# Patient Record
Sex: MALE | Race: WHITE | NOT HISPANIC OR LATINO | Employment: OTHER | URBAN - METROPOLITAN AREA
[De-identification: names, ages, dates, MRNs, and addresses within clinical notes are randomized per-mention and may not be internally consistent; named-entity substitution may affect disease eponyms.]

---

## 2017-01-02 ENCOUNTER — TRANSCRIBE ORDERS (OUTPATIENT)
Dept: LAB | Facility: OTHER | Age: 72
End: 2017-01-02

## 2017-01-02 ENCOUNTER — APPOINTMENT (OUTPATIENT)
Dept: LAB | Facility: OTHER | Age: 72
End: 2017-01-02
Payer: MEDICARE

## 2017-01-02 DIAGNOSIS — R73.09 OTHER ABNORMAL GLUCOSE: ICD-10-CM

## 2017-01-02 DIAGNOSIS — M54.9 DORSALGIA: ICD-10-CM

## 2017-01-02 DIAGNOSIS — K21.9 GASTRO-ESOPHAGEAL REFLUX DISEASE WITHOUT ESOPHAGITIS: ICD-10-CM

## 2017-01-02 DIAGNOSIS — I10 ESSENTIAL (PRIMARY) HYPERTENSION: ICD-10-CM

## 2017-01-02 DIAGNOSIS — E78.00 PURE HYPERCHOLESTEROLEMIA: ICD-10-CM

## 2017-01-02 LAB
ANION GAP SERPL CALCULATED.3IONS-SCNC: 10 MMOL/L (ref 4–13)
BASOPHILS # BLD AUTO: 0.01 THOUSANDS/ΜL (ref 0–0.1)
BASOPHILS NFR BLD AUTO: 0 % (ref 0–1)
BUN SERPL-MCNC: 20 MG/DL (ref 5–25)
CALCIUM SERPL-MCNC: 8.8 MG/DL (ref 8.3–10.1)
CHLORIDE SERPL-SCNC: 107 MMOL/L (ref 100–108)
CHOLEST SERPL-MCNC: 152 MG/DL (ref 50–200)
CO2 SERPL-SCNC: 25 MMOL/L (ref 21–32)
CREAT SERPL-MCNC: 1.04 MG/DL (ref 0.6–1.3)
EOSINOPHIL # BLD AUTO: 0.12 THOUSAND/ΜL (ref 0–0.61)
EOSINOPHIL NFR BLD AUTO: 2 % (ref 0–6)
ERYTHROCYTE [DISTWIDTH] IN BLOOD BY AUTOMATED COUNT: 12.6 % (ref 11.6–15.1)
EST. AVERAGE GLUCOSE BLD GHB EST-MCNC: 120 MG/DL
GFR SERPL CREATININE-BSD FRML MDRD: >60 ML/MIN/1.73SQ M
GLUCOSE SERPL-MCNC: 101 MG/DL (ref 65–140)
HBA1C MFR BLD: 5.8 % (ref 4.2–6.3)
HCT VFR BLD AUTO: 42.5 % (ref 36.5–49.3)
HDLC SERPL-MCNC: 52 MG/DL (ref 40–60)
HGB BLD-MCNC: 14.7 G/DL (ref 12–17)
LDLC SERPL CALC-MCNC: 65 MG/DL (ref 0–100)
LYMPHOCYTES # BLD AUTO: 1.62 THOUSANDS/ΜL (ref 0.6–4.47)
LYMPHOCYTES NFR BLD AUTO: 32 % (ref 14–44)
MCH RBC QN AUTO: 33.9 PG (ref 26.8–34.3)
MCHC RBC AUTO-ENTMCNC: 34.6 G/DL (ref 31.4–37.4)
MCV RBC AUTO: 98 FL (ref 82–98)
MONOCYTES # BLD AUTO: 0.54 THOUSAND/ΜL (ref 0.17–1.22)
MONOCYTES NFR BLD AUTO: 11 % (ref 4–12)
NEUTROPHILS # BLD AUTO: 2.79 THOUSANDS/ΜL (ref 1.85–7.62)
NEUTS SEG NFR BLD AUTO: 55 % (ref 43–75)
NRBC BLD AUTO-RTO: 0 /100 WBCS
PLATELET # BLD AUTO: 173 THOUSANDS/UL (ref 149–390)
PMV BLD AUTO: 10.9 FL (ref 8.9–12.7)
POTASSIUM SERPL-SCNC: 3.9 MMOL/L (ref 3.5–5.3)
RBC # BLD AUTO: 4.33 MILLION/UL (ref 3.88–5.62)
SODIUM SERPL-SCNC: 142 MMOL/L (ref 136–145)
TRIGL SERPL-MCNC: 176 MG/DL
WBC # BLD AUTO: 5.09 THOUSAND/UL (ref 4.31–10.16)

## 2017-01-02 PROCEDURE — 80061 LIPID PANEL: CPT

## 2017-01-02 PROCEDURE — 36415 COLL VENOUS BLD VENIPUNCTURE: CPT

## 2017-01-02 PROCEDURE — 83036 HEMOGLOBIN GLYCOSYLATED A1C: CPT

## 2017-01-02 PROCEDURE — 85025 COMPLETE CBC W/AUTO DIFF WBC: CPT

## 2017-01-02 PROCEDURE — 80048 BASIC METABOLIC PNL TOTAL CA: CPT

## 2017-01-16 ENCOUNTER — APPOINTMENT (OUTPATIENT)
Dept: LAB | Facility: CLINIC | Age: 72
End: 2017-01-16
Payer: MEDICARE

## 2017-01-16 ENCOUNTER — ALLSCRIPTS OFFICE VISIT (OUTPATIENT)
Dept: OTHER | Facility: OTHER | Age: 72
End: 2017-01-16

## 2017-01-16 DIAGNOSIS — M54.9 DORSALGIA: ICD-10-CM

## 2017-01-16 LAB
BILIRUB UR QL STRIP: NEGATIVE
CLARITY UR: NORMAL
COLOR UR: YELLOW
GLUCOSE UR STRIP-MCNC: NEGATIVE MG/DL
HGB UR QL STRIP.AUTO: NEGATIVE
KETONES UR STRIP-MCNC: NEGATIVE MG/DL
LEUKOCYTE ESTERASE UR QL STRIP: NEGATIVE
NITRITE UR QL STRIP: NEGATIVE
PH UR STRIP.AUTO: 7.5 [PH] (ref 4.5–8)
PROT UR STRIP-MCNC: NEGATIVE MG/DL
SP GR UR STRIP.AUTO: 1.02 (ref 1–1.03)
UROBILINOGEN UR QL STRIP.AUTO: 1 E.U./DL

## 2017-01-16 PROCEDURE — 81003 URINALYSIS AUTO W/O SCOPE: CPT

## 2017-01-30 ENCOUNTER — HOSPITAL ENCOUNTER (OUTPATIENT)
Dept: ULTRASOUND IMAGING | Facility: CLINIC | Age: 72
Discharge: HOME | End: 2017-01-30
Payer: MEDICARE

## 2017-01-30 DIAGNOSIS — M54.9 DORSALGIA: ICD-10-CM

## 2017-01-30 PROCEDURE — 76770 US EXAM ABDO BACK WALL COMP: CPT

## 2017-03-31 ENCOUNTER — ALLSCRIPTS OFFICE VISIT (OUTPATIENT)
Dept: OTHER | Facility: OTHER | Age: 72
End: 2017-03-31

## 2017-03-31 DIAGNOSIS — R07.9 CHEST PAIN: ICD-10-CM

## 2017-03-31 DIAGNOSIS — R94.39 OTHER NONSPECIFIC ABNORMAL CARDIOVASCULAR SYSTEM FUNCTION STUDY: ICD-10-CM

## 2017-03-31 DIAGNOSIS — I10 ESSENTIAL (PRIMARY) HYPERTENSION: ICD-10-CM

## 2017-03-31 DIAGNOSIS — E78.00 PURE HYPERCHOLESTEROLEMIA: ICD-10-CM

## 2017-04-18 ENCOUNTER — HOSPITAL ENCOUNTER (OUTPATIENT)
Dept: NON INVASIVE DIAGNOSTICS | Facility: CLINIC | Age: 72
Discharge: HOME | End: 2017-04-18
Payer: MEDICARE

## 2017-04-18 ENCOUNTER — ALLSCRIPTS OFFICE VISIT (OUTPATIENT)
Dept: OTHER | Facility: OTHER | Age: 72
End: 2017-04-18

## 2017-04-18 DIAGNOSIS — R07.9 CHEST PAIN: ICD-10-CM

## 2017-04-18 LAB
MAX DIASTOLIC BP: 76 MMHG
MAX HEART RATE: 123 BPM
MAX PREDICTED HEART RATE: 147 BPM
MAX. SYSTOLIC BP: 232 MMHG
PROTOCOL NAME: NORMAL
REASON FOR TERMINATION: NORMAL
TARGET HR FORMULA: NORMAL
TEST INDICATION: NORMAL
TIME IN EXERCISE PHASE: 480 S

## 2017-04-18 PROCEDURE — 93350 STRESS TTE ONLY: CPT

## 2017-04-22 ENCOUNTER — APPOINTMENT (EMERGENCY)
Dept: RADIOLOGY | Facility: HOSPITAL | Age: 72
DRG: 287 | End: 2017-04-22
Payer: MEDICARE

## 2017-04-22 ENCOUNTER — HOSPITAL ENCOUNTER (INPATIENT)
Facility: HOSPITAL | Age: 72
LOS: 2 days | Discharge: HOME | DRG: 287 | End: 2017-04-24
Attending: EMERGENCY MEDICINE | Admitting: INTERNAL MEDICINE
Payer: MEDICARE

## 2017-04-22 DIAGNOSIS — R07.9 CHEST PAIN: Primary | ICD-10-CM

## 2017-04-22 PROBLEM — E78.5 HYPERLIPIDEMIA: Chronic | Status: ACTIVE | Noted: 2017-04-22

## 2017-04-22 PROBLEM — I10 HTN (HYPERTENSION): Chronic | Status: ACTIVE | Noted: 2017-04-22

## 2017-04-22 LAB
ANION GAP SERPL CALCULATED.3IONS-SCNC: 10 MMOL/L (ref 4–13)
ANION GAP SERPL CALCULATED.3IONS-SCNC: 11 MMOL/L (ref 4–13)
BASOPHILS # BLD AUTO: 0.03 THOUSANDS/ΜL (ref 0–0.1)
BASOPHILS NFR BLD AUTO: 1 % (ref 0–1)
BUN SERPL-MCNC: 21 MG/DL (ref 5–25)
BUN SERPL-MCNC: 25 MG/DL (ref 5–25)
CALCIUM SERPL-MCNC: 8.5 MG/DL (ref 8.3–10.1)
CALCIUM SERPL-MCNC: 8.7 MG/DL (ref 8.3–10.1)
CHLORIDE SERPL-SCNC: 107 MMOL/L (ref 100–108)
CHLORIDE SERPL-SCNC: 108 MMOL/L (ref 100–108)
CHOLEST SERPL-MCNC: 120 MG/DL (ref 50–200)
CO2 SERPL-SCNC: 24 MMOL/L (ref 21–32)
CO2 SERPL-SCNC: 24 MMOL/L (ref 21–32)
CREAT SERPL-MCNC: 1.09 MG/DL (ref 0.6–1.3)
CREAT SERPL-MCNC: 1.12 MG/DL (ref 0.6–1.3)
EOSINOPHIL # BLD AUTO: 0.12 THOUSAND/ΜL (ref 0–0.61)
EOSINOPHIL NFR BLD AUTO: 2 % (ref 0–6)
ERYTHROCYTE [DISTWIDTH] IN BLOOD BY AUTOMATED COUNT: 12.7 % (ref 11.6–15.1)
ERYTHROCYTE [DISTWIDTH] IN BLOOD BY AUTOMATED COUNT: 12.9 % (ref 11.6–15.1)
GFR SERPL CREATININE-BSD FRML MDRD: >60 ML/MIN/1.73SQ M
GFR SERPL CREATININE-BSD FRML MDRD: >60 ML/MIN/1.73SQ M
GLUCOSE SERPL-MCNC: 112 MG/DL (ref 65–140)
GLUCOSE SERPL-MCNC: 124 MG/DL (ref 65–140)
HCT VFR BLD AUTO: 40.4 % (ref 36.5–49.3)
HCT VFR BLD AUTO: 45.7 % (ref 36.5–49.3)
HDLC SERPL-MCNC: 53 MG/DL (ref 40–60)
HGB BLD-MCNC: 14 G/DL (ref 12–17)
HGB BLD-MCNC: 15.5 G/DL (ref 12–17)
LDLC SERPL CALC-MCNC: 50 MG/DL (ref 0–100)
LYMPHOCYTES # BLD AUTO: 2.35 THOUSANDS/ΜL (ref 0.6–4.47)
LYMPHOCYTES NFR BLD AUTO: 36 % (ref 14–44)
MAGNESIUM SERPL-MCNC: 1.8 MG/DL (ref 1.6–2.6)
MCH RBC QN AUTO: 34.2 PG (ref 26.8–34.3)
MCH RBC QN AUTO: 34.3 PG (ref 26.8–34.3)
MCHC RBC AUTO-ENTMCNC: 33.9 G/DL (ref 31.4–37.4)
MCHC RBC AUTO-ENTMCNC: 34.7 G/DL (ref 31.4–37.4)
MCV RBC AUTO: 101 FL (ref 82–98)
MCV RBC AUTO: 99 FL (ref 82–98)
MONOCYTES # BLD AUTO: 0.78 THOUSAND/ΜL (ref 0.17–1.22)
MONOCYTES NFR BLD AUTO: 12 % (ref 4–12)
NEUTROPHILS # BLD AUTO: 3.23 THOUSANDS/ΜL (ref 1.85–7.62)
NEUTS SEG NFR BLD AUTO: 50 % (ref 43–75)
NRBC BLD AUTO-RTO: 0 /100 WBCS
PLATELET # BLD AUTO: 185 THOUSANDS/UL (ref 149–390)
PLATELET # BLD AUTO: 197 THOUSANDS/UL (ref 149–390)
PMV BLD AUTO: 10.1 FL (ref 8.9–12.7)
PMV BLD AUTO: 10.4 FL (ref 8.9–12.7)
POTASSIUM SERPL-SCNC: 3.9 MMOL/L (ref 3.5–5.3)
POTASSIUM SERPL-SCNC: 5.5 MMOL/L (ref 3.5–5.3)
RBC # BLD AUTO: 4.08 MILLION/UL (ref 3.88–5.62)
RBC # BLD AUTO: 4.53 MILLION/UL (ref 3.88–5.62)
SODIUM SERPL-SCNC: 141 MMOL/L (ref 136–145)
SODIUM SERPL-SCNC: 143 MMOL/L (ref 136–145)
TRIGL SERPL-MCNC: 83 MG/DL
TROPONIN I SERPL-MCNC: <0.02 NG/ML
WBC # BLD AUTO: 6.08 THOUSAND/UL (ref 4.31–10.16)
WBC # BLD AUTO: 6.52 THOUSAND/UL (ref 4.31–10.16)

## 2017-04-22 PROCEDURE — 84484 ASSAY OF TROPONIN QUANT: CPT | Performed by: PHYSICIAN ASSISTANT

## 2017-04-22 PROCEDURE — 80048 BASIC METABOLIC PNL TOTAL CA: CPT

## 2017-04-22 PROCEDURE — 80061 LIPID PANEL: CPT | Performed by: INTERNAL MEDICINE

## 2017-04-22 PROCEDURE — 36415 COLL VENOUS BLD VENIPUNCTURE: CPT

## 2017-04-22 PROCEDURE — 84484 ASSAY OF TROPONIN QUANT: CPT

## 2017-04-22 PROCEDURE — 99285 EMERGENCY DEPT VISIT HI MDM: CPT

## 2017-04-22 PROCEDURE — 83735 ASSAY OF MAGNESIUM: CPT | Performed by: INTERNAL MEDICINE

## 2017-04-22 PROCEDURE — 93005 ELECTROCARDIOGRAM TRACING: CPT

## 2017-04-22 PROCEDURE — 80048 BASIC METABOLIC PNL TOTAL CA: CPT | Performed by: INTERNAL MEDICINE

## 2017-04-22 PROCEDURE — 71020 HB CHEST X-RAY 2VW FRONTAL&LATL: CPT

## 2017-04-22 PROCEDURE — 85025 COMPLETE CBC W/AUTO DIFF WBC: CPT

## 2017-04-22 PROCEDURE — 85027 COMPLETE CBC AUTOMATED: CPT | Performed by: INTERNAL MEDICINE

## 2017-04-22 PROCEDURE — 94760 N-INVAS EAR/PLS OXIMETRY 1: CPT

## 2017-04-22 RX ORDER — FENOFIBRATE 145 MG/1
145 TABLET, COATED ORAL DAILY
COMMUNITY
Start: 2015-11-12 | End: 2018-05-12 | Stop reason: SDUPTHER

## 2017-04-22 RX ORDER — AMLODIPINE BESYLATE 5 MG/1
5 TABLET ORAL DAILY
COMMUNITY
Start: 2014-09-19 | End: 2018-03-12 | Stop reason: SDUPTHER

## 2017-04-22 RX ORDER — NITROGLYCERIN 0.4 MG/1
0.4 TABLET SUBLINGUAL ONCE
Status: COMPLETED | OUTPATIENT
Start: 2017-04-22 | End: 2017-04-22

## 2017-04-22 RX ORDER — HYDRALAZINE HYDROCHLORIDE 20 MG/ML
5 INJECTION INTRAMUSCULAR; INTRAVENOUS EVERY 6 HOURS PRN
Status: DISCONTINUED | OUTPATIENT
Start: 2017-04-22 | End: 2017-04-24 | Stop reason: HOSPADM

## 2017-04-22 RX ORDER — AMLODIPINE BESYLATE 5 MG/1
5 TABLET ORAL DAILY
Status: DISCONTINUED | OUTPATIENT
Start: 2017-04-22 | End: 2017-04-24 | Stop reason: HOSPADM

## 2017-04-22 RX ORDER — ASPIRIN 81 MG/1
81 TABLET, CHEWABLE ORAL
COMMUNITY
End: 2018-03-01

## 2017-04-22 RX ORDER — DOCUSATE SODIUM 100 MG/1
100 CAPSULE, LIQUID FILLED ORAL 2 TIMES DAILY
Status: DISCONTINUED | OUTPATIENT
Start: 2017-04-22 | End: 2017-04-24 | Stop reason: HOSPADM

## 2017-04-22 RX ORDER — PRAVASTATIN SODIUM 40 MG
40 TABLET ORAL
Status: DISCONTINUED | OUTPATIENT
Start: 2017-04-22 | End: 2017-04-24 | Stop reason: HOSPADM

## 2017-04-22 RX ORDER — FENOFIBRATE 145 MG/1
145 TABLET, COATED ORAL DAILY
Status: DISCONTINUED | OUTPATIENT
Start: 2017-04-22 | End: 2017-04-24 | Stop reason: HOSPADM

## 2017-04-22 RX ORDER — LISINOPRIL 40 MG/1
40 TABLET ORAL DAILY
COMMUNITY
Start: 2016-01-18 | End: 2018-03-08 | Stop reason: SDUPTHER

## 2017-04-22 RX ORDER — ACETAMINOPHEN 325 MG/1
650 TABLET ORAL EVERY 4 HOURS PRN
Status: DISCONTINUED | OUTPATIENT
Start: 2017-04-22 | End: 2017-04-24 | Stop reason: HOSPADM

## 2017-04-22 RX ORDER — LISINOPRIL 20 MG/1
40 TABLET ORAL DAILY
Status: DISCONTINUED | OUTPATIENT
Start: 2017-04-22 | End: 2017-04-24 | Stop reason: HOSPADM

## 2017-04-22 RX ORDER — MULTIVITAMIN
1 TABLET ORAL
COMMUNITY
End: 2018-03-08

## 2017-04-22 RX ORDER — LOVASTATIN 40 MG/1
40 TABLET ORAL DAILY
COMMUNITY
Start: 2016-01-26 | End: 2018-05-01 | Stop reason: SDUPTHER

## 2017-04-22 RX ORDER — ASPIRIN 81 MG/1
81 TABLET, CHEWABLE ORAL DAILY
Status: DISCONTINUED | OUTPATIENT
Start: 2017-04-22 | End: 2017-04-24 | Stop reason: HOSPADM

## 2017-04-22 RX ORDER — ONDANSETRON 2 MG/ML
4 INJECTION INTRAMUSCULAR; INTRAVENOUS EVERY 6 HOURS PRN
Status: DISCONTINUED | OUTPATIENT
Start: 2017-04-22 | End: 2017-04-24 | Stop reason: HOSPADM

## 2017-04-22 RX ADMIN — ENOXAPARIN SODIUM 40 MG: 40 INJECTION SUBCUTANEOUS at 08:53

## 2017-04-22 RX ADMIN — PRAVASTATIN SODIUM 40 MG: 40 TABLET ORAL at 16:19

## 2017-04-22 RX ADMIN — FENOFIBRATE 145 MG: 145 TABLET ORAL at 08:54

## 2017-04-22 RX ADMIN — DOCUSATE SODIUM 100 MG: 100 CAPSULE, LIQUID FILLED ORAL at 16:19

## 2017-04-22 RX ADMIN — DOCUSATE SODIUM 100 MG: 100 CAPSULE, LIQUID FILLED ORAL at 08:54

## 2017-04-22 RX ADMIN — NITROGLYCERIN 0.4 MG: 0.4 TABLET SUBLINGUAL at 04:00

## 2017-04-22 RX ADMIN — AMLODIPINE BESYLATE 5 MG: 5 TABLET ORAL at 08:54

## 2017-04-22 RX ADMIN — METOPROLOL TARTRATE 25 MG: 25 TABLET ORAL at 08:54

## 2017-04-23 LAB
INR PPP: 1.02 (ref 0.86–1.16)
MAGNESIUM SERPL-MCNC: 2.2 MG/DL (ref 1.6–2.6)
PROTHROMBIN TIME: 13.3 SECONDS (ref 12–14.3)
TROPONIN I SERPL-MCNC: <0.02 NG/ML
TROPONIN I SERPL-MCNC: <0.02 NG/ML

## 2017-04-23 PROCEDURE — 93005 ELECTROCARDIOGRAM TRACING: CPT | Performed by: PHYSICIAN ASSISTANT

## 2017-04-23 PROCEDURE — 83735 ASSAY OF MAGNESIUM: CPT | Performed by: PHYSICIAN ASSISTANT

## 2017-04-23 PROCEDURE — 84484 ASSAY OF TROPONIN QUANT: CPT | Performed by: PHYSICIAN ASSISTANT

## 2017-04-23 PROCEDURE — 85610 PROTHROMBIN TIME: CPT | Performed by: PHYSICIAN ASSISTANT

## 2017-04-23 PROCEDURE — 93005 ELECTROCARDIOGRAM TRACING: CPT

## 2017-04-23 RX ORDER — ASPIRIN 81 MG/1
324 TABLET, CHEWABLE ORAL ONCE
Status: COMPLETED | OUTPATIENT
Start: 2017-04-23 | End: 2017-04-23

## 2017-04-23 RX ORDER — NITROGLYCERIN 0.4 MG/1
0.4 TABLET SUBLINGUAL
Status: DISCONTINUED | OUTPATIENT
Start: 2017-04-23 | End: 2017-04-24 | Stop reason: HOSPADM

## 2017-04-23 RX ORDER — SODIUM CHLORIDE 9 MG/ML
125 INJECTION, SOLUTION INTRAVENOUS CONTINUOUS
Status: DISCONTINUED | OUTPATIENT
Start: 2017-04-24 | End: 2017-04-24 | Stop reason: HOSPADM

## 2017-04-23 RX ORDER — MAGNESIUM HYDROXIDE/ALUMINUM HYDROXICE/SIMETHICONE 120; 1200; 1200 MG/30ML; MG/30ML; MG/30ML
30 SUSPENSION ORAL EVERY 4 HOURS PRN
Status: DISCONTINUED | OUTPATIENT
Start: 2017-04-23 | End: 2017-04-24 | Stop reason: HOSPADM

## 2017-04-23 RX ADMIN — NITROGLYCERIN 0.4 MG: 0.4 TABLET SUBLINGUAL at 22:40

## 2017-04-23 RX ADMIN — NITROGLYCERIN 0.4 MG: 0.4 TABLET SUBLINGUAL at 22:45

## 2017-04-23 RX ADMIN — ALUMINUM HYDROXIDE, MAGNESIUM HYDROXIDE, AND SIMETHICONE 30 ML: 200; 200; 20 SUSPENSION ORAL at 12:31

## 2017-04-23 RX ADMIN — NITROGLYCERIN 0.4 MG: 0.4 TABLET SUBLINGUAL at 22:51

## 2017-04-23 RX ADMIN — ASPIRIN 81 MG CHEWABLE TABLET 81 MG: 81 TABLET CHEWABLE at 09:58

## 2017-04-23 RX ADMIN — FENOFIBRATE 145 MG: 145 TABLET ORAL at 09:59

## 2017-04-23 RX ADMIN — LISINOPRIL 40 MG: 20 TABLET ORAL at 09:59

## 2017-04-23 RX ADMIN — METOPROLOL TARTRATE 25 MG: 25 TABLET ORAL at 09:59

## 2017-04-23 RX ADMIN — ASPIRIN 81 MG CHEWABLE TABLET 324 MG: 81 TABLET CHEWABLE at 19:30

## 2017-04-23 RX ADMIN — ACETAMINOPHEN 650 MG: 325 TABLET ORAL at 06:50

## 2017-04-23 RX ADMIN — ENOXAPARIN SODIUM 40 MG: 40 INJECTION SUBCUTANEOUS at 10:00

## 2017-04-23 RX ADMIN — AMLODIPINE BESYLATE 5 MG: 5 TABLET ORAL at 09:59

## 2017-04-23 RX ADMIN — PRAVASTATIN SODIUM 40 MG: 40 TABLET ORAL at 16:32

## 2017-04-24 ENCOUNTER — APPOINTMENT (INPATIENT)
Dept: INTERVENTIONAL RADIOLOGY/VASCULAR | Facility: HOSPITAL | Age: 72
DRG: 287 | End: 2017-04-24
Payer: MEDICARE

## 2017-04-24 ENCOUNTER — APPOINTMENT (INPATIENT)
Dept: NON INVASIVE DIAGNOSTICS | Facility: HOSPITAL | Age: 72
DRG: 287 | End: 2017-04-24
Payer: MEDICARE

## 2017-04-24 VITALS
SYSTOLIC BLOOD PRESSURE: 153 MMHG | DIASTOLIC BLOOD PRESSURE: 81 MMHG | HEART RATE: 72 BPM | TEMPERATURE: 97.5 F | WEIGHT: 214.29 LBS | BODY MASS INDEX: 36.58 KG/M2 | OXYGEN SATURATION: 92 % | RESPIRATION RATE: 18 BRPM | HEIGHT: 64 IN

## 2017-04-24 LAB
ANION GAP SERPL CALCULATED.3IONS-SCNC: 9 MMOL/L (ref 4–13)
ATRIAL RATE: 58 BPM
ATRIAL RATE: 59 BPM
ATRIAL RATE: 60 BPM
ATRIAL RATE: 60 BPM
ATRIAL RATE: 61 BPM
BUN SERPL-MCNC: 19 MG/DL (ref 5–25)
CALCIUM SERPL-MCNC: 8.7 MG/DL (ref 8.3–10.1)
CHLORIDE SERPL-SCNC: 106 MMOL/L (ref 100–108)
CO2 SERPL-SCNC: 24 MMOL/L (ref 21–32)
CREAT SERPL-MCNC: 0.95 MG/DL (ref 0.6–1.3)
ERYTHROCYTE [DISTWIDTH] IN BLOOD BY AUTOMATED COUNT: 12.7 % (ref 11.6–15.1)
GFR SERPL CREATININE-BSD FRML MDRD: >60 ML/MIN/1.73SQ M
GLUCOSE SERPL-MCNC: 115 MG/DL (ref 65–140)
HCT VFR BLD AUTO: 40.3 % (ref 36.5–49.3)
HGB BLD-MCNC: 13.8 G/DL (ref 12–17)
MCH RBC QN AUTO: 34 PG (ref 26.8–34.3)
MCHC RBC AUTO-ENTMCNC: 34.2 G/DL (ref 31.4–37.4)
MCV RBC AUTO: 99 FL (ref 82–98)
NT-PROBNP SERPL-MCNC: 24 PG/ML
P AXIS: 28 DEGREES
P AXIS: 29 DEGREES
P AXIS: 34 DEGREES
P AXIS: 61 DEGREES
P AXIS: 68 DEGREES
PLATELET # BLD AUTO: 186 THOUSANDS/UL (ref 149–390)
PMV BLD AUTO: 10.3 FL (ref 8.9–12.7)
POTASSIUM SERPL-SCNC: 4.2 MMOL/L (ref 3.5–5.3)
PR INTERVAL: 180 MS
PR INTERVAL: 184 MS
PR INTERVAL: 190 MS
PR INTERVAL: 192 MS
PR INTERVAL: 192 MS
QRS AXIS: 26 DEGREES
QRS AXIS: 27 DEGREES
QRS AXIS: 27 DEGREES
QRS AXIS: 28 DEGREES
QRS AXIS: 57 DEGREES
QRSD INTERVAL: 82 MS
QRSD INTERVAL: 82 MS
QRSD INTERVAL: 84 MS
QRSD INTERVAL: 86 MS
QRSD INTERVAL: 88 MS
QT INTERVAL: 416 MS
QT INTERVAL: 420 MS
QTC INTERVAL: 408 MS
QTC INTERVAL: 415 MS
QTC INTERVAL: 416 MS
QTC INTERVAL: 416 MS
QTC INTERVAL: 418 MS
RBC # BLD AUTO: 4.06 MILLION/UL (ref 3.88–5.62)
SODIUM SERPL-SCNC: 139 MMOL/L (ref 136–145)
T WAVE AXIS: 15 DEGREES
T WAVE AXIS: 24 DEGREES
T WAVE AXIS: 30 DEGREES
T WAVE AXIS: 37 DEGREES
T WAVE AXIS: 42 DEGREES
VENTRICULAR RATE: 58 BPM
VENTRICULAR RATE: 59 BPM
VENTRICULAR RATE: 60 BPM
VENTRICULAR RATE: 60 BPM
VENTRICULAR RATE: 61 BPM
WBC # BLD AUTO: 5.9 THOUSAND/UL (ref 4.31–10.16)

## 2017-04-24 PROCEDURE — 99152 MOD SED SAME PHYS/QHP 5/>YRS: CPT | Performed by: PHYSICIAN ASSISTANT

## 2017-04-24 PROCEDURE — 93306 TTE W/DOPPLER COMPLETE: CPT

## 2017-04-24 PROCEDURE — C1894 INTRO/SHEATH, NON-LASER: HCPCS | Performed by: PHYSICIAN ASSISTANT

## 2017-04-24 PROCEDURE — 99153 MOD SED SAME PHYS/QHP EA: CPT | Performed by: PHYSICIAN ASSISTANT

## 2017-04-24 PROCEDURE — B2111ZZ FLUOROSCOPY OF MULTIPLE CORONARY ARTERIES USING LOW OSMOLAR CONTRAST: ICD-10-PCS | Performed by: FAMILY MEDICINE

## 2017-04-24 PROCEDURE — 93458 L HRT ARTERY/VENTRICLE ANGIO: CPT | Performed by: PHYSICIAN ASSISTANT

## 2017-04-24 PROCEDURE — 85027 COMPLETE CBC AUTOMATED: CPT | Performed by: PHYSICIAN ASSISTANT

## 2017-04-24 PROCEDURE — 80048 BASIC METABOLIC PNL TOTAL CA: CPT | Performed by: INTERNAL MEDICINE

## 2017-04-24 PROCEDURE — 4A023N7 MEASUREMENT OF CARDIAC SAMPLING AND PRESSURE, LEFT HEART, PERCUTANEOUS APPROACH: ICD-10-PCS | Performed by: FAMILY MEDICINE

## 2017-04-24 PROCEDURE — B2151ZZ FLUOROSCOPY OF LEFT HEART USING LOW OSMOLAR CONTRAST: ICD-10-PCS | Performed by: FAMILY MEDICINE

## 2017-04-24 PROCEDURE — 83880 ASSAY OF NATRIURETIC PEPTIDE: CPT | Performed by: PHYSICIAN ASSISTANT

## 2017-04-24 PROCEDURE — C1769 GUIDE WIRE: HCPCS | Performed by: PHYSICIAN ASSISTANT

## 2017-04-24 RX ORDER — MIDAZOLAM HYDROCHLORIDE 1 MG/ML
INJECTION INTRAMUSCULAR; INTRAVENOUS CODE/TRAUMA/SEDATION MEDICATION
Status: COMPLETED | OUTPATIENT
Start: 2017-04-24 | End: 2017-04-24

## 2017-04-24 RX ORDER — DIPHENHYDRAMINE HYDROCHLORIDE 50 MG/ML
INJECTION INTRAMUSCULAR; INTRAVENOUS CODE/TRAUMA/SEDATION MEDICATION
Status: COMPLETED | OUTPATIENT
Start: 2017-04-24 | End: 2017-04-24

## 2017-04-24 RX ORDER — LIDOCAINE HYDROCHLORIDE 10 MG/ML
INJECTION, SOLUTION INFILTRATION; PERINEURAL CODE/TRAUMA/SEDATION MEDICATION
Status: COMPLETED | OUTPATIENT
Start: 2017-04-24 | End: 2017-04-24

## 2017-04-24 RX ORDER — FENTANYL CITRATE 50 UG/ML
INJECTION, SOLUTION INTRAMUSCULAR; INTRAVENOUS CODE/TRAUMA/SEDATION MEDICATION
Status: COMPLETED | OUTPATIENT
Start: 2017-04-24 | End: 2017-04-24

## 2017-04-24 RX ORDER — VERAPAMIL HCL 2.5 MG/ML
AMPUL (ML) INTRAVENOUS CODE/TRAUMA/SEDATION MEDICATION
Status: COMPLETED | OUTPATIENT
Start: 2017-04-24 | End: 2017-04-24

## 2017-04-24 RX ORDER — METHYLPREDNISOLONE SODIUM SUCCINATE 125 MG/2ML
125 INJECTION, POWDER, LYOPHILIZED, FOR SOLUTION INTRAMUSCULAR; INTRAVENOUS ONCE
Status: COMPLETED | OUTPATIENT
Start: 2017-04-24 | End: 2017-04-24

## 2017-04-24 RX ORDER — SODIUM CHLORIDE 9 MG/ML
75 INJECTION, SOLUTION INTRAVENOUS CONTINUOUS
Status: DISCONTINUED | OUTPATIENT
Start: 2017-04-24 | End: 2017-04-24 | Stop reason: HOSPADM

## 2017-04-24 RX ORDER — NITROGLYCERIN 20 MG/100ML
INJECTION INTRAVENOUS CODE/TRAUMA/SEDATION MEDICATION
Status: COMPLETED | OUTPATIENT
Start: 2017-04-24 | End: 2017-04-24

## 2017-04-24 RX ADMIN — FENOFIBRATE 145 MG: 145 TABLET ORAL at 10:10

## 2017-04-24 RX ADMIN — ASPIRIN 81 MG CHEWABLE TABLET 81 MG: 81 TABLET CHEWABLE at 10:10

## 2017-04-24 RX ADMIN — MIDAZOLAM HYDROCHLORIDE 1 MG: 1 INJECTION, SOLUTION INTRAMUSCULAR; INTRAVENOUS at 13:15

## 2017-04-24 RX ADMIN — SODIUM CHLORIDE 125 ML/HR: 0.9 INJECTION, SOLUTION INTRAVENOUS at 07:42

## 2017-04-24 RX ADMIN — IOHEXOL 90 ML: 350 INJECTION, SOLUTION INTRAVENOUS at 13:24

## 2017-04-24 RX ADMIN — LIDOCAINE HYDROCHLORIDE 1 ML: 10 INJECTION, SOLUTION INFILTRATION; PERINEURAL at 13:02

## 2017-04-24 RX ADMIN — DIPHENHYDRAMINE HYDROCHLORIDE 50 MG: 50 INJECTION, SOLUTION INTRAMUSCULAR; INTRAVENOUS at 13:01

## 2017-04-24 RX ADMIN — METHYLPREDNISOLONE SODIUM SUCCINATE 125 MG: 125 INJECTION, POWDER, FOR SOLUTION INTRAMUSCULAR; INTRAVENOUS at 12:59

## 2017-04-24 RX ADMIN — SODIUM CHLORIDE 75 ML/HR: 0.9 INJECTION, SOLUTION INTRAVENOUS at 13:54

## 2017-04-24 RX ADMIN — MIDAZOLAM HYDROCHLORIDE 1 MG: 1 INJECTION, SOLUTION INTRAMUSCULAR; INTRAVENOUS at 13:03

## 2017-04-24 RX ADMIN — AMLODIPINE BESYLATE 5 MG: 5 TABLET ORAL at 10:09

## 2017-04-24 RX ADMIN — PRAVASTATIN SODIUM 40 MG: 40 TABLET ORAL at 17:11

## 2017-04-24 RX ADMIN — FENTANYL CITRATE 50 MCG: 50 INJECTION, SOLUTION INTRAMUSCULAR; INTRAVENOUS at 13:04

## 2017-04-24 RX ADMIN — LISINOPRIL 40 MG: 20 TABLET ORAL at 10:10

## 2017-04-24 RX ADMIN — VERAPAMIL HYDROCHLORIDE 2.5 MG: 2.5 INJECTION, SOLUTION INTRAVENOUS at 13:04

## 2017-04-24 RX ADMIN — DOCUSATE SODIUM 100 MG: 100 CAPSULE, LIQUID FILLED ORAL at 10:10

## 2017-04-24 RX ADMIN — FENTANYL CITRATE 25 MCG: 50 INJECTION, SOLUTION INTRAMUSCULAR; INTRAVENOUS at 13:15

## 2017-04-24 RX ADMIN — NITROGLYCERIN 200 MCG: 20 INJECTION INTRAVENOUS at 13:04

## 2017-04-25 ENCOUNTER — GENERIC CONVERSION - ENCOUNTER (OUTPATIENT)
Dept: OTHER | Facility: OTHER | Age: 72
End: 2017-04-25

## 2017-04-26 ENCOUNTER — GENERIC CONVERSION - ENCOUNTER (OUTPATIENT)
Dept: OTHER | Facility: OTHER | Age: 72
End: 2017-04-26

## 2017-05-01 ENCOUNTER — GENERIC CONVERSION - ENCOUNTER (OUTPATIENT)
Dept: OTHER | Facility: OTHER | Age: 72
End: 2017-05-01

## 2017-05-13 ENCOUNTER — HOSPITAL ENCOUNTER (EMERGENCY)
Facility: HOSPITAL | Age: 72
Discharge: HOME | End: 2017-05-13
Attending: EMERGENCY MEDICINE
Payer: MEDICARE

## 2017-05-13 ENCOUNTER — APPOINTMENT (EMERGENCY)
Dept: CT IMAGING | Facility: HOSPITAL | Age: 72
End: 2017-05-13
Payer: MEDICARE

## 2017-05-13 VITALS
DIASTOLIC BLOOD PRESSURE: 67 MMHG | RESPIRATION RATE: 17 BRPM | WEIGHT: 186.07 LBS | HEART RATE: 85 BPM | SYSTOLIC BLOOD PRESSURE: 134 MMHG | TEMPERATURE: 99.9 F | OXYGEN SATURATION: 94 % | BODY MASS INDEX: 31.94 KG/M2

## 2017-05-13 DIAGNOSIS — K52.9 ENTERITIS: Primary | ICD-10-CM

## 2017-05-13 LAB
ALBUMIN SERPL BCP-MCNC: 3.9 G/DL (ref 3.5–5)
ALP SERPL-CCNC: 56 U/L (ref 46–116)
ALT SERPL W P-5'-P-CCNC: 26 U/L (ref 12–78)
ANION GAP SERPL CALCULATED.3IONS-SCNC: 14 MMOL/L (ref 4–13)
AST SERPL W P-5'-P-CCNC: 22 U/L (ref 5–45)
BASOPHILS # BLD MANUAL: 0 THOUSAND/UL (ref 0–0.1)
BASOPHILS NFR MAR MANUAL: 0 % (ref 0–1)
BILIRUB DIRECT SERPL-MCNC: 0.19 MG/DL (ref 0–0.2)
BILIRUB SERPL-MCNC: 0.5 MG/DL (ref 0.2–1)
BUN SERPL-MCNC: 24 MG/DL (ref 5–25)
CALCIUM SERPL-MCNC: 8.6 MG/DL (ref 8.3–10.1)
CHLORIDE SERPL-SCNC: 105 MMOL/L (ref 100–108)
CLARITY, POC: CLEAR
CO2 SERPL-SCNC: 22 MMOL/L (ref 21–32)
COLOR, POC: YELLOW
CREAT SERPL-MCNC: 1.12 MG/DL (ref 0.6–1.3)
EOSINOPHIL # BLD MANUAL: 0 THOUSAND/UL (ref 0–0.4)
EOSINOPHIL NFR BLD MANUAL: 0 % (ref 0–6)
ERYTHROCYTE [DISTWIDTH] IN BLOOD BY AUTOMATED COUNT: 12.5 % (ref 11.6–15.1)
EXT BILIRUBIN, UA: NEGATIVE
EXT BLOOD URINE: NEGATIVE
EXT GLUCOSE, UA: NEGATIVE
EXT KETONES: NORMAL
EXT NITRITE, UA: NEGATIVE
EXT PH, UA: 5
EXT PROTEIN, UA: NORMAL
EXT SPECIFIC GRAVITY, UA: 1.03
EXT UROBILINOGEN: 0.2
GFR SERPL CREATININE-BSD FRML MDRD: >60 ML/MIN/1.73SQ M
GLUCOSE SERPL-MCNC: 114 MG/DL (ref 65–140)
HCT VFR BLD AUTO: 45.6 % (ref 36.5–49.3)
HGB BLD-MCNC: 15.8 G/DL (ref 12–17)
LACTATE SERPL-SCNC: 2 MMOL/L (ref 0.5–2)
LACTATE SERPL-SCNC: 2.3 MMOL/L (ref 0.5–2)
LIPASE SERPL-CCNC: 82 U/L (ref 73–393)
LYMPHOCYTES # BLD AUTO: 0.53 THOUSAND/UL (ref 0.6–4.47)
LYMPHOCYTES # BLD AUTO: 7 % (ref 14–44)
MCH RBC QN AUTO: 34.2 PG (ref 26.8–34.3)
MCHC RBC AUTO-ENTMCNC: 34.6 G/DL (ref 31.4–37.4)
MCV RBC AUTO: 99 FL (ref 82–98)
MONOCYTES # BLD AUTO: 0.08 THOUSAND/UL (ref 0–1.22)
MONOCYTES NFR BLD: 1 % (ref 4–12)
NEUTROPHILS # BLD MANUAL: 6.9 THOUSAND/UL (ref 1.85–7.62)
NEUTS SEG NFR BLD AUTO: 92 % (ref 43–75)
NRBC BLD AUTO-RTO: 0 /100 WBCS
PLATELET # BLD AUTO: 152 THOUSANDS/UL (ref 149–390)
PLATELET BLD QL SMEAR: ADEQUATE
PMV BLD AUTO: 10.2 FL (ref 8.9–12.7)
POTASSIUM SERPL-SCNC: 3.9 MMOL/L (ref 3.5–5.3)
PROT SERPL-MCNC: 7.1 G/DL (ref 6.4–8.2)
RBC # BLD AUTO: 4.62 MILLION/UL (ref 3.88–5.62)
SODIUM SERPL-SCNC: 141 MMOL/L (ref 136–145)
TOTAL CELLS COUNTED SPEC: 100
WBC # BLD AUTO: 7.5 THOUSAND/UL (ref 4.31–10.16)
WBC # BLD EST: NEGATIVE 10*3/UL

## 2017-05-13 PROCEDURE — 85007 BL SMEAR W/DIFF WBC COUNT: CPT | Performed by: PHYSICIAN ASSISTANT

## 2017-05-13 PROCEDURE — 83605 ASSAY OF LACTIC ACID: CPT | Performed by: PHYSICIAN ASSISTANT

## 2017-05-13 PROCEDURE — 36415 COLL VENOUS BLD VENIPUNCTURE: CPT | Performed by: PHYSICIAN ASSISTANT

## 2017-05-13 PROCEDURE — 80048 BASIC METABOLIC PNL TOTAL CA: CPT | Performed by: PHYSICIAN ASSISTANT

## 2017-05-13 PROCEDURE — 74177 CT ABD & PELVIS W/CONTRAST: CPT

## 2017-05-13 PROCEDURE — 99284 EMERGENCY DEPT VISIT MOD MDM: CPT

## 2017-05-13 PROCEDURE — 85027 COMPLETE CBC AUTOMATED: CPT | Performed by: PHYSICIAN ASSISTANT

## 2017-05-13 PROCEDURE — 83690 ASSAY OF LIPASE: CPT | Performed by: PHYSICIAN ASSISTANT

## 2017-05-13 PROCEDURE — 81002 URINALYSIS NONAUTO W/O SCOPE: CPT | Performed by: PHYSICIAN ASSISTANT

## 2017-05-13 PROCEDURE — 80076 HEPATIC FUNCTION PANEL: CPT | Performed by: PHYSICIAN ASSISTANT

## 2017-05-13 RX ORDER — MORPHINE SULFATE 4 MG/ML
4 INJECTION, SOLUTION INTRAMUSCULAR; INTRAVENOUS ONCE
Status: COMPLETED | OUTPATIENT
Start: 2017-05-13 | End: 2017-05-13

## 2017-05-13 RX ORDER — DICYCLOMINE HCL 20 MG
20 TABLET ORAL EVERY 6 HOURS PRN
Qty: 30 TABLET | Refills: 0 | Status: SHIPPED | OUTPATIENT
Start: 2017-05-13 | End: 2018-03-08

## 2017-05-13 RX ORDER — ONDANSETRON 2 MG/ML
4 INJECTION INTRAMUSCULAR; INTRAVENOUS ONCE
Status: COMPLETED | OUTPATIENT
Start: 2017-05-13 | End: 2017-05-13

## 2017-05-13 RX ORDER — ACETAMINOPHEN 325 MG/1
650 TABLET ORAL ONCE
Status: COMPLETED | OUTPATIENT
Start: 2017-05-13 | End: 2017-05-13

## 2017-05-13 RX ORDER — IBUPROFEN 400 MG/1
400 TABLET ORAL ONCE
Status: COMPLETED | OUTPATIENT
Start: 2017-05-13 | End: 2017-05-13

## 2017-05-13 RX ORDER — ONDANSETRON 4 MG/1
4 TABLET, ORALLY DISINTEGRATING ORAL EVERY 8 HOURS PRN
Qty: 15 TABLET | Refills: 0 | Status: SHIPPED | OUTPATIENT
Start: 2017-05-13 | End: 2018-03-08

## 2017-05-13 RX ORDER — LOPERAMIDE HYDROCHLORIDE 2 MG/1
2 CAPSULE ORAL ONCE
Status: COMPLETED | OUTPATIENT
Start: 2017-05-13 | End: 2017-05-13

## 2017-05-13 RX ORDER — LOPERAMIDE HYDROCHLORIDE 2 MG/1
2 TABLET ORAL 4 TIMES DAILY PRN
Qty: 16 TABLET | Refills: 0 | Status: SHIPPED | OUTPATIENT
Start: 2017-05-13 | End: 2017-06-12

## 2017-05-13 RX ADMIN — ACETAMINOPHEN 650 MG: 325 TABLET ORAL at 18:20

## 2017-05-13 RX ADMIN — LOPERAMIDE HYDROCHLORIDE 2 MG: 2 CAPSULE ORAL at 22:15

## 2017-05-13 RX ADMIN — SODIUM CHLORIDE 1000 ML: 0.9 INJECTION, SOLUTION INTRAVENOUS at 18:19

## 2017-05-13 RX ADMIN — IOHEXOL 100 ML: 350 INJECTION, SOLUTION INTRAVENOUS at 18:46

## 2017-05-13 RX ADMIN — ONDANSETRON 4 MG: 2 INJECTION INTRAMUSCULAR; INTRAVENOUS at 18:20

## 2017-05-13 RX ADMIN — SODIUM CHLORIDE 1000 ML: 0.9 INJECTION, SOLUTION INTRAVENOUS at 19:12

## 2017-05-13 RX ADMIN — IBUPROFEN 400 MG: 400 TABLET ORAL at 19:40

## 2017-05-13 RX ADMIN — MORPHINE SULFATE 4 MG: 4 INJECTION, SOLUTION INTRAMUSCULAR; INTRAVENOUS at 18:20

## 2017-05-15 ENCOUNTER — ALLSCRIPTS OFFICE VISIT (OUTPATIENT)
Dept: OTHER | Facility: OTHER | Age: 72
End: 2017-05-15

## 2017-05-22 ENCOUNTER — ALLSCRIPTS OFFICE VISIT (OUTPATIENT)
Dept: OTHER | Facility: OTHER | Age: 72
End: 2017-05-22

## 2017-05-22 ENCOUNTER — GENERIC CONVERSION - ENCOUNTER (OUTPATIENT)
Dept: OTHER | Facility: OTHER | Age: 72
End: 2017-05-22

## 2017-05-25 ENCOUNTER — APPOINTMENT (OUTPATIENT)
Dept: LAB | Facility: OTHER | Age: 72
End: 2017-05-25
Payer: MEDICARE

## 2017-05-25 DIAGNOSIS — E78.00 PURE HYPERCHOLESTEROLEMIA: ICD-10-CM

## 2017-05-25 DIAGNOSIS — R07.9 CHEST PAIN: ICD-10-CM

## 2017-05-25 DIAGNOSIS — I10 ESSENTIAL (PRIMARY) HYPERTENSION: ICD-10-CM

## 2017-05-25 LAB
ANION GAP SERPL CALCULATED.3IONS-SCNC: 8 MMOL/L (ref 4–13)
BASOPHILS # BLD AUTO: 0.02 THOUSANDS/ΜL (ref 0–0.1)
BASOPHILS NFR BLD AUTO: 0 % (ref 0–1)
BUN SERPL-MCNC: 24 MG/DL (ref 5–25)
CALCIUM SERPL-MCNC: 9 MG/DL (ref 8.3–10.1)
CHLORIDE SERPL-SCNC: 106 MMOL/L (ref 100–108)
CHOLEST SERPL-MCNC: 133 MG/DL (ref 50–200)
CO2 SERPL-SCNC: 26 MMOL/L (ref 21–32)
CREAT SERPL-MCNC: 0.96 MG/DL (ref 0.6–1.3)
EOSINOPHIL # BLD AUTO: 0.07 THOUSAND/ΜL (ref 0–0.61)
EOSINOPHIL NFR BLD AUTO: 2 % (ref 0–6)
ERYTHROCYTE [DISTWIDTH] IN BLOOD BY AUTOMATED COUNT: 13.1 % (ref 11.6–15.1)
GFR SERPL CREATININE-BSD FRML MDRD: >60 ML/MIN/1.73SQ M
GLUCOSE P FAST SERPL-MCNC: 95 MG/DL (ref 65–99)
HCT VFR BLD AUTO: 43.8 % (ref 36.5–49.3)
HDLC SERPL-MCNC: 44 MG/DL (ref 40–60)
HGB BLD-MCNC: 14.7 G/DL (ref 12–17)
LDLC SERPL CALC-MCNC: 64 MG/DL (ref 0–100)
LYMPHOCYTES # BLD AUTO: 1.28 THOUSANDS/ΜL (ref 0.6–4.47)
LYMPHOCYTES NFR BLD AUTO: 27 % (ref 14–44)
MCH RBC QN AUTO: 33.9 PG (ref 26.8–34.3)
MCHC RBC AUTO-ENTMCNC: 33.6 G/DL (ref 31.4–37.4)
MCV RBC AUTO: 101 FL (ref 82–98)
MONOCYTES # BLD AUTO: 0.54 THOUSAND/ΜL (ref 0.17–1.22)
MONOCYTES NFR BLD AUTO: 11 % (ref 4–12)
NEUTROPHILS # BLD AUTO: 2.86 THOUSANDS/ΜL (ref 1.85–7.62)
NEUTS SEG NFR BLD AUTO: 60 % (ref 43–75)
NRBC BLD AUTO-RTO: 0 /100 WBCS
PLATELET # BLD AUTO: 251 THOUSANDS/UL (ref 149–390)
PMV BLD AUTO: 11.2 FL (ref 8.9–12.7)
POTASSIUM SERPL-SCNC: 4.2 MMOL/L (ref 3.5–5.3)
RBC # BLD AUTO: 4.34 MILLION/UL (ref 3.88–5.62)
SODIUM SERPL-SCNC: 140 MMOL/L (ref 136–145)
TRIGL SERPL-MCNC: 126 MG/DL
WBC # BLD AUTO: 4.78 THOUSAND/UL (ref 4.31–10.16)

## 2017-05-25 PROCEDURE — 80048 BASIC METABOLIC PNL TOTAL CA: CPT

## 2017-05-25 PROCEDURE — 80061 LIPID PANEL: CPT

## 2017-05-25 PROCEDURE — 85025 COMPLETE CBC W/AUTO DIFF WBC: CPT

## 2017-05-25 PROCEDURE — 36415 COLL VENOUS BLD VENIPUNCTURE: CPT

## 2017-06-05 ENCOUNTER — GENERIC CONVERSION - ENCOUNTER (OUTPATIENT)
Dept: OTHER | Facility: OTHER | Age: 72
End: 2017-06-05

## 2017-06-07 ENCOUNTER — ALLSCRIPTS OFFICE VISIT (OUTPATIENT)
Dept: OTHER | Facility: OTHER | Age: 72
End: 2017-06-07

## 2017-06-16 ENCOUNTER — GENERIC CONVERSION - ENCOUNTER (OUTPATIENT)
Dept: OTHER | Facility: OTHER | Age: 72
End: 2017-06-16

## 2017-06-19 ENCOUNTER — ALLSCRIPTS OFFICE VISIT (OUTPATIENT)
Dept: OTHER | Facility: OTHER | Age: 72
End: 2017-06-19

## 2017-07-21 ENCOUNTER — GENERIC CONVERSION - ENCOUNTER (OUTPATIENT)
Dept: OTHER | Facility: OTHER | Age: 72
End: 2017-07-21

## 2017-07-25 ENCOUNTER — GENERIC CONVERSION - ENCOUNTER (OUTPATIENT)
Dept: OTHER | Facility: OTHER | Age: 72
End: 2017-07-25

## 2017-09-19 ENCOUNTER — GENERIC CONVERSION - ENCOUNTER (OUTPATIENT)
Dept: OTHER | Facility: OTHER | Age: 72
End: 2017-09-19

## 2017-10-04 ENCOUNTER — APPOINTMENT (OUTPATIENT)
Dept: LAB | Facility: OTHER | Age: 72
End: 2017-10-04
Payer: MEDICARE

## 2017-10-04 ENCOUNTER — TRANSCRIBE ORDERS (OUTPATIENT)
Dept: LAB | Facility: OTHER | Age: 72
End: 2017-10-04

## 2017-10-04 DIAGNOSIS — R73.09 OTHER ABNORMAL GLUCOSE: ICD-10-CM

## 2017-10-04 DIAGNOSIS — E78.00 PURE HYPERCHOLESTEROLEMIA: ICD-10-CM

## 2017-10-04 DIAGNOSIS — I10 ESSENTIAL (PRIMARY) HYPERTENSION: ICD-10-CM

## 2017-10-04 DIAGNOSIS — G47.33 OBSTRUCTIVE SLEEP APNEA: ICD-10-CM

## 2017-10-04 DIAGNOSIS — M54.9 DORSALGIA: ICD-10-CM

## 2017-10-04 LAB
ANION GAP SERPL CALCULATED.3IONS-SCNC: 5 MMOL/L (ref 4–13)
BUN SERPL-MCNC: 21 MG/DL (ref 5–25)
CALCIUM SERPL-MCNC: 9.3 MG/DL (ref 8.3–10.1)
CHLORIDE SERPL-SCNC: 106 MMOL/L (ref 100–108)
CHOLEST SERPL-MCNC: 147 MG/DL (ref 50–200)
CO2 SERPL-SCNC: 27 MMOL/L (ref 21–32)
CREAT SERPL-MCNC: 1 MG/DL (ref 0.6–1.3)
EST. AVERAGE GLUCOSE BLD GHB EST-MCNC: 114 MG/DL
GFR SERPL CREATININE-BSD FRML MDRD: 74 ML/MIN/1.73SQ M
GLUCOSE P FAST SERPL-MCNC: 105 MG/DL (ref 65–99)
HBA1C MFR BLD: 5.6 % (ref 4.2–6.3)
HDLC SERPL-MCNC: 45 MG/DL (ref 40–60)
LDLC SERPL CALC-MCNC: 61 MG/DL (ref 0–100)
POTASSIUM SERPL-SCNC: 4.3 MMOL/L (ref 3.5–5.3)
SODIUM SERPL-SCNC: 138 MMOL/L (ref 136–145)
TRIGL SERPL-MCNC: 203 MG/DL

## 2017-10-04 PROCEDURE — 80048 BASIC METABOLIC PNL TOTAL CA: CPT

## 2017-10-04 PROCEDURE — 80061 LIPID PANEL: CPT

## 2017-10-04 PROCEDURE — 83036 HEMOGLOBIN GLYCOSYLATED A1C: CPT

## 2017-10-04 PROCEDURE — 36415 COLL VENOUS BLD VENIPUNCTURE: CPT

## 2017-10-07 DIAGNOSIS — M25.519 PAIN IN SHOULDER: ICD-10-CM

## 2017-10-07 DIAGNOSIS — E78.00 PURE HYPERCHOLESTEROLEMIA: ICD-10-CM

## 2017-10-07 DIAGNOSIS — R73.09 OTHER ABNORMAL GLUCOSE: ICD-10-CM

## 2017-10-07 DIAGNOSIS — G47.33 OBSTRUCTIVE SLEEP APNEA: ICD-10-CM

## 2017-10-07 DIAGNOSIS — M54.9 DORSALGIA: ICD-10-CM

## 2017-10-07 DIAGNOSIS — I10 ESSENTIAL (PRIMARY) HYPERTENSION: ICD-10-CM

## 2017-10-23 ENCOUNTER — ALLSCRIPTS OFFICE VISIT (OUTPATIENT)
Dept: OTHER | Facility: OTHER | Age: 72
End: 2017-10-23

## 2017-10-24 NOTE — PROGRESS NOTES
Assessment  1  Back pain (724 5) (M54 9)   2  Benign essential hypertension (401 1) (I10)   3  Hypercholesterolemia (272 0) (E78 00)   4  Obesity (278 00) (E66 9)   5  Obstructive sleep apnea on CPAP (327 23,V46 8) (G47 33,Z99 89)   6  Chronic left shoulder pain (719 41,338 29) (M25 512,G89 29)    Plan  Abnormal blood sugar, Benign essential hypertension    · (1) HEMOGLOBIN A1C; Status:Active; Requested for:63Bjc8011;   Back pain    · 1 - Esa Guardado MD  (Neurosurgery) Co-Management  *  Status: Active  Requested for: 78NOR6628  Care Summary provided  : Yes  Benign essential hypertension    · (1) CBC/PLT/DIFF; Status:Active; Requested for:30Mlh7570;    · (1) COMPREHENSIVE METABOLIC PANEL; Status:Active; Requested for:91Apr7909;    · (1) LIPID PANEL, FASTING; Status:Active; Requested for:43Xca5933;    · (1) TSH; Status:Active; Requested for:40Ghf0926;    · (1) URINALYSIS w URINE C/S REFLEX (will reflex a microscopy if leukocytes, occult blood, or  nitrites are not within normal limits); Status:Active; Requested for:29Yzr9697; Benign essential hypertension, Special screening examination for neoplasm of prostate    · (1) PSA (SCREEN) (Dx V76 44 Screen for Prostate Cancer); Status:Active; Requested  for:12Qur1040;   Chronic left shoulder pain    · 1 - Lawrence Rodriguez MD (Orthopedic Surgery) Co-Management  *  Status: Active  Requested for:  56BDN2721  Care Summary provided  : Yes    Discussion/Summary  Discussion Summary:   I reviewed his labs no changes needed in his medication refer him to neurosurgery and orthopedics for his musculoskeletal problems  Medication SE Review and Pt Understands Tx: Possible side effects of new medications were reviewed with the patient/guardian today  The treatment plan was reviewed with the patient/guardian   The patient/guardian understands and agrees with the treatment plan      Chief Complaint  Chief Complaint Chronic Condition [facility]: Patient is here today for follow up of chronic conditions described in HPI  History of Present Illness  HPI: For follow-up he is stable he has worsening trouble with his left shoulder and lower back pain which have been chronic problems for him  He has had several left shoulder surgeries in a long history of low back pain  He is normally ambulatory and active   Obstructive Sleep Apnea (Brief): The patient is being seen for a routine clinic follow-up of obstructive sleep apnea  The patient is currently asymptomatic  No associated symptoms are reported  Obesity (Follow-Up): The patient is being seen for follow-up of obesity  He has had no significant interval events  The patient is currently asymptomatic  Disease management:  the patient is not doing well with his goals  Back Pain (Brief): The patient is being seen for a routine clinic follow-up of back pain  Symptoms:  back pain,-- back stiffness,-- decreased spine range of motion,-- decreased flexion,-- decreased extension,-- decreased lateral bending-- and-- decreased rotation, but-- no lower extremity numbness,-- no lower extremity tingling-- and-- no lower extremity weakness  The patient is currently experiencing symptoms  Hyperlipidemia (Follow-Up): The patient states his hyperlipidemia has been under good control since the last visit  Comorbid Illnesses: hypertension  He has no significant interval events  Symptoms: The patient is currently asymptomatic  The patient is doing well with his hyperlipidemia goals  Hypertension (Follow-Up): The patient presents for follow-up of essential hypertension  The patient states he has been doing well with his blood pressure control since the last visit  He has no comorbid illnesses  He has no significant interval events  Symptoms: The patient is currently asymptomatic  Disease Management: the patient is doing well with his blood pressure goals        Review of Systems  Complete-Male:   Constitutional: No fever or chills, feels well, no tiredness, no recent weight gain or weight loss  Eyes: No complaints of eye pain, no red eyes, no discharge from eyes, no itchy eyes  ENT: no complaints of earache, no hearing loss, no nosebleeds, no nasal discharge, no sore throat, no hoarseness  Cardiovascular: No complaints of slow heart rate, no fast heart rate, no chest pain, no palpitations, no leg claudication, no lower extremity  Respiratory: No complaints of shortness of breath, no wheezing, no cough, no SOB on exertion, no orthopnea or PND  Gastrointestinal: No complaints of abdominal pain, no constipation, no nausea or vomiting, no diarrhea or bloody stools  Genitourinary: No complaints of dysuria, no incontinence, no hesitancy, no nocturia, no genital lesion, no testicular pain  Musculoskeletal: as noted in HPI  Integumentary: No complaints of skin rash or skin lesions, no itching, no skin wound, no dry skin  Neurological: No compliants of headache, no confusion, no convulsions, no numbness or tingling, no dizziness or fainting, no limb weakness, no difficulty walking  Psychiatric: Is not suicidal, no sleep disturbances, no anxiety or depression, no change in personality, no emotional problems  Endocrine: No complaints of proptosis, no hot flashes, no muscle weakness, no erectile dysfunction, no deepening of the voice, no feelings of weakness  Hematologic/Lymphatic: No complaints of swollen glands, no swollen glands in the neck, does not bleed easily, no easy bruising  ROS Reviewed:   ROS reviewed  Active Problems  1  Abnormal blood sugar (790 29) (R73 09)   2  Abnormal stress test (794 39) (R94 39)   3  Active advance directive (V49 89) (Z78 9)   4  Acute pharyngitis, unspecified etiology (462) (J02 9)   5  Back pain (724 5) (M54 9)   6  Backache (724 5) (M54 9)   7  Benign essential hypertension (401 1) (I10)   8  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,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,N13 8)   9   Chest pain (786 50) (R07 9)   10  Disc degeneration, lumbar (722 52) (M51 36)   11  Diverticulitis (562 11) (K57 92)   12  Encounter for screening colonoscopy (V76 51) (Z12 11)   13  Enteritis (558 9) (K52 9)   14  Esophageal reflux (530 81) (K21 9)   15  Fever in other diseases (780 61) (R50 81)   16  Hypercholesterolemia (272 0) (E78 00)   17  Lumbar facet arthropathy (721 3) (M12 88)   18  Nocturia (788 43) (R35 1)   19  Obesity (278 00) (E66 9)   20  Obstructive sleep apnea on CPAP (327 23,V46 8) (G47 33,Z99 89)   21  Screening for genitourinary condition (V81 6) (Z13 89)   22  Special screening examination for neoplasm of prostate (V76 44) (Z12 5)   23  Systolic murmur (111 1) (J68 2)   24  Urgency of urination (788 63) (R39 15)   25  Urinary frequency (788 41) (R35 0)    Past Medical History  1  History of Bronchitis (490) (J40)   2  History of Fever (780 60) (R50 9)   3  History of chest pain (V13 89) (Z87 898)   4  History of hypercholesterolemia (V12 29) (Z86 39)   5  History of hyperlipidemia (V12 29) (Z86 39)   6  History of hypertension (V12 59) (Z86 79)   7  History of low back pain (V13 59) (Z87 39)   8  History of mitral valve disorder (V12 59) (Z86 79)   9  History of sleep apnea (V13 89) (Z86 69)   10  History of upper respiratory infection (V12 09) (Z87 09)   11  History of Other muscle spasm (728 85) (M62 838)   12  History of Thoracic neuritis (724 4) (M54 14)  Active Problems And Past Medical History Reviewed: The active problems and past medical history were reviewed and updated today  Surgical History  1  History of Complete Colonoscopy   2  History of Diagnostic Esophagogastroduodenoscopy   3  History of Foot Surgery   4  History of Hernia Repair   5  History of Hernia Repair   6  History of Neuroplasty Decompression Tarsal Tunnel Release   7  History of Neuroplasty Median Nerve At Carpal Tunnel   8  History of Shoulder Surgery  Surgical History Reviewed:    The surgical history was reviewed and updated today  Family History  Mother    1  Family history of Diabetes Mellitus (V18 0)   2  Family history of arteriosclerotic cardiovascular disease (V17 49) (Z82 49)   3  Family history of Hypertension (V17 49)  Father    4  Family history of Cancer   5  Family history of Rheu arthritis mult site w involv of organs and systems  Sister    6  Family history of Thyroid disease  Brother    7  Family history of Diabetes   8  Family history of Diabetes Mellitus (V18 0)   9  Family history of Heart disease   10  Family history of Heart Disease (V17 49)   11  Family history of Hypertension (V17 49)   12  Family history of Hypertension  Family History    13  Family history of Coronary Artery Disease (V17 49)  Family History Reviewed: The family history was reviewed and updated today  Social History   · Active advance directive (V49 89) (Z78 9)   · Denied: History of Alcohol Use (History)   · Former smoker (V15 82) (Y83 937)   · Lives with spouse   · Denied: History of Marijuana   · Marital History - Currently    ·    · Never A Smoker   · No drug use   · Retired  Social History Reviewed: The social history was reviewed and updated today  Current Meds   1  AmLODIPine Besylate 5 MG Oral Tablet; TAKE ONE TABLET BY MOUTH EVERY DAY IN THE   MORNING; Therapy: 42ZVM7196 to (Evaluate:72Okk9494)  Requested for: 88Nfz7746; Last Rx:01Jgk6352   Ordered   2  Aspirin Low Dose 81 MG TABS; TAKE 1 TABLET DAILY; Therapy: (Recorded:09Jun2014) to Recorded   3  Fenofibrate 145 MG Oral Tablet; take one tablet by mouth one time daily; Therapy: 75GPU8136 to (Evaluate:20Zsf4495)  Requested for: 23Oct2017; Last Rx:23Oct2017   Ordered   4  Lisinopril 40 MG Oral Tablet; take one tablet by mouth one time daily; Therapy: 40BCY4813 to (Evaluate:11Jan2018)  Requested for: 13Oct2017; Last Rx:13Oct2017   Ordered   5  Lovastatin 40 MG Oral Tablet; take one tablet by mouth one time daily;    Therapy: 15NMB9605 to (Evaluate:15Oct2017)  Requested for: 17Leq9110; Last Rx:43Vuu4712;   Status: ACTIVE - Transmit to Pharmacy - Awaiting Verification Ordered   6  Metoprolol Tartrate 25 MG Oral Tablet; take one tablet by mouth twice daily; Therapy: 52Wzc6269 to (Evaluate:70Sep5025)  Requested for: 68MGG6321; Last Rx:00Ppz7775   Ordered   7  Tamsulosin HCl - 0 4 MG Oral Capsule; take 1 capsule by mouth at bedtime; Therapy: 97JLI0960 to (Evaluate:62Eyv0179); Last Rx:10Bdp2256 Ordered  Medication List Reviewed: The medication list was reviewed and updated today  Allergies  1  No Known Drug Allergies  2  Seasonal    Vitals  Vital Signs    Recorded: 39LPT2495 02:22PM   Temperature 98 1 F   Heart Rate 60   Respiration 16   Systolic 569   Diastolic 62   Height 5 ft 3 in   Weight 185 lb 6 oz   BMI Calculated 32 84   BSA Calculated 1 87     Physical Exam    Constitutional   General appearance: Abnormal   morbidly obese  Eyes   Conjunctiva and lids: No swelling, erythema, or discharge  Pupils and irises: Equal, round and reactive to light  Ears, Nose, Mouth, and Throat   External inspection of ears and nose: Normal     Otoscopic examination: Tympanic membrance translucent with normal light reflex  Canals patent without erythema  Nasal mucosa, septum, and turbinates: Normal without edema or erythema  Oropharynx: Normal with no erythema, edema, exudate or lesions  Pulmonary   Respiratory effort: No increased work of breathing or signs of respiratory distress  Auscultation of lungs: Clear to auscultation, equal breath sounds bilaterally, no wheezes, no rales, no rhonci  Cardiovascular   Palpation of heart: Normal PMI, no thrills  Auscultation of heart: Normal rate and rhythm, normal S1 and S2, without murmurs  Examination of extremities for edema and/or varicosities: Normal     Carotid pulses: Normal     Abdomen   Abdomen: Non-tender, no masses  Liver and spleen: No hepatomegaly or splenomegaly  Lymphatic   Palpation of lymph nodes in neck: No lymphadenopathy  Musculoskeletal   Gait and station: Normal     Digits and nails: Normal without clubbing or cyanosis  Inspection/palpation of joints, bones, and muscles: Abnormal  -- Limited motion in all directions of his lumbar spine  Limited motion in all directions of left shoulder due to pain  Skin   Skin and subcutaneous tissue: Normal without rashes or lesions  Neurologic   Cranial nerves: Cranial nerves 2-12 intact  Reflexes: 2+ and symmetric  Sensation: No sensory loss  Psychiatric   Orientation to person, place and time: Normal     Mood and affect: Normal          Results/Data  Falls Risk Assessment (Dx Z13 89 Screen for Neurologic Disorder) 23Oct2017 02:24PM User, s     Test Name Result Flag Reference   Falls Risk      Falls with injury in the past year       Health Management  Encounter for preventive health examination   COLONOSCOPY; every 5 years; Last 27GAA2809; Next Due: 30Izl3973;  Active    Future Appointments    Date/Time Provider Specialty Site   06/25/2018 01:00 PM Elizabeth Garcia [facility] Pulmonary Medicine [address] [facility] [place] [facility]   05/21/2018 02:15 PM Tangela Monreal MD Urology [facility]   03/01/2018 02:00 PM Satnam Bailey DO Internal Medicine [facility]     Signatures   Electronically signed by : Marsha Peguero [facility]; Oct 23 2017  6:00PM EST                       (Author)    Electronically signed by : Albert Lennox, DO; Oct 23 2017  6:07PM EST                       (Co-author)

## 2017-11-03 ENCOUNTER — ALLSCRIPTS OFFICE VISIT (OUTPATIENT)
Dept: OTHER | Facility: OTHER | Age: 72
End: 2017-11-03

## 2017-11-03 ENCOUNTER — APPOINTMENT (OUTPATIENT)
Dept: RADIOLOGY | Facility: CLINIC | Age: 72
End: 2017-11-03
Payer: MEDICARE

## 2017-11-03 DIAGNOSIS — M25.519 PAIN IN SHOULDER: ICD-10-CM

## 2017-11-03 PROCEDURE — 73030 X-RAY EXAM OF SHOULDER: CPT

## 2017-11-04 NOTE — PROGRESS NOTES
Assessment  1  Tendinitis of right rotator cuff (726 10) (M98 81)   2  Tendinitis of left rotator cuff (726 10) (M75 82)    Plan  Shoulder pain    · Kenalog 40 MG/ML Injection Suspension (Triamcinolone Acetonide)   · * XR SHOULDER 2+ VIEW LEFT; Status:Active - Retrospective By Protocol Authorization; Requested for:03Nov2017;    · * XR SHOULDER 2+ VIEW RIGHT; Status:Active - Retrospective By Protocol  Authorization; Requested for:03Nov2017;   Tendinitis of left rotator cuff    · Follow-up visit in 6 weeks Evaluation and Treatment  Follow-up  Status: Hold For -  Scheduling  Requested for: 45BWS2696    Discussion/Summary    Reviewed the x-ray findings, diagnosis, and treatment plan today  I recommend initial conservative management  I recommend physical therapy but he declines this option  We also discussed possible corticosteroid injection  He elected to proceed with this for the left shoulder only  Follow up 6 weeks  Chief Complaint  1  Shoulder Pain    History of Present Illness  74year-old left hand dominant male here for evaluation of bilateral shoulder pain  He is retired  He reports several months of pain in the bilateral shoulders after he tripped and grabbed a branch with the left arm to stop himself from falling  He reports the hyper extension injury to the left shoulder  Today he complains of pain in the lateral aspect of both shoulders  The left is more symptomatic than the right  The right side is described as achy soreness  His symptoms are worse with laying on each shoulder at night  He has been taking Tylenol but has not had any other treatment  history significant for a surgery to the right shoulder in 2005 but he is unclear of the details  He also reports 2 separate surgeries on the left shoulder the most recent being 5 years ago  Review of Systems    Constitutional: No fever or chills, feels well, no tiredness, no recent weight loss or weight gain     Eyes: No complaints of red eyes, no eyesight problems  ENT: no complaints of loss of hearing, no nosebleeds, no sore throat  Cardiovascular: No complaints of chest pain, no palpitations, no leg claudication or lower extremity edema  Respiratory: No complaints of shortness of breath, no wheezing, no cough  Gastrointestinal: No complaints of abdominal pain, no constipation, no nausea or vomiting, no diarrhea or bloody stools  Genitourinary: No complaints of dysuria or incontinence, no hesitancy, no nocturia  Musculoskeletal: as noted in HPI  Integumentary: No complaints of skin rash or lesion, no itching or dry skin, no skin wounds  Neurological: No complaints of headache, no confusion, no numbness or tingling, no dizziness  Psychiatric: No suicidal thoughts, no anxiety, no depression  Endocrine: No muscle weakness, no frequent urination, no excessive thirst, no feelings of weakness  Active Problems  1  Abnormal blood sugar (790 29) (R73 09)   2  Abnormal stress test (794 39) (R94 39)   3  Active advance directive (V49 89) (Z78 9)   4  Acute pharyngitis, unspecified etiology (462) (J02 9)   5  Back pain (724 5) (M54 9)   6  Backache (724 5) (M54 9)   7  Benign essential hypertension (401 1) (I10)   8  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,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,N13 8)   9  Chest pain (786 50) (R07 9)   10  Chronic left shoulder pain (719 41,338 29) (M25 512,G89 29)   11  Disc degeneration, lumbar (722 52) (M51 36)   12  Diverticulitis (562 11) (K57 92)   13  Encounter for screening colonoscopy (V76 51) (Z12 11)   14  Enteritis (558 9) (K52 9)   15  Esophageal reflux (530 81) (K21 9)   16  Fever in other diseases (780 61) (R50 81)   17  Hypercholesterolemia (272 0) (E78 00)   18  Lumbar facet arthropathy (721 3) (M12 88)   19  Nocturia (788 43) (R35 1)   20  Obesity (278 00) (E66 9)   21  Obstructive sleep apnea on CPAP (327 23,V46 8) (G47 33,Z99 89)   22  Screening for genitourinary condition (V81 6) (Z49 42)   23  Special screening examination for neoplasm of prostate (V76 44) (Z12 5)   24  Systolic murmur (366 8) (Z79 3)   25  Tendinitis of left rotator cuff (726 10) (M75 82)   26  Tendinitis of right rotator cuff (726 10) (M75 81)   27  Urgency of urination (788 63) (R39 15)   28  Urinary frequency (788 41) (R35 0)    Past Medical History   · History of Bronchitis (490) (J40)   · History of Fever (780 60) (R50 9)   · History of chest pain (V13 89) (O46 063)   · History of hypercholesterolemia (V12 29) (Z86 39)   · History of hyperlipidemia (V12 29) (Z86 39)   · History of hypertension (V12 59) (Z86 79)   · History of low back pain (V13 59) (Z87 39)   · History of mitral valve disorder (V12 59) (Z86 79)   · History of sleep apnea (V13 89) (Z86 69)   · History of upper respiratory infection (V12 09) (Z87 09)   · History of Other muscle spasm (728 85) (K99 944)   · History of Thoracic neuritis (724 4) (M54 14)    The active problems and past medical history were reviewed and updated today  Surgical History   · History of Complete Colonoscopy   · History of Diagnostic Esophagogastroduodenoscopy   · History of Foot Surgery   · History of Hernia Repair   · History of Hernia Repair   · History of Neuroplasty Decompression Tarsal Tunnel Release   · History of Neuroplasty Median Nerve At Carpal Tunnel   · History of Shoulder Surgery    The surgical history was reviewed and updated today         Family History  Mother    · Family history of Diabetes Mellitus (V18 0)   · Family history of arteriosclerotic cardiovascular disease (V17 49) (Z82 49)   · Family history of Hypertension (V17 49)  Father    · Family history of Cancer   · Family history of Rheu arthritis mult site w involv of organs and systems  Sister    · Family history of Thyroid disease  Brother    · Family history of Diabetes   · Family history of Diabetes Mellitus (V18 0)   · Family history of Heart disease   · Family history of Heart Disease (V17 49)   · Family history of Hypertension (V17 49)   · Family history of Hypertension  Family History    · Family history of Coronary Artery Disease (V17 49)    The family history was reviewed and updated today  Social History   · Active advance directive (V49 89) (Z78 9)   · Denied: History of Alcohol Use (History)   · Former smoker (V15 82) (D05 283)   · Lives with spouse   · Denied: History of Marijuana   · Marital History - Currently    ·    · Never A Smoker   · No drug use   · Retired  The social history was reviewed and updated today  The social history was reviewed and is unchanged  Current Meds   1  AmLODIPine Besylate 5 MG Oral Tablet; TAKE ONE TABLET BY MOUTH EVERY DAY IN   THE MORNING; Therapy: 96YMG7658 to (Evaluate:33Ayd4532)  Requested for: 54Bye2421; Last   Rx:05Sep2017 Ordered   2  Aspirin Low Dose 81 MG TABS; TAKE 1 TABLET DAILY; Therapy: (Recorded:09Jun2014) to Recorded   3  Fenofibrate 145 MG Oral Tablet; take one tablet by mouth one time daily; Therapy: 17VHS4871 to (Evaluate:91Odo8632)  Requested for: 23Oct2017; Last   Rx:23Oct2017 Ordered   4  Lisinopril 40 MG Oral Tablet; take one tablet by mouth one time daily; Therapy: 93DIV7605 to (Evaluate:11Jan2018)  Requested for: 13Oct2017; Last   Rx:13Oct2017 Ordered   5  Lovastatin 40 MG Oral Tablet; take one tablet by mouth one time daily; Therapy: 78QNT0074 to ([id number])  Requested for: 26Oct2017; Last   Rx:26Oct2017 Ordered   6  Metoprolol Tartrate 25 MG Oral Tablet; take one tablet by mouth twice daily; Therapy: 31Xcl2645 to (Evaluate:33Pdt1899)  Requested for: 90ZWJ2670; Last   Rx:34Onk7134 Ordered   7  Tamsulosin HCl - 0 4 MG Oral Capsule; take 1 capsule by mouth at bedtime; Therapy: 85NUP3144 to (Evaluate:92Daj2345); Last Rx:12Uwj5811 Ordered    The medication list was reviewed and updated today  Allergies  1  No Known Drug Allergies  2   Seasonal    Vitals   Recorded: 97FJO9009 08:42AM   Heart Rate 50 Systolic 732   Diastolic 69   Height 5 ft 3 in   Weight 185 lb 6 08 oz   BMI Calculated 32 84   BSA Calculated 1 88     Physical Exam    Constitutional - General appearance: Normal    Right shoulder: There are well-healed surgical incisions at the anterior and superior aspect of the right shoulder  There is no tenderness around the shoulder  Active forward elevation is 165Â°, external rotation of 65Â°, internal rotation to T7  Strength is 5/5 and elevation, supraspinatus, internal rotation, external rotation, biceps, triceps  Negative belly press  Mildly positive impingement signs  Sensation intact to light touch in C5 through T1 distributions  Radial pulse 2 +    Left shoulder: There are well-healed surgical incisions at the anterior and superior aspect of the right shoulder  There is mild tenderness at the anterior shoulder  Active forward elevation is 165Â°, external rotation of 65Â°, internal rotation to T7  Strength is 5/5 and elevation, supraspinatus, internal rotation, external rotation, biceps, triceps  Negative belly press  Mildly positive impingement signs  Positive speed's test   Sensation intact to light touch in C5 through T1 distributions  Radial pulse 2 +      Results/Data  I personally reviewed the films/images/results in the office today  My interpretation follows  X-ray Review Three views of the bilateral shoulders were obtained and reviewed today  On the left side there is a metallic suture anchor present consistent with likely prior rotator cuff repair  No acute bony abnormalities  Minimal degenerative changes  On the right shoulder there are no acute bony abnormalities and minimal degenerative changes  Procedure    Procedure: Injection of the left subacromial bursa  Indication:  inflammation  Potential complications include bleeding,-- infection-- and-- allergic reaction  Risk, benefits and alternatives were discussed with the patient   Verbal consent was obtained prior to the procedure  Alcohol was used to prep the area  ethyl chloride spray was used as a topical anesthetic  Using sterile technique, the aspiration/injection needle was then directed from a lateral aspect  Was used to inject 21,-- 4 mL of 1% Lidocaine,-- 4 mL 0 25% Bupivacaine-- and-- 1 mL of 40mg/mL triamcinolone  A bandage was applied  the patient tolerated the procedure well  Complications: none  Future Appointments    Date/Time Provider Specialty Site   06/25/2018 01:00 PM Yvette Vora [facility] Pul[facility]   12/14/2017 10:15 AM ANNIE Bagley   Orthopedic Surgery [facility]   05/21/2018 02:15 PM Rios Gaitan MD Urology [facility]   11/14/2017 09:45 AM Claudine Manzo [facility] Neurosurgery [facility]   03/01/2018 02:00 PM Dominic Hoffmann DO Internal Medicine [address] [facility]     Signatures   Electronically signed by : ANNIE Dickey ; Nov  3 2017 11:17AM EST                       (Author)

## 2017-11-14 ENCOUNTER — TRANSCRIBE ORDERS (OUTPATIENT)
Dept: ADMINISTRATIVE | Facility: HOSPITAL | Age: 72
End: 2017-11-14

## 2017-11-14 ENCOUNTER — GENERIC CONVERSION - ENCOUNTER (OUTPATIENT)
Dept: OTHER | Facility: OTHER | Age: 72
End: 2017-11-14

## 2017-11-14 DIAGNOSIS — M54.16 RADICULOPATHY OF LUMBAR REGION: ICD-10-CM

## 2017-11-14 DIAGNOSIS — M70.61 TROCHANTERIC BURSITIS OF RIGHT HIP: ICD-10-CM

## 2017-11-14 DIAGNOSIS — M54.5 LOW BACK PAIN, UNSPECIFIED BACK PAIN LATERALITY, UNSPECIFIED CHRONICITY, WITH SCIATICA PRESENCE UNSPECIFIED: Primary | ICD-10-CM

## 2017-11-14 DIAGNOSIS — M47.816 SPONDYLOSIS OF LUMBAR REGION WITHOUT MYELOPATHY OR RADICULOPATHY: ICD-10-CM

## 2017-11-14 DIAGNOSIS — M54.41 LOW BACK PAIN WITH RIGHT-SIDED SCIATICA: ICD-10-CM

## 2017-11-14 DIAGNOSIS — M54.42 LOW BACK PAIN WITH LEFT-SIDED SCIATICA: ICD-10-CM

## 2017-11-20 ENCOUNTER — HOSPITAL ENCOUNTER (OUTPATIENT)
Dept: MRI IMAGING | Facility: CLINIC | Age: 72
Discharge: HOME | End: 2017-11-20
Payer: MEDICARE

## 2017-11-20 ENCOUNTER — APPOINTMENT (OUTPATIENT)
Dept: RADIOLOGY | Facility: CLINIC | Age: 72
End: 2017-11-20
Payer: MEDICARE

## 2017-11-20 ENCOUNTER — TRANSCRIBE ORDERS (OUTPATIENT)
Dept: RADIOLOGY | Facility: CLINIC | Age: 72
End: 2017-11-20

## 2017-11-20 DIAGNOSIS — M70.61 TROCHANTERIC BURSITIS OF RIGHT HIP: ICD-10-CM

## 2017-11-20 PROCEDURE — 72114 X-RAY EXAM L-S SPINE BENDING: CPT

## 2017-11-20 PROCEDURE — 72148 MRI LUMBAR SPINE W/O DYE: CPT

## 2017-12-12 ENCOUNTER — GENERIC CONVERSION - ENCOUNTER (OUTPATIENT)
Dept: OTHER | Facility: OTHER | Age: 72
End: 2017-12-12

## 2017-12-14 ENCOUNTER — ALLSCRIPTS OFFICE VISIT (OUTPATIENT)
Dept: OTHER | Facility: OTHER | Age: 72
End: 2017-12-14

## 2017-12-15 NOTE — PROGRESS NOTES
Assessment  1  Tendinitis of left rotator cuff (726 10) (M75 82)   2  Tendinitis of right rotator cuff (726 10) (M75 81)    Plan  Tendinitis of left rotator cuff, Tendinitis of right rotator cuff    · Follow-up PRN Evaluation and Treatment  Follow-up  Status: Complete  Done:30Qqi8936 10:27AM    Chief Complaint  1  Shoulder Pain    Discussion/Summary    We again discussed treatment options  I again recommended a course of physical therapy to help improve the strength and motion in both shoulders  He again refuses physical therapy  He would like to proceed with an injection in the right side which was performed without complication today  He would like to pursue any further treatment he may follow up as needed  Corticosteroid injections may be repeated every 3-4 months as needed  History of Present Illness  74year old male here for follow up of bilateral shoulder pain  Had minimal improvement after corticosteroid injection in left shoulder  Continues to complain of pain in the bilateral shoulders as mild and worse with activities  No new injuries  No mechanical symptoms  Review of Systems   Constitutional: No fever or chills, feels well, no tiredness, no recent weight loss or weight gain  Musculoskeletal: as noted in HPI  Integumentary: No complaints of skin rash or lesion, no itching or dry skin, no skin wounds  Neurological: No complaints of headache, no confusion, no numbness or tingling, no dizziness  Active Problems  1  Abnormal blood sugar (790 29) (R73 09)   2  Abnormal stress test (794 39) (R94 39)   3  Active advance directive (V49 89) (Z78 9)   4  Acute pharyngitis, unspecified etiology (462) (J02 9)   5  Arthritis (716 90) (M19 90)   6  Back pain (724 5) (M54 9)   7  Backache (724 5) (M54 9)   8  Benign essential hypertension (401 1) (I10)   9  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,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,N13 8)   10  Chest pain (786 50) (R07 9)   11   Chronic bilateral low back pain with bilateral sciatica (724 2,724 3,338 29)  (M54 42,M54 41,G89 29)   12  Chronic left shoulder pain (719 41,338 29) (M25 512,G89 29)   13  Chronic radicular lumbar pain (724 4,338 29) (M54 16,G89 29)   14  Disc degeneration, lumbar (722 52) (M51 36)   15  Diverticulitis (562 11) (K57 92)   16  Encounter for screening colonoscopy (V76 51) (Z12 11)   17  Enteritis (558 9) (K52 9)   18  Esophageal reflux (530 81) (K21 9)   19  Fever in other diseases (780 61) (R50 81)   20  GERD (gastroesophageal reflux disease) (530 81) (K21 9)   21  Greater trochanteric bursitis of right hip (726 5) (M70 61)   22  Heartburn (787 1) (R12)   23  Hypercholesterolemia (272 0) (E78 00)   24  Lumbar facet arthropathy (721 3) (M46 96)   25  Lumbar spondylosis (721 3) (M47 816)   26  Lumbar stenosis with neurogenic claudication (724 03) (M48 062)   27  Nocturia (788 43) (R35 1)   28  Obesity (278 00) (E66 9)   29  Obstructive sleep apnea on CPAP (327 23,V46 8) (G47 33,Z99 89)   30  Screening for genitourinary condition (V81 6) (Z13 89)   31  Seasonal allergies (477 9) (J30 2)   32  Sleep disorder (780 50) (G47 9)   33  Special screening examination for neoplasm of prostate (V76 44) (Z12 5)   34  Systolic murmur (070 2) (J38 8)   35  Tendinitis of left rotator cuff (726 10) (M75 82)   36  Tendinitis of right rotator cuff (726 10) (M75 81)   37  Urgency of urination (788 63) (R39 15)   38  Urinary frequency (788 41) (R35 0)   39   Urinary hesitancy (788 64) (R39 11)    Past Medical History   · History of Bronchitis (490) (J40)   · History of Fever (780 60) (R50 9)   · History of chest pain (V13 89) (H06 314)   · History of hypercholesterolemia (V12 29) (Z86 39)   · History of hyperlipidemia (V12 29) (Z86 39)   · History of hypertension (V12 59) (Z86 79)   · History of low back pain (V13 59) (Z87 39)   · History of mitral valve disorder (V12 59) (Z86 79)   · History of sleep apnea (V13 89) (Z86 69)   · History of upper respiratory infection (V12 09) (Z87 09)   · History of Other muscle spasm (728 85) (W93 396)   · History of Thoracic neuritis (724 4) (M54 14)    The active problems and past medical history were reviewed and updated today  Surgical History   · History of Complete Colonoscopy   · History of Diagnostic Esophagogastroduodenoscopy   · History of Foot Surgery   · History of Hernia Repair   · History of Hernia Repair   · History of Neuroplasty Decompression Tarsal Tunnel Release   · History of Neuroplasty Median Nerve At Carpal Tunnel   · History of Shoulder Surgery    Family History  Mother    · Family history of Diabetes Mellitus (V18 0)   · Family history of arteriosclerotic cardiovascular disease (V17 49) (Z82 49)   · Family history of Hypertension (V17 49)  Father    · Family history of Cancer   · Family history of Rheu arthritis mult site w involv of organs and systems  Sister    · Family history of Thyroid disease  Brother    · Family history of Diabetes   · Family history of Diabetes Mellitus (V18 0)   · Family history of Heart disease   · Family history of Heart Disease (V17 49)   · Family history of Hypertension (V17 49)   · Family history of Hypertension  Family History    · Family history of Coronary Artery Disease (V17 49)    Social History   · Active advance directive (V49 89) (Z78 9)   · Denied: History of Alcohol Use (History)   · Former smoker (V15 82) (Z87 891)   · Has 4 children   · High school or GED   · Lives with spouse   · Denied: History of Marijuana   · Marital History - Currently    ·    · No drug use   · Retired  The social history was reviewed and updated today  The social history was reviewed and is unchanged  Current Meds   1  AmLODIPine Besylate 5 MG Oral Tablet; TAKE ONE TABLET BY MOUTH EVERY DAY IN THE MORNING; Therapy: 33Jwy8501 to (Bebeto Ortiz)  Requested for: 64NQW5960; Last Rx:47Ttj4787 Ordered   2  Aspirin Low Dose 81 MG TABS; TAKE 1 TABLET DAILY;  Therapy: (Recorded:09Jun2014) to Recorded   3  Centrum Ultra Mens Oral Tablet; TAKE 1 TABLET DAILY; Therapy: (Recorded:79Usc1052) to Recorded   4  Esomeprazole Magnesium CPDR; TAKE 1 CAPSULE ONCE DAILY; Therapy: (Recorded:88Xpe6624) to Recorded   5  Fenofibrate 145 MG Oral Tablet; take one tablet by mouth one time daily; Therapy: 15EZF4631 to ([id number])  Requested for: 64HJK4466; Last Rx:74Wfo3940 Ordered   6  Imodium A-D TABS; take 1 tablet daily prn; Therapy: (Recorded:25Fue4655) to Recorded   7  Lisinopril 40 MG Oral Tablet; take one tablet by mouth one time daily; Therapy: 77ONF9633 to (Evaluate:11Jan2018)  Requested for: 13Oct2017; Last Rx:13Oct2017 Ordered   8  Lovastatin 40 MG Oral Tablet; take one tablet by mouth one time daily; Therapy: 39TVL2308 to (03 17 74 30 53)  Requested for: 26Oct2017; Last Rx:95Guq0395 Ordered   9  Metoprolol Tartrate 25 MG Oral Tablet; take one tablet by mouth twice daily; Therapy: 62Jjv5375 to (Evaluate:16Snb5987)  Requested for: 20Oct2017; Last Rx:44Ude4518 Ordered   10  Tylenol Extra Strength 500 MG Oral Tablet; TAKE 2 TABLET Daily PRN; Therapy: (Recorded:39Mdx8391) to Recorded    The medication list was reviewed and updated today  Allergies  1  No Known Drug Allergies  2  Seasonal    Vitals   Recorded: 88XPX3918 09:59AM   Heart Rate 48   Systolic 873   Diastolic 72   Height 5 ft 3 in   Weight 185 lb    BMI Calculated 32 77   BSA Calculated 1 88     Physical Exam   Constitutional - General appearance: Normal   Right shoulder: again seen is well-healed surgical incisions at the anterior and superior aspect of the right shoulder  There is no tenderness around the shoulder  Active forward elevation is 165Â°, external rotation of 65Â°, internal rotation to T7  Strength is 5/5 and elevation, supraspinatus, internal rotation, external rotation  Mildly positive impingement signs  Sensation intact to light touch in C5 through T1 distributions   Radial pulse 2 +  Left shoulder: Again is well-healed surgical incisions at the anterior and superior aspect of the right shoulder  There is mild tenderness at the anterior shoulder  Active forward elevation is 165Â°, external rotation of 65Â°, internal rotation to T7  Strength 5-out of 5 with pain in elevation  Strength is 5/5 in internal rotation, external rotation  Positive impingement signs  Positive speed's test  Sensation intact to light touch in C5 through T1 distributions  Radial pulse 2 +      Procedure    Procedure: Injection of the right subacromial bursa  Indication:  inflammation  Potential complications include bleeding,-- infection-- and-- allergic reaction  Risk, benefits and alternatives were discussed with the patient  Verbal consent was obtained prior to the procedure  Alcohol was used to prep the area  ethyl chloride spray was used as a topical anesthetic  Using sterile technique, the aspiration/injection needle was then directed from a lateral aspect  Was used to inject 21,-- 4 mL of 1% Lidocaine,-- 4 mL 0 25% Bupivacaine-- and-- 1 mL of 40mg/mL triamcinolone  A bandage was applied  the patient tolerated the procedure well  Complications: none        Future Appointments    Date/Time Provider Specialty Site   06/25/2018 01:00 PM Niles Mcneil, [address] Pulmonary Medicine [facility]   05/21/2018 02:15 PM Oscar Guido MD Urology [facility]   12/20/2017 09:00 AM Daiana Taylor MD Pain Management [facility]   03/01/2018 02:00 PM Dano Aguero DO Internal Medicine [address] [facility]     Signatures   Electronically signed by : ANNIE Posey ; Dec 14 2017 10:27AM EST                       (Author)

## 2017-12-20 ENCOUNTER — ALLSCRIPTS OFFICE VISIT (OUTPATIENT)
Dept: OTHER | Facility: OTHER | Age: 72
End: 2017-12-20

## 2017-12-21 NOTE — CONSULTS
Assessment   Assessed    1  Chronic radicular lumbar pain (724 4,338 29) (M54 16,G89 29)   2  Disc degeneration, lumbar (722 52) (M51 36)   3  Chronic pain syndrome (338 4) (G89 4)    Plan        Complete risks and benefits of the procedure were reviewed including bleeding, infection, tissue reaction, allergic reaction and nerve injury with verbal and written consent being obtained  Discussion/Summary        TAWANNA's pain persists despite time, relative rest, activity modification and therapy  I believe that he would benefit from lumbar epidural steroid injection to diminish any inflammatory component of his pain  We will initially use an interlaminar approach  The injection may need to be repeated or alternate approach utilized based on the degree of pain relief following the initial injection  In the office today, we reviewed the nature of TAWANNA's pathology in depth using diagrams and models  We discussed the approach we would use for the epidural steroid injection and provided literature for home review  The patient understands the risks associated with the procedure including bleeding, infection, tissue injury, allergic reaction and paralysis and provided written and verbal consent in the office today  This is a 77-year-old male who presents today with chronic low back pain which is multifocal in nature  In the past, he has had pain interventions which helped with his symptoms  His low back pain and leg pain has gradually returned  Recent MRI of the lumbosacral spine demonstrates multilevel degenerative changes with foraminal stenosis  The patient's pain persists despite time, relative rest, activity modification and therapy  I believe that he would benefit from a lumbar epidural steroid injection to diminish any inflammatory component of his pain  We will initially use an interlaminar approach   The injection may need to be repeated or alternate approach utilized based on the degree of pain relief following the initial injection  the office today, we reviewed the nature of the patient's pathology in depth using diagrams and models  We discussed the approach we would use for the epidural steroid injection and provided literature for home review  The patient understands the risks associated with the procedure including bleeding, infection, tissue reaction, allergic reaction, spinal headache and paralysis and provided written and verbal consent in the office today  some point in time, I will consider repeating a lumbar RFA upon completion of a series of lumbar epidural steroid injections  Patient verbalizes understanding  encouraged patient to continue with home exercises and over-the-counter analgesics when necessary  The patient has the current Goals: Continue home exercises, use of TENS, and over-the-counter analgesics  The patent has the current Barriers: None  Patient is able to Self-Care  Educational resources provided: Re: [place]  Possible side effects of new medications were reviewed with the patient/guardian today  The treatment plan was reviewed with the patient/guardian  The patient/guardian understands and agrees with the treatment plan    The patient was counseled regarding diagnostic results,-- instructions for management,-- risk factor reductions,-- prognosis,-- patient and family education,-- impressions,-- risks and benefits of treatment options-- and-- importance of compliance with treatment  Chief Complaint   Chief Complaints    1  Back Pain    History of Present Illness   Patient is a 77-year-old male who presents today for follow up visit  Patient was last seen about 2 years ago  At that time, he had a left and a right L2-L5 rhizotomy  Patient reports that it helped with his pain  However, his low back pain has slowly returned  He complains of low back and shooting pain down the leg  He has been off pain medications   He continues to use a TENS Unit prn to control his pain  Patient recently had an MRI of the lumbosacral spine which demonstrates multilevel facet degenerative changes which is severe  There is also evidence of central foraminal stenosis  He denies loss of bladder or bowel  He denies fever or chills  He continues to perform home exercises which includes stretching on a regular basis without significant relief of pain  Referring physician is  Dr Henry Valentin      Primary Care physician is  Dr Juan M Martinez presents with complaints of gradual onset of constant episodes of severe bilateral lower back pain, described as sharp and throbbing, radiating to the bilateral buttock and bilateral thigh  On a scale of 1 to 10, the patient rates the pain as 8  Symptoms are worsening  Review of Systems        Constitutional: no fever,-- no recent weight gain-- and-- no recent weight loss  Eyes: no double vision-- and-- no blurry vision  Cardiovascular: no chest pain,-- no palpitations-- and-- no lower extremity edema  Respiratory: no complaints of shortness of breath-- and-- no wheezing  Musculoskeletal: joint stiffness-- and-- decreased range of motion, but-- no difficulty walking,-- no muscle weakness,-- no joint swelling,-- no limb swelling-- and-- no pain in extremity  Neurological: no dizziness,-- no difficulty swallowing,-- no memory loss,-- no loss of consciousness-- and-- no seizures  Gastrointestinal: constipation, but-- no nausea,-- no vomiting-- and-- no diarrhea  Genitourinary: no difficulty initiating urine stream,-- no genital pain-- and-- no frequent urination  Integumentary: no complaints of skin rash  Psychiatric: no depression  Endocrine: no excessive thirst,-- no adrenal disease,-- no hypothyroidism-- and-- no hyperthyroidism  Hematologic/Lymphatic: no tendency for easy bruising-- and-- no tendency for easy bleeding  ROS reviewed  Active Problems   Problems    1  Abnormal blood sugar (790 29) (R73 09)   2  Abnormal stress test (794 39) (R94 39)   3  Active advance directive (V49 89) (Z78 9)   4  Acute pharyngitis, unspecified etiology (462) (J02 9)   5  Arthritis (716 90) (M19 90)   6  Back pain (724 5) (M54 9)   7  Backache (724 5) (M54 9)   8  Benign essential hypertension (401 1) (I10)   9  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,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,N13 8)   10  Chest pain (786 50) (R07 9)   11  Chronic bilateral low back pain with bilateral sciatica (724 2,724 3,338 29)      (M54 42,M54 41,G89 29)   12  Chronic left shoulder pain (719 41,338 29) (M25 512,G89 29)   13  Chronic radicular lumbar pain (724 4,338 29) (M54 16,G89 29)   14  Disc degeneration, lumbar (722 52) (M51 36)   15  Diverticulitis (562 11) (K57 92)   16  Encounter for screening colonoscopy (V76 51) (Z12 11)   17  Enteritis (558 9) (K52 9)   18  Esophageal reflux (530 81) (K21 9)   19  Fever in other diseases (780 61) (R50 81)   20  GERD (gastroesophageal reflux disease) (530 81) (K21 9)   21  Greater trochanteric bursitis of right hip (726 5) (M70 61)   22  Heartburn (787 1) (R12)   23  Hypercholesterolemia (272 0) (E78 00)   24  Lumbar facet arthropathy (721 3) (M46 96)   25  Lumbar spondylosis (721 3) (M47 816)   26  Lumbar stenosis with neurogenic claudication (724 03) (M48 062)   27  Nocturia (788 43) (R35 1)   28  Obesity (278 00) (E66 9)   29  Obstructive sleep apnea on CPAP (327 23,V46 8) (G47 33,Z99 89)   30  Screening for genitourinary condition (V81 6) (Z13 89)   31  Seasonal allergies (477 9) (J30 2)   32  Sleep disorder (780 50) (G47 9)   33  Special screening examination for neoplasm of prostate (V76 44) (Z12 5)   34  Systolic murmur (693 4) (R51 1)   35  Tendinitis of left rotator cuff (726 10) (M75 82)   36  Tendinitis of right rotator cuff (726 10) (M75 81)   37  Urgency of urination (788 63) (R39 15)   38  Urinary frequency (788 41) (R35 0)   39   Urinary hesitancy (788 64) (R39 11)    Past Medical History   Problems    1  History of Bronchitis (490) (J40)   2  History of Fever (780 60) (R50 9)   3  History of chest pain (V13 89) (Z87 898)   4  History of hypercholesterolemia (V12 29) (Z86 39)   5  History of hyperlipidemia (V12 29) (Z86 39)   6  History of hypertension (V12 59) (Z86 79)   7  History of low back pain (V13 59) (Z87 39)   8  History of mitral valve disorder (V12 59) (Z86 79)   9  History of sleep apnea (V13 89) (Z86 69)   10  History of upper respiratory infection (V12 09) (Z87 09)   11  History of Other muscle spasm (728 85) (M62 838)   12  History of Thoracic neuritis (724 4) (M54 14)     The active problems and past medical history were reviewed and updated today  Surgical History   Problems    1  History of Complete Colonoscopy   2  History of Diagnostic Esophagogastroduodenoscopy   3  History of Foot Surgery   4  History of Hernia Repair   5  History of Hernia Repair   6  History of Neuroplasty Decompression Tarsal Tunnel Release   7  History of Neuroplasty Median Nerve At Carpal Tunnel   8  History of Shoulder Surgery    Family History   Mother    1  Family history of Diabetes Mellitus (V18 0)   2  Family history of arteriosclerotic cardiovascular disease (V17 49) (Z82 49)   3  Family history of Hypertension (V17 49)  Father    4  Family history of Cancer   5  Family history of Rheu arthritis mult site w involv of organs and systems  Sister    6  Family history of Thyroid disease  Brother    7  Family history of Diabetes   8  Family history of Diabetes Mellitus (V18 0)   9  Family history of Heart disease   10  Family history of Heart Disease (V17 49)   11  Family history of Hypertension (V17 49)   12  Family history of Hypertension  Family History    13   Family history of Coronary Artery Disease (V17 49)    Social History   Problems    · Active advance directive (V49 89) (Z78 9)   · Denied: History of Alcohol Use (History)   · Former smoker (V15 82) (F40 987)   · Has 4 children   · High school or GED   · Lives with spouse   · Denied: History of Marijuana   · Marital History - Currently    ·    · No drug use   · Retired  The social history was reviewed and updated today  Current Meds    1  AmLODIPine Besylate 5 MG Oral Tablet; TAKE ONE TABLET BY MOUTH EVERY DAY IN     THE MORNING; Therapy: 82Doe2606 to (Ramonita Tye)  Requested for: 50BQQ7951; Last     Rx:55Yfd0750 Ordered   2  Aspirin Low Dose 81 MG TABS; TAKE 1 TABLET DAILY; Therapy: (Recorded:09Jun2014) to Recorded   3  Centrum Ultra Mens Oral Tablet; TAKE 1 TABLET DAILY; Therapy: (Recorded:14Nov2017) to Recorded   4  Esomeprazole Magnesium CPDR; TAKE 1 CAPSULE ONCE DAILY; Therapy: (Recorded:26Qsb0091) to Recorded   5  Fenofibrate 145 MG Oral Tablet; take one tablet by mouth one time daily; Therapy: 42YQG4366 to (475 5336)  Requested for: 43VCE3816; Last     Rx:84Ewz6423 Ordered   6  Imodium A-D TABS; take 1 tablet daily prn; Therapy: (Recorded:46Zix9741) to Recorded   7  Lisinopril 40 MG Oral Tablet; take one tablet by mouth one time daily; Therapy: 14GVW2462 to (Evaluate:11Jan2018)  Requested for: 13Oct2017; Last     Rx:13Oct2017 Ordered   8  Lovastatin 40 MG Oral Tablet; take one tablet by mouth one time daily; Therapy: 48GRN0849 to ([id number])  Requested for: 26Oct2017; Last     Rx:26Oct2017 Ordered   9  Metoprolol Tartrate 25 MG Oral Tablet; take one tablet by mouth twice daily; Therapy: 72Iud4145 to (Evaluate:59Frv4417)  Requested for: 20Oct2017; Last     Rx:20Oct2017 Ordered   10  Tylenol Extra Strength 500 MG Oral Tablet; TAKE 2 TABLET Daily PRN; Therapy: (Recorded:60Ick6814) to Recorded     The medication list was reviewed and updated today  Allergies   Medication    1  No Known Drug Allergies  Non-Medication    2   Seasonal    Vitals   Vital Signs    Recorded: 20Dec2017 08:52AM   Heart Rate 56   Respiration 14 Systolic 913   Diastolic 58   Height 5 ft 3 in   Weight 180 lb    BMI Calculated 31 89   BSA Calculated 1 85   Pain Scale 8     Physical Exam        Constitutional      General appearance: Well developed, well nourished, alert, in no distress, non-toxic and no overt pain behavior  HEENT      Hearing grossly intact  Pulmonary      Respiratory effort: Even and unlabored  Cardiovascular      Examination of extremities: No edema or pitting edema present  Pedal pulses: 2+ bilaterally  Skin      Skin and subcutaneous tissue: Normal without rashes or lesions, well hydrated  Psychiatric      Mood and affect: Mood and affect appropriate  Neurologic      Cranial nerves: Cranial nerves II-XII grossly intact  the muscle tone was normal      Musculoskeletal      Gait and station: Normal        Joint Exam:       Lumbar/Sacral Spine examination demonstrates Lumbosacral Spine:      Appearance: Normal  Spinal alignment exhibits normal lordosis  Tenderness: at lumbar spine,-- right paraspinal-- and-- left paraspinal  Palpatory Findings include bilateral muscle spasms  Lumbosacral Spine Sensory: intact to light touch and pinprick in the lower extremities  ROM Lumbosacral Spine: Positive facet loading of the lumbosacral spine bilaterally  Flexion was not restricted-- and-- was painless  Extension was restricted-- and-- was painful  Left lateral flexion was restricted-- and-- was painful  Right lateral flexion was restricted-- and-- was painful  Rotation to the left was restricted-- and-- was painful  Rotation to the right was restricted-- and-- was painful  ROM Hips: Full      Foot and ankle strength was normal bilaterally  Knee strength was normal bilaterally  Hip strength was normal bilaterally  Evaluation of Muscle Stretch Reflexes on the right side demonstrates 4/4 Knee Jerk Reflex        Evaluation of Muscle Stretch Reflexes on the left side demonstrates 4/4 Knee Jerk Reflex  Special Tests: negative Straight Leg Raise on right-- and-- negative Straight Leg Raise on left  Results/Data   Procedure Flowsheet [id number] 08:50AM       Test Name Result Flag Reference   Oswestry Score 62           Results        Radiology:  LUMBAR SPINE     INDICATION: Chronic low-back pain radiating to both hips  COMPARISON: Lumbar spine plain films from 11/20/2017  VIEWS: AP, bilateral oblique, coned down and lateral projections in neutral, flexion and extension     IMAGES: 7     FINDINGS:     Alignment is unremarkable  There is no subluxation and alignment is stable in flexion, extension, and neutral positioning  There is no radiographic evidence of acute fracture or destructive osseous lesion  There is severe degenerative disc space narrowing at L2-L3  There is mild degenerative disc space narrowing throughout the rest of lumbar spine  This mild degenerative facet disease throughout the lumbar spine  Visualized soft tissues appear unremarkable  IMPRESSION:     No acute osseous abnormality  Degenerative changes as described  MRI:  MRI LUMBAR SPINE WITHOUT CONTRAST     INDICATION: Low back pain going down both legs  COMPARISON: November 22, 2013     TECHNIQUE: Sagittal T1, sagittal T2, sagittal inversion recovery, axial T1 and axial T2, coronal T2      IMAGE QUALITY: Diagnostic     FINDINGS:     ALIGNMENT: Slight retrolisthesis L1-L2 and anterolisthesis L4-5  L4-5 anterolisthesis is new since the prior study  MARROW SIGNAL: Endplate degenerative marrow signal L1-2  DISTAL CORD AND CONUS: Normal size and signal within the distal cord and conus  The conus ends at the L1 level  PARASPINAL SOFT TISSUES: Small bilateral T2 renal hyperintensities are likely cysts  SACRUM: Normal signal within the sacrum  No evidence of insufficiency or stress fracture       LOWER THORACIC DISC SPACES: Slight disc desiccation and minimal retrolisthesis with small marginal osteophytes  No significant central or foraminal narrowing  LUMBAR DISC SPACES:     L1-L2: Small marginal osteophytes are noted  There is minimal central stenosis  Foramina are patent  L2-L3: Mild annular bulge with small marginal osteophytes  No significant central stenosis  Minimal right foraminal narrowing  L3-L4: Mild facet hypertrophy  Small marginal osteophytes are noted  Minimal right foraminal narrowing  L4-L5: Severe facet hypertrophy  Slight anterolisthesis noted which is new from the prior study with uncovering of the posterior disc margin  Mild bilateral foraminal narrowing right greater than left  Central canal patent  L5-S1: This is a transitional level  No significant central or foraminal narrowing  IMPRESSION:     Facet degenerative change L4-5 accounts for mild anterolisthesis which is new from the prior study  Mild right greater than left foraminal narrowing is noted  Spondylotic degenerative changes elsewhere results in minimal central and foraminal narrowing as described            Future Appointments      Date/Time Provider Specialty Site   03/01/2018 02:00 PM Dorothea Landin DO Internal Medicine [facility]   05/21/2018 02:15 PM Callie Rodríguez MD Urology [facility]   06/25/2018 01:00 PM Fabrice Melo [facility] Pulmonary Medicine [facility]     Signatures    Electronically signed by : Ovidio Moore MD; Dec 20 2017 10:10AM EST                       (Author)

## 2017-12-28 ENCOUNTER — GENERIC CONVERSION - ENCOUNTER (OUTPATIENT)
Dept: INTERNAL MEDICINE CLINIC | Facility: CLINIC | Age: 72
End: 2017-12-28

## 2018-01-09 NOTE — PROGRESS NOTES
History of Present Illness  Care Coordination Encounter Information:   Type of Encounter: Telephonic   Contact: Follow-Up    Spoke to Patient  Care Coordination [facility] Nurse Richa Mcdonald:   The reason for call is to discuss outreach for follow up/needed services and coordination of meeting care plan treatment goals  Patient in stable condition  Pain on his lower back and hip that comes and goes and states that he thinks it is due to his arthritis  Takes Tylenol for the pain and medication relieve the pain  Welcomed opportunity to patient to make a f/u appointment with PCP for the pain  Patient denied and stated that it does not affect his ADLs and he will make an appointment if he feels that he needs to be seen  Tolerating medications as ordered  Stated that he has no questions or concerns at this time and was busy at the moment but would like a f/u phone call  Active Problems    1  Abnormal blood sugar (790 29) (R73 09)   2  Abnormal stress test (794 39) (R94 39)   3  Active advance directive (V49 89) (Z78 9)   4  Acute pharyngitis, unspecified etiology (462) (J02 9)   5  Back pain (724 5) (M54 9)   6  Backache (724 5) (M54 9)   7  Benign essential hypertension (401 1) (I10)   8  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,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,N13 8)   9  Chest pain (786 50) (R07 9)   10  Disc degeneration, lumbar (722 52) (M51 36)   11  Diverticulitis (562 11) (K57 92)   12  Encounter for screening colonoscopy (V76 51) (Z12 11)   13  Enteritis (558 9) (K52 9)   14  Esophageal reflux (530 81) (K21 9)   15  Fever in other diseases (780 61) (R50 81)   16  Hypercholesterolemia (272 0) (E78 00)   17  Lumbar facet arthropathy (721 3) (M12 88)   18  Nocturia (788 43) (R35 1)   19  GREGG (obstructive sleep apnea) (327 23) (G47 33)   20  Screening for genitourinary condition (V81 6) (Z13 89)   21  Special screening examination for neoplasm of prostate (V76 44) (Z12 5)   22  Systolic murmur (862 3) (P99 1)   23  Urgency of urination (788 63) (R39 15)   24  Urinary frequency (788 41) (R35 0)    Past Medical History    1  History of Bronchitis (490) (J40)   2  History of Fever (780 60) (R50 9)   3  History of chest pain (V13 89) (Z87 898)   4  History of hypercholesterolemia (V12 29) (Z86 39)   5  History of hyperlipidemia (V12 29) (Z86 39)   6  History of hypertension (V12 59) (Z86 79)   7  History of low back pain (V13 59) (Z87 39)   8  History of mitral valve disorder (V12 59) (Z86 79)   9  History of sleep apnea (V13 89) (Z86 69)   10  History of upper respiratory infection (V12 09) (Z87 09)   11  History of Other muscle spasm (728 85) (M62 838)   12  History of Thoracic neuritis (724 4) (M54 14)    Surgical History    1  History of Complete Colonoscopy   2  History of Diagnostic Esophagogastroduodenoscopy   3  History of Foot Surgery   4  History of Hernia Repair   5  History of Hernia Repair   6  History of Neuroplasty Decompression Tarsal Tunnel Release   7  History of Neuroplasty Median Nerve At Carpal Tunnel   8  History of Shoulder Surgery    Family History  Mother    1  Family history of Diabetes Mellitus (V18 0)   2  Family history of arteriosclerotic cardiovascular disease (V17 49) (Z82 49)   3  Family history of Hypertension (V17 49)  Father    4  Family history of Cancer   5  Family history of Rheu arthritis mult site w involv of organs and systems  Sister    6  Family history of Thyroid disease  Brother    7  Family history of Diabetes   8  Family history of Diabetes Mellitus (V18 0)   9  Family history of Heart disease   10  Family history of Heart Disease (V17 49)   11  Family history of Hypertension   12  Family history of Hypertension (V17 49)  Family History    13   Family history of Coronary Artery Disease (V17 49)    Social History    · Active advance directive (V49 89) (Z78 9)   · Denied: History of Alcohol Use (History)   · Former smoker (P17 69) (F45 347)   · Lives with spouse   · Denied: History of Marijuana   · Marital History - Currently    ·    · Never A Smoker   · No drug use   · Retired    Current Meds    1  AmLODIPine Besylate 5 MG Oral Tablet; TAKE ONE TABLET BY MOUTH EVERY DAY IN   THE MORNING; Therapy: 50Isf2719 to (Evaluate:07Sep2017)  Requested for: 22BBG0136; Last   Rx:09Jun2017 Ordered   2  Lisinopril 40 MG Oral Tablet; take one tablet by mouth one time daily; Therapy: 66EAX6440 to (Evaluate:03Klr8004)  Requested for: 13Fom8752; Last   Rx:24Onb3243 Ordered    3  Tamsulosin HCl - 0 4 MG Oral Capsule; take 1 capsule by mouth at bedtime; Therapy: 33XRA9114 to (Evaluate:22Wrl5699); Last Rx:10Fud8629 Ordered    4  Metoprolol Tartrate 25 MG Oral Tablet; take one tablet by mouth twice daily; Therapy: 94Hji8652 to (Evaluate:44Bwe1332)  Requested for: 45Zlk8009; Last   Rx:49Ldm6912 Ordered    5  Fenofibrate 145 MG Oral Tablet; take one tablet by mouth one time daily; Therapy: 47WUE9487 to (Dinora Aquino)  Requested for: 27Mar2017; Last   Rx:27Mar2017; Status: ACTIVE - Transmit to [facility] Verification Ordered   6  Lovastatin 40 MG Oral Tablet; take one tablet by mouth one time daily; Therapy: 78XWF9026 to (Evaluate:15Oct2017)  Requested for: 67Mkf3593; Last   Rx:70Sdi0610; Status: ACTIVE - Transmit to Pharmacy - Awaiting Verification Ordered    7  Aspirin Low Dose 81 MG TABS; TAKE 1 TABLET DAILY; Therapy: (Recorded:09Jun2014) to Recorded    Allergies    1  No Known Drug Allergies    2  Seasonal    Health Management   COLONOSCOPY; every 5 years; Last 75VOS1222; Next Due: 34Pfp0547; Active    End of Encounter Meds    1  AmLODIPine Besylate 5 MG Oral Tablet; TAKE ONE TABLET BY MOUTH EVERY DAY IN   THE MORNING; Therapy: 52Ehv6702 to (Evaluate:60Gmj7239)  Requested for: 63OLK9125; Last   Rx:09Jun2017 Ordered   2  Lisinopril 40 MG Oral Tablet; take one tablet by mouth one time daily;    Therapy: 41QYV5214 to (Evaluate:33Owr5351)  Requested for: 36Quq1942; Last Rx:58Knm5785 Ordered    3  Tamsulosin HCl - 0 4 MG Oral Capsule; take 1 capsule by mouth at bedtime; Therapy: 79HBZ4314 to (Evaluate:17May2018); Last Rx:22May2017 Ordered    4  Metoprolol Tartrate 25 MG Oral Tablet; take one tablet by mouth twice daily; Therapy: 86Cld5759 to (Evaluate:13Apr2018)  Requested for: 18Apr2017; Last   Rx:81Xpn1186 Ordered    5  Fenofibrate 145 MG Oral Tablet; take one tablet by mouth one time daily; Therapy: 88RZM0428 to (Josh Brock)  Requested for: 27Mar2017; Last   Rx:27Mar2017; Status: ACTIVE - Transmit to [facility] Verification Ordered   6  Lovastatin 40 MG Oral Tablet; take one tablet by mouth one time daily; Therapy: 71TPT7547 to (Evaluate:15Oct2017)  Requested for: 18Apr2017; Last   Rx:18Apr2017; Status: ACTIVE - Transmit to Pharmacy - Awaiting Verification Ordered    7  Aspirin Low Dose 81 MG TABS; TAKE 1 TABLET DAILY; Therapy: (Recorded:09Jun2014) to Recorded    Future Appointments    Date/Time Provider Specialty Site   05/21/2018 02:15 PM Dionicio Land MD Urology [facility]   06/19/2017 11:15 AM ANNIE Kendall   Pulmonary Medicine [facility]   10/18/2017 09:30 AM Tootie Bauer DO Internal Medicine [facility]     Patient Care Team    Care Team Member Role Specialty Office Number   Doyle Henson MD  Anesthesia [phone]   [facility]  Physician Assistant [phone]     Signatures   Electronically signed by : Radhika Contreras RN; Jun 16 2017  2:20PM EST                       (Author)

## 2018-01-09 NOTE — PROGRESS NOTES
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) (N40 1)   2  Special screening examination for neoplasm of prostate (V76 44) (Z12 5)   3  Urinary frequency (788 41) (R35 0)   4  Urgency of urination (788 63) (R39 15)   5  Nocturia (788 43) (R35 1)    Discussion/Summary  Discussion Summary:   Henny Perea is a 68-year-old male with a history of mixed lower urinary tract symptoms including a slow stream as well as nocturia  All of his symptoms have resolved since initiating treatment for obstructive sleep apnea  He currently has nocturia twice per night but is overall happy with this as well as his daytime symptoms  He has discontinued his medications approximate one year ago and is overall doing very well  His exam today is benign and his recent PSA is below 1  He empties his bladder to completion with a PVR of 47 as assessed today  Nataliia Grajeda is doing very well he will continue off of medication at the current time  He will follow-up in a year simply for a voiding assessment  I told him that no further PSA testing is recommended  We did discuss that if his lower urinary tract symptoms worsen over time we could get him back on medication if indicated  Understands and agrees with treatment plan: The treatment plan was reviewed with the patient/guardian  The patient/guardian understands and agrees with the treatment plan      Chief Complaint  Chief Complaint Free Text Note Form: BPH, frequency, nocturia  PSA= 0 5 12/10/15      History of Present Illness  HPI: Henny Perea returns for followup on his lower urinary tract symptoms  In brief he has a history of BPH and nocturia and was seen by my partner Dr Oly Rodriguez 2 years ago  At that time he was initiated on tamsulosin as well as oxybutynin  He has done very well over the past 2 years  He was recently initiated on treatment for obstructive sleep apnea and notes that his nocturia has completely resolved   He has appropriately discontinued both his tamsulosin and his oxybutynin and at this time is not on any medical management  He is overall happy with his lower urinary tract symptoms at the current time  He notes that he recently underwent a right inguinal herniorrhaphy and was told that his bladder was included in the hernia sac which was reduced  He has no current lower urinary tract symptoms      Review of Systems  Complete-Male Urology:   Constitutional: No fever or chills, feels well, no tiredness, no recent weight gain or weight loss  Respiratory: Sleep apnea, but No complaints of shortness of breath, no wheezing, no cough, no SOB on exertion, no orthopnea or PND  Cardiovascular: Hypertension, hypercholesterolemia, borderline diabetes, but No complaints of slow heart rate, no fast heart rate, no chest pain, no palpitations, no leg claudication, no lower extremity  Gastrointestinal: GERD, but No complaints of abdominal pain, no constipation, no nausea or vomiting, no diarrhea or bloody stools  Genitourinary: stream quality fair, but as noted in HPI, no dysuria, no urinary hesitancy, no hematuria, no empty sensation, no incontinence and no feelings of urinary urgency (occasionally)    The patient presents with complaints of nocturia (2x)  Musculoskeletal: Back pain, getting injections, but No complaints of arthralgia, no myalgias, no joint swelling or stiffness, no limb pain or swelling  Integumentary: No complaints of skin rash or skin lesions, no itching, no skin wound, no dry skin  Hematologic/Lymphatic: No complaints of swollen glands, no swollen glands in the neck, does not bleed easily, no easy bruising  Neurological: Sciatic back pain, but No compliants of headache, no confusion, no convulsions, no numbness or tingling, no dizziness or fainting, no limb weakness, no difficulty walking  Active Problems    1  Back pain (724 5) (M54 9)   2  Backache (724 5) (M54 9)   3  Benign essential hypertension (401 1) (I10)   4   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) (N40 1)   5  Disc degeneration, lumbar (722 52) (M51 36)   6  Diverticulitis (562 11) (K57 92)   7  Encounter for screening colonoscopy (V76 51) (Z12 11)   8  Esophageal reflux (530 81) (K21 9)   9  Hypercholesterolemia (272 0) (E78 0)   10  Lumbar facet arthropathy (721 3) (M47 816)   11  Nocturia (788 43) (R35 1)   12  Special screening examination for neoplasm of prostate (V76 44) (Z12 5)   13  Systolic murmur (115 8) (T99)   14  Urgency of urination (788 63) (R39 15)   15  Urinary frequency (788 41) (R35 0)    Past Medical History    1  History of Bronchitis (490) (J40)   2  History of Fever (780 60) (R50 9)   3  History of chest pain (V13 89) (Z87 898)   4  History of hypercholesterolemia (V12 29) (Z86 39)   5  History of hyperlipidemia (V12 29) (Z86 39)   6  History of hypertension (V12 59) (Z86 79)   7  History of low back pain (V13 59) (Z87 39)   8  History of mitral valve disorder (V12 59) (Z86 79)   9  History of sleep apnea (V13 89) (Z87 09)   10  History of upper respiratory infection (V12 09) (Z87 09)   11  History of Other muscle spasm (728 85) (M62 838)   12  History of Thoracic neuritis (724 4) (M54 14)  Active Problems And Past Medical History Reviewed: The active problems and past medical history were reviewed and updated today  Surgical History    1  History of Complete Colonoscopy   2  History of Diagnostic Esophagogastroduodenoscopy   3  History of Foot Surgery   4  History of Hernia Repair   5  History of Hernia Repair   6  History of Neuroplasty Decompression Tarsal Tunnel Release   7  History of Neuroplasty Median Nerve At Carpal Tunnel   8  History of Shoulder Surgery  Surgical History Reviewed: The surgical history was reviewed and updated today  Family History    1  Family history of Diabetes Mellitus (V18 0)   2  Family history of Heart Disease (V17 49)   3  Family history of Hypertension (V17 49)    4   Family history of Cancer   5  Family history of Rheu arthritis mult site w involv of organs and systems    6  Family history of Thyroid disease    7  Family history of Diabetes   8  Family history of Diabetes Mellitus (V18 0)   9  Family history of Heart disease   10  Family history of Heart Disease (V17 49)   11  Family history of Hypertension (V17 49)   12  Family history of Hypertension    13  Family history of Coronary Artery Disease (V17 49)  Family History Reviewed: The family history was reviewed and updated today  Social History    · Denied: History of Alcohol Use (History)   · Former smoker (V15 82) (K23 310)   · Lives with spouse   · Denied: History of Marijuana   · Marital History - Currently    ·    · Never A Smoker   · No drug use   · Retired  Social History Reviewed: The social history was reviewed and updated today  The social history was reviewed and is unchanged  Current Meds   1  AmLODIPine Besylate 5 MG Oral Tablet; TAKE ONE TABLET BY MOUTH EVERY DAY IN   THE MORNING; Therapy: 92Pdw5941 to (Evaluate:44Ied8130)  Requested for: 60Ywl3598; Last   Rx:13Ltu2515 Ordered   2  Aspirin Low Dose 81 MG Oral Tablet; TAKE 1 TABLET DAILY; Therapy: (Recorded:09Jun2014) to Recorded   3  Fenofibrate 145 MG Oral Tablet; take one tablet by mouth one time daily; Therapy: 39CUD0623 to (Evaluate:60Gju4316)  Requested for: 63UJA7552; Last   Rx:12Nov2015 Ordered   4  Lisinopril 40 MG Oral Tablet; take one tablet by mouth one time daily; Therapy: 43JKZ4470 to (Evaluate:45Gen8450)  Requested for: 09HFX6837; Last   Rx:18Jan2016 Ordered   5  Lovastatin 40 MG Oral Tablet; take one tablet by mouth one time daily; Therapy: 58BIO9829 to (Evaluate:20Jan2017)  Requested for: 47UJD8633; Last   Rx:26Jan2016 Ordered  Medication List Reviewed: The medication list was reviewed and updated today  Allergies    1  No Known Drug Allergies    2   Seasonal    Vitals  Vital Signs [Data Includes: Current Encounter] Recorded: 17QKJ0540 09:17AM   Heart Rate 60   Systolic 621   Diastolic 62   Height 5 ft 3 in   Weight 184 lb    BMI Calculated 32 59   BSA Calculated 1 87     Physical Exam    Constitutional   General appearance: No acute distress, well appearing and well nourished  Head and Face   Head and face: Normal     Eyes   Conjunctiva and lids: No erythema, swelling or discharge  Ears, Nose, Mouth, and Throat   Oropharynx: Normal with no erythema, edema, exudate or lesions  Pulmonary   Respiratory effort: No increased work of breathing or signs of respiratory distress  Cardiovascular   Examination of extremities for edema and/or varicosities: Normal     Abdomen   Abdomen: Non-tender, no masses  Right inguinal incision consistent with prior herniorrhaphy  Liver and spleen: No hepatomegaly or splenomegaly  Examination for hernias: No hernias appreciated  Genitourinary   Anus and perineum: Normal     Digital rectal exam of prostate: Normal size, no masses  25-30 cc and smooth with no nodules or tenderness be her  Anus, perineum, and rectum: Normal     Lymphatic   Palpation of lymph nodes in groin: No lymphadenopathy  Musculoskeletal   Gait and station: Normal     Stability: Normal     Skin   Skin and subcutaneous tissue: Normal without rashes or lesions  Palpation of skin and subcutaneous tissue: Normal turgor  Neurologic   Cranial nerves: Cranial nerves 2-12 intact  Psychiatric   Judgment and insight: Normal     Orientation to person, place and time: Normal     Recent and remote memory: Intact      Mood and affect: Normal        Results/Data  Lab Studies Reviewed: PSA December 10, 2015 0 5      Future Appointments    Date/Time Provider Specialty Site   03/21/2016 02:15 PM Brynn Cantor DO Internal Medicine [address] [place] [address] [place]     Signatures   Electronically signed by : Verner Squires, MD; Feb 5 2016  9:33AM EST                       (Author)

## 2018-01-10 NOTE — PROGRESS NOTES
History of Present Illness  Care Coordination Encounter Information:   Type of Encounter: Telephonic   Contact: Follow-Up    Spoke to Patient  Care Coordination  Nurse [address]:   The reason for call is to discuss outreach for follow up/needed services and coordination of meeting care plan treatment goals  Patient doing well  Denied pain or discomfort at the moment  Blood pressure this morning was 128/62 per patient  Tolerating medications with no side effects noted  Patient has no questions or concerns at this time  During f/u phone calls education provided with patient verbalizing understanding  Patient is compliant with f/u PCP appointments  Needs are met  Contact information given to patient again  Will close from care coordination at this time  Active Problems    1  Abnormal blood sugar (790 29) (R73 09)   2  Abnormal stress test (794 39) (R94 39)   3  Active advance directive (V49 89) (Z78 9)   4  Acute pharyngitis, unspecified etiology (462) (J02 9)   5  Back pain (724 5) (M54 9)   6  Backache (724 5) (M54 9)   7  Benign essential hypertension (401 1) (I10)   8  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,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,N13 8)   9  Chest pain (786 50) (R07 9)   10  Disc degeneration, lumbar (722 52) (M51 36)   11  Diverticulitis (562 11) (K57 92)   12  Encounter for screening colonoscopy (V76 51) (Z12 11)   13  Enteritis (558 9) (K52 9)   14  Esophageal reflux (530 81) (K21 9)   15  Fever in other diseases (780 61) (R50 81)   16  Hypercholesterolemia (272 0) (E78 00)   17  Lumbar facet arthropathy (721 3) (M12 88)   18  Nocturia (788 43) (R35 1)   19  Obesity (278 00) (E66 9)   20  GREGG (obstructive sleep apnea) (327 23) (G47 33)   21  Screening for genitourinary condition (V81 6) (Z13 89)   22  Special screening examination for neoplasm of prostate (V76 44) (Z12 5)   23  Systolic murmur (852 2) (B65 0)   24  Urgency of urination (788 63) (R39 15)   25   Urinary frequency (788 41) (R35 0)    Past Medical History    1  History of Bronchitis (490) (J40)   2  History of Fever (780 60) (R50 9)   3  History of chest pain (V13 89) (Z87 898)   4  History of hypercholesterolemia (V12 29) (Z86 39)   5  History of hyperlipidemia (V12 29) (Z86 39)   6  History of hypertension (V12 59) (Z86 79)   7  History of low back pain (V13 59) (Z87 39)   8  History of mitral valve disorder (V12 59) (Z86 79)   9  History of sleep apnea (V13 89) (Z86 69)   10  History of upper respiratory infection (V12 09) (Z87 09)   11  History of Other muscle spasm (728 85) (M62 838)   12  History of Thoracic neuritis (724 4) (M54 14)    Surgical History    1  History of Complete Colonoscopy   2  History of Diagnostic Esophagogastroduodenoscopy   3  History of Foot Surgery   4  History of Hernia Repair   5  History of Hernia Repair   6  History of Neuroplasty Decompression Tarsal Tunnel Release   7  History of Neuroplasty Median Nerve At Carpal Tunnel   8  History of Shoulder Surgery    Family History  Mother    1  Family history of Diabetes Mellitus (V18 0)   2  Family history of arteriosclerotic cardiovascular disease (V17 49) (Z82 49)   3  Family history of Hypertension (V17 49)  Father    4  Family history of Cancer   5  Family history of Rheu arthritis mult site w involv of organs and systems  Sister    6  Family history of Thyroid disease  Brother    7  Family history of Diabetes   8  Family history of Diabetes Mellitus (V18 0)   9  Family history of Heart disease   10  Family history of Heart Disease (V17 49)   11  Family history of Hypertension (V17 49)   12  Family history of Hypertension  Family History    13   Family history of Coronary Artery Disease (V17 49)    Social History    · Active advance directive (V49 89) (Z78 9)   · Denied: History of Alcohol Use (History)   · Former smoker (V15 82) (W82 062)   · Lives with spouse   · Denied: History of Marijuana   · Marital History - Currently    ·    · Never A Smoker   · No drug use   · Retired    Current Meds    1  AmLODIPine Besylate 5 MG Oral Tablet; TAKE ONE TABLET BY MOUTH EVERY DAY IN   THE MORNING; Therapy: 34Mma0211 to (Evaluate:07Sep2017)  Requested for: 43GPS3024; Last   Rx:09Jun2017 Ordered   2  Lisinopril 40 MG Oral Tablet; take one tablet by mouth one time daily; Therapy: 28KOI1237 to (Evaluate:15Oct2017)  Requested for: 45PZZ2283; Last   Rx:98Stj3238 Ordered    3  Tamsulosin HCl - 0 4 MG Oral Capsule; take 1 capsule by mouth at bedtime; Therapy: 00DGH3309 to (Evaluate:55Ulh4742); Last Rx:97Owj3430 Ordered    4  Metoprolol Tartrate 25 MG Oral Tablet; take one tablet by mouth twice daily; Therapy: 87Sgj5557 to (Evaluate:13Apr2018)  Requested for: 96Eru2293; Last   Rx:37Rbv4812 Ordered    5  Fenofibrate 145 MG Oral Tablet; take one tablet by mouth one time daily; Therapy: 93PBD0791 to ([phone])  Requested for: 27Mar2017; Last   Rx:27Mar2017; Status: ACTIVE - Transmit to [facility] Verification Ordered   6  Lovastatin 40 MG Oral Tablet; take one tablet by mouth one time daily; Therapy: 05IMH7189 to (Evaluate:15Oct2017)  Requested for: 18Apr2017; Last   Rx:18Apr2017; Status: ACTIVE - Transmit to Pharmacy - Awaiting Verification Ordered    7  Aspirin Low Dose 81 MG TABS; TAKE 1 TABLET DAILY; Therapy: (Recorded:09Jun2014) to Recorded    Allergies    1  No Known Drug Allergies    2  Seasonal    Health Management   COLONOSCOPY; every 5 years; Last 35YMK1104; Next Due: 93Sbl2803; Active    End of Encounter Meds    1  AmLODIPine Besylate 5 MG Oral Tablet; TAKE ONE TABLET BY MOUTH EVERY DAY IN   THE MORNING; Therapy: 41Rwe6975 to (Evaluate:07Sep2017)  Requested for: 49OQY3082; Last   Rx:09Jun2017 Ordered   2  Lisinopril 40 MG Oral Tablet; take one tablet by mouth one time daily; Therapy: 65TPY8350 to (Evaluate:15Oct2017)  Requested for: 00YGY0772; Last   Rx:59Cfi1160 Ordered    3   Tamsulosin HCl - 0 4 MG Oral Capsule; take 1 capsule by mouth at bedtime; Therapy: 42XLJ5121 to (Evaluate:08Dvq4643); Last Rx:47Dgk1135 Ordered    4  Metoprolol Tartrate 25 MG Oral Tablet; take one tablet by mouth twice daily; Therapy: 33Ijo2905 to (Evaluate:13Apr2018)  Requested for: 18Apr2017; Last   Rx:18Apr2017 Ordered    5  Fenofibrate 145 MG Oral Tablet; take one tablet by mouth one time daily; Therapy: 26ALD4598 to (Fidencio Mcneal)  Requested for: 27Mar2017; Last   Rx:27Mar2017; Status: ACTIVE - Transmit to [facility] Verification Ordered   6  Lovastatin 40 MG Oral Tablet; take one tablet by mouth one time daily; Therapy: 98QXE8397 to (Evaluate:15Oct2017)  Requested for: 18Apr2017; Last   Rx:18Apr2017; Status: ACTIVE - Transmit to Pharmacy - Awaiting Verification Ordered    7  Aspirin Low Dose 81 MG TABS; TAKE 1 TABLET DAILY; Therapy: (Recorded:09Jun2014) to Recorded    Future Appointments    Date/Time Provider Specialty Site   09/19/2017 11:00 AM Ildefonso Valle [facility] Pulmonary Medicine [address] [facility]   05/21/2018 02:15 PM Stacie Heimlich, MD Urology [facility] FOR UROLOGY [facility]   10/18/2017 09:30 AM Edwar Angulo DO Internal Medicine [facility] OF [facility]     Patient Care Team    Care Team Member Role Specialty Office Number   Isidro Aguilar MD  Anesthesia [phone]   Jayesh Vernon [facility]  Physician Assistant [phone]   Melisa ALMAZAN    Pulmonary Medicine [phone]     Signatures   Electronically signed by : Kolton Sams RN; Jul 25 2017  9:20AM EST                       (Author)

## 2018-01-11 NOTE — PROGRESS NOTES
History of Present Illness  Care Coordination Encounter Information:   Type of Encounter: Telephonic   Contact: Follow-Up    Spoke to Patient  Care Coordination SL Nurse Inder Frey:   The reason for call is to discuss outreach for follow up/needed services and coordination of meeting care plan treatment goals  Patient stated he is in stable condition  Denied pain or discomfort  Since discharge on 5/13/17, patient stated his appetite is normal again and has had no diarrhea  Continues to tolerate medications as ordered  Discussed appropriate ER, and urgent care utilization and when to contact PCP prior to going to ER or urgent care  Patient verbalized understanding  Did not have any questions or concerns at this time  Some education provided to patient and patient is in stable condition  Will close from care coordination at this time  Active Problems    1  Abnormal blood sugar (790 29) (R73 09)   2  Abnormal stress test (794 39) (R94 39)   3  Active advance directive (V49 89) (Z78 9)   4  Acute pharyngitis, unspecified etiology (462) (J02 9)   5  Back pain (724 5) (M54 9)   6  Backache (724 5) (M54 9)   7  Benign essential hypertension (401 1) (I10)   8  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,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,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)   9  Chest pain (786 50) (R07 9)   10  Disc degeneration, lumbar (722 52) (M51 36)   11  Diverticulitis (562 11) (K57 92)   12  Encounter for screening colonoscopy (V76 51) (Z12 11)   13  Enteritis (558 9) (K52 9)   14  Esophageal reflux (530 81) (K21 9)   15  Fever in other diseases (780 61) (R50 81)   16  Hypercholesterolemia (272 0) (E78 00)   17  Lumbar facet arthropathy (721 3) (M12 88)   18  Nocturia (788 43) (R35 1)   19  GREGG (obstructive sleep apnea) (327 23) (G47 33)   20  Screening for genitourinary condition (V81 6) (Z13 89)   21  Special screening examination for neoplasm of prostate (V76 44) (Z12 5)   22  Systolic murmur (094 3) (H24 0)   23   Urgency of urination (788 63) (R39 15)   24  Urinary frequency (788 41) (R35 0)    Past Medical History    1  History of Bronchitis (490) (J40)   2  History of Fever (780 60) (R50 9)   3  History of chest pain (V13 89) (Z87 898)   4  History of hypercholesterolemia (V12 29) (Z86 39)   5  History of hyperlipidemia (V12 29) (Z86 39)   6  History of hypertension (V12 59) (Z86 79)   7  History of low back pain (V13 59) (Z87 39)   8  History of mitral valve disorder (V12 59) (Z86 79)   9  History of sleep apnea (V13 89) (Z86 69)   10  History of upper respiratory infection (V12 09) (Z87 09)   11  History of Other muscle spasm (728 85) (M62 838)   12  History of Thoracic neuritis (724 4) (M54 14)    Surgical History    1  History of Complete Colonoscopy   2  History of Diagnostic Esophagogastroduodenoscopy   3  History of Foot Surgery   4  History of Hernia Repair   5  History of Hernia Repair   6  History of Neuroplasty Decompression Tarsal Tunnel Release   7  History of Neuroplasty Median Nerve At Carpal Tunnel   8  History of Shoulder Surgery    Family History  Mother    1  Family history of Diabetes Mellitus (V18 0)   2  Family history of arteriosclerotic cardiovascular disease (V17 49) (Z82 49)   3  Family history of Hypertension (V17 49)  Father    4  Family history of Cancer   5  Family history of Rheu arthritis mult site w involv of organs and systems  Sister    6  Family history of Thyroid disease  Brother    7  Family history of Diabetes   8  Family history of Diabetes Mellitus (V18 0)   9  Family history of Heart disease   10  Family history of Heart Disease (V17 49)   11  Family history of Hypertension (V17 49)   12  Family history of Hypertension  Family History    13   Family history of Coronary Artery Disease (V17 49)    Social History    · Active advance directive (V49 89) (Z78 9)   · Denied: History of Alcohol Use (History)   · Former smoker (V15 82) (U23 405)   · Lives with spouse   · Denied: History of Marijuana   · Marital History - Currently    ·    · Never A Smoker   · No drug use   · Retired    Current Meds    1  AmLODIPine Besylate 5 MG Oral Tablet; TAKE ONE TABLET BY MOUTH EVERY   MORNING; Therapy: 75Kxq3157 to (Esaw Angie)  Requested for: 69YHE2500; Last   Rx:29Nov2016 Ordered   2  Lisinopril 40 MG Oral Tablet; take one tablet by mouth one time daily; Therapy: 10GZV3452 to (Evaluate:36Abs7255)  Requested for: 92Phf8531; Last   Rx:20Isv2398 Ordered    3  Tamsulosin HCl - 0 4 MG Oral Capsule; take 1 capsule by mouth at bedtime; Therapy: 28JRX4089 to (Evaluate:92Nga4780); Last Rx:07Kpe8290 Ordered    4  Metoprolol Tartrate 25 MG Oral Tablet; take one tablet by mouth twice daily; Therapy: 68Dpt4161 to (Evaluate:69Pdi7619)  Requested for: 17Hnz3831; Last   Rx:57Akn9824 Ordered    5  Fenofibrate 145 MG Oral Tablet; take one tablet by mouth one time daily; Therapy: 21MGK4263 to ([phone])  Requested for: 27Mar2017; Last   Rx:27Mar2017; Status: ACTIVE - Transmit to [facility] Verification Ordered   6  Lovastatin 40 MG Oral Tablet; take one tablet by mouth one time daily; Therapy: 16DPK1375 to (Evaluate:15Oct2017)  Requested for: 62Rxz7241; Last   Rx:18Apr2017; Status: ACTIVE - Transmit to Pharmacy - Awaiting Verification Ordered    7  Aspirin Low Dose 81 MG TABS; TAKE 1 TABLET DAILY; Therapy: (Recorded:09Jun2014) to Recorded    Allergies    1  No Known Drug Allergies    2  Seasonal    Health Management   COLONOSCOPY; every 5 years; Last 14SEC6942; Next Due: 45Plu9600; Active    End of Encounter Meds    1  AmLODIPine Besylate 5 MG Oral Tablet; TAKE ONE TABLET BY MOUTH EVERY   MORNING; Therapy: 89Cog1248 to (Esaw Spink)  Requested for: 06MAL5093; Last   Rx:29Nov2016 Ordered   2  Lisinopril 40 MG Oral Tablet; take one tablet by mouth one time daily; Therapy: 49GTX3339 to (Evaluate:00Csx1204)  Requested for: 88Nsn3360; Last   Rx:25Rhh4063 Ordered    3   Tamsulosin HCl - 0 4 MG Oral Capsule; take 1 capsule by mouth at bedtime; Therapy: 98RAE5092 to (Evaluate:65Ojn3792); Last Rx:60Mhy4977 Ordered    4  Metoprolol Tartrate 25 MG Oral Tablet; take one tablet by mouth twice daily; Therapy: 48Xne1880 to (Evaluate:13Apr2018)  Requested for: 18Apr2017; Last   Rx:18Apr2017 Ordered    5  Fenofibrate 145 MG Oral Tablet; take one tablet by mouth one time daily; Therapy: 47XRC0188 to (Ivy Guidry)  Requested for: 27Mar2017; Last   Rx:27Mar2017; Status: ACTIVE - Transmit to [facility] Verification Ordered   6  Lovastatin 40 MG Oral Tablet; take one tablet by mouth one time daily; Therapy: 55IMG9904 to (Evaluate:15Oct2017)  Requested for: 18Apr2017; Last   Rx:18Apr2017; Status: ACTIVE - Transmit to Pharmacy - Awaiting Verification Ordered    7  Aspirin Low Dose 81 MG TABS; TAKE 1 TABLET DAILY;    Therapy: (Recorded:09Jun2014) to Recorded    Future Appointments    Date/Time Provider Specialty Site   05/21/2018 02:15 PM Partha Dudley MD Urology [facility]   06/07/2017 11:00 AM Mark Perez [facility]     Patient Care Team    Care Team Member Role Specialty Office Number   Aftab Byrd MD  Anesthesia [phone]   [facility]  Physician Assistant [phone]     Signatures   Electronically signed by : Bonnie Menendez RN; Jun 5 2017  4:06PM EST                       (Author)

## 2018-01-12 VITALS
SYSTOLIC BLOOD PRESSURE: 120 MMHG | WEIGHT: 183 LBS | HEART RATE: 59 BPM | DIASTOLIC BLOOD PRESSURE: 60 MMHG | OXYGEN SATURATION: 96 % | BODY MASS INDEX: 32.43 KG/M2 | HEIGHT: 63 IN

## 2018-01-13 VITALS
WEIGHT: 181 LBS | HEART RATE: 62 BPM | SYSTOLIC BLOOD PRESSURE: 130 MMHG | OXYGEN SATURATION: 98 % | BODY MASS INDEX: 32.06 KG/M2 | DIASTOLIC BLOOD PRESSURE: 60 MMHG

## 2018-01-13 VITALS
HEART RATE: 60 BPM | WEIGHT: 185.38 LBS | BODY MASS INDEX: 32.85 KG/M2 | TEMPERATURE: 98.1 F | RESPIRATION RATE: 16 BRPM | HEIGHT: 63 IN | SYSTOLIC BLOOD PRESSURE: 118 MMHG | DIASTOLIC BLOOD PRESSURE: 62 MMHG

## 2018-01-13 VITALS
OXYGEN SATURATION: 95 % | WEIGHT: 181.25 LBS | HEIGHT: 63 IN | DIASTOLIC BLOOD PRESSURE: 70 MMHG | SYSTOLIC BLOOD PRESSURE: 120 MMHG | HEART RATE: 94 BPM | BODY MASS INDEX: 32.11 KG/M2

## 2018-01-13 VITALS
HEART RATE: 50 BPM | DIASTOLIC BLOOD PRESSURE: 69 MMHG | WEIGHT: 185.38 LBS | HEIGHT: 63 IN | BODY MASS INDEX: 32.85 KG/M2 | SYSTOLIC BLOOD PRESSURE: 156 MMHG

## 2018-01-13 VITALS
BODY MASS INDEX: 31.92 KG/M2 | WEIGHT: 180.13 LBS | HEART RATE: 58 BPM | DIASTOLIC BLOOD PRESSURE: 56 MMHG | TEMPERATURE: 98.5 F | SYSTOLIC BLOOD PRESSURE: 118 MMHG | HEIGHT: 63 IN | OXYGEN SATURATION: 98 %

## 2018-01-13 NOTE — PROGRESS NOTES
History of Present Illness  Care Coordination Encounter Information:   Type of Encounter: Telephonic   Contact: Follow-Up    Spoke to Patient  Care Coordination SL Nurse [facility]:   The reason for call is to discuss outreach for follow up/needed services and coordination of meeting care plan treatment goals  Patient in stable condition  Denied pain or discomfort  Tolerating medications as ordered  Educated patient on good medication management; verbalized understanding  Blood pressures are stable as well per patient  Stated that he is still maintaining a low sodium diet and takes salt off the table  Educated patient when to seek medical attention with health (when to make PCP apt, going to urgent care or ER) patient verbalized understanding  Patient did not have any questions or concerns at this time but would like another f/u phone call  Active Problems    1  Abnormal blood sugar (790 29) (R73 09)   2  Abnormal stress test (794 39) (R94 39)   3  Active advance directive (V49 89) (Z78 9)   4  Acute pharyngitis, unspecified etiology (462) (J02 9)   5  Back pain (724 5) (M54 9)   6  Backache (724 5) (M54 9)   7  Benign essential hypertension (401 1) (I10)   8  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,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,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)   9  Chest pain (786 50) (R07 9)   10  Disc degeneration, lumbar (722 52) (M51 36)   11  Diverticulitis (562 11) (K57 92)   12  Encounter for screening colonoscopy (V76 51) (Z12 11)   13  Enteritis (558 9) (K52 9)   14  Esophageal reflux (530 81) (K21 9)   15  Fever in other diseases (780 61) (R50 81)   16  Hypercholesterolemia (272 0) (E78 00)   17  Lumbar facet arthropathy (721 3) (M12 88)   18  Nocturia (788 43) (R35 1)   19  GREGG (obstructive sleep apnea) (327 23) (G47 33)   20  Screening for genitourinary condition (V81 6) (Z13 89)   21  Special screening examination for neoplasm of prostate (V76 44) (Z12 5)   22  Systolic murmur (380 5) (M96 4)   23   Urgency of urination (788 63) (R39 15)   24  Urinary frequency (788 41) (R35 0)    Past Medical History    1  History of Bronchitis (490) (J40)   2  History of Fever (780 60) (R50 9)   3  History of chest pain (V13 89) (Z87 898)   4  History of hypercholesterolemia (V12 29) (Z86 39)   5  History of hyperlipidemia (V12 29) (Z86 39)   6  History of hypertension (V12 59) (Z86 79)   7  History of low back pain (V13 59) (Z87 39)   8  History of mitral valve disorder (V12 59) (Z86 79)   9  History of sleep apnea (V13 89) (Z86 69)   10  History of upper respiratory infection (V12 09) (Z87 09)   11  History of Other muscle spasm (728 85) (M62 838)   12  History of Thoracic neuritis (724 4) (M54 14)    Surgical History    1  History of Complete Colonoscopy   2  History of Diagnostic Esophagogastroduodenoscopy   3  History of Foot Surgery   4  History of Hernia Repair   5  History of Hernia Repair   6  History of Neuroplasty Decompression Tarsal Tunnel Release   7  History of Neuroplasty Median Nerve At Carpal Tunnel   8  History of Shoulder Surgery    Family History  Mother    1  Family history of Diabetes Mellitus (V18 0)   2  Family history of arteriosclerotic cardiovascular disease (V17 49) (Z82 49)   3  Family history of Hypertension (V17 49)  Father    4  Family history of Cancer   5  Family history of Rheu arthritis mult site w involv of organs and systems  Sister    6  Family history of Thyroid disease  Brother    7  Family history of Diabetes   8  Family history of Diabetes Mellitus (V18 0)   9  Family history of Heart disease   10  Family history of Heart Disease (V17 49)   11  Family history of Hypertension (V17 49)   12  Family history of Hypertension  Family History    13   Family history of Coronary Artery Disease (V17 49)    Social History    · Active advance directive (V49 89) (Z78 9)   · Denied: History of Alcohol Use (History)   · Former smoker (V15 82) (D17 596)   · Lives with spouse   · Denied: History of Marijuana · Marital History - Currently    ·    · Never A Smoker   · No drug use   · Retired    Current Meds    1  AmLODIPine Besylate 5 MG Oral Tablet; TAKE ONE TABLET BY MOUTH EVERY   MORNING; Therapy: 19Bxe2648 to (Michelle Pradowilson)  Requested for: 78YQH3512; Last   Rx:29Nov2016 Ordered   2  Lisinopril 40 MG Oral Tablet; take one tablet by mouth one time daily; Therapy: 78QIY6556 to (Evaluate:52Xul0797)  Requested for: 18Wqw2110; Last   Rx:20Lar4818 Ordered    3  Tamsulosin HCl - 0 4 MG Oral Capsule; take 1 capsule by mouth at bedtime; Therapy: 75PUB6028 to (Evaluate:85Elq5866); Last Rx:96Unq8117 Ordered    4  Metoprolol Tartrate 25 MG Oral Tablet; take one tablet by mouth twice daily; Therapy: 11Fmf3891 to (Evaluate:27Odj6070)  Requested for: 77Oqw9635; Last   Rx:57Pds3954 Ordered    5  Fenofibrate 145 MG Oral Tablet; take one tablet by mouth one time daily; Therapy: 99HTM3462 to (Rand Enamorado)  Requested for: 27Mar2017; Last   Rx:27Mar2017; Status: ACTIVE - Transmit to [facility] Verification Ordered   6  Lovastatin 40 MG Oral Tablet; take one tablet by mouth one time daily; Therapy: 03YXZ1862 to (Evaluate:15Oct2017)  Requested for: 89Mcm0918; Last   Rx:40Ajs5944; Status: ACTIVE - Transmit to Pharmacy - Awaiting Verification Ordered    7  Aspirin Low Dose 81 MG TABS; TAKE 1 TABLET DAILY; Therapy: (Recorded:09Jun2014) to Recorded    Allergies    1  No Known Drug Allergies    2  Seasonal    Health Management   COLONOSCOPY; every 5 years; Last 02SXJ5800; Next Due: 11Edk6485; Active    End of Encounter Meds    1  AmLODIPine Besylate 5 MG Oral Tablet; TAKE ONE TABLET BY MOUTH EVERY   MORNING; Therapy: 33Gph1421 to (Michelle Pradowilson)  Requested for: 79JJZ8409; Last   Rx:29Nov2016 Ordered   2  Lisinopril 40 MG Oral Tablet; take one tablet by mouth one time daily; Therapy: 20AZJ2334 to (Evaluate:28Ydl7736)  Requested for: 69Oup0451; Last   Rx:59Bxf7357 Ordered    3   Tamsulosin HCl - 0 4 MG Oral Capsule; take 1 capsule by mouth at bedtime; Therapy: 80OQL2341 to (Evaluate:17May2018); Last Rx:22May2017 Ordered    4  Metoprolol Tartrate 25 MG Oral Tablet; take one tablet by mouth twice daily; Therapy: 67Wro1197 to (Evaluate:13Apr2018)  Requested for: 18Apr2017; Last   Rx:18Apr2017 Ordered    5  Fenofibrate 145 MG Oral Tablet; take one tablet by mouth one time daily; Therapy: 00XBB0930 to (Josh Gutiérrez)  Requested for: 27Mar2017; Last   Rx:27Mar2017; Status: ACTIVE - Transmit to [facility] Verification Ordered   6  Lovastatin 40 MG Oral Tablet; take one tablet by mouth one time daily; Therapy: 37DBY7151 to (Evaluate:15Oct2017)  Requested for: 18Apr2017; Last   Rx:18Apr2017; Status: ACTIVE - Transmit to Pharmacy - Awaiting Verification Ordered    7  Aspirin Low Dose 81 MG TABS; TAKE 1 TABLET DAILY;    Therapy: (Recorded:09Jun2014) to Recorded    Future Appointments    Date/Time Provider Specialty Site   05/21/2018 02:15 PM Dionicio Land MD Urology [facility]   06/07/2017 11:00 AM Tootie Bauer DO Internal Medicine [facility]     Patient Care Team    Care Team Member Role Specialty Office Number   Doyle Henson MD  Anesthesia [phone]   [facility]  Physician Assistant [phone]     Signatures   Electronically signed by : Radhika Contreras RN; May 22 2017  3:36PM EST                       (Author)

## 2018-01-14 VITALS
HEART RATE: 52 BPM | BODY MASS INDEX: 32.07 KG/M2 | HEIGHT: 63 IN | SYSTOLIC BLOOD PRESSURE: 134 MMHG | WEIGHT: 181 LBS | DIASTOLIC BLOOD PRESSURE: 70 MMHG

## 2018-01-14 VITALS
SYSTOLIC BLOOD PRESSURE: 120 MMHG | WEIGHT: 184 LBS | BODY MASS INDEX: 32.6 KG/M2 | HEART RATE: 61 BPM | HEIGHT: 63 IN | DIASTOLIC BLOOD PRESSURE: 58 MMHG

## 2018-01-14 VITALS
SYSTOLIC BLOOD PRESSURE: 154 MMHG | HEIGHT: 63 IN | DIASTOLIC BLOOD PRESSURE: 70 MMHG | HEART RATE: 70 BPM | WEIGHT: 184 LBS | OXYGEN SATURATION: 95 % | BODY MASS INDEX: 32.6 KG/M2

## 2018-01-16 NOTE — PROGRESS NOTES
History of Present Illness  Care Coordination Encounter Information:   Type of Encounter: Telephonic   Contact: Follow-Up    Spoke to Patient  Care Coordination SL Nurse Woody Barksdale:   The reason for call is to discuss outreach for follow up/needed services and coordination of meeting care plan treatment goals  Patient stated he is doing ok  He does have some pain in shoulder due to recent fall  He stated he does take Tylenol and it is effective  Encouraged patient to make appointment with PCP (Dr Ethel Cunningham)  Patient denied and stated that he is very busy and can not  I told patient the risks of not getting shoulder assessed and he verbalized understanding and stated that he is fine  Patient stated he has been tolerating medications  Asked him if he checks his blood pressures and he stated that he does not  Educated patient on the importance of checking it due to patient being on more than 1 blood pressure medication  He verbalized understanding  Has no questions or concerns at this time  Will continue to f/u  Active Problems    1  Abnormal blood sugar (790 29) (R73 09)   2  Abnormal stress test (794 39) (R94 39)   3  Active advance directive (V49 89) (Z78 9)   4  Acute pharyngitis, unspecified etiology (462) (J02 9)   5  Back pain (724 5) (M54 9)   6  Backache (724 5) (M54 9)   7  Benign essential hypertension (401 1) (I10)   8  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,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,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)   9  Chest pain (786 50) (R07 9)   10  Disc degeneration, lumbar (722 52) (M51 36)   11  Diverticulitis (562 11) (K57 92)   12  Encounter for screening colonoscopy (V76 51) (Z12 11)   13  Enteritis (558 9) (K52 9)   14  Esophageal reflux (530 81) (K21 9)   15  Fever in other diseases (780 61) (R50 81)   16  Hypercholesterolemia (272 0) (E78 00)   17  Lumbar facet arthropathy (721 3) (M12 88)   18  Nocturia (788 43) (R35 1)   19  Obesity (278 00) (E66 9)   20   GREGG (obstructive sleep apnea) (327 23) (G47 33)   21  Screening for genitourinary condition (V81 6) (Z13 89)   22  Special screening examination for neoplasm of prostate (V76 44) (Z12 5)   23  Systolic murmur (530 8) (S06 6)   24  Urgency of urination (788 63) (R39 15)   25  Urinary frequency (788 41) (R35 0)    Past Medical History    1  History of Bronchitis (490) (J40)   2  History of Fever (780 60) (R50 9)   3  History of chest pain (V13 89) (Z87 898)   4  History of hypercholesterolemia (V12 29) (Z86 39)   5  History of hyperlipidemia (V12 29) (Z86 39)   6  History of hypertension (V12 59) (Z86 79)   7  History of low back pain (V13 59) (Z87 39)   8  History of mitral valve disorder (V12 59) (Z86 79)   9  History of sleep apnea (V13 89) (Z86 69)   10  History of upper respiratory infection (V12 09) (Z87 09)   11  History of Other muscle spasm (728 85) (M62 838)   12  History of Thoracic neuritis (724 4) (M54 14)    Surgical History    1  History of Complete Colonoscopy   2  History of Diagnostic Esophagogastroduodenoscopy   3  History of Foot Surgery   4  History of Hernia Repair   5  History of Hernia Repair   6  History of Neuroplasty Decompression Tarsal Tunnel Release   7  History of Neuroplasty Median Nerve At Carpal Tunnel   8  History of Shoulder Surgery    Family History  Mother    1  Family history of Diabetes Mellitus (V18 0)   2  Family history of arteriosclerotic cardiovascular disease (V17 49) (Z82 49)   3  Family history of Hypertension (V17 49)  Father    4  Family history of Cancer   5  Family history of Rheu arthritis mult site w involv of organs and systems  Sister    6  Family history of Thyroid disease  Brother    7  Family history of Diabetes   8  Family history of Diabetes Mellitus (V18 0)   9  Family history of Heart disease   10  Family history of Heart Disease (V17 49)   11  Family history of Hypertension (V17 49)   12  Family history of Hypertension  Family History    13   Family history of Coronary Artery Disease (V17 49)    Social History    · Active advance directive (V49 89) (Z78 9)   · Denied: History of Alcohol Use (History)   · Former smoker (V15 82) (S98 047)   · Lives with spouse   · Denied: History of Marijuana   · Marital History - Currently    ·    · Never A Smoker   · No drug use   · Retired    Current Meds    1  AmLODIPine Besylate 5 MG Oral Tablet; TAKE ONE TABLET BY MOUTH EVERY DAY IN   THE MORNING; Therapy: 64Lqi0076 to (Evaluate:07Sep2017)  Requested for: 20UFS0678; Last   Rx:09Jun2017 Ordered   2  Lisinopril 40 MG Oral Tablet; take one tablet by mouth one time daily; Therapy: 87VRB0020 to (Evaluate:15Oct2017)  Requested for: 77VNW6381; Last   Rx:93Puj2646 Ordered    3  Tamsulosin HCl - 0 4 MG Oral Capsule; take 1 capsule by mouth at bedtime; Therapy: 27WPT7726 to (Evaluate:37Fge0269); Last Rx:23Vhm1994 Ordered    4  Metoprolol Tartrate 25 MG Oral Tablet; take one tablet by mouth twice daily; Therapy: 89Vly6347 to (Evaluate:63Qrk3700)  Requested for: 93Rdo6671; Last   Rx:18Apr2017 Ordered    5  Fenofibrate 145 MG Oral Tablet; take one tablet by mouth one time daily; Therapy: 46TDE1163 to (Leroy Meth)  Requested for: 27Mar2017; Last   Rx:27Mar2017; Status: ACTIVE - Transmit to [facility] Verification Ordered   6  Lovastatin 40 MG Oral Tablet; take one tablet by mouth one time daily; Therapy: 30EKO6279 to (Evaluate:15Oct2017)  Requested for: 43Kmo9589; Last   Rx:14Ozd1623; Status: ACTIVE - Transmit to Pharmacy - Awaiting Verification Ordered    7  Aspirin Low Dose 81 MG TABS; TAKE 1 TABLET DAILY; Therapy: (Recorded:09Jun2014) to Recorded    Allergies    1  No Known Drug Allergies    2  Seasonal    Health Management   COLONOSCOPY; every 5 years; Last 24HJE1693; Next Due: 27Vhr6808; Active    End of Encounter Meds    1  AmLODIPine Besylate 5 MG Oral Tablet; TAKE ONE TABLET BY MOUTH EVERY DAY IN   THE MORNING;    Therapy: 47Kmi8559 to (Evaluate:07Sep2017)  Requested for: 13OUY6952; Last   Rx:09Jun2017 Ordered   2  Lisinopril 40 MG Oral Tablet; take one tablet by mouth one time daily; Therapy: 52VJG7756 to (Evaluate:15Oct2017)  Requested for: 47ZLH9084; Last   Rx:15Qhc0621 Ordered    3  Tamsulosin HCl - 0 4 MG Oral Capsule; take 1 capsule by mouth at bedtime; Therapy: 57TSW1944 to (Evaluate:17May2018); Last Rx:92Tnl1501 Ordered    4  Metoprolol Tartrate 25 MG Oral Tablet; take one tablet by mouth twice daily; Therapy: 02Aro3830 to (Evaluate:13Apr2018)  Requested for: 72Kah7756; Last   Rx:03Vlg3483 Ordered    5  Fenofibrate 145 MG Oral Tablet; take one tablet by mouth one time daily; Therapy: 87GTT9553 to ([phone])  Requested for: 27Mar2017; Last   Rx:27Mar2017; Status: ACTIVE - Transmit to [facility] Verification Ordered   6  Lovastatin 40 MG Oral Tablet; take one tablet by mouth one time daily; Therapy: 15HFQ1298 to (Evaluate:15Oct2017)  Requested for: 18Apr2017; Last   Rx:18Apr2017; Status: ACTIVE - Transmit to Pharmacy - Awaiting Verification Ordered    7  Aspirin Low Dose 81 MG TABS; TAKE 1 TABLET DAILY; Therapy: (Recorded:09Jun2014) to Recorded    Future Appointments    Date/Time Provider Specialty Site   09/19/2017 11:00 AM Saul Arteaga [facility] Pulmonary Medicine [facility] PULMONARY [facility] [place] 05/21/2018 02:15 PM Jamarcus Luis MD Urology [facility] CNTR FOR UROLOGY [facility] [place]   10/18/2017 09:30 AM Willie Meza DO Internal Medicine [facility] ASSOC OF [facility]     Patient Care Team    Care Team Member Role Specialty Office Number   Malika Nagel MD  Anesthesia [phone]   [facility]  Physician Assistant [phone]   Arti ALMAZAN    Pulmonary Medicine [phone]     Signatures   Electronically signed by : Michael Rivera RN; Jul 21 2017  3:55PM EST                       (Author)

## 2018-01-16 NOTE — PROGRESS NOTES
History of Present Illness  Care Coordination Encounter Information:   Type of Encounter: Telephonic   Contact: Initial Contact    Spoke to Patient  Care Coordination SL Nurse Rodrigo Long:   The reason for call is to discuss outreach for follow up/needed services and coordination of meeting care plan treatment goals  Patient alert and in stable condition  Patient in pleasant mood; denied pain or discomfort  Stated that he has been managing his blood pressures  His blood pressure today was 120/64  Stated he is tolerating medications well  Educated patient on importance of a good low sodium diet  Patient did not have any questions or concerns at this time  Would like a call back in a few weeks for f/u  Active Problems    1  Abnormal blood sugar (790 29) (R73 09)   2  Abnormal stress test (794 39) (R94 39)   3  Active advance directive (V49 89) (Z78 9)   4  Acute pharyngitis, unspecified etiology (462) (J02 9)   5  Back pain (724 5) (M54 9)   6  Backache (724 5) (M54 9)   7  Benign essential hypertension (401 1) (I10)   8  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,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,N13 8)   9  Chest pain (786 50) (R07 9)   10  Disc degeneration, lumbar (722 52) (M51 36)   11  Diverticulitis (562 11) (K57 92)   12  Encounter for screening colonoscopy (V76 51) (Z12 11)   13  Esophageal reflux (530 81) (K21 9)   14  Fever in other diseases (780 61) (R50 81)   15  Hypercholesterolemia (272 0) (E78 00)   16  Lumbar facet arthropathy (721 3) (M12 88)   17  Nocturia (788 43) (R35 1)   18  GREGG (obstructive sleep apnea) (327 23) (G47 33)   19  Screening for genitourinary condition (V81 6) (Z13 89)   20  Special screening examination for neoplasm of prostate (V76 44) (Z12 5)   21  Systolic murmur (775 2) (D90 8)   22  Urgency of urination (788 63) (R39 15)   23  Urinary frequency (788 41) (R35 0)    Past Medical History    1  History of Bronchitis (490) (J40)   2  History of Fever (780 60) (R50 9)   3  History of chest pain (V13 89) (Z87 898)   4  History of hypercholesterolemia (V12 29) (Z86 39)   5  History of hyperlipidemia (V12 29) (Z86 39)   6  History of hypertension (V12 59) (Z86 79)   7  History of low back pain (V13 59) (Z87 39)   8  History of mitral valve disorder (V12 59) (Z86 79)   9  History of sleep apnea (V13 89) (Z86 69)   10  History of upper respiratory infection (V12 09) (Z87 09)   11  History of Other muscle spasm (728 85) (M62 838)   12  History of Thoracic neuritis (724 4) (M54 14)    Surgical History    1  History of Complete Colonoscopy   2  History of Diagnostic Esophagogastroduodenoscopy   3  History of Foot Surgery   4  History of Hernia Repair   5  History of Hernia Repair   6  History of Neuroplasty Decompression Tarsal Tunnel Release   7  History of Neuroplasty Median Nerve At Carpal Tunnel   8  History of Shoulder Surgery    Family History  Mother    1  Family history of Diabetes Mellitus (V18 0)   2  Family history of arteriosclerotic cardiovascular disease (V17 49) (Z82 49)   3  Family history of Hypertension (V17 49)  Father    4  Family history of Cancer   5  Family history of Rheu arthritis mult site w involv of organs and systems  Sister    6  Family history of Thyroid disease  Brother    7  Family history of Diabetes   8  Family history of Diabetes Mellitus (V18 0)   9  Family history of Heart disease   10  Family history of Heart Disease (V17 49)   11  Family history of Hypertension (V17 49)   12  Family history of Hypertension  Family History    13  Family history of Coronary Artery Disease (V17 49)    Social History    · Active advance directive (V49 89) (Z78 9)   · Denied: History of Alcohol Use (History)   · Former smoker (V15 82) (Z86 622)   · Lives with spouse   · Denied: History of Marijuana   · Marital History - Currently    ·    · Never A Smoker   · No drug use   · Retired    Current Meds    1   AmLODIPine Besylate 5 MG Oral Tablet; TAKE ONE TABLET BY MOUTH EVERY   MORNING; Therapy: 63Frk3871 to (Anel Almazan)  Requested for: 89CUT3040; Last   Rx:29Nov2016 Ordered   2  Lisinopril 40 MG Oral Tablet; take one tablet by mouth one time daily; Therapy: 98KDM4325 to (Evaluate:95Wak6627)  Requested for: 19Tgm4516; Last   Rx:08Vbj1294 Ordered    3  Metoprolol Tartrate 25 MG Oral Tablet; take one tablet by mouth twice daily; Therapy: 88Umx9977 to (Evaluate:78Kex8853)  Requested for: 39Lby0085; Last   Rx:29Ruy1680 Ordered    4  Fenofibrate 145 MG Oral Tablet; take one tablet by mouth one time daily; Therapy: 96JMG8520 to ([phone])  Requested for: 27Mar2017; Last   Rx:27Mar2017; Status: 15[address] Dr nano Marinelli Verification Ordered   5  Lovastatin 40 MG Oral Tablet; take one tablet by mouth one time daily; Therapy: 70JPB6623 to (Evaluate:15Oct2017)  Requested for: 18Apr2017; Last   Rx:18Apr2017; Status: ACTIVE - Transmit to Pharmacy - Awaiting Verification Ordered    6  Aspirin Low Dose 81 MG TABS; TAKE 1 TABLET DAILY; Therapy: (Recorded:09Jun2014) to Recorded    Allergies    1  No Known Drug Allergies    2  Seasonal    Health Management   COLONOSCOPY; every 5 years; Last 83QKQ3060; Next Due: 38Hqn3864; Active    End of Encounter Meds    1  AmLODIPine Besylate 5 MG Oral Tablet; TAKE ONE TABLET BY MOUTH EVERY   MORNING; Therapy: 10Dzv0025 to (Anel Almazan)  Requested for: 29BWO7968; Last   Rx:29Nov2016 Ordered   2  Lisinopril 40 MG Oral Tablet; take one tablet by mouth one time daily; Therapy: 91ZHV7397 to (Evaluate:00Mil5157)  Requested for: 50Unw3156; Last   Rx:08Ovs5523 Ordered    3  Metoprolol Tartrate 25 MG Oral Tablet; take one tablet by mouth twice daily; Therapy: 37Ywk8110 to (Evaluate:09Exh0258)  Requested for: 06Zzs1896; Last   Rx:79Con9160 Ordered    4  Fenofibrate 145 MG Oral Tablet; take one tablet by mouth one time daily;    Therapy: 99TNP7099 to ([phone])  Requested for: 64LER8331; Last Rx:27Mar2017; Status: ACTIVE - Transmit to Pharmacy - Awaiting Verification Ordered   5  Lovastatin 40 MG Oral Tablet; take one tablet by mouth one time daily; Therapy: 19JSC3627 to (Evaluate:15Oct2017)  Requested for: 18Apr2017; Last   Rx:18Apr2017; Status: ACTIVE - Transmit to Pharmacy - Awaiting Verification Ordered    6  Aspirin Low Dose 81 MG TABS; TAKE 1 TABLET DAILY;    Therapy: (Recorded:09Jun2014) to Recorded    Future Appointments    Date/Time Provider Specialty Site   05/22/2017 01:45 PM [address] [place] [address] Urology [facility] FOR UROLOGY [facility]   05/31/2017 10:00 AM Rolly Orosco DO Internal Medicine [facility] ASSOC OF [facility]     Patient Care Team    Care Team Member Role Specialty Office Number   Anne Arthur MD  Anesthesia [phone]     Signatures   Electronically signed by : Subhash Vasquez RN; May  1 2017  2:55PM EST                       (Author)

## 2018-01-18 NOTE — MISCELLANEOUS
Assessment    1  Benign essential hypertension (401 1) (I10)   2  Chest pain (786 50) (R07 9)   3  GREGG (obstructive sleep apnea) (327 23) (G47 33)   4  Hypercholesterolemia (272 0) (E78 00)    Discussion/Summary  Discussion Summary:   Fortunately he had a negative cardiac catheterization  He is otherwise doing fairly well the present time  He will continue the current medicines and I'm scheduled to see him in 2 months  Chief Complaint  Chief Complaint Free Text Note Form: Chest pain and positive stress test      History of Present Illness  TCM Communication [facility]: The patient is being contacted for follow-up after hospitalization and has appt 4/26, needs to be a tcm appt  He was hospitalized at [facility]  The date of admission: 4/22, date of discharge: 4/24  Diagnosis: chest pain   s/p cardiac cath-clear  He was discharged to home  Medications reviewed and updated today  He scheduled a follow up appointment  Counseling was provided to the patient  doing ok  Communication performed and completed by shayy mckeon   HPI: He was hospitalized with a positive stress test and chest pain  He had a negative cardiac catheterization  He is now feeling fairly well  Review of Systems  Complete-Male:   Constitutional: He feels he is back to normal, but as noted in HPI  Eyes: eyesight problems and He wears corrective lenses  ENT: no complaints of earache, no hearing loss, no nosebleeds, no nasal discharge, no sore throat, no hoarseness  Cardiovascular: No further chest pain, but as noted in HPI and no chest pain  Respiratory: No complaints of shortness of breath, no wheezing, no cough, no SOB on exertion, no orthopnea or PND  Gastrointestinal: He is overweight, but No complaints of abdominal pain, no constipation, no nausea or vomiting, no diarrhea or bloody stools  Genitourinary: No complaints of dysuria, no incontinence, no hesitancy, no nocturia, no genital lesion, no testicular pain  Musculoskeletal: No complaints of arthralgia, no myalgias, no joint swelling or stiffness, no limb pain or swelling  Integumentary: No complaints of skin rash or skin lesions, no itching, no skin wound, no dry skin  Neurological: No compliants of headache, no confusion, no convulsions, no numbness or tingling, no dizziness or fainting, no limb weakness, no difficulty walking  Psychiatric: Is not suicidal, no sleep disturbances, no anxiety or depression, no change in personality, no emotional problems  Endocrine: No complaints of proptosis, no hot flashes, no muscle weakness, no erectile dysfunction, no deepening of the voice, no feelings of weakness  Hematologic/Lymphatic: No complaints of swollen glands, no swollen glands in the neck, does not bleed easily, no easy bruising  Active Problems    1  Abnormal blood sugar (790 29) (R73 09)   2  Abnormal stress test (794 39) (R94 39)   3  Acute pharyngitis, unspecified etiology (462) (J02 9)   4  Back pain (724 5) (M54 9)   5  Backache (724 5) (M54 9)   6  Benign essential hypertension (401 1) (I10)   7  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,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,N13 8)   8  Chest pain (786 50) (R07 9)   9  Disc degeneration, lumbar (722 52) (M51 36)   10  Diverticulitis (562 11) (K57 92)   11  Encounter for screening colonoscopy (V76 51) (Z12 11)   12  Esophageal reflux (530 81) (K21 9)   13  Fever in other diseases (780 61) (R50 81)   14  Hypercholesterolemia (272 0) (E78 00)   15  Lumbar facet arthropathy (721 3) (M12 88)   16  Nocturia (788 43) (R35 1)   17  GREGG (obstructive sleep apnea) (327 23) (G47 33)   18  Screening for genitourinary condition (V81 6) (Z13 89)   19  Special screening examination for neoplasm of prostate (V76 44) (Z12 5)   20  Systolic murmur (577 4) (X11 8)   21  Urgency of urination (788 63) (R39 15)   22  Urinary frequency (788 41) (R35 0)    Past Medical History    1  History of Bronchitis (490) (J40)   2  History of Fever (780 60) (R50 9)   3  History of chest pain (V13 89) (Z87 898)   4  History of hypercholesterolemia (V12 29) (Z86 39)   5  History of hyperlipidemia (V12 29) (Z86 39)   6  History of hypertension (V12 59) (Z86 79)   7  History of low back pain (V13 59) (Z87 39)   8  History of mitral valve disorder (V12 59) (Z86 79)   9  History of sleep apnea (V13 89) (Z86 69)   10  History of upper respiratory infection (V12 09) (Z87 09)   11  History of Other muscle spasm (728 85) (M62 838)   12  History of Thoracic neuritis (724 4) (M54 14)    Surgical History    1  History of Complete Colonoscopy   2  History of Diagnostic Esophagogastroduodenoscopy   3  History of Foot Surgery   4  History of Hernia Repair   5  History of Hernia Repair   6  History of Neuroplasty Decompression Tarsal Tunnel Release   7  History of Neuroplasty Median Nerve At Carpal Tunnel   8  History of Shoulder Surgery    Family History  Mother    1  Family history of Diabetes Mellitus (V18 0)   2  Family history of arteriosclerotic cardiovascular disease (V17 49) (Z82 49)   3  Family history of Hypertension (V17 49)  Father    4  Family history of Cancer   5  Family history of Rheu arthritis mult site w involv of organs and systems  Sister    6  Family history of Thyroid disease  Brother    7  Family history of Diabetes   8  Family history of Diabetes Mellitus (V18 0)   9  Family history of Heart disease   10  Family history of Heart Disease (V17 49)   11  Family history of Hypertension (V17 49)   12  Family history of Hypertension  Family History    13  Family history of Coronary Artery Disease (V17 49)    Social History    · Denied: History of Alcohol Use (History)   · Former smoker (V15 82) (Z24 153)   · Lives with spouse   · Denied: History of Marijuana   · Marital History - Currently    ·    · Never A Smoker   · No drug use   · Retired    Current Meds   1   AmLODIPine Besylate 5 MG Oral Tablet; TAKE ONE TABLET BY MOUTH EVERY   MORNING; Therapy: 12Dzj6825 to ([place]a [place])  Requested for: 08LSZ0535; Last   Rx:29Nov2016 Ordered   2  Aspirin Low Dose 81 MG TABS; TAKE 1 TABLET DAILY; Therapy: (Recorded:09Jun2014) to Recorded   3  Fenofibrate 145 MG Oral Tablet; take one tablet by mouth one time daily; Therapy: 68HNP7554 to (Alan Palacioly)  Requested for: 27Mar2017; Last   Rx:27Mar2017; Status: 1554 [address]geons Dr nano Marinelli Verification Ordered   4  Lisinopril 40 MG Oral Tablet; take one tablet by mouth one time daily; Therapy: 36DDW6540 to (Evaluate:08Dxe0496)  Requested for: 45Shj9256; Last   Rx:33Fza6971 Ordered   5  Lovastatin 40 MG Oral Tablet; take one tablet by mouth one time daily; Therapy: 20JYZ0869 to (Evaluate:86Xcf8172)  Requested for: 76Hqp2039; Last   Rx:43Hyv7874; Status: ACTIVE - Transmit to Pharmacy - Awaiting Verification Ordered   6  Metoprolol Tartrate 25 MG Oral Tablet; take one tablet by mouth twice daily; Therapy: 72Qly7852 to (Evaluate:21Xjc6292)  Requested for: 84Xse3630; Last   Rx:03Hec2220 Ordered  Medication List Reviewed: The medication list was reviewed and updated today  Allergies    1  No Known Drug Allergies    2  Seasonal    Physical Exam    Constitutional   General appearance: No acute distress, well appearing and well nourished  Blood pressures 122/82  Eyes   Pupils and irises: Equal, round and reactive to light  Ears, Nose, Mouth, and Throat   Otoscopic examination: Tympanic membrance translucent with normal light reflex  Canals patent without erythema  Oropharynx: Normal with no erythema, edema, exudate or lesions  Pulmonary   Auscultation of lungs: Clear to auscultation, equal breath sounds bilaterally, no wheezes, no rales, no rhonci  Cardiovascular   Auscultation of heart: Normal rate and rhythm, normal S1 and S2, without murmurs  No murmur rub or gallop     Examination of extremities for edema and/or varicosities: Normal     Abdomen Abdomen: Abnormal   He is moderately overweight  Lymphatic   Palpation of lymph nodes in neck: No lymphadenopathy  Musculoskeletal   Inspection/palpation of joints, bones, and muscles: Abnormal   Some arthritic changes of his hands  Skin   Skin and subcutaneous tissue: Normal without rashes or lesions  Neurologic   Cranial nerves: Cranial nerves 2-12 intact  Psychiatric   Orientation to person, place and time: Normal     Mood and affect: Normal          Health Management  Encounter for preventive health examination   COLONOSCOPY; every 5 years; Last 89AIG5028; Next Due: 29Zmj0265; Active    Future Appointments    Date/Time Provider Specialty Site   05/22/2017 01:45 PM [address] [place] [address] Urology [facility] CNTR FOR UROLOGY [place]   05/31/2017 10:00 AM Nash Kovacs DO Internal Medicine [facility] ASSOC OF [facility]     Signatures   Electronically signed by : Porsche August DO;  Apr 26 2017  5:40PM EST                       (Author)

## 2018-01-22 VITALS
BODY MASS INDEX: 31.89 KG/M2 | DIASTOLIC BLOOD PRESSURE: 58 MMHG | SYSTOLIC BLOOD PRESSURE: 110 MMHG | HEART RATE: 56 BPM | WEIGHT: 180 LBS | HEIGHT: 63 IN | RESPIRATION RATE: 14 BRPM

## 2018-01-22 VITALS
HEIGHT: 63 IN | WEIGHT: 184 LBS | SYSTOLIC BLOOD PRESSURE: 122 MMHG | BODY MASS INDEX: 32.6 KG/M2 | OXYGEN SATURATION: 95 % | HEART RATE: 56 BPM | DIASTOLIC BLOOD PRESSURE: 72 MMHG

## 2018-01-22 VITALS
HEART RATE: 45 BPM | HEIGHT: 63 IN | SYSTOLIC BLOOD PRESSURE: 168 MMHG | WEIGHT: 182.38 LBS | RESPIRATION RATE: 18 BRPM | DIASTOLIC BLOOD PRESSURE: 55 MMHG | BODY MASS INDEX: 32.32 KG/M2 | TEMPERATURE: 94.8 F

## 2018-01-22 VITALS
WEIGHT: 185.25 LBS | OXYGEN SATURATION: 97 % | DIASTOLIC BLOOD PRESSURE: 82 MMHG | BODY MASS INDEX: 31.8 KG/M2 | HEART RATE: 51 BPM | SYSTOLIC BLOOD PRESSURE: 122 MMHG

## 2018-01-23 VITALS
HEIGHT: 63 IN | HEART RATE: 48 BPM | SYSTOLIC BLOOD PRESSURE: 136 MMHG | WEIGHT: 185 LBS | BODY MASS INDEX: 32.78 KG/M2 | DIASTOLIC BLOOD PRESSURE: 72 MMHG

## 2018-01-24 VITALS
BODY MASS INDEX: 32.47 KG/M2 | HEIGHT: 63 IN | TEMPERATURE: 97.7 F | WEIGHT: 183.25 LBS | HEART RATE: 49 BPM | RESPIRATION RATE: 18 BRPM | DIASTOLIC BLOOD PRESSURE: 59 MMHG | SYSTOLIC BLOOD PRESSURE: 148 MMHG

## 2018-01-30 ENCOUNTER — HOSPITAL ENCOUNTER (OUTPATIENT)
Dept: RADIOLOGY | Facility: CLINIC | Age: 73
Discharge: HOME | End: 2018-01-30
Attending: ANESTHESIOLOGY
Payer: MEDICARE

## 2018-01-30 VITALS
RESPIRATION RATE: 20 BRPM | HEART RATE: 43 BPM | TEMPERATURE: 97.7 F | DIASTOLIC BLOOD PRESSURE: 55 MMHG | OXYGEN SATURATION: 96 % | SYSTOLIC BLOOD PRESSURE: 136 MMHG

## 2018-01-30 DIAGNOSIS — M54.16 LUMBAR RADICULOPATHY: ICD-10-CM

## 2018-01-30 PROBLEM — M54.42 CHRONIC BILATERAL LOW BACK PAIN WITH BILATERAL SCIATICA: Status: ACTIVE | Noted: 2017-11-14

## 2018-01-30 PROBLEM — M54.41 CHRONIC BILATERAL LOW BACK PAIN WITH BILATERAL SCIATICA: Status: ACTIVE | Noted: 2017-11-14

## 2018-01-30 PROBLEM — M48.062 LUMBAR STENOSIS WITH NEUROGENIC CLAUDICATION: Status: ACTIVE | Noted: 2017-11-14

## 2018-01-30 PROBLEM — G89.29 CHRONIC LEFT SHOULDER PAIN: Status: ACTIVE | Noted: 2017-10-23

## 2018-01-30 PROBLEM — M25.512 CHRONIC LEFT SHOULDER PAIN: Status: ACTIVE | Noted: 2017-10-23

## 2018-01-30 PROBLEM — G89.29 CHRONIC BILATERAL LOW BACK PAIN WITH BILATERAL SCIATICA: Status: ACTIVE | Noted: 2017-11-14

## 2018-01-30 PROBLEM — G89.4 CHRONIC PAIN DISORDER: Status: ACTIVE | Noted: 2017-12-20

## 2018-01-30 PROCEDURE — 62323 NJX INTERLAMINAR LMBR/SAC: CPT | Performed by: ANESTHESIOLOGY

## 2018-01-30 RX ORDER — LIDOCAINE HYDROCHLORIDE 10 MG/ML
1 INJECTION, SOLUTION EPIDURAL; INFILTRATION; INTRACAUDAL; PERINEURAL ONCE
Status: COMPLETED | OUTPATIENT
Start: 2018-01-30 | End: 2018-01-30

## 2018-01-30 RX ORDER — METHYLPREDNISOLONE ACETATE 80 MG/ML
80 INJECTION, SUSPENSION INTRA-ARTICULAR; INTRALESIONAL; INTRAMUSCULAR; SOFT TISSUE ONCE
Status: COMPLETED | OUTPATIENT
Start: 2018-01-30 | End: 2018-01-30

## 2018-01-30 RX ADMIN — LIDOCAINE HYDROCHLORIDE 1 ML: 10 INJECTION, SOLUTION EPIDURAL; INFILTRATION; INTRACAUDAL; PERINEURAL at 10:13

## 2018-01-30 RX ADMIN — METHYLPREDNISOLONE ACETATE 80 MG: 80 INJECTION, SUSPENSION INTRA-ARTICULAR; INTRALESIONAL; INTRAMUSCULAR; SOFT TISSUE at 10:14

## 2018-01-30 RX ADMIN — IOHEXOL 1 ML: 300 INJECTION, SOLUTION INTRAVENOUS at 10:13

## 2018-01-30 NOTE — H&P
History of Present Illness: The patient is a 74 y o  male who presents with complaints of low pain and bilateral leg pain which is neuropathic in nature  Patient is here today for a lumbar epidural steroid injection  Pain score is 6/10       Patient Active Problem List   Diagnosis    Chest pain    HTN (hypertension)    Hyperlipidemia    Chronic pain disorder    Chronic left shoulder pain    Chronic bilateral low back pain with bilateral sciatica    Disc degeneration, lumbar    Lumbar stenosis with neurogenic claudication       Past Medical History:   Diagnosis Date    Diverticulitis     Hyperlipidemia     Hypertension        Past Surgical History:   Procedure Laterality Date    HERNIA REPAIR      KNEE SURGERY Left          Current Outpatient Prescriptions:     amLODIPine (NORVASC) 5 mg tablet, Take 5 mg by mouth daily, Disp: , Rfl:     aspirin (ASPIRIN LOW DOSE) 81 mg chewable tablet, Chew 81 mg daily after dinner  , Disp: , Rfl:     dicyclomine (BENTYL) 20 mg tablet, Take 1 tablet by mouth every 6 (six) hours as needed (abdominal cramping) for up to 30 days For crampy abdominal pain, Disp: 30 tablet, Rfl: 0    fenofibrate (TRICOR) 145 mg tablet, Take 145 mg by mouth daily, Disp: , Rfl:     lisinopril (ZESTRIL) 40 mg tablet, Take 40 mg by mouth daily, Disp: , Rfl:     lovastatin (MEVACOR) 40 MG tablet, Take 40 mg by mouth daily, Disp: , Rfl:     metoprolol tartrate (LOPRESSOR) 25 mg tablet, Take 1 tablet by mouth 2 (two) times a day for 30 days, Disp: 60 tablet, Rfl: 0    Multiple Vitamin (MULTIVITAMIN) tablet, Take 1 tablet by mouth daily before dinner  , Disp: , Rfl:     ondansetron (ZOFRAN ODT) 4 mg disintegrating tablet, Take 1 tablet by mouth every 8 (eight) hours as needed for nausea or vomiting for up to 30 days, Disp: 15 tablet, Rfl: 0    No Known Allergies    Physical Exam:   Vitals:    01/30/18 0942   BP: 120/58   Pulse: (!) 45   Resp: 18   Temp: 97 7 °F (36 5 °C)   SpO2: 96% General: Awake, Alert, Oriented x 3, Mood and affect appropriate  Respiratory: Respirations even and unlabored  Cardiovascular: Peripheral pulses intact; no edema  Musculoskeletal Exam: limited ROM of the lumbar spine  Tenderness to palpation of the lumbar paraspinal musculature  ASA Score: 2    Assessment:   1   Lumbar radiculopathy        Plan: LESI ASA HOLD

## 2018-01-30 NOTE — DISCHARGE INSTRUCTIONS
Epidural Steroid Injection   WHAT YOU NEED TO KNOW:   An epidural steroid injection ([facility]) is a procedure to inject steroid medicine into the epidural space  The epidural space is between your spinal cord and vertebrae  Steroids reduce inflammation and fluid buildup in your spine that may be causing pain  You may be given pain medicine along with the steroids  ACTIVITY  · Do not drive or operate machinery today  · No strenuous activity today - bending, lifting, etc   · You may resume normal activites starting tomorrow - start slowly and as tolerated  · You may shower today, but no tub baths or hot tubs  · You may have numbness for several hours from the local anesthetic  Please use caution and common sense, especially with weight-bearing activities  CARE OF THE INJECTION SITE  · If you have soreness or pain, apply ice to the area today (20 minutes on/20 minutes off)  · Starting tomorrow, you may use warm, moist heat or ice if needed  · You may have an increase or change in your discomfort for 36-48 hours after your treatment  · Apply ice and continue with any pain medication you have been prescribed  · Notify the [facility] if you have any of the following: redness, drainage, swelling, headache, stiff neck or fever above 100°F     SPECIAL INSTRUCTIONS  · Our office will contact you in approximately 7 days for a progress report  MEDICATIONS  · Continue to take all routine medications  · Our office may have instructed you to hold some medications  If you have a problem specifically related to your procedure, please call our office at [phone]  Problems not related to your procedure should be directed to your primary care physician

## 2018-02-06 ENCOUNTER — TELEPHONE (OUTPATIENT)
Dept: RADIOLOGY | Facility: CLINIC | Age: 73
End: 2018-02-06

## 2018-02-07 NOTE — TELEPHONE ENCOUNTER
Pt returned call stating he had about 70% relief -- he said he can bend over and put his socks on  Pt can be reached at [phone]

## 2018-02-15 ENCOUNTER — APPOINTMENT (OUTPATIENT)
Dept: LAB | Facility: CLINIC | Age: 73
End: 2018-02-15
Payer: MEDICARE

## 2018-02-15 DIAGNOSIS — R73.09 OTHER ABNORMAL GLUCOSE: ICD-10-CM

## 2018-02-15 DIAGNOSIS — I10 ESSENTIAL (PRIMARY) HYPERTENSION: ICD-10-CM

## 2018-02-15 DIAGNOSIS — Z12.5 ENCOUNTER FOR SCREENING FOR MALIGNANT NEOPLASM OF PROSTATE: ICD-10-CM

## 2018-02-15 LAB
ALBUMIN SERPL BCP-MCNC: 3.6 G/DL (ref 3.5–5)
ALP SERPL-CCNC: 57 U/L (ref 46–116)
ALT SERPL W P-5'-P-CCNC: 25 U/L (ref 12–78)
ANION GAP SERPL CALCULATED.3IONS-SCNC: 8 MMOL/L (ref 4–13)
AST SERPL W P-5'-P-CCNC: 16 U/L (ref 5–45)
BASOPHILS # BLD AUTO: 0.01 THOUSANDS/ΜL (ref 0–0.1)
BASOPHILS NFR BLD AUTO: 0 % (ref 0–1)
BILIRUB SERPL-MCNC: 0.73 MG/DL (ref 0.2–1)
BILIRUB UR QL STRIP: NEGATIVE
BUN SERPL-MCNC: 20 MG/DL (ref 5–25)
CALCIUM SERPL-MCNC: 8.8 MG/DL (ref 8.3–10.1)
CHLORIDE SERPL-SCNC: 109 MMOL/L (ref 100–108)
CHOLEST SERPL-MCNC: 131 MG/DL (ref 50–200)
CLARITY UR: CLEAR
CO2 SERPL-SCNC: 27 MMOL/L (ref 21–32)
COLOR UR: YELLOW
CREAT SERPL-MCNC: 0.99 MG/DL (ref 0.6–1.3)
EOSINOPHIL # BLD AUTO: 0.1 THOUSAND/ΜL (ref 0–0.61)
EOSINOPHIL NFR BLD AUTO: 2 % (ref 0–6)
ERYTHROCYTE [DISTWIDTH] IN BLOOD BY AUTOMATED COUNT: 13.2 % (ref 11.6–15.1)
EST. AVERAGE GLUCOSE BLD GHB EST-MCNC: 114 MG/DL
GFR SERPL CREATININE-BSD FRML MDRD: 75 ML/MIN/1.73SQ M
GLUCOSE P FAST SERPL-MCNC: 99 MG/DL (ref 65–99)
GLUCOSE UR STRIP-MCNC: NEGATIVE MG/DL
HBA1C MFR BLD: 5.6 % (ref 4.2–6.3)
HCT VFR BLD AUTO: 43.9 % (ref 36.5–49.3)
HDLC SERPL-MCNC: 53 MG/DL (ref 40–60)
HGB BLD-MCNC: 14.7 G/DL (ref 12–17)
HGB UR QL STRIP.AUTO: NEGATIVE
KETONES UR STRIP-MCNC: NEGATIVE MG/DL
LDLC SERPL CALC-MCNC: 56 MG/DL (ref 0–100)
LEUKOCYTE ESTERASE UR QL STRIP: NEGATIVE
LYMPHOCYTES # BLD AUTO: 1.35 THOUSANDS/ΜL (ref 0.6–4.47)
LYMPHOCYTES NFR BLD AUTO: 21 % (ref 14–44)
MCH RBC QN AUTO: 34.3 PG (ref 26.8–34.3)
MCHC RBC AUTO-ENTMCNC: 33.5 G/DL (ref 31.4–37.4)
MCV RBC AUTO: 102 FL (ref 82–98)
MONOCYTES # BLD AUTO: 0.62 THOUSAND/ΜL (ref 0.17–1.22)
MONOCYTES NFR BLD AUTO: 10 % (ref 4–12)
NEUTROPHILS # BLD AUTO: 4.27 THOUSANDS/ΜL (ref 1.85–7.62)
NEUTS SEG NFR BLD AUTO: 67 % (ref 43–75)
NITRITE UR QL STRIP: NEGATIVE
NRBC BLD AUTO-RTO: 0 /100 WBCS
PH UR STRIP.AUTO: 6 [PH] (ref 4.5–8)
PLATELET # BLD AUTO: 207 THOUSANDS/UL (ref 149–390)
PMV BLD AUTO: 10.9 FL (ref 8.9–12.7)
POTASSIUM SERPL-SCNC: 3.9 MMOL/L (ref 3.5–5.3)
PROT SERPL-MCNC: 6.9 G/DL (ref 6.4–8.2)
PROT UR STRIP-MCNC: NEGATIVE MG/DL
PSA SERPL-MCNC: 0.4 NG/ML (ref 0–4)
RBC # BLD AUTO: 4.29 MILLION/UL (ref 3.88–5.62)
SODIUM SERPL-SCNC: 144 MMOL/L (ref 136–145)
SP GR UR STRIP.AUTO: 1.02 (ref 1–1.03)
TRIGL SERPL-MCNC: 109 MG/DL
TSH SERPL DL<=0.05 MIU/L-ACNC: 1.21 UIU/ML (ref 0.36–3.74)
UROBILINOGEN UR QL STRIP.AUTO: 0.2 E.U./DL
WBC # BLD AUTO: 6.37 THOUSAND/UL (ref 4.31–10.16)

## 2018-02-15 PROCEDURE — 85025 COMPLETE CBC W/AUTO DIFF WBC: CPT

## 2018-02-15 PROCEDURE — 80053 COMPREHEN METABOLIC PANEL: CPT

## 2018-02-15 PROCEDURE — 83036 HEMOGLOBIN GLYCOSYLATED A1C: CPT

## 2018-02-15 PROCEDURE — 84443 ASSAY THYROID STIM HORMONE: CPT

## 2018-02-15 PROCEDURE — G0103 PSA SCREENING: HCPCS

## 2018-02-15 PROCEDURE — 36415 COLL VENOUS BLD VENIPUNCTURE: CPT

## 2018-02-15 PROCEDURE — 81003 URINALYSIS AUTO W/O SCOPE: CPT

## 2018-02-15 PROCEDURE — 80061 LIPID PANEL: CPT

## 2018-02-23 DIAGNOSIS — I10 ESSENTIAL (PRIMARY) HYPERTENSION: ICD-10-CM

## 2018-02-23 DIAGNOSIS — Z12.5 ENCOUNTER FOR SCREENING FOR MALIGNANT NEOPLASM OF PROSTATE: ICD-10-CM

## 2018-02-23 DIAGNOSIS — R73.09 OTHER ABNORMAL GLUCOSE: ICD-10-CM

## 2018-03-01 ENCOUNTER — HOSPITAL ENCOUNTER (EMERGENCY)
Facility: HOSPITAL | Age: 73
Discharge: HOME | End: 2018-03-01
Attending: EMERGENCY MEDICINE | Admitting: EMERGENCY MEDICINE
Payer: MEDICARE

## 2018-03-01 ENCOUNTER — APPOINTMENT (EMERGENCY)
Dept: CT IMAGING | Facility: HOSPITAL | Age: 73
End: 2018-03-01
Payer: MEDICARE

## 2018-03-01 ENCOUNTER — OFFICE VISIT (OUTPATIENT)
Dept: INTERNAL MEDICINE CLINIC | Facility: CLINIC | Age: 73
End: 2018-03-01
Payer: MEDICARE

## 2018-03-01 VITALS
WEIGHT: 177 LBS | DIASTOLIC BLOOD PRESSURE: 62 MMHG | SYSTOLIC BLOOD PRESSURE: 120 MMHG | BODY MASS INDEX: 30.22 KG/M2 | OXYGEN SATURATION: 96 % | HEIGHT: 64 IN | HEART RATE: 62 BPM

## 2018-03-01 VITALS
BODY MASS INDEX: 30.38 KG/M2 | TEMPERATURE: 99.6 F | DIASTOLIC BLOOD PRESSURE: 77 MMHG | RESPIRATION RATE: 16 BRPM | SYSTOLIC BLOOD PRESSURE: 174 MMHG | WEIGHT: 177 LBS | OXYGEN SATURATION: 95 % | HEART RATE: 75 BPM

## 2018-03-01 DIAGNOSIS — R10.30 LOWER ABDOMINAL PAIN: Primary | ICD-10-CM

## 2018-03-01 DIAGNOSIS — K57.92 DIVERTICULITIS: Primary | ICD-10-CM

## 2018-03-01 DIAGNOSIS — E78.2 MIXED HYPERLIPIDEMIA: ICD-10-CM

## 2018-03-01 DIAGNOSIS — I10 ESSENTIAL HYPERTENSION: ICD-10-CM

## 2018-03-01 LAB
ALBUMIN SERPL BCP-MCNC: 3.5 G/DL (ref 3.5–5)
ALP SERPL-CCNC: 62 U/L (ref 46–116)
ALT SERPL W P-5'-P-CCNC: 22 U/L (ref 12–78)
ANION GAP SERPL CALCULATED.3IONS-SCNC: 9 MMOL/L (ref 4–13)
AST SERPL W P-5'-P-CCNC: 15 U/L (ref 5–45)
BACTERIA UR QL AUTO: ABNORMAL /HPF
BASOPHILS # BLD AUTO: 0.02 THOUSANDS/ΜL (ref 0–0.1)
BASOPHILS NFR BLD AUTO: 0 % (ref 0–1)
BILIRUB SERPL-MCNC: 0.8 MG/DL (ref 0.2–1)
BILIRUB UR QL STRIP: NEGATIVE
BUN SERPL-MCNC: 20 MG/DL (ref 5–25)
CALCIUM SERPL-MCNC: 9.3 MG/DL (ref 8.3–10.1)
CHLORIDE SERPL-SCNC: 103 MMOL/L (ref 100–108)
CLARITY UR: CLEAR
CO2 SERPL-SCNC: 25 MMOL/L (ref 21–32)
COLOR UR: YELLOW
CREAT SERPL-MCNC: 1.06 MG/DL (ref 0.6–1.3)
EOSINOPHIL # BLD AUTO: 0.04 THOUSAND/ΜL (ref 0–0.61)
EOSINOPHIL NFR BLD AUTO: 1 % (ref 0–6)
ERYTHROCYTE [DISTWIDTH] IN BLOOD BY AUTOMATED COUNT: 12.5 % (ref 11.6–15.1)
GFR SERPL CREATININE-BSD FRML MDRD: 69 ML/MIN/1.73SQ M
GLUCOSE SERPL-MCNC: 114 MG/DL (ref 65–140)
GLUCOSE UR STRIP-MCNC: NEGATIVE MG/DL
HCT VFR BLD AUTO: 41.6 % (ref 36.5–49.3)
HGB BLD-MCNC: 14.3 G/DL (ref 12–17)
HGB UR QL STRIP.AUTO: ABNORMAL
KETONES UR STRIP-MCNC: NEGATIVE MG/DL
LEUKOCYTE ESTERASE UR QL STRIP: NEGATIVE
LIPASE SERPL-CCNC: 110 U/L (ref 73–393)
LYMPHOCYTES # BLD AUTO: 1.8 THOUSANDS/ΜL (ref 0.6–4.47)
LYMPHOCYTES NFR BLD AUTO: 25 % (ref 14–44)
MCH RBC QN AUTO: 34.2 PG (ref 26.8–34.3)
MCHC RBC AUTO-ENTMCNC: 34.4 G/DL (ref 31.4–37.4)
MCV RBC AUTO: 100 FL (ref 82–98)
MONOCYTES # BLD AUTO: 0.77 THOUSAND/ΜL (ref 0.17–1.22)
MONOCYTES NFR BLD AUTO: 11 % (ref 4–12)
MUCOUS THREADS UR QL AUTO: ABNORMAL
NEUTROPHILS # BLD AUTO: 4.64 THOUSANDS/ΜL (ref 1.85–7.62)
NEUTS SEG NFR BLD AUTO: 64 % (ref 43–75)
NITRITE UR QL STRIP: NEGATIVE
NON-SQ EPI CELLS URNS QL MICRO: ABNORMAL /HPF
NRBC BLD AUTO-RTO: 0 /100 WBCS
PH UR STRIP.AUTO: 5.5 [PH] (ref 4.5–8)
PLATELET # BLD AUTO: 177 THOUSANDS/UL (ref 149–390)
PMV BLD AUTO: 9.9 FL (ref 8.9–12.7)
POTASSIUM SERPL-SCNC: 3.5 MMOL/L (ref 3.5–5.3)
PROT SERPL-MCNC: 7.2 G/DL (ref 6.4–8.2)
PROT UR STRIP-MCNC: NEGATIVE MG/DL
RBC # BLD AUTO: 4.18 MILLION/UL (ref 3.88–5.62)
RBC #/AREA URNS AUTO: ABNORMAL /HPF
SODIUM SERPL-SCNC: 137 MMOL/L (ref 136–145)
SP GR UR STRIP.AUTO: >=1.03 (ref 1–1.03)
UROBILINOGEN UR QL STRIP.AUTO: 0.2 E.U./DL
WBC # BLD AUTO: 7.29 THOUSAND/UL (ref 4.31–10.16)
WBC #/AREA URNS AUTO: ABNORMAL /HPF

## 2018-03-01 PROCEDURE — 96360 HYDRATION IV INFUSION INIT: CPT

## 2018-03-01 PROCEDURE — 83690 ASSAY OF LIPASE: CPT | Performed by: EMERGENCY MEDICINE

## 2018-03-01 PROCEDURE — 99284 EMERGENCY DEPT VISIT MOD MDM: CPT

## 2018-03-01 PROCEDURE — 81001 URINALYSIS AUTO W/SCOPE: CPT | Performed by: EMERGENCY MEDICINE

## 2018-03-01 PROCEDURE — 74177 CT ABD & PELVIS W/CONTRAST: CPT

## 2018-03-01 PROCEDURE — 85025 COMPLETE CBC W/AUTO DIFF WBC: CPT | Performed by: EMERGENCY MEDICINE

## 2018-03-01 PROCEDURE — 99214 OFFICE O/P EST MOD 30 MIN: CPT | Performed by: INTERNAL MEDICINE

## 2018-03-01 PROCEDURE — 36415 COLL VENOUS BLD VENIPUNCTURE: CPT | Performed by: EMERGENCY MEDICINE

## 2018-03-01 PROCEDURE — 80053 COMPREHEN METABOLIC PANEL: CPT | Performed by: EMERGENCY MEDICINE

## 2018-03-01 RX ORDER — DICYCLOMINE HCL 20 MG
20 TABLET ORAL ONCE
Status: COMPLETED | OUTPATIENT
Start: 2018-03-01 | End: 2018-03-01

## 2018-03-01 RX ORDER — CIPROFLOXACIN 500 MG/1
500 TABLET, FILM COATED ORAL 2 TIMES DAILY
Qty: 20 TABLET | Refills: 0 | Status: SHIPPED | OUTPATIENT
Start: 2018-03-01 | End: 2018-03-11

## 2018-03-01 RX ORDER — DICYCLOMINE HCL 20 MG
20 TABLET ORAL EVERY 6 HOURS
Qty: 20 TABLET | Refills: 0 | Status: SHIPPED | OUTPATIENT
Start: 2018-03-01 | End: 2018-03-08

## 2018-03-01 RX ORDER — ACETAMINOPHEN 500 MG
2 TABLET ORAL DAILY PRN
COMMUNITY
End: 2019-05-21

## 2018-03-01 RX ORDER — LOPERAMIDE HYDROCHLORIDE 2 MG/1
1 TABLET ORAL DAILY PRN
COMMUNITY

## 2018-03-01 RX ORDER — METRONIDAZOLE 500 MG/1
500 TABLET ORAL EVERY 8 HOURS SCHEDULED
Qty: 30 TABLET | Refills: 0 | Status: SHIPPED | OUTPATIENT
Start: 2018-03-01 | End: 2018-03-08

## 2018-03-01 RX ORDER — DICYCLOMINE HCL 20 MG
20 TABLET ORAL EVERY 6 HOURS
Qty: 20 TABLET | Refills: 0 | Status: SHIPPED | OUTPATIENT
Start: 2018-03-01 | End: 2018-11-14 | Stop reason: ALTCHOICE

## 2018-03-01 RX ADMIN — DICYCLOMINE HYDROCHLORIDE 20 MG: 20 TABLET ORAL at 16:47

## 2018-03-01 RX ADMIN — IOHEXOL 100 ML: 350 INJECTION, SOLUTION INTRAVENOUS at 17:35

## 2018-03-01 RX ADMIN — SODIUM CHLORIDE 1000 ML: 0.9 INJECTION, SOLUTION INTRAVENOUS at 16:47

## 2018-03-01 NOTE — PROGRESS NOTES
Assessment/Plan:  Regarding his abdominal pain it appears that he has diverticulitis  Clinically he is consistent with that  Regarding his blood pressure is stable  His cholesterol is under good control  He is overweight  I had a lengthy discussion with him  I have advised him to go to the emergency room for evaluation including labs and CT scan  He may need to be be admitted  He was in the hospital last summer for acute diverticulitis  I will see him after he is treated  He will call before if any problems  No problem-specific Assessment & Plan notes found for this encounter  Diagnoses and all orders for this visit:    Lower abdominal pain    Essential hypertension    Mixed hyperlipidemia    Other orders  -     aspirin (ASPIRIN LOW DOSE) 81 MG tablet; Take 1 tablet by mouth daily  -     acetaminophen (TYLENOL) 500 mg tablet; Take 2 tablets by mouth daily as needed  -     loperamide (IMODIUM A-D) 2 MG tablet; Take 1 tablet by mouth daily as needed  -     Influenza Vac Split High-Dose 0 5 ML BRII;   -     esomeprazole (NexIUM) 20 mg capsule; Take 1 capsule by mouth daily  -     Multiple Vitamins-Minerals (CENTRUM ULTRA MENS PO); Take 1 tablet by mouth daily          Subjective:  Problems are 1  Lower abdominal pain 2  Hypertension 3  Hyperlipidemia 4  History of diverticulosis     Patient ID: Tyree Weaver is a 74 y o  male  HPI he comes in with abdominal pain  It started 1 week ago  He did not call  It is in the lower abdominal in especially on the left  He has had some loose stools  He has had no melena or hematochezia  He has had no fever  The pain is been there every day  He is eating  He has not had any vomiting    The following portions of the patient's history were reviewed and updated as appropriate: allergies, current medications, past family history, past medical history, past social history, past surgical history and problem list     Review of Systems Constitutional: Positive for appetite change and fatigue  Negative for activity change, chills, diaphoresis, fever and unexpected weight change  HENT: Negative for congestion, ear pain, hearing loss, mouth sores, nosebleeds, postnasal drip, sinus pain, sinus pressure, sore throat and trouble swallowing  Eyes: Negative for pain, discharge and visual disturbance  Respiratory: Negative for apnea, cough, chest tightness, shortness of breath and wheezing  Cardiovascular: Negative for chest pain, palpitations and leg swelling  Gastrointestinal: Positive for abdominal pain and nausea  Negative for anal bleeding, blood in stool, constipation, diarrhea and vomiting  Endocrine: Negative for polydipsia and polyphagia  Genitourinary: Negative for decreased urine volume, dysuria, flank pain, frequency, hematuria and urgency  Musculoskeletal: Negative for arthralgias, back pain, gait problem, joint swelling and myalgias  Skin: Negative for rash and wound  Allergic/Immunologic: Negative for environmental allergies and food allergies  Neurological: Negative for dizziness, tremors, seizures, syncope, speech difficulty, light-headedness, numbness and headaches  Hematological: Negative for adenopathy  Does not bruise/bleed easily  Psychiatric/Behavioral: Negative for agitation, confusion, hallucinations, sleep disturbance and suicidal ideas  The patient is not nervous/anxious  Objective:     Physical Exam   Constitutional: No distress  He is overweight  He is in moderate distress  His blood pressure is 120/62  Oxygen saturation is 96  HENT:   Head: Normocephalic  Right Ear: External ear normal    Left Ear: External ear normal    Nose: Nose normal    Mouth/Throat: Oropharynx is clear and moist  No oropharyngeal exudate  Eyes: Conjunctivae and EOM are normal  Pupils are equal, round, and reactive to light  Right eye exhibits no discharge  Left eye exhibits no discharge     Neck: Normal range of motion  Neck supple  No thyromegaly present  Cardiovascular: Normal rate, regular rhythm, normal heart sounds and intact distal pulses  Exam reveals no gallop and no friction rub  No murmur heard  Pulmonary/Chest: Effort normal and breath sounds normal  No respiratory distress  He has no wheezes  He has no rales  Abdominal: Bowel sounds are normal  He exhibits distension  He exhibits no mass  There is tenderness  There is guarding  There is no rebound  He has tenderness in the lower abdomen particularly on the left  Slight guarding  Musculoskeletal: Normal range of motion  He exhibits no edema, tenderness or deformity  Lymphadenopathy:     He has no cervical adenopathy  Neurological: He is alert  He has normal reflexes  No cranial nerve deficit  Coordination normal    Skin: Skin is warm and dry  No rash noted  No erythema  Psychiatric: He has a normal mood and affect  His behavior is normal  Judgment and thought content normal    Nursing note and vitals reviewed

## 2018-03-01 NOTE — ED NOTES
D/c reviewed with pt prior to discharge  Medications discussed  Ambulatory off unit with steady gait        Radha Roe RN  [id number]

## 2018-03-01 NOTE — DISCHARGE INSTRUCTIONS
Diverticulitis   WHAT YOU NEED TO KNOW:   What is diverticulitis? Diverticulitis is a condition that causes small pockets along your intestine called diverticula to become inflamed or infected  This is caused by hard bowel movement, food, or bacteria that get stuck in the pockets  What are the signs and symptoms of diverticulitis? · Pain in the lower left side of your abdomen    · Fever and chills    · Nausea or vomiting     · Constipation or diarrhea     · An urge to urinate or have a bowel movement more often than usual     · Bloody bowel movements    · Bloating and gas  How is diverticulitis diagnosed? Your healthcare provider will examine you  He or she will ask questions about your symptoms and health history  You may need any of the following:  · Blood and urine tests  may show signs of infection or inflammation  · CT scan or ultrasound  pictures may be used to find problems in your intestines  You may be given contrast liquid to help your intestines show up better in the pictures  Tell the healthcare provider if you have ever had an allergic reaction to contrast liquid  · A colonoscopy  is used to look at your intestines with a scope  A scope (long bendable tube with a light on the end) is used to take pictures  This test may show swollen diverticula or bleeding  Samples may be taken from your digestive tract and sent to a lab for tests  Bleeding may be controlled with tools that are inserted through the scope  How is diverticulitis treated? Mild diverticulitis can be treated at home  You may need to rest and follow a clear liquid diet until your diverticulitis gets better  You will be admitted to the hospital if you have severe diverticulitis  You may need any of the following:  · Antibiotics  help treat or prevent a bacterial infection  · Prescription pain medicine  may be given  Ask your healthcare provider how to take this medicine safely   Some prescription pain medicines contain acetaminophen  Do not take other medicines that contain acetaminophen without talking to your healthcare provider  Too much acetaminophen may cause liver damage  Prescription pain medicine may cause constipation  Ask your healthcare provider how to prevent or treat constipation  · An IV  may be used to give you liquids and nutrition  You may not be able to eat or drink anything until your healthcare provider says it is okay  · Drainage  may be done to reduce inflammation or treat infection  Your healthcare provider may insert a small tube through an incision in your abdomen to drain pus from infected diverticula  · Surgery  may be needed if there is a hole in your bowel or a large amount of swelling  A healthcare provider will remove the infected or inflamed areas of your colon  How can I manage my symptoms? A clear liquid diet will allow your intestines to heal  A clear liquid diet includes any liquids that you can see through  Examples include water, ginger-sally, cranberry or apple juice, frozen fruit ice, or broth  Ask your healthcare provider for more information on a clear liquid diet  When should I seek immediate care? · You have bowel movement or foul-smelling discharge leaking from your vagina or in your urine  · You have severe diarrhea  · You urinate less than usual or not at all  · You are not able to have a bowel movement  · You cannot stop vomiting  · You have severe abdominal pain, a fever, and your abdomen is larger than usual      · You have new or increased blood in your bowel movements  When should I contact my healthcare provider? · You have pain when you urinate  · Your symptoms get worse or do not go away  · You have questions or concerns about your condition or care  CARE AGREEMENT:   You have the right to help plan your care  Learn about your health condition and how it may be treated   Discuss treatment options with your caregivers to decide what care you want to receive  You always have the right to refuse treatment  The above information is an  only  It is not intended as medical advice for individual conditions or treatments  Talk to your doctor, nurse or pharmacist before following any medical regimen to see if it is safe and effective for you  © 2017 [employer] Information is for End User's use only and may not be sold, redistributed or otherwise used for commercial purposes  All illustrations and images included in CareNotes® are the copyrighted property of [employer]  or [employer]

## 2018-03-01 NOTE — ED PROVIDER NOTES
History  Chief Complaint   Patient presents with    Abdominal Pain     LLQ abdominal pain x 1 week     [de-identified] year old male presents for evaluation of left lower quadrant abdominal pain that is been ongoing for the past week but has been worsening  States that he had some enteritis symptoms, a/w diarrhea, took Imodium which resolved diarrhea symptoms, he then became constipated, attempted taking a laxative which is only partially relieved his constipation  He did have a bowel movement yesterday though he denies any blood or melena in his bowel movements  He states that he has had this lower abdominal pain in the past when he had diverticulitis and he is concerned that this is diverticulitis  He went to his primary care provider who referred him to the emergency department for further testing  He denies any fevers or chills, denies nausea or vomiting, denies urinary symptoms but does admit to a chronic problem of difficulty urinating likely secondary to enlarged prostate  Patient denies other symptoms at this time  Prior to Admission Medications   Prescriptions Last Dose Informant Patient Reported? Taking?    Influenza Vac Split High-Dose 0 5 ML BRII   Yes No   Multiple Vitamin (MULTIVITAMIN) tablet   Yes No   Sig: Take 1 tablet by mouth daily before dinner     Multiple Vitamins-Minerals (CENTRUM ULTRA MENS PO)   Yes No   Sig: Take 1 tablet by mouth daily   acetaminophen (TYLENOL) 500 mg tablet   Yes No   Sig: Take 2 tablets by mouth daily as needed   amLODIPine (NORVASC) 5 mg tablet   Yes No   Sig: Take 5 mg by mouth daily   aspirin (ASPIRIN LOW DOSE) 81 MG tablet   Yes No   Sig: Take 1 tablet by mouth daily   dicyclomine (BENTYL) 20 mg tablet   No No   Sig: Take 1 tablet by mouth every 6 (six) hours as needed (abdominal cramping) for up to 30 days For crampy abdominal pain   esomeprazole (NexIUM) 20 mg capsule   Yes No   Sig: Take 1 capsule by mouth daily   fenofibrate (TRICOR) 145 mg tablet Yes No   Sig: Take 145 mg by mouth daily   lisinopril (ZESTRIL) 40 mg tablet   Yes No   Sig: Take 40 mg by mouth daily   loperamide (IMODIUM A-D) 2 MG tablet   Yes No   Sig: Take 1 tablet by mouth daily as needed   lovastatin (MEVACOR) 40 MG tablet   Yes No   Sig: Take 40 mg by mouth daily   metoprolol tartrate (LOPRESSOR) 25 mg tablet   No No   Sig: Take 1 tablet by mouth 2 (two) times a day for 30 days   ondansetron (ZOFRAN ODT) 4 mg disintegrating tablet   No No   Sig: Take 1 tablet by mouth every 8 (eight) hours as needed for nausea or vomiting for up to 30 days      Facility-Administered Medications: None       Past Medical History:   Diagnosis Date    Apnea     Diverticulitis     Hypercholesterolemia     Hyperlipidemia     Hypertension     Mitral valve disorder     Thoracic neuritis        Past Surgical History:   Procedure Laterality Date    COLONOSCOPY      Complete    ESOPHAGOGASTRODUODENOSCOPY      Diagnostic    FOOT SURGERY      HERNIA REPAIR      KNEE SURGERY Left     NEUROPLASTY / TRANSPOSITION MEDIAN NERVE AT CARPAL TUNNEL      SHOULDER SURGERY      TARSAL TUNNEL RELEASE      Neuroplasty Decompression       Family History   Problem Relation Age of Onset    Diabetes Mother      Mellitus    Heart disease Mother      Arteriosclerotic Cardiovascular    Hypertension Mother     Cancer Father     Arthritis Father      Rheu Mult Site W Involv Of Organs And Systems    Thyroid disease Sister     Diabetes Brother      Diabetes:Mellitus    Heart disease Brother     Hypertension Brother     Coronary artery disease Family      I have reviewed and agree with the history as documented  Social History   Substance Use Topics    Smoking status: Never Smoker    Smokeless tobacco: Never Used      Comment: Per [employer] : Former Smoker    Alcohol use No        Review of Systems   Constitutional: Positive for appetite change (decreased)  Negative for chills, fatigue and fever     HENT: Negative for congestion and sore throat  Respiratory: Negative for cough, chest tightness and shortness of breath  Cardiovascular: Negative for chest pain and palpitations  Gastrointestinal: Positive for abdominal pain (LLQ, suprapubic, b/l flank), constipation and diarrhea  Negative for blood in stool, nausea and vomiting  Genitourinary: Positive for difficulty urinating and flank pain  Negative for dysuria, penile pain, scrotal swelling and testicular pain  Musculoskeletal: Negative for back pain and neck pain  Skin: Negative for color change and rash  Allergic/Immunologic: Negative for immunocompromised state  Neurological: Negative for dizziness, syncope and headaches  Physical Exam  ED Triage Vitals [03/01/18 1616]   Temperature Pulse Respirations Blood Pressure SpO2   99 6 °F (37 6 °C) 75 16 (!) 174/77 95 %      Temp Source Heart Rate Source Patient Position - Orthostatic VS BP Location FiO2 (%)   Oral Monitor Lying Right arm --      Pain Score       9           Orthostatic Vital Signs  Vitals:    03/01/18 1616   BP: (!) 174/77   Pulse: 75   Patient Position - Orthostatic VS: Lying       Physical Exam   Constitutional: He is oriented to person, place, and time  He appears well-developed and well-nourished  No distress  HENT:   Head: Normocephalic and atraumatic  Mouth/Throat: Oropharynx is clear and moist    Eyes: Conjunctivae and EOM are normal  Pupils are equal, round, and reactive to light  Right eye exhibits no discharge  Left eye exhibits no discharge  No scleral icterus  Neck: Normal range of motion  Neck supple  No JVD present  Cardiovascular: Normal rate, regular rhythm, normal heart sounds and intact distal pulses  Exam reveals no gallop and no friction rub  No murmur heard  Pulmonary/Chest: Effort normal and breath sounds normal  No respiratory distress  He has no wheezes  He has no rales  He exhibits no tenderness  Abdominal: Soft   Bowel sounds are normal  He exhibits no distension  There is tenderness (suprapubic, LLQ, b/l flank)  There is no rebound and no guarding  Musculoskeletal: Normal range of motion  He exhibits no edema, tenderness or deformity  Neurological: He is alert and oriented to person, place, and time  No cranial nerve deficit  Skin: Skin is warm and dry  No rash noted  He is not diaphoretic  No erythema  No pallor  Psychiatric: He has a normal mood and affect  His behavior is normal    Vitals reviewed  ED Medications  Medications   sodium chloride 0 9 % bolus 1,000 mL (0 mL Intravenous Stopped 3/1/18 1747)   dicyclomine (BENTYL) tablet 20 mg (20 mg Oral Given 3/1/18 1647)   iohexol (OMNIPAQUE) 350 MG/ML injection (MULTI-DOSE) 100 mL (100 mL Intravenous Given 3/1/18 1735)       Diagnostic Studies  Results Reviewed     Procedure Component Value Units Date/Time    Comprehensive metabolic panel [[id number]] Collected:  03/01/18 1646    Lab Status:  Final result Specimen:  Blood from Arm, Right Updated:  03/01/18 1710     Sodium 137 mmol/L      Potassium 3 5 mmol/L      Chloride 103 mmol/L      CO2 25 mmol/L      Anion Gap 9 mmol/L      BUN 20 mg/dL      Creatinine 1 06 mg/dL      Glucose 114 mg/dL      Calcium 9 3 mg/dL      AST 15 U/L      ALT 22 U/L      Alkaline Phosphatase 62 U/L      Total Protein 7 2 g/dL      Albumin 3 5 g/dL      Total Bilirubin 0 80 mg/dL      eGFR 69 ml/min/1 73sq m     Narrative:         National Kidney Disease Education Program recommendations are as follows:  GFR calculation is accurate only with a steady state creatinine  Chronic Kidney disease less than 60 ml/min/1 73 sq  meters  Kidney failure less than 15 ml/min/1 73 sq  meters      Lipase [[id number]]  (Normal) Collected:  03/01/18 1646    Lab Status:  Final result Specimen:  Blood from Arm, Right Updated:  03/01/18 1710     Lipase 110 u/L     Urine Microscopic [[id number]]  (Abnormal) Collected:  03/01/18 1643    Lab Status:  Final result Specimen:  Urine from Urine, Clean Catch Updated:  03/01/18 1656     RBC, UA 0-1 (A) /hpf      WBC, UA 0-1 (A) /hpf      Epithelial Cells Occasional /hpf      Bacteria, UA Occasional /hpf      MUCOUS THREADS Occasional    CBC and differential [[id number]]  (Abnormal) Collected:  03/01/18 1646    Lab Status:  Final result Specimen:  Blood from Arm, Right Updated:  03/01/18 1655     WBC 7 29 Thousand/uL      RBC 4 18 Million/uL      Hemoglobin 14 3 g/dL      Hematocrit 41 6 %       (H) fL      MCH 34 2 pg      MCHC 34 4 g/dL      RDW 12 5 %      MPV 9 9 fL      Platelets 123 Thousands/uL      nRBC 0 /100 WBCs      Neutrophils Relative 64 %      Lymphocytes Relative 25 %      Monocytes Relative 11 %      Eosinophils Relative 1 %      Basophils Relative 0 %      Neutrophils Absolute 4 64 Thousands/µL      Lymphocytes Absolute 1 80 Thousands/µL      Monocytes Absolute 0 77 Thousand/µL      Eosinophils Absolute 0 04 Thousand/µL      Basophils Absolute 0 02 Thousands/µL     UA w Reflex to Microscopic w Reflex to Culture [[id number]]  (Abnormal) Collected:  03/01/18 1643    Lab Status:  Final result Specimen:  Urine from Urine, Clean Catch Updated:  03/01/18 1649     Color, UA Yellow     Clarity, UA Clear     Specific Gravity, UA >=1 030     pH, UA 5 5     Leukocytes, UA Negative     Nitrite, UA Negative     Protein, UA Negative mg/dl      Glucose, UA Negative mg/dl      Ketones, UA Negative mg/dl      Urobilinogen, UA 0 2 E U /dl      Bilirubin, UA Negative     Blood, UA Trace-lysed (A)                 CT abdomen pelvis with contrast   ED Interpretation by Haley Martines DO (03/01 3243)   Acute uncomplicated diverticulitis involving the splenic flexure  Final Result by Dragan Sanchez MD (03/01 6729)      Acute uncomplicated diverticulitis involving the splenic flexure              Workstation performed: [id number]                    Procedures  Procedures       Phone Contacts  ED Phone Contact    ED Course  ED Course as of Mar 01 2307   u Mar 01, 2018   1756 Discussed with patient the findingsOf diverticulitis  Discussed the risks and benefits of hospitalization versus getting treated at home  Patient feels comfortable going home and getting antibiotics  Discussed good return precautions in case of worsening condition  The patient is also advised of the trace blood in his urine and advised to follow up with Dr Dede Hendrix regarding this  MDM  Number of Diagnoses or Management Options  Diverticulitis:   Diagnosis management comments: Abdominal pain - LLQ and suprapubic  Concern for diverticulitis, Urinary infection, etc   UA, labs, CT abd/pelvis  IVF  CT scan indicates diverticulitis  Patient is agreeable to discharge with outpatient medication  Given good return precautions in case of worsening condition  Discussed with patient and they understood the risks and benefits of discharge  Patient had opportunity to ask questions regarding care and discharge instructions and had no further questions  Advised follow up with PCP, advised returning if worsening, and discussed disease specific return precautions  Patient understood discharge instructions  Amount and/or Complexity of Data Reviewed  Clinical lab tests: ordered and reviewed  Tests in the radiology section of CPT®: ordered and reviewed      CritCare Time    Disposition  Final diagnoses:   Diverticulitis     Time reflects when diagnosis was documented in both MDM as applicable and the Disposition within this note     Time User Action Codes Description Comment    3/1/2018  6:00 PM Zackary Chong Add [F74 65] Diverticulitis       ED Disposition     ED Disposition Condition Comment    Discharge  Nadira Formberyl discharge to home/self care      Condition at discharge: Good        Follow-up Information     Follow up With Specialties Details Why Contact Info Additional [facility] Emergency Department Emergency Medicine  If symptoms worsen: worsening abdominal pain, systemic illness, etc [address], [address] [place], [place], [address],  Internal Medicine Schedule an appointment as soon as possible for a visit for follow up and to [facility] mprovement [address]/[facility] [address]  [phone]           Discharge Medication List as of 3/1/2018  6:25 PM      START taking these medications    Details   ciprofloxacin (CIPRO) 500 mg tablet Take 1 tablet (500 mg total) by mouth 2 (two) times a day for 10 days, Starting Thu 3/1/2018, Until Sun 3/11/2018, Print      metroNIDAZOLE (FLAGYL) 500 mg tablet Take 1 tablet (500 mg total) by mouth every 8 (eight) hours for 10 days, Starting u 3/1/2018, Until Sun 3/11/2018, Print         CONTINUE these medications which have CHANGED    Details   dicyclomine (BENTYL) 20 mg tablet Take 1 tablet (20 mg total) by mouth every 6 (six) hours As needed for abdominal pain, Starting u 3/1/2018, Normal         CONTINUE these medications which have NOT CHANGED    Details   acetaminophen (TYLENOL) 500 mg tablet Take 2 tablets by mouth daily as needed, Historical Med      amLODIPine (NORVASC) 5 mg tablet Take 5 mg by mouth daily, Starting 9/19/2014, Until Discontinued, Historical Med      aspirin (ASPIRIN LOW DOSE) 81 MG tablet Take 1 tablet by mouth daily, Historical Med      esomeprazole (NexIUM) 20 mg capsule Take 1 capsule by mouth daily, Historical Med      fenofibrate (TRICOR) 145 mg tablet Take 145 mg by mouth daily, Starting 11/12/2015, Until Discontinued, Historical Med      Influenza Vac Split High-Dose 0 5 ML BRII Starting Thu 10/1/2015, Historical Med      lisinopril (ZESTRIL) 40 mg tablet Take 40 mg by mouth daily, Starting 1/18/2016, Until Discontinued, Historical Med      loperamide (IMODIUM A-D) 2 MG tablet Take 1 tablet by mouth daily as needed, Historical Med      lovastatin (MEVACOR) 40 MG tablet Take 40 mg by mouth daily, Starting 1/26/2016, Until Discontinued, Historical Med      metoprolol tartrate (LOPRESSOR) 25 mg tablet Take 1 tablet by mouth 2 (two) times a day for 30 days, Starting 4/24/2017, Until Wed 5/24/17, No Print      Multiple Vitamin (MULTIVITAMIN) tablet Take 1 tablet by mouth daily before dinner  , Until Discontinued, Historical Med      Multiple Vitamins-Minerals (CENTRUM ULTRA MENS PO) Take 1 tablet by mouth daily, Historical Med      ondansetron (ZOFRAN ODT) 4 mg disintegrating tablet Take 1 tablet by mouth every 8 (eight) hours as needed for nausea or vomiting for up to 30 days, Starting 5/13/2017, Until Mon 6/12/17, Print           No discharge procedures on file      ED Provider  Electronically Signed by           Bo Madison DO  03/01/18 1723

## 2018-03-02 ENCOUNTER — TELEPHONE (OUTPATIENT)
Dept: INTERNAL MEDICINE CLINIC | Facility: CLINIC | Age: 73
End: 2018-03-02

## 2018-03-08 ENCOUNTER — APPOINTMENT (EMERGENCY)
Dept: CT IMAGING | Facility: HOSPITAL | Age: 73
End: 2018-03-08
Payer: MEDICARE

## 2018-03-08 ENCOUNTER — HOSPITAL ENCOUNTER (EMERGENCY)
Facility: HOSPITAL | Age: 73
Discharge: HOME | End: 2018-03-09
Attending: EMERGENCY MEDICINE | Admitting: EMERGENCY MEDICINE
Payer: MEDICARE

## 2018-03-08 VITALS
RESPIRATION RATE: 18 BRPM | TEMPERATURE: 100 F | SYSTOLIC BLOOD PRESSURE: 144 MMHG | DIASTOLIC BLOOD PRESSURE: 68 MMHG | WEIGHT: 180 LBS | HEIGHT: 65 IN | BODY MASS INDEX: 29.99 KG/M2 | HEART RATE: 76 BPM | OXYGEN SATURATION: 96 %

## 2018-03-08 DIAGNOSIS — I10 ESSENTIAL HYPERTENSION: Primary | ICD-10-CM

## 2018-03-08 DIAGNOSIS — R10.9 ABDOMINAL PAIN: Primary | ICD-10-CM

## 2018-03-08 LAB
ALBUMIN SERPL BCP-MCNC: 3.7 G/DL (ref 3.5–5)
ALP SERPL-CCNC: 57 U/L (ref 46–116)
ALT SERPL W P-5'-P-CCNC: 29 U/L (ref 12–78)
ANION GAP SERPL CALCULATED.3IONS-SCNC: 11 MMOL/L (ref 4–13)
AST SERPL W P-5'-P-CCNC: 26 U/L (ref 5–45)
BASOPHILS # BLD AUTO: 0.02 THOUSANDS/ΜL (ref 0–0.1)
BASOPHILS NFR BLD AUTO: 0 % (ref 0–1)
BILIRUB SERPL-MCNC: 0.5 MG/DL (ref 0.2–1)
BUN SERPL-MCNC: 21 MG/DL (ref 5–25)
CALCIUM SERPL-MCNC: 9.6 MG/DL (ref 8.3–10.1)
CHLORIDE SERPL-SCNC: 106 MMOL/L (ref 100–108)
CLARITY, POC: CLEAR
CO2 SERPL-SCNC: 27 MMOL/L (ref 21–32)
COLOR, POC: YELLOW
CREAT SERPL-MCNC: 1.41 MG/DL (ref 0.6–1.3)
EOSINOPHIL # BLD AUTO: 0.04 THOUSAND/ΜL (ref 0–0.61)
EOSINOPHIL NFR BLD AUTO: 1 % (ref 0–6)
ERYTHROCYTE [DISTWIDTH] IN BLOOD BY AUTOMATED COUNT: 12.7 % (ref 11.6–15.1)
EXT BILIRUBIN, UA: NORMAL
EXT BLOOD URINE: NORMAL
EXT GLUCOSE, UA: NORMAL
EXT KETONES: NORMAL
EXT NITRITE, UA: NORMAL
EXT PH, UA: 6
EXT PROTEIN, UA: NORMAL
EXT SPECIFIC GRAVITY, UA: 1.01
EXT UROBILINOGEN: NORMAL
GFR SERPL CREATININE-BSD FRML MDRD: 49 ML/MIN/1.73SQ M
GLUCOSE SERPL-MCNC: 123 MG/DL (ref 65–140)
HCT VFR BLD AUTO: 44.1 % (ref 36.5–49.3)
HGB BLD-MCNC: 15.3 G/DL (ref 12–17)
LACTATE SERPL-SCNC: 1.6 MMOL/L (ref 0.5–2)
LIPASE SERPL-CCNC: 106 U/L (ref 73–393)
LYMPHOCYTES # BLD AUTO: 0.45 THOUSANDS/ΜL (ref 0.6–4.47)
LYMPHOCYTES NFR BLD AUTO: 5 % (ref 14–44)
MCH RBC QN AUTO: 34.5 PG (ref 26.8–34.3)
MCHC RBC AUTO-ENTMCNC: 34.7 G/DL (ref 31.4–37.4)
MCV RBC AUTO: 99 FL (ref 82–98)
MONOCYTES # BLD AUTO: 0.57 THOUSAND/ΜL (ref 0.17–1.22)
MONOCYTES NFR BLD AUTO: 7 % (ref 4–12)
NEUTROPHILS # BLD AUTO: 7.29 THOUSANDS/ΜL (ref 1.85–7.62)
NEUTS SEG NFR BLD AUTO: 87 % (ref 43–75)
NRBC BLD AUTO-RTO: 0 /100 WBCS
PLATELET # BLD AUTO: 231 THOUSANDS/UL (ref 149–390)
PMV BLD AUTO: 9.8 FL (ref 8.9–12.7)
POTASSIUM SERPL-SCNC: 3.7 MMOL/L (ref 3.5–5.3)
PROT SERPL-MCNC: 7.2 G/DL (ref 6.4–8.2)
RBC # BLD AUTO: 4.44 MILLION/UL (ref 3.88–5.62)
SODIUM SERPL-SCNC: 144 MMOL/L (ref 136–145)
WBC # BLD AUTO: 8.4 THOUSAND/UL (ref 4.31–10.16)
WBC # BLD EST: NORMAL 10*3/UL

## 2018-03-08 PROCEDURE — 83690 ASSAY OF LIPASE: CPT | Performed by: EMERGENCY MEDICINE

## 2018-03-08 PROCEDURE — 36415 COLL VENOUS BLD VENIPUNCTURE: CPT | Performed by: EMERGENCY MEDICINE

## 2018-03-08 PROCEDURE — 85025 COMPLETE CBC W/AUTO DIFF WBC: CPT | Performed by: EMERGENCY MEDICINE

## 2018-03-08 PROCEDURE — 96361 HYDRATE IV INFUSION ADD-ON: CPT

## 2018-03-08 PROCEDURE — 96375 TX/PRO/DX INJ NEW DRUG ADDON: CPT

## 2018-03-08 PROCEDURE — 80053 COMPREHEN METABOLIC PANEL: CPT | Performed by: EMERGENCY MEDICINE

## 2018-03-08 PROCEDURE — 83605 ASSAY OF LACTIC ACID: CPT | Performed by: EMERGENCY MEDICINE

## 2018-03-08 PROCEDURE — 81002 URINALYSIS NONAUTO W/O SCOPE: CPT | Performed by: EMERGENCY MEDICINE

## 2018-03-08 PROCEDURE — 74177 CT ABD & PELVIS W/CONTRAST: CPT

## 2018-03-08 PROCEDURE — 87040 BLOOD CULTURE FOR BACTERIA: CPT | Performed by: EMERGENCY MEDICINE

## 2018-03-08 PROCEDURE — 96374 THER/PROPH/DIAG INJ IV PUSH: CPT

## 2018-03-08 RX ORDER — ONDANSETRON 2 MG/ML
4 INJECTION INTRAMUSCULAR; INTRAVENOUS ONCE
Status: COMPLETED | OUTPATIENT
Start: 2018-03-08 | End: 2018-03-08

## 2018-03-08 RX ORDER — FENTANYL CITRATE 50 UG/ML
50 INJECTION, SOLUTION INTRAMUSCULAR; INTRAVENOUS ONCE
Status: COMPLETED | OUTPATIENT
Start: 2018-03-08 | End: 2018-03-08

## 2018-03-08 RX ORDER — LISINOPRIL 40 MG/1
TABLET ORAL
Qty: 90 TABLET | Refills: 0 | Status: SHIPPED | OUTPATIENT
Start: 2018-03-08 | End: 2018-07-10 | Stop reason: SDUPTHER

## 2018-03-08 RX ADMIN — IOHEXOL 100 ML: 350 INJECTION, SOLUTION INTRAVENOUS at 21:28

## 2018-03-08 RX ADMIN — SODIUM CHLORIDE 1000 ML: 0.9 INJECTION, SOLUTION INTRAVENOUS at 20:50

## 2018-03-08 RX ADMIN — FENTANYL CITRATE 50 MCG: 50 INJECTION INTRAMUSCULAR; INTRAVENOUS at 20:58

## 2018-03-08 RX ADMIN — ONDANSETRON 4 MG: 2 INJECTION INTRAMUSCULAR; INTRAVENOUS at 20:57

## 2018-03-09 DIAGNOSIS — I10 ESSENTIAL HYPERTENSION: ICD-10-CM

## 2018-03-09 DIAGNOSIS — R73.9 HYPERGLYCEMIA: Primary | ICD-10-CM

## 2018-03-09 PROCEDURE — 99284 EMERGENCY DEPT VISIT MOD MDM: CPT

## 2018-03-09 NOTE — DISCHARGE INSTRUCTIONS
Abdominal Pain   WHAT YOU NEED TO KNOW:   Abdominal pain can be dull, achy, or sharp  You may have pain in one area of your abdomen, or in your entire abdomen  Your pain may be caused by a condition such as constipation, food sensitivity or poisoning, infection, or a blockage  Abdominal pain can also be from a hernia, appendicitis, or an ulcer  Liver, gallbladder, or kidney conditions can also cause abdominal pain  The cause of your abdominal pain may be unknown  DISCHARGE INSTRUCTIONS:   Return to the emergency department if:   · You have new chest pain or shortness of breath  · You have pulsing pain in your upper abdomen or lower back that suddenly becomes constant  · Your pain is in the right lower abdominal area and worsens with movement  · You have a fever over 100 4°F (38°C) or shaking chills  · You are vomiting and cannot keep food or liquids down  · Your pain does not improve or gets worse over the next 8 to 12 hours  · You see blood in your vomit or bowel movements, or they look black and tarry  · Your skin or the whites of your eyes turn yellow  · You are a woman and have a large amount of vaginal bleeding that is not your monthly period  Contact your healthcare provider if:   · You have pain in your lower back  · You are a man and have pain in your testicles  · You have pain when you urinate  · You have questions or concerns about your condition or care  Follow up with your healthcare provider within 24 hours or as directed:  Write down your questions so you remember to ask them during your visits  Medicines:   · Medicines  may be given to calm your stomach and prevent vomiting or to decrease pain  Ask how to take pain medicine safely  · Take your medicine as directed  Contact your healthcare provider if you think your medicine is not helping or if you have side effects  Tell him of her if you are allergic to any medicine   Keep a list of the medicines, vitamins, and herbs you take  Include the amounts, and when and why you take them  Bring the list or the pill bottles to follow-up visits  Carry your medicine list with you in case of an emergency  © 2017 [address] [employer] Information is for End User's use only and may not be sold, redistributed or otherwise used for commercial purposes  All illustrations and images included in CareNotes® are the copyrighted property of [employer]  or [employer]  The above information is an  only  It is not intended as medical advice for individual conditions or treatments  Talk to your doctor, nurse or pharmacist before following any medical regimen to see if it is safe and effective for you

## 2018-03-09 NOTE — ED PROVIDER NOTES
Pt Name: Sarah Padilla  MRN: [id number]  [place] 1945  Age/Sex: 74 y o  male  Date of evaluation: 3/8/2018  PCP: Emily Gutierrez, [address] [place] [address]    Chief Complaint   Patient presents with    Abdominal Pain     Pt c/o LLQ abdominal pain x 1 week  States has had low grade fever at home  Denies n/v/d          HPI    Maryrere Breenus presents to the Emergency Department complaining of abdominal pain  Patient has a hx of diverticulitis  He has been on outpatient abx and tonight developed a fever and worsening pain            History provided by:  Patient   used: No    Abdominal Pain   Pain location:  LLQ  Pain quality: bloating, dull, fullness and pressure    Pain severity:  Moderate  Onset quality:  Sudden  Timing:  Constant  Chronicity:  Recurrent  Relieved by:  Nothing  Worsened by:  Nothing  Ineffective treatments:  None tried  Associated symptoms: chills, fever and nausea    Associated symptoms: no chest pain, no constipation, no diarrhea, no dysuria, no fatigue, no shortness of breath, no sore throat and no vomiting          Past Medical and Surgical History    Past Medical History:   Diagnosis Date    Apnea     Diverticulitis     Hypercholesterolemia     Hyperlipidemia     Hypertension     Mitral valve disorder     Thoracic neuritis        Past Surgical History:   Procedure Laterality Date    COLONOSCOPY      Complete    ESOPHAGOGASTRODUODENOSCOPY      Diagnostic    FOOT SURGERY      HERNIA REPAIR      KNEE SURGERY Left     NEUROPLASTY / TRANSPOSITION MEDIAN NERVE AT CARPAL TUNNEL      SHOULDER SURGERY      TARSAL TUNNEL RELEASE      Neuroplasty Decompression       Family History   Problem Relation Age of Onset    Diabetes Mother      Mellitus    Heart disease Mother      Arteriosclerotic Cardiovascular    Hypertension Mother     Cancer Father     Arthritis Father      Rheu Mult Site W Involv Of Organs And Systems    Thyroid disease Sister     Diabetes Brother Diabetes:Mellitus    Heart disease Brother     Hypertension Brother     Coronary artery disease Family        Social History   Substance Use Topics    Smoking status: Never Smoker    Smokeless tobacco: Never Used      Comment: Per [employer] : Former Smoker    Alcohol use No              Allergies    Allergies   Allergen Reactions    Iodides     Seasonal Ic  [Cholestatin]        Home Medications    Prior to Admission medications    Medication Sig Start Date End Date Taking?  Authorizing Provider   acetaminophen (TYLENOL) 500 mg tablet Take 2 tablets by mouth daily as needed   Yes Historical Provider, MD   amLODIPine (NORVASC) 5 mg tablet Take 5 mg by mouth daily 9/19/14  Yes Historical Provider, MD   aspirin (ASPIRIN LOW DOSE) 81 MG tablet Take 1 tablet by mouth daily   Yes Historical Provider, MD   ciprofloxacin (CIPRO) 500 mg tablet Take 1 tablet (500 mg total) by mouth 2 (two) times a day for 10 days 3/1/18 3/11/18 Yes Flower Chong DO   dicyclomine (BENTYL) 20 mg tablet Take 1 tablet (20 mg total) by mouth every 6 (six) hours As needed for abdominal pain 3/1/18  Yes Flower Chong DO   esomeprazole (NexIUM) 20 mg capsule Take 1 capsule by mouth daily   Yes Historical Provider, MD   fenofibrate (TRICOR) 145 mg tablet Take 145 mg by mouth daily 11/12/15  Yes Historical Provider, MD   lisinopril (ZESTRIL) 40 mg tablet TAKE ONE TABLET BY MOUTH ONE TIME DAILY  3/8/18  Yes Janay Ortiz PA-C   loperamide (IMODIUM A-D) 2 MG tablet Take 1 tablet by mouth daily as needed   Yes Historical Provider, MD   lovastatin (MEVACOR) 40 MG tablet Take 40 mg by mouth daily 1/26/16  Yes Historical Provider, MD   Multiple Vitamins-Minerals (CENTRUM ULTRA MENS PO) Take 1 tablet by mouth daily   Yes Historical Provider, MD   Multiple Vitamin (MULTIVITAMIN) tablet Take 1 tablet by mouth daily before dinner    3/8/18 Yes Historical Provider, MD   Influenza Vac Split High-Dose 0 5 ML BRII  10/1/15   Historical Provider, MD   dicyclomine (BENTYL) 20 mg tablet Take 1 tablet by mouth every 6 (six) hours as needed (abdominal cramping) for up to 30 days For crampy abdominal pain 5/13/17 3/8/18  Xavier Guzman MD   dicyclomine (BENTYL) 20 mg tablet Take 1 tablet (20 mg total) by mouth every 6 (six) hours As needed for abdominal pain/cramping 3/1/18 3/8/18  Lazara Chong DO   lisinopril (ZESTRIL) 40 mg tablet Take 40 mg by mouth daily 1/18/16 3/8/18  Historical Provider, MD   metoprolol tartrate (LOPRESSOR) 25 mg tablet Take 1 tablet by mouth 2 (two) times a day for 30 days 4/24/17 3/8/18  Nohemi Barahona MD   metroNIDAZOLE (FLAGYL) 500 mg tablet Take 1 tablet (500 mg total) by mouth every 8 (eight) hours for 10 days 3/1/18 3/8/18  Lazara Chong DO   ondansetron (ZOFRAN ODT) 4 mg disintegrating tablet Take 1 tablet by mouth every 8 (eight) hours as needed for nausea or vomiting for up to 30 days 5/13/17 3/8/18  Xavier Guzman MD           Review of Systems    Review of Systems   Constitutional: Positive for chills and fever  Negative for activity change, appetite change and fatigue  HENT: Negative for congestion, rhinorrhea, sinus pressure, sneezing, sore throat and trouble swallowing  Eyes: Negative for photophobia and visual disturbance  Respiratory: Negative for chest tightness, shortness of breath and wheezing  Cardiovascular: Negative for chest pain and leg swelling  Gastrointestinal: Positive for abdominal distention, abdominal pain and nausea  Negative for constipation, diarrhea and vomiting  Endocrine: Negative for polydipsia, polyphagia and polyuria  Genitourinary: Negative for decreased urine volume, difficulty urinating, dysuria, flank pain, frequency and urgency  Musculoskeletal: Negative for back pain, gait problem, joint swelling and neck pain  Skin: Negative for color change, pallor and rash  Allergic/Immunologic: Negative for immunocompromised state  Neurological: Negative for seizures, syncope, speech difficulty, weakness, light-headedness and headaches  Psychiatric/Behavioral: Negative for confusion  All other systems reviewed and are negative  Physical Exam      ED Triage Vitals [03/08/18 1940]   Temperature Pulse Respirations Blood Pressure SpO2   100 °F (37 8 °C) 94 17 (!) 175/81 95 %      Temp Source Heart Rate Source Patient Position - Orthostatic VS BP Location FiO2 (%)   Oral Monitor Lying Right arm --      Pain Score       7               Physical Exam   Constitutional: He is oriented to person, place, and time  He appears well-developed and well-nourished  No distress  HENT:   Head: Normocephalic and atraumatic  Nose: Nose normal    Mouth/Throat: Oropharynx is clear and moist    Eyes: Conjunctivae and EOM are normal  Pupils are equal, round, and reactive to light  Neck: Normal range of motion  Neck supple  Cardiovascular: Normal rate, regular rhythm and normal heart sounds  Exam reveals no gallop and no friction rub  No murmur heard  Pulmonary/Chest: Effort normal and breath sounds normal  No respiratory distress  He has no wheezes  He has no rales  Abdominal: Soft  Normal appearance and bowel sounds are normal  There is tenderness in the left lower quadrant  There is no rebound and no guarding  Musculoskeletal: Normal range of motion  Neurological: He is alert and oriented to person, place, and time  Skin: Skin is warm and dry  He is not diaphoretic  Psychiatric: He has a normal mood and affect  His behavior is normal    Nursing note and vitals reviewed  Assessment and Plan    Bryn Pedraza is a 74 y o  male who presents with LLQ abdominal pain  Physical examination remarkable for tenderness without guarding or rebound  Differential diagnosis (not completely inclusive) includes diverticulitis vs other pathology   Plan will be to perform diagnostic testing and treat symptomatically        MDM    Diagnostic Results      Labs:    Results for orders placed or performed during the hospital encounter of 03/08/18   CBC and differential   Result Value Ref Range    WBC 8 40 4 31 - 10 16 Thousand/uL    RBC 4 44 3 88 - 5 62 Million/uL    Hemoglobin 15 3 12 0 - 17 0 g/dL    Hematocrit 44 1 36 5 - 49 3 %    MCV 99 (H) 82 - 98 fL    MCH 34 5 (H) 26 8 - 34 3 pg    MCHC 34 7 31 4 - 37 4 g/dL    RDW 12 7 11 6 - 15 1 %    MPV 9 8 8 9 - 12 7 fL    Platelets 664 793 - 705 Thousands/uL    nRBC 0 /100 WBCs    Neutrophils Relative 87 (H) 43 - 75 %    Lymphocytes Relative 5 (L) 14 - 44 %    Monocytes Relative 7 4 - 12 %    Eosinophils Relative 1 0 - 6 %    Basophils Relative 0 0 - 1 %    Neutrophils Absolute 7 29 1 85 - 7 62 Thousands/µL    Lymphocytes Absolute 0 45 (L) 0 60 - 4 47 Thousands/µL    Monocytes Absolute 0 57 0 17 - 1 22 Thousand/µL    Eosinophils Absolute 0 04 0 00 - 0 61 Thousand/µL    Basophils Absolute 0 02 0 00 - 0 10 Thousands/µL   Comprehensive metabolic panel   Result Value Ref Range    Sodium 144 136 - 145 mmol/L    Potassium 3 7 3 5 - 5 3 mmol/L    Chloride 106 100 - 108 mmol/L    CO2 27 21 - 32 mmol/L    Anion Gap 11 4 - 13 mmol/L    BUN 21 5 - 25 mg/dL    Creatinine 1 41 (H) 0 60 - 1 30 mg/dL    Glucose 123 65 - 140 mg/dL    Calcium 9 6 8 3 - 10 1 mg/dL    AST 26 5 - 45 U/L    ALT 29 12 - 78 U/L    Alkaline Phosphatase 57 46 - 116 U/L    Total Protein 7 2 6 4 - 8 2 g/dL    Albumin 3 7 3 5 - 5 0 g/dL    Total Bilirubin 0 50 0 20 - 1 00 mg/dL    eGFR 49 ml/min/1 73sq m   Lipase   Result Value Ref Range    Lipase 106 73 - 393 u/L   Lactic acid, plasma   Result Value Ref Range    LACTIC ACID 1 6 0 5 - 2 0 mmol/L   POCT urinalysis dipstick   Result Value Ref Range    Color, UA yellow     Clarity, UA clear     EXT Glucose, UA neg     EXT Bilirubin, UA (Ref: Negative) neg     EXT Ketones, UA (Ref: Negative) neg     EXT Spec Grav, UA 1 010     EXT Blood, UA (Ref: Negative) neg     EXT pH, UA 6 0     EXT Protein, UA (Ref: Negative) neg     EXT Urobilinogen, UA (Ref: 0 2- 1 0) neg     EXT Leukocytes, UA (Ref: Negative) neg     EXT Nitrite, UA (Ref: Negative) neg        All labs reviewed and utilized in the medical decision making process    Radiology:    CT abdomen pelvis with contrast   Final Result         1  Colonic diverticulosis  Previously noted splenic flecture diverticulitis has near completely resolved  Fluid-filled jejunal and ileal common loops of bowel could relate to enteritis  2   Bilateral renal subcentimeter nonobstructing calculi  3   Delayed right nephrogram without right hydronephrosis or obstructing stone  This could be due to renal failure although other etiologies are not excluded  4   Left inferior pole renal cortical hypodensity was not seen on the prior exam and could relate to pyelonephritis, correlate with urinalysis  Workstation performed: [id number]             All radiology studies independently viewed by me and interpreted by the radiologist     Procedure    Procedures    CritCare Time      ED Course of Care and Re-Assessments    Patient is currently taking outpatient abx as well as something for GERD and stomach pain that he was prescribed  He should continue theses medications and follow up with PCP       Medications   sodium chloride 0 9 % bolus 1,000 mL (0 mL Intravenous Stopped 3/8/18 2213)   ondansetron (ZOFRAN) injection 4 mg (4 mg Intravenous Given 3/8/18 2057)   fentanyl citrate (PF) 100 MCG/2ML 50 mcg (50 mcg Intravenous Given 3/8/18 2058)   iohexol (OMNIPAQUE) 350 MG/ML injection (MULTI-DOSE) 100 mL (100 mL Intravenous Given 3/8/18 2128)           FINAL IMPRESSION    Final diagnoses:   Abdominal pain         DISPOSITION/PLAN      Time reflects when diagnosis was documented in both MDM as applicable and the Disposition within this note     Time User Action Codes Description Comment    3/8/2018 11:08 PM Cammie Jackson Add [R10 9] Abdominal pain       ED Disposition     ED Disposition Condition Comment    Discharge  Marito Doan discharge to home/self care      Condition at discharge: Good        Follow-up Information     Follow up With Specialties Details Why Contact Info Additional [address],  Internal Medicine Schedule an appointment as soon as possible for a visit  [address] [place] [address]/[address]       [address] [facility] Emergency Department Emergency Medicine Go to As needed, If symptoms worsen [address] [facility] [address]  [phone] [place] ED, [address] [place], [place], [address]            PATIENT REFERRED TO:    Halima Inman DO  [address] [place] [address]/[address]    Schedule an appointment as soon as possible for a visit      [address] [facility] Emergency Department  [address] [facility] [address]  [phone]  Go to  As needed, If symptoms worsen      DISCHARGE MEDICATIONS:    Discharge Medication List as of 3/9/2018 12:50 AM      CONTINUE these medications which have NOT CHANGED    Details   acetaminophen (TYLENOL) 500 mg tablet Take 2 tablets by mouth daily as needed, Historical Med      amLODIPine (NORVASC) 5 mg tablet Take 5 mg by mouth daily, Starting 9/19/2014, Until Discontinued, Historical Med      aspirin (ASPIRIN LOW DOSE) 81 MG tablet Take 1 tablet by mouth daily, Historical Med      ciprofloxacin (CIPRO) 500 mg tablet Take 1 tablet (500 mg total) by mouth 2 (two) times a day for 10 days, Starting Thu 3/1/2018, Until Sun 3/11/2018, Print      dicyclomine (BENTYL) 20 mg tablet Take 1 tablet (20 mg total) by mouth every 6 (six) hours As needed for abdominal pain, Starting Thu 3/1/2018, Normal      esomeprazole (NexIUM) 20 mg capsule Take 1 capsule by mouth daily, Historical Med      fenofibrate (TRICOR) 145 mg tablet Take 145 mg by mouth daily, Starting 11/12/2015, Until Discontinued, Historical Med      lisinopril (ZESTRIL) 40 mg tablet TAKE ONE TABLET BY MOUTH ONE TIME DAILY , Normal      loperamide (IMODIUM A-D) 2 MG tablet Take 1 tablet by mouth daily as needed, Historical Med      lovastatin (MEVACOR) 40 MG tablet Take 40 mg by mouth daily, Starting 1/26/2016, Until Discontinued, Historical Med      Multiple Vitamins-Minerals (CENTRUM ULTRA MENS PO) Take 1 tablet by mouth daily, Historical Med      Influenza Vac Split High-Dose 0 5 ML BRII Starting Thu 10/1/2015, Historical Med             No discharge procedures on file           Kathie Carine, [address] [facility], DO  03/09/18 5588

## 2018-03-09 NOTE — ED NOTES
Pt states to not have ride to go home  Pt's wife does not drive and daughter is out of country  Pt states "money is tighthttps://www [employer]/ supervisor called and cab voucher requested        Hitesh Saenz RN  03/09/18 3912

## 2018-03-12 ENCOUNTER — OFFICE VISIT (OUTPATIENT)
Dept: INTERNAL MEDICINE CLINIC | Facility: CLINIC | Age: 73
End: 2018-03-12
Payer: MEDICARE

## 2018-03-12 VITALS
SYSTOLIC BLOOD PRESSURE: 136 MMHG | OXYGEN SATURATION: 97 % | BODY MASS INDEX: 29.16 KG/M2 | HEIGHT: 65 IN | WEIGHT: 175 LBS | DIASTOLIC BLOOD PRESSURE: 64 MMHG | HEART RATE: 50 BPM

## 2018-03-12 DIAGNOSIS — I10 ESSENTIAL HYPERTENSION: ICD-10-CM

## 2018-03-12 DIAGNOSIS — K57.92 ACUTE DIVERTICULITIS: Primary | ICD-10-CM

## 2018-03-12 DIAGNOSIS — I10 ESSENTIAL HYPERTENSION: Primary | ICD-10-CM

## 2018-03-12 PROCEDURE — 99213 OFFICE O/P EST LOW 20 MIN: CPT | Performed by: INTERNAL MEDICINE

## 2018-03-12 RX ORDER — AMLODIPINE BESYLATE 5 MG/1
TABLET ORAL
Qty: 90 TABLET | Refills: 0 | Status: SHIPPED | OUTPATIENT
Start: 2018-03-12 | End: 2018-03-12 | Stop reason: SDUPTHER

## 2018-03-12 RX ORDER — AMLODIPINE BESYLATE 5 MG/1
5 TABLET ORAL EVERY MORNING
Qty: 90 TABLET | Refills: 3 | Status: SHIPPED | OUTPATIENT
Start: 2018-03-12 | End: 2018-12-31 | Stop reason: SDUPTHER

## 2018-03-12 NOTE — TELEPHONE ENCOUNTER
AT CHECKOUT PT SAID HE NEEDS A REFILL OF AMLODIPINE TO [facility]

## 2018-03-12 NOTE — PROGRESS NOTES
Assessment/Plan:  Regarding his pain he is improving slowly  He does not have a fever  He still has very slight tenderness left lower quadrant  I am going to restart the Cipro as I do not think that is bothering his abdomen  I will see him back here in 3 days  If he has any exacerbation of his pain prior to that time he will let me know    No problem-specific Assessment & Plan notes found for this encounter  There are no diagnoses linked to this encounter  Subjective:  Abdominal pain     Patient ID: Becca Khan is a 74 y o  male  HPI he came in about 12 days ago with abdominal pain  He went to the emergency room  CT scan Re confirmed diverticulitis  He was given Flagyl and Cipro  The Flagyl bother his stomach  He went back 3 days ago to the emergency room  He was still having discomfort  He was kept on Flagyl and Cipro  He is a little bit better  He does not have fever or chills  He has not really been taking his temperature  He is eating  His bowels are moving and started to form up  Overall he is some better but not entirely better  The following portions of the patient's history were reviewed and updated as appropriate: allergies, current medications, past family history, past medical history, past social history, past surgical history and problem list     Review of Systems   Constitutional: Positive for fatigue  Negative for chills and fever  He generally feels fairly well  No chills  HENT: Negative  Respiratory: Negative for chest tightness and shortness of breath  Cardiovascular: Negative for chest pain  Gastrointestinal: Positive for abdominal pain, diarrhea, nausea and vomiting  Negative for blood in stool  Genitourinary: Negative  Objective:     Physical Exam   Constitutional:   Moderately overweight  Temperature is 98 2°  Blood pressure is 136/64  Cardiovascular: Normal rate and regular rhythm      No murmur heard   Pulmonary/Chest: Effort normal and breath sounds normal  He has no wheezes  He has no rales     Abdominal:   Slightly tender left lower quadrant

## 2018-03-14 LAB
BACTERIA BLD CULT: NORMAL
BACTERIA BLD CULT: NORMAL

## 2018-03-16 ENCOUNTER — OFFICE VISIT (OUTPATIENT)
Dept: INTERNAL MEDICINE CLINIC | Facility: CLINIC | Age: 73
End: 2018-03-16
Payer: MEDICARE

## 2018-03-16 VITALS
HEART RATE: 60 BPM | DIASTOLIC BLOOD PRESSURE: 66 MMHG | OXYGEN SATURATION: 97 % | BODY MASS INDEX: 29.32 KG/M2 | HEIGHT: 65 IN | SYSTOLIC BLOOD PRESSURE: 130 MMHG | WEIGHT: 176 LBS

## 2018-03-16 DIAGNOSIS — K57.92 ACUTE DIVERTICULITIS: ICD-10-CM

## 2018-03-16 DIAGNOSIS — I10 ESSENTIAL HYPERTENSION: Primary | ICD-10-CM

## 2018-03-16 PROCEDURE — 99213 OFFICE O/P EST LOW 20 MIN: CPT | Performed by: INTERNAL MEDICINE

## 2018-03-16 NOTE — PROGRESS NOTES
Assessment/Plan:  He seems to be doing well  He is off of his antibiotics  He is going to see the gastroenterologist for his diverticulitis  His blood pressure is under control he has no cardiac symptoms  Will see him in 3 in 4 months for his regular appointment  He will call before if any problems  No problem-specific Assessment & Plan notes found for this encounter  There are no diagnoses linked to this encounter  Subjective:  Diverticulitis     Patient ID: Marito Doan is a 74 y o  male  HPI he is back for recheck after 4 days  He is feeling much better  The pain is gone  He is off his antibiotics  He has no fever  He is eating well and his appetite has stabilized  The following portions of the patient's history were reviewed and updated as appropriate: allergies, current medications, past family history, past medical history, past social history, past surgical history and problem list     Review of Systems   Constitutional:        He is doing much better  His pain is gone  He has stable hypertension  Has hyperlipidemia  HENT: Negative  Respiratory: Negative  Gastrointestinal:        He no longer has any abdominal pain  He is moving his bowels well  He has no dysuria or hematuria  Genitourinary: Negative  Hematological: Negative  Psychiatric/Behavioral: Negative  Objective:     Physical Exam   Constitutional: No distress  He is overweight  His temperature is 98 4°  His blood pressure is 130/66  HENT:   Head: Normocephalic  Right Ear: External ear normal    Left Ear: External ear normal    Nose: Nose normal    Mouth/Throat: Oropharynx is clear and moist  No oropharyngeal exudate  Eyes: Conjunctivae and EOM are normal  Pupils are equal, round, and reactive to light  Right eye exhibits no discharge  Left eye exhibits no discharge  Neck: No thyromegaly present     Cardiovascular: Normal rate, regular rhythm, normal heart sounds and intact distal pulses  Exam reveals no gallop and no friction rub  No murmur heard  Pulmonary/Chest: Effort normal and breath sounds normal  No respiratory distress  He has no wheezes  He has no rales  Abdominal: Soft  Bowel sounds are normal  He exhibits no distension and no mass  There is no tenderness  There is no rebound and no guarding  Musculoskeletal: Normal range of motion  He exhibits no edema, tenderness or deformity  Neurological: He is alert  He has normal reflexes  No cranial nerve deficit  Coordination normal    Skin: Skin is warm and dry  No rash noted  No erythema  Psychiatric: He has a normal mood and affect  His behavior is normal  Judgment and thought content normal    Nursing note and vitals reviewed

## 2018-03-27 ENCOUNTER — OFFICE VISIT (OUTPATIENT)
Dept: GASTROENTEROLOGY | Facility: CLINIC | Age: 73
End: 2018-03-27
Payer: MEDICARE

## 2018-03-27 VITALS
HEART RATE: 60 BPM | BODY MASS INDEX: 29.29 KG/M2 | DIASTOLIC BLOOD PRESSURE: 70 MMHG | WEIGHT: 176 LBS | SYSTOLIC BLOOD PRESSURE: 130 MMHG

## 2018-03-27 DIAGNOSIS — K57.92 DIVERTICULITIS OF INTESTINE, UNSPECIFIED BLEEDING STATUS, UNSPECIFIED COMPLICATION STATUS, UNSPECIFIED PART OF INTESTINAL TRACT: ICD-10-CM

## 2018-03-27 DIAGNOSIS — K92.1 MELANOTIC STOOLS: ICD-10-CM

## 2018-03-27 DIAGNOSIS — R93.89 ABNORMAL CT SCAN: ICD-10-CM

## 2018-03-27 DIAGNOSIS — R10.13 EPIGASTRIC ABDOMINAL PAIN: Primary | ICD-10-CM

## 2018-03-27 DIAGNOSIS — R10.32 LLQ ABDOMINAL PAIN: ICD-10-CM

## 2018-03-27 PROBLEM — R93.5 ABNORMAL CT OF THE ABDOMEN: Status: ACTIVE | Noted: 2018-03-27

## 2018-03-27 PROCEDURE — 99213 OFFICE O/P EST LOW 20 MIN: CPT | Performed by: INTERNAL MEDICINE

## 2018-03-27 NOTE — PROGRESS NOTES
[facility]          Chief Complaint: Diverticulitis       HPI:  Stephania Meadows is a 74 y o  male who presents here today for evaluation of diverticulitis  He has a history of diverticulosis with recurrent flares of diverticulitis  His last flare was three weeks ago  At that time he did not take Flagyl but did take full course of Cipro  He continues to experience LLQ abdominal discomfort with diarrhea  At times with his diarrhea he has noticed melanotic stools  He does have a history of chronic lower back pain and notices some association of his abdominal pain with his lower back and sciatic pain  He also complaints of upper GI symptoms, for which he is taking Omeprazole on occasion  He denies any nausea, vomiting, dysphagia, and any other s/s at this time  Review of Systems:   Constitutional: No fever or chills, feels well, no tiredness, no recent weight gain or weight loss  HENT: No complaints of earache, no hearing loss, no nosebleeds, no nasal discharge, no sore throat, no hoarseness  Eyes: No complaints of eye pain, no red eyes, no discharge from eyes, no itchy eyes  Cardiovascular: No complaints of slow heart rate, no fast heart rate, no chest pain, no palpitations, no leg claudication, no lower extremity edema  Respiratory: No complaints of shortness of breath, no wheezing, no cough, no SOB on exertion, no orthopnea  Gastrointestinal: As noted in HPI  Genitourinary: No complaints of dysuria, no incontinence, no hesitancy, no nocturia  Musculoskeletal: No complaints of arthralgia, no myalgias, no joint swelling or stiffness, no limb pain or swelling  Complaint of  back pain  Neurological: No complaints of headache, no confusion, no convulsions, no numbness or tingling, no dizziness or fainting, no limb weakness, no difficulty walking  Skin: No complaints of skin rash or skin lesions, no itching, no skin wound, no dry skin      Hematological/Lymphatic: No complaints of swollen glands, does not bleed easy  Allergic/Immunologic: No immunocompromised state  Endocrine:  No complaints of polyuria, no polydipsia  Psychiatric/Behavioral: is not suicidal, no sleep disturbances, no anxiety or depression, no change in personality, no emotional problems  Historical Information   Past Medical History:   Diagnosis Date    Apnea     Diverticulitis     Hypercholesterolemia     Hyperlipidemia     Hypertension     Mitral valve disorder     Thoracic neuritis      Past Surgical History:   Procedure Laterality Date    COLONOSCOPY      Complete    ESOPHAGOGASTRODUODENOSCOPY      Diagnostic    FOOT SURGERY      HERNIA REPAIR      KNEE SURGERY Left     NEUROPLASTY / TRANSPOSITION MEDIAN NERVE AT CARPAL TUNNEL      SHOULDER SURGERY      TARSAL TUNNEL RELEASE      Neuroplasty Decompression     Social History   History   Alcohol Use No     History   Drug Use No     Comment: denied marijuana     History   Smoking Status    Never Smoker   Smokeless Tobacco    Never Used     Comment: Per [employer] : Former Smoker     Family History   Problem Relation Age of Onset    Diabetes Mother      Mellitus    Heart disease Mother      Arteriosclerotic Cardiovascular    Hypertension Mother     Cancer Father     Arthritis Father      Maximu Mult Site W Involv Of Organs And Systems    Thyroid disease Sister     Diabetes Brother      Diabetes:Mellitus    Heart disease Brother     Hypertension Brother     Coronary artery disease Family          Current Medications: has a current medication list which includes the following prescription(s): acetaminophen, amlodipine, aspirin, dicyclomine, esomeprazole, fenofibrate, influenza vac split high-dose, lisinopril, loperamide, lovastatin, and multiple vitamins-minerals         Physical Exam:   Constitutional  General Appearance: No acute distress, well appearing and well nourished  Head  Normocephalic  Eyes  Conjunctivae and lids: No swelling, erythema, or discharge  Pupils and irises: Equal, round and reactive to light  Ears, Nose, Mouth, and Throat  External inspection of ears and nose: Normal  Nasal mucosa, septum and turbinates: Normal without edema or erythema/   Oropharynx: Normal with no erythema, edema, exudate or lesions  Neck  Normal range of motion  Neck supple  Cardiovascular  Auscultation of the heart: Normal rate and rhythm, normal S1 and S2 without murmurs  Examination of the extremities for edema and/or varicosities: Normal  Pulmonary/Chest  Respiratory effort: No increased work of breathing or signs of respiratory distress  Auscultation of lungs: Clear to auscultation, equal breath sounds bilaterally, no wheezes, rales, no rhonchi  Abdomen  Abdomen: Mild mid upper quadrant and epigastric tenderness  No rebound    Liver and spleen: No hepatomegaly or splenomegaly  Musculoskeletal  Gait and station: normal   Digits and Nails: normal without clubbing or cyanosis  Inspection/palpation of joints, bones, and muscles: Normal  Neurological  No nystagmus or asterixis  Skin  Skin and subcutaneous tissue: Normal without rashes or lesions  Lymphatic  Palpation of the lymph nodes in neck: No lymphadenopathy  Psychiatric  Orientation to person, place and time: Normal   Mood and affect: Normal          Labs:   Results Reviewed     None          X-Rays & Procedures:   CT scan done on 03/08/2018 showed colonic diverticulosis  Previously noted splenic flecture diverticulitis has near completely resolved  Fluid-filled jejunal and ileal common loops of bowel could relate to enteritis     ______________________________________________________________________      Assessment & Plan:        Epigastric abdominal pain - plan is to continue Omeprazole   Will schedule EGD    Melanotic stools - plan is to schedule an EGD as above    LLQ abdominal pain - plan is to schedule colonoscopy    Abnormal CT scan showing diverticulitis - plan is to schedule a colonoscopy as above                     Attestation:   By signing my name below, I, EDMUND FELDMAN  [facility], attest that this documentation has been prepared under the direction and in the presence of Kris Ackerman MD  Electronically Signed: EDMUND FELDMAN  [facility], Scribe  03/27/18   By signing my name below, Buffy Cosby, attest that this documentation has been prepared under the direction and in the presence of Kris Ackerman MD  Electronically Signed: Cookie Daigle, Esperanza  03/27/18      I, Kris Ackerman, personally performed the services described in this documentation  All medical record entries made by the scribe were at my direction and in my presence  I have reviewed the chart and discharge instructions and agree that the record reflects my personal performance and is accurate and complete   Kris Ackerman MD  03/27/18

## 2018-04-06 ENCOUNTER — OFFICE VISIT (OUTPATIENT)
Dept: PAIN MEDICINE | Facility: CLINIC | Age: 73
End: 2018-04-06
Payer: MEDICARE

## 2018-04-06 VITALS
HEIGHT: 65 IN | DIASTOLIC BLOOD PRESSURE: 64 MMHG | BODY MASS INDEX: 29.32 KG/M2 | WEIGHT: 176 LBS | SYSTOLIC BLOOD PRESSURE: 118 MMHG

## 2018-04-06 DIAGNOSIS — M54.16 LUMBAR RADICULOPATHY: Primary | ICD-10-CM

## 2018-04-06 DIAGNOSIS — G89.4 CHRONIC PAIN SYNDROME: ICD-10-CM

## 2018-04-06 DIAGNOSIS — M54.41 CHRONIC BILATERAL LOW BACK PAIN WITH BILATERAL SCIATICA: ICD-10-CM

## 2018-04-06 DIAGNOSIS — M54.42 CHRONIC BILATERAL LOW BACK PAIN WITH BILATERAL SCIATICA: ICD-10-CM

## 2018-04-06 DIAGNOSIS — M48.062 LUMBAR STENOSIS WITH NEUROGENIC CLAUDICATION: ICD-10-CM

## 2018-04-06 DIAGNOSIS — G89.29 CHRONIC BILATERAL LOW BACK PAIN WITH BILATERAL SCIATICA: ICD-10-CM

## 2018-04-06 DIAGNOSIS — M51.36 DISC DEGENERATION, LUMBAR: ICD-10-CM

## 2018-04-06 PROCEDURE — 99213 OFFICE O/P EST LOW 20 MIN: CPT | Performed by: ANESTHESIOLOGY

## 2018-04-06 NOTE — PROGRESS NOTES
Assessment:  1  Lumbar radiculopathy     2  Disc degeneration, lumbar     3  Chronic bilateral low back pain with bilateral sciatica     4  Lumbar stenosis with neurogenic claudication     5  Chronic pain syndrome       Plan:  Patient is a 74 y o male who presents today for follow up management of low back pain  Patient had a LESI back 8 weeks ago  He states that it helps and that he is moving a lot better  Pain has subsided  He does have back pain and spasms aggravated with lumbar spine extension  I advised use of back brace, topical creams and possibly a repeat RFA  Patient declines at this time  I will give him a sheet of exercises he can do at home  I advised him that if his pain gets worse, he will benefit from a repeat LESI versus RFA  He verbalized understanding  Patient will follow up p r n  My impressions and treatment recommendations were discussed in detail with the patient who verbalized understanding and had no further questions  Discharge instructions were provided  I personally saw and examined the patient and I agree with the above discussed plan of care  History of Present Illness:  Spenser Fournier is a 74 y o  male who presents for a follow up office visit in regards to Back Pain  The patients current symptoms include achiness in the low back  He had a LESI back 1/30/18  He states that the procedure helped about 30%  He states that he is able to do a lot more than before  Pain is rated 7/10  He states that he has diverticulitis and is seeing GI  Patient takes acetaminophen prn for pain  I have personally reviewed and/or updated the patient's past medical history, past surgical history, family history, social history, current medications, allergies, and vital signs today  Review of Systems   Respiratory: Negative for shortness of breath  Cardiovascular: Negative for chest pain     Gastrointestinal: Negative for constipation, diarrhea, nausea and vomiting  Musculoskeletal: Negative for arthralgias, gait problem, joint swelling and myalgias  Skin: Negative for rash  Neurological: Negative for dizziness, seizures and weakness  All other systems reviewed and are negative        Patient Active Problem List   Diagnosis    Chest pain    Essential hypertension    Mixed hyperlipidemia    Chronic pain disorder    Chronic left shoulder pain    Chronic bilateral low back pain with bilateral sciatica    Disc degeneration, lumbar    Lumbar stenosis with neurogenic claudication    Left lower quadrant pain    Abnormal CT of the abdomen       Past Medical History:   Diagnosis Date    Apnea     Diverticulitis     Hypercholesterolemia     Hyperlipidemia     Hypertension     Mitral valve disorder     Thoracic neuritis        Past Surgical History:   Procedure Laterality Date    COLONOSCOPY      Complete    ESOPHAGOGASTRODUODENOSCOPY      Diagnostic    FOOT SURGERY      HERNIA REPAIR      KNEE SURGERY Left     NEUROPLASTY / TRANSPOSITION MEDIAN NERVE AT CARPAL TUNNEL      SHOULDER SURGERY      TARSAL TUNNEL RELEASE      Neuroplasty Decompression       Family History   Problem Relation Age of Onset    Diabetes Mother      Mellitus    Heart disease Mother      Arteriosclerotic Cardiovascular    Hypertension Mother     Cancer Father     Arthritis Father      Rheu Mult Site W Involv Of Organs And Systems    Thyroid disease Sister     Diabetes Brother      Diabetes:Mellitus    Heart disease Brother     Hypertension Brother     Coronary artery disease Family        Social History     Occupational History    retired      Social History Main Topics    Smoking status: Never Smoker    Smokeless tobacco: Never Used      Comment: Per [employer] : Former Smoker    Alcohol use No    Drug use: No      Comment: denied marijuana    Sexual activity: Not Currently     Partners: Female       Current Outpatient Prescriptions on File Prior to Visit Medication Sig    acetaminophen (TYLENOL) 500 mg tablet Take 2 tablets by mouth daily as needed    amLODIPine (NORVASC) 5 mg tablet Take 1 tablet (5 mg total) by mouth every morning    aspirin (ASPIRIN LOW DOSE) 81 MG tablet Take 1 tablet by mouth daily    dicyclomine (BENTYL) 20 mg tablet Take 1 tablet (20 mg total) by mouth every 6 (six) hours As needed for abdominal pain    esomeprazole (NexIUM) 20 mg capsule Take 1 capsule by mouth daily    fenofibrate (TRICOR) 145 mg tablet Take 145 mg by mouth daily    Influenza Vac Split High-Dose 0 5 ML BRII     lisinopril (ZESTRIL) 40 mg tablet TAKE ONE TABLET BY MOUTH ONE TIME DAILY     loperamide (IMODIUM A-D) 2 MG tablet Take 1 tablet by mouth daily as needed    lovastatin (MEVACOR) 40 MG tablet Take 40 mg by mouth daily    Multiple Vitamins-Minerals (CENTRUM ULTRA MENS PO) Take 1 tablet by mouth daily     No current facility-administered medications on file prior to visit  Allergies   Allergen Reactions    Iodides     Seasonal Ic  [Cholestatin]        Physical Exam:    Ht 5' 5" (1 651 m)   Wt 79 8 kg (176 lb)   BMI 29 29 kg/m²     Constitutional:normal, well developed, well nourished, alert, in no distress and non-toxic and no overt pain behavior    Eyes:anicteric  HEENT:grossly intact  Neck:supple, symmetric, trachea midline and no masses   Pulmonary:even and unlabored  Cardiovascular:No edema or pitting edema present  Skin:Normal without rashes or lesions and well hydrated  Psychiatric:Mood and affect appropriate  Neurologic:Cranial Nerves II-XII grossly intact  Musculoskeletal:normal    Lumbar Spine Exam    Appearance:  Normal lordosis  Palpation/Tenderness:  left lumbar paraspinal tenderness  right lumbar paraspinal tenderness  Sensory:  no sensory deficits noted  Range of Motion:  Extension:  Moderately limited  with pain  Motor Strength:  Left foot dorsiflexion:  5/5  Left foot plantar flexion:  5/5  Right foot dorsiflexion:  4/5  Right foot plantar flexion:  5/5  Reflexes:  Left Patellar:  2+   Right Patellar:  2+     Imaging

## 2018-04-10 ENCOUNTER — ANESTHESIA EVENT (OUTPATIENT)
Dept: PERIOP | Facility: HOSPITAL | Age: 73
End: 2018-04-10
Payer: MEDICARE

## 2018-04-10 ENCOUNTER — ANESTHESIA (OUTPATIENT)
Dept: PERIOP | Facility: HOSPITAL | Age: 73
End: 2018-04-10
Payer: MEDICARE

## 2018-04-10 ENCOUNTER — TELEPHONE (OUTPATIENT)
Dept: OTHER | Facility: HOSPITAL | Age: 73
End: 2018-04-10

## 2018-04-10 ENCOUNTER — TELEPHONE (OUTPATIENT)
Dept: GASTROENTEROLOGY | Facility: CLINIC | Age: 73
End: 2018-04-10

## 2018-04-10 ENCOUNTER — HOSPITAL ENCOUNTER (OUTPATIENT)
Facility: HOSPITAL | Age: 73
Setting detail: OUTPATIENT SURGERY
Discharge: HOME | End: 2018-04-10
Attending: INTERNAL MEDICINE | Admitting: INTERNAL MEDICINE
Payer: MEDICARE

## 2018-04-10 VITALS
WEIGHT: 170.4 LBS | TEMPERATURE: 98 F | DIASTOLIC BLOOD PRESSURE: 71 MMHG | BODY MASS INDEX: 29.09 KG/M2 | HEART RATE: 49 BPM | OXYGEN SATURATION: 100 % | SYSTOLIC BLOOD PRESSURE: 154 MMHG | RESPIRATION RATE: 20 BRPM | HEIGHT: 64 IN

## 2018-04-10 DIAGNOSIS — B37.81 ESOPHAGEAL MONILIASIS (HCC): ICD-10-CM

## 2018-04-10 DIAGNOSIS — R93.5 ABNORMAL CT OF THE ABDOMEN: ICD-10-CM

## 2018-04-10 DIAGNOSIS — R10.32 LEFT LOWER QUADRANT PAIN: ICD-10-CM

## 2018-04-10 DIAGNOSIS — B37.81 ESOPHAGEAL MONILIASIS (HCC): Primary | ICD-10-CM

## 2018-04-10 PROCEDURE — 88312 SPECIAL STAINS GROUP 1: CPT | Performed by: PATHOLOGY

## 2018-04-10 PROCEDURE — 88342 IMHCHEM/IMCYTCHM 1ST ANTB: CPT | Performed by: PATHOLOGY

## 2018-04-10 PROCEDURE — 88305 TISSUE EXAM BY PATHOLOGIST: CPT | Performed by: PATHOLOGY

## 2018-04-10 PROCEDURE — 43239 EGD BIOPSY SINGLE/MULTIPLE: CPT | Performed by: INTERNAL MEDICINE

## 2018-04-10 PROCEDURE — 88112 CYTOPATH CELL ENHANCE TECH: CPT | Performed by: PATHOLOGY

## 2018-04-10 PROCEDURE — 45378 DIAGNOSTIC COLONOSCOPY: CPT | Performed by: INTERNAL MEDICINE

## 2018-04-10 RX ORDER — PROPOFOL 10 MG/ML
INJECTION, EMULSION INTRAVENOUS AS NEEDED
Status: DISCONTINUED | OUTPATIENT
Start: 2018-04-10 | End: 2018-04-10 | Stop reason: SURG

## 2018-04-10 RX ORDER — SODIUM CHLORIDE, SODIUM LACTATE, POTASSIUM CHLORIDE, CALCIUM CHLORIDE 600; 310; 30; 20 MG/100ML; MG/100ML; MG/100ML; MG/100ML
100 INJECTION, SOLUTION INTRAVENOUS CONTINUOUS
Status: DISCONTINUED | OUTPATIENT
Start: 2018-04-10 | End: 2018-04-10 | Stop reason: HOSPADM

## 2018-04-10 RX ORDER — LIDOCAINE HYDROCHLORIDE 10 MG/ML
INJECTION, SOLUTION INFILTRATION; PERINEURAL AS NEEDED
Status: DISCONTINUED | OUTPATIENT
Start: 2018-04-10 | End: 2018-04-10 | Stop reason: SURG

## 2018-04-10 RX ADMIN — PROPOFOL 30 MG: 10 INJECTION, EMULSION INTRAVENOUS at 08:23

## 2018-04-10 RX ADMIN — LIDOCAINE HYDROCHLORIDE 50 MG: 10 INJECTION, SOLUTION INFILTRATION; PERINEURAL at 08:10

## 2018-04-10 RX ADMIN — SODIUM CHLORIDE, SODIUM LACTATE, POTASSIUM CHLORIDE, AND CALCIUM CHLORIDE: .6; .31; .03; .02 INJECTION, SOLUTION INTRAVENOUS at 08:00

## 2018-04-10 RX ADMIN — PROPOFOL 20 MG: 10 INJECTION, EMULSION INTRAVENOUS at 08:18

## 2018-04-10 RX ADMIN — PROPOFOL 150 MG: 10 INJECTION, EMULSION INTRAVENOUS at 08:10

## 2018-04-10 RX ADMIN — PROPOFOL 20 MG: 10 INJECTION, EMULSION INTRAVENOUS at 08:13

## 2018-04-10 NOTE — TELEPHONE ENCOUNTER
ptn is saying Dr Edenilson Blount gave him direction after his colonoscopy to take nystatin (MYCOSTATIN) 100,000 units/mL suspension for 2 weeks but the pharmacy said the script was only for 3 day   He wants to know whats correct and if he should be given more

## 2018-04-10 NOTE — ANESTHESIA PREPROCEDURE EVALUATION
Review of Systems/Medical History  Patient summary reviewed  Chart reviewed  No history of anesthetic complications     Cardiovascular  EKG reviewed, Exercise tolerance: good,  Hyperlipidemia, Hypertension , No CAD, , No CHF ,    Pulmonary  Not a smoker , No asthma: , No recent URI , Sleep apnea ,        GI/Hepatic  Negative GI/hepatic ROS               Endo/Other  Negative endo/other ROS      GYN       Hematology   Musculoskeletal    Arthritis     Neurology   Psychology       Patient: Daquan Median  MR number: [id number]  Account number: [de-identified]  : 1945  Age: 66 years  Gender: Male  Status: Inpatient  Location: Bedside  Height: 64 in  Weight: 214 1 lb  BP: 162/ 68 mmHg     Indications: Evaluate for coronary artery disease      Diagnoses: I25 10 - Atherosclerotic heart disease of native coronary artery without angina pectoris     Sonographer:   Alexander Brothers RDCS  Interpreting Physician:  Nawaf Fallon MD  Primary Physician:  Moreno Martin PA-C  Group:  [facility]     SUMMARY     LEFT VENTRICLE:  Systolic function was normal  Ejection fraction was estimated to be 55 %  There were no regional wall motion abnormalities  Doppler parameters were consistent with abnormal left ventricular relaxation (grade 1 diastolic dysfunction)      MITRAL VALVE:  There was mild regurgitation      AORTIC VALVE:  There was mild regurgitation      TRICUSPID VALVE:  There was mild regurgitation      PULMONIC VALVE:  There was mild regurgitation      HISTORY: Symptoms: chest pain  PRIOR HISTORY: Risk factors: hypertension and hypercholesterolemia  Physical Exam    Airway    Mallampati score: II  TM Distance: >3 FB       Dental   upper dentures,     Cardiovascular      Pulmonary  Breath sounds clear to auscultation,     Other Findings        Anesthesia Plan  ASA Score- 2     Anesthesia Type- IV sedation with anesthesia with ASA Monitors     Additional Monitors:   Airway Plan: Comment: ANNIE Gil , have personally seen and evaluated the patient prior to anesthetic care  I have reviewed the pre-anesthetic record, and other medical records if appropriate to the anesthetic care  If a CRNA is involved in the case, I have reviewed the CRNA assessment, if present, and agree  Risks/benefits and alternatives discussed with patient including possible PONV, sore throat, and possibility of rare anesthetic and surgical emergencies        Plan Factors-  Patient did not smoke on day of surgery  Induction-     Postoperative Plan-     Informed Consent- Anesthetic plan and risks discussed with patient  I personally reviewed this patient with the CRNA  Discussed and agreed on the Anesthesia Plan with the BALAJI Villalba

## 2018-04-10 NOTE — OP NOTE
**** GI/ENDOSCOPY REPORT ****     PATIENT NAME: Rhiannon Dailey - VISIT ID:  Patient ID: [id number]   YOB: 1945     INTRODUCTION: Esophagogastroduodenoscopy - A 74 male patient presents for   an outpatient Esophagogastroduodenoscopy at [address] [place] [address]  INDICATIONS: Pain located in the epigastrium  CONSENT: The benefits, risks, and alternatives to the procedure were   discussed and informed consent was obtained from the patient  PREPARATION:  EKG, pulse, pulse oximetry and blood pressure were monitored   throughout the procedure  MEDICATIONS:asa 2     PROCEDURE:  The endoscope was passed without difficulty through the mouth   under direct visualization and advanced to the 3rd portion of the   duodenum  The scope was withdrawn and the mucosa was carefully examined  FINDINGS:   Esophagus: Mild candidal esophagitis was found in the proximal   third of the esophagus and middle third of the esophagus  Tissue sampling   for cytology was performed in the middle third of the esophagus  The Z   line was visualized at 40 cm from the entry site  Stomach: The antrum,   body of the stomach, cardia, fundus, incisura, and pylorus appeared to be   normal  A biopsy was taken from the antrum, body of the stomach, and   fundus  Duodenum: The duodenal bulb, 2nd portion of the duodenum, and 3rd   portion of the duodenum appeared to be normal      COMPLICATIONS: There were no complications  IMPRESSIONS: Mild esophagitis seen in the proximal third of the esophagus   and middle third of the esophagus  Tissue sampling for cytology was   performed  Z line visualized  Normal antrum, body of the stomach, cardia,   fundus, incisura, and pylorus  Biopsy taken  Normal duodenal bulb, 2nd   portion of the duodenum, and 3rd portion of the duodenum  RECOMMENDATIONS: Follow-up on the results of the biopsy specimens in 1   week  Begin medication as prescribed   Begin taking Nystatin 600,000 U po   qid x 2 weeks  ESTIMATED BLOOD LOSS: Insignificant  PATHOLOGY SPECIMENS: Cytology performed in the middle third of the   esophagus  Random biopsy taken from the antrum, body of the stomach, and   fundus  PROCEDURE CODES: 17815 - EGD flexible; with biopsy     ICD-9 Codes: 789 06 Abdominal pain, epigastric 112 84 Candidal esophagitis     ICD-10 Codes: R10 13 Epigastric pain K20 9 Esophagitis, unspecified     PERFORMED BY: ANNIE Rush  [employer]  on 04/10/2018  Version 1, electronically signed by ANNIE Johnson , D O  on   04/10/2018 at 08:19

## 2018-04-10 NOTE — OP NOTE
**** GI/ENDOSCOPY REPORT ****     PATIENT NAME: Chely Gates ------ VISIT ID:  Patient ID:   [id number] YOB: 1945     INTRODUCTION: Colonoscopy - A 74 male patient presents for an outpatient   Colonoscopy at [facility]  PREVIOUS COLONOSCOPY: This colonoscopy is being performed for diagnostic   indication     INDICATIONS: Pain centered in the left a lower quadrant of the abdomen  History of diverticulitis  CONSENT:  The benefits, risks, and alternatives to the procedure were   discussed and informed consent was obtained from the patient  PREPARATION: EKG, pulse, pulse oximetry and blood pressure were monitored   throughout the procedure  The patient was identified by myself both   verbally and by visual inspection of ID band  Airway Assessment   Classification: Airway class 2 - Visualization of the soft palate, fauces   and uvula  ASA Classification: Class 2 - Patient has mild to moderate   systemic disturbance that may or may not be related to the disorder   requiring surgery  MEDICATIONS: Anesthesia-check records     PROCEDURE:  The endoscope was passed without difficulty through the anus   under direct visualization and advanced to the cecum, confirmed by   appendiceal orifice and ileocecal valve  The scope was withdrawn and the   mucosa was carefully examined  The quality of the preparation was   excellent  Cecal Intubation Time: 3 minutes(s) Scope Withdrawal Time: 3   minutes(s)     RECTAL EXAM: Normal rectal exam      FINDINGS:  There was evidence of moderately severe diverticulosis in the   sigmoid colon  No evidence of current diverticulitis  Normal   retroversion  Otherwise, the colon appeared to be normal      COMPLICATIONS: There were no complications  IMPRESSIONS: Moderately severe diverticulosis found in the sigmoid colon  RECOMMENDATIONS: Colonoscopy recommended in 10 years   Yearly FIT test with   PCP Every 3 year Cologuard test with PCP     ESTIMATED BLOOD LOSS: None  PATHOLOGY SPECIMENS: No     PROCEDURE CODES: Colonoscopy     ICD-9 Codes: 789 04 Abdominal pain, left lower quadrant 562 10   Diverticulosis of colon (without mention of hemorrhage)     ICD-10 Codes: R10 32 Left lower quadrant pain K57 Diverticular disease of   intestine     PERFORMED BY: ANNIE Sibley  on 04/10/2018  Version 1, electronically signed by ANNIE Fowler , D O  on   04/10/2018 at 08:30

## 2018-04-10 NOTE — ANESTHESIA POSTPROCEDURE EVALUATION
Post-Op Assessment Note      CV Status:  Stable    Mental Status:  Alert    Hydration Status:  Stable    PONV Controlled:  None    Airway Patency:  Patent and adequate    Post Op Vitals Reviewed: Yes          Staff: Anesthesiologist, CRNA           BP   102/54   Temp      Pulse  58   Resp   16   SpO2   99%

## 2018-04-10 NOTE — DISCHARGE INSTRUCTIONS
Abdominal Pain   WHAT YOU NEED TO KNOW:   Abdominal pain can be dull, achy, or sharp  You may have pain in one area of your abdomen, or in your entire abdomen  Your pain may be caused by a condition such as constipation, food sensitivity or poisoning, infection, or a blockage  Abdominal pain can also be from a hernia, appendicitis, or an ulcer  Liver, gallbladder, or kidney conditions can also cause abdominal pain  The cause of your abdominal pain may be unknown  DISCHARGE INSTRUCTIONS:   Return to the emergency department if:   · You have new chest pain or shortness of breath  · You have pulsing pain in your upper abdomen or lower back that suddenly becomes constant  · Your pain is in the right lower abdominal area and worsens with movement  · You have a fever over 100 4°F (38°C) or shaking chills  · You are vomiting and cannot keep food or liquids down  · Your pain does not improve or gets worse over the next 8 to 12 hours  · You see blood in your vomit or bowel movements, or they look black and tarry  · Your skin or the whites of your eyes turn yellow  · You are a woman and have a large amount of vaginal bleeding that is not your monthly period  Contact your healthcare provider if:   · You have pain in your lower back  · You are a man and have pain in your testicles  · You have pain when you urinate  · You have questions or concerns about your condition or care  Follow up with your healthcare provider within 24 hours or as directed:  Write down your questions so you remember to ask them during your visits  Medicines:   · Medicines  may be given to calm your stomach and prevent vomiting or to decrease pain  Ask how to take pain medicine safely  · Take your medicine as directed  Contact your healthcare provider if you think your medicine is not helping or if you have side effects  Tell him of her if you are allergic to any medicine   Keep a list of the medicines, vitamins, and herbs you take  Include the amounts, and when and why you take them  Bring the list or the pill bottles to follow-up visits  Carry your medicine list with you in case of an emergency  © 2017 [employer] Information is for End User's use only and may not be sold, redistributed or otherwise used for commercial purposes  All illustrations and images included in CareNotes® are the copyrighted property of [employer]  or [employer]  The above information is an  only  It is not intended as medical advice for individual conditions or treatments  Talk to your doctor, nurse or pharmacist before following any medical regimen to see if it is safe and effective for you

## 2018-04-19 ENCOUNTER — TELEPHONE (OUTPATIENT)
Dept: GASTROENTEROLOGY | Facility: CLINIC | Age: 73
End: 2018-04-19

## 2018-04-30 ENCOUNTER — TELEPHONE (OUTPATIENT)
Dept: GASTROENTEROLOGY | Facility: CLINIC | Age: 73
End: 2018-04-30

## 2018-04-30 DIAGNOSIS — B37.81 ESOPHAGEAL MONILIASIS (HCC): Primary | ICD-10-CM

## 2018-05-01 ENCOUNTER — TELEPHONE (OUTPATIENT)
Dept: GASTROENTEROLOGY | Facility: CLINIC | Age: 73
End: 2018-05-01

## 2018-05-01 DIAGNOSIS — B37.81 ESOPHAGEAL MONILIASIS (HCC): ICD-10-CM

## 2018-05-01 DIAGNOSIS — E78.2 MIXED HYPERLIPIDEMIA: Primary | ICD-10-CM

## 2018-05-01 RX ORDER — LOVASTATIN 40 MG/1
TABLET ORAL
Qty: 90 TABLET | Refills: 2 | Status: SHIPPED | OUTPATIENT
Start: 2018-05-01 | End: 2019-02-06 | Stop reason: SDUPTHER

## 2018-05-01 NOTE — TELEPHONE ENCOUNTER
ptn is saying his Nystatin should be sent over for 280ML it was sent for 60 ML   Please resend at correct amount

## 2018-05-03 ENCOUNTER — HOSPITAL ENCOUNTER (EMERGENCY)
Facility: HOSPITAL | Age: 73
Discharge: HOME | End: 2018-05-03
Attending: EMERGENCY MEDICINE
Payer: MEDICARE

## 2018-05-03 VITALS
SYSTOLIC BLOOD PRESSURE: 166 MMHG | OXYGEN SATURATION: 96 % | TEMPERATURE: 98.6 F | DIASTOLIC BLOOD PRESSURE: 72 MMHG | HEART RATE: 61 BPM | RESPIRATION RATE: 18 BRPM

## 2018-05-03 DIAGNOSIS — S61.451A CAT BITE OF RIGHT HAND, INITIAL ENCOUNTER: ICD-10-CM

## 2018-05-03 DIAGNOSIS — S61.452A CAT BITE OF LEFT HAND, INITIAL ENCOUNTER: Primary | ICD-10-CM
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
## 2018-05-03 PROCEDURE — 90715 TDAP VACCINE 7 YRS/> IM: CPT | Performed by: PHYSICIAN ASSISTANT

## 2018-05-03 PROCEDURE — 99283 EMERGENCY DEPT VISIT LOW MDM: CPT

## 2018-05-03 PROCEDURE — 90471 IMMUNIZATION ADMIN: CPT

## 2018-05-03 RX ORDER — AMOXICILLIN AND CLAVULANATE POTASSIUM 875; 125 MG/1; MG/1
1 TABLET, FILM COATED ORAL ONCE
Status: COMPLETED | OUTPATIENT
Start: 2018-05-03 | End: 2018-05-03

## 2018-05-03 RX ORDER — AMOXICILLIN AND CLAVULANATE POTASSIUM 875; 125 MG/1; MG/1
1 TABLET, FILM COATED ORAL EVERY 12 HOURS SCHEDULED
Qty: 20 TABLET | Refills: 0 | Status: SHIPPED | OUTPATIENT
Start: 2018-05-03 | End: 2018-05-13

## 2018-05-03 RX ADMIN — TETANUS TOXOID, REDUCED DIPHTHERIA TOXOID AND ACELLULAR PERTUSSIS VACCINE, ADSORBED 0.5 ML: 5; 2.5; 8; 8; 2.5 SUSPENSION INTRAMUSCULAR at 20:02

## 2018-05-03 RX ADMIN — AMOXICILLIN AND CLAVULANATE POTASSIUM 1 TABLET: 875; 125 TABLET, FILM COATED ORAL at 20:02

## 2018-05-03 NOTE — ED PROVIDER NOTES
History  Chief Complaint   Patient presents with    Cat Bite     pt post cat bite on b/l hands  cat was adopted last summer not up to date on shots  incident occured about 1 hour ago      Janette Licea is a 74 y o  male w PMH diverticulitis, HTN, HLD who presents for evaluation of cat bite  Patient that by his own cat tonight  He was trying to coax a cat out from underneath the porch after got out of the house  Unfortunately the cat bit him in multiple locations on bilateral hands  He does not remember the date of his last tetanus shot  The shots are not up-to-date for the cat but the cat is owned by him and his wife that he is able to watch for the next 7 days  No abnormal behaviour of cat  Prior to Admission Medications   Prescriptions Last Dose Informant Patient Reported? Taking?    Influenza Vac Split High-Dose 0 5 ML BRII  Self Yes No   Multiple Vitamins-Minerals (CENTRUM ULTRA MENS PO)  Self Yes No   Sig: Take 1 tablet by mouth daily   acetaminophen (TYLENOL) 500 mg tablet  Self Yes No   Sig: Take 2 tablets by mouth daily as needed   amLODIPine (NORVASC) 5 mg tablet  Self No No   Sig: Take 1 tablet (5 mg total) by mouth every morning   aspirin (ASPIRIN LOW DOSE) 81 MG tablet  Self Yes No   Sig: Take 1 tablet by mouth daily   dicyclomine (BENTYL) 20 mg tablet  Self No No   Sig: Take 1 tablet (20 mg total) by mouth every 6 (six) hours As needed for abdominal pain   esomeprazole (NexIUM) 20 mg capsule  Self Yes No   Sig: Take 1 capsule by mouth daily   fenofibrate (TRICOR) 145 mg tablet  Self Yes No   Sig: Take 145 mg by mouth daily   lisinopril (ZESTRIL) 40 mg tablet  Self No No   Sig: TAKE ONE TABLET BY MOUTH ONE TIME DAILY    loperamide (IMODIUM A-D) 2 MG tablet  Self Yes No   Sig: Take 1 tablet by mouth daily as needed   lovastatin (MEVACOR) 40 MG tablet   No No   Sig: Take one tablet by mouth one time daily   nystatin (MYCOSTATIN) 100,000 units/mL suspension   No No   Sig: Apply 5 mL (500,000 Units total) to the mouth or throat 4 (four) times a day   nystatin (MYCOSTATIN) 100,000 units/mL suspension   No No   Sig: Take 5 mL (500,000 Units total) by mouth 4 (four) times a day      Facility-Administered Medications: None       Past Medical History:   Diagnosis Date    Apnea     Apnea     Diverticulitis     Diverticulitis     Hypercholesterolemia     Hyperlipidemia     Hypertension     Mitral valve disorder     Mitral valve disorder     Thoracic neuritis     Thoracic neuritis        Past Surgical History:   Procedure Laterality Date    COLONOSCOPY      Complete    ESOPHAGOGASTRODUODENOSCOPY      Diagnostic    FOOT SURGERY      HERNIA REPAIR      KNEE SURGERY Left     NEUROPLASTY / TRANSPOSITION MEDIAN NERVE AT CARPAL TUNNEL      [place] COLONOSCOPY FLX DX W/COLLJ SPEC WHEN PFRMD N/A 4/10/2018    Procedure: EGD AND COLONOSCOPY;  Surgeon: Bebe Aguilar MD;  Location: [facility]; Service: Gastroenterology    SHOULDER SURGERY      TARSAL TUNNEL RELEASE      Neuroplasty Decompression       Family History   Problem Relation Age of Onset    Diabetes Mother      Mellitus    Heart disease Mother      Arteriosclerotic Cardiovascular    Hypertension Mother     Cancer Father     Arthritis Father      Rheu Mult Site W Involv Of Organs And Systems    Thyroid disease Sister     Diabetes Brother      Diabetes:Mellitus    Heart disease Brother     Hypertension Brother     Coronary artery disease Family      I have reviewed and agree with the history as documented  Social History   Substance Use Topics    Smoking status: Former Smoker     Quit date: 1978    Smokeless tobacco: Never Used      Comment: Per [employer] : Former Smoker    Alcohol use No        Review of Systems   Constitutional: Negative for activity change, chills, diaphoresis, fatigue and fever  HENT: Negative for congestion and rhinorrhea  Eyes: Negative for pain     Respiratory: Negative for cough, chest tightness, shortness of breath and wheezing  Cardiovascular: Negative for chest pain and palpitations  Gastrointestinal: Negative for abdominal distention, constipation, diarrhea, nausea and vomiting  Genitourinary: Negative for difficulty urinating and dysuria  Musculoskeletal: Negative for arthralgias and myalgias  Skin: Positive for wound  Neurological: Negative for dizziness, weakness, light-headedness and headaches  Psychiatric/Behavioral: The patient is not nervous/anxious  Physical Exam  ED Triage Vitals   Temperature Pulse Respirations Blood Pressure SpO2   05/03/18 1930 05/03/18 1928 05/03/18 1928 05/03/18 1928 05/03/18 1928   98 6 °F (37 °C) 61 18 166/72 96 %      Temp Source Heart Rate Source Patient Position - Orthostatic VS BP Location FiO2 (%)   05/03/18 1930 05/03/18 1928 05/03/18 1928 05/03/18 1928 --   Oral Monitor Sitting Right arm       Pain Score       05/03/18 1928       3           Orthostatic Vital Signs  Vitals:    05/03/18 1928   BP: 166/72   Pulse: 61   Patient Position - Orthostatic VS: Sitting       Physical Exam   Constitutional: He is oriented to person, place, and time  He appears well-developed and well-nourished  No distress  HENT:   Head: Normocephalic and atraumatic  Eyes: Pupils are equal, round, and reactive to light  Neck: Neck supple  No tracheal deviation present  Cardiovascular: Normal rate, regular rhythm and intact distal pulses  Exam reveals no gallop and no friction rub  No murmur heard  Pulmonary/Chest: Effort normal and breath sounds normal  No respiratory distress  He has no wheezes  He has no rales  Abdominal: Soft  Bowel sounds are normal  He exhibits no distension and no mass  There is no tenderness  There is no guarding  Musculoskeletal: He exhibits no edema or deformity  Neurological: He is alert and oriented to person, place, and time  Skin: Skin is warm and dry  He is not diaphoretic     Multiple puncture wounds and scratch wounds to bilateral hands, mostly on the dorsal surfaces, some extend up to the distal forearms   Psychiatric: He has a normal mood and affect  His behavior is normal    Nursing note and vitals reviewed  ED Medications  Medications   tetanus-diphtheria-acellular pertussis (BOOSTRIX) IM injection 0 5 mL (0 5 mL Intramuscular Given 5/3/18 2002)   amoxicillin-clavulanate (AUGMENTIN) 875-125 mg per tablet 1 tablet (1 tablet Oral Given 5/3/18 2002)       Diagnostic Studies  Results Reviewed     None                 No orders to display              Procedures  Procedures       Phone Contacts  ED Phone Contact    ED Course                               MDM  Number of Diagnoses or Management Options  Cat bite of left hand, initial encounter:   Cat bite of right hand, initial encounter:   Diagnosis management comments: DDX includes but not ltd to:   Multiple cat bites  Updated tetanus  The wounds were cleansed with hydrogen peroxide and sterile saline  He is appropriate for discharge home on antibiotics  Return if any signs of infection which were discussed with him and his wife  Needs to return if there is any suspicion for abnormal animal behavior  If so will require rabies vaccine but does not at this time as animal can be quarantined  Return parameters discussed  Pt requires f/u as an outpt  Pt expresses understanding w above treatment plan  All questions answered prior to d/c  Portions of the record may have been created with voice recognition software   Occasional wrong word or "sound a like" substitutions may have occurred due to the inherent limitations of voice recognition software   Read the chart carefully and recognize, using context, where substitutions have occurred        CritCare Time    Disposition  Final diagnoses:   Cat bite of left hand, initial encounter   Cat bite of right hand, initial encounter     Time reflects when diagnosis was documented in both MDM as applicable and the Disposition within this note     Time User Action Codes Description Comment    5/3/2018  7:46 PM Floydene Marii Add Enrique Brown Cat bite of left hand, initial encounter     5/3/2018  7:47 PM Floydene Marii Add Enrique Anguianoner Cat bite of right hand, initial encounter       ED Disposition     ED Disposition Condition Comment    Discharge  [address] discharge to home/self care      Condition at discharge: Good        Follow-up Information     Follow up With Specialties Details Why Contact Info Additional Information    [facility] Emergency Department Emergency Medicine  If symptoms worsen [address] [address]  [phone] [place] [facility], [address] [facility], [facility], [address]        Discharge Medication List as of 5/3/2018  7:48 PM      START taking these medications    Details   amoxicillin-clavulanate (AUGMENTIN) 875-125 mg per tablet Take 1 tablet by mouth every 12 (twelve) hours for 10 days, Starting Thu 5/3/2018, Until Sun 5/13/2018, Print         CONTINUE these medications which have NOT CHANGED    Details   acetaminophen (TYLENOL) 500 mg tablet Take 2 tablets by mouth daily as needed, Historical Med      amLODIPine (NORVASC) 5 mg tablet Take 1 tablet (5 mg total) by mouth every morning, Starting Mon 3/12/2018, Normal      aspirin (ASPIRIN LOW DOSE) 81 MG tablet Take 1 tablet by mouth daily, Historical Med      dicyclomine (BENTYL) 20 mg tablet Take 1 tablet (20 mg total) by mouth every 6 (six) hours As needed for abdominal pain, Starting Thu 3/1/2018, Normal      esomeprazole (NexIUM) 20 mg capsule Take 1 capsule by mouth daily, Historical Med      fenofibrate (TRICOR) 145 mg tablet Take 145 mg by mouth daily, Starting Thu 11/12/2015, Historical Med      Influenza Vac Split High-Dose 0 5 ML BRII Starting Thu 10/1/2015, Historical Med      lisinopril (ZESTRIL) 40 mg tablet TAKE ONE TABLET BY MOUTH ONE TIME DAILY , Normal      loperamide (IMODIUM A-D) 2 MG tablet Take 1 tablet by mouth daily as needed, Historical Med      lovastatin (MEVACOR) 40 MG tablet Take one tablet by mouth one time daily, Normal      Multiple Vitamins-Minerals (CENTRUM ULTRA MENS PO) Take 1 tablet by mouth daily, Historical Med      !! nystatin (MYCOSTATIN) 100,000 units/mL suspension Apply 5 mL (500,000 Units total) to the mouth or throat 4 (four) times a day, Starting Tue 5/1/2018, Normal      !! nystatin (MYCOSTATIN) 100,000 units/mL suspension Take 5 mL (500,000 Units total) by mouth 4 (four) times a day, Starting Tue 5/1/2018, Normal       !! - Potential duplicate medications found  Please discuss with provider  No discharge procedures on file      ED Provider  Electronically Signed by           Emily Trejo PA-C  05/04/18 8014

## 2018-05-03 NOTE — DISCHARGE INSTRUCTIONS
Animal Bite   WHAT YOU NEED TO KNOW:   Animal bite injuries range from shallow cuts to deep, life-threatening wounds  An animal can cut or puncture the skin when it bites  Your skin may be torn from your body  Your skin may swell or bruise even if the bite does not break the skin  Animal bites occur more often on the hands, arms, legs, and face  Bites from dogs and cats are the most common injuries  DISCHARGE INSTRUCTIONS:   Seek care immediately if:   · You have a fever  · Your wound is red, swollen, and draining pus  · You see red streaks on the skin around the wound  · You can no longer move the bitten area  · Your heartbeat and breathing are much faster than usual     · You feel dizzy and confused  Contact your healthcare provider if:   · Your pain does not get better, even after you take pain medicine  · You have nightmares or flashbacks about the animal bite  · You have questions or concerns about your condition or care  Medicines: You may need any of the following:  · Antibiotics  prevent or treat a bacterial infection  · Prescription pain medicine  may be given  Ask how to take this medicine safely  · A tetanus vaccine  may be needed to prevent tetanus  Tetanus is a life-threatening bacterial infection that affects the nerves and muscles  The bacteria can be spread through animal bites  · A rabies vaccine  may be needed to prevent rabies  Rabies is a life-threatening viral infection  The virus can be spread through animal bites  · Take your medicine as directed  Contact your healthcare provider if you think your medicine is not helping or if you have side effects  Tell him or her if you are allergic to any medicine  Keep a list of the medicines, vitamins, and herbs you take  Include the amounts, and when and why you take them  Bring the list or the pill bottles to follow-up visits  Carry your medicine list with you in case of an emergency    Follow up with your healthcare provider in 1 to 2 days: You may need to return to have your stitches removed  Write down your questions so you remember to ask them during your visits  Self-care:   · Apply antibiotic ointment as directed  This helps prevent infection in minor skin wounds  It is available without a doctor's order  · Keep the wound clean and covered  Wash the wound every day with soap and water or germ-killing cleanser  Ask your healthcare provider about the kinds of bandages to use  · Apply ice on your wound  Ice helps decrease swelling and pain  Ice may also help prevent tissue damage  Use an ice pack, or put crushed ice in a plastic bag  Cover it with a towel and place it on your wound for 15 to 20 minutes every hour or as directed  · Elevate the wound area  Raise your wound above the level of your heart as often as you can  This will help decrease swelling and pain  Prop your wound on pillows or blankets to keep it elevated comfortably  Prevent another animal bite:   · Learn to recognize the signs of a scared or angry pet  Avoid quick, sudden movements  · Do not step between animals that are fighting  · Do not leave a pet alone with a young child  · Do not disturb an animal while it eats, sleeps, or cares for its young  · Do not approach an animal you do not know, especially one that is tied up or caged  · Stay away from animals that seem sick or act strangely  · Do not feed or capture wild animals  © 2017 [address] Information is for End User's use only and may not be sold, redistributed or otherwise used for commercial purposes  All illustrations and images included in CareNotes® are the copyrighted property of [employer] , [employer]  or [employer]  The above information is an  only  It is not intended as medical advice for individual conditions or treatments   Talk to your doctor, nurse or pharmacist before following any medical regimen to see if it is safe and effective for you

## 2018-05-12 DIAGNOSIS — I10 ESSENTIAL HYPERTENSION: Primary | ICD-10-CM

## 2018-05-12 DIAGNOSIS — E78.2 MIXED HYPERLIPIDEMIA: Primary | ICD-10-CM

## 2018-05-14 RX ORDER — FENOFIBRATE 145 MG/1
TABLET, COATED ORAL
Qty: 30 TABLET | Refills: 2 | Status: SHIPPED | OUTPATIENT
Start: 2018-05-14 | End: 2018-08-21 | Stop reason: SDUPTHER

## 2018-05-21 ENCOUNTER — OFFICE VISIT (OUTPATIENT)
Dept: UROLOGY | Facility: CLINIC | Age: 73
End: 2018-05-21
Payer: MEDICARE

## 2018-05-21 VITALS
SYSTOLIC BLOOD PRESSURE: 118 MMHG | BODY MASS INDEX: 29.66 KG/M2 | HEIGHT: 65 IN | HEART RATE: 64 BPM | DIASTOLIC BLOOD PRESSURE: 80 MMHG | WEIGHT: 178 LBS

## 2018-05-21 DIAGNOSIS — R35.0 BENIGN PROSTATIC HYPERPLASIA WITH URINARY FREQUENCY: Primary | ICD-10-CM

## 2018-05-21 DIAGNOSIS — N40.1 BENIGN PROSTATIC HYPERPLASIA WITH URINARY FREQUENCY: Primary | ICD-10-CM

## 2018-05-21 LAB
SL AMB  POCT GLUCOSE, UA: NORMAL
SL AMB LEUKOCYTE ESTERASE,UA: NORMAL
SL AMB POCT BILIRUBIN,UA: NORMAL
SL AMB POCT BLOOD,UA: NORMAL
SL AMB POCT CLARITY,UA: CLEAR
SL AMB POCT COLOR,UA: YELLOW
SL AMB POCT KETONES,UA: NORMAL
SL AMB POCT NITRITE,UA: NORMAL
SL AMB POCT PH,UA: 5
SL AMB POCT SPECIFIC GRAVITY,UA: 1.01
SL AMB POCT URINE PROTEIN: NORMAL
SL AMB POCT UROBILINOGEN: NORMAL

## 2018-05-21 PROCEDURE — 81002 URINALYSIS NONAUTO W/O SCOPE: CPT | Performed by: UROLOGY

## 2018-05-21 PROCEDURE — 99214 OFFICE O/P EST MOD 30 MIN: CPT | Performed by: UROLOGY

## 2018-05-21 PROCEDURE — 51741 ELECTRO-UROFLOWMETRY FIRST: CPT | Performed by: UROLOGY

## 2018-05-21 PROCEDURE — 51798 US URINE CAPACITY MEASURE: CPT | Performed by: UROLOGY

## 2018-05-21 NOTE — PROGRESS NOTES
Uroflow Result    Indication:     medically refractory lower urinary tract symptoms       Procedure  [facility]  There asked to void volitionally into the uroflow apparatus  The following findings were noted:    Findings:  Voided Volume:   102  Maximum flow (Qmax):  4 0 ml/s  Average flow:    2 5 ml/s  Description of emptying curve:   stick high though pattern indicative of abdominal voiding       Post Void Residual Measurement:  After voiding to completion the patient was brought to the exam room and positioned supine    Residual urine volume was measured with an ultrasound at:    Twenty-three ml

## 2018-05-21 NOTE — PROGRESS NOTES
Referring Physician: Ana Hall DO  A copy of this note was sent to the referring physician  Diagnoses and all orders for this visit:    Benign prostatic hyperplasia with urinary frequency  -     POCT urine dip  -     Cystoscopy; Future            Assessment and plan:       1  Medically refractory lower urinary tract symptoms  -not improved with a trial of tamsulosin    I recommended flexible cystoscopy for further evaluation  Discussed the risks benefits and alternatives to this diagnostic procedure  Risks include but are not limited to hematuria, pain, urinary tract infection, stricture formation, possible need for hospitalization  Patient verbalized understanding and has elected to proceed  Cystoscopy will be scheduled in the near future  Patient was provided with written information about the Uro lift procedure  We discussed the pending the results of the cystoscopy he may benefit from this minimally invasive procedure for management of his obstructive lower urinary tract symptoms    Normajean Mortimer, MD      Chief Complaint     Follow-up BPH      History of Present Illness     Maddi Blount is a 74 y o  male returns in follow-up for BP H  one year ago he was initiated on a trial of an alpha-blocker for bothersome frequency urgency and intermittent urinary stream   He felt this medication was not efficacious and discontinued it  At the present time he remains highly symptomatic  His primary concern is post micturition dribbling  He also reports in intermittent stream, hesitancy, and significant frequency and urgency as well as nocturia x2  He has not had any hematuria or interval UTIs  Detailed Urologic History     - please refer to HPI    Review of Systems     Review of Systems   Constitutional: Negative for activity change and fatigue  HENT: Negative for congestion  Eyes: Negative for visual disturbance  Respiratory: Negative for shortness of breath and wheezing  Cardiovascular: Negative for chest pain and leg swelling  Gastrointestinal: Negative for abdominal pain  Endocrine: Positive for polyuria  Genitourinary: Positive for dysuria  Negative for flank pain, hematuria and urgency  Musculoskeletal: Negative for back pain  Allergic/Immunologic: Negative for immunocompromised state  Neurological: Negative for dizziness and numbness  Psychiatric/Behavioral: Negative for dysphoric mood  All other systems reviewed and are negative  AUA SYMPTOM SCORE      Most Recent Value   AUA SYMPTOM SCORE   How often have you had a sensation of not emptying your bladder completely after you finished urinating? 5   How often have you had to urinate again less than two hours after you finished urinating? 4   How often have you found you stopped and started again several times when you urinate? 3   How often have you found it difficult to postpone urination? 3   How often have you had a weak urinary stream?  3   How often have you had to push or strain to begin urination? 2   How many times did you most typically get up to urinate from the time you went to bed at night until the time you got up in the morning? 2   Quality of Life: If you were to spend the rest of your life with your urinary condition just the way it is now, how would you feel about that?  3   AUA SYMPTOM SCORE  22            Allergies     Allergies   Allergen Reactions    Iodides     Seasonal Ic  [Cholestatin]        Physical Exam     Physical Exam   Constitutional: He is oriented to person, place, and time  He appears well-developed and well-nourished  No distress  HENT:   Head: Normocephalic and atraumatic  Eyes: EOM are normal    Neck: Normal range of motion  Cardiovascular:   Negative lower extremity edema   Pulmonary/Chest: Effort normal and breath sounds normal    Abdominal: Soft     Genitourinary:   Genitourinary Comments: Negative suprapubic tenderness, CVA tenderness Musculoskeletal: Normal range of motion  Neurological: He is alert and oriented to person, place, and time  Skin: Skin is warm  Psychiatric: He has a normal mood and affect   His behavior is normal            Vital Signs  Vitals:    05/21/18 1416   BP: 118/80   BP Location: Left arm   Patient Position: Sitting   Cuff Size: Adult   Pulse: 64   Weight: 80 7 kg (178 lb)   Height: 5' 5" (1 651 m)         Current Medications       Current Outpatient Prescriptions:     acetaminophen (TYLENOL) 500 mg tablet, Take 2 tablets by mouth daily as needed, Disp: , Rfl:     amLODIPine (NORVASC) 5 mg tablet, Take 1 tablet (5 mg total) by mouth every morning, Disp: 90 tablet, Rfl: 3    aspirin (ASPIRIN LOW DOSE) 81 MG tablet, Take 1 tablet by mouth daily, Disp: , Rfl:     dicyclomine (BENTYL) 20 mg tablet, Take 1 tablet (20 mg total) by mouth every 6 (six) hours As needed for abdominal pain, Disp: 20 tablet, Rfl: 0    esomeprazole (NexIUM) 20 mg capsule, Take 1 capsule by mouth daily, Disp: , Rfl:     fenofibrate (TRICOR) 145 mg tablet, TAKE ONE TABLET BY MOUTH ONE TIME DAILY , Disp: 30 tablet, Rfl: 2    Influenza Vac Split High-Dose 0 5 ML BRII, , Disp: , Rfl:     lisinopril (ZESTRIL) 40 mg tablet, TAKE ONE TABLET BY MOUTH ONE TIME DAILY , Disp: 90 tablet, Rfl: 0    loperamide (IMODIUM A-D) 2 MG tablet, Take 1 tablet by mouth daily as needed, Disp: , Rfl:     lovastatin (MEVACOR) 40 MG tablet, Take one tablet by mouth one time daily, Disp: 90 tablet, Rfl: 2    Multiple Vitamins-Minerals (CENTRUM ULTRA MENS PO), Take 1 tablet by mouth daily, Disp: , Rfl:     nystatin (MYCOSTATIN) 100,000 units/mL suspension, Apply 5 mL (500,000 Units total) to the mouth or throat 4 (four) times a day, Disp: 60 mL, Rfl: 0    nystatin (MYCOSTATIN) 100,000 units/mL suspension, Take 5 mL (500,000 Units total) by mouth 4 (four) times a day, Disp: 280 mL, Rfl: 0      Active Problems     Patient Active Problem List   Diagnosis    Chest pain    Essential hypertension    Mixed hyperlipidemia    Chronic pain disorder    Chronic left shoulder pain    Chronic bilateral low back pain with bilateral sciatica    Disc degeneration, lumbar    Lumbar stenosis with neurogenic claudication    Left lower quadrant pain    Abnormal CT of the abdomen    Chronic pain syndrome    Benign prostatic hyperplasia with urinary frequency         Past Medical History     Past Medical History:   Diagnosis Date    Apnea     Apnea     Diverticulitis     Diverticulitis     Hypercholesterolemia     Hyperlipidemia     Hypertension     Mitral valve disorder     Mitral valve disorder     Thoracic neuritis     Thoracic neuritis          Surgical History     Past Surgical History:   Procedure Laterality Date    COLONOSCOPY      Complete    ESOPHAGOGASTRODUODENOSCOPY      Diagnostic    FOOT SURGERY      HERNIA REPAIR      KNEE SURGERY Left     NEUROPLASTY / TRANSPOSITION MEDIAN NERVE AT CARPAL TUNNEL      [place] COLONOSCOPY FLX DX W/COLLJ SPEC WHEN PFRMD N/A 4/10/2018    Procedure: EGD AND COLONOSCOPY;  Surgeon: Kodi Soto MD;  Location: [facility];   Service: Gastroenterology    SHOULDER SURGERY      TARSAL TUNNEL RELEASE      Neuroplasty Decompression         Family History     Family History   Problem Relation Age of Onset    Diabetes Mother      Mellitus    Heart disease Mother      Arteriosclerotic Cardiovascular    Hypertension Mother     Cancer Father     Arthritis Father      Rheu Mult Site W Involv Of Organs And Systems    Thyroid disease Sister     Diabetes Brother      Diabetes:Mellitus    Heart disease Brother     Hypertension Brother     Coronary artery disease Family          Social History     Social History     History   Smoking Status    Former Smoker    Quit date: 1978   Smokeless Tobacco    Never Used     Comment: Per [employer] : Former Smoker         Pertinent Lab Values     Lab Results   Component Value Date CREATININE 1 41 (H) 03/08/2018       Lab Results   Component Value Date    PSA 0 4 02/15/2018    PSA 0 5 12/10/2015    PSA 0 78 12/03/2014       @RESULTRCNT(1H])@      Pertinent Imaging      - n/a    Portions of the record may have been created with voice recognition software   Occasional wrong word or "sound a like" substitutions may have occurred due to the inherent limitations of voice recognition software   Read the chart carefully and recognize, using context, where substitutions have occurred

## 2018-06-20 ENCOUNTER — OFFICE VISIT (OUTPATIENT)
Dept: PULMONOLOGY | Facility: CLINIC | Age: 73
End: 2018-06-20
Payer: MEDICARE

## 2018-06-20 VITALS
OXYGEN SATURATION: 96 % | BODY MASS INDEX: 29.79 KG/M2 | HEART RATE: 55 BPM | WEIGHT: 179 LBS | SYSTOLIC BLOOD PRESSURE: 140 MMHG | RESPIRATION RATE: 18 BRPM | DIASTOLIC BLOOD PRESSURE: 70 MMHG

## 2018-06-20 DIAGNOSIS — G47.33 OSA ON CPAP: Primary | ICD-10-CM

## 2018-06-20 DIAGNOSIS — Z99.89 OSA ON CPAP: Primary | ICD-10-CM

## 2018-06-20 PROCEDURE — 99213 OFFICE O/P EST LOW 20 MIN: CPT | Performed by: PHYSICIAN ASSISTANT

## 2018-06-20 NOTE — PROGRESS NOTES
Assessment:    1  GREGG on CPAP  CPAP Auto New DME           Plan:     Patient doing well with his CPAP, compliance shows 98% usage with AHI of 0 9  He is requesting a new machine as his is over 10years old  Will order a new machine and set it to auto CPAP - he is now on a set pressure of 14 and feels that he wakes up bloated and with excess gas, pressure may be too high  Follow up in 3 months or sooner if necessary  Subjective:     Patient ID: Maine Valero is a 74 y o  male  Chief Complaint:  Patient is a 73 yo male former smoker with PMH of GREGG on CPAP, HTN, HLD, BPH  He is here today for follow up  He is doing well with his CPAP, he would like a new machine as his is loud  Also would like an Auto CPAP as he wakes up feeling bloated  He does still wake during the night to urinate - having a procedure done with Dr Elisha Hood for his BPH  Since he retired 10 years ago he does tend to take a nap if he is not busy  On days he is out of the house or driving he does not have any trouble staying awake  The following portions of the patient's history were reviewed in this encounter and updated as appropriate:   Review of Systems   Constitutional: Negative  HENT: Negative  Respiratory: Negative  Cardiovascular: Negative  Gastrointestinal: Negative  Genitourinary: Positive for frequency  Musculoskeletal: Negative  Skin: Negative  Allergic/Immunologic: Negative  Neurological: Negative  Psychiatric/Behavioral: Negative  Objective:    Physical Exam   Constitutional: He is oriented to person, place, and time  He appears well-developed and well-nourished  No distress  HENT:   Mouth/Throat: Oropharynx is clear and moist    Crowded oropharyngeal airway, Mallampati 3   Eyes: Pupils are equal, round, and reactive to light  Neck:   Short and wide neck   Cardiovascular: Normal rate and regular rhythm  No murmur heard    Pulmonary/Chest: Effort normal and breath sounds normal  No respiratory distress  He has no wheezes  He has no rales  Abdominal: Soft  Musculoskeletal: Normal range of motion  Neurological: He is alert and oriented to person, place, and time  Skin: Skin is warm and dry  Psychiatric: He has a normal mood and affect   His behavior is normal  Judgment and thought content normal

## 2018-07-02 ENCOUNTER — APPOINTMENT (OUTPATIENT)
Dept: LAB | Facility: CLINIC | Age: 73
End: 2018-07-02
Payer: MEDICARE

## 2018-07-02 DIAGNOSIS — I10 ESSENTIAL HYPERTENSION: ICD-10-CM

## 2018-07-02 DIAGNOSIS — R73.9 HYPERGLYCEMIA: ICD-10-CM

## 2018-07-02 LAB
ANION GAP SERPL CALCULATED.3IONS-SCNC: 10 MMOL/L (ref 4–13)
BASOPHILS # BLD AUTO: 0.02 THOUSANDS/ΜL (ref 0–0.1)
BASOPHILS NFR BLD AUTO: 0 % (ref 0–1)
BUN SERPL-MCNC: 16 MG/DL (ref 5–25)
CALCIUM SERPL-MCNC: 9.1 MG/DL (ref 8.3–10.1)
CHLORIDE SERPL-SCNC: 105 MMOL/L (ref 100–108)
CHOLEST SERPL-MCNC: 136 MG/DL (ref 50–200)
CO2 SERPL-SCNC: 24 MMOL/L (ref 21–32)
CREAT SERPL-MCNC: 1.03 MG/DL (ref 0.6–1.3)
EOSINOPHIL # BLD AUTO: 0.07 THOUSAND/ΜL (ref 0–0.61)
EOSINOPHIL NFR BLD AUTO: 1 % (ref 0–6)
ERYTHROCYTE [DISTWIDTH] IN BLOOD BY AUTOMATED COUNT: 12.6 % (ref 11.6–15.1)
EST. AVERAGE GLUCOSE BLD GHB EST-MCNC: 120 MG/DL
GFR SERPL CREATININE-BSD FRML MDRD: 71 ML/MIN/1.73SQ M
GLUCOSE P FAST SERPL-MCNC: 90 MG/DL (ref 65–99)
HBA1C MFR BLD: 5.8 % (ref 4.2–6.3)
HCT VFR BLD AUTO: 44.3 % (ref 36.5–49.3)
HDLC SERPL-MCNC: 44 MG/DL (ref 40–60)
HGB BLD-MCNC: 15.2 G/DL (ref 12–17)
IMM GRANULOCYTES # BLD AUTO: 0.01 THOUSAND/UL (ref 0–0.2)
IMM GRANULOCYTES NFR BLD AUTO: 0 % (ref 0–2)
LDLC SERPL CALC-MCNC: 62 MG/DL (ref 0–100)
LYMPHOCYTES # BLD AUTO: 1.45 THOUSANDS/ΜL (ref 0.6–4.47)
LYMPHOCYTES NFR BLD AUTO: 28 % (ref 14–44)
MCH RBC QN AUTO: 34.4 PG (ref 26.8–34.3)
MCHC RBC AUTO-ENTMCNC: 34.3 G/DL (ref 31.4–37.4)
MCV RBC AUTO: 100 FL (ref 82–98)
MONOCYTES # BLD AUTO: 0.53 THOUSAND/ΜL (ref 0.17–1.22)
MONOCYTES NFR BLD AUTO: 10 % (ref 4–12)
NEUTROPHILS # BLD AUTO: 3.06 THOUSANDS/ΜL (ref 1.85–7.62)
NEUTS SEG NFR BLD AUTO: 61 % (ref 43–75)
NONHDLC SERPL-MCNC: 92 MG/DL
NRBC BLD AUTO-RTO: 0 /100 WBCS
PLATELET # BLD AUTO: 200 THOUSANDS/UL (ref 149–390)
PMV BLD AUTO: 10.9 FL (ref 8.9–12.7)
POTASSIUM SERPL-SCNC: 4 MMOL/L (ref 3.5–5.3)
RBC # BLD AUTO: 4.42 MILLION/UL (ref 3.88–5.62)
SODIUM SERPL-SCNC: 139 MMOL/L (ref 136–145)
TRIGL SERPL-MCNC: 150 MG/DL
WBC # BLD AUTO: 5.14 THOUSAND/UL (ref 4.31–10.16)

## 2018-07-02 PROCEDURE — 85025 COMPLETE CBC W/AUTO DIFF WBC: CPT

## 2018-07-02 PROCEDURE — 83036 HEMOGLOBIN GLYCOSYLATED A1C: CPT

## 2018-07-02 PROCEDURE — 80048 BASIC METABOLIC PNL TOTAL CA: CPT

## 2018-07-02 PROCEDURE — 80061 LIPID PANEL: CPT

## 2018-07-02 PROCEDURE — 36415 COLL VENOUS BLD VENIPUNCTURE: CPT

## 2018-07-10 ENCOUNTER — OFFICE VISIT (OUTPATIENT)
Dept: INTERNAL MEDICINE CLINIC | Facility: CLINIC | Age: 73
End: 2018-07-10
Payer: MEDICARE

## 2018-07-10 VITALS
SYSTOLIC BLOOD PRESSURE: 130 MMHG | HEART RATE: 56 BPM | HEIGHT: 65 IN | OXYGEN SATURATION: 95 % | DIASTOLIC BLOOD PRESSURE: 64 MMHG | WEIGHT: 176 LBS | BODY MASS INDEX: 29.32 KG/M2

## 2018-07-10 DIAGNOSIS — Z99.89 OSA ON CPAP: Primary | ICD-10-CM

## 2018-07-10 DIAGNOSIS — N40.1 BENIGN PROSTATIC HYPERPLASIA WITH URINARY FREQUENCY: ICD-10-CM

## 2018-07-10 DIAGNOSIS — I10 ESSENTIAL HYPERTENSION: ICD-10-CM

## 2018-07-10 DIAGNOSIS — G47.33 OSA ON CPAP: Primary | ICD-10-CM

## 2018-07-10 DIAGNOSIS — K21.9 GERD WITHOUT ESOPHAGITIS: ICD-10-CM

## 2018-07-10 DIAGNOSIS — R35.0 BENIGN PROSTATIC HYPERPLASIA WITH URINARY FREQUENCY: ICD-10-CM

## 2018-07-10 DIAGNOSIS — E78.2 COMBINED HYPERLIPIDEMIA: ICD-10-CM

## 2018-07-10 PROCEDURE — 99214 OFFICE O/P EST MOD 30 MIN: CPT | Performed by: INTERNAL MEDICINE

## 2018-07-10 RX ORDER — LISINOPRIL 40 MG/1
40 TABLET ORAL DAILY
Qty: 90 TABLET | Refills: 3 | Status: SHIPPED | OUTPATIENT
Start: 2018-07-10 | End: 2019-03-27 | Stop reason: SDUPTHER

## 2018-07-10 NOTE — PROGRESS NOTES
Assessment/Plan:  Regarding his care cardiac status he remains stable with normal blood pressure and a good cholesterol  No chest pains  He should follow up with the cardiologist who wheeze not seen in a year  Blood sugar was stable at 90  LDL cholesterol was 62  He needs to get an eye checkup and has not down the to this point  He is up-to-date on colonoscopies  He has BPH and is for a a your left  I will see him back here in 4 months      Recent Results (from the past 1008 hour(s))   CBC and differential    Collection Time: 07/02/18  7:57 AM   Result Value Ref Range    WBC 5 14 4 31 - 10 16 Thousand/uL    RBC 4 42 3 88 - 5 62 Million/uL    Hemoglobin 15 2 12 0 - 17 0 g/dL    Hematocrit 44 3 36 5 - 49 3 %     (H) 82 - 98 fL    MCH 34 4 (H) 26 8 - 34 3 pg    MCHC 34 3 31 4 - 37 4 g/dL    RDW 12 6 11 6 - 15 1 %    MPV 10 9 8 9 - 12 7 fL    Platelets 287 312 - 577 Thousands/uL    nRBC 0 /100 WBCs    Neutrophils Relative 61 43 - 75 %    Immat GRANS % 0 0 - 2 %    Lymphocytes Relative 28 14 - 44 %    Monocytes Relative 10 4 - 12 %    Eosinophils Relative 1 0 - 6 %    Basophils Relative 0 0 - 1 %    Neutrophils Absolute 3 06 1 85 - 7 62 Thousands/µL    Immature Grans Absolute 0 01 0 00 - 0 20 Thousand/uL    Lymphocytes Absolute 1 45 0 60 - 4 47 Thousands/µL    Monocytes Absolute 0 53 0 17 - 1 22 Thousand/µL    Eosinophils Absolute 0 07 0 00 - 0 61 Thousand/µL    Basophils Absolute 0 02 0 00 - 0 10 Thousands/µL   Basic metabolic panel    Collection Time: 07/02/18  7:57 AM   Result Value Ref Range    Sodium 139 136 - 145 mmol/L    Potassium 4 0 3 5 - 5 3 mmol/L    Chloride 105 100 - 108 mmol/L    CO2 24 21 - 32 mmol/L    Anion Gap 10 4 - 13 mmol/L    BUN 16 5 - 25 mg/dL    Creatinine 1 03 0 60 - 1 30 mg/dL    Glucose, Fasting 90 65 - 99 mg/dL    Calcium 9 1 8 3 - 10 1 mg/dL    eGFR 71 ml/min/1 73sq m   HEMOGLOBIN A1C W/ EAG ESTIMATION    Collection Time: 07/02/18  7:57 AM   Result Value Ref Range Hemoglobin A1C 5 8 4 2 - 6 3 %     mg/dl   Lipid panel    Collection Time: 07/02/18  7:57 AM   Result Value Ref Range    Cholesterol 136 50 - 200 mg/dL    Triglycerides 150 <=150 mg/dL    HDL, Direct 44 40 - 60 mg/dL    LDL Calculated 62 0 - 100 mg/dL    Non-HDL-Chol (CHOL-HDL) 92 mg/dl       1  GREGG on CPAP     2  Essential hypertension  lisinopril (ZESTRIL) 40 mg tablet    Basic metabolic panel    Lipid panel    Basic metabolic panel   3  Combined hyperlipidemia  Lipid panel   4  GERD without esophagitis         Orders Placed This Encounter   Procedures    Basic metabolic panel    Lipid panel    Basic metabolic panel    Lipid panel         Subjective:  Problems are 1  BPH 2  Obstructive sleep apnea 3  Hypertension 4  GERD 5  Hyperlipidemia D media  Patient ID: Angelica Henderson is a 74 y o  male  HPI  He comes in for follow-up  Actually doing fairly well  Does have BPH and is going to get a Uro left  Denies any cardiac complaints  Head ate catheterization 1 year ago with trivial coronary disease  Cholesterol stable with an LDL 62  The following portions of the patient's history were reviewed and updated as appropriate:   He has a past medical history of Apnea; Apnea; Diverticulitis; Diverticulitis; Hypercholesterolemia; Hyperlipidemia; Hypertension; Mitral valve disorder; Mitral valve disorder; Thoracic neuritis; and Thoracic neuritis  ,   does not have any pertinent problems on file  ,   has a past surgical history that includes Knee surgery (Left); Colonoscopy; Esophagogastroduodenoscopy; Foot surgery; Hernia repair; Tarsal tunnel release; Shoulder surgery; Neuroplasty / transposition median nerve at carpal tunnel; and pr colonoscopy flx dx w/collj spec when pfrmd (N/A, 4/10/2018)  ,  family history includes Arthritis in his father; Cancer in his father; Coronary artery disease in his family; Diabetes in his brother and mother; Heart disease in his brother and mother; Hypertension in his brother and mother; Thyroid disease in his sister  ,   reports that he quit smoking about 40 years ago  He has a 17 00 pack-year smoking history  He has never used smokeless tobacco  He reports that he does not drink alcohol or use drugs  ,  is allergic to iodides and seasonal ic  [cholestatin]       Current Outpatient Prescriptions:     acetaminophen (TYLENOL) 500 mg tablet, Take 2 tablets by mouth daily as needed, Disp: , Rfl:     amLODIPine (NORVASC) 5 mg tablet, Take 1 tablet (5 mg total) by mouth every morning, Disp: 90 tablet, Rfl: 3    aspirin (ASPIRIN LOW DOSE) 81 MG tablet, Take 1 tablet by mouth daily, Disp: , Rfl:     dicyclomine (BENTYL) 20 mg tablet, Take 1 tablet (20 mg total) by mouth every 6 (six) hours As needed for abdominal pain, Disp: 20 tablet, Rfl: 0    fenofibrate (TRICOR) 145 mg tablet, TAKE ONE TABLET BY MOUTH ONE TIME DAILY , Disp: 30 tablet, Rfl: 2    lisinopril (ZESTRIL) 40 mg tablet, Take 1 tablet (40 mg total) by mouth daily, Disp: 90 tablet, Rfl: 3    loperamide (IMODIUM A-D) 2 MG tablet, Take 1 tablet by mouth daily as needed, Disp: , Rfl:     lovastatin (MEVACOR) 40 MG tablet, Take one tablet by mouth one time daily, Disp: 90 tablet, Rfl: 2    metoprolol tartrate (LOPRESSOR) 25 mg tablet, TAKE ONE TABLET BY MOUTH TWICE DAILY , Disp: 60 tablet, Rfl: 9    Multiple Vitamins-Minerals (CENTRUM ULTRA MENS PO), Take 1 tablet by mouth daily, Disp: , Rfl:     esomeprazole (NexIUM) 20 mg capsule, Take 1 capsule by mouth daily, Disp: , Rfl:     Review of Systems   Constitutional: Negative for activity change, appetite change, chills, diaphoresis, fatigue, fever and unexpected weight change  HENT: Negative for congestion, ear pain, hearing loss, mouth sores, nosebleeds, postnasal drip, sinus pain, sinus pressure, sore throat and trouble swallowing  Eyes: Negative for pain, discharge and visual disturbance  Has not had a recent eye checkup     Respiratory: Negative for apnea, cough, chest tightness, shortness of breath and wheezing  Cardiovascular: Negative for chest pain, palpitations and leg swelling  Gastrointestinal: Negative for abdominal pain, anal bleeding, blood in stool, constipation, diarrhea, nausea and vomiting  Endocrine: Negative for polydipsia and polyphagia  Genitourinary: Negative for decreased urine volume, dysuria, flank pain, frequency, hematuria and urgency  Musculoskeletal: Negative for arthralgias, back pain, gait problem, joint swelling and myalgias  Skin: Negative for rash and wound  Allergic/Immunologic: Negative for environmental allergies and food allergies  Neurological: Negative for dizziness, tremors, seizures, syncope, speech difficulty, light-headedness, numbness and headaches  Hematological: Negative for adenopathy  Does not bruise/bleed easily  Psychiatric/Behavioral: Negative for agitation, confusion, hallucinations, sleep disturbance and suicidal ideas  The patient is not nervous/anxious  Objective:  /64 (BP Location: Left arm)   Pulse 56   Ht 5' 5" (1 651 m)   Wt 79 8 kg (176 lb)   SpO2 95%   BMI 29 29 kg/m²      Physical Exam   Constitutional: He appears well-developed  No distress  Moderately overweight  Blood pressure is 130/64  Oxygen saturations 95%  HENT:   Head: Normocephalic  Right Ear: External ear normal    Left Ear: External ear normal    Nose: Nose normal    Mouth/Throat: Oropharynx is clear and moist  No oropharyngeal exudate  Dental plates in place  Eyes: Conjunctivae and EOM are normal  Pupils are equal, round, and reactive to light  Right eye exhibits no discharge  Left eye exhibits no discharge  Neck: Normal range of motion  Neck supple  No thyromegaly present  Cardiovascular: Normal rate, regular rhythm, normal heart sounds and intact distal pulses  Exam reveals no gallop and no friction rub  No murmur heard    Pulmonary/Chest: Effort normal and breath sounds normal  No respiratory distress  He has no wheezes  He has no rales  Abdominal: Soft  Bowel sounds are normal  He exhibits no distension and no mass  There is no tenderness  There is no rebound and no guarding  Musculoskeletal: Normal range of motion  He exhibits no edema, tenderness or deformity  Lymphadenopathy:     He has no cervical adenopathy  Neurological: He is alert  He has normal reflexes  No cranial nerve deficit  Coordination normal    Skin: Skin is warm and dry  No rash noted  No erythema  Psychiatric: He has a normal mood and affect  His behavior is normal  Judgment and thought content normal    Nursing note and vitals reviewed

## 2018-07-25 ENCOUNTER — TELEPHONE (OUTPATIENT)
Dept: PULMONOLOGY | Facility: CLINIC | Age: 73
End: 2018-07-25

## 2018-07-27 ENCOUNTER — TELEPHONE (OUTPATIENT)
Dept: INTERNAL MEDICINE CLINIC | Facility: CLINIC | Age: 73
End: 2018-07-27

## 2018-07-27 DIAGNOSIS — K21.00 REFLUX ESOPHAGITIS: Primary | ICD-10-CM

## 2018-07-27 NOTE — TELEPHONE ENCOUNTER
[facility] CALLED AND WANTS THE OMEPRAZOLE 20MG  INSTEAD OF THE ESOMEPRAZOLE 20MG  PLEASE RESEND A SCRIPT FOR THE OMEPRAZOLE 20MG  [facility]  [phone]  PATIENT IS REQUESTING THIS

## 2018-08-02 ENCOUNTER — PROCEDURE VISIT (OUTPATIENT)
Dept: UROLOGY | Facility: CLINIC | Age: 73
End: 2018-08-02
Payer: MEDICARE

## 2018-08-02 VITALS
DIASTOLIC BLOOD PRESSURE: 70 MMHG | BODY MASS INDEX: 29.49 KG/M2 | HEART RATE: 58 BPM | HEIGHT: 65 IN | SYSTOLIC BLOOD PRESSURE: 138 MMHG | WEIGHT: 177 LBS

## 2018-08-02 DIAGNOSIS — R35.0 BENIGN PROSTATIC HYPERPLASIA WITH URINARY FREQUENCY: ICD-10-CM

## 2018-08-02 DIAGNOSIS — N40.1 BENIGN PROSTATIC HYPERPLASIA WITH LOWER URINARY TRACT SYMPTOMS, SYMPTOM DETAILS UNSPECIFIED: Primary | ICD-10-CM

## 2018-08-02 DIAGNOSIS — N40.1 BENIGN PROSTATIC HYPERPLASIA WITH URINARY FREQUENCY: ICD-10-CM

## 2018-08-02 LAB
SL AMB  POCT GLUCOSE, UA: NORMAL
SL AMB LEUKOCYTE ESTERASE,UA: NORMAL
SL AMB POCT BILIRUBIN,UA: NORMAL
SL AMB POCT BLOOD,UA: NORMAL
SL AMB POCT CLARITY,UA: CLEAR
SL AMB POCT COLOR,UA: YELLOW
SL AMB POCT KETONES,UA: NORMAL
SL AMB POCT NITRITE,UA: NORMAL
SL AMB POCT PH,UA: NORMAL
SL AMB POCT SPECIFIC GRAVITY,UA: 1.01
SL AMB POCT URINE PROTEIN: NORMAL
SL AMB POCT UROBILINOGEN: NORMAL

## 2018-08-02 PROCEDURE — 52000 CYSTOURETHROSCOPY: CPT | Performed by: UROLOGY

## 2018-08-02 PROCEDURE — 99213 OFFICE O/P EST LOW 20 MIN: CPT | Performed by: UROLOGY

## 2018-08-02 PROCEDURE — 87086 URINE CULTURE/COLONY COUNT: CPT | Performed by: UROLOGY

## 2018-08-02 PROCEDURE — 76872 US TRANSRECTAL: CPT | Performed by: UROLOGY

## 2018-08-02 PROCEDURE — 81002 URINALYSIS NONAUTO W/O SCOPE: CPT | Performed by: UROLOGY

## 2018-08-02 NOTE — PROGRESS NOTES
Referring Physician: Stan Fletcher DO  A copy of this note was sent to the referring physician  Diagnoses and all orders for this visit:    Benign prostatic hyperplasia with lower urinary tract symptoms, symptom details unspecified  -     POCT urine dip  -     Urine culture    Benign prostatic hyperplasia with urinary frequency            Assessment and plan: We reviewed the options for treating BPH/LUTS which include but are not limited to expectant management, medical therapy, transurethral resection of prostate (TURP)  We also discussed minimally invasive options  At this point, the patient wishes to proceed with Urolift  This is a great option for the patient based on the following criteria:    - cystoscopy revealed mild lateral lobe hyperplasia and a high bladder neck  - 20 g gland measured on transrectal ultrasound  -uroflow with Q max 4 0  - PVR with 23  - normal renal function  - no active urinary tract infection  - PSA: 0 4  - no documented allergy to nickel    - He has failed the following treatment options:  tamsulosin  The additional morbidity of standard surgical options (ie, TURP) is not necessary given the above criteria  The risks of Urolift include but are not limited to bleeding, infection, reaction to anesthesia such as heart attack, stroke, DVT/PE, hyponatremia, bladder neck contracture, urethral stricture, injury to surrounding structures (ureters, rectum, etc)  We discussed that additional risks of trans urethral resection procedures such as incontinence, retrograde ejaculation, and erectile dysfunction have been only rarely reported with the Uro lift procedure  We did discuss that this is a new were procedure and a permanent implant  We discussed that there may be some long-term implications that her on for seen with this newer technology, such as perhaps complicating treatment for prostate cancer if indicated down the line    Finally, I told him that he may require additional procedures secondary to some of these complications  Because he has a high bladder neck I recommended performing a concomitant trans urethral incision of the prostate at the time of the Uro lift  I discussed the additional risks of this procedure including retrograde ejaculation which he states is not a concern of his    Informed consent was obtained for the Uro lift procedure  This will be scheduled in the near future to be performed under [facility]  We will schedule the surgery at [facility]  He understands he will be discharged with a Samuel catheter for few days due to the trans urethral incision of the prostate      Fernando Chandra MD      Chief Complaint     Follow-up BPH      History of Present Illness     Jhony Avila is a 74 y o  male returns in follow-up for BP H  He has had f he remains highly symptomatic  with bothersome weak stream, hesitancy and daytime frequency and nocturia times 2-3  Tamsulosin was not in brie efficacious the past   He presents today for cystoscopy and transrectal ultrasoun no interval UTIs or hematuria  Detailed Urologic History     - please refer to HPI    Review of Systems     Review of Systems   Constitutional: Negative for activity change and fatigue  HENT: Negative for congestion  Eyes: Negative for visual disturbance  Respiratory: Negative for shortness of breath and wheezing  Cardiovascular: Negative for chest pain and leg swelling  Gastrointestinal: Negative for abdominal pain  Endocrine: Negative for polyuria  Genitourinary: Positive for frequency  Negative for dysuria, flank pain, hematuria and urgency  Musculoskeletal: Negative for back pain  Allergic/Immunologic: Negative for immunocompromised state  Neurological: Negative for dizziness and numbness  Psychiatric/Behavioral: Negative for dysphoric mood  All other systems reviewed and are negative        AUA SYMPTOM SCORE      Most Recent Value   AUA SYMPTOM SCORE   How often have you had a sensation of not emptying your bladder completely after you finished urinating? 5   How often have you had to urinate again less than two hours after you finished urinating? 4   How often have you found you stopped and started again several times when you urinate? 3   How often have you found it difficult to postpone urination? 3   How often have you had a weak urinary stream?  3   How often have you had to push or strain to begin urination? 2   How many times did you most typically get up to urinate from the time you went to bed at night until the time you got up in the morning? 2   Quality of Life: If you were to spend the rest of your life with your urinary condition just the way it is now, how would you feel about that?  3   AUA SYMPTOM SCORE  22            Allergies     Allergies   Allergen Reactions    Iodides     Seasonal Ic  [Cholestatin]        Physical Exam     Physical Exam   Constitutional: He is oriented to person, place, and time  He appears well-developed and well-nourished  No distress  HENT:   Head: Normocephalic and atraumatic  Eyes: EOM are normal    Neck: Normal range of motion  Cardiovascular:   Negative lower extremity edema   Pulmonary/Chest: Effort normal and breath sounds normal    Abdominal: Soft  Genitourinary: Penis normal    Genitourinary Comments: Negative suprapubic tenderness, CVA tenderness   Musculoskeletal: Normal range of motion  Neurological: He is alert and oriented to person, place, and time  Skin: Skin is warm  Psychiatric: He has a normal mood and affect   His behavior is normal            Vital Signs  Vitals:    08/02/18 1359   BP: 138/70   Pulse: 58   Weight: 80 3 kg (177 lb)   Height: 5' 5" (1 651 m)         Current Medications       Current Outpatient Prescriptions:     acetaminophen (TYLENOL) 500 mg tablet, Take 2 tablets by mouth daily as needed, Disp: , Rfl:     amLODIPine (NORVASC) 5 mg tablet, Take 1 tablet (5 mg total) by mouth every morning, Disp: 90 tablet, Rfl: 3    aspirin (ASPIRIN LOW DOSE) 81 MG tablet, Take 1 tablet by mouth daily, Disp: , Rfl:     dicyclomine (BENTYL) 20 mg tablet, Take 1 tablet (20 mg total) by mouth every 6 (six) hours As needed for abdominal pain, Disp: 20 tablet, Rfl: 0    esomeprazole (NexIUM) 20 mg capsule, Take 1 capsule (20 mg total) by mouth daily, Disp: 90 capsule, Rfl: 3    fenofibrate (TRICOR) 145 mg tablet, TAKE ONE TABLET BY MOUTH ONE TIME DAILY , Disp: 30 tablet, Rfl: 2    lisinopril (ZESTRIL) 40 mg tablet, Take 1 tablet (40 mg total) by mouth daily, Disp: 90 tablet, Rfl: 3    loperamide (IMODIUM A-D) 2 MG tablet, Take 1 tablet by mouth daily as needed, Disp: , Rfl:     lovastatin (MEVACOR) 40 MG tablet, Take one tablet by mouth one time daily, Disp: 90 tablet, Rfl: 2    metoprolol tartrate (LOPRESSOR) 25 mg tablet, TAKE ONE TABLET BY MOUTH TWICE DAILY , Disp: 60 tablet, Rfl: 9    Multiple Vitamins-Minerals (CENTRUM ULTRA MENS PO), Take 1 tablet by mouth daily, Disp: , Rfl:       Active Problems     Patient Active Problem List   Diagnosis    Chest pain    Essential hypertension    Mixed hyperlipidemia    Chronic pain disorder    Chronic left shoulder pain    Chronic bilateral low back pain with bilateral sciatica    Disc degeneration, lumbar    Lumbar stenosis with neurogenic claudication    Left lower quadrant pain    Abnormal CT of the abdomen    Chronic pain syndrome    Benign prostatic hyperplasia with urinary frequency    GREGG on CPAP         Past Medical History     Past Medical History:   Diagnosis Date    Apnea     Apnea     Diverticulitis     Diverticulitis     Hypercholesterolemia     Hyperlipidemia     Hypertension     Mitral valve disorder     Mitral valve disorder     Thoracic neuritis     Thoracic neuritis          Surgical History     Past Surgical History:   Procedure Laterality Date    COLONOSCOPY      Complete    ESOPHAGOGASTRODUODENOSCOPY Diagnostic    FOOT SURGERY      HERNIA REPAIR      KNEE SURGERY Left     NEUROPLASTY / TRANSPOSITION MEDIAN NERVE AT CARPAL TUNNEL      HI COLONOSCOPY FLX DX W/COLLJ SPEC WHEN PFRMD N/A 4/10/2018    Procedure: EGD AND COLONOSCOPY;  Surgeon: Volodymyr Yousif MD;  Location: [facility]; Service: Gastroenterology    SHOULDER SURGERY      TARSAL TUNNEL RELEASE      Neuroplasty Decompression         Family History     Family History   Problem Relation Age of Onset    Diabetes Mother         Mellitus    Heart disease Mother         Arteriosclerotic Cardiovascular    Hypertension Mother     Cancer Father     Arthritis Father         Rheu Mult Site W Involv Of Organs And Systems    Thyroid disease Sister     Diabetes Brother         Diabetes:Mellitus    Heart disease Brother     Hypertension Brother     Coronary artery disease Family          Social History     Social History     History   Smoking Status    Former Smoker    Packs/day: 1 00    Years: 17 00    Quit date: 1978   Smokeless Tobacco    Never Used         Pertinent Lab Values     Lab Results   Component Value Date    CREATININE 1 03 07/02/2018       Lab Results   Component Value Date    PSA 0 4 02/15/2018    PSA 0 5 12/10/2015    PSA 0 78 12/03/2014       @RESULTRCNT(1H])@      Pertinent Imaging      - n/a    Portions of the record may have been created with voice recognition software   Occasional wrong word or "sound a like" substitutions may have occurred due to the inherent limitations of voice recognition software   Read the chart carefully and recognize, using context, where substitutions have occurred

## 2018-08-02 NOTE — PROGRESS NOTES
Office TRUS-    Indication    Prostate volumetric       Transrectal ultrasonography  The patient was placed in the left lateral decubitus position  After an attentive digital rectal examination, a 7 5 mHz sidefire ultrasound probe was gently inserted into the rectum and biplanar imaging of the prostate was done with the findings noted below  Images were taken of any abnormal findings and also to document prostate size      Ultrasound size measurements:  -Volume:  20 cm3 4 0 x 2 3 x 4 0    Ultrasound findings:  -Cysts: None  -Masses: None  -Median lobe: absent

## 2018-08-02 NOTE — PROGRESS NOTES
Office Cystoscopy Procedure Note    Indication:     medically refractory lower urinary tract symptoms     Informed consent   The risks, benefits, complications, treatment options, and expected outcomes were discussed with the patient  The patient concurred with the proposed plan and provided informed consent  Anesthesia  Lidocaine jelly 2%    Antibiotic prophylaxis   None    Procedure  The patient was placed in the supineposition, was prepped and draped in the usual manner using sterile technique, and 2% lidocaine jelly instilled into the urethra  A 17 F flexible cystoscope was then inserted into the urethra and the urethra and bladder carefully examined  The following findings were noted:    Findings:  Urethra:  Mild nonobstructing bulbar urethral strictures are noted  Prostate:  Very mild lateral lobe hyperplasia  The lateral lobes do not meet in the midline  There is however a high bladder neck  Bladder:  Large capacity grade 1-2 trabeculations  Ureteral orifices:  Normal  Other findings:  None     Specimens: None                 Complications:    None; patient tolerated the procedure well           Disposition: To home after 30 minute observation             Condition: Stable

## 2018-08-03 LAB — BACTERIA UR CULT: NORMAL

## 2018-08-06 ENCOUNTER — OFFICE VISIT (OUTPATIENT)
Dept: LAB | Facility: HOSPITAL | Age: 73
End: 2018-08-06
Attending: UROLOGY
Payer: MEDICARE

## 2018-08-06 DIAGNOSIS — N40.1 BENIGN PROSTATIC HYPERPLASIA WITH LOWER URINARY TRACT SYMPTOMS, SYMPTOM DETAILS UNSPECIFIED: ICD-10-CM

## 2018-08-06 PROCEDURE — 93005 ELECTROCARDIOGRAM TRACING: CPT

## 2018-08-07 LAB
ATRIAL RATE: 56 BPM
P AXIS: 57 DEGREES
PR INTERVAL: 184 MS
QRS AXIS: 14 DEGREES
QRSD INTERVAL: 88 MS
QT INTERVAL: 412 MS
QTC INTERVAL: 397 MS
T WAVE AXIS: 7 DEGREES
VENTRICULAR RATE: 56 BPM

## 2018-08-07 PROCEDURE — 93010 ELECTROCARDIOGRAM REPORT: CPT | Performed by: INTERNAL MEDICINE

## 2018-08-07 NOTE — PRE-PROCEDURE INSTRUCTIONS
Pre-Surgery Instructions:   Medication Instructions    amLODIPine (NORVASC) 5 mg tablet Patient was instructed by Physician and understands   aspirin (ASPIRIN LOW DOSE) 81 MG tablet Patient was instructed by Physician and understands   dicyclomine (BENTYL) 20 mg tablet Patient was instructed by Physician and understands   fenofibrate (TRICOR) 145 mg tablet Patient was instructed by Physician and understands   lisinopril (ZESTRIL) 40 mg tablet Patient was instructed by Physician and understands   lovastatin (MEVACOR) 40 MG tablet Patient was instructed by Physician and understands   metoprolol tartrate (LOPRESSOR) 25 mg tablet Patient was instructed by Physician and understands   Multiple Vitamins-Minerals (CENTRUM ULTRA MENS PO) Patient was instructed by Physician and understands

## 2018-08-09 DIAGNOSIS — K21.9 CHRONIC GERD: Primary | ICD-10-CM

## 2018-08-09 NOTE — TELEPHONE ENCOUNTER
Pharmacy called  Patient does not want the Nexium because it is too expensive  Wants to change script to Prilosex 20 mg  Please send new script to pharmacy or call to advise

## 2018-08-10 ENCOUNTER — TELEPHONE (OUTPATIENT)
Dept: INTERNAL MEDICINE CLINIC | Facility: CLINIC | Age: 73
End: 2018-08-10

## 2018-08-10 DIAGNOSIS — K21.9 CHRONIC GERD: ICD-10-CM

## 2018-08-10 RX ORDER — OMEPRAZOLE 20 MG/1
20 CAPSULE, DELAYED RELEASE ORAL DAILY
Qty: 90 CAPSULE | Refills: 1 | Status: SHIPPED | OUTPATIENT
Start: 2018-08-10 | End: 2018-08-10 | Stop reason: SDUPTHER

## 2018-08-10 RX ORDER — OMEPRAZOLE 20 MG/1
20 CAPSULE, DELAYED RELEASE ORAL DAILY
Qty: 90 CAPSULE | Refills: 1 | Status: SHIPPED | OUTPATIENT
Start: 2018-08-10 | End: 2019-05-20 | Stop reason: SDUPTHER

## 2018-08-10 NOTE — TELEPHONE ENCOUNTER
Meghan Whitney from [facility] called in regards to a script for nexium that was sent to them  Pt's copay is too high, wants to know if we could send over omeprazole?  Please advise      Any questions call Meghan Whitney   [phone]

## 2018-08-11 NOTE — TELEPHONE ENCOUNTER
Pharmacy calling back and said patient is @ pharmacy now    She said a verbal OK to change the med to omeprazole is fine    Or e scribe    Is fine too

## 2018-08-17 ENCOUNTER — APPOINTMENT (OUTPATIENT)
Dept: LAB | Facility: HOSPITAL | Age: 73
End: 2018-08-17
Attending: UROLOGY
Payer: MEDICARE

## 2018-08-17 DIAGNOSIS — N40.1 BENIGN PROSTATIC HYPERPLASIA WITH LOWER URINARY TRACT SYMPTOMS, SYMPTOM DETAILS UNSPECIFIED: ICD-10-CM

## 2018-08-17 LAB
ABO GROUP BLD: NORMAL
BLD GP AB SCN SERPL QL: NEGATIVE
RH BLD: NEGATIVE
SPECIMEN EXPIRATION DATE: NORMAL

## 2018-08-17 PROCEDURE — 86850 RBC ANTIBODY SCREEN: CPT

## 2018-08-17 PROCEDURE — 86901 BLOOD TYPING SEROLOGIC RH(D): CPT

## 2018-08-17 PROCEDURE — 36415 COLL VENOUS BLD VENIPUNCTURE: CPT

## 2018-08-17 PROCEDURE — 86900 BLOOD TYPING SEROLOGIC ABO: CPT

## 2018-08-21 ENCOUNTER — TELEPHONE (OUTPATIENT)
Dept: INTERNAL MEDICINE CLINIC | Facility: CLINIC | Age: 73
End: 2018-08-21

## 2018-08-21 DIAGNOSIS — E78.2 MIXED HYPERLIPIDEMIA: ICD-10-CM

## 2018-08-21 RX ORDER — FENOFIBRATE 145 MG/1
145 TABLET, COATED ORAL DAILY
Qty: 30 TABLET | Refills: 0 | Status: SHIPPED | OUTPATIENT
Start: 2018-08-21 | End: 2018-09-18 | Stop reason: SDUPTHER

## 2018-08-21 NOTE — TELEPHONE ENCOUNTER
Patient called for a refill on Fenofibrate 145 mg  Would like to come in a  the script from Dr Rosco Severs this afternoon  He has only 1 pill left  Please call patient to let him know what time he can come for the script   Needs to go to [facility] in [address] [place] [address]

## 2018-08-21 NOTE — TELEPHONE ENCOUNTER
Misael Dougherty from [facility] called they faxed yesterday a note stating that they need a script for Fenofibrate sent over for the pt he is out of meds   [phone] faxed

## 2018-08-22 ENCOUNTER — TELEPHONE (OUTPATIENT)
Dept: INTERNAL MEDICINE CLINIC | Facility: CLINIC | Age: 73
End: 2018-08-22

## 2018-08-22 NOTE — TELEPHONE ENCOUNTER
PT CALLED PLEASE REMOVE [facility]  PT DOES NOT USE THIS PHARMACY ANY LONGER

## 2018-08-24 ENCOUNTER — APPOINTMENT (OUTPATIENT)
Dept: PREADMISSION TESTING | Facility: HOSPITAL | Age: 73
End: 2018-08-24
Payer: MEDICARE

## 2018-08-29 ENCOUNTER — ANESTHESIA EVENT (OUTPATIENT)
Dept: PERIOP | Facility: HOSPITAL | Age: 73
End: 2018-08-29
Payer: MEDICARE

## 2018-08-30 ENCOUNTER — ANESTHESIA (OUTPATIENT)
Dept: PERIOP | Facility: HOSPITAL | Age: 73
End: 2018-08-30
Payer: MEDICARE

## 2018-08-30 ENCOUNTER — HOSPITAL ENCOUNTER (OUTPATIENT)
Facility: HOSPITAL | Age: 73
Setting detail: OUTPATIENT SURGERY
Discharge: HOME | End: 2018-08-30
Attending: UROLOGY | Admitting: UROLOGY
Payer: MEDICARE

## 2018-08-30 ENCOUNTER — TELEPHONE (OUTPATIENT)
Dept: UROLOGY | Facility: CLINIC | Age: 73
End: 2018-08-30

## 2018-08-30 VITALS
SYSTOLIC BLOOD PRESSURE: 170 MMHG | WEIGHT: 173.72 LBS | TEMPERATURE: 97.2 F | RESPIRATION RATE: 18 BRPM | HEIGHT: 65 IN | HEART RATE: 50 BPM | DIASTOLIC BLOOD PRESSURE: 77 MMHG | OXYGEN SATURATION: 97 % | BODY MASS INDEX: 28.94 KG/M2

## 2018-08-30 DIAGNOSIS — R39.12 BENIGN PROSTATIC HYPERPLASIA WITH WEAK URINARY STREAM: Primary | ICD-10-CM

## 2018-08-30 DIAGNOSIS — N40.1 BENIGN PROSTATIC HYPERPLASIA WITH WEAK URINARY STREAM: Primary | ICD-10-CM

## 2018-08-30 PROCEDURE — 52450 TRANSURETHRAL INC PROSTATE: CPT | Performed by: UROLOGY

## 2018-08-30 PROCEDURE — 52442 CYSTO INS TRNSPRSTC IMPLT EA: CPT | Performed by: UROLOGY

## 2018-08-30 PROCEDURE — L8699 PROSTHETIC IMPLANT NOS: HCPCS | Performed by: UROLOGY

## 2018-08-30 PROCEDURE — 52441 CYSTO INSJ TRNSPRSTC 1 IMPLT: CPT | Performed by: UROLOGY

## 2018-08-30 DEVICE — IMPLANT URO PROSTATE UROLIFT: Type: IMPLANTABLE DEVICE | Site: PROSTATE | Status: FUNCTIONAL

## 2018-08-30 RX ORDER — FENTANYL CITRATE/PF 50 MCG/ML
50 SYRINGE (ML) INJECTION
Status: DISCONTINUED | OUTPATIENT
Start: 2018-08-30 | End: 2018-08-30 | Stop reason: HOSPADM

## 2018-08-30 RX ORDER — DOCUSATE SODIUM 100 MG/1
100 CAPSULE, LIQUID FILLED ORAL 2 TIMES DAILY
Qty: 30 CAPSULE | Refills: 0 | Status: SHIPPED | OUTPATIENT
Start: 2018-08-30 | End: 2019-05-21

## 2018-08-30 RX ORDER — ONDANSETRON 2 MG/ML
4 INJECTION INTRAMUSCULAR; INTRAVENOUS ONCE
Status: DISCONTINUED | OUTPATIENT
Start: 2018-08-30 | End: 2018-08-30 | Stop reason: HOSPADM

## 2018-08-30 RX ORDER — PHENAZOPYRIDINE HYDROCHLORIDE 200 MG/1
200 TABLET, FILM COATED ORAL 3 TIMES DAILY PRN
Qty: 10 TABLET | Refills: 0 | Status: SHIPPED | OUTPATIENT
Start: 2018-08-30 | End: 2018-09-02

## 2018-08-30 RX ORDER — SODIUM CHLORIDE, SODIUM LACTATE, POTASSIUM CHLORIDE, CALCIUM CHLORIDE 600; 310; 30; 20 MG/100ML; MG/100ML; MG/100ML; MG/100ML
INJECTION, SOLUTION INTRAVENOUS CONTINUOUS PRN
Status: DISCONTINUED | OUTPATIENT
Start: 2018-08-30 | End: 2018-08-30 | Stop reason: SURG

## 2018-08-30 RX ORDER — OXYCODONE HYDROCHLORIDE 5 MG/1
5 TABLET ORAL EVERY 4 HOURS PRN
Status: DISCONTINUED | OUTPATIENT
Start: 2018-08-30 | End: 2018-08-30 | Stop reason: HOSPADM

## 2018-08-30 RX ORDER — HYDROCODONE BITARTRATE AND ACETAMINOPHEN 5; 325 MG/1; MG/1
1 TABLET ORAL EVERY 6 HOURS PRN
Qty: 5 TABLET | Refills: 0 | Status: SHIPPED | OUTPATIENT
Start: 2018-08-30 | End: 2018-09-09

## 2018-08-30 RX ORDER — MAGNESIUM HYDROXIDE 1200 MG/15ML
LIQUID ORAL AS NEEDED
Status: DISCONTINUED | OUTPATIENT
Start: 2018-08-30 | End: 2018-08-30 | Stop reason: HOSPADM

## 2018-08-30 RX ORDER — DEXMEDETOMIDINE HYDROCHLORIDE 100 UG/ML
INJECTION, SOLUTION INTRAVENOUS AS NEEDED
Status: DISCONTINUED | OUTPATIENT
Start: 2018-08-30 | End: 2018-08-30 | Stop reason: SURG

## 2018-08-30 RX ORDER — PROPOFOL 10 MG/ML
INJECTION, EMULSION INTRAVENOUS AS NEEDED
Status: DISCONTINUED | OUTPATIENT
Start: 2018-08-30 | End: 2018-08-30 | Stop reason: SURG

## 2018-08-30 RX ORDER — ONDANSETRON 2 MG/ML
INJECTION INTRAMUSCULAR; INTRAVENOUS AS NEEDED
Status: DISCONTINUED | OUTPATIENT
Start: 2018-08-30 | End: 2018-08-30 | Stop reason: SURG

## 2018-08-30 RX ORDER — FENTANYL CITRATE 50 UG/ML
INJECTION, SOLUTION INTRAMUSCULAR; INTRAVENOUS AS NEEDED
Status: DISCONTINUED | OUTPATIENT
Start: 2018-08-30 | End: 2018-08-30 | Stop reason: SURG

## 2018-08-30 RX ORDER — IBUPROFEN 200 MG
600 TABLET ORAL EVERY 6 HOURS PRN
Refills: 0
Start: 2018-08-30 | End: 2019-05-21

## 2018-08-30 RX ADMIN — ONDANSETRON 4 MG: 2 INJECTION INTRAMUSCULAR; INTRAVENOUS at 09:52

## 2018-08-30 RX ADMIN — PROPOFOL 150 MG: 10 INJECTION, EMULSION INTRAVENOUS at 09:52

## 2018-08-30 RX ADMIN — SODIUM CHLORIDE, SODIUM LACTATE, POTASSIUM CHLORIDE, AND CALCIUM CHLORIDE: .6; .31; .03; .02 INJECTION, SOLUTION INTRAVENOUS at 09:37

## 2018-08-30 RX ADMIN — CEFAZOLIN SODIUM 2000 MG: 2 SOLUTION INTRAVENOUS at 09:37

## 2018-08-30 RX ADMIN — DEXMEDETOMIDINE 25 MCG: 100 INJECTION, SOLUTION, CONCENTRATE INTRAVENOUS at 09:52

## 2018-08-30 RX ADMIN — SODIUM CHLORIDE, SODIUM LACTATE, POTASSIUM CHLORIDE, AND CALCIUM CHLORIDE: .6; .31; .03; .02 INJECTION, SOLUTION INTRAVENOUS at 09:45

## 2018-08-30 RX ADMIN — FENTANYL CITRATE 25 MCG: 50 INJECTION, SOLUTION INTRAMUSCULAR; INTRAVENOUS at 10:02

## 2018-08-30 RX ADMIN — LIDOCAINE HYDROCHLORIDE 100 MG: 20 INJECTION, SOLUTION INTRAVENOUS at 09:52

## 2018-08-30 RX ADMIN — DEXMEDETOMIDINE 12 MCG: 100 INJECTION, SOLUTION, CONCENTRATE INTRAVENOUS at 09:57

## 2018-08-30 RX ADMIN — FENTANYL CITRATE 25 MCG: 50 INJECTION, SOLUTION INTRAMUSCULAR; INTRAVENOUS at 09:55

## 2018-08-30 NOTE — ANESTHESIA PREPROCEDURE EVALUATION
Review of Systems/Medical History  Patient summary reviewed  Chart reviewed  No history of anesthetic complications     Cardiovascular  EKG reviewed, Negative cardio ROS Exercise tolerance (METS): good,  Hyperlipidemia, Hypertension , No CAD , No CHF ,   Comment: LEFT VENTRICLE:  Systolic function was normal  Ejection fraction was estimated to be 55 %  There were no regional wall motion abnormalities  Doppler parameters were consistent with abnormal left ventricular relaxation (grade 1 diastolic dysfunction)      MITRAL VALVE:  There was mild regurgitation      AORTIC VALVE:  There was mild regurgitation      TRICUSPID VALVE:  There was mild regurgitation      PULMONIC VALVE:  There was mild regurgitation    ,  Pulmonary  Negative pulmonary ROS Not a smoker , No asthma , No recent URI , Sleep apnea ,        GI/Hepatic  Negative GI/hepatic ROS          Negative  ROS        Endo/Other  Negative endo/other ROS      GYN  Negative gynecology ROS          Hematology  Negative hematology ROS      Musculoskeletal    Arthritis     Neurology  Negative neurology ROS      Psychology   Negative psychology ROS          Patient: Antoni Birch  MR number: [id number]  Account number: [de-identified]  : 1945  Age: 66 years  Gender: Male  Status: Inpatient  Location: Bedside  Height: 64 in  Weight: 214 1 lb  BP: 162/ 68 mmHg     Indications: Evaluate for coronary artery disease      Diagnoses: I25 10 - Atherosclerotic heart disease of native coronary artery without angina pectoris     Sonographer:   Alexander Brothers RDCS  Interpreting Physician:  Maik Euceda MD  Primary Physician:  Maco Merlos PA-C  Group:  [facility]     SUMMARY     LEFT VENTRICLE:  Systolic function was normal  Ejection fraction was estimated to be 55 %  There were no regional wall motion abnormalities    Doppler parameters were consistent with abnormal left ventricular relaxation (grade 1 diastolic dysfunction)      MITRAL VALVE:  There was mild regurgitation      AORTIC VALVE:  There was mild regurgitation      TRICUSPID VALVE:  There was mild regurgitation      PULMONIC VALVE:  There was mild regurgitation      HISTORY: Symptoms: chest pain  PRIOR HISTORY: Risk factors: hypertension and hypercholesterolemia  Physical Exam    Airway    Mallampati score: II  TM Distance: >3 FB       Dental   upper dentures,     Cardiovascular  Comment: Negative ROS,     Pulmonary  Breath sounds clear to auscultation,     Other Findings        Anesthesia Plan  ASA Score- 2     Anesthesia Type- general with ASA Monitors  Additional Monitors:   Airway Plan:         Plan Factors-  Patient did not smoke on day of surgery  Induction- intravenous  Postoperative Plan-     Informed Consent- Anesthetic plan and risks discussed with patient  I personally reviewed this patient with the CRNA  Discussed and agreed on the Anesthesia Plan with the CRNA  Kady Fontenot

## 2018-08-30 NOTE — DISCHARGE INSTRUCTIONS
UROLIFT    You may restart your aspirin in 5 days  You will be contacted by my office for Samuel catheter removal appointment    WHAT YOU NEED TO KNOW:   A UroLift is procedure that is done to open the natural channel within your prostate gland  DISCHARGE INSTRUCTIONS:   Medicines:   · Pain medicine: You may be given a prescription medicine to decrease pain  Do not wait until the pain is severe before you take this medicine  · Antibiotics:  You may be provided with antibiotics at discharge  This medicine is given to fight or prevent an infection caused by bacteria  Always take your antibiotics exactly as ordered by your healthcare provider  Do not stop taking your medicine unless directed by your healthcare provider  Never save antibiotics or take leftover antibiotics that were given to you for another illness  · Take your medicine as directed  Contact your healthcare provider if you think your medicine is not helping or if you have side effects  Tell him or her if you are allergic to any medicine  Keep a list of the medicines, vitamins, and herbs you take  Include the amounts, and when and why you take them  Bring the list or the pill bottles to follow-up visits  Carry your medicine list with you in case of an emergency  Follow up with your healthcare provider or urologist as directed: You may need to return to make sure you do not have an infection, or to have your Samuel catheter removed  Write down your questions so you remember to ask them during your visits  Samuel catheter care: You may be sent home with a Samuel catheter  If so you will be provided with instructions to remove it    Bladder control:  After surgery, you may leak urine and have trouble controlling when you urinate  Ask for more information about the following ways to help decrease urine leakage:  · Avoid caffeine:  Caffeine can cause problems with bladder control and increase your need to urinate      · Do pelvic floor muscle exercises:  Pelvic floor muscle exercises may help improve your bladder control, if you leak urine  These exercises are done by tightening and relaxing your pelvic muscles  Ask how to do pelvic floor muscle exercises, and how often to do them  · Limit your liquids:  Drink smaller amounts of liquid throughout the day  Do not drink before bedtime  Ask if you should decrease the amount of liquid you drink each day  This may help you control your bladder  · Wear a pad or adult diapers: These may help to absorb leaking urine and decrease the odor  Activity guidelines:  Ask when it is okay for you to return to work and activities, or to have sex  Contact your healthcare provider or urologist if:   · You have a fever  · You have new or more blood in your urine  · You have trouble starting to urinate, or have a weak stream of urine when you urinate  · You feel like you have a full bladder, even after you urinate  You may also leak urine  · You often wake up during the night to urinate  You may also feel the need to urinate right away  · You feel pain and burning when you urinate  · You feel pain or pressure in your lower abdomen  · Your urine looks cloudy, and smells bad  · You have trouble getting an erection or ejaculating  · You have questions or concerns about your condition or care  Seek care immediately or call 911 if:   · You urinate little or not at all  · You have severe abdominal or back pain  · You are dizzy or confused  · You have abdominal pain, nausea, and vomiting  · Your heartbeat is slower than usual   © 2017 [facility] Information is for End User's use only and may not be sold, redistributed or otherwise used for commercial purposes  All illustrations and images included in CareNotes® are the copyrighted property of [employer]  or [employer]  The above information is an  only   It is not intended as medical advice for individual conditions or treatments  Talk to your doctor, nurse or pharmacist before following any medical regimen to see if it is safe and effective for you  Samuel Catheter Placement and Care   WHAT YOU NEED TO KNOW:   A Samuel catheter is a sterile tube that is inserted into your bladder to drain urine  It is also called an indwelling urinary catheter  The tip of the catheter has a small balloon filled with solution that holds the catheter inside your bladder  DISCHARGE INSTRUCTIONS:   Return to the emergency department if:   · Your catheter comes out  · You suddenly have material that looks like sand in the tubing or drainage bag  · No urine is draining into the bag and you have checked the system  · You have pain in your hip, back, pelvis, or lower abdomen  · You are confused or cannot think clearly  Contact your healthcare provider if:   · You have a fever  · You have bladder spasms for more than 1 day after the catheter is placed  · You see blood in the tubing or drainage bag  · You have a rash or itching where the catheter tube is secured to your skin  · Urine leaks from or around the catheter, tubing, or drainage bag  · The closed drainage system has accidently come open or apart  · You see a layer of crystals inside the tubing  · You have questions or concerns about your condition or care  Care for your Samuel catheter:   · Clean your genital area 2 times every day  Clean your catheter and the area around where it was inserted  Use soap and water  Clean your anal opening and catheter area after every bowel movement  · Secure the catheter tube  so you do not pull or move the catheter  This helps prevent pain and bladder spasms  Healthcare providers will show you how to use medical tape or a strap to secure the catheter tube to your body  · Keep a closed drainage system    Your Samuel catheter should always be attached to the drainage bag to form a closed system  Do not disconnect any part of the closed system unless you need to change the bag  Care for your drainage bag:   · Ask if a leg bag is right for you  A leg bag can be worn under your clothes  Ask your healthcare provider for more information about a leg bag  · Keep the drainage bag below the level of your waist   This helps stop urine from moving back up the tubing and into your bladder  Do not loop or kink the tubing  This can cause urine to back up and collect in your bladder  Do not let the drainage bag touch or lie on the floor  · Empty the drainage bag when needed  The weight of a full drainage bag can be painful  Empty the drainage bag every 3 to 6 hours or when it is ? full  · Clean and change the drainage bag as directed  Ask your healthcare provider how often you should change the drainage bag and what cleaning solution to use  Wear disposable gloves when you change the bag  Do not allow the end of the catheter or tubing to touch anything  Clean the ends with an alcohol pad before you reconnect them  What to do if problems develop:   · No urine is draining into the bag:      ¨ Check for kinks in the tubing and straighten them out  ¨ Check the tape or strap used to secure the catheter tube to your skin  Make sure it is not blocking the tube  ¨ Make sure you are not sitting or lying on the tubing  ¨ Make sure the urine bag is hanging below the level of your waist     · Urine leaks from or around the catheter, tubing, or drainage bag:  Check if the closed drainage system has accidently come open or apart  Clean the catheter and tubing ends with a new alcohol pad and reconnect them  Prevent an infection:   · Wash your hands often  Wash before and after you touch your catheter, tubing, or drainage bag  Use soap and water  Wear clean disposable gloves when you care for your catheter or disconnect the drainage bag   Wash your hands before you prepare or eat food            · Drink liquids as directed  Ask your healthcare provider how much liquid to drink each day and which liquids are best for you  Liquids will help flush your kidneys and bladder to help prevent infection  Follow up with your healthcare provider as directed:  Write down your questions so you remember to ask them during your visits  © 2017 [address] [employer] Information is for End User's use only and may not be sold, redistributed or otherwise used for commercial purposes  All illustrations and images included in CareNotes® are the copyrighted property of [employer]  or [employer]  The above information is an  only  It is not intended as medical advice for individual conditions or treatments  Talk to your doctor, nurse or pharmacist before following any medical regimen to see if it is safe and effective for you

## 2018-08-30 NOTE — H&P (VIEW-ONLY)
Referring Physician: Ilana White DO  A copy of this note was sent to the referring physician  Diagnoses and all orders for this visit:    Benign prostatic hyperplasia with lower urinary tract symptoms, symptom details unspecified  -     POCT urine dip  -     Urine culture    Benign prostatic hyperplasia with urinary frequency            Assessment and plan: We reviewed the options for treating BPH/LUTS which include but are not limited to expectant management, medical therapy, transurethral resection of prostate (TURP)  We also discussed minimally invasive options  At this point, the patient wishes to proceed with Urolift  This is a great option for the patient based on the following criteria:    - cystoscopy revealed mild lateral lobe hyperplasia and a high bladder neck  - 20 g gland measured on transrectal ultrasound  -uroflow with Q max 4 0  - PVR with 23  - normal renal function  - no active urinary tract infection  - PSA: 0 4  - no documented allergy to nickel    - He has failed the following treatment options:  tamsulosin  The additional morbidity of standard surgical options (ie, TURP) is not necessary given the above criteria  The risks of Urolift include but are not limited to bleeding, infection, reaction to anesthesia such as heart attack, stroke, DVT/PE, hyponatremia, bladder neck contracture, urethral stricture, injury to surrounding structures (ureters, rectum, etc)  We discussed that additional risks of trans urethral resection procedures such as incontinence, retrograde ejaculation, and erectile dysfunction have been only rarely reported with the Uro lift procedure  We did discuss that this is a new were procedure and a permanent implant  We discussed that there may be some long-term implications that her on for seen with this newer technology, such as perhaps complicating treatment for prostate cancer if indicated down the line    Finally, I told him that he may require additional procedures secondary to some of these complications  Because he has a high bladder neck I recommended performing a concomitant trans urethral incision of the prostate at the time of the Uro lift  I discussed the additional risks of this procedure including retrograde ejaculation which he states is not a concern of his    Informed consent was obtained for the Uro lift procedure  This will be scheduled in the near future to be performed under [facility]  We will schedule the surgery at [place]  He understands he will be discharged with a Samuel catheter for few days due to the trans urethral incision of the prostate      Fransico Freeman MD      Chief Complaint     Follow-up BPH      History of Present Illness     John Paul Batres is a 74 y o  male returns in follow-up for BP H  He has had f he remains highly symptomatic  with bothersome weak stream, hesitancy and daytime frequency and nocturia times 2-3  Tamsulosin was not in brie efficacious the past   He presents today for cystoscopy and transrectal ultrasoun no interval UTIs or hematuria  Detailed Urologic History     - please refer to HPI    Review of Systems     Review of Systems   Constitutional: Negative for activity change and fatigue  HENT: Negative for congestion  Eyes: Negative for visual disturbance  Respiratory: Negative for shortness of breath and wheezing  Cardiovascular: Negative for chest pain and leg swelling  Gastrointestinal: Negative for abdominal pain  Endocrine: Negative for polyuria  Genitourinary: Positive for frequency  Negative for dysuria, flank pain, hematuria and urgency  Musculoskeletal: Negative for back pain  Allergic/Immunologic: Negative for immunocompromised state  Neurological: Negative for dizziness and numbness  Psychiatric/Behavioral: Negative for dysphoric mood  All other systems reviewed and are negative        AUA SYMPTOM SCORE      Most Recent Value   AUA SYMPTOM SCORE   How often have you had a sensation of not emptying your bladder completely after you finished urinating? 5   How often have you had to urinate again less than two hours after you finished urinating? 4   How often have you found you stopped and started again several times when you urinate? 3   How often have you found it difficult to postpone urination? 3   How often have you had a weak urinary stream?  3   How often have you had to push or strain to begin urination? 2   How many times did you most typically get up to urinate from the time you went to bed at night until the time you got up in the morning? 2   Quality of Life: If you were to spend the rest of your life with your urinary condition just the way it is now, how would you feel about that?  3   AUA SYMPTOM SCORE  22            Allergies     Allergies   Allergen Reactions    Iodides     Seasonal Ic  [Cholestatin]        Physical Exam     Physical Exam   Constitutional: He is oriented to person, place, and time  He appears well-developed and well-nourished  No distress  HENT:   Head: Normocephalic and atraumatic  Eyes: EOM are normal    Neck: Normal range of motion  Cardiovascular:   Negative lower extremity edema   Pulmonary/Chest: Effort normal and breath sounds normal    Abdominal: Soft  Genitourinary: Penis normal    Genitourinary Comments: Negative suprapubic tenderness, CVA tenderness   Musculoskeletal: Normal range of motion  Neurological: He is alert and oriented to person, place, and time  Skin: Skin is warm  Psychiatric: He has a normal mood and affect   His behavior is normal            Vital Signs  Vitals:    08/02/18 1359   BP: 138/70   Pulse: 58   Weight: 80 3 kg (177 lb)   Height: 5' 5" (1 651 m)         Current Medications       Current Outpatient Prescriptions:     acetaminophen (TYLENOL) 500 mg tablet, Take 2 tablets by mouth daily as needed, Disp: , Rfl:     amLODIPine (NORVASC) 5 mg tablet, Take 1 tablet (5 mg total) by mouth every morning, Disp: 90 tablet, Rfl: 3    aspirin (ASPIRIN LOW DOSE) 81 MG tablet, Take 1 tablet by mouth daily, Disp: , Rfl:     dicyclomine (BENTYL) 20 mg tablet, Take 1 tablet (20 mg total) by mouth every 6 (six) hours As needed for abdominal pain, Disp: 20 tablet, Rfl: 0    esomeprazole (NexIUM) 20 mg capsule, Take 1 capsule (20 mg total) by mouth daily, Disp: 90 capsule, Rfl: 3    fenofibrate (TRICOR) 145 mg tablet, TAKE ONE TABLET BY MOUTH ONE TIME DAILY , Disp: 30 tablet, Rfl: 2    lisinopril (ZESTRIL) 40 mg tablet, Take 1 tablet (40 mg total) by mouth daily, Disp: 90 tablet, Rfl: 3    loperamide (IMODIUM A-D) 2 MG tablet, Take 1 tablet by mouth daily as needed, Disp: , Rfl:     lovastatin (MEVACOR) 40 MG tablet, Take one tablet by mouth one time daily, Disp: 90 tablet, Rfl: 2    metoprolol tartrate (LOPRESSOR) 25 mg tablet, TAKE ONE TABLET BY MOUTH TWICE DAILY , Disp: 60 tablet, Rfl: 9    Multiple Vitamins-Minerals (CENTRUM ULTRA MENS PO), Take 1 tablet by mouth daily, Disp: , Rfl:       Active Problems     Patient Active Problem List   Diagnosis    Chest pain    Essential hypertension    Mixed hyperlipidemia    Chronic pain disorder    Chronic left shoulder pain    Chronic bilateral low back pain with bilateral sciatica    Disc degeneration, lumbar    Lumbar stenosis with neurogenic claudication    Left lower quadrant pain    Abnormal CT of the abdomen    Chronic pain syndrome    Benign prostatic hyperplasia with urinary frequency    GREGG on CPAP         Past Medical History     Past Medical History:   Diagnosis Date    Apnea     Apnea     Diverticulitis     Diverticulitis     Hypercholesterolemia     Hyperlipidemia     Hypertension     Mitral valve disorder     Mitral valve disorder     Thoracic neuritis     Thoracic neuritis          Surgical History     Past Surgical History:   Procedure Laterality Date    COLONOSCOPY      Complete    ESOPHAGOGASTRODUODENOSCOPY Diagnostic    FOOT SURGERY      HERNIA REPAIR      KNEE SURGERY Left     NEUROPLASTY / TRANSPOSITION MEDIAN NERVE AT CARPAL TUNNEL      SD COLONOSCOPY FLX DX W/COLLJ SPEC WHEN PFRMD N/A 4/10/2018    Procedure: EGD AND COLONOSCOPY;  Surgeon: Phyllis Cerrato MD;  Location: [facility]; Service: Gastroenterology    SHOULDER SURGERY      TARSAL TUNNEL RELEASE      Neuroplasty Decompression         Family History     Family History   Problem Relation Age of Onset    Diabetes Mother         Mellitus    Heart disease Mother         Arteriosclerotic Cardiovascular    Hypertension Mother     Cancer Father     Arthritis Father         Rheu Mult Site W Involv Of Organs And Systems    Thyroid disease Sister     Diabetes Brother         Diabetes:Mellitus    Heart disease Brother     Hypertension Brother     Coronary artery disease Family          Social History     Social History     History   Smoking Status    Former Smoker    Packs/day: 1 00    Years: 17 00    Quit date: 1978   Smokeless Tobacco    Never Used         Pertinent Lab Values     Lab Results   Component Value Date    CREATININE 1 03 07/02/2018       Lab Results   Component Value Date    PSA 0 4 02/15/2018    PSA 0 5 12/10/2015    PSA 0 78 12/03/2014       @RESULTRCNT(1H])@      Pertinent Imaging      - n/a    Portions of the record may have been created with voice recognition software   Occasional wrong word or "sound a like" substitutions may have occurred due to the inherent limitations of voice recognition software   Read the chart carefully and recognize, using context, where substitutions have occurred

## 2018-08-30 NOTE — TELEPHONE ENCOUNTER
Discharging with Samuel catheter after Uro lift    Please schedule Samuel removal in PVR in the office early next week    Then follow-up 4 weeks AP uroflow PVR

## 2018-08-30 NOTE — OP NOTE
Operative Note     PATIENT:  Rachel De La Rosa (MRN [id number])    DATE OF PROCEDURE:   8/30/2018    PRE-OP DIAGNOSES:   1) BPH with obstruction     POST-OP DIAGNOSES AND OPERATIVE FINDINGS:   1) BPH with obstruction    PROCEDURES:  1) UroLift implantation  2) trans urethral incision of the prostate    SURGEON:   Mariely Ellsworth MD    No qualified teaching residents available to assist    ANESTHESIA TYPE:  General anesthesia    ESTIMATED BLOOD LOSS:   Minimal    COMPLICATIONS:   None    ANTIBIOTICS:  Cefazolin    INTRAOPERATIVE THROMBOEMBOLISM PROPHYLAXIS:  Pneumatic compression stockings    INDICATIONS:  Patient is a 75-year-old male with bilobar hyperplasia as well as high bladder neck  He has medically refractory lower urinary tract symptoms  After reviewing the options patient elected to proceed to the operating room for a Uro lift with a concomitant trans urethral incision of the prostate  Both procedures were discussed in detail including the benefits, the alternatives, the expected postoperative course, and the risks and he provided informed consent and elected to proceed    PROCEDURE SUMMARY:    The patient was identified, brought to the operating room, and placed on the table in supine position  After induction of general anesthesia, the patient was placed in dorsal lithotomy position and prepped and draped in the usual sterile fashion  A complete formal timeout was performed  A 20F cystoscope was inserted into the bladder  Again noted was a high bladder neck and lateral lobe hyperplasia  Trans urethral incision of the prostate was performed 1st   This was done using an electrocautery instrument  Bladder neck was incised at the 5 o'clock and 7:00 position  The incision began just distal to each ureteral orifice and proceeded to the [place] 42  Hemostasis was then ensured using the instrument  This provided a nice wide-open channel to deploy each Uro lift implant        The cystoscopy bridge was replaced with a UroLift delivery device  The first treatment site was the patient's left side approximately 1 5 cm distal to the bladder neck  The distal tip of the delivery device was then angled laterally approximately 20 degrees at this position to compress the lateral lobe  The trigger was pulled, thereby deploying a needle containing the implant through the prostate  The needle was then retracted, allowing one end of the implant to be delivered to the capsular surface of the prostate  The implant was then tensioned to assure capsular seating and removal of slack monofilament  The device was then angled back toward midline and slowly advanced proximally until cystoscopic verification of the monofilament being centered in the delivery bay  The urethral end piece was then affixed to the monofilament thereby tailoring the size of the implant  Excess filament was then severed  The delivery device was then re-advanced into the bladder  The delivery device was then replaced with cystoscope and bridge and the implant location and opening effect was confirmed cystoscopically  The same procedure was then repeated on the right side, and two additional implants were delivered just proximal to the veru montanum, again one on left and one on right side of the prostate, following the same technique  A total of 6 implants were deployed to create a nice anterior channel  Implants were deployed in the following locations:    1  Right bladder neck  2  Left bladder neck  3  Right apex  4  Left apex  5  Right mid gland  6  Left mid gland    A final cystoscopy was conducted first to inspect the location and state of each implant and second, to confirm the presence of a continuous anterior channel was present through the prostatic urethra with irrigation flow turned off  A hays catheter was then placed      The patient tolerated the procedure well and was transferred to the recovery room awake alert and in stable condition  IMPLANTS:     Implant Name Type Inv  Item Serial No   Lot No  LRB No  Used   IMPLANT URO PROSTATE UROLIFT - [id number]   IMPLANT URO PROSTATE UROLIFT   [employer] [id number] N/A 6      PLAN:  Patient will be discharged home with a Samuel catheter  Will be removed in the office next week    He will then follow up in 4 weeks with a uroflow PVR

## 2018-08-30 NOTE — ANESTHESIA POSTPROCEDURE EVALUATION
Post-Op Assessment Note      CV Status:  Stable    Mental Status:  Alert and awake    Hydration Status:  Euvolemic    PONV Controlled:  Controlled    Airway Patency:  Patent    Post Op Vitals Reviewed: Yes          Staff: CRNA           /71   Temp  97 2   Pulse 50   Resp   16   SpO2   99%

## 2018-08-30 NOTE — TELEPHONE ENCOUNTER
Called and spoke to patients wife  Patient was scheduled for void trial on 9/4/18 at 8 and 2  Explained void trail to patients wife  Patients wife verbalized understanding of void trial and is aware of appointments time and location  Patients wife denies any other questions at this time

## 2018-08-31 ENCOUNTER — HOSPITAL ENCOUNTER (EMERGENCY)
Facility: HOSPITAL | Age: 73
Discharge: HOME | End: 2018-08-31
Attending: EMERGENCY MEDICINE | Admitting: EMERGENCY MEDICINE
Payer: MEDICARE

## 2018-08-31 VITALS
DIASTOLIC BLOOD PRESSURE: 69 MMHG | OXYGEN SATURATION: 95 % | TEMPERATURE: 98.4 F | HEART RATE: 75 BPM | RESPIRATION RATE: 18 BRPM | SYSTOLIC BLOOD PRESSURE: 135 MMHG

## 2018-08-31 DIAGNOSIS — T83.9XXA FOLEY CATHETER PROBLEM (HCC): Primary | ICD-10-CM

## 2018-08-31 PROCEDURE — 99283 EMERGENCY DEPT VISIT LOW MDM: CPT

## 2018-08-31 NOTE — DISCHARGE INSTRUCTIONS
Samuel Catheter Placement and Care   WHAT YOU NEED TO KNOW:   A Samuel catheter is a sterile tube that is inserted into your bladder to drain urine  It is also called an indwelling urinary catheter  The tip of the catheter has a small balloon filled with solution that holds the catheter inside your bladder  DISCHARGE INSTRUCTIONS:   Return to the emergency department if:   · Your catheter comes out  · You suddenly have material that looks like sand in the tubing or drainage bag  · No urine is draining into the bag and you have checked the system  · You have pain in your hip, back, pelvis, or lower abdomen  · You are confused or cannot think clearly  Contact your healthcare provider if:   · You have a fever  · You have bladder spasms for more than 1 day after the catheter is placed  · You see blood in the tubing or drainage bag  · You have a rash or itching where the catheter tube is secured to your skin  · Urine leaks from or around the catheter, tubing, or drainage bag  · The closed drainage system has accidently come open or apart  · You see a layer of crystals inside the tubing  · You have questions or concerns about your condition or care  Care for your Samuel catheter:   · Clean your genital area 2 times every day  Clean your catheter and the area around where it was inserted  Use soap and water  Clean your anal opening and catheter area after every bowel movement  · Secure the catheter tube  so you do not pull or move the catheter  This helps prevent pain and bladder spasms  Healthcare providers will show you how to use medical tape or a strap to secure the catheter tube to your body  · Keep a closed drainage system  Your Samuel catheter should always be attached to the drainage bag to form a closed system  Do not disconnect any part of the closed system unless you need to change the bag    Care for your drainage bag:   · Ask if a leg bag is right for you   A leg bag can be worn under your clothes  Ask your healthcare provider for more information about a leg bag  · Keep the drainage bag below the level of your waist   This helps stop urine from moving back up the tubing and into your bladder  Do not loop or kink the tubing  This can cause urine to back up and collect in your bladder  Do not let the drainage bag touch or lie on the floor  · Empty the drainage bag when needed  The weight of a full drainage bag can be painful  Empty the drainage bag every 3 to 6 hours or when it is ? full  · Clean and change the drainage bag as directed  Ask your healthcare provider how often you should change the drainage bag and what cleaning solution to use  Wear disposable gloves when you change the bag  Do not allow the end of the catheter or tubing to touch anything  Clean the ends with an alcohol pad before you reconnect them  What to do if problems develop:   · No urine is draining into the bag:      ¨ Check for kinks in the tubing and straighten them out  ¨ Check the tape or strap used to secure the catheter tube to your skin  Make sure it is not blocking the tube  ¨ Make sure you are not sitting or lying on the tubing  ¨ Make sure the urine bag is hanging below the level of your waist     · Urine leaks from or around the catheter, tubing, or drainage bag:  Check if the closed drainage system has accidently come open or apart  Clean the catheter and tubing ends with a new alcohol pad and reconnect them  Prevent an infection:   · Wash your hands often  Wash before and after you touch your catheter, tubing, or drainage bag  Use soap and water  Wear clean disposable gloves when you care for your catheter or disconnect the drainage bag  Wash your hands before you prepare or eat food  · Drink liquids as directed  Ask your healthcare provider how much liquid to drink each day and which liquids are best for you   Liquids will help flush your kidneys and bladder to help prevent infection  Follow up with your healthcare provider as directed:  Write down your questions so you remember to ask them during your visits  © 2017 [employer] Information is for End User's use only and may not be sold, redistributed or otherwise used for commercial purposes  All illustrations and images included in CareNotes® are the copyrighted property of A D [employer] M [employer]  or [employer]  The above information is an  only  It is not intended as medical advice for individual conditions or treatments  Talk to your doctor, nurse or pharmacist before following any medical regimen to see if it is safe and effective for you

## 2018-09-04 ENCOUNTER — PROCEDURE VISIT (OUTPATIENT)
Dept: UROLOGY | Facility: CLINIC | Age: 73
End: 2018-09-04
Payer: MEDICARE

## 2018-09-04 ENCOUNTER — TELEPHONE (OUTPATIENT)
Dept: UROLOGY | Facility: CLINIC | Age: 73
End: 2018-09-04

## 2018-09-04 VITALS
BODY MASS INDEX: 28.92 KG/M2 | HEART RATE: 62 BPM | HEIGHT: 65 IN | WEIGHT: 173.6 LBS | DIASTOLIC BLOOD PRESSURE: 78 MMHG | SYSTOLIC BLOOD PRESSURE: 142 MMHG

## 2018-09-04 DIAGNOSIS — N40.1 BENIGN PROSTATIC HYPERPLASIA WITH LOWER URINARY TRACT SYMPTOMS, SYMPTOM DETAILS UNSPECIFIED: Primary | ICD-10-CM

## 2018-09-04 LAB — POST-VOID RESIDUAL VOLUME, ML POC: 6 ML

## 2018-09-04 PROCEDURE — 51798 US URINE CAPACITY MEASURE: CPT

## 2018-09-04 NOTE — TELEPHONE ENCOUNTER
Lm on pt's vm to confirm the appt   In the message, adv we must hear back from him in order to confirm if he can make the appointment or not

## 2018-09-04 NOTE — TELEPHONE ENCOUNTER
Pt needs a 4 week fu with grecia in [facility]  It's for a uroflow/pvr  Please advise

## 2018-09-04 NOTE — PROGRESS NOTES
9/4/2018    Ellyn Rollins  1945  [id number]    Diagnosis  Chief Complaint     Benign Prostatic Hypertrophy          Patient presents for Void trial managed by [address]  Follow up in 4 weeks with Uroflow/PVR with AP     Procedure Samuel removal/voiding trial    Samuel catheter removed after deflation of an intact balloon  Patient tolerated well  Encouraged patient to hydrate well and return this afternoon for post void residual  He knows he may return early if uncomfortable and unable to urinate  Patient agrees to this plan  Patient returned this afternoon  Patient states he was able to void multiple times  Patient voided while in office  Bladder ultrasound performed and PVR measured 6ml      Recent Results (from the past 1 hour(s))   POCT Measure PVR    Collection Time: 09/04/18  2:09 PM   Result Value Ref Range    POST-VOID RESIDUAL VOLUME, ML POC 6 mL         Vitals:    09/04/18 0805   BP: 142/78   BP Location: Right arm   Patient Position: Sitting   Cuff Size: Standard   Pulse: 62   Weight: 78 7 kg (173 lb 9 6 oz)   Height: 5' 5" (1 651 m)           Cassy Arzate RN

## 2018-09-18 DIAGNOSIS — E78.2 MIXED HYPERLIPIDEMIA: ICD-10-CM

## 2018-09-18 RX ORDER — FENOFIBRATE 145 MG/1
145 TABLET, COATED ORAL DAILY
Qty: 90 TABLET | Refills: 2 | Status: SHIPPED | OUTPATIENT
Start: 2018-09-18 | End: 2019-03-06 | Stop reason: SDUPTHER

## 2018-09-25 ENCOUNTER — OFFICE VISIT (OUTPATIENT)
Dept: PULMONOLOGY | Facility: CLINIC | Age: 73
End: 2018-09-25
Payer: MEDICARE

## 2018-09-25 VITALS
DIASTOLIC BLOOD PRESSURE: 80 MMHG | HEART RATE: 50 BPM | WEIGHT: 173 LBS | BODY MASS INDEX: 28.82 KG/M2 | SYSTOLIC BLOOD PRESSURE: 130 MMHG | HEIGHT: 65 IN | OXYGEN SATURATION: 96 %

## 2018-09-25 DIAGNOSIS — G47.33 OSA ON CPAP: Primary | ICD-10-CM

## 2018-09-25 DIAGNOSIS — Z99.89 OSA ON CPAP: Primary | ICD-10-CM

## 2018-09-25 PROCEDURE — 99213 OFFICE O/P EST LOW 20 MIN: CPT | Performed by: INTERNAL MEDICINE

## 2018-09-25 NOTE — PROGRESS NOTES
Assessment:    1  GREGG on CPAP  PAP DME Resupply/Reorder       Plan:     Patient with sleep apnea, recently requested a new machine, was also having problems with time the pressure was too high  We ordered him a new machine and sent it to auto CPAP  He is doing much better with the new machine  Compliance report shows AHI of 5 9  He did recently have a prostate surgery done so his sleep has been more interrupted overall he feels better with the new CPAP  We will continue with the current settings  Follow-up in 6 months or sooner if necessary  Subjective:     Patient ID: Kashmir Wilhelm is a 74 y o  male  Chief Complaint:  Patient is a 75 yo male former smoker with PMH of GREGG on CPAP, HTN, HLD, BPH  He is here today for follow up  He did receive a new CPAP machine which is set to an auto pressure  The new machine is much more comfortable  He did recently have a surgery Dr Elma Gutierrez for his prostate so has been up more through the night to urinate  The following portions of the patient's history were reviewed in this encounter and updated as appropriate:   Review of Systems   Constitutional: Negative  HENT: Negative  Respiratory: Negative  Cardiovascular: Negative  Gastrointestinal: Negative  Genitourinary: Negative  Musculoskeletal: Negative  Skin: Negative  Allergic/Immunologic: Negative  Neurological: Negative  Psychiatric/Behavioral: Negative  Objective:  /80   Pulse (!) 50   Ht 5' 5" (1 651 m)   Wt 78 5 kg (173 lb)   SpO2 96%   BMI 28 79 kg/m²     Physical Exam   Constitutional: He is oriented to person, place, and time  He appears well-developed and well-nourished  No distress  HENT:   Mouth/Throat: Oropharynx is clear and moist    Crowded oropharyngeal airway, Mallampati 3   Eyes: Pupils are equal, round, and reactive to light  Neck:   Short and wide neck   Cardiovascular: Normal rate and regular rhythm      No murmur heard   Pulmonary/Chest: Effort normal and breath sounds normal  No respiratory distress  He has no wheezes  He has no rales  Abdominal: Soft  Musculoskeletal: Normal range of motion  Neurological: He is alert and oriented to person, place, and time  Skin: Skin is warm and dry  Psychiatric: He has a normal mood and affect   His behavior is normal  Judgment and thought content normal

## 2018-10-04 ENCOUNTER — OFFICE VISIT (OUTPATIENT)
Dept: UROLOGY | Facility: CLINIC | Age: 73
End: 2018-10-04
Payer: MEDICARE

## 2018-10-04 VITALS
HEIGHT: 65 IN | SYSTOLIC BLOOD PRESSURE: 130 MMHG | DIASTOLIC BLOOD PRESSURE: 80 MMHG | HEART RATE: 74 BPM | BODY MASS INDEX: 28.79 KG/M2

## 2018-10-04 DIAGNOSIS — N40.1 BPH ASSOCIATED WITH NOCTURIA: Primary | ICD-10-CM

## 2018-10-04 DIAGNOSIS — R35.1 BPH ASSOCIATED WITH NOCTURIA: Primary | ICD-10-CM

## 2018-10-04 DIAGNOSIS — R39.12 BENIGN PROSTATIC HYPERPLASIA WITH WEAK URINARY STREAM: ICD-10-CM

## 2018-10-04 DIAGNOSIS — N40.1 BENIGN PROSTATIC HYPERPLASIA WITH WEAK URINARY STREAM: ICD-10-CM

## 2018-10-04 PROBLEM — R35.0 BENIGN PROSTATIC HYPERPLASIA WITH URINARY FREQUENCY: Status: RESOLVED | Noted: 2018-05-21 | Resolved: 2018-10-04

## 2018-10-04 LAB
POST-VOID RESIDUAL VOLUME, ML POC: 24 ML
SL AMB  POCT GLUCOSE, UA: NORMAL
SL AMB LEUKOCYTE ESTERASE,UA: NORMAL
SL AMB POCT BILIRUBIN,UA: NORMAL
SL AMB POCT BLOOD,UA: NORMAL
SL AMB POCT CLARITY,UA: CLEAR
SL AMB POCT COLOR,UA: YELLOW
SL AMB POCT KETONES,UA: NORMAL
SL AMB POCT NITRITE,UA: NORMAL
SL AMB POCT PH,UA: 5
SL AMB POCT SPECIFIC GRAVITY,UA: 1.01
SL AMB POCT URINE PROTEIN: NORMAL
SL AMB POCT UROBILINOGEN: NORMAL

## 2018-10-04 PROCEDURE — 51798 US URINE CAPACITY MEASURE: CPT | Performed by: PHYSICIAN ASSISTANT

## 2018-10-04 PROCEDURE — 51741 ELECTRO-UROFLOWMETRY FIRST: CPT | Performed by: PHYSICIAN ASSISTANT

## 2018-10-04 PROCEDURE — 81002 URINALYSIS NONAUTO W/O SCOPE: CPT | Performed by: PHYSICIAN ASSISTANT

## 2018-10-04 PROCEDURE — 99024 POSTOP FOLLOW-UP VISIT: CPT | Performed by: PHYSICIAN ASSISTANT

## 2018-10-04 NOTE — PROGRESS NOTES
1  BPH associated with nocturia  POCT Measure PVR    POCT urine dip   2  Benign prostatic hyperplasia with weak urinary stream         Assessment and plan:       1  BPH with lower urinary tract symptoms status post Urolift and TUIP 8/30/18  - managed by Dr Nenita Ramirez   - most of patient's urinary symptoms have improved after Uro lift in TURP  We did discuss timed voiding and avoidance of bladder irritants to minimize his irritative lower urinary symptoms  We discussed that these can improve over time  - patient will follow up in 6 months time for uroflow PVR in symptom reassessment  He is aware to contact us in the interim with any concern  All questions answered  Wesley Block PA-C      Chief Complaint     Chief Complaint   Patient presents with    Benign Prostatic Hypertrophy         History of Present Illness     Tiffanie Frias is a 74 y o  male patient of Dr Nenita Ramirez with a history of BPH with lower urinary tract symptoms status post Urolift and TUIP 8/30/18 presenting for follow-up  Patient had bothersome urinary hesitancy, frequency, nocturia 2-3 times nightly, and weak stream   Symptoms do not improve with tamsulosin monotherapy  Cystoscopy had revealed mild lateral lobe hyperplasia and high bladder neck  Patient underwent Uro lift implantation and transurethral incision of the prostate 08/30/2018  Patient states that he has noticed a significant improvement in the strength of his stream   He is also having decreased nocturia down to 3 times nightly  Patient does have a diagnosis of obstructive sleep apnea and utilizes a CPAP every night  He overall feels empty after urination  He still has intermittent hesitancy, urgency, and suprapubic pressure  He denies any dysuria, gross hematuria, or urinary infections  Uroflow reveals a voided volume of 173 mL, 17 mL/sec peak flow, 9 1 mL/sec average flow  Relatively good bell curve distribution    Postvoid residual reveals 24 mL         Laboratory     Lab Results   Component Value Date    CREATININE 1 03 07/02/2018       Lab Results   Component Value Date    PSA 0 4 02/15/2018    PSA 0 5 12/10/2015    PSA 0 78 12/03/2014       Review of Systems     Review of Systems   Constitutional: Negative for activity change, appetite change, chills, diaphoresis, fatigue, fever and unexpected weight change  Respiratory: Negative for chest tightness and shortness of breath  Cardiovascular: Negative for chest pain, palpitations and leg swelling  Gastrointestinal: Negative for abdominal distention, abdominal pain, constipation, diarrhea, nausea and vomiting  Genitourinary: Positive for frequency and urgency  Negative for decreased urine volume, difficulty urinating, dysuria, enuresis, flank pain, genital sores and hematuria  Musculoskeletal: Negative for back pain, gait problem and myalgias  Skin: Negative for color change, pallor, rash and wound  Psychiatric/Behavioral: Negative for behavioral problems  The patient is not nervous/anxious  AUA SYMPTOM SCORE      Most Recent Value   AUA SYMPTOM SCORE   How often have you had a sensation of not emptying your bladder completely after you finished urinating? 0   How often have you had to urinate again less than two hours after you finished urinating? 3   How often have you found you stopped and started again several times when you urinate? 4   How often have you found it difficult to postpone urination? 2   How often have you had a weak urinary stream?  0   How often have you had to push or strain to begin urination? 2   How many times did you most typically get up to urinate from the time you went to bed at night until the time you got up in the morning?   4   Quality of Life: If you were to spend the rest of your life with your urinary condition just the way it is now, how would you feel about that?  3   AUA SYMPTOM SCORE  15            Allergies     Allergies   Allergen Reactions  Iodine      Nausea, hot, sweaty -- had through an IV  Has had it since with no issues       Physical Exam     Physical Exam   Constitutional: He is oriented to person, place, and time  He appears well-developed and well-nourished  No distress  HENT:   Head: Normocephalic and atraumatic  Eyes: Conjunctivae are normal    Neck: Normal range of motion  No tracheal deviation present  Pulmonary/Chest: Effort normal    Musculoskeletal: Normal range of motion  He exhibits no edema or deformity  Neurological: He is alert and oriented to person, place, and time  Skin: Skin is warm and dry  No rash noted  He is not diaphoretic  No erythema  Psychiatric: He has a normal mood and affect   His behavior is normal          Vital Signs     Vitals:    10/04/18 1137   BP: 130/80   BP Location: Left arm   Patient Position: Sitting   Cuff Size: Adult   Pulse: 74   Height: 5' 5" (1 651 m)         Current Medications       Current Outpatient Prescriptions:     amLODIPine (NORVASC) 5 mg tablet, Take 1 tablet (5 mg total) by mouth every morning, Disp: 90 tablet, Rfl: 3    aspirin (ASPIRIN LOW DOSE) 81 MG tablet, Take 1 tablet by mouth daily, Disp: , Rfl:     fenofibrate (TRICOR) 145 mg tablet, Take 1 tablet (145 mg total) by mouth daily, Disp: 90 tablet, Rfl: 2    lisinopril (ZESTRIL) 40 mg tablet, Take 1 tablet (40 mg total) by mouth daily, Disp: 90 tablet, Rfl: 3    lovastatin (MEVACOR) 40 MG tablet, Take one tablet by mouth one time daily, Disp: 90 tablet, Rfl: 2    metoprolol tartrate (LOPRESSOR) 25 mg tablet, TAKE ONE TABLET BY MOUTH TWICE DAILY , Disp: 60 tablet, Rfl: 9    Multiple Vitamins-Minerals (CENTRUM ULTRA MENS PO), Take 1 tablet by mouth daily, Disp: , Rfl:     omeprazole (PriLOSEC) 20 mg delayed release capsule, Take 1 capsule (20 mg total) by mouth daily for 90 days, Disp: 90 capsule, Rfl: 1    acetaminophen (TYLENOL) 500 mg tablet, Take 2 tablets by mouth daily as needed, Disp: , Rfl:    dicyclomine (BENTYL) 20 mg tablet, Take 1 tablet (20 mg total) by mouth every 6 (six) hours As needed for abdominal pain (Patient not taking: Reported on 10/4/2018 ), Disp: 20 tablet, Rfl: 0    docusate sodium (COLACE) 100 mg capsule, Take 1 capsule (100 mg total) by mouth 2 (two) times a day for 15 days, Disp: 30 capsule, Rfl: 0    ibuprofen (MOTRIN) 200 mg tablet, Take 3 tablets (600 mg total) by mouth every 6 (six) hours as needed for mild pain (Patient not taking: Reported on 10/4/2018 ), Disp: , Rfl: 0    loperamide (IMODIUM A-D) 2 MG tablet, Take 1 tablet by mouth daily as needed, Disp: , Rfl:       Active Problems     Patient Active Problem List   Diagnosis    Chest pain    Essential hypertension    Mixed hyperlipidemia    Chronic pain disorder    Chronic left shoulder pain    Chronic bilateral low back pain with bilateral sciatica    Disc degeneration, lumbar    Lumbar stenosis with neurogenic claudication    Left lower quadrant pain    Abnormal CT of the abdomen    Chronic pain syndrome    GREGG on CPAP    Benign prostatic hyperplasia with lower urinary tract symptoms         Past Medical History     Past Medical History:   Diagnosis Date    Apnea     Apnea     CPAP (continuous positive airway pressure) dependence     Diverticulitis     Diverticulitis     Hypercholesterolemia     Hyperlipidemia     Hypertension     Mitral valve disorder     Mitral valve disorder     Sleep apnea     Thoracic neuritis     Thoracic neuritis          Surgical History     Past Surgical History:   Procedure Laterality Date    COLONOSCOPY      Complete    CORONARY ANGIOPLASTY      ESOPHAGOGASTRODUODENOSCOPY      Diagnostic    FOOT SURGERY      HERNIA REPAIR      KNEE SURGERY Left     NEUROPLASTY / TRANSPOSITION MEDIAN NERVE AT CARPAL TUNNEL      MI COLONOSCOPY FLX DX W/COLLJ SPEC WHEN PFRMD N/A 4/10/2018    Procedure: EGD AND COLONOSCOPY;  Surgeon: Kali Finch MD;  Location: [facility]; Service: Gastroenterology    UT CYSTOURETHRO W/IMPLANT N/A 8/30/2018    Procedure: Mariselahrene Miladys WITH INSERTION UROLIFT;  Surgeon: Serjio Hughes MD;  Location: [facility];  Service: Urology    UT TRANSURETHRAL INCISION OF PROSTATE N/A 8/30/2018    Procedure: TRANSURETHRAL INCISION OF PROSTATE (TUIP);   Surgeon: Serjio Hughes MD;  Location: [facility];  Service: Urology    SHOULDER SURGERY      TARSAL TUNNEL RELEASE      Neuroplasty Decompression         Family History     Family History   Problem Relation Age of Onset    Diabetes Mother         Mellitus    Heart disease Mother         Arteriosclerotic Cardiovascular    Hypertension Mother     Cancer Father     Arthritis Father         Rheu Mult Site W Involv Of Organs And Systems    Thyroid disease Sister     Diabetes Brother         Diabetes:Mellitus    Heart disease Brother     Hypertension Brother     Coronary artery disease Family          Social History     Social History       Radiology

## 2018-10-10 DIAGNOSIS — I10 ESSENTIAL HYPERTENSION: ICD-10-CM

## 2018-10-23 ENCOUNTER — APPOINTMENT (EMERGENCY)
Dept: CT IMAGING | Facility: HOSPITAL | Age: 73
End: 2018-10-23
Payer: MEDICARE

## 2018-10-23 ENCOUNTER — HOSPITAL ENCOUNTER (EMERGENCY)
Facility: HOSPITAL | Age: 73
Discharge: HOME | End: 2018-10-24
Attending: EMERGENCY MEDICINE
Payer: MEDICARE

## 2018-10-23 DIAGNOSIS — R10.32 LLQ ABDOMINAL PAIN: ICD-10-CM

## 2018-10-23 DIAGNOSIS — N39.0 UTI (URINARY TRACT INFECTION): ICD-10-CM

## 2018-10-23 DIAGNOSIS — N32.89 BLADDER WALL THICKENING: Primary | ICD-10-CM

## 2018-10-23 LAB
ALBUMIN SERPL BCP-MCNC: 3.8 G/DL (ref 3.5–5)
ALP SERPL-CCNC: 65 U/L (ref 46–116)
ALT SERPL W P-5'-P-CCNC: 27 U/L (ref 12–78)
ANION GAP SERPL CALCULATED.3IONS-SCNC: 10 MMOL/L (ref 4–13)
AST SERPL W P-5'-P-CCNC: 20 U/L (ref 5–45)
BACTERIA UR QL AUTO: ABNORMAL /HPF
BASOPHILS # BLD AUTO: 0.01 THOUSANDS/ΜL (ref 0–0.1)
BASOPHILS NFR BLD AUTO: 0 % (ref 0–1)
BILIRUB DIRECT SERPL-MCNC: 0.24 MG/DL (ref 0–0.2)
BILIRUB SERPL-MCNC: 0.6 MG/DL (ref 0.2–1)
BILIRUB UR QL STRIP: NEGATIVE
BUN SERPL-MCNC: 21 MG/DL (ref 5–25)
CALCIUM SERPL-MCNC: 9.3 MG/DL (ref 8.3–10.1)
CHLORIDE SERPL-SCNC: 104 MMOL/L (ref 100–108)
CLARITY UR: ABNORMAL
CO2 SERPL-SCNC: 28 MMOL/L (ref 21–32)
COLOR UR: YELLOW
CREAT SERPL-MCNC: 1 MG/DL (ref 0.6–1.3)
EOSINOPHIL # BLD AUTO: 0.02 THOUSAND/ΜL (ref 0–0.61)
EOSINOPHIL NFR BLD AUTO: 0 % (ref 0–6)
ERYTHROCYTE [DISTWIDTH] IN BLOOD BY AUTOMATED COUNT: 12.7 % (ref 11.6–15.1)
GFR SERPL CREATININE-BSD FRML MDRD: 73 ML/MIN/1.73SQ M
GLUCOSE SERPL-MCNC: 110 MG/DL (ref 65–140)
GLUCOSE UR STRIP-MCNC: NEGATIVE MG/DL
HCT VFR BLD AUTO: 44 % (ref 36.5–49.3)
HGB BLD-MCNC: 15.5 G/DL (ref 12–17)
HGB UR QL STRIP.AUTO: ABNORMAL
IMM GRANULOCYTES # BLD AUTO: 0.02 THOUSAND/UL (ref 0–0.2)
IMM GRANULOCYTES NFR BLD AUTO: 0 % (ref 0–2)
KETONES UR STRIP-MCNC: NEGATIVE MG/DL
LACTATE SERPL-SCNC: 1.6 MMOL/L (ref 0.5–2)
LEUKOCYTE ESTERASE UR QL STRIP: ABNORMAL
LIPASE SERPL-CCNC: 114 U/L (ref 73–393)
LYMPHOCYTES # BLD AUTO: 1.32 THOUSANDS/ΜL (ref 0.6–4.47)
LYMPHOCYTES NFR BLD AUTO: 13 % (ref 14–44)
MCH RBC QN AUTO: 35.1 PG (ref 26.8–34.3)
MCHC RBC AUTO-ENTMCNC: 35.2 G/DL (ref 31.4–37.4)
MCV RBC AUTO: 100 FL (ref 82–98)
MONOCYTES # BLD AUTO: 0.97 THOUSAND/ΜL (ref 0.17–1.22)
MONOCYTES NFR BLD AUTO: 9 % (ref 4–12)
NEUTROPHILS # BLD AUTO: 7.97 THOUSANDS/ΜL (ref 1.85–7.62)
NEUTS SEG NFR BLD AUTO: 78 % (ref 43–75)
NITRITE UR QL STRIP: NEGATIVE
NON-SQ EPI CELLS URNS QL MICRO: ABNORMAL /HPF
NRBC BLD AUTO-RTO: 0 /100 WBCS
PH UR STRIP.AUTO: 7 [PH] (ref 4.5–8)
PLATELET # BLD AUTO: 178 THOUSANDS/UL (ref 149–390)
PMV BLD AUTO: 10.4 FL (ref 8.9–12.7)
POTASSIUM SERPL-SCNC: 3.7 MMOL/L (ref 3.5–5.3)
PROT SERPL-MCNC: 7 G/DL (ref 6.4–8.2)
PROT UR STRIP-MCNC: ABNORMAL MG/DL
RBC # BLD AUTO: 4.42 MILLION/UL (ref 3.88–5.62)
RBC #/AREA URNS AUTO: ABNORMAL /HPF
SODIUM SERPL-SCNC: 142 MMOL/L (ref 136–145)
SP GR UR STRIP.AUTO: 1.02 (ref 1–1.03)
UROBILINOGEN UR QL STRIP.AUTO: 0.2 E.U./DL
WBC # BLD AUTO: 10.31 THOUSAND/UL (ref 4.31–10.16)
WBC #/AREA URNS AUTO: ABNORMAL /HPF

## 2018-10-23 PROCEDURE — 36415 COLL VENOUS BLD VENIPUNCTURE: CPT | Performed by: EMERGENCY MEDICINE

## 2018-10-23 PROCEDURE — 85025 COMPLETE CBC W/AUTO DIFF WBC: CPT | Performed by: EMERGENCY MEDICINE

## 2018-10-23 PROCEDURE — 96361 HYDRATE IV INFUSION ADD-ON: CPT

## 2018-10-23 PROCEDURE — 74177 CT ABD & PELVIS W/CONTRAST: CPT

## 2018-10-23 PROCEDURE — 81001 URINALYSIS AUTO W/SCOPE: CPT | Performed by: EMERGENCY MEDICINE

## 2018-10-23 PROCEDURE — 80076 HEPATIC FUNCTION PANEL: CPT | Performed by: EMERGENCY MEDICINE

## 2018-10-23 PROCEDURE — 93005 ELECTROCARDIOGRAM TRACING: CPT

## 2018-10-23 PROCEDURE — 87086 URINE CULTURE/COLONY COUNT: CPT | Performed by: EMERGENCY MEDICINE

## 2018-10-23 PROCEDURE — 83690 ASSAY OF LIPASE: CPT | Performed by: EMERGENCY MEDICINE

## 2018-10-23 PROCEDURE — 87077 CULTURE AEROBIC IDENTIFY: CPT | Performed by: EMERGENCY MEDICINE

## 2018-10-23 PROCEDURE — 87186 SC STD MICRODIL/AGAR DIL: CPT | Performed by: EMERGENCY MEDICINE

## 2018-10-23 PROCEDURE — 83605 ASSAY OF LACTIC ACID: CPT | Performed by: EMERGENCY MEDICINE

## 2018-10-23 PROCEDURE — 80048 BASIC METABOLIC PNL TOTAL CA: CPT | Performed by: EMERGENCY MEDICINE

## 2018-10-23 PROCEDURE — 96374 THER/PROPH/DIAG INJ IV PUSH: CPT

## 2018-10-23 PROCEDURE — 99285 EMERGENCY DEPT VISIT HI MDM: CPT

## 2018-10-23 RX ORDER — MORPHINE SULFATE 4 MG/ML
4 INJECTION, SOLUTION INTRAMUSCULAR; INTRAVENOUS ONCE
Status: COMPLETED | OUTPATIENT
Start: 2018-10-23 | End: 2018-10-23

## 2018-10-23 RX ORDER — SODIUM CHLORIDE 9 MG/ML
1000 INJECTION, SOLUTION INTRAVENOUS ONCE
Status: COMPLETED | OUTPATIENT
Start: 2018-10-23 | End: 2018-10-24

## 2018-10-23 RX ADMIN — SODIUM CHLORIDE 1000 ML/HR: 0.9 INJECTION, SOLUTION INTRAVENOUS at 22:36

## 2018-10-23 RX ADMIN — IOHEXOL 100 ML: 350 INJECTION, SOLUTION INTRAVENOUS at 23:29

## 2018-10-23 RX ADMIN — MORPHINE SULFATE 4 MG: 4 INJECTION INTRAVENOUS at 22:58

## 2018-10-24 ENCOUNTER — TELEPHONE (OUTPATIENT)
Dept: UROLOGY | Facility: CLINIC | Age: 73
End: 2018-10-24

## 2018-10-24 VITALS
HEIGHT: 65 IN | RESPIRATION RATE: 18 BRPM | WEIGHT: 177.69 LBS | OXYGEN SATURATION: 94 % | HEART RATE: 63 BPM | BODY MASS INDEX: 29.61 KG/M2 | TEMPERATURE: 98.6 F | DIASTOLIC BLOOD PRESSURE: 68 MMHG | SYSTOLIC BLOOD PRESSURE: 159 MMHG

## 2018-10-24 LAB
ATRIAL RATE: 61 BPM
P AXIS: 54 DEGREES
PR INTERVAL: 188 MS
QRS AXIS: 21 DEGREES
QRSD INTERVAL: 84 MS
QT INTERVAL: 418 MS
QTC INTERVAL: 420 MS
T WAVE AXIS: 27 DEGREES
VENTRICULAR RATE: 61 BPM

## 2018-10-24 PROCEDURE — 93010 ELECTROCARDIOGRAM REPORT: CPT | Performed by: INTERNAL MEDICINE

## 2018-10-24 RX ORDER — CEPHALEXIN 250 MG/1
500 CAPSULE ORAL 4 TIMES DAILY
Qty: 80 CAPSULE | Refills: 0 | Status: SHIPPED | OUTPATIENT
Start: 2018-10-24 | End: 2018-10-24

## 2018-10-24 RX ORDER — CEPHALEXIN 250 MG/1
500 CAPSULE ORAL 4 TIMES DAILY
Qty: 80 CAPSULE | Refills: 0 | Status: SHIPPED | OUTPATIENT
Start: 2018-10-24 | End: 2018-11-03

## 2018-10-24 RX ORDER — CEPHALEXIN 250 MG/1
500 CAPSULE ORAL ONCE
Status: COMPLETED | OUTPATIENT
Start: 2018-10-24 | End: 2018-10-24

## 2018-10-24 RX ADMIN — CEPHALEXIN 500 MG: 250 CAPSULE ORAL at 00:51

## 2018-10-24 NOTE — TELEPHONE ENCOUNTER
Patient managed by Kecia Portillo at the [facility] office  Patient was in the ER last night for abdominal pain  Patient had CT abdomen pelvis while in the ER  Can you please review CT and let us know when patient should be seen in the office and should he be scheduled as a cystoscopy?

## 2018-10-24 NOTE — ED PROVIDER NOTES
History  Chief Complaint   Patient presents with    Abdominal Pain     Brought in by EMS for LLQ abdominal pain since yesterday, gettting worse  C/o diarrhea, hx diverticulitis     75 yo male past medical history of diverticulitis presents with left lower quadrant abdominal pain starting yesterday  Has gradually worsened  Pain is sharp and nonradiating  Has constipation, 1 episode of diarrhea  No nausea  Last normal bowel movement yesterday  Denies fevers or chills  No dysuria or frequency  Prior to Admission Medications   Prescriptions Last Dose Informant Patient Reported? Taking?    Multiple Vitamins-Minerals (CENTRUM ULTRA MENS PO)  Self Yes Yes   Sig: Take 1 tablet by mouth daily   acetaminophen (TYLENOL) 500 mg tablet  Self Yes Yes   Sig: Take 2 tablets by mouth daily as needed   amLODIPine (NORVASC) 5 mg tablet  Self No Yes   Sig: Take 1 tablet (5 mg total) by mouth every morning   aspirin (ASPIRIN LOW DOSE) 81 MG tablet  Self Yes Yes   Sig: Take 1 tablet by mouth daily   dicyclomine (BENTYL) 20 mg tablet  Self No Yes   Sig: Take 1 tablet (20 mg total) by mouth every 6 (six) hours As needed for abdominal pain   docusate sodium (COLACE) 100 mg capsule  Self No Yes   Sig: Take 1 capsule (100 mg total) by mouth 2 (two) times a day for 15 days   fenofibrate (TRICOR) 145 mg tablet  Self No Yes   Sig: Take 1 tablet (145 mg total) by mouth daily   ibuprofen (MOTRIN) 200 mg tablet  Self No Yes   Sig: Take 3 tablets (600 mg total) by mouth every 6 (six) hours as needed for mild pain   lisinopril (ZESTRIL) 40 mg tablet  Self No Yes   Sig: Take 1 tablet (40 mg total) by mouth daily   loperamide (IMODIUM A-D) 2 MG tablet  Self Yes Yes   Sig: Take 1 tablet by mouth daily as needed   lovastatin (MEVACOR) 40 MG tablet  Self No Yes   Sig: Take one tablet by mouth one time daily   metoprolol tartrate (LOPRESSOR) 25 mg tablet   No Yes   Sig: TAKE ONE TABLET BY MOUTH TWICE DAILY    omeprazole (PriLOSEC) 20 mg delayed release capsule  Self No Yes   Sig: Take 1 capsule (20 mg total) by mouth daily for 90 days      Facility-Administered Medications: None       Past Medical History:   Diagnosis Date    Angina at rest ([address] )     Apnea     Apnea     CPAP (continuous positive airway pressure) dependence     Diverticulitis     Diverticulitis     Hypercholesterolemia     Hyperlipidemia     Hypertension     Mitral valve disorder     Mitral valve disorder     Sleep apnea     Thoracic neuritis     Thoracic neuritis        Past Surgical History:   Procedure Laterality Date    COLONOSCOPY      Complete    CORONARY ANGIOPLASTY      ESOPHAGOGASTRODUODENOSCOPY      Diagnostic    FOOT SURGERY      HERNIA REPAIR      KNEE SURGERY Left     NEUROPLASTY / TRANSPOSITION MEDIAN NERVE AT CARPAL TUNNEL      WA COLONOSCOPY FLX DX W/COLLJ SPEC WHEN PFRMD N/A 4/10/2018    Procedure: EGD AND COLONOSCOPY;  Surgeon: Luis A Dickerson MD;  Location: [facility]; Service: Gastroenterology    WA CYSTOURETHRO W/IMPLANT N/A 8/30/2018    Procedure: Alvia Orts WITH INSERTION UROLIFT;  Surgeon: Naz Cross MD;  Location: [facility];  Service: Urology    WA TRANSURETHRAL INCISION OF PROSTATE N/A 8/30/2018    Procedure: TRANSURETHRAL INCISION OF PROSTATE (TUIP); Surgeon: Naz Cross MD;  [facility]: [facility];  Service: Urology    SHOULDER SURGERY      TARSAL TUNNEL RELEASE      Neuroplasty Decompression       Family History   Problem Relation Age of Onset    Diabetes Mother         Mellitus    Heart disease Mother         Arteriosclerotic Cardiovascular    Hypertension Mother     Cancer Father     Arthritis Father         Rheu Mult Site W Involv Of Organs And Systems    Thyroid disease Sister     Diabetes Brother         Diabetes:Mellitus    Heart disease Brother     Hypertension Brother     Coronary artery disease Family      I have reviewed and agree with the history as documented      Social History Substance Use Topics    Smoking status: Former Smoker     Packs/day: 1 00     Years: 17 00     Quit date: 1978    Smokeless tobacco: Never Used    Alcohol use No        Review of Systems   Constitutional: Negative for chills and fever  HENT: Negative for dental problem and ear pain  Eyes: Negative for pain and redness  Respiratory: Negative for cough and shortness of breath  Cardiovascular: Negative for chest pain and palpitations  Gastrointestinal: Positive for abdominal pain, constipation and diarrhea  Negative for nausea  Endocrine: Negative for polydipsia and polyphagia  Genitourinary: Negative for dysuria and frequency  Musculoskeletal: Negative for arthralgias and joint swelling  Skin: Negative for color change and rash  Neurological: Negative for dizziness and headaches  Psychiatric/Behavioral: Negative for behavioral problems and confusion  All other systems reviewed and are negative  Physical Exam  Physical Exam   Constitutional: He is oriented to person, place, and time  He appears well-developed and well-nourished  No distress  HENT:   Head: Atraumatic  Right Ear: External ear normal    Left Ear: External ear normal    Nose: Nose normal    Eyes: Pupils are equal, round, and reactive to light  Conjunctivae and EOM are normal    Neck: Normal range of motion  Neck supple  No JVD present  Cardiovascular: Normal rate, regular rhythm and normal heart sounds  No murmur heard  Pulmonary/Chest: Effort normal and breath sounds normal  No respiratory distress  He has no wheezes  Abdominal: Soft  Bowel sounds are normal  He exhibits no distension  LLQ tenderness to palpation   Musculoskeletal: Normal range of motion  He exhibits no edema  Neurological: He is alert and oriented to person, place, and time  No cranial nerve deficit  Skin: Skin is warm and dry  Capillary refill takes less than 2 seconds  He is not diaphoretic     Psychiatric: He has a normal mood and affect  His behavior is normal    Nursing note and vitals reviewed  Vital Signs  ED Triage Vitals   Temperature Pulse Respirations Blood Pressure SpO2   10/23/18 2211 10/23/18 2211 10/23/18 2211 10/23/18 2211 10/23/18 2211   98 6 °F (37 °C) 58 18 (!) 191/86 97 %      Temp Source Heart Rate Source Patient Position - Orthostatic VS BP Location FiO2 (%)   10/23/18 2211 10/23/18 2211 10/24/18 0053 10/23/18 2211 --   Oral Monitor Lying Right arm       Pain Score       10/23/18 2211       5           Vitals:    10/23/18 2211 10/24/18 0053   BP: (!) 191/86 159/68   Pulse: 58 63   Patient Position - Orthostatic VS:  Lying       Visual Acuity      ED Medications  Medications   sodium chloride 0 9 % infusion (0 mL/hr Intravenous Stopped 10/24/18 0050)   morphine (PF) 4 mg/mL injection 4 mg (4 mg Intravenous Given 10/23/18 2258)   iohexol (OMNIPAQUE) 350 MG/ML injection (MULTI-DOSE) 100 mL (100 mL Intravenous Given 10/23/18 2329)   cephalexin (KEFLEX) capsule 500 mg (500 mg Oral Given 10/24/18 0051)       Diagnostic Studies  Results Reviewed     Procedure Component Value Units Date/Time    Urine Microscopic [[id number]]  (Abnormal) Collected:  10/23/18 2304    Lab Status:  Final result Specimen:  Urine from Urine, Clean Catch Updated:  10/23/18 2324     RBC, UA 1-2 (A) /hpf      WBC, UA 30-50 (A) /hpf      Epithelial Cells None Seen /hpf      Bacteria, UA Moderate (A) /hpf     Urine culture [[id number]] Collected:  10/23/18 2304    Lab Status:   In process Specimen:  Urine from Urine, Clean Catch Updated:  10/23/18 2324    UA w Reflex to Microscopic w Reflex to Culture [[id number]]  (Abnormal) Collected:  10/23/18 2304    Lab Status:  Final result Specimen:  Urine from Urine, Clean Catch Updated:  10/23/18 2316     Color, UA Yellow     Clarity, UA Slightly Cloudy     Specific [place], UA 1 020     pH, UA 7 0     Leukocytes, UA Moderate (A)     Nitrite, UA Negative     Protein, UA 30 (1+) (A) mg/dl      Glucose, UA Negative mg/dl Ketones, UA Negative mg/dl      Urobilinogen, UA 0 2 E U /dl      Bilirubin, UA Negative     Blood, UA Moderate (A)    Lactic acid, plasma [[id number]]  (Normal) Collected:  10/23/18 2234    Lab Status:  Final result Specimen:  Blood from Arm, Right Updated:  10/23/18 2259     LACTIC ACID 1 6 mmol/L     Narrative:         Result may be elevated if tourniquet was used during collection  Basic metabolic panel [[id number]] Collected:  10/23/18 2234    Lab Status:  Final result Specimen:  Blood from Arm, Right Updated:  10/23/18 2257     Sodium 142 mmol/L      Potassium 3 7 mmol/L      Chloride 104 mmol/L      CO2 28 mmol/L      ANION GAP 10 mmol/L      BUN 21 mg/dL      Creatinine 1 00 mg/dL      Glucose 110 mg/dL      Calcium 9 3 mg/dL      eGFR 73 ml/min/1 73sq m     Narrative:         National Kidney Disease Education Program recommendations are as follows:  GFR calculation is accurate only with a steady state creatinine  Chronic Kidney disease less than 60 ml/min/1 73 sq  meters  Kidney failure less than 15 ml/min/1 73 sq  meters      Hepatic function panel [[id number]]  (Abnormal) Collected:  10/23/18 2234    Lab Status:  Final result Specimen:  Blood from Arm, Right Updated:  10/23/18 2257     Total Bilirubin 0 60 mg/dL      Bilirubin, Direct 0 24 (H) mg/dL      Alkaline Phosphatase 65 U/L      AST 20 U/L      ALT 27 U/L      Total Protein 7 0 g/dL      Albumin 3 8 g/dL     Lipase [[id number]]  (Normal) Collected:  10/23/18 2234    Lab Status:  Final result Specimen:  Blood from Arm, Right Updated:  10/23/18 2257     Lipase 114 u/L     CBC and differential [[id number]]  (Abnormal) Collected:  10/23/18 2234    Lab Status:  Final result Specimen:  Blood from Arm, Right Updated:  10/23/18 2241     WBC 10 31 (H) Thousand/uL      RBC 4 42 Million/uL      Hemoglobin 15 5 g/dL      Hematocrit 44 0 %       (H) fL      MCH 35 1 (H) pg      MCHC 35 2 g/dL      RDW 12 7 %      MPV 10 4 fL      Platelets 210 Thousands/uL nRBC 0 /100 WBCs      Neutrophils Relative 78 (H) %      Immat GRANS % 0 %      Lymphocytes Relative 13 (L) %      Monocytes Relative 9 %      Eosinophils Relative 0 %      Basophils Relative 0 %      Neutrophils Absolute 7 97 (H) Thousands/µL      Immature Grans Absolute 0 02 Thousand/uL      Lymphocytes Absolute 1 32 Thousands/µL      Monocytes Absolute 0 97 Thousand/µL      Eosinophils Absolute 0 02 Thousand/µL      Basophils Absolute 0 01 Thousands/µL                  CT abdomen pelvis w contrast   Final Result by Nelly Quarles DO (10/24 0023)      Focal wall thickening of the posterior bladder with extension to the area of the left ureterovesicular junction and thickening of the distal left ureter as well (axial images 72 and 73)  There is also associated mild left hydroureteronephrosis and    evidence of obstructive uropathy  The findings taken together are suspicious for bladder neoplasm  Cystoscopy may be of benefit for further evaluation when clinically feasible and at the discretion of the referring caregiver  There are additional areas of focal ureteral thickening seen in the mid and distal left ureter (for example axial image 59), suspicious for neoplasm as well  Right-sided nephrolithiasis, bilateral renal cysts, colonic diverticulosis without evidence of acute diverticulitis, and other findings as above  Findings discussed with Dr Joshua Guidry by Dr Olya Harvey at 12:20 AM on 10/24/2018  Workstation performed: [id number]                    Procedures  Procedures       Phone Contacts  ED Phone Contact    ED Course           Identification of Seniors at Risk      Most Recent Value   (ISAR) Identification of Seniors at Risk   Before the illness or injury that brought you to the Emergency, did you need someone to help you on a regular basis?   0 Filed at: 10/23/2018 2214   In the last 24 hours, have you needed more help than usual?  0 Filed at: 10/23/2018 2214   Have you been hospitalized for one or more nights during the past 6 months? 0 Filed at: 10/23/2018 2214   In general, do you see well?  0 Filed at: 10/23/2018 2214   In general, do you have serious problems with your memory? 0 Filed at: 10/23/2018 2214   Do you take more than three different medications every day?  0 Filed at: 10/23/2018 2214   ISAR Score  0 Filed at: 10/23/2018 2214                          MDM  Number of Diagnoses or Management Options  Bladder wall thickening:   LLQ abdominal pain:   UTI (urinary tract infection):   Diagnosis management comments: LLQ abdominal pain, bladder thickening and ureter thickening seen on CT scan, possible cancer cystoscopy recommended for follow up  Urine with UTI, will treat and refer to urology as patient already established with them  Benign abdominal exam, labs unremarkable, vitals stable, declines pain medication  CritCare Time    Disposition  Final diagnoses:   Bladder wall thickening   LLQ abdominal pain   UTI (urinary tract infection)     Time reflects when diagnosis was documented in both MDM as applicable and the Disposition within this note     Time User Action Codes Description Comment    10/24/2018 12:42 AM Floria Rued Add [N32 89] Bladder wall thickening     10/24/2018 12:42 AM Floria Rued Add [R10 32] LLQ abdominal pain     10/24/2018 12:43 AM Floria Rued Add [N39 0] UTI (urinary tract infection)       ED Disposition     ED Disposition Condition Comment    Discharge  Lakeisha Osborn discharge to home/self care      Condition at discharge: Good        Follow-up Information     Follow up With Specialties Details Why Contact Info    Selena Haji MD Urology Schedule an appointment as soon as possible for a visit  [address]  [phone]            Discharge Medication List as of 10/24/2018 12:48 AM      START taking these medications    Details   cephalexin (KEFLEX) 250 mg capsule Take 2 capsules (500 mg total) by mouth 4 (four) times a day for 10 days, Starting Wed 10/24/2018, Until Sat 11/3/2018, Print         CONTINUE these medications which have NOT CHANGED    Details   acetaminophen (TYLENOL) 500 mg tablet Take 2 tablets by mouth daily as needed, Historical Med      amLODIPine (NORVASC) 5 mg tablet Take 1 tablet (5 mg total) by mouth every morning, Starting Mon 3/12/2018, Normal      aspirin (ASPIRIN LOW DOSE) 81 MG tablet Take 1 tablet by mouth daily, Historical Med      dicyclomine (BENTYL) 20 mg tablet Take 1 tablet (20 mg total) by mouth every 6 (six) hours As needed for abdominal pain, Starting Thu 3/1/2018, Normal      docusate sodium (COLACE) 100 mg capsule Take 1 capsule (100 mg total) by mouth 2 (two) times a day for 15 days, Starting Thu 8/30/2018, Until Tue 10/23/2018, Normal      fenofibrate (TRICOR) 145 mg tablet Take 1 tablet (145 mg total) by mouth daily, Starting Tue 9/18/2018, Normal      ibuprofen (MOTRIN) 200 mg tablet Take 3 tablets (600 mg total) by mouth every 6 (six) hours as needed for mild pain, Starting Thu 8/30/2018, No Print      lisinopril (ZESTRIL) 40 mg tablet Take 1 tablet (40 mg total) by mouth daily, Starting Tue 7/10/2018, Normal      loperamide (IMODIUM A-D) 2 MG tablet Take 1 tablet by mouth daily as needed, Historical Med      lovastatin (MEVACOR) 40 MG tablet Take one tablet by mouth one time daily, Normal      metoprolol tartrate (LOPRESSOR) 25 mg tablet TAKE ONE TABLET BY MOUTH TWICE DAILY , Normal      Multiple Vitamins-Minerals (CENTRUM ULTRA MENS PO) Take 1 tablet by mouth daily, Historical Med      omeprazole (PriLOSEC) 20 mg delayed release capsule Take 1 capsule (20 mg total) by mouth daily for 90 days, Starting Fri 8/10/2018, Until Thu 11/8/2018, Normal           No discharge procedures on file      ED Provider  Electronically Signed by           Yobany Stafford MD  10/24/18 1333

## 2018-10-24 NOTE — TELEPHONE ENCOUNTER
Patient just underwent cystoscopy, TUIP, and Urolift a few weeks ago  I would recommend office f/u with Dr Tay Salcedo in 1-2 weeks to discuss CT findings and further plan of care  Thank you

## 2018-10-24 NOTE — TELEPHONE ENCOUNTER
Patient scheduled for discussion with Rolin Lombard on 11/6/18 at 2:45 at the [facility] office  Please call and confirm appointment

## 2018-10-26 LAB — BACTERIA UR CULT: ABNORMAL

## 2018-11-06 ENCOUNTER — TELEPHONE (OUTPATIENT)
Dept: UROLOGY | Facility: CLINIC | Age: 73
End: 2018-11-06

## 2018-11-06 ENCOUNTER — APPOINTMENT (OUTPATIENT)
Dept: LAB | Facility: CLINIC | Age: 73
End: 2018-11-06
Payer: MEDICARE

## 2018-11-06 ENCOUNTER — OFFICE VISIT (OUTPATIENT)
Dept: UROLOGY | Facility: CLINIC | Age: 73
End: 2018-11-06
Payer: MEDICARE

## 2018-11-06 VITALS
HEART RATE: 56 BPM | BODY MASS INDEX: 29.26 KG/M2 | SYSTOLIC BLOOD PRESSURE: 132 MMHG | HEIGHT: 65 IN | WEIGHT: 175.6 LBS | DIASTOLIC BLOOD PRESSURE: 68 MMHG

## 2018-11-06 DIAGNOSIS — N40.1 BENIGN PROSTATIC HYPERPLASIA WITH WEAK URINARY STREAM: Primary | ICD-10-CM

## 2018-11-06 DIAGNOSIS — I10 ESSENTIAL HYPERTENSION: ICD-10-CM

## 2018-11-06 DIAGNOSIS — R39.12 BENIGN PROSTATIC HYPERPLASIA WITH WEAK URINARY STREAM: Primary | ICD-10-CM

## 2018-11-06 DIAGNOSIS — E78.2 COMBINED HYPERLIPIDEMIA: ICD-10-CM

## 2018-11-06 PROBLEM — N13.39 OTHER HYDRONEPHROSIS: Status: ACTIVE | Noted: 2018-11-06

## 2018-11-06 LAB
ANION GAP SERPL CALCULATED.3IONS-SCNC: 6 MMOL/L (ref 4–13)
BUN SERPL-MCNC: 20 MG/DL (ref 5–25)
CALCIUM SERPL-MCNC: 9 MG/DL (ref 8.3–10.1)
CHLORIDE SERPL-SCNC: 103 MMOL/L (ref 100–108)
CHOLEST SERPL-MCNC: 129 MG/DL (ref 50–200)
CO2 SERPL-SCNC: 27 MMOL/L (ref 21–32)
CREAT SERPL-MCNC: 0.96 MG/DL (ref 0.6–1.3)
GFR SERPL CREATININE-BSD FRML MDRD: 77 ML/MIN/1.73SQ M
GLUCOSE P FAST SERPL-MCNC: 89 MG/DL (ref 65–99)
HDLC SERPL-MCNC: 42 MG/DL (ref 40–60)
LDLC SERPL CALC-MCNC: 69 MG/DL (ref 0–100)
NONHDLC SERPL-MCNC: 87 MG/DL
POTASSIUM SERPL-SCNC: 4.1 MMOL/L (ref 3.5–5.3)
SL AMB  POCT GLUCOSE, UA: NORMAL
SL AMB LEUKOCYTE ESTERASE,UA: NORMAL
SL AMB POCT BILIRUBIN,UA: NORMAL
SL AMB POCT BLOOD,UA: NORMAL
SL AMB POCT CLARITY,UA: CLEAR
SL AMB POCT COLOR,UA: YELLOW
SL AMB POCT KETONES,UA: NORMAL
SL AMB POCT NITRITE,UA: NORMAL
SL AMB POCT PH,UA: 5
SL AMB POCT SPECIFIC GRAVITY,UA: 1
SL AMB POCT URINE PROTEIN: NORMAL
SL AMB POCT UROBILINOGEN: NORMAL
SODIUM SERPL-SCNC: 136 MMOL/L (ref 136–145)
TRIGL SERPL-MCNC: 92 MG/DL

## 2018-11-06 PROCEDURE — 80048 BASIC METABOLIC PNL TOTAL CA: CPT

## 2018-11-06 PROCEDURE — 80061 LIPID PANEL: CPT

## 2018-11-06 PROCEDURE — 36415 COLL VENOUS BLD VENIPUNCTURE: CPT

## 2018-11-06 PROCEDURE — 81002 URINALYSIS NONAUTO W/O SCOPE: CPT | Performed by: UROLOGY

## 2018-11-06 PROCEDURE — 99214 OFFICE O/P EST MOD 30 MIN: CPT | Performed by: UROLOGY

## 2018-11-06 RX ORDER — A/SINGAPORE/GP1908/2015 IVR-180 (H1N1) (AN A/MICHIGAN/45/2015 (H1N1)PDM09-LIKE VIRUS), A/HONG KONG/4801/2014, NYMC X-263B (H3N2) (AN A/HONG KONG/4801/2014-LIKE VIRUS), AND B/BRISBANE/60/2008, WILD TYPE (A B/BRISBANE/60/2008-LIKE VIRUS) 15; 15; 15 UG/.5ML; UG/.5ML; UG/.5ML
INJECTION, SUSPENSION INTRAMUSCULAR
Refills: 0 | COMMUNITY
Start: 2018-09-10 | End: 2019-05-21

## 2018-11-06 NOTE — TELEPHONE ENCOUNTER
[facility] PT having CT scan Nov 20th needs to see Radha Aleman around Dec 6th if possible   Thank you

## 2018-11-06 NOTE — LETTER
November 6, 2018     Miroslava Storm DO  [address] [facility] [place] [address]    Patient: Alexandria Rosenbaum   YOB: 1945   Date of Visit: 11/6/2018       Dear Dr Juan Olivas: Thank you for referring Deloris Ramos to me for evaluation  Below are my notes for this consultation  If you have questions, please do not hesitate to call me  I look forward to following your patient along with you  Sincerely,        Joaquina Mcconnell MD        CC: MARELY Gentile MD  11/6/2018  3:06 PM  Sign at close encounter  Referring Physician: Miroslava Storm DO  A copy of this note was sent to the referring physician  Diagnoses and all orders for this visit:    Benign prostatic hyperplasia with weak urinary stream  -     CT abdomen pelvis w wo contrast; Future  -     POCT urine dip    Other orders  -     FLUAD 0 5 ML BRII; inject 0 5 milliliter intramuscularly            Assessment and plan:       1  BPH status post Uro lift    2  Left hydronephrosis, thickening of the left bladder wall near the trigone (new problem)    On personal review, I believe that the radiographic findings most likely represent recent passage of a ureteral stone with edema at the level of the UVJ  Patient has had recent cystoscope previously at and before the time of his Uro lift which did not reveal any evidence of urothelial carcinoma of the bladder  The Uro lift implants both the capsular tabs as well as the urethral end plate are visible on his CT scan and there does not appear to be any injury to the ureter or to the bladder based on the location of these implants  We discussed a number of options today including repeating cystoscopy with diagnostic ureteroscopy or repeating imaging  The pros and cons of each approach were reviewed  Patient has elected to obtain imaging prior to scheduling another cystoscopy and I agree that this is most reasonable      We will obtain a CT abdomen pelvis with delayed expiratory imaging  If his hydroureteronephrosis and bladder wall thickening resolve then we will manage conservatively  If they persist he will be contacted and we will plan for a cystoscopy with diagnostic ureteroscopy under anesthesia  Zak Cardona MD      Chief Complaint     Hydronephrosis      History of Present Illness     Shonna Magaña is a 74 y o  male presents for an acute visit  He is known to me for medically refractory lower urinary tract symptoms and underwent a Uro lift procedure approximately 6 months ago  His obstructive symptoms improved significantly  He still has persistent nocturia  One week ago he developed acute onset left lower quadrant pain  He thought that this was a diverticulitis flare  He was evaluated in the emergency department  A CT scan abdomen pelvis revealed new onset left hydroureteronephrosis down to the level of the UVJ which appeared asymmetrically thickened  Patient's pain has subsided  He did complete a course of antibiotics  He has had no hematuria  Detailed Urologic History     - please refer to HPI    Review of Systems     Review of Systems   Constitutional: Negative for activity change and fatigue  HENT: Negative for congestion  Eyes: Negative for visual disturbance  Respiratory: Negative for shortness of breath and wheezing  Cardiovascular: Negative for chest pain and leg swelling  Gastrointestinal: Positive for abdominal pain  Genitourinary: Positive for flank pain  Negative for hematuria and urgency  Musculoskeletal: Negative for back pain  Allergic/Immunologic: Negative for immunocompromised state  Neurological: Negative for dizziness and numbness  Psychiatric/Behavioral: Negative for dysphoric mood  All other systems reviewed and are negative        AUA SYMPTOM SCORE      Most Recent Value   AUA SYMPTOM SCORE   How often have you had a sensation of not emptying your bladder completely after you finished urinating? 1   How often have you had to urinate again less than two hours after you finished urinating? 3   How often have you found you stopped and started again several times when you urinate? 1   How often have you found it difficult to postpone urination? 1   How often have you had a weak urinary stream?  1   How often have you had to push or strain to begin urination? 1   How many times did you most typically get up to urinate from the time you went to bed at night until the time you got up in the morning? 3   Quality of Life: If you were to spend the rest of your life with your urinary condition just the way it is now, how would you feel about that?  3   AUA SYMPTOM SCORE  11            Allergies     Allergies   Allergen Reactions    Iodine      Nausea, hot, sweaty -- had through an IV  Has had it since with no issues    Pt reports no issues with IV contrast - has had numerous times with no issues        Physical Exam     Physical Exam   Constitutional: He is oriented to person, place, and time  He appears well-developed and well-nourished  No distress  HENT:   Head: Normocephalic and atraumatic  Eyes: EOM are normal    Neck: Normal range of motion  Cardiovascular:   Negative lower extremity edema   Pulmonary/Chest: Effort normal and breath sounds normal    Abdominal: Soft  Genitourinary:   Genitourinary Comments: Negative suprapubic or CVA tenderness   Musculoskeletal: Normal range of motion  Neurological: He is alert and oriented to person, place, and time  Skin: Skin is warm  Psychiatric: He has a normal mood and affect   His behavior is normal            Vital Signs  Vitals:    11/06/18 1436   BP: 132/68   BP Location: Left arm   Patient Position: Sitting   Cuff Size: Standard   Pulse: 56   Weight: 79 7 kg (175 lb 9 6 oz)   Height: 5' 5" (1 651 m)         Current Medications       Current Outpatient Prescriptions:     acetaminophen (TYLENOL) 500 mg tablet, Take 2 tablets by mouth daily as needed, Disp: , Rfl:     amLODIPine (NORVASC) 5 mg tablet, Take 1 tablet (5 mg total) by mouth every morning, Disp: 90 tablet, Rfl: 3    aspirin (ASPIRIN LOW DOSE) 81 MG tablet, Take 1 tablet by mouth daily, Disp: , Rfl:     dicyclomine (BENTYL) 20 mg tablet, Take 1 tablet (20 mg total) by mouth every 6 (six) hours As needed for abdominal pain, Disp: 20 tablet, Rfl: 0    fenofibrate (TRICOR) 145 mg tablet, Take 1 tablet (145 mg total) by mouth daily, Disp: 90 tablet, Rfl: 2    FLUAD 0 5 ML BRII, inject 0 5 milliliter intramuscularly, Disp: , Rfl: 0    ibuprofen (MOTRIN) 200 mg tablet, Take 3 tablets (600 mg total) by mouth every 6 (six) hours as needed for mild pain, Disp: , Rfl: 0    lisinopril (ZESTRIL) 40 mg tablet, Take 1 tablet (40 mg total) by mouth daily, Disp: 90 tablet, Rfl: 3    loperamide (IMODIUM A-D) 2 MG tablet, Take 1 tablet by mouth daily as needed, Disp: , Rfl:     lovastatin (MEVACOR) 40 MG tablet, Take one tablet by mouth one time daily, Disp: 90 tablet, Rfl: 2    metoprolol tartrate (LOPRESSOR) 25 mg tablet, TAKE ONE TABLET BY MOUTH TWICE DAILY , Disp: 60 tablet, Rfl: 10    Multiple Vitamins-Minerals (CENTRUM ULTRA MENS PO), Take 1 tablet by mouth daily, Disp: , Rfl:     omeprazole (PriLOSEC) 20 mg delayed release capsule, Take 1 capsule (20 mg total) by mouth daily for 90 days, Disp: 90 capsule, Rfl: 1    docusate sodium (COLACE) 100 mg capsule, Take 1 capsule (100 mg total) by mouth 2 (two) times a day for 15 days, Disp: 30 capsule, Rfl: 0      Active Problems     Patient Active Problem List   Diagnosis    Chest pain    Essential hypertension    Mixed hyperlipidemia    Chronic pain disorder    Chronic left shoulder pain    Chronic bilateral low back pain with bilateral sciatica    Disc degeneration, lumbar    Lumbar stenosis with neurogenic claudication    Left lower quadrant pain    Abnormal CT of the abdomen    Chronic pain syndrome    GREGG on CPAP    Benign prostatic hyperplasia with lower urinary tract symptoms    Other hydronephrosis         Past Medical History     Past Medical History:   Diagnosis Date    Angina at rest ([address] )     Apnea     Apnea     CPAP (continuous positive airway pressure) dependence     Diverticulitis     Diverticulitis     Hypercholesterolemia     Hyperlipidemia     Hypertension     Mitral valve disorder     Mitral valve disorder     Sleep apnea     Thoracic neuritis     Thoracic neuritis          Surgical History     Past Surgical History:   Procedure Laterality Date    COLONOSCOPY      Complete    CORONARY ANGIOPLASTY      ESOPHAGOGASTRODUODENOSCOPY      Diagnostic    FOOT SURGERY      HERNIA REPAIR      KNEE SURGERY Left     NEUROPLASTY / TRANSPOSITION MEDIAN NERVE AT CARPAL TUNNEL      ND COLONOSCOPY FLX DX W/COLLJ SPEC WHEN PFRMD N/A 4/10/2018    Procedure: EGD AND COLONOSCOPY;  Surgeon: Kali Finch MD;  Location: [facility]; Service: Gastroenterology    ND CYSTOURETHRO W/IMPLANT N/A 8/30/2018    Procedure: Jenise Hartman WITH INSERTION UROLIFT;  Surgeon: Roland Jaramillo MD;  Location: [facility];  Service: Urology    ND TRANSURETHRAL INCISION OF PROSTATE N/A 8/30/2018    Procedure: TRANSURETHRAL INCISION OF PROSTATE (TUIP);   Surgeon: Roland Jaramillo MD;  Location: [facility];  Service: Urology    SHOULDER SURGERY      TARSAL TUNNEL RELEASE      Neuroplasty Decompression         Family History     Family History   Problem Relation Age of Onset    Diabetes Mother         Mellitus    Heart disease Mother         Arteriosclerotic Cardiovascular    Hypertension Mother     Cancer Father     Arthritis Father         [facility]    Thyroid disease Sister     Diabetes Brother         Diabetes:Mellitus    Heart disease Brother     Hypertension Brother     Coronary artery disease Family          Social History     Social History     History   Smoking Status    Former Smoker    Packs/day: 1 00    Years: 17 00    Quit date: 1978   Smokeless Tobacco    Never Used         Pertinent Lab Values     Lab Results   Component Value Date    CREATININE 1 00 10/23/2018       Lab Results   Component Value Date    PSA 0 4 02/15/2018    PSA 0 5 12/10/2015    PSA 0 78 12/03/2014       @RESULTRCNT(1H])@      Pertinent Imaging     CT ABDOMEN AND PELVIS WITH IV CONTRAST     INDICATION:   LLQ abdominal pain h/o diverticulitis eval diverticulitis      COMPARISON:  CT abdomen and pelvis dated 3/8/2018     TECHNIQUE:  CT examination of the abdomen and pelvis was performed  Axial, sagittal, and coronal 2D reformatted images were created from the source data and submitted for interpretation      Radiation dose length product (DLP) for this visit:  582 mGy-cm   This examination, like all CT scans performed in the [facility], was performed utilizing techniques to minimize radiation dose exposure, including the use of iterative   reconstruction and automated exposure control      IV Contrast:  100 mL of iohexol (OMNIPAQUE)  Enteric Contrast:  Enteric contrast was not administered      FINDINGS:     ABDOMEN     LOWER CHEST:  No clinically significant abnormality identified in the visualized lower chest      LIVER/BILIARY TREE:  Several tiny low-density lesions are scattered in the liver, too small to definitively characterize but of unlikely clinical significance  The liver otherwise appears grossly unremarkable      GALLBLADDER:  No calcified gallstones  No pericholecystic inflammatory change      SPLEEN:  Unremarkable      PANCREAS:  Unremarkable      ADRENAL GLANDS:  Unremarkable      KIDNEYS/URETERS:  4 mm nonobstructing stone in the midportion of the right kidney  There is a 1 8 cm parapelvic cyst in the right kidney as well as a 1 1 cm cyst in the lower pole    The right kidney otherwise appears grossly unremarkable; no   hydronephrosis      There is a delayed nephrogram on the left  In addition there is a 1 6 cm cyst in the upper pole of the left kidney  There is also mild hydroureteronephrosis with some left periureteral ureteral and perinephric fat stranding  In addition there is a   tiny stone seen in the distal ureter with thickening of the distal ureter extending into the bladder      There are also additional areas of focal ureteral thickening seen in the mid and distal left ureter (for example axial image 59)      STOMACH AND BOWEL:  Colonic diverticulosis redemonstrated, most prominent in the left and sigmoid colon  No discrete evidence for acute diverticulitis  Normal appendix  No evidence of bowel obstruction  Otherwise grossly unremarkable     APPENDIX:  A normal appendix was visualized      ABDOMINOPELVIC CAVITY:  No ascites or free intraperitoneal air  No lymphadenopathy      VESSELS:  Mild atherosclerosis; no aortic aneurysm     PELVIS     REPRODUCTIVE ORGANS:  Multiple metallic clips are seen in the prostate  Otherwise unremarkable      URINARY BLADDER:  Focal wall thickening of the posterior bladder (axial images 72 and 73) with extension to the area of the left ureterovesicular junction  Suspicious for bladder neoplasm      ABDOMINAL WALL/INGUINAL REGIONS:  Postsurgical clips redemonstrated in the right inguinal region  Otherwise grossly unremarkable      OSSEOUS STRUCTURES:  No acute fracture or destructive osseous lesion  Redemonstration of degenerative changes of the spine which appear most prominent at L2/L3 and L5/S1  There is vacuum disc phenomenon at L5/S1  There is also minimal anterolisthesis   at this level, probably related to facet joint arthropathy, similar to the prior    Moderate degenerative changes of the bilateral hips, worse on the right      IMPRESSION:     Focal wall thickening of the posterior bladder with extension to the area of the left ureterovesicular junction and thickening of the distal left ureter as well (axial images 72 and 73)  There is also associated mild left hydroureteronephrosis and   evidence of obstructive uropathy  The findings taken together are suspicious for bladder neoplasm  Cystoscopy may be of benefit for further evaluation when clinically feasible and at the discretion of the referring caregiver      There are additional areas of focal ureteral thickening seen in the mid and distal left ureter (for example axial image 59), suspicious for neoplasm as well      Right-sided nephrolithiasis, bilateral renal cysts, colonic diverticulosis without evidence of acute diverticulitis, and other findings as above  Portions of the record may have been created with voice recognition software   Occasional wrong word or "sound a like" substitutions may have occurred due to the inherent limitations of voice recognition software   Read the chart carefully and recognize, using context, where substitutions have occurred

## 2018-11-06 NOTE — LETTER
November 6, 2018     Margarette Jain DO  [address] [facility] ([place]) [address]    Patient: Renee Hart   YOB: 1945   Date of Visit: 11/6/2018       Dear Dr Helen Weaver: Thank you for referring Treasure Andrade to me for evaluation  Below are my notes for this consultation  If you have questions, please do not hesitate to call me  I look forward to following your patient along with you  Sincerely,        Angelia Jones MD        CC: MARELY Calderon MD  11/6/2018  3:06 PM  Sign at close encounter  Referring Physician: Margarette Jain DO  A copy of this note was sent to the referring physician  {[id number]}        Assessment and plan:       1  BPH status post Uro lift    2  Left hydronephrosis, thickening of the left bladder wall near the trigone (new problem)    On personal review, I believe that the radiographic findings most likely represent recent passage of a ureteral stone with edema at the level of the UVJ  Patient has had recent cystoscope previously at and before the time of his Uro lift which did not reveal any evidence of urothelial carcinoma of the bladder  The Uro lift implants both the capsular tabs as well as the urethral end plate are visible on his CT scan and there does not appear to be any injury to the ureter or to the bladder based on the location of these implants  We discussed a number of options today including repeating cystoscopy with diagnostic ureteroscopy or repeating imaging  The pros and cons of each approach were reviewed  Patient has elected to obtain imaging prior to scheduling another cystoscopy and I agree that this is most reasonable  We will obtain a CT abdomen pelvis with delayed expiratory imaging  If his hydroureteronephrosis and bladder wall thickening resolve then we will manage conservatively    If they persist he will be contacted and we will plan for a cystoscopy with diagnostic ureteroscopy under anesthesia  Audie Mercer MD      Chief Complaint     Hydronephrosis      History of Present Illness     Stephania Meadows is a 74 y o  male presents for an acute visit  He is known to me for medically refractory lower urinary tract symptoms and underwent a Uro lift procedure approximately 6 months ago  His obstructive symptoms improved significantly  He still has persistent nocturia  One week ago he developed acute onset left lower quadrant pain  He thought that this was a diverticulitis flare  He was evaluated in the emergency department  A CT scan abdomen pelvis revealed new onset left hydroureteronephrosis down to the level of the UVJ which appeared asymmetrically thickened  Patient's pain has subsided  He did complete a course of antibiotics  He has had no hematuria  Detailed Urologic History     - please refer to HPI    Review of Systems     Review of Systems   Constitutional: Negative for activity change and fatigue  HENT: Negative for congestion  Eyes: Negative for visual disturbance  Respiratory: Negative for shortness of breath and wheezing  Cardiovascular: Negative for chest pain and leg swelling  Gastrointestinal: Positive for abdominal pain  Genitourinary: Positive for flank pain  Negative for hematuria and urgency  Musculoskeletal: Negative for back pain  Allergic/Immunologic: Negative for immunocompromised state  Neurological: Negative for dizziness and numbness  Psychiatric/Behavioral: Negative for dysphoric mood  All other systems reviewed and are negative  AUA SYMPTOM SCORE      Most Recent Value   AUA SYMPTOM SCORE   How often have you had a sensation of not emptying your bladder completely after you finished urinating? 1   How often have you had to urinate again less than two hours after you finished urinating?   3   How often have you found you stopped and started again several times when you urinate? 1   How often have you found it difficult to postpone urination? 1   How often have you had a weak urinary stream?  1   How often have you had to push or strain to begin urination? 1   How many times did you most typically get up to urinate from the time you went to bed at night until the time you got up in the morning? 3   Quality of Life: If you were to spend the rest of your life with your urinary condition just the way it is now, how would you feel about that?  3   AUA SYMPTOM SCORE  11            Allergies     Allergies   Allergen Reactions    Iodine      Nausea, hot, sweaty -- had through an IV  Has had it since with no issues    Pt reports no issues with IV contrast - has had numerous times with no issues        Physical Exam     Physical Exam   Constitutional: He is oriented to person, place, and time  He appears well-developed and well-nourished  No distress  HENT:   Head: Normocephalic and atraumatic  Eyes: EOM are normal    Neck: Normal range of motion  Cardiovascular:   Negative lower extremity edema   Pulmonary/Chest: Effort normal and breath sounds normal    Abdominal: Soft  Genitourinary:   Genitourinary Comments: Negative suprapubic or CVA tenderness   Musculoskeletal: Normal range of motion  Neurological: He is alert and oriented to person, place, and time  Skin: Skin is warm  Psychiatric: He has a normal mood and affect   His behavior is normal            Vital Signs  Vitals:    11/06/18 1436   BP: 132/68   BP Location: Left arm   Patient Position: Sitting   Cuff Size: Standard   Pulse: 56   Weight: 79 7 kg (175 lb 9 6 oz)   Height: 5' 5" (1 651 m)         Current Medications       Current Outpatient Prescriptions:     acetaminophen (TYLENOL) 500 mg tablet, Take 2 tablets by mouth daily as needed, Disp: , Rfl:     amLODIPine (NORVASC) 5 mg tablet, Take 1 tablet (5 mg total) by mouth every morning, Disp: 90 tablet, Rfl: 3    aspirin (ASPIRIN LOW DOSE) 81 MG tablet, Take 1 tablet by mouth daily, Disp: , Rfl:     dicyclomine (BENTYL) 20 mg tablet, Take 1 tablet (20 mg total) by mouth every 6 (six) hours As needed for abdominal pain, Disp: 20 tablet, Rfl: 0    fenofibrate (TRICOR) 145 mg tablet, Take 1 tablet (145 mg total) by mouth daily, Disp: 90 tablet, Rfl: 2    FLUAD 0 5 ML BRII, inject 0 5 milliliter intramuscularly, Disp: , Rfl: 0    ibuprofen (MOTRIN) 200 mg tablet, Take 3 tablets (600 mg total) by mouth every 6 (six) hours as needed for mild pain, Disp: , Rfl: 0    lisinopril (ZESTRIL) 40 mg tablet, Take 1 tablet (40 mg total) by mouth daily, Disp: 90 tablet, Rfl: 3    loperamide (IMODIUM A-D) 2 MG tablet, Take 1 tablet by mouth daily as needed, Disp: , Rfl:     lovastatin (MEVACOR) 40 MG tablet, Take one tablet by mouth one time daily, Disp: 90 tablet, Rfl: 2    metoprolol tartrate (LOPRESSOR) 25 mg tablet, TAKE ONE TABLET BY MOUTH TWICE DAILY , Disp: 60 tablet, Rfl: 10    Multiple Vitamins-Minerals (CENTRUM ULTRA MENS PO), Take 1 tablet by mouth daily, Disp: , Rfl:     omeprazole (PriLOSEC) 20 mg delayed release capsule, Take 1 capsule (20 mg total) by mouth daily for 90 days, Disp: 90 capsule, Rfl: 1    docusate sodium (COLACE) 100 mg capsule, Take 1 capsule (100 mg total) by mouth 2 (two) times a day for 15 days, Disp: 30 capsule, Rfl: 0      Active Problems     Patient Active Problem List   Diagnosis    Chest pain    Essential hypertension    Mixed hyperlipidemia    Chronic pain disorder    Chronic left shoulder pain    Chronic bilateral low back pain with bilateral sciatica    Disc degeneration, lumbar    Lumbar stenosis with neurogenic claudication    Left lower quadrant pain    Abnormal CT of the abdomen    Chronic pain syndrome    GREGG on CPAP    Benign prostatic hyperplasia with lower urinary tract symptoms    Other hydronephrosis         Past Medical History     Past Medical History:   Diagnosis Date    [facility])     Apnea  Apnea     CPAP (continuous positive airway pressure) dependence     Diverticulitis     Diverticulitis     Hypercholesterolemia     Hyperlipidemia     Hypertension     Mitral valve disorder     Mitral valve disorder     Sleep apnea     Thoracic neuritis     Thoracic neuritis          Surgical History     Past Surgical History:   Procedure Laterality Date    COLONOSCOPY      Complete    CORONARY ANGIOPLASTY      ESOPHAGOGASTRODUODENOSCOPY      Diagnostic    FOOT SURGERY      HERNIA REPAIR      KNEE SURGERY Left     NEUROPLASTY / TRANSPOSITION MEDIAN NERVE AT CARPAL TUNNEL      KS COLONOSCOPY FLX DX W/COLLJ SPEC WHEN PFRMD N/A 4/10/2018    Procedure: EGD AND COLONOSCOPY;  Surgeon: Minoo Bowers MD;  Location: [facility]; Service: Gastroenterology    KS CYSTOURETHRO W/IMPLANT N/A 8/30/2018    Procedure: Alas Musty WITH INSERTION UROLIFT;  Surgeon: Terrie Chaparro MD;  Location: [facility];  Service: Urology    KS TRANSURETHRAL INCISION OF PROSTATE N/A 8/30/2018    Procedure: TRANSURETHRAL INCISION OF PROSTATE (TUIP);   Surgeon: Terrie Chaparro MD;  [facility]: [facility];  Service: Urology    SHOULDER SURGERY      TARSAL TUNNEL RELEASE      Neuroplasty Decompression         Family History     Family History   Problem Relation Age of Onset    Diabetes Mother         Mellitus    Heart disease Mother         Arteriosclerotic Cardiovascular    Hypertension Mother     Cancer Father     Arthritis Father         Rheu Mult Site [place] [employer]    Thyroid disease Sister     Diabetes Brother         Diabetes:Mellitus    Heart disease Brother     Hypertension Brother     Coronary artery disease Family          Social History     Social History     History   Smoking Status    Former Smoker    Packs/day: 1 00    Years: 17 00    Quit date: 1978   Smokeless Tobacco    Never Used         Pertinent Lab Values     Lab Results   Component Value Date    CREATININE 1 00 10/23/2018       Lab Results   Component Value Date    PSA 0 4 02/15/2018    PSA 0 5 12/10/2015    PSA 0 78 12/03/2014       @RESULTRCNT(1H])@      Pertinent Imaging     CT ABDOMEN AND PELVIS WITH IV CONTRAST     INDICATION:   LLQ abdominal pain h/o diverticulitis eval diverticulitis      COMPARISON:  CT abdomen and pelvis dated 3/8/2018     TECHNIQUE:  CT examination of the abdomen and pelvis was performed  Axial, sagittal, and coronal 2D reformatted images were created from the source data and submitted for interpretation      Radiation dose length product (DLP) for this visit:  582 mGy-cm   This examination, like all CT scans performed in the [facility], was performed utilizing techniques to minimize radiation dose exposure, including the use of iterative   reconstruction and automated exposure control      IV Contrast:  100 mL of iohexol (OMNIPAQUE)  Enteric Contrast:  Enteric contrast was not administered      FINDINGS:     ABDOMEN     LOWER CHEST:  No clinically significant abnormality identified in the visualized lower chest      LIVER/BILIARY TREE:  Several tiny low-density lesions are scattered in the liver, too small to definitively characterize but of unlikely clinical significance  The liver otherwise appears grossly unremarkable      GALLBLADDER:  No calcified gallstones  No pericholecystic inflammatory change      SPLEEN:  Unremarkable      PANCREAS:  Unremarkable      ADRENAL GLANDS:  Unremarkable      KIDNEYS/URETERS:  4 mm nonobstructing stone in the midportion of the right kidney  There is a 1 8 cm parapelvic cyst in the right kidney as well as a 1 1 cm cyst in the lower pole  The right kidney otherwise appears grossly unremarkable; no   hydronephrosis      There is a delayed nephrogram on the left  In addition there is a 1 6 cm cyst in the upper pole of the left kidney    There is also mild hydroureteronephrosis with some left periureteral ureteral and perinephric fat stranding  In addition there is a   tiny stone seen in the distal ureter with thickening of the distal ureter extending into the bladder      There are also additional areas of focal ureteral thickening seen in the mid and distal left ureter (for example axial image 59)      STOMACH AND BOWEL:  Colonic diverticulosis redemonstrated, most prominent in the left and sigmoid colon  No discrete evidence for acute diverticulitis  Normal appendix  No evidence of bowel obstruction  Otherwise grossly unremarkable     APPENDIX:  A normal appendix was visualized      ABDOMINOPELVIC CAVITY:  No ascites or free intraperitoneal air  No lymphadenopathy      VESSELS:  Mild atherosclerosis; no aortic aneurysm     PELVIS     REPRODUCTIVE ORGANS:  Multiple metallic clips are seen in the prostate  Otherwise unremarkable      URINARY BLADDER:  Focal wall thickening of the posterior bladder (axial images 72 and 73) with extension to the area of the left ureterovesicular junction  Suspicious for bladder neoplasm      ABDOMINAL WALL/INGUINAL REGIONS:  Postsurgical clips redemonstrated in the right inguinal region  Otherwise grossly unremarkable      OSSEOUS STRUCTURES:  No acute fracture or destructive osseous lesion  Redemonstration of degenerative changes of the spine which appear most prominent at L2/L3 and L5/S1  There is vacuum disc phenomenon at L5/S1  There is also minimal anterolisthesis   at this level, probably related to facet joint arthropathy, similar to the prior  Moderate degenerative changes of the bilateral hips, worse on the right      IMPRESSION:     Focal wall thickening of the posterior bladder with extension to the area of the left ureterovesicular junction and thickening of the distal left ureter as well (axial images 72 and 73)  There is also associated mild left hydroureteronephrosis and   evidence of obstructive uropathy  The findings taken together are suspicious for bladder neoplasm    Cystoscopy may be of benefit for further evaluation when clinically feasible and at the discretion of the referring caregiver      There are additional areas of focal ureteral thickening seen in the mid and distal left ureter (for example axial image 59), suspicious for neoplasm as well      Right-sided nephrolithiasis, bilateral renal cysts, colonic diverticulosis without evidence of acute diverticulitis, and other findings as above  Portions of the record may have been created with voice recognition software   Occasional wrong word or "sound a like" substitutions may have occurred due to the inherent limitations of voice recognition software   Read the chart carefully and recognize, using context, where substitutions have occurred

## 2018-11-06 NOTE — PROGRESS NOTES
Referring Physician: Amy Mckeon DO  A copy of this note was sent to the referring physician  Diagnoses and all orders for this visit:    Benign prostatic hyperplasia with weak urinary stream  -     CT abdomen pelvis w wo contrast; Future  -     POCT urine dip    Other orders  -     FLUAD 0 5 ML BRII; inject 0 5 milliliter intramuscularly            Assessment and plan:       1  BPH status post Uro lift    2  Left hydronephrosis, thickening of the left bladder wall near the trigone (new problem)    On personal review, I believe that the radiographic findings most likely represent recent passage of a ureteral stone with edema at the level of the UVJ  Patient has had recent cystoscope previously at and before the time of his Uro lift which did not reveal any evidence of urothelial carcinoma of the bladder  The Uro lift implants both the capsular tabs as well as the urethral end plate are visible on his CT scan and there does not appear to be any injury to the ureter or to the bladder based on the location of these implants  We discussed a number of options today including repeating cystoscopy with diagnostic ureteroscopy or repeating imaging  The pros and cons of each approach were reviewed  Patient has elected to obtain imaging prior to scheduling another cystoscopy and I agree that this is most reasonable  We will obtain a CT abdomen pelvis with delayed expiratory imaging  If his hydroureteronephrosis and bladder wall thickening resolve then we will manage conservatively  If they persist he will be contacted and we will plan for a cystoscopy with diagnostic ureteroscopy under anesthesia  Tommie Jason MD      Chief Complaint     Hydronephrosis      History of Present Illness     Nabil Gauthier is a 74 y o  male presents for an acute visit  He is known to me for medically refractory lower urinary tract symptoms and underwent a Uro lift procedure approximately 6 months ago    His obstructive symptoms improved significantly  He still has persistent nocturia  One week ago he developed acute onset left lower quadrant pain  He thought that this was a diverticulitis flare  He was evaluated in the emergency department  A CT scan abdomen pelvis revealed new onset left hydroureteronephrosis down to the level of the UVJ which appeared asymmetrically thickened  Patient's pain has subsided  He did complete a course of antibiotics  He has had no hematuria  Detailed Urologic History     - please refer to HPI    Review of Systems     Review of Systems   Constitutional: Negative for activity change and fatigue  HENT: Negative for congestion  Eyes: Negative for visual disturbance  Respiratory: Negative for shortness of breath and wheezing  Cardiovascular: Negative for chest pain and leg swelling  Gastrointestinal: Positive for abdominal pain  Genitourinary: Positive for flank pain  Negative for hematuria and urgency  Musculoskeletal: Negative for back pain  Allergic/Immunologic: Negative for immunocompromised state  Neurological: Negative for dizziness and numbness  Psychiatric/Behavioral: Negative for dysphoric mood  All other systems reviewed and are negative  AUA SYMPTOM SCORE      Most Recent Value   AUA SYMPTOM SCORE   How often have you had a sensation of not emptying your bladder completely after you finished urinating? 1   How often have you had to urinate again less than two hours after you finished urinating? 3   How often have you found you stopped and started again several times when you urinate? 1   How often have you found it difficult to postpone urination? 1   How often have you had a weak urinary stream?  1   How often have you had to push or strain to begin urination? 1   How many times did you most typically get up to urinate from the time you went to bed at night until the time you got up in the morning?   3   Quality of Life: If you were to spend the rest of your life with your urinary condition just the way it is now, how would you feel about that?  3   AUA SYMPTOM SCORE  11            Allergies     Allergies   Allergen Reactions    Iodine      Nausea, hot, sweaty -- had through an IV  Has had it since with no issues    Pt reports no issues with IV contrast - has had numerous times with no issues        Physical Exam     Physical Exam   Constitutional: He is oriented to person, place, and time  He appears well-developed and well-nourished  No distress  HENT:   Head: Normocephalic and atraumatic  Eyes: EOM are normal    Neck: Normal range of motion  Cardiovascular:   Negative lower extremity edema   Pulmonary/Chest: Effort normal and breath sounds normal    Abdominal: Soft  Genitourinary:   Genitourinary Comments: Negative suprapubic or CVA tenderness   Musculoskeletal: Normal range of motion  Neurological: He is alert and oriented to person, place, and time  Skin: Skin is warm  Psychiatric: He has a normal mood and affect   His behavior is normal            Vital Signs  Vitals:    11/06/18 1436   BP: 132/68   BP Location: Left arm   Patient Position: Sitting   Cuff Size: Standard   Pulse: 56   Weight: 79 7 kg (175 lb 9 6 oz)   Height: 5' 5" (1 651 m)         Current Medications       Current Outpatient Prescriptions:     acetaminophen (TYLENOL) 500 mg tablet, Take 2 tablets by mouth daily as needed, Disp: , Rfl:     amLODIPine (NORVASC) 5 mg tablet, Take 1 tablet (5 mg total) by mouth every morning, Disp: 90 tablet, Rfl: 3    aspirin (ASPIRIN LOW DOSE) 81 MG tablet, Take 1 tablet by mouth daily, Disp: , Rfl:     dicyclomine (BENTYL) 20 mg tablet, Take 1 tablet (20 mg total) by mouth every 6 (six) hours As needed for abdominal pain, Disp: 20 tablet, Rfl: 0    fenofibrate (TRICOR) 145 mg tablet, Take 1 tablet (145 mg total) by mouth daily, Disp: 90 tablet, Rfl: 2    FLUAD 0 5 ML BRII, inject 0 5 milliliter intramuscularly, Disp: , Rfl: 0    ibuprofen (MOTRIN) 200 mg tablet, Take 3 tablets (600 mg total) by mouth every 6 (six) hours as needed for mild pain, Disp: , Rfl: 0    lisinopril (ZESTRIL) 40 mg tablet, Take 1 tablet (40 mg total) by mouth daily, Disp: 90 tablet, Rfl: 3    loperamide (IMODIUM A-D) 2 MG tablet, Take 1 tablet by mouth daily as needed, Disp: , Rfl:     lovastatin (MEVACOR) 40 MG tablet, Take one tablet by mouth one time daily, Disp: 90 tablet, Rfl: 2    metoprolol tartrate (LOPRESSOR) 25 mg tablet, TAKE ONE TABLET BY MOUTH TWICE DAILY , Disp: 60 tablet, Rfl: 10    Multiple Vitamins-Minerals (CENTRUM ULTRA MENS PO), Take 1 tablet by mouth daily, Disp: , Rfl:     omeprazole (PriLOSEC) 20 mg delayed release capsule, Take 1 capsule (20 mg total) by mouth daily for 90 days, Disp: 90 capsule, Rfl: 1    docusate sodium (COLACE) 100 mg capsule, Take 1 capsule (100 mg total) by mouth 2 (two) times a day for 15 days, Disp: 30 capsule, Rfl: 0      Active Problems     Patient Active Problem List   Diagnosis    Chest pain    Essential hypertension    Mixed hyperlipidemia    Chronic pain disorder    Chronic left shoulder pain    Chronic bilateral low back pain with bilateral sciatica    Disc degeneration, lumbar    Lumbar stenosis with neurogenic claudication    Left lower quadrant pain    Abnormal CT of the abdomen    Chronic pain syndrome    GREGG on CPAP    Benign prostatic hyperplasia with lower urinary tract symptoms    Other hydronephrosis         Past Medical History     Past Medical History:   Diagnosis Date    Angina at rest (HCC)     Apnea     Apnea     CPAP (continuous positive airway pressure) dependence     Diverticulitis     Diverticulitis     Hypercholesterolemia     Hyperlipidemia     Hypertension     Mitral valve disorder     Mitral valve disorder     Sleep apnea     Thoracic neuritis     Thoracic neuritis          Surgical History     Past Surgical History:   Procedure Laterality Date  COLONOSCOPY      Complete    CORONARY ANGIOPLASTY      ESOPHAGOGASTRODUODENOSCOPY      Diagnostic    FOOT SURGERY      HERNIA REPAIR      KNEE SURGERY Left     NEUROPLASTY / TRANSPOSITION MEDIAN NERVE AT CARPAL TUNNEL      [place] COLONOSCOPY FLX DX W/COLLJ SPEC WHEN PFRMD N/A 4/10/2018    Procedure: EGD AND COLONOSCOPY;  Surgeon: Jenny Durham MD;  [facility]; Service: Gastroenterology    MN CYSTOURETHRO W/IMPLANT N/A 8/30/2018    Procedure: Webster Brilliant WITH INSERTION UROLIFT;  Surgeon: Angelia Jones MD;  [facility];  Service: Urology    MN TRANSURETHRAL INCISION OF PROSTATE N/A 8/30/2018    Procedure: TRANSURETHRAL INCISION OF PROSTATE (TUIP);   Surgeon: Angelia Jones MD;  [facility];  Service: Urology    SHOULDER SURGERY      TARSAL TUNNEL RELEASE      Neuroplasty Decompression         Family History     Family History   Problem Relation Age of Onset    Diabetes Mother         Mellitus    Heart disease Mother         Arteriosclerotic Cardiovascular    Hypertension Mother     Cancer Father     Arthritis Father         [employer] [employer] [employer]    Thyroid disease Sister     Diabetes Brother         Diabetes:Mellitus    Heart disease Brother     Hypertension Brother     Coronary artery disease Family          Social History     Social History     History   Smoking Status    Former Smoker    Packs/day: 1 00    Years: 17 00    Quit date: 1978   Smokeless Tobacco    Never Used         Pertinent Lab Values     Lab Results   Component Value Date    CREATININE 1 00 10/23/2018       Lab Results   Component Value Date    PSA 0 4 02/15/2018    PSA 0 5 12/10/2015    PSA 0 78 12/03/2014       @RESULTRCNT(1H])@      Pertinent Imaging     CT ABDOMEN AND PELVIS WITH IV CONTRAST     INDICATION:   LLQ abdominal pain h/o diverticulitis eval diverticulitis      COMPARISON:  CT abdomen and pelvis dated 3/8/2018     TECHNIQUE:  CT examination of the abdomen and pelvis was performed  Axial, sagittal, and coronal 2D reformatted images were created from the source data and submitted for interpretation      Radiation dose length product (DLP) for this visit:  582 mGy-cm   This examination, like all CT scans performed in the [facility], was performed utilizing techniques to minimize radiation dose exposure, including the use of iterative   reconstruction and automated exposure control      IV Contrast:  100 mL of iohexol (OMNIPAQUE)  Enteric Contrast:  Enteric contrast was not administered      FINDINGS:     ABDOMEN     LOWER CHEST:  No clinically significant abnormality identified in the visualized lower chest      LIVER/BILIARY TREE:  Several tiny low-density lesions are scattered in the liver, too small to definitively characterize but of unlikely clinical significance  The liver otherwise appears grossly unremarkable      GALLBLADDER:  No calcified gallstones  No pericholecystic inflammatory change      SPLEEN:  Unremarkable      PANCREAS:  Unremarkable      ADRENAL GLANDS:  Unremarkable      KIDNEYS/URETERS:  4 mm nonobstructing stone in the midportion of the right kidney  There is a 1 8 cm parapelvic cyst in the right kidney as well as a 1 1 cm cyst in the lower pole  The right kidney otherwise appears grossly unremarkable; no   hydronephrosis      There is a delayed nephrogram on the left  In addition there is a 1 6 cm cyst in the upper pole of the left kidney  There is also mild hydroureteronephrosis with some left periureteral ureteral and perinephric fat stranding  In addition there is a   tiny stone seen in the distal ureter with thickening of the distal ureter extending into the bladder      There are also additional areas of focal ureteral thickening seen in the mid and distal left ureter (for example axial image 59)      STOMACH AND BOWEL:  Colonic diverticulosis redemonstrated, most prominent in the left and sigmoid colon  No discrete evidence for acute diverticulitis  Normal appendix  No evidence of bowel obstruction  Otherwise grossly unremarkable     APPENDIX:  A normal appendix was visualized      ABDOMINOPELVIC CAVITY:  No ascites or free intraperitoneal air  No lymphadenopathy      VESSELS:  Mild atherosclerosis; no aortic aneurysm     PELVIS     REPRODUCTIVE ORGANS:  Multiple metallic clips are seen in the prostate  Otherwise unremarkable      URINARY BLADDER:  Focal wall thickening of the posterior bladder (axial images 72 and 73) with extension to the area of the left ureterovesicular junction  Suspicious for bladder neoplasm      ABDOMINAL WALL/INGUINAL REGIONS:  Postsurgical clips redemonstrated in the right inguinal region  Otherwise grossly unremarkable      OSSEOUS STRUCTURES:  No acute fracture or destructive osseous lesion  Redemonstration of degenerative changes of the spine which appear most prominent at L2/L3 and L5/S1  There is vacuum disc phenomenon at L5/S1  There is also minimal anterolisthesis   at this level, probably related to facet joint arthropathy, similar to the prior  Moderate degenerative changes of the bilateral hips, worse on the right      IMPRESSION:     Focal wall thickening of the posterior bladder with extension to the area of the left ureterovesicular junction and thickening of the distal left ureter as well (axial images 72 and 73)  There is also associated mild left hydroureteronephrosis and   evidence of obstructive uropathy  The findings taken together are suspicious for bladder neoplasm    Cystoscopy may be of benefit for further evaluation when clinically feasible and at the discretion of the referring caregiver      There are additional areas of focal ureteral thickening seen in the mid and distal left ureter (for example axial image 59), suspicious for neoplasm as well      Right-sided nephrolithiasis, bilateral renal cysts, colonic diverticulosis without evidence of acute diverticulitis, and other findings as above  Portions of the record may have been created with voice recognition software   Occasional wrong word or "sound a like" substitutions may have occurred due to the inherent limitations of voice recognition software   Read the chart carefully and recognize, using context, where substitutions have occurred

## 2018-11-14 ENCOUNTER — OFFICE VISIT (OUTPATIENT)
Dept: INTERNAL MEDICINE CLINIC | Facility: CLINIC | Age: 73
End: 2018-11-14
Payer: MEDICARE

## 2018-11-14 VITALS
OXYGEN SATURATION: 98 % | BODY MASS INDEX: 29.19 KG/M2 | DIASTOLIC BLOOD PRESSURE: 60 MMHG | HEIGHT: 65 IN | HEART RATE: 53 BPM | WEIGHT: 175.2 LBS | SYSTOLIC BLOOD PRESSURE: 118 MMHG

## 2018-11-14 DIAGNOSIS — R39.12 BENIGN PROSTATIC HYPERPLASIA WITH WEAK URINARY STREAM: ICD-10-CM

## 2018-11-14 DIAGNOSIS — N40.1 BENIGN PROSTATIC HYPERPLASIA WITH WEAK URINARY STREAM: ICD-10-CM

## 2018-11-14 DIAGNOSIS — I10 ESSENTIAL HYPERTENSION: Primary | ICD-10-CM

## 2018-11-14 DIAGNOSIS — E78.2 MIXED HYPERLIPIDEMIA: ICD-10-CM

## 2018-11-14 PROCEDURE — 99214 OFFICE O/P EST MOD 30 MIN: CPT | Performed by: INTERNAL MEDICINE

## 2018-11-14 PROCEDURE — G0439 PPPS, SUBSEQ VISIT: HCPCS | Performed by: INTERNAL MEDICINE

## 2018-11-14 NOTE — PROGRESS NOTES
Assessment and Plan:  Health and fitness annual visit  Questions reviewed  Problem List Items Addressed This Visit     None        Health Maintenance Due   Topic Date Due    Medicare Annual Wellness Visit (AWV)  1945    Pneumococcal PPSV23/PCV13 65+ Years / Low and Medium Risk (2 of 2 - PCV13) 09/23/2010         HPI:  Lexie Frausto is a 74 y o  male here for his Subsequent Wellness Visit  Patient Active Problem List   Diagnosis    Chest pain    Essential hypertension    Mixed hyperlipidemia    Chronic pain disorder    Chronic left shoulder pain    Chronic bilateral low back pain with bilateral sciatica    Disc degeneration, lumbar    Lumbar stenosis with neurogenic claudication    Left lower quadrant pain    Abnormal CT of the abdomen    Chronic pain syndrome    GREGG on CPAP    Benign prostatic hyperplasia with lower urinary tract symptoms    Other hydronephrosis     Past Medical History:   Diagnosis Date    Angina at rest ([address] )     Apnea     Apnea     CPAP (continuous positive airway pressure) dependence     Diverticulitis     Diverticulitis     Hypercholesterolemia     Hyperlipidemia     Hypertension     Mitral valve disorder     Mitral valve disorder     Sleep apnea     Thoracic neuritis     Thoracic neuritis      Past Surgical History:   Procedure Laterality Date    COLONOSCOPY      Complete    CORONARY ANGIOPLASTY      ESOPHAGOGASTRODUODENOSCOPY      Diagnostic    FOOT SURGERY      HERNIA REPAIR      KNEE SURGERY Left     NEUROPLASTY / TRANSPOSITION MEDIAN NERVE AT CARPAL TUNNEL      [place] COLONOSCOPY FLX DX W/COLLJ SPEC WHEN PFRMD N/A 4/10/2018    Procedure: EGD AND COLONOSCOPY;  Surgeon: Lynda Elizalde MD;  [facility];   Service: Gastroenterology    W[place] CYSTOURETHRO W/IMPLANT N/A 8/30/2018    Procedure: Jeffery Adams WITH INSERTION Vladimir Hood;  Surgeon: Negrito Hennessy MD;  [facility];  Service: Urology    [place] TRANSURETHRAL INCISION OF PROSTATE N/A 8/30/2018    Procedure: TRANSURETHRAL INCISION OF PROSTATE (TUIP);   Surgeon: Angelia Jones MD;  [facility];  Service: Urology    SHOULDER SURGERY      TARSAL TUNNEL RELEASE      Neuroplasty Decompression     Family History   Problem Relation Age of Onset    Diabetes Mother         Mellitus    Heart disease Mother         Arteriosclerotic Cardiovascular    Hypertension Mother     Cancer Father     Arthritis Father         [facility] [employer]    Thyroid disease Sister     Diabetes Brother         Diabetes:Mellitus    Heart disease Brother     Hypertension Brother     Coronary artery disease Family      History   Smoking Status    Former Smoker    Packs/day: 1 00    Years: 17 00    Quit date: 1978   Smokeless Tobacco    Never Used     History   Alcohol Use No      History   Drug Use No       Current Outpatient Prescriptions   Medication Sig Dispense Refill    acetaminophen (TYLENOL) 500 mg tablet Take 2 tablets by mouth daily as needed      amLODIPine (NORVASC) 5 mg tablet Take 1 tablet (5 mg total) by mouth every morning 90 tablet 3    aspirin (ASPIRIN LOW DOSE) 81 MG tablet Take 1 tablet by mouth daily      docusate sodium (COLACE) 100 mg capsule Take 1 capsule (100 mg total) by mouth 2 (two) times a day for 15 days 30 capsule 0    fenofibrate (TRICOR) 145 mg tablet Take 1 tablet (145 mg total) by mouth daily 90 tablet 2    FLUAD 0 5 ML BRII inject 0 5 milliliter intramuscularly  0    ibuprofen (MOTRIN) 200 mg tablet Take 3 tablets (600 mg total) by mouth every 6 (six) hours as needed for mild pain  0    lisinopril (ZESTRIL) 40 mg tablet Take 1 tablet (40 mg total) by mouth daily 90 tablet 3    loperamide (IMODIUM A-D) 2 MG tablet Take 1 tablet by mouth daily as needed      lovastatin (MEVACOR) 40 MG tablet Take one tablet by mouth one time daily 90 tablet 2    metoprolol tartrate (LOPRESSOR) 25 mg tablet TAKE ONE TABLET BY MOUTH TWICE DAILY  60 tablet 10    Multiple Vitamins-Minerals (CENTRUM ULTRA MENS PO) Take 1 tablet by mouth daily      omeprazole (PriLOSEC) 20 mg delayed release capsule Take 1 capsule (20 mg total) by mouth daily for 90 days 90 capsule 1    dicyclomine (BENTYL) 20 mg tablet Take 1 tablet (20 mg total) by mouth every 6 (six) hours As needed for abdominal pain (Patient not taking: Reported on 11/14/2018 ) 20 tablet 0     No current facility-administered medications for this visit  Allergies   Allergen Reactions    Iodine      Nausea, hot, sweaty -- had through an IV  Has had it since with no issues    Pt reports no issues with IV contrast - has had numerous times with no issues      Immunization History   Administered Date(s) Administered    Influenza 11/02/2005, 09/23/2009    Influenza Split High Dose Preservative Free IM 10/01/2015, 08/30/2017    Influenza TIV (IM) 10/01/2016    Pneumococcal Polysaccharide PPV23 09/23/2009    Tdap 1945, 05/03/2018    Zoster 1945       Patient Care Team:  Miguel Bailey DO as PCP - General  Marin Mccullough, MD Maryam Ortiz MD Kenyon Offer, PA-C Tillie Husband, PA-C Irvine Shock, MD Lionel Proctor, MD (Urology)    Medicare Screening Tests and Risk Assessments:  Matthew Carpenter is here for his Subsequent Wellness visit  Health Risk Assessment:  Patient rates overall health as good  Patient feels that their physical health rating is Same  Eyesight was rated as Slightly worse  Hearing was rated as Same  Patient feels that their emotional and mental health rating is Same  Pain experienced by patient in the last 7 days has been Some  Patient's pain rating has been 5/10  Patient states that he has experienced no weight loss or gain in last 6 months  Emotional/Mental Health:  Patient has been feeling nervous/anxious  PHQ-9 Depression Screening:    Frequency of the following problems over the past two weeks:      1   Little interest or pleasure in doing things: 0 - not at all      2  Feeling down, depressed, or hopeless: 0 - not at all  PHQ-2 Score: 0          Broken Bones/Falls: Fall Risk Assessment:    In the past year, patient has experienced: History of falling in past year     Number of falls: 1  Patient does not feel he is unsteady standing  Patient is not taking medication that can cause feelings of lightheadedness or tiredness  Patient often has no need to rush to the toilet  Bladder/Bowel:  Patient has leaked urine accidently in the last six months  Patient reports no loss of bowel control  Immunizations:  Patient has had a flu vaccination within the last year  Patient has received a pneumonia shot  Patient has received a shingles shot  Patient has received tetanus/diphtheria shot  Home Safety:  Patient does not have trouble with stairs inside or outside of their home  Patient currently reports that there are no safety hazards present in home, working smoke alarms, working carbon monoxide detectors  Preventative Screenings:   prostate cancer screen performed, colon cancer screen completed, cholesterol screen completed, glaucoma eye exam completed,     Nutrition:  Current diet: Regular with servings of the following:    Medications:  Patient is currently taking over-the-counter supplements  List of OTC medications includes: vitamin  Patient is able to manage medications  Lifestyle Choices:  Patient reports no tobacco use  Patient has smoked or used tobacco in the past   Patient has stopped his tobacco use  Tobacco use quit date: 40 years  Patient reports no alcohol use  Patient drives a vehicle  Patient wears seat belt  Current level of exercise of physical activity described by patient as: walking          Activities of Daily Living:  Can get out of bed by his or her self, able to dress self, able to make own meals, able to do own shopping, able to bathe self, can do own laundry/housekeeping, can manage own money, pay bills and track expenses    Previous Hospitalizations:  No hospitalization or ED visit in past 12 months        Advanced Directives:  Patient has decided on a power of   Patient has spoken to designated power of   Patient has completed advanced directive          Preventative Screening/Counseling:      Cardiovascular:      General: Risks and Benefits Discussed and Screening Current          Diabetes:      General: Risks and Benefits Discussed and Screening Current          Colorectal Cancer:      General: Risks and Benefits Discussed and Screening Current          Prostate Cancer:      General: Risks and Benefits Discussed and Screening Current          Osteoporosis:      General: Screening Not Indicated          AAA:      General: Screening Not Indicated          Glaucoma:      Referrals: Ophthalmology          HIV:      General: Screening Not Indicated          Hepatitis C:      General: Screening Not Indicated        Advanced Directives:   Patient has living will for healthcare, has durable POA for healthcare,

## 2018-11-14 NOTE — PROGRESS NOTES
Assessment/Plan:  REGARDING CARDIAC STATUS HE SEEMS STABLE WITH NORMAL BLOOD PRESSURE AND NO CHEST PAINS  CHOLESTEROL IS QUITE GOOD WITH A TOTAL CHOLESTEROL  AND LDL OF 69     HE NEEDS TO KEEP UP-TO-DATE ON EYE CHECKUPS  WILL SEE HIM BACK IN 4 MONTHS  BEFORE IF ANY PROBLEMS      Recent Results (from the past 1008 hour(s))   POCT urine dip    Collection Time: 10/04/18 11:39 AM   Result Value Ref Range    LEUKOCYTE ESTERASE,UA -     NITRITE,UA -     SL AMB POCT UROBILINOGEN -     POCT URINE PROTEIN -      PH,UA 5 0     BLOOD,UA -     SPECIFIC GRAVITY,UA 1 010     KETONES,UA -     BILIRUBIN,UA -     GLUCOSE, UA -      COLOR,UA Yellow     CLARITY,UA Clear    POCT Measure PVR    Collection Time: 10/04/18 11:40 AM   Result Value Ref Range    POST-VOID RESIDUAL VOLUME, ML POC 24 mL   ECG 12 lead    Collection Time: 10/23/18 10:31 PM   Result Value Ref Range    Ventricular Rate 61 BPM    Atrial Rate 61 BPM    AZ Interval 188 ms    QRSD Interval 84 ms    QT Interval 418 ms    QTC Interval 420 ms    P [place] 54 degrees    QRS Axis 21 degrees    T Wave Axis 27 degrees   Basic metabolic panel    Collection Time: 10/23/18 10:34 PM   Result Value Ref Range    Sodium 142 136 - 145 mmol/L    Potassium 3 7 3 5 - 5 3 mmol/L    Chloride 104 100 - 108 mmol/L    CO2 28 21 - 32 mmol/L    ANION GAP 10 4 - 13 mmol/L    BUN 21 5 - 25 mg/dL    Creatinine 1 00 0 60 - 1 30 mg/dL    Glucose 110 65 - 140 mg/dL    Calcium 9 3 8 3 - 10 1 mg/dL    eGFR 73 ml/min/1 73sq m   CBC and differential    Collection Time: 10/23/18 10:34 PM   Result Value Ref Range    WBC 10 31 (H) 4 31 - 10 16 Thousand/uL    RBC 4 42 3 88 - 5 62 Million/uL    Hemoglobin 15 5 12 0 - 17 0 g/dL    Hematocrit 44 0 36 5 - 49 3 %     (H) 82 - 98 fL    MCH 35 1 (H) 26 8 - 34 3 pg    MCHC 35 2 31 4 - 37 4 g/dL    RDW 12 7 11 6 - 15 1 %    MPV 10 4 8 9 - 12 7 fL    Platelets 108 108 - 351 Thousands/uL    nRBC 0 /100 WBCs    Neutrophils Relative 78 (H) 43 - 75 % Immat GRANS % 0 0 - 2 %    Lymphocytes Relative 13 (L) 14 - 44 %    Monocytes Relative 9 4 - 12 %    Eosinophils Relative 0 0 - 6 %    Basophils Relative 0 0 - 1 %    Neutrophils Absolute 7 97 (H) 1 85 - 7 62 Thousands/µL    Immature Grans Absolute 0 02 0 00 - 0 20 Thousand/uL    Lymphocytes Absolute 1 32 0 60 - 4 47 Thousands/µL    Monocytes Absolute 0 97 0 17 - 1 22 Thousand/µL    Eosinophils Absolute 0 02 0 00 - 0 61 Thousand/µL    Basophils Absolute 0 01 0 00 - 0 10 Thousands/µL   Hepatic function panel    Collection Time: 10/23/18 10:34 PM   Result Value Ref Range    Total Bilirubin 0 60 0 20 - 1 00 mg/dL    Bilirubin, Direct 0 24 (H) 0 00 - 0 20 mg/dL    Alkaline Phosphatase 65 46 - 116 U/L    AST 20 5 - 45 U/L    ALT 27 12 - 78 U/L    Total Protein 7 0 6 4 - 8 2 g/dL    Albumin 3 8 3 5 - 5 0 g/dL   Lactic acid, plasma    Collection Time: 10/23/18 10:34 PM   Result Value Ref Range    LACTIC ACID 1 6 0 5 - 2 0 mmol/L   Lipase    Collection Time: 10/23/18 10:34 PM   Result Value Ref Range    Lipase 114 73 - 393 u/L   UA w Reflex to Microscopic w Reflex to Culture    Collection Time: 10/23/18 11:04 PM   Result Value Ref Range    Color, UA Yellow     Clarity, UA Slightly Cloudy     Specific [place], UA 1 020 1 003 - 1 030    pH, UA 7 0 4 5 - 8 0    Leukocytes, UA Moderate (A) Negative    Nitrite, UA Negative Negative    Protein, UA 30 (1+) (A) Negative mg/dl    Glucose, UA Negative Negative mg/dl    Ketones, UA Negative Negative mg/dl    Urobilinogen, UA 0 2 0 2, 1 0 E U /dl E U /dl    Bilirubin, UA Negative Negative    Blood, UA Moderate (A) Negative   Urine Microscopic    Collection Time: 10/23/18 11:04 PM   Result Value Ref Range    RBC, UA 1-2 (A) None Seen, 0-5 /hpf    WBC, UA 30-50 (A) None Seen, 0-5, 5-55, 5-65 /hpf    Epithelial Cells None Seen None Seen, Occasional /hpf    Bacteria, UA Moderate (A) None Seen, Occasional /hpf   Urine culture    Collection Time: 10/23/18 11:04 PM   Result Value Ref Range Urine Culture >100,000 cfu/ml Proteus mirabilis (A)        Susceptibility    Proteus mirabilis - SIMON     ZID Performed Yes       Ampicillin ($$) <8 00 Susceptible ug/ml     Aztreonam ($$$)  <4 Susceptible ug/ml     Cefazolin ($) <2 00 Susceptible ug/ml     Ciprofloxacin ($)  <1 00 Susceptible ug/ml     Gentamicin ($$) <1 Susceptible ug/ml     Levofloxacin ($) <0 25 Susceptible ug/ml     Nitrofurantoin >64 Resistant ug/ml     Tetracycline >8 Resistant ug/ml     Tobramycin ($) <1 Susceptible ug/ml     Trimethoprim + Sulfamethoxazole ($$$) <2/38 Susceptible ug/ml   Basic metabolic panel    Collection Time: 11/06/18  7:38 AM   Result Value Ref Range    Sodium 136 136 - 145 mmol/L    Potassium 4 1 3 5 - 5 3 mmol/L    Chloride 103 100 - 108 mmol/L    CO2 27 21 - 32 mmol/L    ANION GAP 6 4 - 13 mmol/L    BUN 20 5 - 25 mg/dL    Creatinine 0 96 0 60 - 1 30 mg/dL    Glucose, Fasting 89 65 - 99 mg/dL    Calcium 9 0 8 3 - 10 1 mg/dL    eGFR 77 ml/min/1 73sq m   Lipid panel    Collection Time: 11/06/18  7:38 AM   Result Value Ref Range    Cholesterol 129 50 - 200 mg/dL    Triglycerides 92 <=150 mg/dL    HDL, Direct 42 40 - 60 mg/dL    LDL Calculated 69 0 - 100 mg/dL    Non-HDL-Chol (CHOL-HDL) 87 mg/dl   POCT urine dip    Collection Time: 11/06/18  3:04 PM   Result Value Ref Range    LEUKOCYTE ESTERASE,UA -     NITRITE,UA -     SL AMB POCT UROBILINOGEN -     POCT URINE PROTEIN -      PH,UA 5 0     BLOOD,UA -     SPECIFIC GRAVITY,UA 1 005     KETONES,UA -     BILIRUBIN,UA -     GLUCOSE, UA -      COLOR,UA yellow     CLARITY,UA clear        1  Essential hypertension  Comprehensive metabolic panel   2  Benign prostatic hyperplasia with weak urinary stream     3  Mixed hyperlipidemia  Lipid panel       Orders Placed This Encounter   Procedures    Comprehensive metabolic panel    Lipid panel         Subjective:  HE GENERALLY FEELS FAIRLY WELL  HE HAS HYPERLIPIDEMIA HYPERTENSION AND GERD  UP-TO-DATE ON EYE CHECKUPS    UP-TO-DATE ON IMMUNIZATIONS  NO EXERTIONAL CHEST PAIN PRESSURE SQUEEZING OR TIGHTNESS  Patient ID: Jess Mc is a 74 y o  male  HPI AS ABOVE  The following portions of the patient's history were reviewed and updated as appropriate:   He has a past medical history of Angina at rest [facility]); Apnea; Apnea; CPAP (continuous positive airway pressure) dependence; Diverticulitis; Diverticulitis; Hypercholesterolemia; Hyperlipidemia; Hypertension; Mitral valve disorder; Mitral valve disorder; Sleep apnea; Thoracic neuritis; and Thoracic neuritis  ,   does not have any pertinent problems on file  ,   has a past surgical history that includes Knee surgery (Left); Colonoscopy; Esophagogastroduodenoscopy; Foot surgery; Hernia repair; Tarsal tunnel release; Shoulder surgery; Neuroplasty / transposition median nerve at carpal tunnel; pr colonoscopy flx dx w/collj spec when pfrmd (N/A, 4/10/2018); Coronary angioplasty; pr cystourethro w/implant (N/A, 8/30/2018); and pr transurethral incision of prostate (N/A, 8/30/2018)  ,  family history includes Arthritis in his father; Cancer in his father; Coronary artery disease in his family; Diabetes in his brother and mother; Heart disease in his brother and mother; Hypertension in his brother and mother; Thyroid disease in his sister  ,   reports that he quit smoking about 40 years ago  He has a 17 00 pack-year smoking history  He has never used smokeless tobacco  He reports that he does not drink alcohol or use drugs  ,  is allergic to iodine       Current Outpatient Prescriptions:     acetaminophen (TYLENOL) 500 mg tablet, Take 2 tablets by mouth daily as needed, Disp: , Rfl:     amLODIPine (NORVASC) 5 mg tablet, Take 1 tablet (5 mg total) by mouth every morning, Disp: 90 tablet, Rfl: 3    aspirin (ASPIRIN LOW DOSE) 81 MG tablet, Take 1 tablet by mouth daily, Disp: , Rfl:     docusate sodium (COLACE) 100 mg capsule, Take 1 capsule (100 mg total) by mouth 2 (two) times a day for 15 days, Disp: 30 capsule, Rfl: 0    fenofibrate (TRICOR) 145 mg tablet, Take 1 tablet (145 mg total) by mouth daily, Disp: 90 tablet, Rfl: 2    FLUAD 0 5 ML BRII, inject 0 5 milliliter intramuscularly, Disp: , Rfl: 0    ibuprofen (MOTRIN) 200 mg tablet, Take 3 tablets (600 mg total) by mouth every 6 (six) hours as needed for mild pain, Disp: , Rfl: 0    lisinopril (ZESTRIL) 40 mg tablet, Take 1 tablet (40 mg total) by mouth daily, Disp: 90 tablet, Rfl: 3    loperamide (IMODIUM A-D) 2 MG tablet, Take 1 tablet by mouth daily as needed, Disp: , Rfl:     lovastatin (MEVACOR) 40 MG tablet, Take one tablet by mouth one time daily, Disp: 90 tablet, Rfl: 2    metoprolol tartrate (LOPRESSOR) 25 mg tablet, TAKE ONE TABLET BY MOUTH TWICE DAILY , Disp: 60 tablet, Rfl: 10    Multiple Vitamins-Minerals (CENTRUM ULTRA MENS PO), Take 1 tablet by mouth daily, Disp: , Rfl:     omeprazole (PriLOSEC) 20 mg delayed release capsule, Take 1 capsule (20 mg total) by mouth daily for 90 days, Disp: 90 capsule, Rfl: 1    Review of Systems   Constitutional: Negative for activity change, appetite change, chills, diaphoresis, fatigue, fever and unexpected weight change  HENT: Negative for congestion, ear pain, hearing loss, mouth sores, nosebleeds, postnasal drip, sinus pain, sinus pressure, sore throat and trouble swallowing  WEARS DENTAL PLATES  Eyes: Positive for visual disturbance  Negative for pain and discharge  WEARS CORRECTIVE LENSES   Respiratory: Negative for apnea, cough, chest tightness, shortness of breath and wheezing  Cardiovascular: Negative for chest pain, palpitations and leg swelling  Gastrointestinal: Negative for abdominal pain, anal bleeding, blood in stool, constipation, diarrhea, nausea and vomiting  Endocrine: Negative for polydipsia and polyphagia  Genitourinary: Negative for decreased urine volume, dysuria, flank pain, frequency, hematuria and urgency     Musculoskeletal: Negative for arthralgias, back pain, gait problem, joint swelling and myalgias  Skin: Negative for rash and wound  Allergic/Immunologic: Negative for environmental allergies and food allergies  Neurological: Negative for dizziness, tremors, seizures, syncope, speech difficulty, light-headedness, numbness and headaches  Hematological: Negative for adenopathy  Does not bruise/bleed easily  Psychiatric/Behavioral: Negative for agitation, confusion, hallucinations, sleep disturbance and suicidal ideas  The patient is not nervous/anxious  Objective:  /60 (BP Location: Left arm, Patient Position: Sitting)   Pulse (!) 53   Ht 5' 5" (1 651 m)   Wt 79 5 kg (175 lb 3 2 oz)   SpO2 98%   BMI 29 15 kg/m²      Physical Exam   Constitutional: He appears well-developed  No distress  MODERATELY OVERWEIGHT  BLOOD PRESSURE /70  HENT:   Head: Normocephalic  Right Ear: External ear normal    Left Ear: External ear normal    Nose: Nose normal    Mouth/Throat: Oropharynx is clear and moist  No oropharyngeal exudate  DENTAL PLATES IN PLACE  Eyes: Pupils are equal, round, and reactive to light  Conjunctivae and EOM are normal  Right eye exhibits no discharge  Left eye exhibits no discharge  Neck: Normal range of motion  Neck supple  No thyromegaly present  Cardiovascular: Normal rate, regular rhythm, normal heart sounds and intact distal pulses  Exam reveals no gallop and no friction rub  No murmur heard  Pulmonary/Chest: Effort normal and breath sounds normal  No respiratory distress  He has no wheezes  He has no rales  Abdominal: Soft  Bowel sounds are normal  He exhibits no distension and no mass  There is no tenderness  There is no rebound and no guarding  ABDOMEN IS MODERATELY OVERWEIGHT  Musculoskeletal: Normal range of motion  He exhibits no edema, tenderness or deformity  Lymphadenopathy:     He has no cervical adenopathy  Neurological: He is alert  He has normal reflexes   No cranial nerve deficit  Coordination normal    Skin: Skin is warm and dry  No rash noted  No erythema  Psychiatric: He has a normal mood and affect  His behavior is normal  Judgment and thought content normal    Nursing note and vitals reviewed

## 2018-11-19 ENCOUNTER — TELEPHONE (OUTPATIENT)
Dept: UROLOGY | Facility: MEDICAL CENTER | Age: 73
End: 2018-11-19

## 2018-11-19 NOTE — TELEPHONE ENCOUNTER
Appointment is to review CT scan  If patient does not have CT done prior to appointment  Appointment should be rescheduled

## 2018-11-26 ENCOUNTER — TELEPHONE (OUTPATIENT)
Dept: CARDIOLOGY CLINIC | Facility: CLINIC | Age: 73
End: 2018-11-26

## 2018-11-26 NOTE — TELEPHONE ENCOUNTER
Pt stopped in and questioning whether or not he is suppose to continue taking Metoprolol permanently or if this was just for a certain amount of time  He has not seen Dr Yokasta Kate in quite awhile because he is unsure if he is suppose to continue following up with a cardiologist and taking this med  Please advise if pt should have appt

## 2018-11-26 NOTE — TELEPHONE ENCOUNTER
S/w pt and he verbally understood he is to book a f/u appt with Dr Niya Day will be calling pt to book appt

## 2018-12-07 ENCOUNTER — HOSPITAL ENCOUNTER (OUTPATIENT)
Dept: CT IMAGING | Facility: HOSPITAL | Age: 73
Discharge: HOME | End: 2018-12-07
Attending: UROLOGY
Payer: MEDICARE

## 2018-12-07 DIAGNOSIS — N40.1 BENIGN PROSTATIC HYPERPLASIA WITH WEAK URINARY STREAM: ICD-10-CM

## 2018-12-07 DIAGNOSIS — R39.12 BENIGN PROSTATIC HYPERPLASIA WITH WEAK URINARY STREAM: ICD-10-CM

## 2018-12-07 PROCEDURE — 74178 CT ABD&PLV WO CNTR FLWD CNTR: CPT

## 2018-12-07 RX ADMIN — IOHEXOL 100 ML: 350 INJECTION, SOLUTION INTRAVENOUS at 12:35

## 2018-12-14 ENCOUNTER — TELEPHONE (OUTPATIENT)
Dept: UROLOGY | Facility: CLINIC | Age: 73
End: 2018-12-14

## 2018-12-14 NOTE — TELEPHONE ENCOUNTER
----- Message from Negrito Hennessy MD sent at 12/14/2018  3:39 PM EST -----  Please let the patient know the good news that the CT is negative  KIdneys and upper urinary tract look good  SHould followup as scheduled    Thank you,    ROSELINE

## 2018-12-17 NOTE — TELEPHONE ENCOUNTER
Called and made patient aware that per Lexie Mishra that CT results are negative and that he should follow up as scheduled  Confirmed appointment with patient  Patient verbalized understanding and denies any other questions or concerns

## 2018-12-18 NOTE — TELEPHONE ENCOUNTER
"S-(situation): \"Having decreased fetal movement x 2 days\".    B-(background): , 21w1d    A-(assessment): Pt reports that she is \"really sick\" and no fetal movement for 2 days.  She doesn't know if she is running a fever but has the chills, been vomiting since 6pm last night until 4am today.  Has had little urine output, urine is clear yellow with a foul odor.  She denies vaginal bleeding or leaking of fluid.    R-(recommendations): Advised pt that she will need to be seen in the ER for further evaluation.  Pt lives in [place], [place] and will be going to the closest ER in her area.  She does not have anyone to transport her therefore will drive herself.    Anusha Carter  [facility] RN      " Patient would like a script for Nexium sent to [facility] in [place]   Pharmacy phone is [phone] [facility] [phone]

## 2018-12-31 ENCOUNTER — OFFICE VISIT (OUTPATIENT)
Dept: CARDIOLOGY CLINIC | Facility: CLINIC | Age: 73
End: 2018-12-31
Payer: MEDICARE

## 2018-12-31 VITALS
BODY MASS INDEX: 29.32 KG/M2 | WEIGHT: 176 LBS | SYSTOLIC BLOOD PRESSURE: 140 MMHG | DIASTOLIC BLOOD PRESSURE: 76 MMHG | OXYGEN SATURATION: 99 % | HEART RATE: 62 BPM | HEIGHT: 65 IN

## 2018-12-31 DIAGNOSIS — E78.2 MIXED HYPERLIPIDEMIA: Primary | ICD-10-CM

## 2018-12-31 DIAGNOSIS — R60.0 LOWER EXTREMITY EDEMA: ICD-10-CM

## 2018-12-31 DIAGNOSIS — I35.1 NONRHEUMATIC AORTIC VALVE INSUFFICIENCY: ICD-10-CM

## 2018-12-31 DIAGNOSIS — I10 ESSENTIAL HYPERTENSION: ICD-10-CM

## 2018-12-31 DIAGNOSIS — I51.89 DIASTOLIC DYSFUNCTION: ICD-10-CM

## 2018-12-31 DIAGNOSIS — I34.0 NON-RHEUMATIC MITRAL REGURGITATION: ICD-10-CM

## 2018-12-31 PROCEDURE — 99214 OFFICE O/P EST MOD 30 MIN: CPT | Performed by: INTERNAL MEDICINE

## 2018-12-31 RX ORDER — AMLODIPINE BESYLATE 10 MG/1
10 TABLET ORAL EVERY MORNING
Qty: 90 TABLET | Refills: 3 | Status: SHIPPED | OUTPATIENT
Start: 2018-12-31 | End: 2018-12-31 | Stop reason: SDUPTHER

## 2018-12-31 RX ORDER — AMLODIPINE BESYLATE 10 MG/1
10 TABLET ORAL EVERY MORNING
Qty: 90 TABLET | Refills: 3 | Status: SHIPPED | OUTPATIENT
Start: 2018-12-31 | End: 2019-06-13 | Stop reason: SDUPTHER

## 2018-12-31 NOTE — PROGRESS NOTES
Cardiology Consultation     Jeanie Quiroz  [id number]  1945  [address] [place] [id number]    C/c:  FOLLOW-UP HYPERTENSION AND HYPERLIPIDEMIA    HPI:  75-year-old male with past medical history of hypertension, hyperlipidemia mild nonobstructive CAD, diastolic dysfunction, mild mitral regurgitation is here for follow-up  Patient is doing reasonably well, is active  He gets mild shortness of breath on moderate exertion  He denies having any chest pain  He has mild lower extremity edema  He denies having any orthopnea, paroxysmal nocturnal dyspnea or recent weight gain  He denies having any palpitations, dizziness or syncope  He had cardiac catheterization last year which showed mild nonobstructive CA D  He also had echocardiogram which showed grade 1 diastolic dysfunction, mild mitral regurgitation, mild tricuspid regurgitation, mild aortic regurgitation and mild pulmonic regurgitation      Patient Active Problem List   Diagnosis    Chest pain    Essential hypertension    Mixed hyperlipidemia    Chronic pain disorder    Chronic left shoulder pain    Chronic bilateral low back pain with bilateral sciatica    Disc degeneration, lumbar    Lumbar stenosis with neurogenic claudication    Left lower quadrant pain    Abnormal CT of the abdomen    Chronic pain syndrome    GREGG on CPAP    Benign prostatic hyperplasia with lower urinary tract symptoms    Other hydronephrosis     Past Medical History:   Diagnosis Date    Abnormal stress test     [facility])     Apnea     Apnea     Chest pain     CPAP (continuous positive airway pressure) dependence     Diverticulitis     Diverticulitis     Hypercholesterolemia     Hypercholesterolemia     Hyperlipidemia     Hypertension     Mitral valve disorder     Mitral valve disorder     Sleep apnea     Thoracic neuritis     Thoracic neuritis      Social History     Social History    Marital status: /Civil Union     Spouse name: N/A    Number of children: 4    Years of education: high school or GED     Occupational History    retired      Social History Main Topics    Smoking status: Former Smoker     Packs/day: 1 00     Years: 17 00     Quit date: 1978    Smokeless tobacco: Never Used    Alcohol use No    Drug use: No    Sexual activity: Not Currently     Partners: Female     Other Topics Concern    Not on file     Social History Narrative    Active Advance Directives    Lives With Spouse                  Family History   Problem Relation Age of Onset    Diabetes Mother         Mellitus    Heart disease Mother         Arteriosclerotic Cardiovascular    Hypertension Mother     Cancer Father     Arthritis Father         Rheu Mult Site W Involv Of Organs And Systems    Thyroid disease Sister     Diabetes Brother         Diabetes:Mellitus    Heart disease Brother     Hypertension Brother     Coronary artery disease Family      Past Surgical History:   Procedure Laterality Date    COLONOSCOPY      Complete    CORONARY ANGIOPLASTY      ESOPHAGOGASTRODUODENOSCOPY      Diagnostic    FOOT SURGERY      HERNIA REPAIR      KNEE SURGERY Left     NEUROPLASTY / TRANSPOSITION MEDIAN NERVE AT CARPAL TUNNEL      AZ COLONOSCOPY FLX DX W/COLLJ SPEC WHEN PFRMD N/A 4/10/2018    Procedure: EGD AND COLONOSCOPY;  Surgeon: Carline Duque MD;  [facility]; Service: Gastroenterology    AZ CYSTOURETHRO W/IMPLANT N/A 8/30/2018    Procedure: Yuly Avalos WITH INSERTION UROLIFT;  Surgeon: Laura Schwarz MD;  [facility];  Service: Urology    AZ TRANSURETHRAL INCISION OF PROSTATE N/A 8/30/2018    Procedure: TRANSURETHRAL INCISION OF PROSTATE (TUIP);   Surgeon: Laura Schwarz MD;  [facility];  Service: Urology    SHOULDER SURGERY      TARSAL TUNNEL RELEASE      Neuroplasty Decompression       Current Outpatient Prescriptions:     acetaminophen (TYLENOL) 500 mg tablet, Take 2 tablets by mouth daily as needed, Disp: , Rfl:     amLODIPine (NORVASC) 5 mg tablet, Take 1 tablet (5 mg total) by mouth every morning, Disp: 90 tablet, Rfl: 3    aspirin (ASPIRIN LOW DOSE) 81 MG tablet, Take 1 tablet by mouth daily, Disp: , Rfl:     fenofibrate (TRICOR) 145 mg tablet, Take 1 tablet (145 mg total) by mouth daily, Disp: 90 tablet, Rfl: 2    ibuprofen (MOTRIN) 200 mg tablet, Take 3 tablets (600 mg total) by mouth every 6 (six) hours as needed for mild pain, Disp: , Rfl: 0    lisinopril (ZESTRIL) 40 mg tablet, Take 1 tablet (40 mg total) by mouth daily, Disp: 90 tablet, Rfl: 3    loperamide (IMODIUM A-D) 2 MG tablet, Take 1 tablet by mouth daily as needed, Disp: , Rfl:     lovastatin (MEVACOR) 40 MG tablet, Take one tablet by mouth one time daily, Disp: 90 tablet, Rfl: 2    metoprolol tartrate (LOPRESSOR) 25 mg tablet, TAKE ONE TABLET BY MOUTH TWICE DAILY , Disp: 60 tablet, Rfl: 10    Multiple Vitamins-Minerals (CENTRUM ULTRA MENS PO), Take 1 tablet by mouth daily, Disp: , Rfl:     omeprazole (PriLOSEC) 20 mg delayed release capsule, Take 1 capsule (20 mg total) by mouth daily for 90 days, Disp: 90 capsule, Rfl: 1    docusate sodium (COLACE) 100 mg capsule, Take 1 capsule (100 mg total) by mouth 2 (two) times a day for 15 days, Disp: 30 capsule, Rfl: 0    FLUAD 0 5 ML BRII, inject 0 5 milliliter intramuscularly, Disp: , Rfl: 0  Allergies   Allergen Reactions    Iodine      Nausea, hot, sweaty -- had through an IV    Has had it since with no issues    Pt reports no issues with IV contrast - has had numerous times with no issues      Vitals:    12/31/18 1311   BP: 140/76   BP Location: Left arm   Patient Position: Sitting   Cuff Size: Adult   Pulse: 62   SpO2: 99%   Weight: 79 8 kg (176 lb)   Height: 5' 5" (1 651 m)       Labs:  Office Visit on 11/06/2018   Component Date Value    LEUKOCYTE ESTERASE,UA 11/06/2018 -     NITRITE,UA 11/06/2018 -     SL AMB POCT UROBILINOGEN 11/06/2018 -     POCT URINE PROTEIN 11/06/2018 -      PH,UA 11/06/2018 5 0     BLOOD,UA 11/06/2018 -     SPECIFIC GRAVITY,UA 11/06/2018 1 005     KETONES,UA 11/06/2018 -     BILIRUBIN,UA 11/06/2018 -     GLUCOSE, UA 11/06/2018 -      COLOR,UA 11/06/2018 yellow     CLARITY,UA 11/06/2018 clear    Appointment on 11/06/2018   Component Date Value    Sodium 11/06/2018 136     Potassium 11/06/2018 4 1     Chloride 11/06/2018 103     CO2 11/06/2018 27     ANION GAP 11/06/2018 6     BUN 11/06/2018 20     Creatinine 11/06/2018 0 96     Glucose, Fasting 11/06/2018 89     Calcium 11/06/2018 9 0     eGFR 11/06/2018 77     Cholesterol 11/06/2018 129     Triglycerides 11/06/2018 92     HDL, Direct 11/06/2018 42     LDL Calculated 11/06/2018 69     Non-HDL-Chol (CHOL-HDL) 11/06/2018 87      Imaging: Ct Renal Protocol    Result Date: 12/11/2018  Narrative: CT ABDOMEN AND PELVIS WITH AND WITHOUT IV CONTRAST INDICATION:   N40 1: Benign prostatic hyperplasia with lower urinary tract symptoms R39 12: Poor urinary stream  COMPARISON:  CT abdomen and pelvis 10/23/2018  TECHNIQUE: Initial CT of the abdomen and pelvis was performed without intravenous contrast   Subsequent dynamic CT evaluation of the abdomen and pelvis was performed after the administration of intravenous contrast in both nephrographic and delayed phases after the administration of intravenous contrast   Axial, sagittal, and coronal 2D reformatted images were created from the source data and submitted for interpretation  Radiation dose length product (DLP) for this visit:  2088 mGy-cm   This examination, like all CT scans performed in the [facility], was performed utilizing techniques to minimize radiation dose exposure, including the use of iterative reconstruction and automated exposure control   IV Contrast:  100 mL of iohexol (OMNIPAQUE) Enteric Contrast:  Enteric contrast was not administered  FINDINGS: ABDOMEN RIGHT KIDNEY AND URETER: No solid renal mass  No detectable urothelial mass  There is a stable cyst within the lower pole  No hydronephrosis or hydroureter  3 mm nonobstructing calculus is again seen in the midpole  There is no perinephric collection  LEFT KIDNEY AND URETER: No solid renal mass  No detectable urothelial mass  Previously described urothelial thickening is no longer visualized  There is a stable cysts in the upper pole  Scattered hypoattenuating foci that are too small to characterize but likely represent cysts are also present  No hydronephrosis or hydroureter  No urinary tract calculi  No perinephric collection  URINARY BLADDER: Previously described area of bladder wall thickening is longer seen  There are no focal masses  Postsurgical changes of urolift procedure are again noted  No calculi  LOWER CHEST:  No clinically significant abnormality identified in the visualized lower chest  LIVER/BILIARY TREE:  Unremarkable  GALLBLADDER:  No calcified gallstones  No pericholecystic inflammatory change  SPLEEN:  Unremarkable  PANCREAS:  Unremarkable  ADRENAL GLANDS:  Unremarkable  STOMACH AND BOWEL:  There is colonic diverticulosis without evidence of acute diverticulitis  ABDOMINOPELVIC CAVITY:  No ascites  No free intraperitoneal air  No lymphadenopathy  VESSELS:  Unremarkable for patient's age  PELVIS REPRODUCTIVE ORGANS:  Metallic clips within the prostate gland again seen  APPENDIX: A normal appendix was visualized  ABDOMINAL WALL/INGUINAL REGIONS:  Unremarkable  OSSEOUS STRUCTURES:  No acute fracture or destructive osseous lesion  Impression: 1  Resolution of previously seen left urothelial thickening and hydroureteronephrosis  No urothelial lesions  or solid enhancing masses identified  2   3 mm nonobstructing calculus within the midpole the right kidney    No calculi identified within the left kidney or ureters  3   Postsurgical changes of urolift procedure  4   Diverticulosis without active diverticulitis  Workstation performed: [id number]       Review of Systems:  Review of Systems   Constitutional: Negative for diaphoresis and fatigue  HENT: Negative for congestion and facial swelling  Eyes: Negative for photophobia and visual disturbance  Respiratory: Positive for shortness of breath  Negative for chest tightness  Cardiovascular: Positive for leg swelling  Negative for chest pain and palpitations  Gastrointestinal: Negative for abdominal pain and blood in stool  Endocrine: Negative for cold intolerance and heat intolerance  Musculoskeletal: Negative for arthralgias and myalgias  Skin: Negative for pallor and rash  Neurological: Negative for dizziness and syncope  Physical Exam:  Physical Exam   Constitutional: He is oriented to person, place, and time  He appears well-developed and well-nourished  HENT:   Head: Normocephalic and atraumatic  Eyes: Pupils are equal, round, and reactive to light  Conjunctivae and EOM are normal    Neck: Normal range of motion  Neck supple  No JVD present  No thyromegaly present  Cardiovascular: Normal rate, regular rhythm and intact distal pulses  Exam reveals no gallop and no friction rub  Murmur heard  Systolic murmur is present with a grade of 3/6   1+ edema bilaterally   Pulmonary/Chest: Effort normal and breath sounds normal  No respiratory distress  He has no wheezes  He has no rales  Abdominal: Soft  Bowel sounds are normal  He exhibits no distension  There is no tenderness  Musculoskeletal: Normal range of motion  He exhibits no edema  Neurological: He is alert and oriented to person, place, and time  He has normal reflexes  Skin: Skin is warm and dry  Psychiatric: He has a normal mood and affect  CT scan of the abdomen in 2018 October did not show any abdominal aortic aneurysm    Discussion/Summary:  1  Hypertension:  Systolic blood pressure is borderline high  I will stop metoprolol as patient does not want to take the medications  I will increase the dose of Norvasc to 10 mg  Continue lisinopril  Patient monitor his blood pressure and call me for systolic blood pressures more than 140mmhg  2  Hyperlipidemia, on statins  LDL at goal    3  Systolic murmur:  Probably due to mitral regurgitation  Asymptomatic    4  Lower extremity edema/diastolic dysfunction:  Probably due to varicose vein and not due to congestive heart failure  Recommended salt restriction and limb elevation

## 2019-01-04 ENCOUNTER — HOSPITAL ENCOUNTER (OUTPATIENT)
Dept: NON INVASIVE DIAGNOSTICS | Facility: CLINIC | Age: 74
Discharge: HOME | End: 2019-01-04
Payer: MEDICARE

## 2019-01-04 DIAGNOSIS — I10 ESSENTIAL HYPERTENSION: ICD-10-CM

## 2019-01-04 DIAGNOSIS — I51.89 DIASTOLIC DYSFUNCTION: ICD-10-CM

## 2019-01-04 DIAGNOSIS — I35.1 NONRHEUMATIC AORTIC VALVE INSUFFICIENCY: ICD-10-CM

## 2019-01-04 DIAGNOSIS — I34.0 NON-RHEUMATIC MITRAL REGURGITATION: ICD-10-CM

## 2019-01-04 DIAGNOSIS — R60.0 LOWER EXTREMITY EDEMA: ICD-10-CM

## 2019-01-04 PROCEDURE — C8929 TTE W OR WO FOL WCON,DOPPLER: HCPCS

## 2019-01-04 RX ADMIN — PERFLUTREN 0.4 ML/MIN: 6.52 INJECTION, SUSPENSION INTRAVENOUS at 11:00

## 2019-01-07 PROCEDURE — 93306 TTE W/DOPPLER COMPLETE: CPT | Performed by: INTERNAL MEDICINE

## 2019-02-06 DIAGNOSIS — E78.2 MIXED HYPERLIPIDEMIA: ICD-10-CM

## 2019-02-06 RX ORDER — LOVASTATIN 40 MG/1
TABLET ORAL
Qty: 90 TABLET | Refills: 1 | Status: SHIPPED | OUTPATIENT
Start: 2019-02-06 | End: 2019-05-01 | Stop reason: SDUPTHER

## 2019-02-19 ENCOUNTER — OFFICE VISIT (OUTPATIENT)
Dept: UROLOGY | Facility: CLINIC | Age: 74
End: 2019-02-19
Payer: MEDICARE

## 2019-02-19 VITALS
WEIGHT: 175.4 LBS | DIASTOLIC BLOOD PRESSURE: 68 MMHG | HEART RATE: 64 BPM | BODY MASS INDEX: 29.22 KG/M2 | SYSTOLIC BLOOD PRESSURE: 122 MMHG | HEIGHT: 65 IN

## 2019-02-19 DIAGNOSIS — N20.0 NEPHROLITHIASIS: ICD-10-CM

## 2019-02-19 DIAGNOSIS — N40.1 BENIGN PROSTATIC HYPERPLASIA WITH LOWER URINARY TRACT SYMPTOMS, SYMPTOM DETAILS UNSPECIFIED: Primary | ICD-10-CM

## 2019-02-19 LAB — POST-VOID RESIDUAL VOLUME, ML POC: 0 ML

## 2019-02-19 PROCEDURE — 99214 OFFICE O/P EST MOD 30 MIN: CPT | Performed by: UROLOGY

## 2019-02-19 PROCEDURE — 51798 US URINE CAPACITY MEASURE: CPT | Performed by: UROLOGY

## 2019-02-19 PROCEDURE — 51741 ELECTRO-UROFLOWMETRY FIRST: CPT | Performed by: UROLOGY

## 2019-02-19 NOTE — PROGRESS NOTES
Referring Physician: Jael Babb DO  A copy of this note was sent to the referring physician  Diagnoses and all orders for this visit:    Benign prostatic hyperplasia with lower urinary tract symptoms, symptom details unspecified  -     POCT Measure PVR    Nephrolithiasis  -     XR abdomen 1 view kub; Future            Assessment and plan:       1  BPH status post Uro lift    2  Left hydronephrosis, thickening of the left bladder wall near the trigone (new problem)  - resolved with follow-up imaging -  findings most likely represent successful spontaneous passage of a left ureteral calculus    3  Nephrolithiasis  - currently stone free on CT    Jose Urena is doing very well from a urologic perspective  I was happy to review his repeat CT scan which revealed complete resolution of his left hydroureteronephrosis and urothelial thickening  Findings were most likely consistent with prior passage of a stone and there are no residual calculi present on imaging  He remains asymptomatic  He is also doing very well from a urinary standpoint following prior Uro lift procedure  He continues to do well off of any ongoing medical management  At this point will follow him on an annual basis  He will return with a KUB at that time or sooner if needed  Elie Parada MD      Chief Complaint     Follow-up hydro      History of Present Illness     Miriam Espinoza is a 74 y o  male returns in follow-up for BPH and hydronephrosis  In brief,he is known to me for medically refractory lower urinary tract symptoms and underwent a Uro lift procedure approximately 9 months ago  His obstructive symptoms improved significantly  He still has persistent nocturia  He subsequently developed acute onset left lower quadrant pain  He thought that this was a diverticulitis flare  He was evaluated in the emergency department    A CT scan abdomen pelvis revealed new onset left hydroureteronephrosis down to the level of the UVJ which appeared asymmetrically thickened  I felt that the constellation of findings was most likely secondary to a previously plasty ureteral calculus  I recommended follow-up axial imaging which he has completed and presents to review the results  He is doing very well  His abdominal pain has completely resolved  He has had no hematuria and no voiding complaints  He remains happy with his lower urinary tract symptoms off of any medical management at the present time  Detailed Urologic History     - please refer to HPI    Review of Systems     Review of Systems   Constitutional: Negative for activity change and fatigue  HENT: Negative for congestion  Eyes: Negative for visual disturbance  Respiratory: Negative for shortness of breath and wheezing  Cardiovascular: Negative for chest pain and leg swelling  Gastrointestinal: Positive for abdominal pain  Genitourinary: Positive for flank pain  Negative for hematuria and urgency  Musculoskeletal: Negative for back pain  Allergic/Immunologic: Negative for immunocompromised state  Neurological: Negative for dizziness and numbness  Psychiatric/Behavioral: Negative for dysphoric mood  All other systems reviewed and are negative  Allergies     Allergies   Allergen Reactions    Iodine      Nausea, hot, sweaty -- had through an IV  Has had it since with no issues    Pt reports no issues with IV contrast - has had numerous times with no issues        Physical Exam     Physical Exam   Constitutional: He is oriented to person, place, and time  He appears well-developed and well-nourished  No distress  HENT:   Head: Normocephalic and atraumatic  Eyes: EOM are normal    Neck: Normal range of motion  Cardiovascular:   Negative lower extremity edema   Pulmonary/Chest: Effort normal and breath sounds normal    Abdominal: Soft     Genitourinary:   Genitourinary Comments: Negative suprapubic or CVA tenderness Musculoskeletal: Normal range of motion  Neurological: He is alert and oriented to person, place, and time  Skin: Skin is warm  Psychiatric: He has a normal mood and affect   His behavior is normal            Vital Signs  Vitals:    02/19/19 0851   BP: 122/68   BP Location: Left arm   Patient Position: Sitting   Cuff Size: Standard   Pulse: 64   Weight: 79 6 kg (175 lb 6 4 oz)   Height: 5' 5" (1 651 m)         Current Medications       Current Outpatient Medications:     acetaminophen (TYLENOL) 500 mg tablet, Take 2 tablets by mouth daily as needed, Disp: , Rfl:     amLODIPine (NORVASC) 10 mg tablet, Take 1 tablet (10 mg total) by mouth every morning, Disp: 90 tablet, Rfl: 3    aspirin (ASPIRIN LOW DOSE) 81 MG tablet, Take 1 tablet by mouth daily, Disp: , Rfl:     docusate sodium (COLACE) 100 mg capsule, Take 1 capsule (100 mg total) by mouth 2 (two) times a day for 15 days, Disp: 30 capsule, Rfl: 0    fenofibrate (TRICOR) 145 mg tablet, Take 1 tablet (145 mg total) by mouth daily, Disp: 90 tablet, Rfl: 2    FLUAD 0 5 ML BRII, inject 0 5 milliliter intramuscularly, Disp: , Rfl: 0    ibuprofen (MOTRIN) 200 mg tablet, Take 3 tablets (600 mg total) by mouth every 6 (six) hours as needed for mild pain, Disp: , Rfl: 0    lisinopril (ZESTRIL) 40 mg tablet, Take 1 tablet (40 mg total) by mouth daily, Disp: 90 tablet, Rfl: 3    loperamide (IMODIUM A-D) 2 MG tablet, Take 1 tablet by mouth daily as needed, Disp: , Rfl:     lovastatin (MEVACOR) 40 MG tablet, TAKE ONE TABLET BY MOUTH DAILY, Disp: 90 tablet, Rfl: 1    Multiple Vitamins-Minerals (CENTRUM ULTRA MENS PO), Take 1 tablet by mouth daily, Disp: , Rfl:     omeprazole (PriLOSEC) 20 mg delayed release capsule, Take 1 capsule (20 mg total) by mouth daily for 90 days, Disp: 90 capsule, Rfl: 1      Active Problems     Patient Active Problem List   Diagnosis    Chest pain    Essential hypertension    Mixed hyperlipidemia    Chronic pain disorder    Chronic left shoulder pain    Chronic bilateral low back pain with bilateral sciatica    Disc degeneration, lumbar    Lumbar stenosis with neurogenic claudication    Left lower quadrant pain    Abnormal CT of the abdomen    Chronic pain syndrome    GREGG on CPAP    Benign prostatic hyperplasia with lower urinary tract symptoms    Other hydronephrosis    Nonrheumatic aortic valve insufficiency    Non-rheumatic mitral regurgitation    Diastolic dysfunction    Lower extremity edema    Nephrolithiasis         Past Medical History     Past Medical History:   Diagnosis Date    Abnormal stress test     [facility])     Apnea     Apnea     Chest pain     CPAP (continuous positive airway pressure) dependence     Diverticulitis     Diverticulitis     Hypercholesterolemia     Hypercholesterolemia     Hyperlipidemia     Hypertension     Mitral valve disorder     Mitral valve disorder     Sleep apnea     Thoracic neuritis     Thoracic neuritis          Surgical History     Past Surgical History:   Procedure Laterality Date    COLONOSCOPY      Complete    CORONARY ANGIOPLASTY      ESOPHAGOGASTRODUODENOSCOPY      Diagnostic    FOOT SURGERY      HERNIA REPAIR      KNEE SURGERY Left     NEUROPLASTY / TRANSPOSITION MEDIAN NERVE AT CARPAL TUNNEL      DE COLONOSCOPY FLX DX W/COLLJ SPEC WHEN PFRMD N/A 4/10/2018    Procedure: EGD AND COLONOSCOPY;  Surgeon: Karen Trivedi MD;  [facility]; Service: Gastroenterology    DE CYSTOURETHRO W/IMPLANT N/A 8/30/2018    Procedure: Radha Farooq WITH INSERTION UROLIFT;  Surgeon: Meño Mcintyre MD;  [facility];  Service: Urology    DE TRANSURETHRAL INCISION OF PROSTATE N/A 8/30/2018    Procedure: TRANSURETHRAL INCISION OF PROSTATE (TUIP);   Surgeon: Meño Mcintyre MD;  [facility];  Service: Urology    SHOULDER SURGERY      TARSAL TUNNEL RELEASE      Neuroplasty Decompression         Family History     Family History   Problem Relation Age of Onset    Diabetes Mother         Mellitus    Heart disease Mother         Arteriosclerotic Cardiovascular    Hypertension Mother     Cancer Father     Arthritis Father         Maximu Jumat Site W Involv Of Organs And Systems    Thyroid disease Sister     Diabetes Brother         Diabetes:Mellitus    Heart disease Brother     Hypertension Brother     Coronary artery disease Family          Social History     Social History     Social History     Tobacco Use   Smoking Status Former Smoker    Packs/day: 1 00    Years: 17 00    Pack years: 17 00    Last attempt to quit:     Years since quittin 1   Smokeless Tobacco Never Used         Pertinent Lab Values     Lab Results   Component Value Date    CREATININE 0 96 2018       Lab Results   Component Value Date    PSA 0 4 02/15/2018    PSA 0 5 12/10/2015    PSA 0 78 2014       @RESULTRCNT(1H])@      Pertinent Imaging     FINDINGS:     ABDOMEN     RIGHT KIDNEY AND URETER:  No solid renal mass  No detectable urothelial mass  There is a stable cyst within the lower pole  No hydronephrosis or hydroureter  3 mm nonobstructing calculus is again seen in the midpole  There is no perinephric collection      LEFT KIDNEY AND URETER:  No solid renal mass  No detectable urothelial mass  Previously described urothelial thickening is no longer visualized  There is a stable cysts in the upper pole  Scattered hypoattenuating foci that are too small to characterize but likely represent   cysts are also present  No hydronephrosis or hydroureter  No urinary tract calculi  No perinephric collection      URINARY BLADDER:  Previously described area of bladder wall thickening is longer seen  There are no focal masses  Postsurgical changes of urolift procedure are again noted    No calculi         LOWER CHEST:  No clinically significant abnormality identified in the visualized lower chest      LIVER/BILIARY TREE: Unremarkable      GALLBLADDER:  No calcified gallstones  No pericholecystic inflammatory change      SPLEEN:  Unremarkable      PANCREAS:  Unremarkable      ADRENAL GLANDS:  Unremarkable      STOMACH AND BOWEL:  There is colonic diverticulosis without evidence of acute diverticulitis      ABDOMINOPELVIC CAVITY:  No ascites  No free intraperitoneal air  No lymphadenopathy      VESSELS:  Unremarkable for patient's age      PELVIS     REPRODUCTIVE ORGANS:  Metallic clips within the prostate gland again seen      APPENDIX: A normal appendix was visualized      ABDOMINAL WALL/INGUINAL REGIONS:  Unremarkable      OSSEOUS STRUCTURES:  No acute fracture or destructive osseous lesion      IMPRESSION:        1  Resolution of previously seen left urothelial thickening and hydroureteronephrosis  No urothelial lesions  or solid enhancing masses identified      2   3 mm nonobstructing calculus within the midpole the right kidney  No calculi identified within the left kidney or ureters      3  Postsurgical changes of urolift procedure         4  Diverticulosis without active diverticulitis  Portions of the record may have been created with voice recognition software   Occasional wrong word or "sound a like" substitutions may have occurred due to the inherent limitations of voice recognition software   Read the chart carefully and recognize, using context, where substitutions have occurred

## 2019-03-06 DIAGNOSIS — E78.2 MIXED HYPERLIPIDEMIA: ICD-10-CM

## 2019-03-07 RX ORDER — FENOFIBRATE 145 MG/1
145 TABLET, COATED ORAL DAILY
Qty: 90 TABLET | Refills: 2 | Status: SHIPPED | OUTPATIENT
Start: 2019-03-07 | End: 2020-03-09 | Stop reason: SDUPTHER

## 2019-03-21 ENCOUNTER — OFFICE VISIT (OUTPATIENT)
Dept: DERMATOLOGY | Facility: CLINIC | Age: 74
End: 2019-03-21
Payer: MEDICARE

## 2019-03-21 DIAGNOSIS — L30.8 PSORIASIFORM DERMATITIS: ICD-10-CM

## 2019-03-21 DIAGNOSIS — L82.1 SEBORRHEIC KERATOSIS: ICD-10-CM

## 2019-03-21 DIAGNOSIS — L98.9 UNKNOWN SKIN LESION: Primary | ICD-10-CM

## 2019-03-21 DIAGNOSIS — Z13.89 SCREENING FOR SKIN CONDITION: ICD-10-CM

## 2019-03-21 PROCEDURE — 88305 TISSUE EXAM BY PATHOLOGIST: CPT | Performed by: PATHOLOGY

## 2019-03-21 PROCEDURE — 11102 TANGNTL BX SKIN SINGLE LES: CPT | Performed by: DERMATOLOGY

## 2019-03-21 PROCEDURE — 99213 OFFICE O/P EST LOW 20 MIN: CPT | Performed by: DERMATOLOGY

## 2019-03-21 RX ORDER — FLUTICASONE PROPIONATE 0.05 %
CREAM (GRAM) TOPICAL 2 TIMES DAILY
Qty: 30 G | Refills: 1 | Status: SHIPPED | OUTPATIENT
Start: 2019-03-21 | End: 2020-07-22 | Stop reason: ALTCHOICE

## 2019-03-21 NOTE — PROGRESS NOTES
[address] [facility] DERMATOLOGY  [address] [place] [address]  [phone]  [phone]     MRN: [id number] : 1945  Encounter: [id number]  Patient Information: Danna Appiah  Chief complaint:  Skin lesion and concerned regarding a rash    History of present illness:  68-year-old male without previous history of skin problems though he has a intermittent rash for many years concerned regarding a growth that is been present on his scalp for over almost a year patient notes lesion has been slowly getting larger  Also notes rash that has been going on for awhile itchy at times gets worse in the summer  Past Medical History:   Diagnosis Date    Abnormal stress test     Angina at rest [facility])     Apnea     Apnea     Chest pain     CPAP (continuous positive airway pressure) dependence     Diverticulitis     Diverticulitis     Hypercholesterolemia     Hypercholesterolemia     Hyperlipidemia     Hypertension     Mitral valve disorder     Mitral valve disorder     Sleep apnea     Thoracic neuritis     Thoracic neuritis      Past Surgical History:   Procedure Laterality Date    COLONOSCOPY      Complete    CORONARY ANGIOPLASTY      ESOPHAGOGASTRODUODENOSCOPY      Diagnostic    FOOT SURGERY      HERNIA REPAIR      KNEE SURGERY Left     NEUROPLASTY / TRANSPOSITION MEDIAN NERVE AT CARPAL TUNNEL      UT COLONOSCOPY FLX DX W/COLLJ SPEC WHEN PFRMD N/A 4/10/2018    Procedure: EGD AND COLONOSCOPY;  Surgeon: Leesa Maya MD;  Location: [facility]; Service: Gastroenterology    UT CYSTOURETHRO W/IMPLANT N/A 2018    Procedure: Chiara Overcast WITH INSERTION UROLIFT;  Surgeon: Dagoberto Jiang MD;  [facility];  Service: Urology    UT TRANSURETHRAL INCISION OF PROSTATE N/A 2018    Procedure: TRANSURETHRAL INCISION OF PROSTATE (TUIP);   Surgeon: Dagoberto Jiang MD;  [facility];  Service: Urology   [facility] TARSAL TUNNEL RELEASE      Neuroplasty Decompression     Social History   Social History     Substance and Sexual Activity   Alcohol Use No     Social History     Substance and Sexual Activity   Drug Use No     Social History     Tobacco Use   Smoking Status Former Smoker    Packs/day: 1 00    Years: 17     Pack years: 17     Last attempt to quit: Raphael Suarez Years since quittin 2   Smokeless Tobacco Never Used     Family History   Problem Relation Age of Onset    Diabetes Mother         Mellitus    Heart disease Mother         Arteriosclerotic Cardiovascular    Hypertension Mother     Cancer Father     Arthritis Father         Roberto Dennisont Site W Involv Of Organs And Systems    Thyroid disease Sister     Diabetes Brother         Diabetes:Mellitus    Heart disease Brother     Hypertension Brother     Coronary artery disease Family      Meds/Allergies   Allergies   Allergen Reactions    Iodine      Nausea, hot, sweaty -- had through an IV  Has had it since with no issues    Pt reports no issues with IV contrast - has had numerous times with no issues        Meds:  Prior to Admission medications    Medication Sig Start Date End Date Taking?  Authorizing Provider   acetaminophen (TYLENOL) 500 mg tablet Take 2 tablets by mouth daily as needed   Yes Historical Provider, MD   amLODIPine (NORVASC) 10 mg tablet Take 1 tablet (10 mg total) by mouth every morning 18  Yes Galilea Simms MD   aspirin (ASPIRIN LOW DOSE) 81 MG tablet Take 1 tablet by mouth daily   Yes Historical Provider, MD   fenofibrate (TRICOR) 145 mg tablet Take 1 tablet (145 mg total) by mouth daily 3/7/19  Yes Franklin Avina,    FLUAD 0 5 ML BRII inject 0 5 milliliter intramuscularly 9/10/18  Yes Historical Provider, MD   ibuprofen (MOTRIN) 200 mg tablet Take 3 tablets (600 mg total) by mouth every 6 (six) hours as needed for mild pain 18  Yes Serjio Hughes MD   lisinopril (ZESTRIL) 40 mg tablet Take 1 tablet (40 mg total) by mouth daily 7/10/18  Yes Reeda Gosselin, DO   loperamide (IMODIUM A-D) 2 MG tablet Take 1 tablet by mouth daily as needed   Yes Historical Provider, MD   lovastatin (MEVACOR) 40 MG tablet TAKE ONE TABLET BY MOUTH DAILY 2/6/19  Yes Reeda Gosselin, DO   Multiple Vitamins-Minerals (CENTRUM ULTRA MENS PO) Take 1 tablet by mouth daily   Yes Historical Provider, MD   docusate sodium (COLACE) 100 mg capsule Take 1 capsule (100 mg total) by mouth 2 (two) times a day for 15 days 8/30/18 11/14/18  Negrito Hennessy MD   omeprazole (PriLOSEC) 20 mg delayed release capsule Take 1 capsule (20 mg total) by mouth daily for 90 days 8/10/18 12/31/18  Reeda Gosselin, DO       Subjective:     Review of Systems:    General: negative for - chills, fatigue, fever,  weight gain or weight loss  Psychological: negative for - anxiety, behavioral disorder, concentration difficulties, decreased libido, depression, irritability, memory difficulties, mood swings, sleep disturbances or suicidal ideation  ENT: negative for - hearing difficulties , nasal congestion, nasal discharge, oral lesions, sinus pain, sneezing, sore throat  Allergy and Immunology: negative for - hives, insect bite sensitivity,  Hematological and Lymphatic: negative for - bleeding problems, blood clots,bruising, swollen lymph nodes  Endocrine: negative for - hair pattern changes, hot flashes, malaise/lethargy, mood swings, palpitations, polydipsia/polyuria, skin changes, temperature intolerance or unexpected weight change  Respiratory: negative for - cough, hemoptysis, orthopnea, shortness of breath, or wheezing  Cardiovascular: negative for - chest pain, dyspnea on exertion, edema,  Gastrointestinal: negative for - abdominal pain, nausea/vomiting  Genito-Urinary: negative for - dysuria, incontinence, irregular/heavy menses or urinary frequency/urgency  Musculoskeletal: negative for - gait disturbance, joint pain, joint stiffness, joint swelling, muscle pain, muscular weakness  Dermatological:  As in HPI  Neurological: negative for confusion, dizziness, headaches, impaired coordination/balance, memory loss, numbness/tingling, seizures, speech problems, tremors or weakness       Objective: There were no vitals taken for this visit  Physical Exam:    General Appearance:    Alert, cooperative, no distress   Head:    Normocephalic, without obvious abnormality, atraumatic           Skin:   A full skin exam was performed including scalp, head scalp, eyes, ears, nose, lips, neck, chest, axilla, abdomen, back, buttocks, bilateral upper extremities, bilateral lower extremities, hands, feet, fingers, toes, fingernails, and toenails keratotic 7 mm  nodule noted on the left frontal scalp normal keratotic papules with greasy stuck on appearance nothing else remarkable except for dry scaling patches widespread chest and back more distinct patches noted on the right upper arm KOH prep was performed and negative        Shave Biopsy Procedure Note    Pre-operative Diagnosis:  Rule out squamous cell carcinoma    Plan:  1  Instructed to keep the wound dry and covered for 24 and clean thereafter  2  Warning signs of infection were reviewed  3  Recommended that the patient use OTC acetaminophen as needed for pain  4  Return  Pending results of biopsy(ies)    Locations:  Frontal scalp    Indications:  Suspicious lesion    Anesthesia: Lidocaine 1% with epinephrine without added sodium bicarbonate    Procedure Details     Patient informed of the risks (including bleeding and infection) and benefits of the   procedure and Verbal informed consent obtained  The lesion and surrounding area were given a sterile prep using alcohol and draped in the usual sterile fashion  A Blue blade razor was used to obtain a specimen  Hemostasis achieved with aluminum chloride  Petrolatum and a sterile dressing applied  The specimen was sent for pathologic examination   The patient tolerated the procedure(s) well  Complications:  none  Assessment:     1  Unknown skin lesion     2  Psoriasiform dermatitis     3  Screening for skin condition     4  Seborrheic keratosis           Plan:   Lesion possible squamous cell carcinoma will require excision pending results of biopsy  Psoriasiform dermatitis nondescript at this time will go ahead treat with topical steroids and follow-up if no improvement or any worsening noted  Seborrheic Keratosis  Patient reasurred these are normal growths we acquire with age no treatment needed  Screening for Dermatologic Disorders: Nothing else of concern noted on complete exam follow up bernard Shah MD  3/21/2019,12:16 PM    Portions of the record may have been created with voice recognition software   Occasional wrong word or "sound a like" substitutions may have occurred due to the inherent limitations of voice recognition software   Read the chart carefully and recognize, using context, where substitutions have occurred

## 2019-03-21 NOTE — PATIENT INSTRUCTIONS
Lesion possible squamous cell carcinoma will require excision pending results of biopsy  Psoriasiform dermatitis nondescript at this time will go ahead treat with topical steroids and follow-up if no improvement or any worsening noted  Seborrheic Keratosis  Patient reasurred these are normal growths we acquire with age no treatment needed    Screening for Dermatologic Disorders: Nothing else of concern noted on complete exam follow up prn  Wound care instructions given to patient

## 2019-03-26 ENCOUNTER — OFFICE VISIT (OUTPATIENT)
Dept: PULMONOLOGY | Facility: CLINIC | Age: 74
End: 2019-03-26
Payer: MEDICARE

## 2019-03-26 VITALS
BODY MASS INDEX: 29.32 KG/M2 | SYSTOLIC BLOOD PRESSURE: 124 MMHG | DIASTOLIC BLOOD PRESSURE: 72 MMHG | HEIGHT: 65 IN | HEART RATE: 58 BPM | WEIGHT: 176 LBS | OXYGEN SATURATION: 99 %

## 2019-03-26 DIAGNOSIS — Z99.89 OSA ON CPAP: Primary | ICD-10-CM

## 2019-03-26 DIAGNOSIS — G47.33 OSA ON CPAP: Primary | ICD-10-CM

## 2019-03-26 PROCEDURE — 99213 OFFICE O/P EST LOW 20 MIN: CPT | Performed by: PHYSICIAN ASSISTANT

## 2019-03-26 NOTE — PROGRESS NOTES
Assessment:    1  GREGG on CPAP           Plan:     Patient is here today for CPAP follow-up  He was given a new machine about 6 months ago and is doing well  Download compliance shows 100% usage with an AHI of 3 7  He will continue with the current settings  He can follow-up with us on an annual basis or sooner if needed  Subjective:     Patient ID: Sarah Padilla is a 74 y o  male  Chief Complaint:  Patient is a 73 yo male former smoker with PMH of GREGG on CPAP, HTN, HLD, BPH  He is here today for follow up  He continues to do well with his CPAP  He does have occasional sinus congestion for which he takes a sinus pillow at night which helps  He is feeling well rested during the day, getting a good night of sleep  The following portions of the patient's history were reviewed in this encounter and updated as appropriate:   Review of Systems   Constitutional: Negative  HENT: Negative  Respiratory: Negative  Cardiovascular: Negative  Gastrointestinal: Negative  Genitourinary: Negative  Musculoskeletal: Negative  Skin: Negative  Allergic/Immunologic: Negative  Neurological: Negative  Psychiatric/Behavioral: Negative  Objective:  /72   Pulse 58   Ht 5' 5" (1 651 m)   Wt 79 8 kg (176 lb)   SpO2 99%   BMI 29 29 kg/m²   Physical Exam   Constitutional: He is oriented to person, place, and time  He appears well-developed and well-nourished  No distress  HENT:   Mouth/Throat: Oropharynx is clear and moist    Crowded oropharyngeal airway   Eyes: Pupils are equal, round, and reactive to light  Neck: Normal range of motion  Short and wide neck   Cardiovascular: Normal rate and regular rhythm  Pulmonary/Chest: Effort normal and breath sounds normal  No respiratory distress  He has no decreased breath sounds  He has no wheezes  He has no rhonchi  He has no rales  Abdominal: Soft  Musculoskeletal: Normal range of motion     Neurological: He is alert and oriented to person, place, and time  Skin: Skin is warm and dry  Psychiatric: He has a normal mood and affect   His behavior is normal  Judgment and thought content normal

## 2019-03-27 DIAGNOSIS — I10 ESSENTIAL HYPERTENSION: ICD-10-CM

## 2019-03-27 RX ORDER — LISINOPRIL 40 MG/1
TABLET ORAL
Qty: 90 TABLET | Refills: 3 | Status: SHIPPED | OUTPATIENT
Start: 2019-03-27 | End: 2020-04-17

## 2019-04-02 ENCOUNTER — APPOINTMENT (OUTPATIENT)
Dept: LAB | Facility: CLINIC | Age: 74
End: 2019-04-02
Payer: MEDICARE

## 2019-04-02 DIAGNOSIS — E78.2 MIXED HYPERLIPIDEMIA: ICD-10-CM

## 2019-04-02 DIAGNOSIS — I10 ESSENTIAL HYPERTENSION: ICD-10-CM

## 2019-04-02 LAB
ALBUMIN SERPL BCP-MCNC: 4.1 G/DL (ref 3.5–5)
ALP SERPL-CCNC: 96 U/L (ref 46–116)
ALT SERPL W P-5'-P-CCNC: 29 U/L (ref 12–78)
ANION GAP SERPL CALCULATED.3IONS-SCNC: 5 MMOL/L (ref 4–13)
AST SERPL W P-5'-P-CCNC: 21 U/L (ref 5–45)
BILIRUB SERPL-MCNC: 0.45 MG/DL (ref 0.2–1)
BUN SERPL-MCNC: 20 MG/DL (ref 5–25)
CALCIUM SERPL-MCNC: 9.3 MG/DL (ref 8.3–10.1)
CHLORIDE SERPL-SCNC: 108 MMOL/L (ref 100–108)
CHOLEST SERPL-MCNC: 141 MG/DL (ref 50–200)
CO2 SERPL-SCNC: 27 MMOL/L (ref 21–32)
CREAT SERPL-MCNC: 0.99 MG/DL (ref 0.6–1.3)
GFR SERPL CREATININE-BSD FRML MDRD: 74 ML/MIN/1.73SQ M
GLUCOSE P FAST SERPL-MCNC: 102 MG/DL (ref 65–99)
HDLC SERPL-MCNC: 51 MG/DL (ref 40–60)
LDLC SERPL CALC-MCNC: 73 MG/DL (ref 0–100)
NONHDLC SERPL-MCNC: 90 MG/DL
POTASSIUM SERPL-SCNC: 4.2 MMOL/L (ref 3.5–5.3)
PROT SERPL-MCNC: 7.1 G/DL (ref 6.4–8.2)
SODIUM SERPL-SCNC: 140 MMOL/L (ref 136–145)
TRIGL SERPL-MCNC: 87 MG/DL

## 2019-04-02 PROCEDURE — 80053 COMPREHEN METABOLIC PANEL: CPT

## 2019-04-02 PROCEDURE — 36415 COLL VENOUS BLD VENIPUNCTURE: CPT

## 2019-04-02 PROCEDURE — 80061 LIPID PANEL: CPT

## 2019-04-04 ENCOUNTER — PROCEDURE VISIT (OUTPATIENT)
Dept: DERMATOLOGY | Facility: CLINIC | Age: 74
End: 2019-04-04
Payer: MEDICARE

## 2019-04-04 DIAGNOSIS — L57.0 ACTINIC KERATOSIS: ICD-10-CM

## 2019-04-04 DIAGNOSIS — D04.4 SQUAMOUS CELL CARCINOMA IN SITU (SCCIS) OF SCALP: Primary | ICD-10-CM

## 2019-04-04 PROCEDURE — 17000 DESTRUCT PREMALG LESION: CPT | Performed by: DERMATOLOGY

## 2019-04-04 PROCEDURE — 17271 DSTR MAL LES S/N/H/F/G 0.6-1: CPT | Performed by: DERMATOLOGY

## 2019-04-09 ENCOUNTER — OFFICE VISIT (OUTPATIENT)
Dept: INTERNAL MEDICINE CLINIC | Facility: CLINIC | Age: 74
End: 2019-04-09
Payer: MEDICARE

## 2019-04-09 VITALS
HEIGHT: 65 IN | BODY MASS INDEX: 28.62 KG/M2 | OXYGEN SATURATION: 97 % | DIASTOLIC BLOOD PRESSURE: 60 MMHG | HEART RATE: 68 BPM | WEIGHT: 171.8 LBS | SYSTOLIC BLOOD PRESSURE: 122 MMHG

## 2019-04-09 DIAGNOSIS — I35.1 NONRHEUMATIC AORTIC VALVE INSUFFICIENCY: ICD-10-CM

## 2019-04-09 DIAGNOSIS — N20.0 NEPHROLITHIASIS: ICD-10-CM

## 2019-04-09 DIAGNOSIS — M48.062 LUMBAR STENOSIS WITH NEUROGENIC CLAUDICATION: ICD-10-CM

## 2019-04-09 DIAGNOSIS — I10 ESSENTIAL HYPERTENSION: Primary | ICD-10-CM

## 2019-04-09 DIAGNOSIS — J44.9 CHRONIC OBSTRUCTIVE PULMONARY DISEASE, UNSPECIFIED COPD TYPE (HCC): ICD-10-CM

## 2019-04-09 DIAGNOSIS — N40.1 BENIGN PROSTATIC HYPERPLASIA WITH WEAK URINARY STREAM: ICD-10-CM

## 2019-04-09 DIAGNOSIS — R39.12 BENIGN PROSTATIC HYPERPLASIA WITH WEAK URINARY STREAM: ICD-10-CM

## 2019-04-09 DIAGNOSIS — G47.33 OSA ON CPAP: ICD-10-CM

## 2019-04-09 DIAGNOSIS — Z99.89 OSA ON CPAP: ICD-10-CM

## 2019-04-09 DIAGNOSIS — E78.2 MIXED HYPERLIPIDEMIA: ICD-10-CM

## 2019-04-09 PROCEDURE — 99214 OFFICE O/P EST MOD 30 MIN: CPT | Performed by: INTERNAL MEDICINE

## 2019-04-29 ENCOUNTER — HOSPITAL ENCOUNTER (EMERGENCY)
Facility: HOSPITAL | Age: 74
Discharge: HOME | End: 2019-04-29
Attending: EMERGENCY MEDICINE
Payer: MEDICARE

## 2019-04-29 ENCOUNTER — APPOINTMENT (EMERGENCY)
Dept: CT IMAGING | Facility: HOSPITAL | Age: 74
End: 2019-04-29
Payer: MEDICARE

## 2019-04-29 VITALS
HEIGHT: 64 IN | OXYGEN SATURATION: 95 % | BODY MASS INDEX: 30.34 KG/M2 | WEIGHT: 177.69 LBS | HEART RATE: 70 BPM | RESPIRATION RATE: 16 BRPM | SYSTOLIC BLOOD PRESSURE: 125 MMHG | DIASTOLIC BLOOD PRESSURE: 60 MMHG | TEMPERATURE: 98.3 F

## 2019-04-29 DIAGNOSIS — N20.0 NEPHROLITHIASIS: Primary | ICD-10-CM

## 2019-04-29 LAB
ANION GAP SERPL CALCULATED.3IONS-SCNC: 10 MMOL/L (ref 4–13)
BACTERIA UR QL AUTO: ABNORMAL /HPF
BASOPHILS # BLD AUTO: 0.02 THOUSANDS/ΜL (ref 0–0.1)
BASOPHILS NFR BLD AUTO: 0 % (ref 0–1)
BILIRUB UR QL STRIP: NEGATIVE
BUN SERPL-MCNC: 18 MG/DL (ref 5–25)
CALCIUM SERPL-MCNC: 9 MG/DL (ref 8.3–10.1)
CHLORIDE SERPL-SCNC: 105 MMOL/L (ref 100–108)
CLARITY UR: ABNORMAL
CO2 SERPL-SCNC: 25 MMOL/L (ref 21–32)
COLOR UR: YELLOW
CREAT SERPL-MCNC: 1.04 MG/DL (ref 0.6–1.3)
EOSINOPHIL # BLD AUTO: 0.08 THOUSAND/ΜL (ref 0–0.61)
EOSINOPHIL NFR BLD AUTO: 1 % (ref 0–6)
ERYTHROCYTE [DISTWIDTH] IN BLOOD BY AUTOMATED COUNT: 12.9 % (ref 11.6–15.1)
GFR SERPL CREATININE-BSD FRML MDRD: 70 ML/MIN/1.73SQ M
GLUCOSE SERPL-MCNC: 111 MG/DL (ref 65–140)
GLUCOSE UR STRIP-MCNC: NEGATIVE MG/DL
HCT VFR BLD AUTO: 41.3 % (ref 36.5–49.3)
HGB BLD-MCNC: 14.3 G/DL (ref 12–17)
HGB UR QL STRIP.AUTO: ABNORMAL
IMM GRANULOCYTES # BLD AUTO: 0.03 THOUSAND/UL (ref 0–0.2)
IMM GRANULOCYTES NFR BLD AUTO: 0 % (ref 0–2)
KETONES UR STRIP-MCNC: NEGATIVE MG/DL
LEUKOCYTE ESTERASE UR QL STRIP: ABNORMAL
LYMPHOCYTES # BLD AUTO: 1.52 THOUSANDS/ΜL (ref 0.6–4.47)
LYMPHOCYTES NFR BLD AUTO: 19 % (ref 14–44)
MCH RBC QN AUTO: 34.3 PG (ref 26.8–34.3)
MCHC RBC AUTO-ENTMCNC: 34.6 G/DL (ref 31.4–37.4)
MCV RBC AUTO: 99 FL (ref 82–98)
MONOCYTES # BLD AUTO: 0.74 THOUSAND/ΜL (ref 0.17–1.22)
MONOCYTES NFR BLD AUTO: 9 % (ref 4–12)
NEUTROPHILS # BLD AUTO: 5.62 THOUSANDS/ΜL (ref 1.85–7.62)
NEUTS SEG NFR BLD AUTO: 71 % (ref 43–75)
NITRITE UR QL STRIP: NEGATIVE
NON-SQ EPI CELLS URNS QL MICRO: ABNORMAL /HPF
NRBC BLD AUTO-RTO: 0 /100 WBCS
PH UR STRIP.AUTO: 5.5 [PH]
PLATELET # BLD AUTO: 187 THOUSANDS/UL (ref 149–390)
PMV BLD AUTO: 10.1 FL (ref 8.9–12.7)
POTASSIUM SERPL-SCNC: 3.9 MMOL/L (ref 3.5–5.3)
PROT UR STRIP-MCNC: ABNORMAL MG/DL
RBC # BLD AUTO: 4.17 MILLION/UL (ref 3.88–5.62)
RBC #/AREA URNS AUTO: ABNORMAL /HPF
SODIUM SERPL-SCNC: 140 MMOL/L (ref 136–145)
SP GR UR STRIP.AUTO: >=1.03 (ref 1–1.03)
UROBILINOGEN UR QL STRIP.AUTO: 1 E.U./DL
WBC # BLD AUTO: 8.01 THOUSAND/UL (ref 4.31–10.16)
WBC #/AREA URNS AUTO: ABNORMAL /HPF

## 2019-04-29 PROCEDURE — 99283 EMERGENCY DEPT VISIT LOW MDM: CPT | Performed by: EMERGENCY MEDICINE

## 2019-04-29 PROCEDURE — 87186 SC STD MICRODIL/AGAR DIL: CPT | Performed by: EMERGENCY MEDICINE

## 2019-04-29 PROCEDURE — 74176 CT ABD & PELVIS W/O CONTRAST: CPT

## 2019-04-29 PROCEDURE — 99284 EMERGENCY DEPT VISIT MOD MDM: CPT

## 2019-04-29 PROCEDURE — 87086 URINE CULTURE/COLONY COUNT: CPT | Performed by: EMERGENCY MEDICINE

## 2019-04-29 PROCEDURE — 36415 COLL VENOUS BLD VENIPUNCTURE: CPT | Performed by: EMERGENCY MEDICINE

## 2019-04-29 PROCEDURE — 96374 THER/PROPH/DIAG INJ IV PUSH: CPT

## 2019-04-29 PROCEDURE — 87077 CULTURE AEROBIC IDENTIFY: CPT | Performed by: EMERGENCY MEDICINE

## 2019-04-29 PROCEDURE — 80048 BASIC METABOLIC PNL TOTAL CA: CPT | Performed by: EMERGENCY MEDICINE

## 2019-04-29 PROCEDURE — 81001 URINALYSIS AUTO W/SCOPE: CPT | Performed by: EMERGENCY MEDICINE

## 2019-04-29 PROCEDURE — 85025 COMPLETE CBC W/AUTO DIFF WBC: CPT | Performed by: EMERGENCY MEDICINE

## 2019-04-29 RX ORDER — IBUPROFEN 600 MG/1
600 TABLET ORAL EVERY 6 HOURS PRN
Qty: 30 TABLET | Refills: 0 | Status: SHIPPED | OUTPATIENT
Start: 2019-04-29 | End: 2019-05-21

## 2019-04-29 RX ORDER — KETOROLAC TROMETHAMINE 30 MG/ML
30 INJECTION, SOLUTION INTRAMUSCULAR; INTRAVENOUS ONCE
Status: COMPLETED | OUTPATIENT
Start: 2019-04-29 | End: 2019-04-29

## 2019-04-29 RX ORDER — ACETAMINOPHEN 500 MG
1000 TABLET ORAL EVERY 6 HOURS PRN
Qty: 30 TABLET | Refills: 0 | Status: SHIPPED | OUTPATIENT
Start: 2019-04-29 | End: 2019-07-15 | Stop reason: ALTCHOICE

## 2019-04-29 RX ORDER — TAMSULOSIN HYDROCHLORIDE 0.4 MG/1
0.4 CAPSULE ORAL
Qty: 5 CAPSULE | Refills: 0 | Status: SHIPPED | OUTPATIENT
Start: 2019-04-29 | End: 2019-07-11 | Stop reason: SDUPTHER

## 2019-04-29 RX ADMIN — KETOROLAC TROMETHAMINE 30 MG: 30 INJECTION, SOLUTION INTRAMUSCULAR at 21:37

## 2019-04-30 ENCOUNTER — TELEPHONE (OUTPATIENT)
Dept: UROLOGY | Facility: CLINIC | Age: 74
End: 2019-04-30

## 2019-05-01 DIAGNOSIS — E78.2 MIXED HYPERLIPIDEMIA: ICD-10-CM

## 2019-05-01 LAB — BACTERIA UR CULT: ABNORMAL

## 2019-05-01 RX ORDER — LOVASTATIN 40 MG/1
TABLET ORAL
Qty: 90 TABLET | Refills: 1 | Status: SHIPPED | OUTPATIENT
Start: 2019-05-01 | End: 2019-11-13 | Stop reason: SDUPTHER

## 2019-05-03 ENCOUNTER — OFFICE VISIT (OUTPATIENT)
Dept: UROLOGY | Facility: CLINIC | Age: 74
End: 2019-05-03
Payer: MEDICARE

## 2019-05-03 VITALS
DIASTOLIC BLOOD PRESSURE: 64 MMHG | BODY MASS INDEX: 30.56 KG/M2 | HEART RATE: 68 BPM | WEIGHT: 179 LBS | SYSTOLIC BLOOD PRESSURE: 138 MMHG | HEIGHT: 64 IN

## 2019-05-03 DIAGNOSIS — N13.30 HYDRONEPHROSIS, UNSPECIFIED HYDRONEPHROSIS TYPE: ICD-10-CM

## 2019-05-03 DIAGNOSIS — N20.0 NEPHROLITHIASIS: Primary | ICD-10-CM

## 2019-05-03 LAB
BACTERIA UR QL AUTO: ABNORMAL /HPF
BILIRUB UR QL STRIP: NEGATIVE
CLARITY UR: ABNORMAL
COLOR UR: YELLOW
GLUCOSE UR STRIP-MCNC: NEGATIVE MG/DL
HGB UR QL STRIP.AUTO: ABNORMAL
HYALINE CASTS #/AREA URNS LPF: ABNORMAL /LPF
KETONES UR STRIP-MCNC: NEGATIVE MG/DL
LEUKOCYTE ESTERASE UR QL STRIP: ABNORMAL
NITRITE UR QL STRIP: NEGATIVE
NON-SQ EPI CELLS URNS QL MICRO: ABNORMAL /HPF
PH UR STRIP.AUTO: 6.5 [PH]
PROT UR STRIP-MCNC: NEGATIVE MG/DL
RBC #/AREA URNS AUTO: ABNORMAL /HPF
SL AMB  POCT GLUCOSE, UA: NORMAL
SL AMB LEUKOCYTE ESTERASE,UA: NORMAL
SL AMB POCT BILIRUBIN,UA: NORMAL
SL AMB POCT BLOOD,UA: NORMAL
SL AMB POCT CLARITY,UA: CLEAR
SL AMB POCT COLOR,UA: YELLOW
SL AMB POCT KETONES,UA: NORMAL
SL AMB POCT NITRITE,UA: NORMAL
SL AMB POCT PH,UA: 5
SL AMB POCT SPECIFIC GRAVITY,UA: 1
SL AMB POCT URINE PROTEIN: NORMAL
SL AMB POCT UROBILINOGEN: NORMAL
SP GR UR STRIP.AUTO: 1.01 (ref 1–1.03)
UROBILINOGEN UR QL STRIP.AUTO: 0.2 E.U./DL
WBC #/AREA URNS AUTO: ABNORMAL /HPF

## 2019-05-03 PROCEDURE — 81002 URINALYSIS NONAUTO W/O SCOPE: CPT | Performed by: UROLOGY

## 2019-05-03 PROCEDURE — 82360 CALCULUS ASSAY QUANT: CPT | Performed by: UROLOGY

## 2019-05-03 PROCEDURE — 87086 URINE CULTURE/COLONY COUNT: CPT | Performed by: UROLOGY

## 2019-05-03 PROCEDURE — 81001 URINALYSIS AUTO W/SCOPE: CPT | Performed by: UROLOGY

## 2019-05-03 PROCEDURE — 99214 OFFICE O/P EST MOD 30 MIN: CPT | Performed by: UROLOGY

## 2019-05-03 PROCEDURE — 87077 CULTURE AEROBIC IDENTIFY: CPT | Performed by: UROLOGY

## 2019-05-03 PROCEDURE — 87186 SC STD MICRODIL/AGAR DIL: CPT | Performed by: UROLOGY

## 2019-05-05 LAB — BACTERIA UR CULT: ABNORMAL

## 2019-05-07 ENCOUNTER — TELEPHONE (OUTPATIENT)
Dept: UROLOGY | Facility: AMBULATORY SURGERY CENTER | Age: 74
End: 2019-05-07

## 2019-05-07 ENCOUNTER — TELEPHONE (OUTPATIENT)
Dept: UROLOGY | Facility: CLINIC | Age: 74
End: 2019-05-07

## 2019-05-07 DIAGNOSIS — N39.0 URINARY TRACT INFECTION WITHOUT HEMATURIA, SITE UNSPECIFIED: Primary | ICD-10-CM

## 2019-05-07 PROCEDURE — 1123F ACP DISCUSS/DSCN MKR DOCD: CPT | Performed by: UROLOGY

## 2019-05-07 RX ORDER — CIPROFLOXACIN 500 MG/1
500 TABLET, FILM COATED ORAL EVERY 12 HOURS SCHEDULED
Qty: 20 TABLET | Refills: 0 | Status: SHIPPED | OUTPATIENT
Start: 2019-05-07 | End: 2019-05-17

## 2019-05-08 ENCOUNTER — CLINICAL SUPPORT (OUTPATIENT)
Dept: DERMATOLOGY | Facility: CLINIC | Age: 74
End: 2019-05-08

## 2019-05-08 DIAGNOSIS — D04.4 SQUAMOUS CELL CARCINOMA IN SITU (SCCIS) OF SCALP: Primary | ICD-10-CM

## 2019-05-08 LAB
COLOR STONE: NORMAL
COMMENT-STONE3: NORMAL
COMPOSITION: NORMAL
LABORATORY COMMENT REPORT: NORMAL
NIDUS STONE QL: NORMAL
PHOTO: NORMAL
SIZE STONE: NORMAL MM
STONE ANALYSIS-IMP: NORMAL
STONE ANALYSIS-IMP: NORMAL
WT STONE: <1 MG

## 2019-05-08 PROCEDURE — 99024 POSTOP FOLLOW-UP VISIT: CPT | Performed by: DERMATOLOGY

## 2019-05-09 ENCOUNTER — HOSPITAL ENCOUNTER (EMERGENCY)
Facility: HOSPITAL | Age: 74
Discharge: HOME | End: 2019-05-10
Attending: EMERGENCY MEDICINE
Payer: MEDICARE

## 2019-05-09 DIAGNOSIS — N13.30 HYDRONEPHROSIS, UNSPECIFIED HYDRONEPHROSIS TYPE: ICD-10-CM

## 2019-05-09 DIAGNOSIS — R10.9 FLANK PAIN: Primary | ICD-10-CM

## 2019-05-09 DIAGNOSIS — R79.89 ELEVATED SERUM CREATININE: ICD-10-CM

## 2019-05-09 DIAGNOSIS — N20.0 NEPHROLITHIASIS: ICD-10-CM

## 2019-05-09 LAB
BILIRUB UR QL STRIP: NEGATIVE
CLARITY UR: CLEAR
COLOR UR: YELLOW
GLUCOSE UR STRIP-MCNC: NEGATIVE MG/DL
HGB UR QL STRIP.AUTO: ABNORMAL
KETONES UR STRIP-MCNC: NEGATIVE MG/DL
LEUKOCYTE ESTERASE UR QL STRIP: ABNORMAL
NITRITE UR QL STRIP: NEGATIVE
PH UR STRIP.AUTO: 5 [PH]
PROT UR STRIP-MCNC: NEGATIVE MG/DL
SP GR UR STRIP.AUTO: 1.02 (ref 1–1.03)
UROBILINOGEN UR QL STRIP.AUTO: 0.2 E.U./DL

## 2019-05-09 PROCEDURE — 81001 URINALYSIS AUTO W/SCOPE: CPT

## 2019-05-09 PROCEDURE — 99285 EMERGENCY DEPT VISIT HI MDM: CPT

## 2019-05-10 ENCOUNTER — TELEPHONE (OUTPATIENT)
Dept: UROLOGY | Facility: CLINIC | Age: 74
End: 2019-05-10

## 2019-05-10 ENCOUNTER — APPOINTMENT (EMERGENCY)
Dept: CT IMAGING | Facility: HOSPITAL | Age: 74
End: 2019-05-10
Payer: MEDICARE

## 2019-05-10 ENCOUNTER — ANESTHESIA EVENT (OUTPATIENT)
Dept: PERIOP | Facility: AMBULARY SURGERY CENTER | Age: 74
End: 2019-05-10
Payer: MEDICARE

## 2019-05-10 ENCOUNTER — OFFICE VISIT (OUTPATIENT)
Dept: LAB | Facility: HOSPITAL | Age: 74
End: 2019-05-10
Attending: UROLOGY
Payer: MEDICARE

## 2019-05-10 ENCOUNTER — TELEPHONE (OUTPATIENT)
Dept: UROLOGY | Facility: AMBULATORY SURGERY CENTER | Age: 74
End: 2019-05-10

## 2019-05-10 VITALS
HEART RATE: 67 BPM | DIASTOLIC BLOOD PRESSURE: 72 MMHG | RESPIRATION RATE: 20 BRPM | TEMPERATURE: 97.6 F | SYSTOLIC BLOOD PRESSURE: 163 MMHG | OXYGEN SATURATION: 93 %

## 2019-05-10 DIAGNOSIS — N20.0 NEPHROLITHIASIS: ICD-10-CM

## 2019-05-10 LAB
ALBUMIN SERPL BCP-MCNC: 3.7 G/DL (ref 3.5–5)
ALP SERPL-CCNC: 86 U/L (ref 46–116)
ALT SERPL W P-5'-P-CCNC: 25 U/L (ref 12–78)
ANION GAP SERPL CALCULATED.3IONS-SCNC: 8 MMOL/L (ref 4–13)
AST SERPL W P-5'-P-CCNC: 17 U/L (ref 5–45)
ATRIAL RATE: 67 BPM
BACTERIA UR QL AUTO: ABNORMAL /HPF
BASOPHILS # BLD AUTO: 0.02 THOUSANDS/ΜL (ref 0–0.1)
BASOPHILS NFR BLD AUTO: 0 % (ref 0–1)
BILIRUB SERPL-MCNC: 0.3 MG/DL (ref 0.2–1)
BUN SERPL-MCNC: 36 MG/DL (ref 5–25)
CALCIUM SERPL-MCNC: 8.8 MG/DL (ref 8.3–10.1)
CHLORIDE SERPL-SCNC: 104 MMOL/L (ref 100–108)
CO2 SERPL-SCNC: 24 MMOL/L (ref 21–32)
CREAT SERPL-MCNC: 1.74 MG/DL (ref 0.6–1.3)
EOSINOPHIL # BLD AUTO: 0.07 THOUSAND/ΜL (ref 0–0.61)
EOSINOPHIL NFR BLD AUTO: 1 % (ref 0–6)
ERYTHROCYTE [DISTWIDTH] IN BLOOD BY AUTOMATED COUNT: 12.3 % (ref 11.6–15.1)
GFR SERPL CREATININE-BSD FRML MDRD: 38 ML/MIN/1.73SQ M
GLUCOSE SERPL-MCNC: 112 MG/DL (ref 65–140)
HCT VFR BLD AUTO: 40.9 % (ref 36.5–49.3)
HGB BLD-MCNC: 14 G/DL (ref 12–17)
IMM GRANULOCYTES # BLD AUTO: 0.03 THOUSAND/UL (ref 0–0.2)
IMM GRANULOCYTES NFR BLD AUTO: 0 % (ref 0–2)
LIPASE SERPL-CCNC: 127 U/L (ref 73–393)
LYMPHOCYTES # BLD AUTO: 1.26 THOUSANDS/ΜL (ref 0.6–4.47)
LYMPHOCYTES NFR BLD AUTO: 14 % (ref 14–44)
MCH RBC QN AUTO: 34 PG (ref 26.8–34.3)
MCHC RBC AUTO-ENTMCNC: 34.2 G/DL (ref 31.4–37.4)
MCV RBC AUTO: 99 FL (ref 82–98)
MONOCYTES # BLD AUTO: 1.27 THOUSAND/ΜL (ref 0.17–1.22)
MONOCYTES NFR BLD AUTO: 14 % (ref 4–12)
NEUTROPHILS # BLD AUTO: 6.24 THOUSANDS/ΜL (ref 1.85–7.62)
NEUTS SEG NFR BLD AUTO: 71 % (ref 43–75)
NON-SQ EPI CELLS URNS QL MICRO: ABNORMAL /HPF
NRBC BLD AUTO-RTO: 0 /100 WBCS
P AXIS: 50 DEGREES
PLATELET # BLD AUTO: 233 THOUSANDS/UL (ref 149–390)
PMV BLD AUTO: 9.6 FL (ref 8.9–12.7)
POTASSIUM SERPL-SCNC: 4.1 MMOL/L (ref 3.5–5.3)
PR INTERVAL: 184 MS
PROT SERPL-MCNC: 7 G/DL (ref 6.4–8.2)
QRS AXIS: 9 DEGREES
QRSD INTERVAL: 86 MS
QT INTERVAL: 396 MS
QTC INTERVAL: 418 MS
RBC # BLD AUTO: 4.12 MILLION/UL (ref 3.88–5.62)
RBC #/AREA URNS AUTO: ABNORMAL /HPF
SODIUM SERPL-SCNC: 136 MMOL/L (ref 136–145)
T WAVE AXIS: 25 DEGREES
VENTRICULAR RATE: 67 BPM
WBC # BLD AUTO: 8.89 THOUSAND/UL (ref 4.31–10.16)
WBC #/AREA URNS AUTO: ABNORMAL /HPF

## 2019-05-10 PROCEDURE — 74176 CT ABD & PELVIS W/O CONTRAST: CPT

## 2019-05-10 PROCEDURE — 85025 COMPLETE CBC W/AUTO DIFF WBC: CPT | Performed by: PHYSICIAN ASSISTANT

## 2019-05-10 PROCEDURE — 83690 ASSAY OF LIPASE: CPT | Performed by: PHYSICIAN ASSISTANT

## 2019-05-10 PROCEDURE — 99284 EMERGENCY DEPT VISIT MOD MDM: CPT | Performed by: PHYSICIAN ASSISTANT

## 2019-05-10 PROCEDURE — 80053 COMPREHEN METABOLIC PANEL: CPT | Performed by: PHYSICIAN ASSISTANT

## 2019-05-10 PROCEDURE — 93010 ELECTROCARDIOGRAM REPORT: CPT | Performed by: INTERNAL MEDICINE

## 2019-05-10 PROCEDURE — 36415 COLL VENOUS BLD VENIPUNCTURE: CPT | Performed by: PHYSICIAN ASSISTANT

## 2019-05-10 PROCEDURE — 96374 THER/PROPH/DIAG INJ IV PUSH: CPT

## 2019-05-10 PROCEDURE — 93005 ELECTROCARDIOGRAM TRACING: CPT

## 2019-05-10 RX ORDER — OXYCODONE HYDROCHLORIDE AND ACETAMINOPHEN 5; 325 MG/1; MG/1
1 TABLET ORAL EVERY 6 HOURS PRN
Qty: 8 TABLET | Refills: 0 | Status: SHIPPED | OUTPATIENT
Start: 2019-05-10 | End: 2019-05-12

## 2019-05-10 RX ORDER — KETOROLAC TROMETHAMINE 30 MG/ML
15 INJECTION, SOLUTION INTRAMUSCULAR; INTRAVENOUS ONCE
Status: COMPLETED | OUTPATIENT
Start: 2019-05-10 | End: 2019-05-10

## 2019-05-10 RX ADMIN — KETOROLAC TROMETHAMINE 15 MG: 30 INJECTION, SOLUTION INTRAMUSCULAR at 00:45

## 2019-05-20 DIAGNOSIS — K21.9 CHRONIC GERD: ICD-10-CM

## 2019-05-20 RX ORDER — OMEPRAZOLE 20 MG/1
CAPSULE, DELAYED RELEASE ORAL
Qty: 90 CAPSULE | Refills: 1 | Status: SHIPPED | OUTPATIENT
Start: 2019-05-20 | End: 2019-08-22 | Stop reason: SDUPTHER

## 2019-05-24 ENCOUNTER — TELEPHONE (OUTPATIENT)
Dept: UROLOGY | Facility: CLINIC | Age: 74
End: 2019-05-24

## 2019-05-24 ENCOUNTER — APPOINTMENT (OUTPATIENT)
Dept: RADIOLOGY | Facility: AMBULARY SURGERY CENTER | Age: 74
End: 2019-05-24
Payer: MEDICARE

## 2019-05-24 ENCOUNTER — ANESTHESIA (OUTPATIENT)
Dept: PERIOP | Facility: AMBULARY SURGERY CENTER | Age: 74
End: 2019-05-24
Payer: MEDICARE

## 2019-05-24 ENCOUNTER — HOSPITAL ENCOUNTER (OUTPATIENT)
Facility: AMBULARY SURGERY CENTER | Age: 74
Setting detail: OUTPATIENT SURGERY
Discharge: HOME | End: 2019-05-24
Attending: UROLOGY | Admitting: UROLOGY
Payer: MEDICARE

## 2019-05-24 VITALS
WEIGHT: 171 LBS | OXYGEN SATURATION: 98 % | BODY MASS INDEX: 28.49 KG/M2 | HEIGHT: 65 IN | RESPIRATION RATE: 18 BRPM | SYSTOLIC BLOOD PRESSURE: 122 MMHG | TEMPERATURE: 98 F | DIASTOLIC BLOOD PRESSURE: 66 MMHG | HEART RATE: 80 BPM

## 2019-05-24 DIAGNOSIS — N20.0 NEPHROLITHIASIS: Primary | ICD-10-CM

## 2019-05-24 PROCEDURE — C1758 CATHETER, URETERAL: HCPCS | Performed by: UROLOGY

## 2019-05-24 PROCEDURE — C2617 STENT, NON-COR, TEM W/O DEL: HCPCS | Performed by: UROLOGY

## 2019-05-24 PROCEDURE — 74420 UROGRAPHY RTRGR +-KUB: CPT

## 2019-05-24 PROCEDURE — C1769 GUIDE WIRE: HCPCS | Performed by: UROLOGY

## 2019-05-24 PROCEDURE — 52351 CYSTOURETERO & OR PYELOSCOPE: CPT | Performed by: UROLOGY

## 2019-05-24 PROCEDURE — 52332 CYSTOSCOPY AND TREATMENT: CPT | Performed by: UROLOGY

## 2019-05-24 DEVICE — STENT URETERAL 6FR 24CML INLAY OPTIMA: Type: IMPLANTABLE DEVICE | Site: URETER | Status: FUNCTIONAL

## 2019-05-24 RX ORDER — IBUPROFEN 200 MG
600 TABLET ORAL EVERY 6 HOURS PRN
Refills: 0
Start: 2019-05-24 | End: 2020-05-07 | Stop reason: CLARIF

## 2019-05-24 RX ORDER — DOCUSATE SODIUM 100 MG/1
100 CAPSULE, LIQUID FILLED ORAL 2 TIMES DAILY
Qty: 30 CAPSULE | Refills: 0 | Status: SHIPPED | OUTPATIENT
Start: 2019-05-24 | End: 2019-08-19 | Stop reason: ALTCHOICE

## 2019-05-24 RX ORDER — FENTANYL CITRATE/PF 50 MCG/ML
25 SYRINGE (ML) INJECTION
Status: DISCONTINUED | OUTPATIENT
Start: 2019-05-24 | End: 2019-05-24 | Stop reason: HOSPADM

## 2019-05-24 RX ORDER — DEXAMETHASONE SODIUM PHOSPHATE 10 MG/ML
INJECTION, SOLUTION INTRAMUSCULAR; INTRAVENOUS AS NEEDED
Status: DISCONTINUED | OUTPATIENT
Start: 2019-05-24 | End: 2019-05-24 | Stop reason: SURG

## 2019-05-24 RX ORDER — ONDANSETRON 2 MG/ML
INJECTION INTRAMUSCULAR; INTRAVENOUS AS NEEDED
Status: DISCONTINUED | OUTPATIENT
Start: 2019-05-24 | End: 2019-05-24 | Stop reason: SURG

## 2019-05-24 RX ORDER — HYDROCODONE BITARTRATE AND ACETAMINOPHEN 5; 325 MG/1; MG/1
1 TABLET ORAL EVERY 6 HOURS PRN
Qty: 5 TABLET | Refills: 0 | Status: SHIPPED | OUTPATIENT
Start: 2019-05-24 | End: 2019-06-03

## 2019-05-24 RX ORDER — CEFAZOLIN SODIUM 2 G/50ML
2000 SOLUTION INTRAVENOUS ONCE
Status: COMPLETED | OUTPATIENT
Start: 2019-05-24 | End: 2019-05-24

## 2019-05-24 RX ORDER — PROPOFOL 10 MG/ML
INJECTION, EMULSION INTRAVENOUS AS NEEDED
Status: DISCONTINUED | OUTPATIENT
Start: 2019-05-24 | End: 2019-05-24 | Stop reason: SURG

## 2019-05-24 RX ORDER — ONDANSETRON 2 MG/ML
4 INJECTION INTRAMUSCULAR; INTRAVENOUS ONCE AS NEEDED
Status: DISCONTINUED | OUTPATIENT
Start: 2019-05-24 | End: 2019-05-24 | Stop reason: HOSPADM

## 2019-05-24 RX ORDER — MAGNESIUM HYDROXIDE 1200 MG/15ML
LIQUID ORAL AS NEEDED
Status: DISCONTINUED | OUTPATIENT
Start: 2019-05-24 | End: 2019-05-24 | Stop reason: HOSPADM

## 2019-05-24 RX ORDER — FENTANYL CITRATE 50 UG/ML
INJECTION, SOLUTION INTRAMUSCULAR; INTRAVENOUS AS NEEDED
Status: DISCONTINUED | OUTPATIENT
Start: 2019-05-24 | End: 2019-05-24 | Stop reason: SURG

## 2019-05-24 RX ORDER — PHENAZOPYRIDINE HYDROCHLORIDE 200 MG/1
200 TABLET, FILM COATED ORAL 3 TIMES DAILY PRN
Qty: 10 TABLET | Refills: 0 | Status: SHIPPED | OUTPATIENT
Start: 2019-05-24 | End: 2019-05-27

## 2019-05-24 RX ORDER — SODIUM CHLORIDE, SODIUM LACTATE, POTASSIUM CHLORIDE, CALCIUM CHLORIDE 600; 310; 30; 20 MG/100ML; MG/100ML; MG/100ML; MG/100ML
INJECTION, SOLUTION INTRAVENOUS CONTINUOUS PRN
Status: DISCONTINUED | OUTPATIENT
Start: 2019-05-24 | End: 2019-05-24 | Stop reason: SURG

## 2019-05-24 RX ORDER — LIDOCAINE HYDROCHLORIDE 10 MG/ML
INJECTION, SOLUTION INFILTRATION; PERINEURAL AS NEEDED
Status: DISCONTINUED | OUTPATIENT
Start: 2019-05-24 | End: 2019-05-24 | Stop reason: SURG

## 2019-05-24 RX ORDER — CEPHALEXIN 500 MG/1
500 CAPSULE ORAL EVERY 6 HOURS SCHEDULED
Qty: 3 CAPSULE | Refills: 0 | Status: SHIPPED | OUTPATIENT
Start: 2019-05-24 | End: 2019-05-25

## 2019-05-24 RX ORDER — SODIUM CHLORIDE 9 MG/ML
INJECTION, SOLUTION INTRAVENOUS AS NEEDED
Status: DISCONTINUED | OUTPATIENT
Start: 2019-05-24 | End: 2019-05-24 | Stop reason: HOSPADM

## 2019-05-24 RX ORDER — OXYCODONE HYDROCHLORIDE 5 MG/1
5 TABLET ORAL EVERY 4 HOURS PRN
Status: DISCONTINUED | OUTPATIENT
Start: 2019-05-24 | End: 2019-05-24 | Stop reason: HOSPADM

## 2019-05-24 RX ADMIN — DEXAMETHASONE SODIUM PHOSPHATE 4 MG: 10 INJECTION, SOLUTION INTRAMUSCULAR; INTRAVENOUS at 13:04

## 2019-05-24 RX ADMIN — SODIUM CHLORIDE, SODIUM LACTATE, POTASSIUM CHLORIDE, AND CALCIUM CHLORIDE: .6; .31; .03; .02 INJECTION, SOLUTION INTRAVENOUS at 12:44

## 2019-05-24 RX ADMIN — FENTANYL CITRATE 25 MCG: 50 INJECTION, SOLUTION INTRAMUSCULAR; INTRAVENOUS at 12:59

## 2019-05-24 RX ADMIN — FENTANYL CITRATE 25 MCG: 50 INJECTION, SOLUTION INTRAMUSCULAR; INTRAVENOUS at 12:55

## 2019-05-24 RX ADMIN — ONDANSETRON 4 MG: 2 INJECTION INTRAMUSCULAR; INTRAVENOUS at 13:13

## 2019-05-24 RX ADMIN — FENTANYL CITRATE 25 MCG: 50 INJECTION, SOLUTION INTRAMUSCULAR; INTRAVENOUS at 12:53

## 2019-05-24 RX ADMIN — CEFAZOLIN SODIUM 2000 MG: 2 SOLUTION INTRAVENOUS at 12:52

## 2019-05-24 RX ADMIN — LIDOCAINE HYDROCHLORIDE ANHYDROUS 50 MG: 10 INJECTION, SOLUTION INFILTRATION at 12:48

## 2019-05-24 RX ADMIN — FENTANYL CITRATE 25 MCG: 50 INJECTION, SOLUTION INTRAMUSCULAR; INTRAVENOUS at 12:48

## 2019-05-24 RX ADMIN — PROPOFOL 160 MG: 10 INJECTION, EMULSION INTRAVENOUS at 12:48

## 2019-05-28 ENCOUNTER — PROCEDURE VISIT (OUTPATIENT)
Dept: UROLOGY | Facility: CLINIC | Age: 74
End: 2019-05-28
Payer: MEDICARE

## 2019-05-28 VITALS
HEART RATE: 64 BPM | DIASTOLIC BLOOD PRESSURE: 68 MMHG | SYSTOLIC BLOOD PRESSURE: 140 MMHG | HEIGHT: 65 IN | BODY MASS INDEX: 28.76 KG/M2 | WEIGHT: 172.6 LBS

## 2019-05-28 DIAGNOSIS — N20.0 NEPHROLITHIASIS: Primary | ICD-10-CM

## 2019-05-28 PROCEDURE — 99211 OFF/OP EST MAY X REQ PHY/QHP: CPT | Performed by: UROLOGY

## 2019-06-01 DIAGNOSIS — E78.2 MIXED HYPERLIPIDEMIA: ICD-10-CM

## 2019-06-03 RX ORDER — FENOFIBRATE 145 MG/1
145 TABLET, COATED ORAL DAILY
Qty: 90 TABLET | Refills: 2 | Status: SHIPPED | OUTPATIENT
Start: 2019-06-03 | End: 2019-11-13 | Stop reason: SDUPTHER

## 2019-06-05 ENCOUNTER — PATIENT OUTREACH (OUTPATIENT)
Dept: INTERNAL MEDICINE CLINIC | Facility: CLINIC | Age: 74
End: 2019-06-05

## 2019-06-05 DIAGNOSIS — Z71.89 COMPLEX CARE COORDINATION: Primary | ICD-10-CM

## 2019-06-06 ENCOUNTER — CONSULT (OUTPATIENT)
Dept: NEPHROLOGY | Facility: CLINIC | Age: 74
End: 2019-06-06
Payer: MEDICARE

## 2019-06-06 ENCOUNTER — APPOINTMENT (OUTPATIENT)
Dept: LAB | Facility: HOSPITAL | Age: 74
End: 2019-06-06
Payer: MEDICARE

## 2019-06-06 VITALS
HEART RATE: 64 BPM | HEIGHT: 65 IN | DIASTOLIC BLOOD PRESSURE: 64 MMHG | SYSTOLIC BLOOD PRESSURE: 132 MMHG | RESPIRATION RATE: 18 BRPM | WEIGHT: 172 LBS | BODY MASS INDEX: 28.66 KG/M2 | TEMPERATURE: 98.4 F

## 2019-06-06 DIAGNOSIS — N17.9 AKI (ACUTE KIDNEY INJURY) (HCC): ICD-10-CM

## 2019-06-06 DIAGNOSIS — N17.9 AKI (ACUTE KIDNEY INJURY) (HCC): Primary | ICD-10-CM

## 2019-06-06 DIAGNOSIS — N20.0 NEPHROLITHIASIS: ICD-10-CM

## 2019-06-06 LAB
25(OH)D3 SERPL-MCNC: 16.2 NG/ML (ref 30–100)
ANION GAP SERPL CALCULATED.3IONS-SCNC: 8 MMOL/L (ref 4–13)
BUN SERPL-MCNC: 25 MG/DL (ref 5–25)
CALCIUM SERPL-MCNC: 8.8 MG/DL (ref 8.3–10.1)
CHLORIDE SERPL-SCNC: 105 MMOL/L (ref 100–108)
CO2 SERPL-SCNC: 27 MMOL/L (ref 21–32)
CREAT SERPL-MCNC: 1.04 MG/DL (ref 0.6–1.3)
GFR SERPL CREATININE-BSD FRML MDRD: 70 ML/MIN/1.73SQ M
GLUCOSE SERPL-MCNC: 99 MG/DL (ref 65–140)
POTASSIUM SERPL-SCNC: 3.8 MMOL/L (ref 3.5–5.3)
PTH-INTACT SERPL-MCNC: 26.7 PG/ML (ref 18.4–80.1)
SODIUM SERPL-SCNC: 140 MMOL/L (ref 136–145)
URATE SERPL-MCNC: 4 MG/DL (ref 4.2–8)

## 2019-06-06 PROCEDURE — 84550 ASSAY OF BLOOD/URIC ACID: CPT

## 2019-06-06 PROCEDURE — 36415 COLL VENOUS BLD VENIPUNCTURE: CPT

## 2019-06-06 PROCEDURE — 82306 VITAMIN D 25 HYDROXY: CPT

## 2019-06-06 PROCEDURE — 83970 ASSAY OF PARATHORMONE: CPT

## 2019-06-06 PROCEDURE — 99204 OFFICE O/P NEW MOD 45 MIN: CPT | Performed by: INTERNAL MEDICINE

## 2019-06-06 PROCEDURE — 80048 BASIC METABOLIC PNL TOTAL CA: CPT

## 2019-06-10 DIAGNOSIS — K21.00 REFLUX ESOPHAGITIS: ICD-10-CM

## 2019-06-13 DIAGNOSIS — I10 ESSENTIAL HYPERTENSION: ICD-10-CM

## 2019-06-13 RX ORDER — AMLODIPINE BESYLATE 10 MG/1
10 TABLET ORAL EVERY MORNING
Qty: 90 TABLET | Refills: 3 | Status: SHIPPED | OUTPATIENT
Start: 2019-06-13 | End: 2020-04-15

## 2019-06-19 ENCOUNTER — PATIENT OUTREACH (OUTPATIENT)
Dept: INTERNAL MEDICINE CLINIC | Facility: CLINIC | Age: 74
End: 2019-06-19

## 2019-06-26 ENCOUNTER — PATIENT OUTREACH (OUTPATIENT)
Dept: INTERNAL MEDICINE CLINIC | Facility: CLINIC | Age: 74
End: 2019-06-26

## 2019-07-10 ENCOUNTER — TELEPHONE (OUTPATIENT)
Dept: UROLOGY | Facility: CLINIC | Age: 74
End: 2019-07-10

## 2019-07-10 NOTE — TELEPHONE ENCOUNTER
Gopal Charlton is a patient of Portia Brown and is seen at [facility]  Patient needs refill of tamsulosin  Please send

## 2019-07-11 ENCOUNTER — TELEPHONE (OUTPATIENT)
Dept: INTERNAL MEDICINE CLINIC | Facility: CLINIC | Age: 74
End: 2019-07-11

## 2019-07-11 DIAGNOSIS — N20.0 NEPHROLITHIASIS: ICD-10-CM

## 2019-07-11 RX ORDER — TAMSULOSIN HYDROCHLORIDE 0.4 MG/1
0.4 CAPSULE ORAL
Qty: 5 CAPSULE | Refills: 0 | Status: SHIPPED | OUTPATIENT
Start: 2019-07-11 | End: 2019-07-15 | Stop reason: SDUPTHER

## 2019-07-11 NOTE — TELEPHONE ENCOUNTER
Have him call for a same day appointment with me tomorrow or  Monday  If he prefers to see an ENT instead of me that is okay too

## 2019-07-11 NOTE — TELEPHONE ENCOUNTER
Message for Dr Kathy Che:    Do you think he might have a sinus infection? Having issues with CPAP mach, nasal passages plug up during the night  Could it be from the hot weather? He was trying to make an appt with you  I told him to call in the early for any cancellations  Nothing open with Edgar Click either tomorrow  Marcelina An said he'll probably make an appt with an ENT dr     His current CPAP mach is around 1 1/2 to 3 yrs old  He's been using CPAP for over 7 yrs        Please call him at this p#: [phone] with any questions or concerns

## 2019-07-15 DIAGNOSIS — N20.0 NEPHROLITHIASIS: ICD-10-CM

## 2019-07-15 RX ORDER — TAMSULOSIN HYDROCHLORIDE 0.4 MG/1
0.4 CAPSULE ORAL
Qty: 5 CAPSULE | Refills: 0 | Status: SHIPPED | OUTPATIENT
Start: 2019-07-15 | End: 2019-07-27 | Stop reason: SDUPTHER

## 2019-07-16 NOTE — PROGRESS NOTES
Please let the patient know the good news that the CT is negative  KIdneys and upper urinary tract look good  SHould followup as scheduled    Thank you,    ROSELINE Odomzo Counseling- I discussed with the patient the risks of Odomzo including but not limited to nausea, vomiting, diarrhea, constipation, weight loss, changes in the sense of taste, decreased appetite, muscle spasms, and hair loss.  The patient verbalized understanding of the proper use and possible adverse effects of Odomzo.  All of the patient's questions and concerns were addressed.

## 2019-07-17 ENCOUNTER — HOSPITAL ENCOUNTER (OUTPATIENT)
Dept: ULTRASOUND IMAGING | Facility: HOSPITAL | Age: 74
Discharge: HOME | End: 2019-07-17
Payer: MEDICARE

## 2019-07-17 ENCOUNTER — HOSPITAL ENCOUNTER (OUTPATIENT)
Dept: RADIOLOGY | Facility: HOSPITAL | Age: 74
Discharge: HOME | End: 2019-07-17
Payer: MEDICARE

## 2019-07-17 DIAGNOSIS — N20.0 NEPHROLITHIASIS: ICD-10-CM

## 2019-07-17 PROCEDURE — 74018 RADEX ABDOMEN 1 VIEW: CPT

## 2019-07-17 PROCEDURE — 51798 US URINE CAPACITY MEASURE: CPT

## 2019-07-17 RX ORDER — TAMSULOSIN HYDROCHLORIDE 0.4 MG/1
0.4 CAPSULE ORAL
Qty: 5 CAPSULE | Refills: 0 | OUTPATIENT
Start: 2019-07-17

## 2019-07-24 ENCOUNTER — OFFICE VISIT (OUTPATIENT)
Dept: CARDIOLOGY CLINIC | Facility: CLINIC | Age: 74
End: 2019-07-24
Payer: MEDICARE

## 2019-07-24 VITALS
BODY MASS INDEX: 31.65 KG/M2 | DIASTOLIC BLOOD PRESSURE: 70 MMHG | HEIGHT: 65 IN | OXYGEN SATURATION: 97 % | SYSTOLIC BLOOD PRESSURE: 132 MMHG | HEART RATE: 62 BPM | WEIGHT: 190 LBS

## 2019-07-24 DIAGNOSIS — I10 ESSENTIAL HYPERTENSION: Primary | ICD-10-CM

## 2019-07-24 DIAGNOSIS — E78.2 MIXED HYPERLIPIDEMIA: ICD-10-CM

## 2019-07-24 DIAGNOSIS — I35.1 NONRHEUMATIC AORTIC VALVE INSUFFICIENCY: ICD-10-CM

## 2019-07-24 DIAGNOSIS — I34.0 NON-RHEUMATIC MITRAL REGURGITATION: ICD-10-CM

## 2019-07-24 PROCEDURE — 99213 OFFICE O/P EST LOW 20 MIN: CPT | Performed by: INTERNAL MEDICINE

## 2019-07-24 NOTE — PROGRESS NOTES
Cardiology Consultation     Mehul   [id number]  1945  [facility] [place]  [phone] [facility]  [place] [address]    C/c:  FOLLOW-UP HYPERTENSION AND HYPERLIPIDEMIA     HPI:  75-year-old male with past medical history of hypertension, hyperlipidemia, mild nonobstructive CAD, diastolic dysfunction, mild mitral regurgitation is here for follow-up  patient is active but does not do any formal exercise  He denies having any exertional chest pain or shortness of breath  He denies having any orthopnea, lower extremity edema, paroxysmal nocturnal dyspnea or recent weight gain  He denies having any palpitations, dizziness or syncope  He had cardiac catheterization last year which showed mild nonobstructive CA D  He also had echocardiogram which showed grade 1 diastolic dysfunction, mild mitral regurgitation, mild tricuspid regurgitation, mild aortic regurgitation and mild pulmonic regurgitation      Patient Active Problem List   Diagnosis    Chest pain    Essential hypertension    Mixed hyperlipidemia    Chronic pain disorder    Chronic left shoulder pain    Chronic bilateral low back pain with bilateral sciatica    Disc degeneration, lumbar    Lumbar stenosis with neurogenic claudication    Left lower quadrant pain    Abnormal CT of the abdomen    Chronic pain syndrome    GREGG on CPAP    Benign prostatic hyperplasia with lower urinary tract symptoms    Hydronephrosis    Nonrheumatic aortic valve insufficiency    Non-rheumatic mitral regurgitation    Diastolic dysfunction    Lower extremity edema    Nephrolithiasis    Squamous cell carcinoma in situ (SCCIS) of scalp    Actinic keratosis    Chronic obstructive pulmonary disease (HCC)     Past Medical History:   Diagnosis Date    Abnormal stress test     Angina at rest (HCC)     Apnea     Apnea     Chest pain     CPAP (continuous positive airway pressure) dependence     Diverticulitis     Diverticulitis     GERD (gastroesophageal reflux disease)     Hypercholesterolemia     Hypercholesterolemia     Hyperlipidemia     Hypertension     Kidney stone     Mitral valve disorder     Mitral valve disorder     Sleep apnea     Thoracic neuritis     Thoracic neuritis      Social History     Socioeconomic History    Marital status: /Civil Union     Spouse name: Not on file    Number of children: 3    Years of education: high school or GED    Highest education level: Not on file   Occupational History    Occupation: retired   Social Needs    Financial resource strain: Not on file    Food insecurity:     Worry: Not on file     Inability: Not on file   [employer] needs:     Medical: Not on file     Non-medical: Not on file   Tobacco Use    Smoking status: Former Smoker     Packs/day:      Years: 17      Pack years: 17      Last attempt to quit:      Years since quittin 5    Smokeless tobacco: Never Used   Substance and Sexual Activity    Alcohol use: No    Drug use: No    Sexual activity: Not Currently     Partners: Female   Lifestyle    Physical activity:     Days per week: Not on file     Minutes per session: Not on file    Stress: Not on file   Relationships    Social connections:     Talks on phone: Not on file     Gets together: Not on file     Attends Congregation service: Not on file     Active member of club or organization: Not on file     Attends meetings of clubs or organizations: Not on file     Relationship status: Not on file    Intimate partner violence:     Fear of current or ex partner: Not on file     Emotionally abused: Not on file     Physically abused: Not on file     Forced sexual activity: Not on file   Other Topics Concern    Not on file   Social History Narrative    Active Advance Directives    Lives With Spouse              Family History   Problem Relation Age of Onset    Diabetes Mother Mellitus    Heart disease Mother         Arteriosclerotic Cardiovascular    Hypertension Mother     Cancer Father     Arthritis Father         [facility] [employer]    Thyroid disease Sister     Diabetes Brother         Diabetes:Mellitus    Heart disease Brother     Hypertension Brother     Coronary artery disease Family      Past Surgical History:   Procedure Laterality Date    COLONOSCOPY      Complete    CORONARY ANGIOPLASTY  2017    ESOPHAGOGASTRODUODENOSCOPY      Diagnostic    FL RETROGRADE PYELOGRAM  5/24/2019    FOOT SURGERY Left     tarsal tunnel    HERNIA REPAIR      KNEE ARTHROSCOPY Left     KNEE SURGERY Left     NEUROPLASTY / TRANSPOSITION MEDIAN NERVE AT CARPAL TUNNEL      NE COLONOSCOPY FLX DX W/COLLJ SPEC WHEN PFRMD N/A 4/10/2018    Procedure: EGD AND COLONOSCOPY;  Surgeon: Jayde Dickson MD;  [facility]; Service: Gastroenterology    NE CYSTO/URETERO W/LITHOTRIPSY &INDWELL STENT INSRT Right 5/24/2019    Procedure: CYSTOSCOPY URETEROSCOPY WITH LITHOTRIPSY HOLMIUM LASER, RETROGRADE PYELOGRAM AND INSERTION STENT URETERAL;  Surgeon: Kerrie Negro MD;  [facility];  Service: Urology    NE CYSTOURETHRO W/IMPLANT N/A 8/30/2018    Procedure: CYSTOSCOPY WITH INSERTION Florette Ok;  Surgeon: Kerrie Negro MD;  [facility];  Service: Urology    NE TRANSURETHRAL INCISION OF PROSTATE N/A 8/30/2018    Procedure: TRANSURETHRAL INCISION OF PROSTATE (TUIP);   Surgeon: Kerrie Negro MD;  [facility];  Service: Urology    ROTATOR CUFF REPAIR Bilateral     SHOULDER SURGERY      TARSAL TUNNEL RELEASE      Neuroplasty Decompression       Current Outpatient Medications:     amLODIPine (NORVASC) 10 mg tablet, Take 1 tablet (10 mg total) by mouth every morning, Disp: 90 tablet, Rfl: 3    aspirin (ASPIRIN LOW DOSE) 81 MG tablet, Take 1 tablet by mouth daily, Disp: , Rfl:     esomeprazole (NexIUM) 20 mg capsule, Take 1 capsule (20 mg total) by mouth daily, Disp: 90 capsule, Rfl: 3    fenofibrate (TRICOR) 145 mg tablet, Take 1 tablet (145 mg total) by mouth daily, Disp: 90 tablet, Rfl: 2    fluticasone (CUTIVATE) 0 05 % cream, Apply topically 2 (two) times a day To rash until resolved, Disp: 30 g, Rfl: 1    ibuprofen (MOTRIN) 200 mg tablet, Take 3 tablets (600 mg total) by mouth every 6 (six) hours as needed for mild pain, Disp: , Rfl: 0    lisinopril (ZESTRIL) 40 mg tablet, TAKE ONE TABLET BY MOUTH DAILY (Patient taking differently: TAKE ONE TABLET BY MOUTH DAILY IN AM), Disp: 90 tablet, Rfl: 3    lovastatin (MEVACOR) 40 MG tablet, TAKE ONE TABLET BY MOUTH DAILY (Patient taking differently: TAKE ONE TABLET BY MOUTH DAILY IN AM), Disp: 90 tablet, Rfl: 1    Multiple Vitamins-Minerals (CENTRUM ULTRA MENS PO), Take 1 tablet by mouth daily, Disp: , Rfl:     omeprazole (PriLOSEC) 20 mg delayed release capsule, TAKE ONE CAPSULE DAILY, Disp: 90 capsule, Rfl: 1    tamsulosin (FLOMAX) 0 4 mg, Take 1 capsule (0 4 mg total) by mouth daily with dinner, Disp: 5 capsule, Rfl: 0    docusate sodium (COLACE) 100 mg capsule, Take 1 capsule (100 mg total) by mouth 2 (two) times a day for 15 days (Patient not taking: Reported on 7/24/2019), Disp: 30 capsule, Rfl: 0    fenofibrate (TRICOR) 145 mg tablet, Take 1 tablet (145 mg total) by mouth daily, Disp: 90 tablet, Rfl: 2    fluticasone (FLONASE) 50 mcg/act nasal spray, 2 sprays into each nostril daily (Patient not taking: Reported on 7/24/2019), Disp: 1 Bottle, Rfl: 5    loperamide (IMODIUM A-D) 2 MG tablet, Take 1 tablet by mouth daily as needed, Disp: , Rfl:   Allergies   Allergen Reactions    Iodine      Nausea, hot, sweaty -- had through an IV    Has had it since with no issues    Pt reports no issues with IV contrast - has had numerous times with no issues      Vitals:    07/24/19 1042   BP: 132/70   BP Location: Left arm   Patient Position: Sitting   Cuff Size: Adult   Pulse: 62   SpO2: 97%   Weight: 86 2 kg (190 lb)   Height: 5' 5" (1 651 m)       Labs:  Appointment on 06/06/2019   Component Date Value    PTH 06/06/2019 26 7     Vit D, 25-Hydroxy 06/06/2019 16 2*    Uric Acid 06/06/2019 4 0*    Sodium 06/06/2019 140     Potassium 06/06/2019 3 8     Chloride 06/06/2019 105     CO2 06/06/2019 27     ANION GAP 06/06/2019 8     BUN 06/06/2019 25     Creatinine 06/06/2019 1 04     Glucose 06/06/2019 99     Calcium 06/06/2019 8 8     eGFR 06/06/2019 70      Imaging: Xr Abdomen 1 View Kub    Result Date: 7/20/2019  Narrative: ABDOMEN INDICATION:   N20 0: Calculus of kidney  COMPARISON:  CT abdomen pelvis 5/10/2019 VIEWS:  AP supine FINDINGS: Stable 4 mm right lower pole renal calculus  No radiopaque ureteral calculi identified  Nonobstructive bowel gas pattern  Degenerative changes pubic symphysis and lumbar spine noted  Degenerative changes of bilateral hips  Postsurgical changes of the pelvis  Impression: Stable right renal calculus  Workstation performed: [id number]     Us Kidney And Bladder With Pvr    Result Date: 7/21/2019  Narrative: RENAL ULTRASOUND INDICATION:   N20 0: Calculus of kidney  COMPARISON: None TECHNIQUE:   Ultrasound of the retroperitoneum complete with postvoid bladder volume residual was performed with a curvilinear transducer utilizing volumetric sweeps and still imaging techniques  FINDINGS: KIDNEYS: Symmetric and normal size  Right kidney:  11 6 cm  Left kidney:  11 cm  Right kidney Normal echogenicity and contour  Simple cortical cyst  No hydronephrosis  Suspected 5 mm right lower pole renal nonobstructing calculus  No perinephric fluid collections  Left kidney Normal echogenicity and contour  No suspicious masses detected  No hydronephrosis  No shadowing calculi  No perinephric fluid collections  URETERS: Nonvisualized  BLADDER: Normally distended  No focal thickening or mass lesions  Bilateral ureteral jets detected   No significant postvoid residual urine volume  Impression: 5 mm right lower pole renal nonobstructing calculus  No significant postvoid residual urine volume  Workstation performed: [id number]       Review of Systems:  Review of Systems   Constitutional: Negative for diaphoresis and fatigue  HENT: Negative for congestion and facial swelling  Eyes: Negative for photophobia and visual disturbance  Respiratory: Negative for chest tightness and shortness of breath  Cardiovascular: Negative for chest pain, palpitations and leg swelling  Gastrointestinal: Negative for abdominal pain and blood in stool  Endocrine: Negative for cold intolerance and heat intolerance  Musculoskeletal: Negative for arthralgias and myalgias  Skin: Negative for pallor and rash  Neurological: Negative for dizziness and syncope  Physical Exam:  Physical Exam   Constitutional: He is oriented to person, place, and time  He appears well-developed and well-nourished  HENT:   Head: Normocephalic and atraumatic  Eyes: Pupils are equal, round, and reactive to light  Conjunctivae and EOM are normal    Neck: Normal range of motion  Neck supple  No JVD present  No thyromegaly present  Cardiovascular: Normal rate, regular rhythm and intact distal pulses  Exam reveals no gallop and no friction rub  Murmur heard  Systolic murmur is present with a grade of 2/6  Pulmonary/Chest: Effort normal and breath sounds normal  No respiratory distress  He has no wheezes  He has no rales  Abdominal: Soft  Bowel sounds are normal  He exhibits no distension  There is no tenderness  Musculoskeletal: Normal range of motion  He exhibits no edema  Neurological: He is alert and oriented to person, place, and time  He has normal reflexes  Skin: Skin is warm and dry  Psychiatric: He has a normal mood and affect  Discussion/Summary:  1  Hypertension:   Blood pressure is normalized since last visit  Continue current medications for now      2  Hyperlipidemia, on statins  LDL at goal     3  Systolic murmur:  Probably due to mitral regurgitation  Asymptomatic     4  Lower extremity edema/diastolic dysfunction:   lower extremity edema resolved      Follow-up in a year

## 2019-07-26 NOTE — PROGRESS NOTES
There are no diagnoses linked to this encounter  Assessment and plan:       1  Nephrolithiasis status post right ureteroscopy - Dr Ger Vazquez  - Patient's imaging continues to be positive for a 4-5 mm stable right-sided lower pole stone  - Stone analysis had unclear results  - Prior to patient's currently scheduled follow-up in 2020, he will undergo ultrasound and KUB for stone follow-up  2  BPH with lower urinary tract symptoms status post Urolift  - Patient is scheduled for follow-up in February of 2020    - he continues to be bothered by urinary frequency, especially at night  - patient is treated for sleep apnea with his CPAP machine but has been experiencing nasal congestion  We did discuss that sleep apnea that is not properly treated can lead to an increased nocturia  - patient reports that he has been previously told by his cardiologist that he "retains fluid" and should decrease his sodium intake  We talked about elevating his lower extremities 2 hours prior to bedtime as well as restricting fluid intake during this time can help to decrease his fluid retention and increase his urinary output prior to bed to hopefully reduce his episodes of nocturia  Cy Awan PA-C      Chief Complaint     Chief Complaint   Patient presents with    Nephrolithiasis    Benign Prostatic Hypertrophy         History of Present Illness     Dandre Boogie is a 66 y o  patient known to Dr Ger Vazquez for BPH which is now status post Urolift, history of left-sided hydronephrosis with thickening of the bladder which had resolved on follow-up imaging, and most recently nephrolithiasis  Patient was also referred to Nephrology    Patient underwent cystoscopy with ureteroscopy for 5 mm right obstructing ureteral stone on 05/24/2019  Stent was removed by nursing staff on 05/28/2019 without difficulty      Stone analysis was performed and per report "appears to consist of cellular material with few unidentifiable crystals "    Follow-up KUB and ultrasound were performed on 07/17/2019  Imaging continues to show a 4-5 mm right lower pole calculus  No hydronephrosis  Patient is asymptomatic in regards to his previous kidney stone symptoms  Patient continues to be bothered by a lower urinary tract symptoms  He reports that the Urolift improved his incontinence but increase his frequency  He reports that he continues to get up once per our overnight  Patient does have a history of sleep apnea for which he uses a CPAP machine at night but has reported issues with nasal congestion lately  The patient was also previously told by his cardiologist that he is retaining fluid and was recommended to decrease his sodium intake  Laboratory     Lab Results   Component Value Date    CREATININE 1 04 06/06/2019       Lab Results   Component Value Date    PSA 0 4 02/15/2018    PSA 0 5 12/10/2015    PSA 0 78 12/03/2014       No results found for this or any previous visit (from the past 1 hour(s))  Review of Systems     Review of Systems   Constitutional: Negative for chills and fever  HENT: Negative  Eyes: Negative  Respiratory: Negative for shortness of breath  Cardiovascular: Negative for chest pain  Gastrointestinal: Negative for constipation, diarrhea, nausea and vomiting  Genitourinary: Positive for frequency and urgency  Negative for difficulty urinating, dysuria, enuresis, flank pain and hematuria  Allergic/Immunologic: Negative  Neurological: Negative  Psychiatric/Behavioral: Negative  Allergies     Allergies   Allergen Reactions    Iodine      Nausea, hot, sweaty -- had through an IV  Has had it since with no issues    Pt reports no issues with IV contrast - has had numerous times with no issues        Physical Exam     Physical Exam   Constitutional: He is oriented to person, place, and time  He appears well-developed and well-nourished  No distress  HENT:   Head: Normocephalic and atraumatic  Right Ear: External ear normal    Left Ear: External ear normal    Nose: Nose normal    Eyes: Right eye exhibits no discharge  Left eye exhibits no discharge  No scleral icterus  Cardiovascular: Normal rate and regular rhythm  Pulmonary/Chest: Effort normal    Genitourinary:   Genitourinary Comments: Negative for CVA tenderness bilaterally   Musculoskeletal:   Ambulates independently   Neurological: He is alert and oriented to person, place, and time  Skin: Skin is warm and dry  He is not diaphoretic  Psychiatric: He has a normal mood and affect  His behavior is normal  Judgment and thought content normal    Nursing note and vitals reviewed          Vital Signs     Vitals:    07/31/19 0948   BP: 136/64   BP Location: Left arm   Patient Position: Sitting   Cuff Size: Standard   Pulse: 64   Weight: 76 kg (167 lb 9 6 oz)   Height: 5' 5" (1 651 m)         Current Medications       Current Outpatient Medications:     amLODIPine (NORVASC) 10 mg tablet, Take 1 tablet (10 mg total) by mouth every morning, Disp: 90 tablet, Rfl: 3    aspirin (ASPIRIN LOW DOSE) 81 MG tablet, Take 1 tablet by mouth daily, Disp: , Rfl:     esomeprazole (NexIUM) 20 mg capsule, Take 1 capsule (20 mg total) by mouth daily, Disp: 90 capsule, Rfl: 3    fenofibrate (TRICOR) 145 mg tablet, Take 1 tablet (145 mg total) by mouth daily, Disp: 90 tablet, Rfl: 2    fenofibrate (TRICOR) 145 mg tablet, Take 1 tablet (145 mg total) by mouth daily, Disp: 90 tablet, Rfl: 2    fluticasone (CUTIVATE) 0 05 % cream, Apply topically 2 (two) times a day To rash until resolved, Disp: 30 g, Rfl: 1    fluticasone (FLONASE) 50 mcg/act nasal spray, 2 sprays into each nostril daily, Disp: 1 Bottle, Rfl: 5    ibuprofen (MOTRIN) 200 mg tablet, Take 3 tablets (600 mg total) by mouth every 6 (six) hours as needed for mild pain, Disp: , Rfl: 0    lisinopril (ZESTRIL) 40 mg tablet, TAKE ONE TABLET BY MOUTH DAILY (Patient taking differently: TAKE ONE TABLET BY MOUTH DAILY IN AM), Disp: 90 tablet, Rfl: 3    loperamide (IMODIUM A-D) 2 MG tablet, Take 1 tablet by mouth daily as needed, Disp: , Rfl:     lovastatin (MEVACOR) 40 MG tablet, TAKE ONE TABLET BY MOUTH DAILY (Patient taking differently: TAKE ONE TABLET BY MOUTH DAILY IN AM), Disp: 90 tablet, Rfl: 1    Multiple Vitamins-Minerals (CENTRUM ULTRA MENS PO), Take 1 tablet by mouth daily, Disp: , Rfl:     omeprazole (PriLOSEC) 20 mg delayed release capsule, TAKE ONE CAPSULE DAILY, Disp: 90 capsule, Rfl: 1    tamsulosin (FLOMAX) 0 4 mg, TAKE 1 CAPSULE BY MOUTH DAILY WITH DINNER, Disp: 5 capsule, Rfl: 0    docusate sodium (COLACE) 100 mg capsule, Take 1 capsule (100 mg total) by mouth 2 (two) times a day for 15 days (Patient not taking: Reported on 7/24/2019), Disp: 30 capsule, Rfl: 0      Active Problems     Patient Active Problem List   Diagnosis    Chest pain    Essential hypertension    Mixed hyperlipidemia    Chronic pain disorder    Chronic left shoulder pain    Chronic bilateral low back pain with bilateral sciatica    Disc degeneration, lumbar    Lumbar stenosis with neurogenic claudication    Left lower quadrant pain    Abnormal CT of the abdomen    Chronic pain syndrome    GREGG on CPAP    Benign prostatic hyperplasia with lower urinary tract symptoms    Hydronephrosis    Nonrheumatic aortic valve insufficiency    Non-rheumatic mitral regurgitation    Diastolic dysfunction    Lower extremity edema    Nephrolithiasis    Squamous cell carcinoma in situ (SCCIS) of scalp    Actinic keratosis    Chronic obstructive pulmonary disease (HCC)         Past Medical History     Past Medical History:   Diagnosis Date    Abnormal stress test     Angina at rest (HCC)     Apnea     Apnea     Chest pain     CPAP (continuous positive airway pressure) dependence     Diverticulitis     Diverticulitis     GERD (gastroesophageal reflux disease)     Hypercholesterolemia     Hypercholesterolemia     Hyperlipidemia     Hypertension     Kidney stone     Mitral valve disorder     Mitral valve disorder     Sleep apnea     Thoracic neuritis     Thoracic neuritis          Surgical History     Past Surgical History:   Procedure Laterality Date    COLONOSCOPY      Complete    CORONARY ANGIOPLASTY  2017    ESOPHAGOGASTRODUODENOSCOPY      Diagnostic    FL RETROGRADE PYELOGRAM  5/24/2019    FOOT SURGERY Left     tarsal tunnel    HERNIA REPAIR      KNEE ARTHROSCOPY Left     KNEE SURGERY Left     NEUROPLASTY / TRANSPOSITION MEDIAN NERVE AT CARPAL TUNNEL      [place] COLONOSCOPY FLX DX W/COLLJ SPEC WHEN PFRMD N/A 4/10/2018    Procedure: EGD AND COLONOSCOPY;  Surgeon: Vera Cleary MD;  [facility]; Service: Gastroenterology    NY CYSTO/URETERO W/LITHOTRIPSY &INDWELL STENT INSRT Right 5/24/2019    Procedure: CYSTOSCOPY URETEROSCOPY WITH LITHOTRIPSY HOLMIUM LASER, RETROGRADE PYELOGRAM AND INSERTION STENT URETERAL;  Surgeon: Li Cerna MD;  [facility];  Service: Urology    NY CYSTOURETHRO W/IMPLANT N/A 8/30/2018    Procedure: CYSTOSCOPY WITH INSERTION Katherleen Lies;  Surgeon: Li Cerna MD;  [facility];  Service: Urology    [place] TRANSURETHRAL INCISION OF PROSTATE N/A 8/30/2018    Procedure: TRANSURETHRAL INCISION OF PROSTATE (TUIP);   Surgeon: Li Cerna MD;  [facility];  Service: Urology    ROTATOR CUFF REPAIR Bilateral     SHOULDER SURGERY      TARSAL TUNNEL RELEASE      Neuroplasty Decompression         Family History     Family History   Problem Relation Age of Onset    Diabetes Mother         Mellitus    Heart disease Mother         Arteriosclerotic Cardiovascular    Hypertension Mother     Cancer Father     Arthritis Father         [employer]    Thyroid disease Sister     Diabetes Brother         Diabetes:Mellitus    Heart disease Brother     Hypertension Brother     Coronary artery disease Family          Social History     Social History       Radiology     ABDOMEN     INDICATION:   N20 0: Calculus of kidney      COMPARISON:  CT abdomen pelvis 5/10/2019     VIEWS:  AP supine        FINDINGS:     Stable 4 mm right lower pole renal calculus      No radiopaque ureteral calculi identified      Nonobstructive bowel gas pattern      Degenerative changes pubic symphysis and lumbar spine noted  Degenerative changes of bilateral hips  Postsurgical changes of the pelvis      IMPRESSION:     Stable right renal calculus  RENAL ULTRASOUND     INDICATION:   N20 0: Calculus of kidney      COMPARISON: None     TECHNIQUE:   Ultrasound of the retroperitoneum complete with postvoid bladder volume residual was performed with a curvilinear transducer utilizing volumetric sweeps and still imaging techniques       FINDINGS:     KIDNEYS:  Symmetric and normal size  Right kidney:  11 6 cm  Left kidney:  11 cm      Right kidney  Normal echogenicity and contour  Simple cortical cyst   No hydronephrosis  Suspected 5 mm right lower pole renal nonobstructing calculus  No perinephric fluid collections      Left kidney  Normal echogenicity and contour  No suspicious masses detected  No hydronephrosis  No shadowing calculi  No perinephric fluid collections      URETERS:  Nonvisualized      BLADDER:   Normally distended  No focal thickening or mass lesions  Bilateral ureteral jets detected  No significant postvoid residual urine volume      IMPRESSION:     5 mm right lower pole renal nonobstructing calculus      No significant postvoid residual urine volume

## 2019-07-27 DIAGNOSIS — N20.0 NEPHROLITHIASIS: ICD-10-CM

## 2019-07-29 RX ORDER — TAMSULOSIN HYDROCHLORIDE 0.4 MG/1
CAPSULE ORAL
Qty: 5 CAPSULE | Refills: 0 | Status: SHIPPED | OUTPATIENT
Start: 2019-07-29 | End: 2019-07-31 | Stop reason: ALTCHOICE

## 2019-07-30 ENCOUNTER — PATIENT OUTREACH (OUTPATIENT)
Dept: INTERNAL MEDICINE CLINIC | Facility: CLINIC | Age: 74
End: 2019-07-30

## 2019-07-31 ENCOUNTER — OFFICE VISIT (OUTPATIENT)
Dept: UROLOGY | Facility: CLINIC | Age: 74
End: 2019-07-31
Payer: MEDICARE

## 2019-07-31 VITALS
WEIGHT: 167.6 LBS | DIASTOLIC BLOOD PRESSURE: 64 MMHG | HEIGHT: 65 IN | HEART RATE: 64 BPM | SYSTOLIC BLOOD PRESSURE: 136 MMHG | BODY MASS INDEX: 27.92 KG/M2

## 2019-07-31 DIAGNOSIS — N20.0 NEPHROLITHIASIS: Primary | ICD-10-CM

## 2019-07-31 PROCEDURE — 99214 OFFICE O/P EST MOD 30 MIN: CPT | Performed by: PHYSICIAN ASSISTANT

## 2019-08-06 ENCOUNTER — PATIENT OUTREACH (OUTPATIENT)
Dept: INTERNAL MEDICINE CLINIC | Facility: CLINIC | Age: 74
End: 2019-08-06

## 2019-08-06 NOTE — PROGRESS NOTES
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  He denied pain or discomfort at this time  Had some questions in regards to his previous ED visit  Was unsure what nephrolithiasis actually was and how it was caused  Did provide education  Stated that he is trying to drink more water and decrease his sodium per urology recommendations  Vitals have been stable  Introduced him to outpatient care management  Stressed importance of adhering to PCP follow up appointments  Reviewed over up coming appointment with PCP on 8/19/19  He stated he will be attending and has no issues with transportation  He is in agreement  Provided him with contact information  Adult comprehensive assessment completed  Will follow up

## 2019-08-12 ENCOUNTER — APPOINTMENT (OUTPATIENT)
Dept: LAB | Facility: CLINIC | Age: 74
End: 2019-08-12
Payer: MEDICARE

## 2019-08-12 DIAGNOSIS — E78.2 MIXED HYPERLIPIDEMIA: ICD-10-CM

## 2019-08-12 LAB
ANION GAP SERPL CALCULATED.3IONS-SCNC: 8 MMOL/L (ref 4–13)
BUN SERPL-MCNC: 18 MG/DL (ref 5–25)
CALCIUM SERPL-MCNC: 8.8 MG/DL (ref 8.3–10.1)
CHLORIDE SERPL-SCNC: 107 MMOL/L (ref 100–108)
CHOLEST SERPL-MCNC: 141 MG/DL (ref 50–200)
CO2 SERPL-SCNC: 24 MMOL/L (ref 21–32)
CREAT SERPL-MCNC: 0.93 MG/DL (ref 0.6–1.3)
GFR SERPL CREATININE-BSD FRML MDRD: 79 ML/MIN/1.73SQ M
GLUCOSE P FAST SERPL-MCNC: 92 MG/DL (ref 65–99)
HDLC SERPL-MCNC: 49 MG/DL (ref 40–60)
LDLC SERPL CALC-MCNC: 70 MG/DL (ref 0–100)
NONHDLC SERPL-MCNC: 92 MG/DL
POTASSIUM SERPL-SCNC: 4 MMOL/L (ref 3.5–5.3)
SODIUM SERPL-SCNC: 139 MMOL/L (ref 136–145)
TRIGL SERPL-MCNC: 108 MG/DL

## 2019-08-12 PROCEDURE — 80048 BASIC METABOLIC PNL TOTAL CA: CPT

## 2019-08-12 PROCEDURE — 80061 LIPID PANEL: CPT

## 2019-08-12 PROCEDURE — 36415 COLL VENOUS BLD VENIPUNCTURE: CPT

## 2019-08-15 ENCOUNTER — TELEPHONE (OUTPATIENT)
Dept: NEPHROLOGY | Facility: CLINIC | Age: 74
End: 2019-08-15

## 2019-08-19 ENCOUNTER — OFFICE VISIT (OUTPATIENT)
Dept: INTERNAL MEDICINE CLINIC | Facility: CLINIC | Age: 74
End: 2019-08-19
Payer: MEDICARE

## 2019-08-19 VITALS
DIASTOLIC BLOOD PRESSURE: 60 MMHG | WEIGHT: 168.6 LBS | HEART RATE: 64 BPM | BODY MASS INDEX: 28.09 KG/M2 | HEIGHT: 65 IN | SYSTOLIC BLOOD PRESSURE: 124 MMHG

## 2019-08-19 DIAGNOSIS — M48.062 LUMBAR STENOSIS WITH NEUROGENIC CLAUDICATION: ICD-10-CM

## 2019-08-19 DIAGNOSIS — G47.33 OSA ON CPAP: Primary | ICD-10-CM

## 2019-08-19 DIAGNOSIS — N20.0 NEPHROLITHIASIS: ICD-10-CM

## 2019-08-19 DIAGNOSIS — Z99.89 OSA ON CPAP: Primary | ICD-10-CM

## 2019-08-19 DIAGNOSIS — I10 ESSENTIAL HYPERTENSION: ICD-10-CM

## 2019-08-19 DIAGNOSIS — E78.2 MIXED HYPERLIPIDEMIA: ICD-10-CM

## 2019-08-19 PROCEDURE — 99214 OFFICE O/P EST MOD 30 MIN: CPT | Performed by: INTERNAL MEDICINE

## 2019-08-19 NOTE — PROGRESS NOTES
Assessment/Plan:  Labs reviewed with patient  Cholesterol is 141 %period% creatinine stable at 0 93  Triglycerides are 1 0 weight  Blood pressure under control with no cardiac complaints  Up-to-date on colonoscopies  Prostate is taking care by Urology  Will see him back in 4 months  Continue current regimen  1  GREGG on CPAP     2  Essential hypertension  CBC and differential    Basic metabolic panel   3  Nephrolithiasis     4  Mixed hyperlipidemia  Lipid panel   5  Lumbar stenosis with neurogenic claudication         Orders Placed This Encounter   Procedures    CBC and differential    Basic metabolic panel    Lipid panel         Subjective:  His problems are as listed  He was doing well until he developed a kidney stone  Patient ID: Shiloh Adams is a 66 y o  male  HPI he has been following up with Urology  He had a kidney stone  He apparently has another 1 that is not been passed at this point  He will continue following up  Problems that we take care of are essential hypertension and hyperlipidemia  On medicines as outlined  The following portions of the patient's history were reviewed and updated as appropriate:   He has a past medical history of Abnormal stress test, Angina at rest [facility]), Apnea, Apnea, Chest pain, CPAP (continuous positive airway pressure) dependence, Diverticulitis, Diverticulitis, GERD (gastroesophageal reflux disease), Hypercholesterolemia, Hypercholesterolemia, Hyperlipidemia, Hypertension, Kidney stone, Mitral valve disorder, Mitral valve disorder, Sleep apnea, Thoracic neuritis, and Thoracic neuritis  ,  does not have any pertinent problems on file  ,   has a past surgical history that includes Knee surgery (Left); Colonoscopy; Esophagogastroduodenoscopy; Hernia repair; Tarsal tunnel release; Shoulder surgery;  Neuroplasty / transposition median nerve at carpal tunnel; pr colonoscopy flx dx w/collj spec when pfrmd (N/A, 4/10/2018); pr cystourethro w/implant (N/A, 8/30/2018); pr transurethral incision of prostate (N/A, 8/30/2018); Coronary angioplasty (2017); Foot surgery (Left); Knee arthroscopy (Left); Rotator cuff repair (Bilateral); pr cysto/uretero w/lithotripsy &indwell stent insrt (Right, 5/24/2019); and [place] retrograde pyelogram (5/24/2019)  ,  family history includes Arthritis in his father; Cancer in his father; Coronary artery disease in his family; Diabetes in his brother and mother; Heart disease in his brother and mother; Hypertension in his brother and mother; Thyroid disease in his sister  ,   reports that he quit smoking about 41 years ago  He has a 17 00 pack-year smoking history  He has never used smokeless tobacco  He reports that he does not drink alcohol or use drugs  ,  is allergic to iodine       Current Outpatient Medications:     amLODIPine (NORVASC) 10 mg tablet, Take 1 tablet (10 mg total) by mouth every morning, Disp: 90 tablet, Rfl: 3    aspirin (ASPIRIN LOW DOSE) 81 MG tablet, Take 1 tablet by mouth daily, Disp: , Rfl:     esomeprazole (NexIUM) 20 mg capsule, Take 1 capsule (20 mg total) by mouth daily, Disp: 90 capsule, Rfl: 3    fenofibrate (TRICOR) 145 mg tablet, Take 1 tablet (145 mg total) by mouth daily, Disp: 90 tablet, Rfl: 2    fenofibrate (TRICOR) 145 mg tablet, Take 1 tablet (145 mg total) by mouth daily, Disp: 90 tablet, Rfl: 2    fluticasone (CUTIVATE) 0 05 % cream, Apply topically 2 (two) times a day To rash until resolved, Disp: 30 g, Rfl: 1    fluticasone (FLONASE) 50 mcg/act nasal spray, 2 sprays into each nostril daily, Disp: 1 Bottle, Rfl: 5    ibuprofen (MOTRIN) 200 mg tablet, Take 3 tablets (600 mg total) by mouth every 6 (six) hours as needed for mild pain, Disp: , Rfl: 0    lisinopril (ZESTRIL) 40 mg tablet, TAKE ONE TABLET BY MOUTH DAILY (Patient taking differently: TAKE ONE TABLET BY MOUTH DAILY IN AM), Disp: 90 tablet, Rfl: 3    loperamide (IMODIUM A-D) 2 MG tablet, Take 1 tablet by mouth daily as needed, Disp: , Rfl:     lovastatin (MEVACOR) 40 MG tablet, TAKE ONE TABLET BY MOUTH DAILY (Patient taking differently: TAKE ONE TABLET BY MOUTH DAILY IN AM), Disp: 90 tablet, Rfl: 1    Multiple Vitamins-Minerals (CENTRUM ULTRA MENS PO), Take 1 tablet by mouth daily, Disp: , Rfl:     omeprazole (PriLOSEC) 20 mg delayed release capsule, TAKE ONE CAPSULE DAILY, Disp: 90 capsule, Rfl: 1    Review of Systems   Constitutional: Negative for activity change, appetite change, chills, diaphoresis, fatigue, fever and unexpected weight change  HENT: Negative for congestion, ear pain, hearing loss, mouth sores, nosebleeds, postnasal drip, sinus pressure, sinus pain, sore throat and trouble swallowing  Has dental plates   Eyes: Negative for pain, discharge and visual disturbance  Respiratory: Negative for apnea, cough, chest tightness, shortness of breath and wheezing  Cardiovascular: Negative for chest pain, palpitations and leg swelling  Gastrointestinal: Negative for abdominal pain, anal bleeding, blood in stool, constipation, diarrhea, nausea and vomiting  Endocrine: Negative for polydipsia and polyphagia  Genitourinary: Negative for decreased urine volume, dysuria, flank pain, frequency, hematuria and urgency  Has kidney stones as described above  Not currently having any pain  Musculoskeletal: Positive for back pain  Negative for arthralgias, gait problem, joint swelling and myalgias  Has chronic low back pain discomfort  Skin: Negative for rash and wound  Allergic/Immunologic: Negative for environmental allergies and food allergies  Neurological: Negative for dizziness, tremors, seizures, syncope, speech difficulty, light-headedness, numbness and headaches  Hematological: Negative for adenopathy  Does not bruise/bleed easily  Psychiatric/Behavioral: Negative for agitation, confusion, hallucinations, sleep disturbance and suicidal ideas  The patient is not nervous/anxious  Objective:  /60 (BP Location: Left arm, Patient Position: Sitting, Cuff Size: Standard)   Pulse 64 Comment: 50 with pulse ox  Ht 5' 5" (1 651 m)   Wt 76 5 kg (168 lb 9 6 oz)   BMI 28 06 kg/m²      Physical Exam   Constitutional: He appears well-developed and well-nourished  No distress  Blood pressure is 124/60  O2 saturation is 98%  Pulse is 60  Respirations are 20  HENT:   Head: Normocephalic  Right Ear: External ear normal    Left Ear: External ear normal    Nose: Nose normal    Mouth/Throat: Oropharynx is clear and moist  No oropharyngeal exudate  Eyes: Pupils are equal, round, and reactive to light  Conjunctivae and EOM are normal  Right eye exhibits no discharge  Left eye exhibits no discharge  Neck: Normal range of motion  Neck supple  No thyromegaly present  Cardiovascular: Normal rate, regular rhythm, normal heart sounds and intact distal pulses  Exam reveals no gallop and no friction rub  No murmur heard  Pulmonary/Chest: Effort normal and breath sounds normal  No respiratory distress  He has no wheezes  He has no rales  Abdominal: Soft  Bowel sounds are normal  He exhibits no distension and no mass  There is no tenderness  There is no rebound and no guarding  Mildly overweight  Musculoskeletal: Normal range of motion  He exhibits no edema, tenderness or deformity  Lymphadenopathy:     He has no cervical adenopathy  Neurological: He is alert  He has normal reflexes  He displays normal reflexes  No cranial nerve deficit  Coordination normal    Skin: Skin is warm and dry  No rash noted  No erythema  Psychiatric: He has a normal mood and affect  His behavior is normal  Judgment and thought content normal    Nursing note and vitals reviewed          Recent Results (from the past 1008 hour(s))   Basic metabolic panel    Collection Time: 08/12/19  8:10 AM   Result Value Ref Range    Sodium 139 136 - 145 mmol/L    Potassium 4 0 3 5 - 5 3 mmol/L    Chloride 107 100 - 108 mmol/L    CO2 24 21 - 32 mmol/L    ANION GAP 8 4 - 13 mmol/L    BUN 18 5 - 25 mg/dL    Creatinine 0 93 0 60 - 1 30 mg/dL    Glucose, Fasting 92 65 - 99 mg/dL    Calcium 8 8 8 3 - 10 1 mg/dL    eGFR 79 ml/min/1 73sq m   Lipid panel    Collection Time: 08/12/19  8:10 AM   Result Value Ref Range    Cholesterol 141 50 - 200 mg/dL    Triglycerides 108 <=150 mg/dL    HDL, Direct 49 40 - 60 mg/dL    LDL Calculated 70 0 - 100 mg/dL    Non-HDL-Chol (CHOL-HDL) 92 mg/dl

## 2019-08-22 DIAGNOSIS — K21.9 CHRONIC GERD: ICD-10-CM

## 2019-08-22 RX ORDER — OMEPRAZOLE 20 MG/1
CAPSULE, DELAYED RELEASE ORAL
Qty: 90 CAPSULE | Refills: 1 | Status: SHIPPED | OUTPATIENT
Start: 2019-08-22 | End: 2020-02-17

## 2019-09-06 ENCOUNTER — PATIENT OUTREACH (OUTPATIENT)
Dept: INTERNAL MEDICINE CLINIC | Facility: CLINIC | Age: 74
End: 2019-09-06

## 2019-09-06 ENCOUNTER — OFFICE VISIT (OUTPATIENT)
Dept: NEPHROLOGY | Facility: CLINIC | Age: 74
End: 2019-09-06
Payer: MEDICARE

## 2019-09-06 VITALS
SYSTOLIC BLOOD PRESSURE: 115 MMHG | HEIGHT: 64 IN | HEART RATE: 57 BPM | DIASTOLIC BLOOD PRESSURE: 70 MMHG | WEIGHT: 167.8 LBS | BODY MASS INDEX: 28.65 KG/M2 | TEMPERATURE: 97.9 F | RESPIRATION RATE: 16 BRPM

## 2019-09-06 DIAGNOSIS — N20.0 NEPHROLITHIASIS: Primary | ICD-10-CM

## 2019-09-06 PROCEDURE — 99214 OFFICE O/P EST MOD 30 MIN: CPT | Performed by: INTERNAL MEDICINE

## 2019-09-06 PROCEDURE — 1124F ACP DISCUSS-NO DSCNMKR DOCD: CPT | Performed by: INTERNAL MEDICINE

## 2019-09-06 NOTE — PROGRESS NOTES
Stew Holden in good spirits  Denied pain or discomfort  Followed up with nephrologist today and blood pressure was 115/70  Stated that his blood pressure has been very stable for awhile  Has been eating a good balanced diet and has been trying to stay active  Discussed his medications and importance of adhernce to daily medication regimen and importance of correct medication administration  He verbalized understanding  Also discussed ED utilization and alternatives for after hour care  He currently has no questions or concerns  Will follow up

## 2019-09-06 NOTE — PROGRESS NOTES
NEPHROLOGY PROGRESS NOTE    Patient: Hurshel Ganser               Sex: male          DOA: No admission date for patient encounter  YOB: 1945        Age:  66 y o         LOS: [unfilled]  9/6/2019        BACKGROUND     24-year-old male with past medical history of nephrolithiasis, strong family history of nephrolithiasis as well, hypertension, hyper triglyceridemia had been following up in renal clinic for management and evaluation of nephrolithiasis  SUBJECTIVE     Patient presents after 3 months  He underwent 224 urine collections as part of litho link  Patient was called with results  States that he drinks adequate amounts of water every day  REVIEW OF SYSTEMS     Review of Systems   Constitutional: Negative  HENT: Negative  Eyes: Negative  Respiratory: Negative  Cardiovascular: Negative  Gastrointestinal: Negative  Endocrine: Negative  Genitourinary: Negative  Musculoskeletal: Negative  Skin: Negative  Allergic/Immunologic: Negative  Neurological: Negative  Hematological: Negative  All other systems reviewed and are negative  OBJECTIVE     Current Weight: Weight - Scale: 76 1 kg (167 lb 12 8 oz)  Vitals:    09/06/19 0914   BP: 115/70   Pulse: 57   Resp: 16   Temp: 97 9 °F (36 6 °C)     Body mass index is 29 03 kg/m²    [unfilled]    CURRENT MEDICATIONS       Current Outpatient Medications:     amLODIPine (NORVASC) 10 mg tablet, Take 1 tablet (10 mg total) by mouth every morning, Disp: 90 tablet, Rfl: 3    aspirin (ASPIRIN LOW DOSE) 81 MG tablet, Take 1 tablet by mouth daily, Disp: , Rfl:     esomeprazole (NexIUM) 20 mg capsule, Take 1 capsule (20 mg total) by mouth daily, Disp: 90 capsule, Rfl: 3    fenofibrate (TRICOR) 145 mg tablet, Take 1 tablet (145 mg total) by mouth daily, Disp: 90 tablet, Rfl: 2    fluticasone (CUTIVATE) 0 05 % cream, Apply topically 2 (two) times a day To rash until resolved, Disp: 30 g, Rfl: 1    fluticasone (FLONASE) 50 mcg/act nasal spray, 2 sprays into each nostril daily, Disp: 1 Bottle, Rfl: 5    ibuprofen (MOTRIN) 200 mg tablet, Take 3 tablets (600 mg total) by mouth every 6 (six) hours as needed for mild pain, Disp: , Rfl: 0    lisinopril (ZESTRIL) 40 mg tablet, TAKE ONE TABLET BY MOUTH DAILY (Patient taking differently: TAKE ONE TABLET BY MOUTH DAILY IN AM), Disp: 90 tablet, Rfl: 3    loperamide (IMODIUM A-D) 2 MG tablet, Take 1 tablet by mouth daily as needed, Disp: , Rfl:     lovastatin (MEVACOR) 40 MG tablet, TAKE ONE TABLET BY MOUTH DAILY (Patient taking differently: TAKE ONE TABLET BY MOUTH DAILY IN AM), Disp: 90 tablet, Rfl: 1    Multiple Vitamins-Minerals (CENTRUM ULTRA MENS PO), Take 1 tablet by mouth daily, Disp: , Rfl:     omeprazole (PriLOSEC) 20 mg delayed release capsule, TAKE ONE CAPSULE DAILY, Disp: 90 capsule, Rfl: 1    fenofibrate (TRICOR) 145 mg tablet, Take 1 tablet (145 mg total) by mouth daily (Patient not taking: Reported on 9/6/2019), Disp: 90 tablet, Rfl: 2      PHYSICAL EXAMINATION     Physical Exam   Constitutional: He is oriented to person, place, and time  He appears well-developed and well-nourished  HENT:   Head: Normocephalic and atraumatic  Eyes: Pupils are equal, round, and reactive to light  Neck: Neck supple  Cardiovascular: Normal rate, regular rhythm and normal heart sounds  Pulmonary/Chest: Effort normal    Abdominal: Soft  Bowel sounds are normal    Musculoskeletal: Normal range of motion  Neurological: He is alert and oriented to person, place, and time  Skin: Skin is warm  Psychiatric: He has a normal mood and affect           LAB RESULTS     Results from last 6 Months   Lab Units 08/12/19  0810 06/06/19  1559 05/10/19  0045 04/29/19  1927   WBC Thousand/uL  --   --  8 89 8 01   HEMOGLOBIN g/dL  --   --  14 0 14 3   HEMATOCRIT %  --   --  40 9 41 3   PLATELETS Thousands/uL  --   --  233 187   POTASSIUM mmol/L 4 0 3 8 4 1 3 9   CHLORIDE mmol/L 107 105 104 105   CO2 mmol/L 24 27 24 25   BUN mg/dL 18 25 36* 18   CREATININE mg/dL 0 93 1 04 1 74* 1 04   CALCIUM mg/dL 8 8 8 8 8 8 9 0             RADIOLOGY RESULTS      No results found for this or any previous visit  Results for orders placed during the hospital encounter of 04/22/17   X-ray chest 2 views    Narrative CHEST - DUAL ENERGY    INDICATION: 49-year-old male, chest pain  COMPARISON: None    VIEWS:  PA (including soft tissue/bone algorithms) and lateral projections; 4 images    FINDINGS:    The lungs are clear  No pleural effusions  The cardiomediastinal silhouette is unremarkable  Bony thorax is unremarkable  Impression No acute cardiopulmonary disease                                     Findings are consistent with emergency room physician's preliminary reading  Workstation performed: [id number]         ASSESSMENT/PLAN     66years old male with past medical history of hypertension, nephrolithiasis, hypertriglyceridemia, BPH status post ureteral if procedure who presents for follow-up  1  Nephrolithiasis:  Patient with recent history of hydroureteronephrosis emanating from a 2 mm distal stone resulting in acute kidney injury and requiring retrograde pyelogram with renal stenting  Evaluation of stone cannot be performed however a 24 hour litho link testing revealed hypercalciuria and mild hypocitraturia  On both urine collections calcium level in the urine exceeded 250 mg  Patient was called with results and recommended starting use of hydrochlorothiazide to prevent hypercalciuria however patient wants to hold off on taking any medications  He does however want to drink a glass of lemonade which has enough citrate and that appears to be low in both his urine collections  2  Acute kidney injury:  Etiology was likely obstructive uropathy from a renal calculi  Serum creatinine has now normalized      3  Hypertension:  Patient blood pressure is on target with the current regimen  4  Sleep apnea:  Noted to be on CPAP machine  Patient will follow-up with me as needed                Arturo Posada MD  Nephrology  9/6/2019

## 2019-10-07 ENCOUNTER — PATIENT OUTREACH (OUTPATIENT)
Dept: INTERNAL MEDICINE CLINIC | Facility: CLINIC | Age: 74
End: 2019-10-07

## 2019-10-07 NOTE — PROGRESS NOTES
Attempted to contact Christiano Sun to follow up on health  Message left with his spouse along with contact information  Will wait for return call

## 2019-10-14 ENCOUNTER — PATIENT OUTREACH (OUTPATIENT)
Dept: INTERNAL MEDICINE CLINIC | Facility: CLINIC | Age: 74
End: 2019-10-14

## 2019-10-19 DIAGNOSIS — N20.0 NEPHROLITHIASIS: ICD-10-CM

## 2019-10-21 RX ORDER — TAMSULOSIN HYDROCHLORIDE 0.4 MG/1
CAPSULE ORAL
Qty: 5 CAPSULE | Refills: 0 | OUTPATIENT
Start: 2019-10-21

## 2019-10-21 NOTE — TELEPHONE ENCOUNTER
Patient was prescribed this for stones  Unless he is currently passing a stone (I don't see any documentation about this), he probably does not need this medication

## 2019-10-22 ENCOUNTER — PATIENT OUTREACH (OUTPATIENT)
Dept: INTERNAL MEDICINE CLINIC | Facility: CLINIC | Age: 74
End: 2019-10-22

## 2019-10-29 ENCOUNTER — PATIENT OUTREACH (OUTPATIENT)
Dept: INTERNAL MEDICINE CLINIC | Facility: CLINIC | Age: 74
End: 2019-10-29

## 2019-10-29 NOTE — PROGRESS NOTES
Roman Kaye stated that he is well  Denies SOB, pain, cough  Stated blood pressure has been stable  Has been staying hydrated and urinating well  Has been compliant with his f/u appointments with urology  Tolerating prescribed medications  Stated that he has been taking them as prescribed daily  Discussed his diet  He has been eating well  Educated on importance of a diet low in saturated fats, low sodium and low cholesterol  Discussed a few examples of good snack/meal choices  He verbalized understanding  Has no questions or concerns at this time  Continues to have contact information if need be  Will follow up

## 2019-11-13 ENCOUNTER — OFFICE VISIT (OUTPATIENT)
Dept: DERMATOLOGY | Facility: CLINIC | Age: 74
End: 2019-11-13
Payer: MEDICARE

## 2019-11-13 DIAGNOSIS — Z85.828 HISTORY OF SKIN CANCER: ICD-10-CM

## 2019-11-13 DIAGNOSIS — Z13.89 SCREENING FOR SKIN CONDITION: ICD-10-CM

## 2019-11-13 DIAGNOSIS — L30.8 PSORIASIFORM DERMATITIS: ICD-10-CM

## 2019-11-13 DIAGNOSIS — E78.2 MIXED HYPERLIPIDEMIA: ICD-10-CM

## 2019-11-13 DIAGNOSIS — L82.1 SEBORRHEIC KERATOSIS: ICD-10-CM

## 2019-11-13 DIAGNOSIS — L98.9 UNKNOWN SKIN LESION: Primary | ICD-10-CM

## 2019-11-13 PROCEDURE — 88305 TISSUE EXAM BY PATHOLOGIST: CPT | Performed by: STUDENT IN AN ORGANIZED HEALTH CARE EDUCATION/TRAINING PROGRAM

## 2019-11-13 PROCEDURE — 99213 OFFICE O/P EST LOW 20 MIN: CPT | Performed by: DERMATOLOGY

## 2019-11-13 PROCEDURE — 11102 TANGNTL BX SKIN SINGLE LES: CPT | Performed by: DERMATOLOGY

## 2019-11-13 RX ORDER — LOVASTATIN 40 MG/1
TABLET ORAL
Qty: 90 TABLET | Refills: 0 | Status: SHIPPED | OUTPATIENT
Start: 2019-11-13 | End: 2020-05-07

## 2019-11-13 NOTE — PROGRESS NOTES
[facility] DERMATOLOGY  [address] [place] [id number]  [phone]  [phone]     MRN: [id number] : 1945  Encounter: [id number]  Patient Information: Colton Xie  Chief complaint:  Six-month checkup and rash    History of present illness:  58-year-old male with previous history skin cancer presents for overall checkup patient also complaining of rash and that is been bothersome for him on his scalp also in his chest and nasal creases  We had given some cream previously which he has not used  Not aware lesion on the left ear  Past Medical History:   Diagnosis Date    Abnormal stress test     [facility])     Apnea     Apnea     Chest pain     CPAP (continuous positive airway pressure) dependence     Diverticulitis     Diverticulitis     GERD (gastroesophageal reflux disease)     Hypercholesterolemia     Hypercholesterolemia     Hyperlipidemia     Hypertension     Kidney stone     Mitral valve disorder     Mitral valve disorder     Sleep apnea     Thoracic neuritis     Thoracic neuritis      Past Surgical History:   Procedure Laterality Date    COLONOSCOPY      Complete    CORONARY ANGIOPLASTY  2017    ESOPHAGOGASTRODUODENOSCOPY      Diagnostic    FL RETROGRADE PYELOGRAM  2019    FOOT SURGERY Left     tarsal tunnel    HERNIA REPAIR      KNEE ARTHROSCOPY Left     KNEE SURGERY Left     NEUROPLASTY / TRANSPOSITION MEDIAN NERVE AT CARPAL TUNNEL      ME COLONOSCOPY FLX DX W/COLLJ SPEC WHEN PFRMD N/A 4/10/2018    Procedure: EGD AND COLONOSCOPY;  Surgeon: Marbin Adler MD;  Location: [facility];   Service: Gastroenterology    ME CYSTO/URETERO W/LITHOTRIPSY &INDWELL STENT INSRT Right 2019    Procedure: CYSTOSCOPY URETEROSCOPY WITH LITHOTRIPSY HOLMIUM LASER, RETROGRADE PYELOGRAM AND INSERTION STENT URETERAL;  Surgeon: Karis Garcia MD;  [facility];  Service: Urology    ME CYSTOURETHRO W/IMPLANT N/A 2018    Procedure: CYSTOSCOPY WITH INSERTION UROLIFT;  Surgeon: Cornell Downs MD;  [facility];  Service: Urology    IN TRANSURETHRAL INCISION OF PROSTATE N/A 2018    Procedure: TRANSURETHRAL INCISION OF PROSTATE (TUIP); Surgeon: Cornell Downs MD;  [facility];  Service: Urology    ROTATOR CUFF REPAIR Bilateral     SHOULDER SURGERY      TARSAL TUNNEL RELEASE      Neuroplasty Decompression     Social History   Social History     Substance and Sexual Activity   Alcohol Use No     Social History     Substance and Sexual Activity   Drug Use No     Social History     Tobacco Use   Smoking Status Former Smoker    Packs/day: 1 00    Years: 17     Pack years: 17     Last attempt to quit: Dez Tavares Years since quittin 8   Smokeless Tobacco Never Used     Family History   Problem Relation Age of Onset    Diabetes Mother         Mellitus    Heart disease Mother         Arteriosclerotic Cardiovascular    Hypertension Mother     Cancer Father     Arthritis Father         Rheu Mult Site W Involv Of Organs And Systems    Thyroid disease Sister     Diabetes Brother         Diabetes:Mellitus    Heart disease Brother     Hypertension Brother     Coronary artery disease Family      Meds/Allergies   Allergies   Allergen Reactions    Iodine      Nausea, hot, sweaty -- had through an IV  Has had it since with no issues    Pt reports no issues with IV contrast - has had numerous times with no issues        Meds:  Prior to Admission medications    Medication Sig Start Date End Date Taking?  Authorizing Provider   amLODIPine (NORVASC) 10 mg tablet Take 1 tablet (10 mg total) by mouth every morning 19  Yes Sulaiman Cohen MD   aspirin (ASPIRIN LOW DOSE) 81 MG tablet Take 1 tablet by mouth daily   Yes Historical Provider, MD   fenofibrate (TRICOR) 145 mg tablet Take 1 tablet (145 mg total) by mouth daily 3/7/19  Yes Darren Arreola DO   fluticasone (CUTIVATE) 0 05 % cream Apply topically 2 (two) times a day To rash until resolved 3/21/19  Yes Miek Yepez MD   fluticasone [facility] [place]) 50 mcg/act nasal spray 2 sprays into each nostril daily 7/15/19  Yes Becca Boyd MD   ibuprofen (MOTRIN) 200 mg tablet Take 3 tablets (600 mg total) by mouth every 6 (six) hours as needed for mild pain 5/24/19  Yes Siva Sebastian MD   lisinopril (ZESTRIL) 40 mg tablet TAKE ONE TABLET BY MOUTH DAILY  Patient taking differently: TAKE ONE TABLET BY MOUTH DAILY IN AM 3/27/19  Yes Marge Baker PA-C   loperamide (IMODIUM A-D) 2 MG tablet Take 1 tablet by mouth daily as needed   Yes Historical Provider, MD   lovastatin (MEVACOR) 40 MG tablet TAKE ONE TABLET BY MOUTH DAILY  Patient taking differently: TAKE ONE TABLET BY MOUTH DAILY IN AM 5/1/19  Yes Bebo Alvarado DO   Multiple Vitamins-Minerals (CENTRUM ULTRA MENS PO) Take 1 tablet by mouth daily   Yes Historical Provider, MD   omeprazole (PriLOSEC) 20 mg delayed release capsule TAKE ONE CAPSULE DAILY 8/22/19  Yes Bebo Alvarado DO   esomeprazole (NexIUM) 20 mg capsule Take 1 capsule (20 mg total) by mouth daily  Patient not taking: Reported on 11/13/2019 6/10/19   Marge Baker PA-C   fenofibrate (TRICOR) 145 mg tablet Take 1 tablet (145 mg total) by mouth daily  Patient not taking: Reported on 9/6/2019 6/3/19 11/13/19  Bebo Alvarado DO       Subjective:     Review of Systems:    General: negative for - chills, fatigue, fever,  weight gain or weight loss  Psychological: negative for - anxiety, behavioral disorder, concentration difficulties, decreased libido, depression, irritability, memory difficulties, mood swings, sleep disturbances or suicidal ideation  ENT: negative for - hearing difficulties , nasal congestion, nasal discharge, oral lesions, sinus pain, sneezing, sore throat  Allergy and Immunology: negative for - hives, insect bite sensitivity,  Hematological and Lymphatic: negative for - bleeding problems, blood clots,bruising, swollen lymph nodes  Endocrine: negative for - hair pattern changes, hot flashes, malaise/lethargy, mood swings, palpitations, polydipsia/polyuria, skin changes, temperature intolerance or unexpected weight change  Respiratory: negative for - cough, hemoptysis, orthopnea, shortness of breath, or wheezing  Cardiovascular: negative for - chest pain, dyspnea on exertion, edema,  Gastrointestinal: negative for - abdominal pain, nausea/vomiting  Genito-Urinary: negative for - dysuria, incontinence, irregular/heavy menses or urinary frequency/urgency  Musculoskeletal: negative for - gait disturbance, joint pain, joint stiffness, joint swelling, muscle pain, muscular weakness  Dermatological:  As in HPI  Neurological: negative for confusion, dizziness, headaches, impaired coordination/balance, memory loss, numbness/tingling, seizures, speech problems, tremors or weakness       Objective: There were no vitals taken for this visit  Physical Exam:    General Appearance:    Alert, cooperative, no distress   Head:    Normocephalic, without obvious abnormality, atraumatic           Skin:   A full skin exam was performed including scalp, head scalp, eyes, ears, nose, lips, neck, chest, axilla, abdomen, back, buttocks, bilateral upper extremities, bilateral lower extremities, hands, feet, fingers, toes, fingernails, and toenails 2 mm pearly papule noted on the superior helix of the left ear erythema scaling patches noted on the scalp little bit nasal creases and the chest wall nothing else remarkable normal keratotic papules greasy stuck on appearance previous site of skin cancer well healed without recurrence    Shave Biopsy Procedure Note    Pre-operative Diagnosis:  Rule out basal cell carcinoma    Plan:  1  Instructed to keep the wound dry and covered for 24 and clean thereafter  2  Warning signs of infection were reviewed  3  Recommended that the patient use OTC acetaminophen as needed for pain     4  Return  Pending results of biopsy(ies)    Locations:  Superior helix of the left ear    Indications:  Suspicious lesion    Anesthesia: Lidocaine 1% with epinephrine without added sodium bicarbonate    Procedure Details     Patient informed of the risks (including bleeding and infection) and benefits of the   procedure and Verbal informed consent obtained  The lesion and surrounding area were given a sterile prep using alcohol and draped in the usual sterile fashion  A Blue blade razor was used to obtain a specimen  Hemostasis achieved with aluminum chloride  Petrolatum and a sterile dressing applied  The specimen was sent for pathologic examination  The patient tolerated the procedure(s) well  Complications:  none  Assessment:     1  Unknown skin lesion     2  Psoriasiform dermatitis     3  Seborrheic keratosis     4  Screening for skin condition     5  History of skin cancer           Plan:   Skin lesion possible basal cell carcinoma quite small may be amenable to curettage  Psoriasiform dermatitis looks more like seborrheic dermatitis today advised at least try the fluticasone cream which we had given him previously  Seborrheic keratosis patient reassured these are normal growths we acquire with age no treatment needed  History of skin cancer in no recurrence nothing else atypical sunblock recommended follow-up in 6 months  Screening for dermatologic disorders nothing else of concern noted on complete exam follow-up in 6 months    Oscar Garcia MD  11/13/2019,12:49 PM    Portions of the record may have been created with voice recognition software   Occasional wrong word or "sound a like" substitutions may have occurred due to the inherent limitations of voice recognition software   Read the chart carefully and recognize, using context, where substitutions have occurred

## 2019-11-13 NOTE — PATIENT INSTRUCTIONS
Skin lesion possible basal cell carcinoma quite small may be amenable to curettage  Psoriasiform dermatitis looks more like seborrheic dermatitis today advised at least try the fluticasone cream which we had given him previously  Seborrheic keratosis patient reassured these are normal growths we acquire with age no treatment needed  History of skin cancer in no recurrence nothing else atypical sunblock recommended follow-up in 6 months  Screening for dermatologic disorders nothing else of concern noted on complete exam follow-up in 6 months  Wound care instructions given to patient

## 2019-11-26 DIAGNOSIS — E78.2 MIXED HYPERLIPIDEMIA: ICD-10-CM

## 2019-11-26 RX ORDER — FENOFIBRATE 145 MG/1
TABLET, COATED ORAL
Qty: 90 TABLET | Refills: 0 | Status: SHIPPED | OUTPATIENT
Start: 2019-11-26 | End: 2020-02-06 | Stop reason: SDUPTHER

## 2019-12-02 ENCOUNTER — PATIENT OUTREACH (OUTPATIENT)
Dept: INTERNAL MEDICINE CLINIC | Facility: CLINIC | Age: 74
End: 2019-12-02

## 2019-12-13 ENCOUNTER — PATIENT OUTREACH (OUTPATIENT)
Dept: INTERNAL MEDICINE CLINIC | Facility: CLINIC | Age: 74
End: 2019-12-13

## 2019-12-13 NOTE — PROGRESS NOTES
Dozier Law is well  Denies pain or discomfort  Denies SOB, cough, wheezing  Tolerating prescribed medications  He is in agreement to closure from outpatient care management  Goals met and in stable condition  Currently has no questions or concerns  Continues to have contact information if need be  Will close from outpatient care management

## 2019-12-23 ENCOUNTER — APPOINTMENT (OUTPATIENT)
Dept: LAB | Facility: CLINIC | Age: 74
End: 2019-12-23
Payer: MEDICARE

## 2019-12-23 DIAGNOSIS — I10 ESSENTIAL HYPERTENSION: ICD-10-CM

## 2019-12-23 DIAGNOSIS — E78.2 MIXED HYPERLIPIDEMIA: ICD-10-CM

## 2019-12-23 LAB
ANION GAP SERPL CALCULATED.3IONS-SCNC: 2 MMOL/L (ref 4–13)
BASOPHILS # BLD AUTO: 0.02 THOUSANDS/ΜL (ref 0–0.1)
BASOPHILS NFR BLD AUTO: 0 % (ref 0–1)
BUN SERPL-MCNC: 22 MG/DL (ref 5–25)
CALCIUM SERPL-MCNC: 9.3 MG/DL (ref 8.3–10.1)
CHLORIDE SERPL-SCNC: 109 MMOL/L (ref 100–108)
CHOLEST SERPL-MCNC: 137 MG/DL (ref 50–200)
CO2 SERPL-SCNC: 29 MMOL/L (ref 21–32)
CREAT SERPL-MCNC: 0.97 MG/DL (ref 0.6–1.3)
EOSINOPHIL # BLD AUTO: 0.05 THOUSAND/ΜL (ref 0–0.61)
EOSINOPHIL NFR BLD AUTO: 1 % (ref 0–6)
ERYTHROCYTE [DISTWIDTH] IN BLOOD BY AUTOMATED COUNT: 12.8 % (ref 11.6–15.1)
GFR SERPL CREATININE-BSD FRML MDRD: 76 ML/MIN/1.73SQ M
GLUCOSE P FAST SERPL-MCNC: 98 MG/DL (ref 65–99)
HCT VFR BLD AUTO: 43.1 % (ref 36.5–49.3)
HDLC SERPL-MCNC: 53 MG/DL
HGB BLD-MCNC: 14.2 G/DL (ref 12–17)
IMM GRANULOCYTES # BLD AUTO: 0.02 THOUSAND/UL (ref 0–0.2)
IMM GRANULOCYTES NFR BLD AUTO: 0 % (ref 0–2)
LDLC SERPL CALC-MCNC: 68 MG/DL (ref 0–100)
LYMPHOCYTES # BLD AUTO: 1.68 THOUSANDS/ΜL (ref 0.6–4.47)
LYMPHOCYTES NFR BLD AUTO: 33 % (ref 14–44)
MCH RBC QN AUTO: 33.3 PG (ref 26.8–34.3)
MCHC RBC AUTO-ENTMCNC: 32.9 G/DL (ref 31.4–37.4)
MCV RBC AUTO: 101 FL (ref 82–98)
MONOCYTES # BLD AUTO: 0.53 THOUSAND/ΜL (ref 0.17–1.22)
MONOCYTES NFR BLD AUTO: 10 % (ref 4–12)
NEUTROPHILS # BLD AUTO: 2.84 THOUSANDS/ΜL (ref 1.85–7.62)
NEUTS SEG NFR BLD AUTO: 56 % (ref 43–75)
NONHDLC SERPL-MCNC: 84 MG/DL
NRBC BLD AUTO-RTO: 0 /100 WBCS
PLATELET # BLD AUTO: 189 THOUSANDS/UL (ref 149–390)
PMV BLD AUTO: 11 FL (ref 8.9–12.7)
POTASSIUM SERPL-SCNC: 4.3 MMOL/L (ref 3.5–5.3)
RBC # BLD AUTO: 4.26 MILLION/UL (ref 3.88–5.62)
SODIUM SERPL-SCNC: 140 MMOL/L (ref 136–145)
TRIGL SERPL-MCNC: 81 MG/DL
WBC # BLD AUTO: 5.14 THOUSAND/UL (ref 4.31–10.16)

## 2019-12-23 PROCEDURE — 85025 COMPLETE CBC W/AUTO DIFF WBC: CPT

## 2019-12-23 PROCEDURE — 80048 BASIC METABOLIC PNL TOTAL CA: CPT

## 2019-12-23 PROCEDURE — 80061 LIPID PANEL: CPT

## 2019-12-23 PROCEDURE — 36415 COLL VENOUS BLD VENIPUNCTURE: CPT

## 2019-12-31 ENCOUNTER — OFFICE VISIT (OUTPATIENT)
Dept: DERMATOLOGY | Facility: CLINIC | Age: 74
End: 2019-12-31
Payer: MEDICARE

## 2019-12-31 DIAGNOSIS — C44.219 BASAL CELL CARCINOMA (BCC) OF HELIX OF LEFT EAR: Primary | ICD-10-CM

## 2019-12-31 PROCEDURE — 17280 DSTR MAL LS F/E/E/N/L/M .5/<: CPT | Performed by: DERMATOLOGY

## 2019-12-31 NOTE — PROGRESS NOTES
[facility] DERMATOLOGY  [address] [place] [id number]  [phone]  [phone]     MRN: [id number] : 1945  Encounter: [id number]  Patient Information: Pedro Pollard    Subjective:     49-year-old male presents for planned removal of previously biopsied basal cell carcinoma left superior helix of the ear     Objective: There were no vitals taken for this visit  Physical Exam:    General Appearance:    Alert, cooperative, no distress   Skin:   A previous site of biopsy barely discernible  Procedure: Curettage & Electrodessication basal cell carcinoma  The reasons for the procedure were explained to the patient  The benefits and risks of the procedure were explained to the patient, including bleeding, infection, incomplete removal, prolonged anesthesia (weeks to months) and rarely nerve damage  The patient is aware that a scar will result from the procedure  The consent for the procedure was obtained verbally and in writing  Lesion Site:  Left superior helix Curetted Area Size (mm): 2    Using a sterile curette, the appropriate area was curetted  The area was curetted and electrodesiccated x3  Final defect size: 2 5mm    The wound was left to heal by secondary intention  The wound was cleansed then covered with a dressing  Wound care instructions were verbally given and in writing  I performed the entire procedure  Patient tolerated procedure well  Assessment:     1  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  Authorizing Provider   amLODIPine (NORVASC) 10 mg tablet Take 1 tablet (10 mg total) by mouth every morning 19  Yes Harpal Camarillo MD   aspirin (ASPIRIN LOW DOSE) 81 MG tablet Take 1 tablet by mouth daily   Yes Historical Provider, MD   fenofibrate (TRICOR) 145 mg tablet Take 1 tablet (145 mg total) by mouth daily 3/7/19  Yes Leslee Rose, DO   fenofibrate (TRICOR) 145 mg tablet TAKE 1 TABLET BY MOUTH DAILY 11/26/19  Yes Leslee Rose, DO   fluticasone [facility]) 50 mcg/act nasal spray 2 sprays into each nostril daily 7/15/19  Yes Javier Guo MD   ibuprofen (MOTRIN) 200 mg tablet Take 3 tablets (600 mg total) by mouth every 6 (six) hours as needed for mild pain 5/24/19  Yes Daniel Wisdom MD   lisinopril (ZESTRIL) 40 mg tablet TAKE ONE TABLET BY MOUTH DAILY  Patient taking differently: TAKE ONE TABLET BY MOUTH DAILY IN AM 3/27/19  Yes Es Tomas PA-C   loperamide (IMODIUM A-D) 2 MG tablet Take 1 tablet by mouth daily as needed   Yes Historical Provider, MD   lovastatin (MEVACOR) 40 MG tablet TAKE ONE TABLET BY MOUTH DAILY 11/13/19  Yes Leslee Rose, DO   Multiple Vitamins-Minerals (CENTRUM ULTRA MENS PO) Take 1 tablet by mouth daily   Yes Historical Provider, MD   omeprazole (PriLOSEC) 20 mg delayed release capsule TAKE ONE CAPSULE DAILY 8/22/19  Yes Leslee Rose,    esomeprazole (NexIUM) 20 mg capsule Take 1 capsule (20 mg total) by mouth daily  Patient not taking: Reported on 12/31/2019 6/10/19   Es Tomas PA-C   fluticasone (CUTIVATE) 0 05 % cream Apply topically 2 (two) times a day To rash until resolved  Patient not taking: Reported on 12/31/2019 3/21/19   Claudette Shade, MD     Allergies   Allergen Reactions    Iodine      Nausea, hot, sweaty -- had through an IV  Has had it since with no issues    Pt reports no issues with IV contrast - has had numerous times with no issues        Claudette Shade, MD  12/31/2019,12:15 PM    Portions of the record may have been created with voice recognition software   Occasional wrong word or "sound a like" substitutions may have occurred due to the inherent limitations of voice recognition software   Read the chart carefully and recognize, using context, where substitutions have occurred

## 2020-01-03 ENCOUNTER — OFFICE VISIT (OUTPATIENT)
Dept: INTERNAL MEDICINE CLINIC | Facility: CLINIC | Age: 75
End: 2020-01-03
Payer: MEDICARE

## 2020-01-03 VITALS
DIASTOLIC BLOOD PRESSURE: 60 MMHG | WEIGHT: 164.4 LBS | HEART RATE: 63 BPM | BODY MASS INDEX: 28.07 KG/M2 | SYSTOLIC BLOOD PRESSURE: 118 MMHG | HEIGHT: 64 IN | OXYGEN SATURATION: 97 %

## 2020-01-03 DIAGNOSIS — G89.29 CHRONIC BILATERAL LOW BACK PAIN WITH BILATERAL SCIATICA: ICD-10-CM

## 2020-01-03 DIAGNOSIS — M54.42 CHRONIC BILATERAL LOW BACK PAIN WITH BILATERAL SCIATICA: ICD-10-CM

## 2020-01-03 DIAGNOSIS — G47.33 OSA ON CPAP: Primary | ICD-10-CM

## 2020-01-03 DIAGNOSIS — Z99.89 OSA ON CPAP: Primary | ICD-10-CM

## 2020-01-03 DIAGNOSIS — M54.41 CHRONIC BILATERAL LOW BACK PAIN WITH BILATERAL SCIATICA: ICD-10-CM

## 2020-01-03 DIAGNOSIS — J44.9 CHRONIC OBSTRUCTIVE PULMONARY DISEASE, UNSPECIFIED COPD TYPE (HCC): ICD-10-CM

## 2020-01-03 DIAGNOSIS — N20.0 NEPHROLITHIASIS: ICD-10-CM

## 2020-01-03 DIAGNOSIS — E78.2 MIXED HYPERLIPIDEMIA: ICD-10-CM

## 2020-01-03 DIAGNOSIS — I10 ESSENTIAL HYPERTENSION: ICD-10-CM

## 2020-01-03 DIAGNOSIS — D04.4 SQUAMOUS CELL CARCINOMA IN SITU (SCCIS) OF SCALP: ICD-10-CM

## 2020-01-03 PROCEDURE — G0439 PPPS, SUBSEQ VISIT: HCPCS | Performed by: INTERNAL MEDICINE

## 2020-01-03 PROCEDURE — 99214 OFFICE O/P EST MOD 30 MIN: CPT | Performed by: INTERNAL MEDICINE

## 2020-01-03 PROCEDURE — 1123F ACP DISCUSS/DSCN MKR DOCD: CPT | Performed by: INTERNAL MEDICINE

## 2020-01-03 NOTE — PROGRESS NOTES
Assessment and Plan:  Patient in for wellness visit  Questions reviewed  Problem List Items Addressed This Visit     None           Preventive health issues were discussed with patient, and age appropriate screening tests were ordered as noted in patient's After Visit Summary  Personalized health advice and appropriate referrals for health education or preventive services given if needed, as noted in patient's After Visit Summary       History of Present Illness:     Patient presents for Medicare Annual Wellness visit    Patient Care Team:  Naveed Raya DO as PCP - MARELY Alegre MD Alphonsa Goes, MD Joyce Flakes, PA-C Wilhelm Mar, PA-C Purnell Barton, MD Beacher Herald, MD (Urology)  Briana Rubalcava MD as Endoscopist  Caden Noriega MD as Consulting Physician (Nephrology)     Problem List:     Patient Active Problem List   Diagnosis    Chest pain    Essential hypertension    Mixed hyperlipidemia    Chronic pain disorder    Chronic left shoulder pain    Chronic bilateral low back pain with bilateral sciatica    Disc degeneration, lumbar    Lumbar stenosis with neurogenic claudication    Left lower quadrant pain    Abnormal CT of the abdomen    Chronic pain syndrome    GREGG on CPAP    Benign prostatic hyperplasia with lower urinary tract symptoms    Hydronephrosis    Nonrheumatic aortic valve insufficiency    Non-rheumatic mitral regurgitation    Diastolic dysfunction    Lower extremity edema    Nephrolithiasis    Squamous cell carcinoma in situ (SCCIS) of scalp    Actinic keratosis    Chronic obstructive pulmonary disease ([facility] [address] )      Past Medical and Surgical History:     Past Medical History:   Diagnosis Date    Abnormal stress test     Angina at rest [facility])     Apnea     Apnea     Chest pain     CPAP (continuous positive airway pressure) dependence     Diverticulitis     Diverticulitis     GERD (gastroesophageal reflux disease)  Hypercholesterolemia     Hypercholesterolemia     Hyperlipidemia     Hypertension     Kidney stone     Mitral valve disorder     Mitral valve disorder     Sleep apnea     Thoracic neuritis     Thoracic neuritis      Past Surgical History:   Procedure Laterality Date    COLONOSCOPY      Complete    CORONARY ANGIOPLASTY  2017    ESOPHAGOGASTRODUODENOSCOPY      Diagnostic    FL RETROGRADE PYELOGRAM  5/24/2019    FOOT SURGERY Left     tarsal tunnel    HERNIA REPAIR      KNEE ARTHROSCOPY Left     KNEE SURGERY Left     NEUROPLASTY / TRANSPOSITION MEDIAN NERVE AT CARPAL TUNNEL      NH COLONOSCOPY FLX DX W/COLLJ SPEC WHEN PFRMD N/A 4/10/2018    Procedure: EGD AND COLONOSCOPY;  Surgeon: Jerod Saavedra MD;  [facility]; Service: Gastroenterology    NH CYSTO/URETERO W/LITHOTRIPSY &INDWELL STENT INSRT Right 5/24/2019    Procedure: CYSTOSCOPY URETEROSCOPY WITH LITHOTRIPSY HOLMIUM LASER, RETROGRADE PYELOGRAM AND INSERTION STENT URETERAL;  Surgeon: Noelle Wolf MD;  [facility];  Service: Urology    NH CYSTOURETHRO W/IMPLANT N/A 8/30/2018    Procedure: CYSTOSCOPY WITH INSERTION Sherleen Elder;  Surgeon: Noelle Wolf MD;  [facility];  Service: Urology    NH TRANSURETHRAL INCISION OF PROSTATE N/A 8/30/2018    Procedure: TRANSURETHRAL INCISION OF PROSTATE (TUIP);   Surgeon: Noelle Wolf MD;  [facility];  Service: Urology    ROTATOR CUFF REPAIR Bilateral     SHOULDER SURGERY      TARSAL TUNNEL RELEASE      Neuroplasty Decompression      Family History:     Family History   Problem Relation Age of Onset    Diabetes Mother         Mellitus    Heart disease Mother         Arteriosclerotic Cardiovascular    Hypertension Mother     Cancer Father     Arthritis Father         Rheu Mult Site W Involv Of Organs And Systems    Thyroid disease Sister     Diabetes Brother         Diabetes:Mellitus    Heart disease Brother     Hypertension Brother  Coronary artery disease Family       Social History:     Social History     Socioeconomic History    Marital status: /Civil Union     Spouse name: None    Number of children: 3    Years of education: high school or GED    Highest education level: None   Occupational History    Occupation: retired   Social Needs    Financial resource strain: None    Food insecurity:     Worry: None     Inability: None    Transportation needs:     Medical: None     Non-medical: None   Tobacco Use    Smoking status: Former Smoker     Packs/day: 1 00     Years: 17 00     Pack years: 17 00     Last attempt to quit: 1978     Years since quittin 0    Smokeless tobacco: Never Used   Substance and Sexual Activity    Alcohol use: No    Drug use: No    Sexual activity: Not Currently     Partners: Female   Lifestyle    Physical activity:     Days per week: 7 days     Minutes per session: 40 min    Stress: Not at all   Relationships    Social connections:     Talks on phone: None     Gets together: None     Attends Taoism service: None     Active member of club or organization: None     Attends meetings of clubs or organizations: None     Relationship status: None    Intimate partner violence:     Fear of current or ex partner: None     Emotionally abused: None     Physically abused: None     Forced sexual activity: None   Other Topics Concern    None   Social History Narrative    Active Advance Directives    Lives With Spouse               Medications and Allergies:     Current Outpatient Medications   Medication Sig Dispense Refill    amLODIPine (NORVASC) 10 mg tablet Take 1 tablet (10 mg total) by mouth every morning 90 tablet 3    aspirin (ASPIRIN LOW DOSE) 81 MG tablet Take 1 tablet by mouth daily      fenofibrate (TRICOR) 145 mg tablet Take 1 tablet (145 mg total) by mouth daily 90 tablet 2    fenofibrate (TRICOR) 145 mg tablet TAKE 1 TABLET BY MOUTH DAILY 90 tablet 0    fluticasone (CUTIVATE) 0 05 % cream Apply topically 2 (two) times a day To rash until resolved 30 g 1    fluticasone (FLONASE) 50 mcg/act nasal spray 2 sprays into each nostril daily 1 Bottle 5    ibuprofen (MOTRIN) 200 mg tablet Take 3 tablets (600 mg total) by mouth every 6 (six) hours as needed for mild pain  0    lisinopril (ZESTRIL) 40 mg tablet TAKE ONE TABLET BY MOUTH DAILY (Patient taking differently: TAKE ONE TABLET BY MOUTH DAILY IN AM) 90 tablet 3    loperamide (IMODIUM A-D) 2 MG tablet Take 1 tablet by mouth daily as needed      lovastatin (MEVACOR) 40 MG tablet TAKE ONE TABLET BY MOUTH DAILY 90 tablet 0    Multiple Vitamins-Minerals (CENTRUM ULTRA MENS PO) Take 1 tablet by mouth daily      omeprazole (PriLOSEC) 20 mg delayed release capsule TAKE ONE CAPSULE DAILY 90 capsule 1     No current facility-administered medications for this visit  Allergies   Allergen Reactions    Iodine      Nausea, hot, sweaty -- had through an IV    Has had it since with no issues    Pt reports no issues with IV contrast - has had numerous times with no issues       Immunizations:     Immunization History   Administered Date(s) Administered    INFLUENZA 11/02/2005, 09/23/2009, 10/06/2014, 10/01/2015, 09/10/2018    Influenza Split High Dose Preservative Free IM 10/01/2015, 08/30/2017    Influenza TIV (IM) 10/01/2016    Pneumococcal Polysaccharide PPV23 09/23/2009    Tdap 1943, 05/03/2018    Zoster 1945    influenza, trivalent, adjuvanted 10/10/2019      Health Maintenance:         Topic Date Due    CRC Screening: Colonoscopy  04/10/2023         Topic Date Due    Pneumococcal Vaccine: 65+ Years (2 of 2 - PCV13) 09/23/2010    Influenza Vaccine  07/01/2019      Medicare Health Risk Assessment:     /60 (BP Location: Left arm, Patient Position: Sitting, Cuff Size: Standard)   Pulse 63   Ht 5' 3 75" (1 619 m)   Wt 74 6 kg (164 lb 6 4 oz)   SpO2 97%   BMI 28 44 kg/m²      Akilah Del Real is here for his Subsequent Wellness visit  Health Risk Assessment:   Patient rates overall health as very good  Patient feels that their physical health rating is same  Eyesight was rated as slightly worse  Hearing was rated as same  Patient feels that their emotional and mental health rating is same  Pain experienced in the last 7 days has been some  Patient's pain rating has been 5/10  Patient states that he has experienced no weight loss or gain in last 6 months  Depression Screening:   PHQ-2 Score: 0      Fall Risk Screening: In the past year, patient has experienced: history of falling in past year    Number of falls: 1  Injured during fall?: No    Feels unsteady when standing or walking?: No    Worried about falling?: No      Home Safety:  Patient does not have trouble with stairs inside or outside of their home  Patient has working smoke alarms and has working carbon monoxide detector  Home safety hazards include: none  Nutrition:   Current diet is Regular and No Added Salt  Medications:   Patient is currently taking over-the-counter supplements  OTC medications include: see medication list  Patient is able to manage medications  Activities of Daily Living (ADLs)/Instrumental Activities of Daily Living (IADLs):   Walk and transfer into and out of bed and chair?: Yes  Dress and groom yourself?: Yes    Bathe or shower yourself?: Yes    Feed yourself? Yes  Do your laundry/housekeeping?: Yes  Manage your money, pay your bills and track your expenses?: Yes  Make your own meals?: Yes    Do your own shopping?: Yes    Previous Hospitalizations:   Any hospitalizations or ED visits within the last 12 months?: No      Advance Care Planning:   Living will: Yes    Durable POA for healthcare:  Yes    Advanced directive: Yes    End of Life Decisions reviewed with patient: Yes      Cognitive Screening:   Provider or family/friend/caregiver concerned regarding cognition?: No    PREVENTIVE SCREENINGS      Cardiovascular Screening: General: Screening Not Indicated and History Lipid Disorder      Diabetes Screening:     General: Screening Current      Colorectal Cancer Screening:     General: Screening Current      Prostate Cancer Screening:    General: Screening Not Indicated      Osteoporosis Screening:    General: Screening Not Indicated      Abdominal Aortic Aneurysm (AAA) Screening:    Risk factors include: tobacco use        General: Screening Not Indicated      Lung Cancer Screening:     General: Screening Not Indicated      Hepatitis C Screening:      Hep C Screening Accepted: Yes        Marge Mckay DO

## 2020-01-03 NOTE — PROGRESS NOTES
Assessment/Plan:  Regarding blood pressure is well controlled with no cardiac complaints  Total cholesterol is 137  He does not wish to see anybody for his back pain  Will see him in 4 months  Was followed by Urology and is passing his urine for quite well  Labs reviewed with patient  No diagnosis found  No orders of the defined types were placed in this encounter  Subjective:  Chief complaint of back pain   Patient ID: Colton Xie is a 66 y o  male  HPI patient with multiple medical problems but generally feels well with the exception of his back pain  He has had that for number of years  He had multiple injections  He has some lumbar stenosis and arthritis  He does not want to go back to Pain Management he just takes Tylenol  Otherwise doing reasonably well  Had a colonoscopy about a year ago  Had prostate surgery with the Uro left  No PSA follow-ups were deemed to be necessary  No cardiac complaints    The following portions of the patient's history were reviewed and updated as appropriate:   He has a past medical history of Abnormal stress test, Angina at rest [facility]), Apnea, Apnea, Chest pain, CPAP (continuous positive airway pressure) dependence, Diverticulitis, Diverticulitis, GERD (gastroesophageal reflux disease), Hypercholesterolemia, Hypercholesterolemia, Hyperlipidemia, Hypertension, Kidney stone, Mitral valve disorder, Mitral valve disorder, Sleep apnea, Thoracic neuritis, and Thoracic neuritis  ,  does not have any pertinent problems on file  ,   has a past surgical history that includes Knee surgery (Left); Colonoscopy; Esophagogastroduodenoscopy; Hernia repair; Tarsal tunnel release; Shoulder surgery; Neuroplasty / transposition median nerve at carpal tunnel; pr colonoscopy flx dx w/collj spec when pfrmd (N/A, 4/10/2018); pr cystourethro w/implant (N/A, 8/30/2018); pr transurethral incision of prostate (N/A, 8/30/2018); Coronary angioplasty (2017);  Foot surgery (Left); Knee arthroscopy (Left); Rotator cuff repair (Bilateral); pr cysto/uretero w/lithotripsy &indwell stent insrt (Right, 5/24/2019); and FL retrograde pyelogram (5/24/2019)  ,  family history includes Arthritis in his father; Cancer in his father; Coronary artery disease in his family; Diabetes in his brother and mother; Heart disease in his brother and mother; Hypertension in his brother and mother; Thyroid disease in his sister  ,   reports that he quit smoking about 42 years ago  He has a 17 00 pack-year smoking history  He has never used smokeless tobacco  He reports that he does not drink alcohol or use drugs  ,  is allergic to iodine       Current Outpatient Medications:     amLODIPine (NORVASC) 10 mg tablet, Take 1 tablet (10 mg total) by mouth every morning, Disp: 90 tablet, Rfl: 3    aspirin (ASPIRIN LOW DOSE) 81 MG tablet, Take 1 tablet by mouth daily, Disp: , Rfl:     fenofibrate (TRICOR) 145 mg tablet, Take 1 tablet (145 mg total) by mouth daily, Disp: 90 tablet, Rfl: 2    fenofibrate (TRICOR) 145 mg tablet, TAKE 1 TABLET BY MOUTH DAILY, Disp: 90 tablet, Rfl: 0    fluticasone (CUTIVATE) 0 05 % cream, Apply topically 2 (two) times a day To rash until resolved, Disp: 30 g, Rfl: 1    fluticasone (FLONASE) 50 mcg/act nasal spray, 2 sprays into each nostril daily, Disp: 1 Bottle, Rfl: 5    ibuprofen (MOTRIN) 200 mg tablet, Take 3 tablets (600 mg total) by mouth every 6 (six) hours as needed for mild pain, Disp: , Rfl: 0    lisinopril (ZESTRIL) 40 mg tablet, TAKE ONE TABLET BY MOUTH DAILY (Patient taking differently: TAKE ONE TABLET BY MOUTH DAILY IN AM), Disp: 90 tablet, Rfl: 3    loperamide (IMODIUM A-D) 2 MG tablet, Take 1 tablet by mouth daily as needed, Disp: , Rfl:     lovastatin (MEVACOR) 40 MG tablet, TAKE ONE TABLET BY MOUTH DAILY, Disp: 90 tablet, Rfl: 0    Multiple Vitamins-Minerals (CENTRUM ULTRA MENS PO), Take 1 tablet by mouth daily, Disp: , Rfl:     omeprazole (PriLOSEC) 20 mg delayed release capsule, TAKE ONE CAPSULE DAILY, Disp: 90 capsule, Rfl: 1    Review of Systems   Constitutional: Negative for activity change, appetite change, chills, diaphoresis, fatigue, fever and unexpected weight change  HENT: Negative for congestion, ear pain, hearing loss, mouth sores, nosebleeds, postnasal drip, sinus pressure, sinus pain, sore throat and trouble swallowing  Eyes: Negative for pain, discharge and visual disturbance  Respiratory: Negative for apnea, cough, chest tightness, shortness of breath and wheezing  Cardiovascular: Negative for chest pain, palpitations and leg swelling  Gastrointestinal: Negative for abdominal pain, anal bleeding, blood in stool, constipation, diarrhea, nausea and vomiting  Moderately overweight  Endocrine: Negative for polydipsia and polyphagia  Genitourinary: Negative for decreased urine volume, dysuria, flank pain, frequency, hematuria and urgency  Status post Uro left  Doing well   Musculoskeletal: Positive for back pain  Negative for arthralgias, gait problem, joint swelling and myalgias  Skin: Negative for rash and wound  Allergic/Immunologic: Negative for environmental allergies and food allergies  Neurological: Negative for dizziness, tremors, seizures, syncope, speech difficulty, light-headedness, numbness and headaches  Hematological: Negative for adenopathy  Does not bruise/bleed easily  Psychiatric/Behavioral: Negative for agitation, confusion, hallucinations, sleep disturbance and suicidal ideas  The patient is not nervous/anxious  Objective:  /60 (BP Location: Left arm, Patient Position: Sitting, Cuff Size: Standard)   Pulse 63   Ht 5' 3 75" (1 619 m)   Wt 74 6 kg (164 lb 6 4 oz)   SpO2 97%   BMI 28 44 kg/m²      Physical Exam   Constitutional: He appears well-developed and well-nourished  No distress  Blood pressure is 118/60  Heart rhythm is regular  Rate is 80    O2 saturations 97 HENT:   Head: Normocephalic  Right Ear: External ear normal    Left Ear: External ear normal    Nose: Nose normal    Mouth/Throat: Oropharynx is clear and moist  No oropharyngeal exudate  Eyes: Pupils are equal, round, and reactive to light  Conjunctivae and EOM are normal  Right eye exhibits no discharge  Left eye exhibits no discharge  Neck: Normal range of motion  Neck supple  No thyromegaly present  Cardiovascular: Normal rate, regular rhythm, normal heart sounds and intact distal pulses  Exam reveals no gallop and no friction rub  No murmur heard  Pulmonary/Chest: Effort normal and breath sounds normal  No respiratory distress  He has no wheezes  He has no rales  Abdominal: Soft  Bowel sounds are normal  He exhibits no distension and no mass  There is no tenderness  There is no rebound and no guarding  Moderately overweight   Musculoskeletal: Normal range of motion  He exhibits no edema, tenderness or deformity  Lymphadenopathy:     He has no cervical adenopathy  Neurological: He is alert  He has normal reflexes  He displays normal reflexes  No cranial nerve deficit  Coordination normal    Skin: Skin is warm and dry  No rash noted  No erythema  Psychiatric: He has a normal mood and affect  His behavior is normal  Judgment and thought content normal    Nursing note and vitals reviewed          Recent Results (from the past 1008 hour(s))   CBC and differential    Collection Time: 12/23/19  7:08 AM   Result Value Ref Range    WBC 5 14 4 31 - 10 16 Thousand/uL    RBC 4 26 3 88 - 5 62 Million/uL    Hemoglobin 14 2 12 0 - 17 0 g/dL    Hematocrit 43 1 36 5 - 49 3 %     (H) 82 - 98 fL    MCH 33 3 26 8 - 34 3 pg    MCHC 32 9 31 4 - 37 4 g/dL    RDW 12 8 11 6 - 15 1 %    MPV 11 0 8 9 - 12 7 fL    Platelets 989 778 - 189 Thousands/uL    nRBC 0 /100 WBCs    Neutrophils Relative 56 43 - 75 %    Immat GRANS % 0 0 - 2 %    Lymphocytes Relative 33 14 - 44 %    Monocytes Relative 10 4 - 12 % Eosinophils Relative 1 0 - 6 %    Basophils Relative 0 0 - 1 %    Neutrophils Absolute 2 84 1 85 - 7 62 Thousands/µL    Immature Grans Absolute 0 02 0 00 - 0 20 Thousand/uL    Lymphocytes Absolute 1 68 0 60 - 4 47 Thousands/µL    Monocytes Absolute 0 53 0 17 - 1 22 Thousand/µL    Eosinophils Absolute 0 05 0 00 - 0 61 Thousand/µL    Basophils Absolute 0 02 0 00 - 0 10 Thousands/µL   Basic metabolic panel    Collection Time: 12/23/19  7:08 AM   Result Value Ref Range    Sodium 140 136 - 145 mmol/L    Potassium 4 3 3 5 - 5 3 mmol/L    Chloride 109 (H) 100 - 108 mmol/L    CO2 29 21 - 32 mmol/L    ANION GAP 2 (L) 4 - 13 mmol/L    BUN 22 5 - 25 mg/dL    Creatinine 0 97 0 60 - 1 30 mg/dL    Glucose, Fasting 98 65 - 99 mg/dL    Calcium 9 3 8 3 - 10 1 mg/dL    eGFR 76 ml/min/1 73sq m   Lipid panel    Collection Time: 12/23/19  7:08 AM   Result Value Ref Range    Cholesterol 137 50 - 200 mg/dL    Triglycerides 81 <=150 mg/dL    HDL, Direct 53 >=40 mg/dL    LDL Calculated 68 0 - 100 mg/dL    Non-HDL-Chol (CHOL-HDL) 84 mg/dl

## 2020-01-28 NOTE — PROGRESS NOTES
Assessment and plan:       1  Status post Urolift 8/30/2018  - Patient has an AUA symptom score 12 with an overall bother score of 3    - He reports that he produces 30-50 oz of urine overnight with bothersome nocturia  - This appears to be an over production of urine  He was encouraged to have his CPAP checked to make sure he is maintaining correct treatment for his obstructive sleep apnea  - He will follow up in 1 year for symptom reassessment  2  Nephrolithiasis status post ureteroscopy 05/24/2019  - Patient did not have KUB prior to today's visit  - He will have this performed in the near future and we will notify him of results  - His current right upper quadrant pain does not appear to be urologic in nature  Beatriz Carmona PA-C      Chief Complaint     Chief Complaint   Patient presents with    Post-op     uro-lift         History of Present Illness     Rosey Rivera is a 66 y o  male patient known to Dr Madeline Bee both for BPH now status post Urolift as well as nephrolithiasis  He underwent the Urolift procedure with concomitant transurethral incision of the prostate on 08/30/2018  He most recently required ureteroscopy for a kidney stone on 05/24/2019  He did not have follow-up KUB performed prior to today's visit  He does have some intermittent right upper quadrant pain  He is unsure if this is related to his previously seen a kidney stone or colitis  AUA symptom score today is 12 with an overall bother score of 3  PVR remains acceptable at 26 mL  Patient did complete a uroflow, report was not able to be printed  This indicated a maximum flow of 8 0 mL/sec and average flow 4 4 mL/sec with an extended, flattened curve  Patient does report 2-3 episodes of nocturia nightly with urine output during that time of 30-50 oz  He does have sleep apnea which is treated with a CPAP machine      He reports "nursing" a bottle of water throughout the day and drinking 10 oz overnight  Laboratory     Lab Results   Component Value Date    CREATININE 0 97 12/23/2019       Lab Results   Component Value Date    PSA 0 4 02/15/2018    PSA 0 5 12/10/2015    PSA 0 78 12/03/2014       Recent Results (from the past 1 hour(s))   POCT Measure PVR    Collection Time: 01/31/20 10:31 AM   Result Value Ref Range    POST-VOID RESIDUAL VOLUME, ML POC 26 mL         Review of Systems     Review of Systems   Constitutional: Negative for chills and fever  HENT: Negative  Eyes: Negative  Respiratory: Negative for shortness of breath  Cardiovascular: Negative for chest pain  Gastrointestinal: Positive for abdominal pain  Negative for constipation, diarrhea, nausea and vomiting  Genitourinary: Positive for frequency and urgency  Negative for difficulty urinating, dysuria, flank pain and hematuria  Musculoskeletal: Negative  AUA SYMPTOM SCORE      Most Recent Value   AUA SYMPTOM SCORE   How often have you had a sensation of not emptying your bladder completely after you finished urinating? 0   How often have you had to urinate again less than two hours after you finished urinating? 5   How often have you found you stopped and started again several times when you urinate? 1   How often have you found it difficult to postpone urination? 2   How often have you had a weak urinary stream?  1   How often have you had to push or strain to begin urination? 0   How many times did you most typically get up to urinate from the time you went to bed at night until the time you got up in the morning? 3   Quality of Life: If you were to spend the rest of your life with your urinary condition just the way it is now, how would you feel about that?  3   AUA SYMPTOM SCORE  12                  Allergies     Allergies   Allergen Reactions    Iodine      Nausea, hot, sweaty -- had through an IV    Has had it since with no issues    Pt reports no issues with IV contrast - has had numerous times with no issues        Physical Exam     Physical Exam   Constitutional: He is oriented to person, place, and time  He appears well-developed and well-nourished  No distress  HENT:   Head: Normocephalic and atraumatic  Right Ear: External ear normal    Left Ear: External ear normal    Nose: Nose normal    Eyes: Right eye exhibits no discharge  Left eye exhibits no discharge  No scleral icterus  Cardiovascular: Normal rate  Pulmonary/Chest: Effort normal    Genitourinary:   Genitourinary Comments: Negative for CVA tenderness bilaterally   Musculoskeletal:   Ambulates independently   Neurological: He is alert and oriented to person, place, and time  Skin: Skin is warm and dry  He is not diaphoretic  Psychiatric: He has a normal mood and affect  His behavior is normal  Judgment and thought content normal    Nursing note and vitals reviewed          Vital Signs     Vitals:    01/31/20 1021   BP: 130/70   BP Location: Left arm   Patient Position: Sitting   Cuff Size: Standard   Pulse: 59   Weight: 76 7 kg (169 lb)   Height: 5' 3 75" (1 619 m)         Current Medications       Current Outpatient Medications:     amLODIPine (NORVASC) 10 mg tablet, Take 1 tablet (10 mg total) by mouth every morning, Disp: 90 tablet, Rfl: 3    aspirin (ASPIRIN LOW DOSE) 81 MG tablet, Take 1 tablet by mouth daily, Disp: , Rfl:     fenofibrate (TRICOR) 145 mg tablet, Take 1 tablet (145 mg total) by mouth daily, Disp: 90 tablet, Rfl: 2    fenofibrate (TRICOR) 145 mg tablet, TAKE 1 TABLET BY MOUTH DAILY, Disp: 90 tablet, Rfl: 0    fluticasone (CUTIVATE) 0 05 % cream, Apply topically 2 (two) times a day To rash until resolved, Disp: 30 g, Rfl: 1    fluticasone (FLONASE) 50 mcg/act nasal spray, 2 sprays into each nostril daily, Disp: 1 Bottle, Rfl: 5    ibuprofen (MOTRIN) 200 mg tablet, Take 3 tablets (600 mg total) by mouth every 6 (six) hours as needed for mild pain, Disp: , Rfl: 0    lisinopril (ZESTRIL) 40 mg tablet, TAKE ONE TABLET BY MOUTH DAILY (Patient taking differently: TAKE ONE TABLET BY MOUTH DAILY IN AM), Disp: 90 tablet, Rfl: 3    loperamide (IMODIUM A-D) 2 MG tablet, Take 1 tablet by mouth daily as needed, Disp: , Rfl:     lovastatin (MEVACOR) 40 MG tablet, TAKE ONE TABLET BY MOUTH DAILY, Disp: 90 tablet, Rfl: 0    Multiple Vitamins-Minerals (CENTRUM ULTRA MENS PO), Take 1 tablet by mouth daily, Disp: , Rfl:     omeprazole (PriLOSEC) 20 mg delayed release capsule, TAKE ONE CAPSULE DAILY, Disp: 90 capsule, Rfl: 1      Active Problems     Patient Active Problem List   Diagnosis    Chest pain    Essential hypertension    Mixed hyperlipidemia    Chronic pain disorder    Chronic left shoulder pain    Chronic bilateral low back pain with bilateral sciatica    Disc degeneration, lumbar    Lumbar stenosis with neurogenic claudication    Left lower quadrant pain    Abnormal CT of the abdomen    Chronic pain syndrome    GREGG on CPAP    Benign prostatic hyperplasia with lower urinary tract symptoms    Hydronephrosis    Nonrheumatic aortic valve insufficiency    Non-rheumatic mitral regurgitation    Diastolic dysfunction    Lower extremity edema    Nephrolithiasis    Squamous cell carcinoma in situ (SCCIS) of scalp    Actinic keratosis    Chronic obstructive pulmonary disease (HCC)         Past Medical History     Past Medical History:   Diagnosis Date    Abnormal stress test     Angina at rest (HCC)     Apnea     Apnea     Chest pain     CPAP (continuous positive airway pressure) dependence     Diverticulitis     Diverticulitis     GERD (gastroesophageal reflux disease)     Hypercholesterolemia     Hypercholesterolemia     Hyperlipidemia     Hypertension     Kidney stone     Mitral valve disorder     Mitral valve disorder     Sleep apnea     Thoracic neuritis     Thoracic neuritis          Surgical History     Past Surgical History:   Procedure Laterality Date    COLONOSCOPY      Complete    CORONARY ANGIOPLASTY  2017    ESOPHAGOGASTRODUODENOSCOPY      Diagnostic    FL RETROGRADE PYELOGRAM  5/24/2019    FOOT SURGERY Left     tarsal tunnel    HERNIA REPAIR      KNEE ARTHROSCOPY Left     KNEE SURGERY Left     NEUROPLASTY / TRANSPOSITION MEDIAN NERVE AT CARPAL TUNNEL      HI COLONOSCOPY FLX DX W/COLLJ SPEC WHEN PFRMD N/A 4/10/2018    Procedure: EGD AND COLONOSCOPY;  Surgeon: Mik Mack MD;  Location: [facility]; Service: Gastroenterology    HI CYSTO/URETERO W/LITHOTRIPSY &INDWELL STENT INSRT Right 5/24/2019    Procedure: CYSTOSCOPY URETEROSCOPY WITH LITHOTRIPSY HOLMIUM LASER, RETROGRADE PYELOGRAM AND INSERTION STENT URETERAL;  Surgeon: Mp Forman MD;  [facility];  Service: Urology    HI CYSTOURETHRO W/IMPLANT N/A 8/30/2018    Procedure: CYSTOSCOPY WITH INSERTION Audery Curd;  Surgeon: Mp Forman MD;  [facility];  Service: Urology    HI TRANSURETHRAL INCISION OF PROSTATE N/A 8/30/2018    Procedure: TRANSURETHRAL INCISION OF PROSTATE (TUIP);   Surgeon: Mp Forman MD;  [facility];  Service: Urology    ROTATOR CUFF REPAIR Bilateral     SHOULDER SURGERY      TARSAL TUNNEL RELEASE      Neuroplasty Decompression         Family History     Family History   Problem Relation Age of Onset    Diabetes Mother         Mellitus    Heart disease Mother         Arteriosclerotic Cardiovascular    Hypertension Mother     Cancer Father     Arthritis Father         Rheu Mult Site W Involv Of Organs And Systems    Thyroid disease Sister     Diabetes Brother         Diabetes:Mellitus    Heart disease Brother     Hypertension Brother     Coronary artery disease Family          Social History     Social History       Radiology

## 2020-01-31 ENCOUNTER — OFFICE VISIT (OUTPATIENT)
Dept: UROLOGY | Facility: CLINIC | Age: 75
End: 2020-01-31
Payer: MEDICARE

## 2020-01-31 VITALS
DIASTOLIC BLOOD PRESSURE: 70 MMHG | SYSTOLIC BLOOD PRESSURE: 130 MMHG | HEART RATE: 59 BPM | WEIGHT: 169 LBS | HEIGHT: 64 IN | BODY MASS INDEX: 28.85 KG/M2

## 2020-01-31 DIAGNOSIS — N20.0 NEPHROLITHIASIS: Primary | ICD-10-CM

## 2020-01-31 LAB — POST-VOID RESIDUAL VOLUME, ML POC: 26 ML

## 2020-01-31 PROCEDURE — 51798 US URINE CAPACITY MEASURE: CPT | Performed by: PHYSICIAN ASSISTANT

## 2020-01-31 PROCEDURE — 99213 OFFICE O/P EST LOW 20 MIN: CPT | Performed by: PHYSICIAN ASSISTANT

## 2020-02-03 ENCOUNTER — CLINICAL SUPPORT (OUTPATIENT)
Dept: DERMATOLOGY | Facility: CLINIC | Age: 75
End: 2020-02-03

## 2020-02-03 DIAGNOSIS — C44.219 BASAL CELL CARCINOMA (BCC) OF HELIX OF LEFT EAR: Primary | ICD-10-CM

## 2020-02-03 PROCEDURE — 3078F DIAST BP <80 MM HG: CPT | Performed by: DERMATOLOGY

## 2020-02-03 PROCEDURE — 99024 POSTOP FOLLOW-UP VISIT: CPT | Performed by: DERMATOLOGY

## 2020-02-03 PROCEDURE — 4040F PNEUMOC VAC/ADMIN/RCVD: CPT | Performed by: DERMATOLOGY

## 2020-02-03 PROCEDURE — 3075F SYST BP GE 130 - 139MM HG: CPT | Performed by: DERMATOLOGY

## 2020-02-03 PROCEDURE — 1160F RVW MEDS BY RX/DR IN RCRD: CPT | Performed by: DERMATOLOGY

## 2020-02-03 NOTE — PROGRESS NOTES
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  Authorizing Provider   amLODIPine (NORVASC) 10 mg tablet Take 1 tablet (10 mg total) by mouth every morning 19  Yes Lupe Giles MD   aspirin (ASPIRIN LOW DOSE) 81 MG tablet Take 1 tablet by mouth daily   Yes Historical Provider, MD   fenofibrate (TRICOR) 145 mg tablet Take 1 tablet (145 mg total) by mouth daily 3/7/19  Yes Chacho Davis DO   fenofibrate (TRICOR) 145 mg tablet TAKE 1 TABLET BY MOUTH DAILY 19  Yes Chacho Davis DO   fluticasone (CUTIVATE) 0 05 % cream Apply topically 2 (two) times a day To rash until resolved 3/21/19  Yes Alberto Culp MD   fluticasone [facility]) 50 mcg/act nasal spray 2 sprays into each nostril daily 7/15/19  Yes Lucinda Martines MD   ibuprofen (MOTRIN) 200 mg tablet Take 3 tablets (600 mg total) by mouth every 6 (six) hours as needed for mild pain 19  Yes Jorge Osuna MD   lisinopril (ZESTRIL) 40 mg tablet TAKE ONE TABLET BY MOUTH DAILY  Patient taking differently: TAKE ONE TABLET BY MOUTH DAILY IN AM 3/27/19  Yes Bernadette Bishop PA-C   loperamide (IMODIUM A-D) 2 MG tablet Take 1 tablet by mouth daily as needed   Yes Historical Provider, MD   lovastatin (MEVACOR) 40 MG tablet TAKE ONE TABLET BY MOUTH DAILY 11/13/19  Yes Melisa Whitlock, DO   Multiple Vitamins-Minerals (CENTRUM ULTRA MENS PO) Take 1 tablet by mouth daily   Yes Historical Provider, MD   omeprazole (PriLOSEC) 20 mg delayed release capsule TAKE ONE CAPSULE DAILY 8/22/19  Yes Melisa Whitlock, DO     Allergies   Allergen Reactions    Iodine      Nausea, hot, sweaty -- had through an IV  Has had it since with no issues    Pt reports no issues with IV contrast - has had numerous times with no issues        Breanne Levi MD  0/1/0520,97:89 PM    Portions of the record may have been created with voice recognition software   Occasional wrong word or "sound a like" substitutions may have occurred due to the inherent limitations of voice recognition software   Read the chart carefully and recognize, using context, where substitutions have occurred

## 2020-02-04 ENCOUNTER — HOSPITAL ENCOUNTER (OUTPATIENT)
Dept: RADIOLOGY | Facility: HOSPITAL | Age: 75
Discharge: HOME | End: 2020-02-04
Payer: MEDICARE

## 2020-02-04 ENCOUNTER — TELEPHONE (OUTPATIENT)
Dept: UROLOGY | Facility: MEDICAL CENTER | Age: 75
End: 2020-02-04

## 2020-02-04 DIAGNOSIS — N20.0 NEPHROLITHIASIS: ICD-10-CM

## 2020-02-04 PROCEDURE — 74018 RADEX ABDOMEN 1 VIEW: CPT

## 2020-02-04 NOTE — TELEPHONE ENCOUNTER
This is a patient of Dr Nicolette Velasquez seen by Ho in [facility]  Patient is at [facility] radiology to get a kub xray  Radiology has a question about the date  Patient is waiting at radiology  Please call Constance at [phone]

## 2020-02-04 NOTE — TELEPHONE ENCOUNTER
Called and spoke to Rosaline Boyce from radiology  Rosaline Boyce had a question about the ordering date for this patient  Spoke to our PA in office and stated that patient should have the KUB done  No further questions at this time

## 2020-02-06 ENCOUNTER — OFFICE VISIT (OUTPATIENT)
Dept: INTERNAL MEDICINE CLINIC | Facility: CLINIC | Age: 75
End: 2020-02-06
Payer: MEDICARE

## 2020-02-06 ENCOUNTER — APPOINTMENT (OUTPATIENT)
Dept: LAB | Facility: CLINIC | Age: 75
End: 2020-02-06
Payer: MEDICARE

## 2020-02-06 ENCOUNTER — TELEPHONE (OUTPATIENT)
Dept: UROLOGY | Facility: AMBULATORY SURGERY CENTER | Age: 75
End: 2020-02-06

## 2020-02-06 VITALS
HEART RATE: 64 BPM | SYSTOLIC BLOOD PRESSURE: 122 MMHG | WEIGHT: 164.8 LBS | OXYGEN SATURATION: 98 % | TEMPERATURE: 97.3 F | DIASTOLIC BLOOD PRESSURE: 54 MMHG | HEIGHT: 64 IN | BODY MASS INDEX: 28.13 KG/M2

## 2020-02-06 DIAGNOSIS — N40.1 BENIGN PROSTATIC HYPERPLASIA WITH URINARY FREQUENCY: ICD-10-CM

## 2020-02-06 DIAGNOSIS — R10.30 LOWER ABDOMINAL PAIN: ICD-10-CM

## 2020-02-06 DIAGNOSIS — G47.33 OSA ON CPAP: ICD-10-CM

## 2020-02-06 DIAGNOSIS — R10.31 RIGHT LOWER QUADRANT ABDOMINAL PAIN: ICD-10-CM

## 2020-02-06 DIAGNOSIS — R35.0 BENIGN PROSTATIC HYPERPLASIA WITH URINARY FREQUENCY: ICD-10-CM

## 2020-02-06 DIAGNOSIS — N20.0 NEPHROLITHIASIS: Primary | ICD-10-CM

## 2020-02-06 DIAGNOSIS — N20.0 NEPHROLITHIASIS: ICD-10-CM

## 2020-02-06 DIAGNOSIS — Z99.89 OSA ON CPAP: ICD-10-CM

## 2020-02-06 DIAGNOSIS — I10 ESSENTIAL HYPERTENSION: ICD-10-CM

## 2020-02-06 LAB
ALBUMIN SERPL BCP-MCNC: 4.2 G/DL (ref 3.5–5)
ALP SERPL-CCNC: 98 U/L (ref 46–116)
ALT SERPL W P-5'-P-CCNC: 27 U/L (ref 12–78)
ANION GAP SERPL CALCULATED.3IONS-SCNC: 5 MMOL/L (ref 4–13)
AST SERPL W P-5'-P-CCNC: 17 U/L (ref 5–45)
BASOPHILS # BLD AUTO: 0.01 THOUSANDS/ΜL (ref 0–0.1)
BASOPHILS NFR BLD AUTO: 0 % (ref 0–1)
BILIRUB SERPL-MCNC: 0.51 MG/DL (ref 0.2–1)
BILIRUB UR QL STRIP: NEGATIVE
BUN SERPL-MCNC: 27 MG/DL (ref 5–25)
CALCIUM SERPL-MCNC: 9.6 MG/DL (ref 8.3–10.1)
CHLORIDE SERPL-SCNC: 107 MMOL/L (ref 100–108)
CLARITY UR: CLEAR
CO2 SERPL-SCNC: 27 MMOL/L (ref 21–32)
COLOR UR: YELLOW
CREAT SERPL-MCNC: 1.09 MG/DL (ref 0.6–1.3)
EOSINOPHIL # BLD AUTO: 0.07 THOUSAND/ΜL (ref 0–0.61)
EOSINOPHIL NFR BLD AUTO: 1 % (ref 0–6)
ERYTHROCYTE [DISTWIDTH] IN BLOOD BY AUTOMATED COUNT: 12.8 % (ref 11.6–15.1)
GFR SERPL CREATININE-BSD FRML MDRD: 66 ML/MIN/1.73SQ M
GLUCOSE SERPL-MCNC: 85 MG/DL (ref 65–140)
GLUCOSE UR STRIP-MCNC: NEGATIVE MG/DL
HCT VFR BLD AUTO: 45.5 % (ref 36.5–49.3)
HGB BLD-MCNC: 15.4 G/DL (ref 12–17)
HGB UR QL STRIP.AUTO: NEGATIVE
IMM GRANULOCYTES # BLD AUTO: 0.02 THOUSAND/UL (ref 0–0.2)
IMM GRANULOCYTES NFR BLD AUTO: 0 % (ref 0–2)
KETONES UR STRIP-MCNC: NEGATIVE MG/DL
LEUKOCYTE ESTERASE UR QL STRIP: NEGATIVE
LYMPHOCYTES # BLD AUTO: 1.65 THOUSANDS/ΜL (ref 0.6–4.47)
LYMPHOCYTES NFR BLD AUTO: 27 % (ref 14–44)
MCH RBC QN AUTO: 34.1 PG (ref 26.8–34.3)
MCHC RBC AUTO-ENTMCNC: 33.8 G/DL (ref 31.4–37.4)
MCV RBC AUTO: 101 FL (ref 82–98)
MONOCYTES # BLD AUTO: 0.6 THOUSAND/ΜL (ref 0.17–1.22)
MONOCYTES NFR BLD AUTO: 10 % (ref 4–12)
NEUTROPHILS # BLD AUTO: 3.69 THOUSANDS/ΜL (ref 1.85–7.62)
NEUTS SEG NFR BLD AUTO: 62 % (ref 43–75)
NITRITE UR QL STRIP: NEGATIVE
NRBC BLD AUTO-RTO: 0 /100 WBCS
PH UR STRIP.AUTO: 5.5 [PH]
PLATELET # BLD AUTO: 193 THOUSANDS/UL (ref 149–390)
PMV BLD AUTO: 10.5 FL (ref 8.9–12.7)
POTASSIUM SERPL-SCNC: 4 MMOL/L (ref 3.5–5.3)
PROT SERPL-MCNC: 7.5 G/DL (ref 6.4–8.2)
PROT UR STRIP-MCNC: NEGATIVE MG/DL
RBC # BLD AUTO: 4.52 MILLION/UL (ref 3.88–5.62)
SODIUM SERPL-SCNC: 139 MMOL/L (ref 136–145)
SP GR UR STRIP.AUTO: 1.02 (ref 1–1.03)
UROBILINOGEN UR QL STRIP.AUTO: 1 E.U./DL
WBC # BLD AUTO: 6.04 THOUSAND/UL (ref 4.31–10.16)

## 2020-02-06 PROCEDURE — 1160F RVW MEDS BY RX/DR IN RCRD: CPT | Performed by: PHYSICIAN ASSISTANT

## 2020-02-06 PROCEDURE — 3074F SYST BP LT 130 MM HG: CPT | Performed by: PHYSICIAN ASSISTANT

## 2020-02-06 PROCEDURE — 3008F BODY MASS INDEX DOCD: CPT | Performed by: PHYSICIAN ASSISTANT

## 2020-02-06 PROCEDURE — 1036F TOBACCO NON-USER: CPT | Performed by: PHYSICIAN ASSISTANT

## 2020-02-06 PROCEDURE — 3078F DIAST BP <80 MM HG: CPT | Performed by: PHYSICIAN ASSISTANT

## 2020-02-06 PROCEDURE — 81003 URINALYSIS AUTO W/O SCOPE: CPT | Performed by: PHYSICIAN ASSISTANT

## 2020-02-06 PROCEDURE — 85025 COMPLETE CBC W/AUTO DIFF WBC: CPT

## 2020-02-06 PROCEDURE — 80053 COMPREHEN METABOLIC PANEL: CPT

## 2020-02-06 PROCEDURE — 36415 COLL VENOUS BLD VENIPUNCTURE: CPT

## 2020-02-06 PROCEDURE — 99214 OFFICE O/P EST MOD 30 MIN: CPT | Performed by: PHYSICIAN ASSISTANT

## 2020-02-06 PROCEDURE — 4040F PNEUMOC VAC/ADMIN/RCVD: CPT | Performed by: PHYSICIAN ASSISTANT

## 2020-02-06 NOTE — PROGRESS NOTES
Assessment/Plan: some subjective tenderness in the right lower quadrant  Will work him up for appendicitis and kidney stone  He is comfortable at rest   Follow-up according to his symptoms  Continue follow-up with Urology       Diagnoses and all orders for this visit:    Nephrolithiasis  -     CBC and differential; Future  -     Comprehensive metabolic panel; Future  -     UA w Reflex to Microscopic w Reflex to Culture  -     CT abdomen pelvis wo contrast; Future    Essential hypertension    GREGG on CPAP    Benign prostatic hyperplasia with urinary frequency    Lower abdominal pain  -     CBC and differential; Future  -     Comprehensive metabolic panel; Future  -     UA w Reflex to Microscopic w Reflex to Culture  -     CT abdomen pelvis wo contrast; Future    Right lower quadrant abdominal pain  -     CBC and differential; Future  -     Comprehensive metabolic panel; Future  -     UA w Reflex to Microscopic w Reflex to Culture  -     CT abdomen pelvis wo contrast; Future        No problem-specific Assessment & Plan notes found for this encounter  BMI Counseling: Body mass index is 28 51 kg/m²  The BMI is above normal  Nutrition recommendations include decreasing portion sizes  Exercise recommendations include exercising 3-5 times per week  Subjective:      Patient ID: Dawn Masterson is a 66 y o  male  He has been having some vague discomfort in his right flank for the last month it hurts him when he lies down at night  He cannot reproduce it by twisting or bending he had watery diarrhea week ago was taking Imodium until yesterday he has had no diarrhea in the last 24 hours     No difficulty urinating no blood in the urine  He has been feeling some right lower quadrant pain when he presses on his right lower quadrant over the past week  No nausea vomiting he has been eating well with no problem no fever chills    He has a previous history of kidney stones and a previous history of diverticulitis hypertension hyperlipidemia well controlled with meds sleep apnea well controlled with CPAP      The following portions of the patient's history were reviewed and updated as appropriate:   He has a past medical history of Abnormal stress test, Angina at rest [facility]), Apnea, Apnea, Chest pain, CPAP (continuous positive airway pressure) dependence, Diverticulitis, Diverticulitis, GERD (gastroesophageal reflux disease), Hypercholesterolemia, Hypercholesterolemia, Hyperlipidemia, Hypertension, Kidney stone, Mitral valve disorder, Mitral valve disorder, Sleep apnea, Thoracic neuritis, and Thoracic neuritis  ,  does not have any pertinent problems on file  ,   has a past surgical history that includes Knee surgery (Left); Colonoscopy; Esophagogastroduodenoscopy; Hernia repair; Tarsal tunnel release; Shoulder surgery; Neuroplasty / transposition median nerve at carpal tunnel; pr colonoscopy flx dx w/collj spec when pfrmd (N/A, 4/10/2018); pr cystourethro w/implant (N/A, 8/30/2018); pr transurethral incision of prostate (N/A, 8/30/2018); Coronary angioplasty (2017); Foot surgery (Left); Knee arthroscopy (Left); Rotator cuff repair (Bilateral); pr cysto/uretero w/lithotripsy &indwell stent insrt (Right, 5/24/2019); and FL retrograde pyelogram (5/24/2019)  ,  family history includes Arthritis in his father; Cancer in his father; Coronary artery disease in his family; Diabetes in his brother and mother; Heart disease in his brother and mother; Hypertension in his brother and mother; Thyroid disease in his sister  ,   reports that he quit smoking about 42 years ago  His smoking use included cigarettes  He has a 17 00 pack-year smoking history  He has never used smokeless tobacco  He reports that he does not drink alcohol or use drugs  ,  is allergic to iodine     Current Outpatient Medications   Medication Sig Dispense Refill    amLODIPine (NORVASC) 10 mg tablet Take 1 tablet (10 mg total) by mouth every morning 90 tablet 3    aspirin (ASPIRIN LOW DOSE) 81 MG tablet Take 1 tablet by mouth daily      fenofibrate (TRICOR) 145 mg tablet Take 1 tablet (145 mg total) by mouth daily 90 tablet 2    fluticasone (CUTIVATE) 0 05 % cream Apply topically 2 (two) times a day To rash until resolved 30 g 1    fluticasone (FLONASE) 50 mcg/act nasal spray 2 sprays into each nostril daily 1 Bottle 5    ibuprofen (MOTRIN) 200 mg tablet Take 3 tablets (600 mg total) by mouth every 6 (six) hours as needed for mild pain  0    lisinopril (ZESTRIL) 40 mg tablet TAKE ONE TABLET BY MOUTH DAILY (Patient taking differently: TAKE ONE TABLET BY MOUTH DAILY IN AM) 90 tablet 3    loperamide (IMODIUM A-D) 2 MG tablet Take 1 tablet by mouth daily as needed      lovastatin (MEVACOR) 40 MG tablet TAKE ONE TABLET BY MOUTH DAILY 90 tablet 0    Multiple Vitamins-Minerals (CENTRUM ULTRA MENS PO) Take 1 tablet by mouth daily      omeprazole (PriLOSEC) 20 mg delayed release capsule TAKE ONE CAPSULE DAILY 90 capsule 1     No current facility-administered medications for this visit  Review of Systems   Constitutional: Negative for activity change, appetite change, chills, diaphoresis, fatigue, fever and unexpected weight change  HENT: Negative for congestion, dental problem, drooling, ear discharge, ear pain, facial swelling, hearing loss, nosebleeds, postnasal drip, rhinorrhea, sinus pressure, sinus pain, sneezing, sore throat, tinnitus, trouble swallowing and voice change  Eyes: Negative for photophobia, pain, discharge, redness, itching and visual disturbance  Respiratory: Negative for apnea, cough, choking, chest tightness, shortness of breath, wheezing and stridor  Cardiovascular: Negative for chest pain, palpitations and leg swelling  Gastrointestinal: Positive for abdominal pain and diarrhea  Negative for abdominal distention, anal bleeding, blood in stool, constipation, nausea, rectal pain and vomiting     Endocrine: Negative for cold intolerance, heat intolerance, polydipsia, polyphagia and polyuria  Genitourinary: Negative for decreased urine volume, difficulty urinating, dysuria, enuresis, flank pain, frequency, genital sores, hematuria and urgency  Musculoskeletal: Positive for back pain  Negative for arthralgias, gait problem, joint swelling, myalgias, neck pain and neck stiffness  Skin: Negative for color change, pallor, rash and wound  Neurological: Negative for dizziness, tremors, seizures, syncope, facial asymmetry, speech difficulty, weakness, light-headedness, numbness and headaches  Hematological: Negative for adenopathy  Does not bruise/bleed easily  Psychiatric/Behavioral: Negative for agitation, behavioral problems, confusion, decreased concentration, dysphoric mood, hallucinations, self-injury, sleep disturbance and suicidal ideas  The patient is not nervous/anxious and is not hyperactive  Objective:  Vitals:    02/06/20 0936   BP: 122/54   BP Location: Left arm   Patient Position: Sitting   Cuff Size: Standard   Pulse: 64   Temp: (!) 97 3 °F (36 3 °C)   TempSrc: Tympanic   SpO2: 98%   Weight: 74 8 kg (164 lb 12 8 oz)   Height: 5' 3 75" (1 619 m)     Body mass index is 28 51 kg/m²  Physical Exam   Constitutional: He is oriented to person, place, and time  He appears well-developed  obese   HENT:   Head: Normocephalic  Right Ear: External ear normal    Left Ear: External ear normal    Nose: Nose normal    Mouth/Throat: Oropharynx is clear and moist  No oropharyngeal exudate  Eyes: Pupils are equal, round, and reactive to light  Conjunctivae are normal    Neck: Neck supple  No JVD present  No thyromegaly present  Cardiovascular: Normal rate, regular rhythm, normal heart sounds and intact distal pulses  No murmur heard  Pulmonary/Chest: Effort normal and breath sounds normal  No stridor  No respiratory distress  He has no wheezes  Abdominal: Soft   Bowel sounds are normal  He exhibits no distension and no mass  There is tenderness ( subjective tenderness right lower quadrant with deep palpation  )  There is no rebound and no guarding  No hernia  Bowel sounds normal no CVA tenderness   Musculoskeletal: Normal range of motion  He exhibits no edema  Lymphadenopathy:     He has no cervical adenopathy  Neurological: He is alert and oriented to person, place, and time  He has normal reflexes  Skin: Skin is warm and dry  No rash noted  No erythema  Vitals reviewed

## 2020-02-06 NOTE — TELEPHONE ENCOUNTER
----- Message from Lizy Kelley PA-C sent at 2/6/2020 10:18 AM EST -----  Please let this patient know that he has a stable, 4 mm stone in the right lower pole  He should follow up as scheduled in 1 year with KUB prior to visit

## 2020-02-06 NOTE — TELEPHONE ENCOUNTER
Call placed to patient, spoke with wife per communication consent form  Advise of above  Patient to follow up as scheduled next year, to call office in meantime with any concerns  Wife verbalized understanding and agrees with plan

## 2020-02-10 ENCOUNTER — HOSPITAL ENCOUNTER (OUTPATIENT)
Dept: CT IMAGING | Facility: HOSPITAL | Age: 75
Discharge: HOME | End: 2020-02-10
Payer: MEDICARE

## 2020-02-10 DIAGNOSIS — R10.31 RIGHT LOWER QUADRANT ABDOMINAL PAIN: ICD-10-CM

## 2020-02-10 DIAGNOSIS — N20.0 NEPHROLITHIASIS: ICD-10-CM

## 2020-02-10 DIAGNOSIS — R10.30 LOWER ABDOMINAL PAIN: ICD-10-CM

## 2020-02-10 PROCEDURE — 74176 CT ABD & PELVIS W/O CONTRAST: CPT

## 2020-02-17 DIAGNOSIS — K21.9 CHRONIC GERD: ICD-10-CM

## 2020-02-17 RX ORDER — OMEPRAZOLE 20 MG/1
CAPSULE, DELAYED RELEASE ORAL
Qty: 90 CAPSULE | Refills: 1 | Status: SHIPPED | OUTPATIENT
Start: 2020-02-17 | End: 2020-05-26

## 2020-02-25 DIAGNOSIS — I10 ESSENTIAL HYPERTENSION: ICD-10-CM

## 2020-03-09 DIAGNOSIS — E78.2 MIXED HYPERLIPIDEMIA: ICD-10-CM

## 2020-03-09 RX ORDER — FENOFIBRATE 145 MG/1
145 TABLET, COATED ORAL DAILY
Qty: 90 TABLET | Refills: 2 | Status: SHIPPED | OUTPATIENT
Start: 2020-03-09 | End: 2020-08-24

## 2020-03-26 ENCOUNTER — TELEMEDICINE (OUTPATIENT)
Dept: PULMONOLOGY | Facility: CLINIC | Age: 75
End: 2020-03-26
Payer: MEDICARE

## 2020-03-26 DIAGNOSIS — Z99.89 OSA ON CPAP: Primary | ICD-10-CM

## 2020-03-26 DIAGNOSIS — G47.33 OSA ON CPAP: Primary | ICD-10-CM

## 2020-03-26 DIAGNOSIS — R35.1 NOCTURIA: ICD-10-CM

## 2020-03-26 PROCEDURE — 99442 PR PHYS/QHP TELEPHONE EVALUATION 11-20 MIN: CPT | Performed by: PHYSICIAN ASSISTANT

## 2020-03-26 NOTE — PROGRESS NOTES
Virtual Regular Visit    Problem List Items Addressed This Visit        Respiratory    GREGG on CPAP - Primary      Other Visit Diagnoses     Nocturia                   Reason for visit is CPAP follow up    Encounter provider Elyssa Henry PA-C    Provider located at [address] [address]      Recent Visits  No visits were found meeting these conditions  Showing recent visits within past 7 days and meeting all other requirements     Future Appointments  No visits were found meeting these conditions  Showing future appointments within next 150 days and meeting all other requirements        After connecting through [employer], the patient was identified by name and date of birth  Pedro Pollard was informed that this is a telemedicine visit and that the visit is being conducted through telephone which may not be secure and therefore, might not be HIPAA-compliant  My office door was closed  No one else was in the room  He acknowledged consent and understanding of privacy and security of the video platform  The patient has agreed to participate and understands they can discontinue the visit at any time  Subjective  Pedro Pollard is a 66 y o  male former smoker with past medical history of GREGG on CPAP, hypertension, hyperlipidemia, BPH  He is following up for his obstructive sleep apnea, has been on CPAP for about 8 years  Overall tolerating the CPAP well  He is complaining of nocturia, urinates much more during the night than during the daytime  He reports that his urologist told him this is due to his sleep apnea        Past Medical History:   Diagnosis Date    Abnormal stress test     [facility])     Apnea     Apnea     Chest pain     CPAP (continuous positive airway pressure) dependence     Diverticulitis     Diverticulitis     GERD (gastroesophageal reflux disease)     Hypercholesterolemia  Hypercholesterolemia     Hyperlipidemia     Hypertension     Kidney stone     Mitral valve disorder     Mitral valve disorder     Sleep apnea     Thoracic neuritis     Thoracic neuritis        Past Surgical History:   Procedure Laterality Date    COLONOSCOPY      Complete    CORONARY ANGIOPLASTY  2017    ESOPHAGOGASTRODUODENOSCOPY      Diagnostic    FL RETROGRADE PYELOGRAM  5/24/2019    FOOT SURGERY Left     tarsal tunnel    HERNIA REPAIR      KNEE ARTHROSCOPY Left     KNEE SURGERY Left     NEUROPLASTY / TRANSPOSITION MEDIAN NERVE AT CARPAL TUNNEL      SC COLONOSCOPY FLX DX W/COLLJ SPEC WHEN PFRMD N/A 4/10/2018    Procedure: EGD AND COLONOSCOPY;  Surgeon: Edie Leon MD;  [facility]; Service: Gastroenterology    SC CYSTO/URETERO W/LITHOTRIPSY &INDWELL STENT INSRT Right 5/24/2019    Procedure: CYSTOSCOPY URETEROSCOPY WITH LITHOTRIPSY HOLMIUM LASER, RETROGRADE PYELOGRAM AND INSERTION STENT URETERAL;  Surgeon: Dominique Zamorano MD;  [facility];  Service: Urology    SC CYSTOURETHRO W/IMPLANT N/A 8/30/2018    Procedure: CYSTOSCOPY WITH INSERTION Lisa Waterville;  Surgeon: Dominique Zamorano MD;  [facility];  Service: Urology    SC TRANSURETHRAL INCISION OF PROSTATE N/A 8/30/2018    Procedure: TRANSURETHRAL INCISION OF PROSTATE (TUIP);   Surgeon: Dominique Zamorano MD;  [facility];  Service: Urology    ROTATOR CUFF REPAIR Bilateral     SHOULDER SURGERY      TARSAL TUNNEL RELEASE      Neuroplasty Decompression       Current Outpatient Medications   Medication Sig Dispense Refill    amLODIPine (NORVASC) 10 mg tablet Take 1 tablet (10 mg total) by mouth every morning 90 tablet 3    aspirin (ASPIRIN LOW DOSE) 81 MG tablet Take 1 tablet by mouth daily      fenofibrate (TRICOR) 145 mg tablet Take 1 tablet (145 mg total) by mouth daily 90 tablet 2    fluticasone (CUTIVATE) 0 05 % cream Apply topically 2 (two) times a day To rash until resolved 30 g 1  fluticasone (FLONASE) 50 mcg/act nasal spray 2 sprays into each nostril daily 1 Bottle 5    ibuprofen (MOTRIN) 200 mg tablet Take 3 tablets (600 mg total) by mouth every 6 (six) hours as needed for mild pain  0    lisinopril (ZESTRIL) 40 mg tablet TAKE ONE TABLET BY MOUTH DAILY (Patient taking differently: TAKE ONE TABLET BY MOUTH DAILY IN AM) 90 tablet 3    loperamide (IMODIUM A-D) 2 MG tablet Take 1 tablet by mouth daily as needed      lovastatin (MEVACOR) 40 MG tablet TAKE ONE TABLET BY MOUTH DAILY 90 tablet 0    Multiple Vitamins-Minerals (CENTRUM ULTRA MENS PO) Take 1 tablet by mouth daily      omeprazole (PriLOSEC) 20 mg delayed release capsule TAKE ONE CAPSULE BY MOUTH DAILY 90 capsule 1     No current facility-administered medications for this visit  Allergies   Allergen Reactions    Iodine      Nausea, hot, sweaty -- had through an IV  Has had it since with no issues    Pt reports no issues with IV contrast - has had numerous times with no issues        Review of Systems   Constitutional: Negative  HENT: Negative  Respiratory: Negative  Cardiovascular: Negative  Gastrointestinal: Negative  Genitourinary:        Nocturia   Musculoskeletal: Negative  Skin: Negative  Allergic/Immunologic: Negative  Neurological: Negative  Psychiatric/Behavioral: Negative  Assessment/plan  Patient following up for his CPAP  Has been on a CPAP for about 8 years now, overall tolerating the CPAP well  He does sometimes fall asleep in the afternoon/evening while watching TV, otherwise feels well rested  He does report waking up during the night to urinate, feels he urinates much bigger volumes at nighttime and during the day  This has been longstanding for him, he reports that his urologist told him it was due to his GREGG  Will obtain a download compliance from Megan, as long as his GREGG is adequately treated, should not be having nocturia from the GREGG    Will speak with him once I have the download compliance  For now he will continue with the current settings, continue receiving new supplies every 3-6 months  Will send a new order for supplies over to Megan  He can continue to follow-up with us on an annual basis  I spent 15 minutes with the patient during this visit  Additional 5 minutes was spent reviewing the chart, total visit 20 minutes

## 2020-04-01 ENCOUNTER — HOSPITAL ENCOUNTER (EMERGENCY)
Facility: HOSPITAL | Age: 75
Discharge: HOME | End: 2020-04-01
Attending: EMERGENCY MEDICINE | Admitting: EMERGENCY MEDICINE
Payer: MEDICARE

## 2020-04-01 ENCOUNTER — APPOINTMENT (EMERGENCY)
Dept: CT IMAGING | Facility: HOSPITAL | Age: 75
End: 2020-04-01
Payer: MEDICARE

## 2020-04-01 ENCOUNTER — TELEPHONE (OUTPATIENT)
Dept: INTERNAL MEDICINE CLINIC | Facility: CLINIC | Age: 75
End: 2020-04-01

## 2020-04-01 VITALS
RESPIRATION RATE: 15 BRPM | TEMPERATURE: 97.9 F | SYSTOLIC BLOOD PRESSURE: 149 MMHG | OXYGEN SATURATION: 98 % | WEIGHT: 170.86 LBS | HEART RATE: 58 BPM | BODY MASS INDEX: 29.56 KG/M2 | DIASTOLIC BLOOD PRESSURE: 68 MMHG

## 2020-04-01 DIAGNOSIS — H81.399 PERIPHERAL VERTIGO: Primary | ICD-10-CM

## 2020-04-01 LAB
ALBUMIN SERPL BCP-MCNC: 4 G/DL (ref 3.5–5)
ALP SERPL-CCNC: 91 U/L (ref 46–116)
ALT SERPL W P-5'-P-CCNC: 26 U/L (ref 12–78)
ANION GAP SERPL CALCULATED.3IONS-SCNC: 9 MMOL/L (ref 4–13)
APTT PPP: 31 SECONDS (ref 23–37)
AST SERPL W P-5'-P-CCNC: 19 U/L (ref 5–45)
ATRIAL RATE: 64 BPM
BASOPHILS # BLD AUTO: 0.01 THOUSANDS/ΜL (ref 0–0.1)
BASOPHILS NFR BLD AUTO: 0 % (ref 0–1)
BILIRUB SERPL-MCNC: 0.5 MG/DL (ref 0.2–1)
BILIRUB UR QL STRIP: NEGATIVE
BUN SERPL-MCNC: 21 MG/DL (ref 5–25)
CALCIUM SERPL-MCNC: 9 MG/DL (ref 8.3–10.1)
CHLORIDE SERPL-SCNC: 104 MMOL/L (ref 100–108)
CLARITY UR: CLEAR
CO2 SERPL-SCNC: 26 MMOL/L (ref 21–32)
COLOR UR: YELLOW
CREAT SERPL-MCNC: 0.85 MG/DL (ref 0.6–1.3)
EOSINOPHIL # BLD AUTO: 0.06 THOUSAND/ΜL (ref 0–0.61)
EOSINOPHIL NFR BLD AUTO: 1 % (ref 0–6)
ERYTHROCYTE [DISTWIDTH] IN BLOOD BY AUTOMATED COUNT: 12.6 % (ref 11.6–15.1)
GFR SERPL CREATININE-BSD FRML MDRD: 85 ML/MIN/1.73SQ M
GLUCOSE SERPL-MCNC: 100 MG/DL (ref 65–140)
GLUCOSE UR STRIP-MCNC: NEGATIVE MG/DL
HCT VFR BLD AUTO: 42.8 % (ref 36.5–49.3)
HGB BLD-MCNC: 14.6 G/DL (ref 12–17)
HGB UR QL STRIP.AUTO: NEGATIVE
IMM GRANULOCYTES # BLD AUTO: 0 THOUSAND/UL (ref 0–0.2)
IMM GRANULOCYTES NFR BLD AUTO: 0 % (ref 0–2)
INR PPP: 0.98 (ref 0.84–1.19)
KETONES UR STRIP-MCNC: NEGATIVE MG/DL
LACTATE SERPL-SCNC: 1.5 MMOL/L (ref 0.5–2)
LEUKOCYTE ESTERASE UR QL STRIP: NEGATIVE
LYMPHOCYTES # BLD AUTO: 1.46 THOUSANDS/ΜL (ref 0.6–4.47)
LYMPHOCYTES NFR BLD AUTO: 31 % (ref 14–44)
MCH RBC QN AUTO: 34.1 PG (ref 26.8–34.3)
MCHC RBC AUTO-ENTMCNC: 34.1 G/DL (ref 31.4–37.4)
MCV RBC AUTO: 100 FL (ref 82–98)
MONOCYTES # BLD AUTO: 0.6 THOUSAND/ΜL (ref 0.17–1.22)
MONOCYTES NFR BLD AUTO: 13 % (ref 4–12)
NEUTROPHILS # BLD AUTO: 2.53 THOUSANDS/ΜL (ref 1.85–7.62)
NEUTS SEG NFR BLD AUTO: 55 % (ref 43–75)
NITRITE UR QL STRIP: NEGATIVE
NRBC BLD AUTO-RTO: 0 /100 WBCS
P AXIS: 71 DEGREES
PH UR STRIP.AUTO: 8 [PH]
PLATELET # BLD AUTO: 175 THOUSANDS/UL (ref 149–390)
PMV BLD AUTO: 10.2 FL (ref 8.9–12.7)
POTASSIUM SERPL-SCNC: 3.9 MMOL/L (ref 3.5–5.3)
PR INTERVAL: 192 MS
PROCALCITONIN SERPL-MCNC: <0.05 NG/ML
PROT SERPL-MCNC: 6.8 G/DL (ref 6.4–8.2)
PROT UR STRIP-MCNC: NEGATIVE MG/DL
PROTHROMBIN TIME: 13 SECONDS (ref 11.6–14.5)
QRS AXIS: 66 DEGREES
QRSD INTERVAL: 90 MS
QT INTERVAL: 406 MS
QTC INTERVAL: 418 MS
RBC # BLD AUTO: 4.28 MILLION/UL (ref 3.88–5.62)
SODIUM SERPL-SCNC: 139 MMOL/L (ref 136–145)
SP GR UR STRIP.AUTO: 1.01 (ref 1–1.03)
T WAVE AXIS: 65 DEGREES
TROPONIN I SERPL-MCNC: <0.02 NG/ML
UROBILINOGEN UR QL STRIP.AUTO: 0.2 E.U./DL
VENTRICULAR RATE: 64 BPM
WBC # BLD AUTO: 4.66 THOUSAND/UL (ref 4.31–10.16)

## 2020-04-01 PROCEDURE — 85730 THROMBOPLASTIN TIME PARTIAL: CPT | Performed by: EMERGENCY MEDICINE

## 2020-04-01 PROCEDURE — 83605 ASSAY OF LACTIC ACID: CPT | Performed by: EMERGENCY MEDICINE

## 2020-04-01 PROCEDURE — 80053 COMPREHEN METABOLIC PANEL: CPT | Performed by: EMERGENCY MEDICINE

## 2020-04-01 PROCEDURE — 85025 COMPLETE CBC W/AUTO DIFF WBC: CPT | Performed by: EMERGENCY MEDICINE

## 2020-04-01 PROCEDURE — 87040 BLOOD CULTURE FOR BACTERIA: CPT | Performed by: EMERGENCY MEDICINE

## 2020-04-01 PROCEDURE — 99284 EMERGENCY DEPT VISIT MOD MDM: CPT | Performed by: EMERGENCY MEDICINE

## 2020-04-01 PROCEDURE — 84484 ASSAY OF TROPONIN QUANT: CPT | Performed by: EMERGENCY MEDICINE

## 2020-04-01 PROCEDURE — 70450 CT HEAD/BRAIN W/O DYE: CPT

## 2020-04-01 PROCEDURE — 85610 PROTHROMBIN TIME: CPT | Performed by: EMERGENCY MEDICINE

## 2020-04-01 PROCEDURE — 36415 COLL VENOUS BLD VENIPUNCTURE: CPT | Performed by: EMERGENCY MEDICINE

## 2020-04-01 PROCEDURE — 96360 HYDRATION IV INFUSION INIT: CPT

## 2020-04-01 PROCEDURE — 81003 URINALYSIS AUTO W/O SCOPE: CPT | Performed by: EMERGENCY MEDICINE

## 2020-04-01 PROCEDURE — 84145 PROCALCITONIN (PCT): CPT | Performed by: EMERGENCY MEDICINE

## 2020-04-01 PROCEDURE — 93010 ELECTROCARDIOGRAM REPORT: CPT | Performed by: INTERNAL MEDICINE

## 2020-04-01 PROCEDURE — 99285 EMERGENCY DEPT VISIT HI MDM: CPT

## 2020-04-01 PROCEDURE — 93005 ELECTROCARDIOGRAM TRACING: CPT

## 2020-04-01 RX ORDER — MECLIZINE HYDROCHLORIDE 25 MG/1
25 TABLET ORAL ONCE
Status: COMPLETED | OUTPATIENT
Start: 2020-04-01 | End: 2020-04-01

## 2020-04-01 RX ORDER — MECLIZINE HYDROCHLORIDE 25 MG/1
25 TABLET ORAL 3 TIMES DAILY PRN
Qty: 30 TABLET | Refills: 0 | Status: SHIPPED | OUTPATIENT
Start: 2020-04-01 | End: 2021-08-06

## 2020-04-01 RX ORDER — ONDANSETRON 4 MG/1
4 TABLET, FILM COATED ORAL EVERY 6 HOURS
Qty: 15 TABLET | Refills: 0 | Status: SHIPPED | OUTPATIENT
Start: 2020-04-01 | End: 2020-09-11 | Stop reason: CLARIF

## 2020-04-01 RX ORDER — ONDANSETRON 4 MG/1
4 TABLET, ORALLY DISINTEGRATING ORAL ONCE
Status: COMPLETED | OUTPATIENT
Start: 2020-04-01 | End: 2020-04-01

## 2020-04-01 RX ORDER — ONDANSETRON 4 MG/1
4 TABLET, FILM COATED ORAL EVERY 6 HOURS
Qty: 10 TABLET | Refills: 0 | Status: SHIPPED | OUTPATIENT
Start: 2020-04-01 | End: 2020-04-01 | Stop reason: SDUPTHER

## 2020-04-01 RX ADMIN — SODIUM CHLORIDE 1000 ML: 0.9 INJECTION, SOLUTION INTRAVENOUS at 11:22

## 2020-04-01 RX ADMIN — ONDANSETRON 4 MG: 4 TABLET, ORALLY DISINTEGRATING ORAL at 13:12

## 2020-04-01 RX ADMIN — MECLIZINE HYDROCHLORIDE 25 MG: 25 TABLET ORAL at 11:22

## 2020-04-06 LAB
BACTERIA BLD CULT: NORMAL
BACTERIA BLD CULT: NORMAL

## 2020-04-15 RX ORDER — AMLODIPINE BESYLATE 10 MG/1
10 TABLET ORAL EVERY MORNING
Qty: 90 TABLET | Refills: 3 | Status: SHIPPED | OUTPATIENT
Start: 2020-04-15 | End: 2020-09-11 | Stop reason: SDUPTHER

## 2020-04-17 DIAGNOSIS — I10 ESSENTIAL HYPERTENSION: ICD-10-CM

## 2020-04-17 RX ORDER — LISINOPRIL 40 MG/1
TABLET ORAL
Qty: 90 TABLET | Refills: 3 | Status: SHIPPED | OUTPATIENT
Start: 2020-04-17 | End: 2020-09-11 | Stop reason: SDUPTHER

## 2020-05-01 ENCOUNTER — TELEPHONE (OUTPATIENT)
Dept: INTERNAL MEDICINE CLINIC | Facility: CLINIC | Age: 75
End: 2020-05-01

## 2020-05-01 ENCOUNTER — APPOINTMENT (OUTPATIENT)
Dept: LAB | Facility: CLINIC | Age: 75
End: 2020-05-01
Payer: MEDICARE

## 2020-05-01 DIAGNOSIS — I10 ESSENTIAL HYPERTENSION: ICD-10-CM

## 2020-05-01 LAB
ALBUMIN SERPL BCP-MCNC: 3.9 G/DL (ref 3.5–5)
ALP SERPL-CCNC: 89 U/L (ref 46–116)
ALT SERPL W P-5'-P-CCNC: 23 U/L (ref 12–78)
ANION GAP SERPL CALCULATED.3IONS-SCNC: 5 MMOL/L (ref 4–13)
AST SERPL W P-5'-P-CCNC: 18 U/L (ref 5–45)
BILIRUB SERPL-MCNC: 0.43 MG/DL (ref 0.2–1)
BUN SERPL-MCNC: 21 MG/DL (ref 5–25)
CALCIUM SERPL-MCNC: 8.9 MG/DL (ref 8.3–10.1)
CHLORIDE SERPL-SCNC: 109 MMOL/L (ref 100–108)
CHOLEST SERPL-MCNC: 135 MG/DL (ref 50–200)
CO2 SERPL-SCNC: 27 MMOL/L (ref 21–32)
CREAT SERPL-MCNC: 0.94 MG/DL (ref 0.6–1.3)
GFR SERPL CREATININE-BSD FRML MDRD: 78 ML/MIN/1.73SQ M
GLUCOSE P FAST SERPL-MCNC: 92 MG/DL (ref 65–99)
HDLC SERPL-MCNC: 49 MG/DL
LDLC SERPL CALC-MCNC: 69 MG/DL (ref 0–100)
NONHDLC SERPL-MCNC: 86 MG/DL
POTASSIUM SERPL-SCNC: 4 MMOL/L (ref 3.5–5.3)
PROT SERPL-MCNC: 6.7 G/DL (ref 6.4–8.2)
SODIUM SERPL-SCNC: 141 MMOL/L (ref 136–145)
TRIGL SERPL-MCNC: 86 MG/DL

## 2020-05-01 PROCEDURE — 80053 COMPREHEN METABOLIC PANEL: CPT

## 2020-05-01 PROCEDURE — 36415 COLL VENOUS BLD VENIPUNCTURE: CPT

## 2020-05-01 PROCEDURE — 80061 LIPID PANEL: CPT

## 2020-05-07 DIAGNOSIS — E78.2 MIXED HYPERLIPIDEMIA: ICD-10-CM

## 2020-05-07 RX ORDER — LOVASTATIN 40 MG/1
TABLET ORAL
Qty: 90 TABLET | Refills: 0 | Status: SHIPPED | OUTPATIENT
Start: 2020-05-07 | End: 2020-08-19

## 2020-05-08 ENCOUNTER — TELEMEDICINE (OUTPATIENT)
Dept: INTERNAL MEDICINE CLINIC | Facility: CLINIC | Age: 75
End: 2020-05-08
Payer: MEDICARE

## 2020-05-08 DIAGNOSIS — I35.1 NONRHEUMATIC AORTIC VALVE INSUFFICIENCY: ICD-10-CM

## 2020-05-08 DIAGNOSIS — I10 ESSENTIAL HYPERTENSION: ICD-10-CM

## 2020-05-08 DIAGNOSIS — E78.2 MIXED HYPERLIPIDEMIA: ICD-10-CM

## 2020-05-08 DIAGNOSIS — R42 VERTIGO: Primary | ICD-10-CM

## 2020-05-08 PROCEDURE — 99442 PR PHYS/QHP TELEPHONE EVALUATION 11-20 MIN: CPT | Performed by: INTERNAL MEDICINE

## 2020-05-26 DIAGNOSIS — K21.9 CHRONIC GERD: ICD-10-CM

## 2020-05-26 RX ORDER — OMEPRAZOLE 20 MG/1
CAPSULE, DELAYED RELEASE ORAL
Qty: 90 CAPSULE | Refills: 1 | Status: SHIPPED | OUTPATIENT
Start: 2020-05-26 | End: 2020-11-30

## 2020-07-07 ENCOUNTER — TELEPHONE (OUTPATIENT)
Dept: INTERNAL MEDICINE CLINIC | Facility: CLINIC | Age: 75
End: 2020-07-07

## 2020-07-07 NOTE — TELEPHONE ENCOUNTER
1  Do you presently have a fever or flu-like symptoms? No  2  Do you have symptoms of an upper respiratory infection like runny nose, sore throat, or cough? Yes sinus issues has it all the time  3  Are you suffering from new headache that you have not had in the past?  No  4  Do you have/have you experienced any new shortness of breath recently? No  5  Do you have any new diarrhea, nausea or vomiting? No  6  Have you been in contact with anyone who has been sick or diagnosed with COVID-19?  No

## 2020-07-08 ENCOUNTER — OFFICE VISIT (OUTPATIENT)
Dept: INTERNAL MEDICINE CLINIC | Facility: CLINIC | Age: 75
End: 2020-07-08
Payer: MEDICARE

## 2020-07-08 VITALS
HEIGHT: 64 IN | WEIGHT: 170.8 LBS | HEART RATE: 65 BPM | DIASTOLIC BLOOD PRESSURE: 62 MMHG | OXYGEN SATURATION: 98 % | SYSTOLIC BLOOD PRESSURE: 128 MMHG | BODY MASS INDEX: 29.16 KG/M2 | TEMPERATURE: 97.2 F

## 2020-07-08 DIAGNOSIS — Z12.11 SCREENING FOR COLON CANCER: ICD-10-CM

## 2020-07-08 DIAGNOSIS — I35.1 NONRHEUMATIC AORTIC VALVE INSUFFICIENCY: ICD-10-CM

## 2020-07-08 DIAGNOSIS — M54.12 CERVICAL RADICULOPATHY: ICD-10-CM

## 2020-07-08 DIAGNOSIS — I10 ESSENTIAL HYPERTENSION: Primary | ICD-10-CM

## 2020-07-08 DIAGNOSIS — E78.2 MIXED HYPERLIPIDEMIA: ICD-10-CM

## 2020-07-08 DIAGNOSIS — Z23 IMMUNIZATION DUE: ICD-10-CM

## 2020-07-08 PROCEDURE — 99214 OFFICE O/P EST MOD 30 MIN: CPT | Performed by: INTERNAL MEDICINE

## 2020-07-08 PROCEDURE — 1036F TOBACCO NON-USER: CPT | Performed by: INTERNAL MEDICINE

## 2020-07-08 PROCEDURE — 4040F PNEUMOC VAC/ADMIN/RCVD: CPT | Performed by: INTERNAL MEDICINE

## 2020-07-08 PROCEDURE — 3078F DIAST BP <80 MM HG: CPT | Performed by: INTERNAL MEDICINE

## 2020-07-08 PROCEDURE — 3008F BODY MASS INDEX DOCD: CPT | Performed by: INTERNAL MEDICINE

## 2020-07-08 PROCEDURE — G0009 ADMIN PNEUMOCOCCAL VACCINE: HCPCS | Performed by: INTERNAL MEDICINE

## 2020-07-08 PROCEDURE — 90670 PCV13 VACCINE IM: CPT | Performed by: INTERNAL MEDICINE

## 2020-07-08 PROCEDURE — 3074F SYST BP LT 130 MM HG: CPT | Performed by: INTERNAL MEDICINE

## 2020-07-08 PROCEDURE — 1160F RVW MEDS BY RX/DR IN RCRD: CPT | Performed by: INTERNAL MEDICINE

## 2020-07-09 NOTE — PROGRESS NOTES
Assessment/Plan:  Regarding blood pressure he is stable this time with no chest pains  Regarding his valve heel issues he is going to follow-up with cardiology  Regarding his neck pain he is going to see his neurologist for workup and evaluation  He will get a cologard and I will give a Prevnar 13 today  Labs will be done in September and will see him at that time  1  Essential hypertension     2  Nonrheumatic aortic valve insufficiency     3  Mixed hyperlipidemia  CBC and differential    Comprehensive metabolic panel    Lipid panel   4  Cervical radiculopathy  Ambulatory referral to Neurology   5  Screening for colon cancer  Cologuard   6  Immunization due  PNEUMOCOCCAL CONJUGATE VACCINE 13-VALENT GREATER THAN 6 MONTHS       Orders Placed This Encounter   Procedures    Cologuard    PNEUMOCOCCAL CONJUGATE VACCINE 13-VALENT GREATER THAN 6 MONTHS    CBC and differential    Comprehensive metabolic panel    Lipid panel    Ambulatory referral to Neurology            Subjective:  He comes in for follow-up  His only real complaint today is that he has been having problems with his neck  Positive history of cervical radiculopathy  He is followed by neurology and will contact them for workup and evaluation  Otherwise he is doing reasonably well other than aches and pains  He has a history of aortic valve insufficiency  Has hyperlipidemia and essential hypertension  Patient ID: Jeanie Quiroz is a 66 y o  male      HPI    The following portions of the patient's history were reviewed and updated as appropriate:   He has a past medical history of Abnormal stress test, Angina at rest [facility]), Apnea, Apnea, Chest pain, CPAP (continuous positive airway pressure) dependence, Diverticulitis, Diverticulitis, GERD (gastroesophageal reflux disease), Hypercholesterolemia, Hypercholesterolemia, Hyperlipidemia, Hypertension, Kidney stone, Mitral valve disorder, Mitral valve disorder, Sleep apnea, Thoracic neuritis, and Thoracic neuritis  ,  does not have any pertinent problems on file  ,   has a past surgical history that includes Knee surgery (Left); Colonoscopy; Esophagogastroduodenoscopy; Hernia repair; Tarsal tunnel release; Shoulder surgery; Neuroplasty / transposition median nerve at carpal tunnel; pr colonoscopy flx dx w/collj spec when pfrmd (N/A, 4/10/2018); pr cystourethro w/implant (N/A, 8/30/2018); pr transurethral incision of prostate (N/A, 8/30/2018); Coronary angioplasty (2017); Foot surgery (Left); Knee arthroscopy (Left); Rotator cuff repair (Bilateral); pr cysto/uretero w/lithotripsy &indwell stent insrt (Right, 5/24/2019); and FL retrograde pyelogram (5/24/2019)  ,  family history includes Arthritis in his father; Cancer in his father; Coronary artery disease in his family; Diabetes in his brother and mother; Heart disease in his brother and mother; Hypertension in his brother and mother; Thyroid disease in his sister  ,   reports that he quit smoking about 42 years ago  His smoking use included cigarettes  He has a 17 00 pack-year smoking history  He has never used smokeless tobacco  He reports that he does not drink alcohol or use drugs  ,  is allergic to iodine       Current Outpatient Medications:     amLODIPine (NORVASC) 10 mg tablet, TAKE 1 TABLET (10 MG TOTAL) BY MOUTH EVERY MORNING, Disp: 90 tablet, Rfl: 3    aspirin (ASPIRIN LOW DOSE) 81 MG tablet, Take 1 tablet by mouth daily, Disp: , Rfl:     fenofibrate (TRICOR) 145 mg tablet, Take 1 tablet (145 mg total) by mouth daily, Disp: 90 tablet, Rfl: 2    fluticasone (CUTIVATE) 0 05 % cream, Apply topically 2 (two) times a day To rash until resolved, Disp: 30 g, Rfl: 1    fluticasone (FLONASE) 50 mcg/act nasal spray, 2 sprays into each nostril daily, Disp: 1 Bottle, Rfl: 5    lisinopril (ZESTRIL) 40 mg tablet, TAKE ONE TABLET BY MOUTH DAILY, Disp: 90 tablet, Rfl: 3    loperamide (IMODIUM A-D) 2 MG tablet, Take 1 tablet by mouth daily as needed, Disp: , Rfl:     lovastatin (MEVACOR) 40 MG tablet, TAKE ONE TABLET BY MOUTH DAILY, Disp: 90 tablet, Rfl: 0    Multiple Vitamins-Minerals (CENTRUM ULTRA MENS PO), Take 1 tablet by mouth daily, Disp: , Rfl:     omeprazole (PriLOSEC) 20 mg delayed release capsule, TAKE ONE CAPSULE BY MOUTH DAILY, Disp: 90 capsule, Rfl: 1    meclizine (ANTIVERT) 25 mg tablet, Take 1 tablet (25 mg total) by mouth 3 (three) times a day as needed for dizziness (Patient not taking: Reported on 7/7/2020), Disp: 30 tablet, Rfl: 0    ondansetron (ZOFRAN) 4 mg tablet, Take 1 tablet (4 mg total) by mouth every 6 (six) hours for 15 doses, Disp: 15 tablet, Rfl: 0    Review of Systems   Constitutional: Negative for activity change, appetite change, chills, diaphoresis, fatigue, fever and unexpected weight change  He has neck and shoulder pain as described above  HENT: Negative for congestion, ear pain, hearing loss, mouth sores, nosebleeds, postnasal drip, sinus pressure, sinus pain, sore throat and trouble swallowing  Eyes: Negative for pain, discharge and visual disturbance  He is overdue for an eye checkup  Respiratory: Negative for apnea, cough, chest tightness, shortness of breath and wheezing  Cardiovascular: Negative for chest pain, palpitations and leg swelling  No exertional chest pain pressure squeezing or tightness  Gastrointestinal: Negative for abdominal pain, anal bleeding, blood in stool, constipation, diarrhea, nausea and vomiting  Endocrine: Negative for polydipsia and polyphagia  Genitourinary: Negative for decreased urine volume, dysuria, flank pain, frequency, hematuria and urgency  Musculoskeletal: Positive for arthralgias and neck pain  Negative for back pain, gait problem, joint swelling and myalgias  Skin: Negative for rash and wound  Allergic/Immunologic: Negative for environmental allergies and food allergies     Neurological: Negative for dizziness, tremors, seizures, syncope, speech difficulty, light-headedness, numbness and headaches  Hematological: Negative for adenopathy  Does not bruise/bleed easily  Psychiatric/Behavioral: Negative for agitation, confusion, hallucinations, sleep disturbance and suicidal ideas  The patient is not nervous/anxious  Objective:  /62 (BP Location: Left arm, Patient Position: Sitting, Cuff Size: Standard)   Pulse 65   Temp (!) 97 2 °F (36 2 °C)   Ht 5' 3 75" (1 619 m)   Wt 77 5 kg (170 lb 12 8 oz)   SpO2 98%   BMI 29 55 kg/m²      Physical Exam   Constitutional: He appears well-developed  No distress  Moderately overweight  Weight is 170 lb  Blood pressure is 128/62  O2 saturations 98  Temperature is 97 2°  HENT:   Head: Normocephalic  Right Ear: External ear normal    Left Ear: External ear normal    Nose: Nose normal    Mouth/Throat: Oropharynx is clear and moist  No oropharyngeal exudate  Eyes: Pupils are equal, round, and reactive to light  Conjunctivae and EOM are normal  Right eye exhibits no discharge  Left eye exhibits no discharge  Neck: Normal range of motion  Neck supple  No thyromegaly present  Cardiovascular: Normal rate, regular rhythm and intact distal pulses  Exam reveals no gallop and no friction rub  Murmur heard  Rhythm is regular  2/6 systolic murmur  Pulmonary/Chest: Effort normal and breath sounds normal  No respiratory distress  He has no wheezes  He has no rales  Abdominal: Soft  Bowel sounds are normal  He exhibits no distension and no mass  There is no tenderness  There is no rebound and no guarding  Musculoskeletal: He exhibits no edema, tenderness or deformity  Decreased range of motion cervical spine   Lymphadenopathy:     He has no cervical adenopathy  Neurological: He is alert  He has normal reflexes  He displays normal reflexes  No cranial nerve deficit  Coordination normal    Skin: Skin is warm and dry  No rash noted  No erythema     Psychiatric: He has a normal mood and affect  His behavior is normal  Judgment and thought content normal    Nursing note and vitals reviewed  No results found for this or any previous visit (from the past 1008 hour(s))

## 2020-07-22 ENCOUNTER — OFFICE VISIT (OUTPATIENT)
Dept: NEUROLOGY | Facility: CLINIC | Age: 75
End: 2020-07-22
Payer: MEDICARE

## 2020-07-22 VITALS
HEART RATE: 64 BPM | SYSTOLIC BLOOD PRESSURE: 120 MMHG | BODY MASS INDEX: 28.17 KG/M2 | HEIGHT: 64 IN | WEIGHT: 165 LBS | DIASTOLIC BLOOD PRESSURE: 52 MMHG

## 2020-07-22 DIAGNOSIS — M54.2 CERVICALGIA: ICD-10-CM

## 2020-07-22 PROCEDURE — 99204 OFFICE O/P NEW MOD 45 MIN: CPT | Performed by: PSYCHIATRY & NEUROLOGY

## 2020-07-22 RX ORDER — MELOXICAM 7.5 MG/1
7.5 TABLET ORAL DAILY
Qty: 30 TABLET | Refills: 3 | Status: SHIPPED | OUTPATIENT
Start: 2020-07-22 | End: 2020-08-27

## 2020-07-22 RX ORDER — PSEUDOEPHEDRINE HYDROCHLORIDE 30 MG/1
60 TABLET ORAL
COMMUNITY
End: 2021-08-06

## 2020-07-22 NOTE — PROGRESS NOTES
David Zarate is a 66 y o  male who presents today with neck pain    Assessment:  1  Cervicalgia        Plan:  Meloxicam 7 5 mg daily p r n  Physical therapy  Follow-up 6 weeks    Discussion:  Augustina Arreola has left-sided neck pain which I suspect is related to facet arthropathy with potential irritation to the upper cervical nerves  He does have some tenderness over the right greater occipital nerve region but does not have typical symptoms to suggest greater occipital neuralgia  Have recommended a trial of meloxicam after meal (discussed the potential adverse effects including dyspepsia) and initiating physical therapy  He has some concerns about the potential cost of therapy and have recommended at least initiating it and having them provide a home exercise program that he can do more independently over the next 6 weeks  I will then see him back in follow-up after that  If he continues to be symptomatic imaging of the cervical spine and a referral to pain management may be indicated  Subjective:    HPI  Augustina Arreola is right-handed man who presents with the above complaints  He reports that about 6 months ago he was walking outside and what he thought was much turned in to be ice  He states he slipped going down his right knee and twisting in an awkward fashion  He states that after this he had pain in his neck that lateralized to the left side  He states the pain has persisted over the past 6 months  He finds that turning his head to the left or extending his head amplify his symptoms  He also finds that if he is driving for period of time the pain symptoms become more prominent  He typically finds that the pain symptoms are most prominent toward the latter part of the day  He reports that at times the pain radiates from the neck to behind his left ear  It rarely radiates into the medial scapular border region or into the proximal left upper extremity    Occasionally he notes a low-grade electrical buzz sensation from the upper left cervical region to the posterior head region  He has not noticed any weakness in his arms since this began and has not noted a in bowel or bladder changes  Past Medical History:   Diagnosis Date    Abnormal stress test     [facility])     Apnea     Carpal tunnel syndrome on left     Chest pain     CPAP (continuous positive airway pressure) dependence     Diverticulitis     GERD (gastroesophageal reflux disease)     Hypercholesterolemia     Hypercholesterolemia     Hyperlipidemia     Hypertension     Kidney stone     Mitral valve disorder     Shoulder injury, left, sequela 01/2020    Sleep apnea     Thoracic neuritis        Family History:  Family History   Problem Relation Age of Onset    Diabetes Mother         Mellitus    Heart disease Mother         Arteriosclerotic Cardiovascular    Hypertension Mother     Cancer Father     Arthritis Father         Rheu Mult Site W Involv Of Organs And Systems    Parkinsonism Father     Thyroid disease Sister     Diabetes Brother         Diabetes:Mellitus    Heart disease Brother     Hypertension Brother     Coronary artery disease Family     Heart disease Brother     No Known Problems Brother     Kidney disease Sister     Parkinsonism Sister        Past Surgical History:  Past Surgical History:   Procedure Laterality Date    COLONOSCOPY      Complete    CORONARY ANGIOPLASTY  2017    ESOPHAGOGASTRODUODENOSCOPY      Diagnostic    FL RETROGRADE PYELOGRAM  5/24/2019    FOOT SURGERY Left     tarsal tunnel    HERNIA REPAIR      KNEE ARTHROSCOPY Left     KNEE SURGERY Left     NEUROPLASTY / TRANSPOSITION MEDIAN NERVE AT CARPAL TUNNEL      VT COLONOSCOPY FLX DX W/COLLJ SPEC WHEN PFRMD N/A 4/10/2018    Procedure: EGD AND COLONOSCOPY;  Surgeon: Arya Quiroz MD;  [facility];   Service: Gastroenterology    VT CYSTO/URETERO W/LITHOTRIPSY &INDWELL STENT INSRT Right 5/24/2019 Procedure: CYSTOSCOPY URETEROSCOPY WITH LITHOTRIPSY HOLMIUM LASER, RETROGRADE PYELOGRAM AND INSERTION STENT URETERAL;  Surgeon: Natalee Bolivar MD;  [facility];  Service: Urology    NH CYSTOURETHRO W/IMPLANT N/A 8/30/2018    Procedure: CYSTOSCOPY WITH INSERTION Claudell Baudilio;  Surgeon: Natalee Bolivar MD;  [facility];  Service: Urology    NH TRANSURETHRAL INCISION OF PROSTATE N/A 8/30/2018    Procedure: TRANSURETHRAL INCISION OF PROSTATE (TUIP); Surgeon: Natalee Bolivar MD;  [facility];  Service: Urology    ROTATOR CUFF REPAIR Bilateral     SHOULDER SURGERY      TARSAL TUNNEL RELEASE      Neuroplasty Decompression       Social History:   reports that he quit smoking about 42 years ago  His smoking use included cigarettes  He has a 17 00 pack-year smoking history  He has never used smokeless tobacco  He reports that he does not drink alcohol or use drugs      Allergies:  Iodine      Current Outpatient Medications:     amLODIPine (NORVASC) 10 mg tablet, TAKE 1 TABLET (10 MG TOTAL) BY MOUTH EVERY MORNING, Disp: 90 tablet, Rfl: 3    aspirin (ASPIRIN LOW DOSE) 81 MG tablet, Take 1 tablet by mouth daily, Disp: , Rfl:     fenofibrate (TRICOR) 145 mg tablet, Take 1 tablet (145 mg total) by mouth daily, Disp: 90 tablet, Rfl: 2    lisinopril (ZESTRIL) 40 mg tablet, TAKE ONE TABLET BY MOUTH DAILY, Disp: 90 tablet, Rfl: 3    loperamide (IMODIUM A-D) 2 MG tablet, Take 1 tablet by mouth daily as needed, Disp: , Rfl:     lovastatin (MEVACOR) 40 MG tablet, TAKE ONE TABLET BY MOUTH DAILY, Disp: 90 tablet, Rfl: 0    Multiple Vitamins-Minerals (CENTRUM ULTRA MENS PO), Take 1 tablet by mouth daily, Disp: , Rfl:     omeprazole (PriLOSEC) 20 mg delayed release capsule, TAKE ONE CAPSULE BY MOUTH DAILY, Disp: 90 capsule, Rfl: 1    pseudoephedrine (SUDAFED) 30 mg tablet, Take 60 mg by mouth daily at bedtime, Disp: , Rfl:     fluticasone (FLONASE) 50 mcg/act nasal spray, 2 sprays into each nostril daily (Patient not taking: Reported on 7/22/2020), Disp: 1 Bottle, Rfl: 5    meclizine (ANTIVERT) 25 mg tablet, Take 1 tablet (25 mg total) by mouth 3 (three) times a day as needed for dizziness (Patient not taking: Reported on 7/7/2020), Disp: 30 tablet, Rfl: 0    ondansetron (ZOFRAN) 4 mg tablet, Take 1 tablet (4 mg total) by mouth every 6 (six) hours for 15 doses, Disp: 15 tablet, Rfl: 0    I have reviewed the past medical, social and family history, current medications, allergies, vitals, review of systems and updated this information as appropriate today     Objective:    Vitals:  Blood pressure 120/52, pulse 64, height 5' 3 5" (1 613 m), weight 74 8 kg (165 lb)  Physical Exam    Neurological Exam    GENERAL:  Cooperative in no acute distress  Well-developed and well-nourished    HEAD and NECK   Head is atraumatic normocephalic with no lesions or masses  Neck is supple with restricted range of motion to lateral rotation to the left greater than right  His pain is intensified with rotation to the left and extended head backwards  There is tenderness in the low left lateral cervical region around C6-7  There is also some mild tenderness over the greater occipital nerve region  CARDIOVASCULAR  Carotid Arteries-no carotid bruits  NEUROLOGIC:  Mental Status-the patient is awake alert and oriented without aphasia or apraxia  Cranial Nerves: Visual fields are full to confrontation  Discs are flat  Extraocular movements are full without nystagmus  Pupils are 2-1/2 mm and reactive  Face is symmetrical to light touch  Movements of facial expression move symmetrically  Hearing is normal to finger rub bilaterally  Soft palate lifts symmetrically  Shoulder shrug is symmetrical  Tongue is midline without atrophy  Motor: No drift is noted on arm extension  Strength is full in the upper and lower extremities with normal bulk and tone    Sensory: Intact to temperature and vibratory sensation in the upper and lower extremities bilaterally  Cortical function is intact  Coordination: Finger to nose testing is performed accurately  Romberg is negative  Gait reveals a normal base with symmetrical arm swing  Tandem walk is normal   Reflexes:  1/4 in the biceps, triceps, brachioradialis, knee jerk and ankle jerk regions  Toes are downgoing            ROS:    Review of Systems   Constitutional: Positive for fatigue  Negative for appetite change and fever  HENT: Negative  Negative for hearing loss, tinnitus, trouble swallowing and voice change  Eyes: Negative  Negative for photophobia and pain  Respiratory: Negative  Negative for shortness of breath  Cardiovascular: Negative  Negative for palpitations  Gastrointestinal: Negative  Negative for nausea and vomiting  Endocrine: Negative  Negative for cold intolerance  Genitourinary: Negative  Negative for dysuria, frequency and urgency  Musculoskeletal: Positive for back pain (left upper back), myalgias (left arm), neck pain and neck stiffness  Skin: Negative  Negative for rash  Neurological: Positive for numbness (with tingling of left side of neck and left upper back)  Negative for dizziness, tremors, seizures, syncope, facial asymmetry, speech difficulty, weakness, light-headedness and headaches  Hematological: Negative  Does not bruise/bleed easily  Psychiatric/Behavioral: Positive for sleep disturbance  Negative for confusion and hallucinations

## 2020-07-22 NOTE — LETTER
July 22, 2020     Kirsten Rinne, DO  [address] [facility] ([place]) [address]    Patient: Cristino Dumas   YOB: 1945   Date of Visit: 7/22/2020       Dear Dr Amy Mojica: Thank you for referring Ray Ventura to me for evaluation  Below are my notes for this consultation  If you have questions, please do not hesitate to call me  I look forward to following your patient along with you  Sincerely,        Bre Spencer MD        CC: No Recipients  Bre Spencer MD  7/22/2020  9:36 AM  Sign at close encounter  Cristino Dumas is a 66 y o  male who presents today with neck pain    Assessment:  1  Cervicalgia        Plan:  Meloxicam 7 5 mg daily p r n  Physical therapy  Follow-up 6 weeks    Discussion:  Beatris Solomon has left-sided neck pain which I suspect is related to facet arthropathy with potential irritation to the upper cervical nerves  He does have some tenderness over the right greater occipital nerve region but does not have typical symptoms to suggest greater occipital neuralgia  Have recommended a trial of meloxicam after meal (discussed the potential adverse effects including dyspepsia) and initiating physical therapy  He has some concerns about the potential cost of therapy and have recommended at least initiating it and having them provide a home exercise program that he can do more independently over the next 6 weeks  I will then see him back in follow-up after that  If he continues to be symptomatic imaging of the cervical spine and a referral to pain management may be indicated  Subjective:    HPI  Beatris Solomon is right-handed man who presents with the above complaints  He reports that about 6 months ago he was walking outside and what he thought was much turned in to be ice  He states he slipped going down his right knee and twisting in an awkward fashion  He states that after this he had pain in his neck that lateralized to the left side    He states the pain has persisted over the past 6 months  He finds that turning his head to the left or extending his head amplify his symptoms  He also finds that if he is driving for period of time the pain symptoms become more prominent  He typically finds that the pain symptoms are most prominent toward the latter part of the day  He reports that at times the pain radiates from the neck to behind his left ear  It rarely radiates into the medial scapular border region or into the proximal left upper extremity  Occasionally he notes a low-grade electrical buzz sensation from the upper left cervical region to the posterior head region  He has not noticed any weakness in his arms since this began and has not noted a in bowel or bladder changes        Past Medical History:   Diagnosis Date    Abnormal stress test     [facility] rest [facility])     Apnea     Carpal tunnel syndrome on left     Chest pain     CPAP (continuous positive airway pressure) dependence     Diverticulitis     GERD (gastroesophageal reflux disease)     Hypercholesterolemia     Hypercholesterolemia     Hyperlipidemia     Hypertension     Kidney stone     Mitral valve disorder     Shoulder injury, left, sequela 01/2020    Sleep apnea     Thoracic neuritis        Family History:  Family History   Problem Relation Age of Onset    Diabetes Mother         Mellitus    Heart disease Mother         Arteriosclerotic Cardiovascular    Hypertension Mother     Cancer Father     Arthritis Father         Maximu Mult Site W Involv Of Organs And Systems    Parkinsonism Father     Thyroid disease Sister     Diabetes Brother         Diabetes:Mellitus    Heart disease Brother     Hypertension Brother     Coronary artery disease Family     Heart disease Brother     No Known Problems Brother     Kidney disease Sister     Parkinsonism Sister        Past Surgical History:  Past Surgical History:   Procedure Laterality Date    COLONOSCOPY      Complete  CORONARY ANGIOPLASTY  2017    ESOPHAGOGASTRODUODENOSCOPY      Diagnostic    FL RETROGRADE PYELOGRAM  5/24/2019    FOOT SURGERY Left     tarsal tunnel    HERNIA REPAIR      KNEE ARTHROSCOPY Left     KNEE SURGERY Left     NEUROPLASTY / TRANSPOSITION MEDIAN NERVE AT CARPAL TUNNEL      MD COLONOSCOPY FLX DX W/COLLJ SPEC WHEN PFRMD N/A 4/10/2018    Procedure: EGD AND COLONOSCOPY;  Surgeon: Arya Quiroz MD;  Location: [facility]; Service: Gastroenterology    MD CYSTO/URETERO W/LITHOTRIPSY &INDWELL STENT INSRT Right 5/24/2019    Procedure: CYSTOSCOPY URETEROSCOPY WITH LITHOTRIPSY HOLMIUM LASER, RETROGRADE PYELOGRAM AND INSERTION STENT URETERAL;  Surgeon: Barbra James MD;  [facility];  Service: Urology    MD CYSTOURETHRO W/IMPLANT N/A 8/30/2018    Procedure: CYSTOSCOPY WITH INSERTION Arnoldo Areas;  Surgeon: Barbra James MD;  [facility];  Service: Urology    MD TRANSURETHRAL INCISION OF PROSTATE N/A 8/30/2018    Procedure: TRANSURETHRAL INCISION OF PROSTATE (TUIP); Surgeon: Barbra James MD;  [facility];  Service: Urology    ROTATOR CUFF REPAIR Bilateral     SHOULDER SURGERY      TARSAL TUNNEL RELEASE      Neuroplasty Decompression       Social History:   reports that he quit smoking about 42 years ago  His smoking use included cigarettes  He has a 17 00 pack-year smoking history  He has never used smokeless tobacco  He reports that he does not drink alcohol or use drugs      Allergies:  Iodine      Current Outpatient Medications:     amLODIPine (NORVASC) 10 mg tablet, TAKE 1 TABLET (10 MG TOTAL) BY MOUTH EVERY MORNING, Disp: 90 tablet, Rfl: 3    aspirin (ASPIRIN LOW DOSE) 81 MG tablet, Take 1 tablet by mouth daily, Disp: , Rfl:     fenofibrate (TRICOR) 145 mg tablet, Take 1 tablet (145 mg total) by mouth daily, Disp: 90 tablet, Rfl: 2    lisinopril (ZESTRIL) 40 mg tablet, TAKE ONE TABLET BY MOUTH DAILY, Disp: 90 tablet, Rfl: 3    loperamide (IMODIUM A-D) 2 MG tablet, Take 1 tablet by mouth daily as needed, Disp: , Rfl:     lovastatin (MEVACOR) 40 MG tablet, TAKE ONE TABLET BY MOUTH DAILY, Disp: 90 tablet, Rfl: 0    Multiple Vitamins-Minerals (CENTRUM ULTRA MENS PO), Take 1 tablet by mouth daily, Disp: , Rfl:     omeprazole (PriLOSEC) 20 mg delayed release capsule, TAKE ONE CAPSULE BY MOUTH DAILY, Disp: 90 capsule, Rfl: 1    pseudoephedrine (SUDAFED) 30 mg tablet, Take 60 mg by mouth daily at bedtime, Disp: , Rfl:     fluticasone (FLONASE) 50 mcg/act nasal spray, 2 sprays into each nostril daily (Patient not taking: Reported on 7/22/2020), Disp: 1 Bottle, Rfl: 5    meclizine (ANTIVERT) 25 mg tablet, Take 1 tablet (25 mg total) by mouth 3 (three) times a day as needed for dizziness (Patient not taking: Reported on 7/7/2020), Disp: 30 tablet, Rfl: 0    ondansetron (ZOFRAN) 4 mg tablet, Take 1 tablet (4 mg total) by mouth every 6 (six) hours for 15 doses, Disp: 15 tablet, Rfl: 0    I have reviewed the past medical, social and family history, current medications, allergies, vitals, review of systems and updated this information as appropriate today     Objective:    Vitals:  Blood pressure 120/52, pulse 64, height 5' 3 5" (1 613 m), weight 74 8 kg (165 lb)  Physical Exam    Neurological Exam    GENERAL:  Cooperative in no acute distress  Well-developed and well-nourished    HEAD and NECK   Head is atraumatic normocephalic with no lesions or masses  Neck is supple with restricted range of motion to lateral rotation to the left greater than right  His pain is intensified with rotation to the left and extended head backwards  There is tenderness in the low left lateral cervical region around C6-7  There is also some mild tenderness over the greater occipital nerve region  CARDIOVASCULAR  Carotid Arteries-no carotid bruits      NEUROLOGIC:  Mental Status-the patient is awake alert and oriented without aphasia or apraxia  Cranial Nerves: Visual fields are full to confrontation  Discs are flat  Extraocular movements are full without nystagmus  Pupils are 2-1/2 mm and reactive  Face is symmetrical to light touch  Movements of facial expression move symmetrically  Hearing is normal to finger rub bilaterally  Soft palate lifts symmetrically  Shoulder shrug is symmetrical  Tongue is midline without atrophy  Motor: No drift is noted on arm extension  Strength is full in the upper and lower extremities with normal bulk and tone  Sensory: Intact to temperature and vibratory sensation in the upper and lower extremities bilaterally  Cortical function is intact  Coordination: Finger to nose testing is performed accurately  Romberg is negative  Gait reveals a normal base with symmetrical arm swing  Tandem walk is normal   Reflexes:  1/4 in the biceps, triceps, brachioradialis, knee jerk and ankle jerk regions  Toes are downgoing            ROS:    Review of Systems   Constitutional: Positive for fatigue  Negative for appetite change and fever  HENT: Negative  Negative for hearing loss, tinnitus, trouble swallowing and voice change  Eyes: Negative  Negative for photophobia and pain  Respiratory: Negative  Negative for shortness of breath  Cardiovascular: Negative  Negative for palpitations  Gastrointestinal: Negative  Negative for nausea and vomiting  Endocrine: Negative  Negative for cold intolerance  Genitourinary: Negative  Negative for dysuria, frequency and urgency  Musculoskeletal: Positive for back pain (left upper back), myalgias (left arm), neck pain and neck stiffness  Skin: Negative  Negative for rash  Neurological: Positive for numbness (with tingling of left side of neck and left upper back)  Negative for dizziness, tremors, seizures, syncope, facial asymmetry, speech difficulty, weakness, light-headedness and headaches  Hematological: Negative  Does not bruise/bleed easily     Psychiatric/Behavioral: Positive for sleep disturbance  Negative for confusion and hallucinations

## 2020-07-24 NOTE — TELEPHONE ENCOUNTER
Called and spoke to patients wife  Made her aware that KUB order is in Epic and he should have this done 1-2 weeks prior to his appointment  She verbalized understanding and will let him know

## 2020-07-24 NOTE — TELEPHONE ENCOUNTER
Patient called in regard to need for KUB prior to appointment in February  Please add to Epic and alert patient when this is completed    Thank you

## 2020-07-24 NOTE — TELEPHONE ENCOUNTER
Attempted to call patient twice and both time the phone number was busy  Will attempt to call patient again at a later time  BULL rodriguez is in Epic and he should have this done 1-2 weeks prior to his appointment

## 2020-08-03 ENCOUNTER — EVALUATION (OUTPATIENT)
Dept: PHYSICAL THERAPY | Facility: CLINIC | Age: 75
End: 2020-08-03
Payer: MEDICARE

## 2020-08-03 DIAGNOSIS — M54.2 CERVICALGIA: ICD-10-CM

## 2020-08-03 PROCEDURE — 97162 PT EVAL MOD COMPLEX 30 MIN: CPT | Performed by: PHYSICAL THERAPIST

## 2020-08-03 PROCEDURE — 97110 THERAPEUTIC EXERCISES: CPT | Performed by: PHYSICAL THERAPIST

## 2020-08-03 NOTE — PROGRESS NOTES
PT Evaluation     Today's date: 8/3/2020  Patient name: Jess Mc  : 1945  MRN: [id number]  Referring provider: Jose Guadalupe Parra MD  Dx:   Encounter Diagnosis     ICD-10-CM    1  Cervicalgia  M54 2 Ambulatory referral to Physical Therapy                  Assessment  Assessment details: Jess Mc is a pleasant 66 y o  presenting to physical therapy with MD referral for Cervicalgia  Problem list: Poor posture, decreased upper extremity strength, limited cervical and thoracic ROM, limited joint mobility, and increased muscle tension    Treatment to include: Manual therapy techniques, cervical A/AA/PROM, postural re-education, instruction in a comprehensive HEP, and modalities as needed  This pt would benefit from skilled PT services to address their impairments and functional limitations to maximize functional outcome  Symptom irritability: moderateBarriers to therapy: GERD, HTN, hx of bilateral shoulder surgery  Understanding of Dx/Px/POC: good   Prognosis: good    Goals  ST  Pt will improve cervical SB to at least 20 degrees bilaterally in 3 weeks  2  Pt will improve cervical rotation to at least 60 degrees in 3 weeks  LT  Pt will be able to rotate neck while driving with minimal to no pain in 6 weeks  2  Pt will be independent in a comprehensive HEP in 6 weeks  Plan  Patient would benefit from: skilled physical therapy  Frequency: 2x week  Duration in weeks: 6  Treatment plan discussed with: patient        Subjective Evaluation    History of Present Illness  Mechanism of injury: Pt reports onset of cervical pain in 2020 when pt slipped on mud and braced himself  Pt states since that point, he notices an "electrical charge" that goes up his neck and down his left periscapular region  Patient reports in the morning he does not experience much pain; however, as the day progresses, the pain progresses  Pt denies any headaches, lightheadedness, or dizziness  Premorbid status:  - ADLs: Independent with no difficulty  - Work: Not a working individual- retired  - Recreation: none    Current status:  - ADLs/Functional activities:     - Rotating neck while driving with achiness in left shoulder and tingling when rotating neck to left   - Looking up to wash hair with mild pain   - Looking down to read a book with moderate pain   - Reaching into overhead cabinets with Pain Levels: mild pain   - Lifting groceries/laundry with minimal impact of neck discomfort   - Sleeping with 0 nightly sleep disturbances due to pain in neck  - Work: Not a working individual- retired  - Recreation: none  Pain  Current pain rating: 3  At best pain ratin  At worst pain ratin  Location: left side of cervical spine and into left pericapular area  Quality: dull ache (tingling/electric charge going up left side of cervical spine into rhomboids on left)  Relieving factors: medications  Progression: worsening    Treatments  Previous treatment: medication  Current treatment: medication and physical therapy  Patient Goals  Patient goals for therapy: decreased pain, increased motion and independence with ADLs/IADLs          Objective     Postural Observations  Seated posture: fair  Standing posture: fair  Correction of posture: makes symptoms worse    Additional Postural Observation Details  Moderate forward rounded shoulders and forward head posture    Palpation   Left   Muscle spasm in the levator scapulae and upper trapezius  Tenderness of the upper trapezius  Right   Muscle spasm in the levator scapulae and upper trapezius  Tenderness of the levator scapulae and upper trapezius       Neurological Testing     Sensation   Cervical/Thoracic   Left   Intact: light touch    Right   Intact: light touch    Reflexes   Left   Biceps (C5/C6): normal (2+)  Brachioradialis (C6): normal (2+)  Triceps (C7): normal (2+)    Right   Biceps (C5/C6): normal (2+)  Brachioradialis (C6): normal (2+)  Triceps (C7): normal (2+)    Active Range of Motion   Cervical/Thoracic Spine       Cervical    Flexion: 60 degrees   Extension: 29 degrees     with pain  Left lateral flexion: 12 degrees     with pain  Right lateral flexion: 20 degrees     with pain  Left rotation: 45 (tingling) degrees with pain  Right rotation: 58 degrees    with pain    Thoracic    Flexion:  Restriction level: maximal  Extension:  Restriction level: maximal  Left lateral flexion:  Restriction level: maximal  Right lateral flexion:  Restriction level: maximal  Left rotation:  Restriction level: moderate  Right rotation:  Restriction level: moderate    Joint Play     Hypomobile: C2, C3, C4, C5, C6, C7, T1, T2, T3, T4, T5, T6, T7, T8, T9, T10 and T11     Strength/Myotome Testing     Left Shoulder     Planes of Motion   Flexion: 3+ (pain in left shoulder)   Extension: 3+ (shoulder pain)   External rotation at 0°: 4+   Internal rotation at 0°: 4+     Right Shoulder     Planes of Motion   Flexion: 4   Abduction: 4   External rotation at 0°: 4+   Internal rotation at 0°: 4+     Left Elbow   Extension: 5    Right Elbow   Flexion: 5  Extension: 5    Left Wrist/Hand   Wrist extension: 5  Wrist flexion: 5  Thumb extension: 5    Right Wrist/Hand   Wrist extension: 5  Wrist flexion: 5  Thumb extension: 5    Tests   Cervical   Positive vertical compression and lumbar distraction test   Negative alar ligament test, transverse ligament test, VBI and neck flexor muscle endurance test      Left   Positive Spurling's Test A  Negative cervical flexion-rotation test      Right   Positive Spurling's Test A  Negative cervical flexion-rotation test      Lumbar   Positive vertical compression          Flowsheet Rows      Most Recent Value   PT/OT G-Codes   Current Score  46   Projected Score  59             Precautions: HTN, GERD      Manuals 8-3 (IE)            Cervical traction in flexion NV            STM to B UT/LS with pin and stretch NV Neuro Re-Ed             Scapular retraction 10 x 10" NV            Cervical retraction 10 x 10" NV            Slouch and overcorrect 10 x 10" NV                                                                Ther Ex             UBE ant/post 3'/3' L1 NV            Corner stretch 4 x 30" NV            Thoracic ext over chair with half foam roll 10 x 2" NV                                      TB:             - rows 2 x 10 RTB NV            - pulldowns 2 x 10 RTB NV            - no money 20 x 5" NV                         Seated:             - UT str 2 x 30" ea NV            - LS str 2 x 30" NV            - pulleys with cervical rot toward down arm 3 mins x 6" hold NV            Ther Activity                                       Gait Training                                       Modalities             MH/Cryo as needed NV                           * On initial evaluation, educated pt on anatomy, pathology, and exercise rationale  Provided pt with basic HEP and ensured proper exercise performance  Educated pt to call with any questions or concerns  Access Code: [email or web address]   URL: [email or web address]   Date: 08/03/2020   Prepared by: Yaneth Rough      Exercises  · Corner Pec Major Stretch - 4 reps - 30 seconds hold - 3x daily  · Seated Scapular Retraction - 10 reps - 10 seconds hold - 3x daily  · Seated Upper Trapezius Stretch - 4 reps - 30 seconds hold - 3x daily  · Gentle Levator Scapulae Stretch - 4 reps - 30 seconds hold - 3x daily

## 2020-08-05 ENCOUNTER — OFFICE VISIT (OUTPATIENT)
Dept: PHYSICAL THERAPY | Facility: CLINIC | Age: 75
End: 2020-08-05
Payer: MEDICARE

## 2020-08-05 DIAGNOSIS — M54.2 CERVICALGIA: Primary | ICD-10-CM

## 2020-08-05 PROCEDURE — 97110 THERAPEUTIC EXERCISES: CPT | Performed by: PHYSICAL THERAPIST

## 2020-08-05 PROCEDURE — 97140 MANUAL THERAPY 1/> REGIONS: CPT | Performed by: PHYSICAL THERAPIST

## 2020-08-05 NOTE — PROGRESS NOTES
Daily Note     Today's date: 2020  Patient name: Karan Gonzales  : 1945  MRN: [id number]  Referring provider: Rahel Keenan MD  Dx:   Encounter Diagnosis     ICD-10-CM    1  Cervicalgia  M54 2                   Subjective: Patient reports he felt okay after the initial evaluation; however, after performing the corner stretch his upper thoracic spine/lower cervical spine was in more pain  Objective: See treatment diary below      Assessment: Modified performance of corner stretch to one arm at a time with less discomfort noted  Initiated exercises this date to address impairments noted during initial evaluation  Tolerated treatment fair  Patient demonstrated fatigue post treatment and would benefit from continued PT  Decreased muscle tension noted following manual therapy techniques this date  Plan: Progress treatment as tolerated         Precautions: HTN, GERD      Manuals 8-3 (IE) 8-5           Cervical traction in flexion NV NV           STM to B UT/LS with pin and stretch NV JG                                     Neuro Re-Ed             Scapular retraction 10 x 10" NV NV           Cervical retraction 10 x 10" NV NV           Slouch and overcorrect 10 x 10" NV NV                                                               Ther Ex             UBE ant/post 3'/3' L1 NV 3'/3' L1           Corner stretch 4 x 30" NV 4 x 30" ea arm           Thoracic ext over chair with half foam roll mid and upper thoracic 10 x 2" NV 10 x 2" ea                                     TB:             - rows 2 x 10 RTB NV 2 x 10 RTB           - pulldowns 2 x 10 RTB NV 2 x 10 RTB           - no money 20 x 5" NV 20 x 5"                        Seated:             - UT str 2 x 30" ea NV 4 x 30" ea           - LS str 2 x 30" NV 4 x 30" ea           - pulleys with cervical rot toward down arm 3 mins x 6" hold NV NV           Ther Activity                                       Gait Training Modalities             MH/Cryo as needed NV

## 2020-08-10 ENCOUNTER — OFFICE VISIT (OUTPATIENT)
Dept: DERMATOLOGY | Facility: CLINIC | Age: 75
End: 2020-08-10
Payer: MEDICARE

## 2020-08-10 VITALS — TEMPERATURE: 99 F

## 2020-08-10 DIAGNOSIS — Z13.89 SCREENING FOR SKIN CONDITION: ICD-10-CM

## 2020-08-10 DIAGNOSIS — L82.1 SEBORRHEIC KERATOSIS: ICD-10-CM

## 2020-08-10 DIAGNOSIS — Z85.828 HISTORY OF SKIN CANCER: ICD-10-CM

## 2020-08-10 DIAGNOSIS — L21.9 SEBORRHEIC DERMATITIS: Primary | ICD-10-CM

## 2020-08-10 PROCEDURE — 99214 OFFICE O/P EST MOD 30 MIN: CPT | Performed by: DERMATOLOGY

## 2020-08-10 PROCEDURE — 3074F SYST BP LT 130 MM HG: CPT | Performed by: DERMATOLOGY

## 2020-08-10 PROCEDURE — 1036F TOBACCO NON-USER: CPT | Performed by: DERMATOLOGY

## 2020-08-10 PROCEDURE — 1160F RVW MEDS BY RX/DR IN RCRD: CPT | Performed by: DERMATOLOGY

## 2020-08-10 PROCEDURE — 3078F DIAST BP <80 MM HG: CPT | Performed by: DERMATOLOGY

## 2020-08-10 PROCEDURE — 4040F PNEUMOC VAC/ADMIN/RCVD: CPT | Performed by: DERMATOLOGY

## 2020-08-10 RX ORDER — KETOCONAZOLE 20 MG/G
CREAM TOPICAL 2 TIMES DAILY
Qty: 30 G | Refills: 3 | Status: SHIPPED | OUTPATIENT
Start: 2020-08-10

## 2020-08-10 NOTE — PROGRESS NOTES
[facility] DERMATOLOGY  [address] [place] [id number]  [phone]  [phone]     MRN: [id number] : 1945  Encounter: [id number]  Patient Information: Karan Gonzales  Chief complaint:  Six-month checkup rash on cheek    History of present illness:  78-year-old male previously seen for his basal cell carcinoma on his left ear which is healed well no problems noted complaining of irritation under his chin that is been going on for awhile and overall skin check  Past Medical History:   Diagnosis Date    Abnormal stress test     Angina at rest [facility])     Apnea     Carpal tunnel syndrome on left     Chest pain     CPAP (continuous positive airway pressure) dependence     Diverticulitis     GERD (gastroesophageal reflux disease)     Hypercholesterolemia     Hypercholesterolemia     Hyperlipidemia     Hypertension     Kidney stone     Mitral valve disorder     Shoulder injury, left, sequela 2020    Sleep apnea     Thoracic neuritis      Past Surgical History:   Procedure Laterality Date    COLONOSCOPY      Complete    CORONARY ANGIOPLASTY  2017    ESOPHAGOGASTRODUODENOSCOPY      Diagnostic    FL RETROGRADE PYELOGRAM  2019    FOOT SURGERY Left     tarsal tunnel    HERNIA REPAIR      KNEE ARTHROSCOPY Left     KNEE SURGERY Left     NEUROPLASTY / TRANSPOSITION MEDIAN NERVE AT CARPAL TUNNEL      DC COLONOSCOPY FLX DX W/COLLJ SPEC WHEN PFRMD N/A 4/10/2018    Procedure: EGD AND COLONOSCOPY;  Surgeon: Kris Ackerman MD;  Location: [facility];   Service: Gastroenterology    DC CYSTO/URETERO W/LITHOTRIPSY &INDWELL STENT INSRT Right 2019    Procedure: CYSTOSCOPY URETEROSCOPY WITH LITHOTRIPSY HOLMIUM LASER, RETROGRADE PYELOGRAM AND INSERTION STENT URETERAL;  Surgeon: Gaudencio Mehta MD;  Location: [facility] MAIN OR;  Service: Urology    DC CYSTOURETHRO W/IMPLANT N/A 2018    Procedure: CYSTOSCOPY WITH INSERTION Tinnie Or; Surgeon: Serjio Hughes MD;  Location: [facility];  Service: Urology    CO TRANSURETHRAL INCISION OF PROSTATE N/A 2018    Procedure: TRANSURETHRAL INCISION OF PROSTATE (TUIP); Surgeon: Serjio Hughes MD;  Location: [facility];  Service: Urology    ROTATOR CUFF REPAIR Bilateral     SHOULDER SURGERY      TARSAL TUNNEL RELEASE      Neuroplasty Decompression     Social History   Social History     Substance and Sexual Activity   Alcohol Use No     Social History     Substance and Sexual Activity   Drug Use No     Social History     Tobacco Use   Smoking Status Former Smoker    Packs/day: 1     Years:     Pack years:     Types: Cigarettes    Last attempt to quit:     Years since quittin 6   Smokeless Tobacco Never Used     Family History   Problem Relation Age of Onset    Diabetes Mother         Mellitus    Heart disease Mother         Arteriosclerotic Cardiovascular    Hypertension Mother     Cancer Father     Arthritis Father         Rheu Mult Site W Involv Of Organs And Systems    Parkinsonism Father     Thyroid disease Sister     Diabetes Brother         Diabetes:Mellitus    Heart disease Brother     Hypertension Brother     Coronary artery disease Family     Heart disease Brother     No Known Problems Brother     Kidney disease Sister     Parkinsonism Sister      Meds/Allergies   Allergies   Allergen Reactions    Iodine      Nausea, hot, sweaty -- had through an IV  Has had it since with no issues    Pt reports no issues with IV contrast - has had numerous times with no issues        Meds:  Prior to Admission medications    Medication Sig Start Date End Date Taking?  Authorizing Provider   amLODIPine (NORVASC) 10 mg tablet TAKE 1 TABLET (10 MG TOTAL) BY MOUTH EVERY MORNING 4/15/20  Yes Jennifer Kilpatrick PA-C   aspirin (ASPIRIN LOW DOSE) 81 MG tablet Take 1 tablet by mouth daily   Yes Historical Provider, MD   fenofibrate (TRICOR) 145 mg tablet Take 1 tablet (145 mg total) by mouth daily 3/9/20  Yes Arabella Urbina MD   lisinopril (ZESTRIL) 40 mg tablet TAKE ONE TABLET BY MOUTH DAILY 4/17/20  Yes Rena Stoll PA-C   loperamide (IMODIUM A-D) 2 MG tablet Take 1 tablet by mouth daily as needed   Yes Historical Provider, MD   lovastatin (MEVACOR) 40 MG tablet TAKE ONE TABLET BY MOUTH DAILY 5/7/20  Yes Iris Carias DO   meclizine (ANTIVERT) 25 mg tablet Take 1 tablet (25 mg total) by mouth 3 (three) times a day as needed for dizziness 4/1/20  Yes Mera Murphy MD   meloxicam (MOBIC) 7 5 mg tablet Take 1 tablet (7 5 mg total) by mouth daily He take a 2nd pill p r n , no more than 2 per day 7/22/20  Yes Don Pettit MD   Multiple Vitamins-Minerals (CENTRUM ULTRA MENS PO) Take 1 tablet by mouth daily   Yes Historical Provider, MD   omeprazole (PriLOSEC) 20 mg delayed release capsule TAKE ONE CAPSULE BY MOUTH DAILY 5/26/20  Yes Iris Carias DO   pseudoephedrine (SUDAFED) 30 mg tablet Take 60 mg by mouth daily at bedtime   Yes Historical Provider, MD   fluticasone (FLONASE) 50 mcg/act nasal spray 2 sprays into each nostril daily  Patient not taking: Reported on 7/22/2020 7/15/19   Irineo Sanchez MD   ondansetron [facility]) 4 mg tablet Take 1 tablet (4 mg total) by mouth every 6 (six) hours for 15 doses 4/1/20 5/7/20  Mera Murphy MD       Subjective:     Review of Systems:    General: negative for - chills, fatigue, fever,  weight gain or weight loss  Psychological: negative for - anxiety, behavioral disorder, concentration difficulties, decreased libido, depression, irritability, memory difficulties, mood swings, sleep disturbances or suicidal ideation  ENT: negative for - hearing difficulties , nasal congestion, nasal discharge, oral lesions, sinus pain, sneezing, sore throat  Allergy and Immunology: negative for - hives, insect bite sensitivity,  Hematological and Lymphatic: negative for - bleeding problems, blood clots,bruising, swollen lymph nodes  Endocrine: negative for - hair pattern changes, hot flashes, malaise/lethargy, mood swings, palpitations, polydipsia/polyuria, skin changes, temperature intolerance or unexpected weight change  Respiratory: negative for - cough, hemoptysis, orthopnea, shortness of breath, or wheezing  Cardiovascular: negative for - chest pain, dyspnea on exertion, edema,  Gastrointestinal: negative for - abdominal pain, nausea/vomiting  Genito-Urinary: negative for - dysuria, incontinence, irregular/heavy menses or urinary frequency/urgency  Musculoskeletal: negative for - gait disturbance, joint pain, joint stiffness, joint swelling, muscle pain, muscular weakness  Dermatological:  As in HPI  Neurological: negative for confusion, dizziness, headaches, impaired coordination/balance, memory loss, numbness/tingling, seizures, speech problems, tremors or weakness       Objective:   Temp 99 °F (37 2 °C)     Physical Exam:    General Appearance:    Alert, cooperative, no distress   Head:    Normocephalic, without obvious abnormality, atraumatic           Skin:   A full skin exam was performed including scalp, head scalp, eyes, ears, nose, lips, neck, chest, axilla, abdomen, back, buttocks, bilateral upper extremities, bilateral lower extremities, hands, feet, fingers, toes, fingernails, and toenails previous site of skin cancer well healed without recurrence normal keratotic papules with greasy appearance greasy scaling erythema noted on the chin also on the mid chest nothing else remarkable on exam       Assessment:     1  Seborrheic dermatitis     2  Seborrheic keratosis     3  Screening for skin condition     4   History of skin cancer           Plan:   Seborrheic dermatitis patient advise that this is a dermatitis sometimes occurred in the facial areas will go ahead treat with ketoconazole cream patient to use the medication for 3 weeks repeat as needed  Seborrheic keratosis patient reassured these are normal growths we acquire with age no treatment needed  History of skin cancer in no recurrence nothing else atypical sunblock recommended follow-up in 6 months  Screening for dermatologic disorders nothing else of concern noted on complete exam follow-up in 6 months    Elva Strong MD  8/10/2020,11:03 AM    Portions of the record may have been created with voice recognition software   Occasional wrong word or "sound a like" substitutions may have occurred due to the inherent limitations of voice recognition software   Read the chart carefully and recognize, using context, where substitutions have occurred

## 2020-08-10 NOTE — PATIENT INSTRUCTIONS
Seborrheic dermatitis patient advise that this is a dermatitis sometimes occurred in the facial areas will go ahead treat with ketoconazole cream patient to use the medication for 3 weeks repeat as needed  Seborrheic keratosis patient reassured these are normal growths we acquire with age no treatment needed  History of skin cancer in no recurrence nothing else atypical sunblock recommended follow-up in 6 months  Screening for dermatologic disorders nothing else of concern noted on complete exam follow-up in 6 months

## 2020-08-11 ENCOUNTER — OFFICE VISIT (OUTPATIENT)
Dept: PHYSICAL THERAPY | Facility: CLINIC | Age: 75
End: 2020-08-11
Payer: MEDICARE

## 2020-08-11 DIAGNOSIS — M54.2 CERVICALGIA: Primary | ICD-10-CM

## 2020-08-11 PROCEDURE — 97140 MANUAL THERAPY 1/> REGIONS: CPT

## 2020-08-11 PROCEDURE — 97110 THERAPEUTIC EXERCISES: CPT

## 2020-08-11 NOTE — PROGRESS NOTES
Daily Note     Today's date: 2020  Patient name: Karan Gonzales  : 1945  MRN: [id number]  Referring provider: Rahel Keenan MD  Dx:   Encounter Diagnosis     ICD-10-CM    1  Cervicalgia  M54 2        Start Time: 1100  Stop Time: 1200  Total time in clinic (min): 60 minutes    Subjective: Patient no really change since last visit  He continues to note pain while driving  He notes discomfort with R UT stretching noting a pinch on the L side  Objective: See treatment diary below    Assessment: Patient required verbal/tactile cueing t/o to improve his posture (forward head, lack of scapular retraction) and keeping count of sets and reps  Patient does has TTP b/l UT and LS  Continue to focus on postural strengthening and muscular restrictions  Plan: Progress treatment as tolerated         Precautions: HTN, GERD    Manuals 8-3 (IE) 8-5 8-11          Cervical traction in flexion NV NV           STM to B UT/LS with pin and stretch NV JG JM                                    Neuro Re-Ed             Scapular retraction 10 x 10" NV NV 10"  x10          Cervical retraction 10 x 10" NV NV 10"  x10          Slouch and overcorrect 10 x 10" NV NV                                                               Ther Ex             UBE ant/post 3'/3' L1 NV 3'/3' L1 3'/3'  L1          Corner stretch 4 x 30" NV 4 x 30" ea arm 4x30" ea          Thoracic ext over chair with half foam roll mid and upper thoracic 10 x 2" NV 10 x 2" ea 10 x 2"  ea                                    TB:             - rows 2 x 10 RTB NV 2 x 10 RTB 2x10  RTB          - pulldowns 2 x 10 RTB NV 2 x 10 RTB 2x10  RTB          - no money 20 x 5" NV 20 x 5" YTB  5"x10                       Seated:             - UT str 2 x 30" ea NV 4 x 30" ea 4x30" ea          - LS str 2 x 30" NV 4 x 30" ea 4x30"  ea          - pulleys with cervical rot toward down arm 3 mins x 6" hold NV NV 5" x10 ea          Ther Activity Gait Training                                       Modalities             MH/Cryo as needed NV

## 2020-08-14 ENCOUNTER — OFFICE VISIT (OUTPATIENT)
Dept: PHYSICAL THERAPY | Facility: CLINIC | Age: 75
End: 2020-08-14
Payer: MEDICARE

## 2020-08-14 DIAGNOSIS — M54.2 CERVICALGIA: Primary | ICD-10-CM

## 2020-08-14 PROCEDURE — 97140 MANUAL THERAPY 1/> REGIONS: CPT | Performed by: PHYSICAL THERAPIST

## 2020-08-14 PROCEDURE — 97110 THERAPEUTIC EXERCISES: CPT | Performed by: PHYSICAL THERAPIST

## 2020-08-14 NOTE — PROGRESS NOTES
Daily Note     Today's date: 2020  Patient name: Rhina Tinajero  : 1945  MRN: [id number]  Referring provider: Alyssa Cruz MD  Dx:   Encounter Diagnosis     ICD-10-CM    1  Cervicalgia  M54 2                   Subjective: Patient reports his neck feels achy and states he continues to experience the electrical shocks  Objective: See treatment diary below      Assessment: Progressed theraband resistance this date  Pt required moderate verbal cues to attain full scapular retraction and depression with TB strengthening  Initiated manual cervical traction this date with fair reponse  Tolerated treatment fair  Patient demonstrated fatigue post treatment and would benefit from continued PT      Plan: Progress treatment as tolerated         Precautions: HTN, GERD    Manuals 8-3 (IE) 8-5 8-11 8-14         Cervical traction in flexion NV NV  JG         STM to B UT/LS with pin and stretch NV JG JM JG                                   Neuro Re-Ed             Scapular retraction 10 x 10" NV NV 10"  x10          Cervical retraction 10 x 10" NV NV 10"  x10          Slouch and overcorrect 10 x 10" NV NV                                                               Ther Ex             UBE ant/post 3'/3' L1 NV 3'/3' L1 3'/3'  L1 3'/3'  L1         Corner stretch 4 x 30" NV 4 x 30" ea arm 4x30" ea 4 x 30"         Thoracic ext over chair with half foam roll mid and upper thoracic 10 x 2" NV 10 x 2" ea 10 x 2"  ea 10 x 2" ea                                   TB:             - rows 2 x 10 RTB NV 2 x 10 RTB 2x10  RTB 3 x 10 GTB         - pulldowns 2 x 10 RTB NV 2 x 10 RTB 2x10  RTB 3 x 10 GTB         - no money 20 x 5" NV 20 x 5" YTB  5"x10 2 x 10 GTB                      Seated:             - UT str 2 x 30" ea NV 4 x 30" ea 4x30" ea 2 x 30" ea         - LS str 2 x 30" NV 4 x 30" ea 4x30"  ea 2 x 30" ea         - pulleys with cervical rot toward down arm 3 mins x 6" hold NV NV 5" x10 ea          Ther Activity Gait Training                                       Modalities             MH/Cryo as needed NV                            * 1:1 time from 11:04am- 11:30am

## 2020-08-17 ENCOUNTER — OFFICE VISIT (OUTPATIENT)
Dept: PHYSICAL THERAPY | Facility: CLINIC | Age: 75
End: 2020-08-17
Payer: MEDICARE

## 2020-08-17 DIAGNOSIS — M54.2 CERVICALGIA: Primary | ICD-10-CM

## 2020-08-17 PROCEDURE — 97140 MANUAL THERAPY 1/> REGIONS: CPT

## 2020-08-17 PROCEDURE — 97110 THERAPEUTIC EXERCISES: CPT

## 2020-08-17 NOTE — PROGRESS NOTES
Daily Note     Today's date: 2020  Patient name: Karan Gonzales  : 1945  MRN: [id number]  Referring provider: Rahel Keenan MD  Dx:   Encounter Diagnosis     ICD-10-CM    1  Cervicalgia  M54 2        Start Time: 1130  Stop Time: 1215  Total time in clinic (min): 45 minutes    Subjective: Patient reports over stretching it this weekend  Sore prior to therapy today  Continues to note numbness/tingling L side cervical/UT region  Objective: See treatment diary below    Assessment: Patient response well to manual work, however, it doesn't have a long term effect at this time  Continued postural strengthening program  Good form this visit with little to no cueing required  Went over appropriate intensity for stretching  Progress as able  Plan: Progress treatment as tolerated         Precautions: HTN, GERD    Manuals 8-3 (IE) 8-5 8-11 8-14 8-17        Cervical traction in flexion NV NV  JG         STM to B UT/LS with pin and stretch NV JG JM JG JM                                  Neuro Re-Ed             Scapular retraction 10 x 10" NV NV 10"  x10          Cervical retraction 10 x 10" NV NV 10"  x10          Slouch and overcorrect 10 x 10" NV NV                                                               Ther Ex             UBE ant/post 3'/3' L1 NV 3'/3' L1 3'/3'  L1 3'/3'  L1 3'/3'  L1        Corner stretch 4 x 30" NV 4 x 30" ea arm 4x30" ea 4 x 30" 4x30"        Thoracic ext over chair with half foam roll mid and upper thoracic 10 x 2" NV 10 x 2" ea 10 x 2"  ea 10 x 2" ea 10x   2" ea                                  TB:             - rows 2 x 10 RTB NV 2 x 10 RTB 2x10  RTB 3 x 10 GTB 3 x 10 GTB        - pulldowns 2 x 10 RTB NV 2 x 10 RTB 2x10  RTB 3 x 10 GTB 3 x 10 GTB        - no money 20 x 5" NV 20 x 5" YTB  5"x10 2 x 10 GTB 3 x 10 GTB                     Seated:             - UT str 2 x 30" ea NV 4 x 30" ea 4x30" ea 2 x 30" ea 2x30"  ea        - LS str 2 x 30" NV 4 x 30" ea 4x30"  ea 2 x 30" ea 2x30" ea        - pulleys with cervical rot toward down arm 3 mins x 6" hold NV NV 5" x10 ea          Ther Activity                                       Gait Training                                       Modalities             MH/Cryo as needed NV

## 2020-08-19 ENCOUNTER — OFFICE VISIT (OUTPATIENT)
Dept: PHYSICAL THERAPY | Facility: CLINIC | Age: 75
End: 2020-08-19
Payer: MEDICARE

## 2020-08-19 DIAGNOSIS — M54.2 CERVICALGIA: Primary | ICD-10-CM

## 2020-08-19 DIAGNOSIS — E78.2 MIXED HYPERLIPIDEMIA: ICD-10-CM

## 2020-08-19 PROCEDURE — 97112 NEUROMUSCULAR REEDUCATION: CPT

## 2020-08-19 PROCEDURE — 97140 MANUAL THERAPY 1/> REGIONS: CPT

## 2020-08-19 PROCEDURE — 97110 THERAPEUTIC EXERCISES: CPT

## 2020-08-19 RX ORDER — LOVASTATIN 40 MG/1
TABLET ORAL
Qty: 90 TABLET | Refills: 0 | Status: SHIPPED | OUTPATIENT
Start: 2020-08-19 | End: 2020-09-11 | Stop reason: SDUPTHER

## 2020-08-19 NOTE — PROGRESS NOTES
Daily Note     Today's date: 2020  Patient name: David Zarate  : 1945  MRN: [id number]  Referring provider: Brian Roper MD  Dx:   Encounter Diagnosis     ICD-10-CM    1  Cervicalgia  M54 2        Start Time: 1000  Stop Time: 1045  Total time in clinic (min): 45 minutes    Subjective: Pt reported that he feels good and bad today on his L side he reports having some tingling and some lack of sensation  Objective: See treatment diary below      Assessment:  Continued with treatment session, facilitated upright posture  Held all exercises that may of caused increase in symptoms  Exacerbated tingling noted on L side while perfoming cervical retraction at 8x and shoulder GTB ER at 7x, therefore held the exercises  Tolerated treatment fair  Patient demonstrated fatigue post treatment, exhibited good technique with therapeutic exercises and would benefit from continued PT      Plan: Continue per plan of care         Precautions: HTN, GERD    Manuals 8-3 (IE) 8-5 8-11 8-14 8-17        Cervical traction in flexion NV NV  JG         STM to B UT/LS with pin and stretch NV JG JM JG JM SC                                  Neuro Re-Ed             Scapular retraction 10 x 10" NV NV 10"  x10   5" 20x        Cervical retraction 10 x 10" NV NV 10"  x10   10" x 8        Slouch and overcorrect 10 x 10" NV NV    10"x 10                                                            Ther Ex             UBE ant/post 3'/3' L1 NV 3'/3' L1 3'/3'  L1 3'/3'  L1 3'/3'  L1 L1 4'/4'       Corner stretch 4 x 30" NV 4 x 30" ea arm 4x30" ea 4 x 30" 4x30" 4x30"        Thoracic ext over chair with half foam roll mid and upper thoracic 10 x 2" NV 10 x 2" ea 10 x 2"  ea 10 x 2" ea 10x   2" ea                                  TB:             - rows 2 x 10 RTB NV 2 x 10 RTB 2x10  RTB 3 x 10 GTB 3 x 10 GTB 3x 10 GTB        - pulldowns 2 x 10 RTB NV 2 x 10 RTB 2x10  RTB 3 x 10 GTB 3 x 10 GTB 3x 10 GTB 3" hold        - no money 20 x 5" NV 20 x 5" YTB  5"x10 2 x 10 GTB 3 x 10 GTB 7x GTB                     Seated:             - UT str 2 x 30" ea NV 4 x 30" ea 4x30" ea 2 x 30" ea 2x30"  ea        - LS str 2 x 30" NV 4 x 30" ea 4x30"  ea 2 x 30" ea 2x30" ea        - pulleys with cervical rot toward down arm 3 mins x 6" hold NV NV 5" x10 ea   3" 2 min        Ther Activity                                       Gait Training                                       Modalities             MH/Cryo as needed NV

## 2020-08-24 ENCOUNTER — OFFICE VISIT (OUTPATIENT)
Dept: PHYSICAL THERAPY | Facility: CLINIC | Age: 75
End: 2020-08-24
Payer: MEDICARE

## 2020-08-24 DIAGNOSIS — M54.2 CERVICALGIA: Primary | ICD-10-CM

## 2020-08-24 DIAGNOSIS — E78.2 MIXED HYPERLIPIDEMIA: ICD-10-CM

## 2020-08-24 PROCEDURE — 97140 MANUAL THERAPY 1/> REGIONS: CPT

## 2020-08-24 PROCEDURE — 97110 THERAPEUTIC EXERCISES: CPT

## 2020-08-24 RX ORDER — FENOFIBRATE 145 MG/1
145 TABLET, COATED ORAL DAILY
Qty: 90 TABLET | Refills: 2 | Status: SHIPPED | OUTPATIENT
Start: 2020-08-24 | End: 2021-09-13

## 2020-08-24 NOTE — PROGRESS NOTES
Daily Note     Today's date: 2020  Patient name: Aurora Mckeon  : 1945  MRN: [id number]  Referring provider: Keshia Simpson MD  Dx:   Encounter Diagnosis     ICD-10-CM    1  Cervicalgia  M54 2        Start Time:   Stop Time:   Total time in clinic (min): 46 minutes    Subjective: pt reported that he still has some neck pain  Pt reported the tingling on the L side, (" feels like there is a knot there") is not constant but still present at times  Pt reported he has the most pain when holding his steering wheel and driving  Pt goes back to see MD on  and would like to have an MRI or X ray to see what is happening  Objective: See treatment diary below      Assessment:  Continued with treatment session,  Progressed to BTB, some soreness, some tingling present while performing pull downs, rows  Tolerated treatment fair  Patient demonstrated fatigue post treatment, exhibited good technique with therapeutic exercises and would benefit from continued PT VC required    S/p treatment session,patient reported feeling a little less pain but some tingling still present  Plan: Continue per plan of care  , progress patient as he is able without increase in symptoms and/or p!       Precautions: HTN, GERD    Manuals 8-3 (IE) 8-5 8-11 8-14 8-      Cervical traction in flexion NV NV  JG         STM to B UT/LS with pin and stretch NV JG JM JG JM SC  SC with masage stick                                 Neuro Re-Ed             Scapular retraction 10 x 10" NV NV 10"  x10   5" 20x  np       Cervical retraction 10 x 10" NV NV 10"  x10   10" x 8  10" x 10       Slouch and overcorrect 10 x 10" NV NV    10"x 10        Backward shoulder rolls          30x                                              Ther Ex             UBE ant/post 3'/3' L1 NV 3'/3' L1 3'/3'  L1 3'/3'  L1 3'/3'  L1 L1 4'/4' L1 4'4'      Corner stretch 4 x 30" NV 4 x 30" ea arm 4x30" ea 4 x 30" 4x30" 4x30"  3x 30" Thoracic ext over chair with half foam roll mid and upper thoracic 10 x 2" NV 10 x 2" ea 10 x 2"  ea 10 x 2" ea 10x   2" ea                                  TB:             - rows 2 x 10 RTB NV 2 x 10 RTB 2x10  RTB 3 x 10 GTB 3 x 10 GTB 3x 10 GTB  2x 10 BTB       - pulldowns 2 x 10 RTB NV 2 x 10 RTB 2x10  RTB 3 x 10 GTB 3 x 10 GTB 3x 10 GTB 3" hold  2x 10 BTB       - no money 20 x 5" NV 20 x 5" YTB  5"x10 2 x 10 GTB 3 x 10 GTB 7x GTB  Hold  p!       D2 flexion        NV trial       Seated:             - UT str 2 x 30" ea NV 4 x 30" ea 4x30" ea 2 x 30" ea 2x30"  ea  3x 30"       - LS str 2 x 30" NV 4 x 30" ea 4x30"  ea 2 x 30" ea 2x30" ea  2x 30"       - pulleys with cervical rot toward down arm 3 mins x 6" hold NV NV 5" x10 ea   3" 2 min  3" 2 min       Ther Activity                                       Gait Training                                       Modalities             MH/Cryo as needed NV

## 2020-08-26 ENCOUNTER — OFFICE VISIT (OUTPATIENT)
Dept: PHYSICAL THERAPY | Facility: CLINIC | Age: 75
End: 2020-08-26
Payer: MEDICARE

## 2020-08-26 DIAGNOSIS — M54.2 CERVICALGIA: Primary | ICD-10-CM

## 2020-08-26 PROCEDURE — 97140 MANUAL THERAPY 1/> REGIONS: CPT | Performed by: PHYSICAL THERAPIST

## 2020-08-26 PROCEDURE — 97110 THERAPEUTIC EXERCISES: CPT | Performed by: PHYSICAL THERAPIST

## 2020-08-26 NOTE — PROGRESS NOTES
Daily Note     Today's date: 2020  Patient name: Sarah Padilla  : 1945  MRN: [id number]  Referring provider: Dee Luna MD  Dx:   Encounter Diagnosis     ICD-10-CM    1  Cervicalgia  M54 2        Start Time:   Stop Time: 1000  Total time in clinic (min): 51 minutes    Subjective: Patient reports that "I feel the same as usual, maybe a little worse " Patient states that he has noticed improved cervical rotation, but he continues to have tingling and cold sensation along his neck  Objective: See treatment diary below      Assessment: Tolerated treatment fair  Patient demonstrated fatigue post treatment, exhibited good technique with therapeutic exercises and would benefit from continued PT  Patient expressed his frustration over lack of progress to date and with insurance company that they will not allow for diagnostic imaging or injections without completing PT first  Patient with fair tolerance for strengthening secondary to shoulder pain or discomfort in neck  Plan: Continue per plan of care  Progress treatment as tolerated  Precautions: HTN, GERD    Manuals 8-3 (IE) 8-5 8-11 8- 8-     Cervical traction in flexion NV NV  JG         STM to B UT/LS with pin and stretch NV JG JM JG JM SC  SC with masage stick  GR                               Neuro Re-Ed             Scapular retraction 10 x 10" NV NV 10"  x10   5" 20x  np       Cervical retraction 10 x 10" NV NV 10"  x10   10" x 8  10" x 10       Slouch and overcorrect 10 x 10" NV NV    10"x 10        Backward shoulder rolls          30x                                              Ther Ex             UBE ant/post 3'/3' L1 NV 3'/3' L1 3'/3'  L1 3'/3'  L1 3'/3'  L1 L1 4'/4' L1 4'4' L1 4'/4'     Corner stretch 4 x 30" NV 4 x 30" ea arm 4x30" ea 4 x 30" 4x30" 4x30"  3x 30" 3x30"     Thoracic ext over chair with half foam roll mid and upper thoracic 10 x 2" NV 10 x 2" ea 10 x 2"  ea 10 x 2" ea 10x   2" ea TB:             - rows 2 x 10 RTB NV 2 x 10 RTB 2x10  RTB 3 x 10 GTB 3 x 10 GTB 3x 10 GTB  2x 10 BTB  2x10 BTB     - pulldowns 2 x 10 RTB NV 2 x 10 RTB 2x10  RTB 3 x 10 GTB 3 x 10 GTB 3x 10 GTB 3" hold  2x 10 BTB  2x10 BTB     - no money 20 x 5" NV 20 x 5" YTB  5"x10 2 x 10 GTB 3 x 10 GTB 7x GTB  Hold  p! 2x10 RTB     D2 flexion        NV trial  10x RTB P!      Seated:             - UT str 2 x 30" ea NV 4 x 30" ea 4x30" ea 2 x 30" ea 2x30"  ea  3x 30"  Reviewed     - LS str 2 x 30" NV 4 x 30" ea 4x30"  ea 2 x 30" ea 2x30" ea  2x 30"  reviewed     - pulleys with cervical rot toward down arm 3 mins x 6" hold NV NV 5" x10 ea   3" 2 min  3" 2 min  3" 2 min     Ther Activity                                       Gait Training                                       Modalities             MH/Cryo as needed NV

## 2020-08-27 DIAGNOSIS — M54.2 CERVICALGIA: ICD-10-CM

## 2020-08-27 RX ORDER — MELOXICAM 7.5 MG/1
TABLET ORAL
Qty: 30 TABLET | Refills: 3 | Status: SHIPPED | OUTPATIENT
Start: 2020-08-27 | End: 2020-10-26

## 2020-08-31 ENCOUNTER — OFFICE VISIT (OUTPATIENT)
Dept: PHYSICAL THERAPY | Facility: CLINIC | Age: 75
End: 2020-08-31
Payer: MEDICARE

## 2020-08-31 DIAGNOSIS — M54.2 CERVICALGIA: Primary | ICD-10-CM

## 2020-08-31 PROCEDURE — 97140 MANUAL THERAPY 1/> REGIONS: CPT | Performed by: PHYSICAL THERAPIST

## 2020-08-31 PROCEDURE — 97110 THERAPEUTIC EXERCISES: CPT | Performed by: PHYSICAL THERAPIST

## 2020-08-31 NOTE — PROGRESS NOTES
Daily Note     Today's date: 2020  Patient name: Tiffanie Frias  : 1945  MRN: [id number]  Referring provider: Wei Velazquez MD  Dx:   Encounter Diagnosis     ICD-10-CM    1  Cervicalgia  M54 2                   Subjective: Patient reports no significant change in symptoms since initial evaluation  Objective: See treatment diary below      Assessment: Progressed exercises this session as charted  Pt reported increased pain in right shoulder after performing D1 extension exercises  Educated pt again to please notify if an exercise hurts right away rather than after finishing all repetitions  Tolerated treatment fair  Patient demonstrated fatigue post treatment and would benefit from continued PT      Plan: Progress note during next visit  Precautions: HTN, GERD    Manuals 8-3 (IE) 8-5 8- 8-    Cervical traction in flexion NV NV  JG         STM to B UT/LS with pin and stretch NV JG JM JG JM SC  SC with masage stick  GR JG                              Neuro Re-Ed             Scapular retraction 10 x 10" NV NV 10"  x10   5" 20x  np       Cervical retraction 10 x 10" NV NV 10"  x10   10" x 8  10" x 10       Slouch and overcorrect 10 x 10" NV NV    10"x 10        Backward shoulder rolls          30x                    Bent Over:             - I's         10 x 5" ea    - T's         10 x 5" ea    - Y's         10 x 5" ea                 Ther Ex             UBE ant/post 3'/3' L1 NV 3'/3' L1 3'/3'  L1 3'/3'  L1 3'/3'  L1 L1 4'/4' L1 4'4' L1 4'/4' L2 4'/4'    Corner stretch 4 x 30" NV 4 x 30" ea arm 4x30" ea 4 x 30" 4x30" 4x30"  3x 30" 3x30" 4 x 30"    Thoracic ext over chair with half foam roll mid and upper thoracic 10 x 2" NV 10 x 2" ea 10 x 2"  ea 10 x 2" ea 10x   2" ea                                  TB:             - rows 2 x 10 RTB NV 2 x 10 RTB 2x10  RTB 3 x 10 GTB 3 x 10 GTB 3x 10 GTB  2x 10 BTB  2x10 BTB     - pulldowns 2 x 10 RTB NV 2 x 10 RTB 2x10  RTB 3 x 10 GTB 3 x 10 GTB 3x 10 GTB 3" hold  2x 10 BTB  2x10 BTB     - no money 20 x 5" NV 20 x 5" YTB  5"x10 2 x 10 GTB 3 x 10 GTB 7x GTB  Hold  p! 2x10 RTB     D2 flexion        NV trial  10x RTB P!     - D1 extension         2 x 10 ea Blue TB (pain with L) L only   - Middle Trapezius         2 x 10 Blue TB                              Seated:             - UT str 2 x 30" ea NV 4 x 30" ea 4x30" ea 2 x 30" ea 2x30"  ea  3x 30"  Reviewed     - LS str 2 x 30" NV 4 x 30" ea 4x30"  ea 2 x 30" ea 2x30" ea  2x 30"  reviewed     - pulleys with cervical rot toward down arm 3 mins x 6" hold NV NV 5" x10 ea   3" 2 min  3" 2 min  3" 2 min 2 mins x 3" hold    Ther Activity                                       Gait Training                                       Modalities             MH/Cryo as needed NV

## 2020-09-02 ENCOUNTER — EVALUATION (OUTPATIENT)
Dept: PHYSICAL THERAPY | Facility: CLINIC | Age: 75
End: 2020-09-02
Payer: MEDICARE

## 2020-09-02 ENCOUNTER — OFFICE VISIT (OUTPATIENT)
Dept: CARDIOLOGY CLINIC | Facility: CLINIC | Age: 75
End: 2020-09-02
Payer: MEDICARE

## 2020-09-02 VITALS
BODY MASS INDEX: 30.22 KG/M2 | WEIGHT: 177 LBS | OXYGEN SATURATION: 97 % | HEART RATE: 60 BPM | DIASTOLIC BLOOD PRESSURE: 70 MMHG | RESPIRATION RATE: 18 BRPM | HEIGHT: 64 IN | SYSTOLIC BLOOD PRESSURE: 134 MMHG

## 2020-09-02 DIAGNOSIS — Z76.89 ENCOUNTER TO ESTABLISH CARE: Primary | ICD-10-CM

## 2020-09-02 DIAGNOSIS — I10 ESSENTIAL HYPERTENSION: ICD-10-CM

## 2020-09-02 DIAGNOSIS — E78.2 MIXED HYPERLIPIDEMIA: ICD-10-CM

## 2020-09-02 DIAGNOSIS — M54.2 CERVICALGIA: Primary | ICD-10-CM

## 2020-09-02 PROCEDURE — 99214 OFFICE O/P EST MOD 30 MIN: CPT | Performed by: INTERNAL MEDICINE

## 2020-09-02 PROCEDURE — 93000 ELECTROCARDIOGRAM COMPLETE: CPT | Performed by: INTERNAL MEDICINE

## 2020-09-02 PROCEDURE — 97110 THERAPEUTIC EXERCISES: CPT | Performed by: PHYSICAL THERAPIST

## 2020-09-02 NOTE — PROGRESS NOTES
Cardiology Office Note    Lexie Frausto 66 y o  male MRN: [id number]    09/02/20          Assessment/Plan:  1  Hypertension-BP well controlled on lisinopril 40 mg/day, and amlodipine 10 mg/day    2  Hyperlipidemia-lipids well controlled on lovastatin and fenofibrate    3  GREGG on CPAP    Follow up: 12 months or sooner as needed    1  Encounter to establish care  POCT ECG   2  Essential hypertension     3  Mixed hyperlipidemia         HPI: Lexie Frausto is a 66y o  year old male with history of hypertension and hyperlipidemia who presents for routine follow-up  He has been doing very well from a cardiac standpoint since his last evaluation  He denies chest pain, palpitations, dyspnea on exertion or any other cardiac concerns at this time  He has been compliant with his medications and tolerating them without side effect  TTE 1/2019: EF: 60%, g1dd, mild MR, TR    Cardiac catheterization 4/2017- nonobstructive CAD    Family History: mother with hyperlipidemia and unclear cardiac history    Social history: quit smoking 40 years ago      Patient Active Problem List   Diagnosis    Chest pain    Essential hypertension    Mixed hyperlipidemia    Chronic pain disorder    Chronic left shoulder pain    Chronic bilateral low back pain with bilateral sciatica    Disc degeneration, lumbar    Lumbar stenosis with neurogenic claudication    Left lower quadrant pain    Abnormal CT of the abdomen    Chronic pain syndrome    GREGG on CPAP    Benign prostatic hyperplasia with lower urinary tract symptoms    Hydronephrosis    Nonrheumatic aortic valve insufficiency    Non-rheumatic mitral regurgitation    Diastolic dysfunction    Lower extremity edema    Nephrolithiasis    Squamous cell carcinoma in situ (SCCIS) of scalp    Actinic keratosis    Chronic obstructive pulmonary disease (HCC)       Allergies   Allergen Reactions    Iodine      Nausea, hot, sweaty -- had through an IV    Has had it since with no issues    Pt reports no issues with IV contrast - has had numerous times with no issues          Current Outpatient Medications:     amLODIPine (NORVASC) 10 mg tablet, TAKE 1 TABLET (10 MG TOTAL) BY MOUTH EVERY MORNING, Disp: 90 tablet, Rfl: 3    aspirin (ASPIRIN LOW DOSE) 81 MG tablet, Take 1 tablet by mouth daily, Disp: , Rfl:     fenofibrate (TRICOR) 145 mg tablet, TAKE 1 TABLET (145 MG TOTAL) BY MOUTH DAILY, Disp: 90 tablet, Rfl: 2    ketoconazole (NIZORAL) 2 % cream, Apply topically 2 (two) times a day To facial rash repeat as needed treat for 3 weeks, Disp: 30 g, Rfl: 3    lisinopril (ZESTRIL) 40 mg tablet, TAKE ONE TABLET BY MOUTH DAILY, Disp: 90 tablet, Rfl: 3    loperamide (IMODIUM A-D) 2 MG tablet, Take 1 tablet by mouth daily as needed, Disp: , Rfl:     lovastatin (MEVACOR) 40 MG tablet, TAKE ONE TABLET BY MOUTH DAILY, Disp: 90 tablet, Rfl: 0    meclizine (ANTIVERT) 25 mg tablet, Take 1 tablet (25 mg total) by mouth 3 (three) times a day as needed for dizziness, Disp: 30 tablet, Rfl: 0    meloxicam (MOBIC) 7 5 mg tablet, TAKE 1 TABLET (7 5 MG TOTAL) BY MOUTH DAILY MAY TAKE A SECOND PILL AS NEEDED, NO MORE THAN 2 PER DAY, Disp: 30 tablet, Rfl: 3    Multiple Vitamins-Minerals (CENTRUM ULTRA MENS PO), Take 1 tablet by mouth daily, Disp: , Rfl:     omeprazole (PriLOSEC) 20 mg delayed release capsule, TAKE ONE CAPSULE BY MOUTH DAILY, Disp: 90 capsule, Rfl: 1    fluticasone (FLONASE) 50 mcg/act nasal spray, 2 sprays into each nostril daily (Patient not taking: Reported on 7/22/2020), Disp: 1 Bottle, Rfl: 5    ondansetron (ZOFRAN) 4 mg tablet, Take 1 tablet (4 mg total) by mouth every 6 (six) hours for 15 doses (Patient not taking: Reported on 9/2/2020), Disp: 15 tablet, Rfl: 0    pseudoephedrine (SUDAFED) 30 mg tablet, Take 60 mg by mouth daily at bedtime, Disp: , Rfl:     Past Medical History:   Diagnosis Date    Abnormal stress test     [facility] at rest [facility])     Apnea     Carpal tunnel syndrome on left     Chest pain     CPAP (continuous positive airway pressure) dependence     Diverticulitis     GERD (gastroesophageal reflux disease)     Hypercholesterolemia     Hypercholesterolemia     Hyperlipidemia     Hypertension     Kidney stone     Mitral valve disorder     Shoulder injury, left, sequela 01/2020    Sleep apnea     Thoracic neuritis        Family History   Problem Relation Age of Onset    Diabetes Mother         Mellitus    Heart disease Mother         Arteriosclerotic Cardiovascular    Hypertension Mother     Cancer Father     Arthritis Father         Rheu Mult Site W Involv Of Organs And Systems    Parkinsonism Father     Thyroid disease Sister     Diabetes Brother         Diabetes:Mellitus    Heart disease Brother     Hypertension Brother     Coronary artery disease Family     Heart disease Brother     No Known Problems Brother     Kidney disease Sister     Parkinsonism Sister        Past Surgical History:   Procedure Laterality Date    COLONOSCOPY      Complete    CORONARY ANGIOPLASTY  2017    ESOPHAGOGASTRODUODENOSCOPY      Diagnostic    FL RETROGRADE PYELOGRAM  5/24/2019    FOOT SURGERY Left     tarsal tunnel    HERNIA REPAIR      KNEE ARTHROSCOPY Left     KNEE SURGERY Left     NEUROPLASTY / TRANSPOSITION MEDIAN NERVE AT CARPAL TUNNEL      NJ COLONOSCOPY FLX DX W/COLLJ SPEC WHEN PFRMD N/A 4/10/2018    Procedure: EGD AND COLONOSCOPY;  Surgeon: Fabio Reyes MD;  Location: [facility];   Service: Gastroenterology    NJ CYSTO/URETERO W/LITHOTRIPSY &INDWELL STENT INSRT Right 5/24/2019    Procedure: CYSTOSCOPY URETEROSCOPY WITH LITHOTRIPSY HOLMIUM LASER, RETROGRADE PYELOGRAM AND INSERTION STENT URETERAL;  Surgeon: Arvind Baugh MD;  [facility];  Service: Urology    NJ CYSTOURETHRO W/IMPLANT N/A 8/30/2018    Procedure: Marcey Prader WITH INSERTION Nita Hews;  Surgeon: Arvind Baugh MD;  [facility];  Service: Urology  NH TRANSURETHRAL INCISION OF PROSTATE N/A 2018    Procedure: TRANSURETHRAL INCISION OF PROSTATE (TUIP);   Surgeon: Natalee Bolivar MD;  [facility];  Service: Urology    ROTATOR CUFF REPAIR Bilateral     SHOULDER SURGERY      TARSAL TUNNEL RELEASE      Neuroplasty Decompression       Social History     Socioeconomic History    Marital status: /Civil Union     Spouse name: Not on file    Number of children: 4    Years of education: high school or GED    Highest education level: Not on file   Occupational History    Occupation: retired   Social Needs    Financial resource strain: Not on file    Food insecurity     Worry: Not on file     Inability: Not on file   [employer] needs     Medical: Not on file     Non-medical: Not on file   Tobacco Use    Smoking status: Former Smoker     Packs/day: 1      Years: 17      Pack years: 17      Types: Cigarettes     Last attempt to quit:      Years since quittin 6    Smokeless tobacco: Never Used   Substance and Sexual Activity    Alcohol use: No    Drug use: No    Sexual activity: Not Currently     Partners: Female   Lifestyle    Physical activity     Days per week: 7 days     Minutes per session: 40 min    Stress: Not at all   Relationships    Social connections     Talks on phone: Not on file     Gets together: Not on file     Attends Congregational service: Not on file     Active member of club or organization: Not on file     Attends meetings of clubs or organizations: Not on file     Relationship status: Not on file    Intimate partner violence     Fear of current or ex partner: Not on file     Emotionally abused: Not on file     Physically abused: Not on file     Forced sexual activity: Not on file   Other Topics Concern    Not on file   Social History Narrative    Active Advance Directives    Lives With Spouse               Review of Systems   Constitution: Negative for diaphoresis, weight gain and weight loss    HENT: Negative for congestion  Cardiovascular: Negative for chest pain, dyspnea on exertion, irregular heartbeat, leg swelling, near-syncope, orthopnea, palpitations, paroxysmal nocturnal dyspnea and syncope  Respiratory: Negative for shortness of breath, sleep disturbances due to breathing and snoring  Hematologic/Lymphatic: Does not bruise/bleed easily  Skin: Negative for rash  Musculoskeletal: Negative for myalgias  Gastrointestinal: Negative for nausea and vomiting  Neurological: Negative for excessive daytime sleepiness and light-headedness  Psychiatric/Behavioral: The patient is not nervous/anxious  Vitals: /70   Pulse 60   Resp 18   Ht 5' 3 5" (1 613 m)   Wt 80 3 kg (177 lb)   SpO2 97%   BMI 30 86 kg/m²         Physical Exam:     GEN: Alert and oriented x 3, in no acute distress  Well appearing and well nourished  HEENT: Sclera anicteric, conjunctivae pink, mucous membranes moist  Oropharynx clear  NECK: Supple, no carotid bruits, no significant JVD  Trachea midline, no thyromegaly  HEART: Regular rhythm, normal S1 and S2, 2/6 MYRNA Left 4th ICS, no clicks, gallops or rubs  PMI nondisplaced, no thrills  LUNGS: Clear to auscultation bilaterally; no wheezes, rales, or rhonchi  No increased work of breathing or signs of respiratory distress  ABDOMEN: Soft, nontender, nondistended, normoactive bowel sounds  EXTREMITIES: Skin warm and well perfused, no clubbing, cyanosis, or edema  NEURO: No focal findings  Normal speech  Mood and affect normal    SKIN: Normal without suspicious lesions on exposed skin

## 2020-09-02 NOTE — PROGRESS NOTES
PT Re-Evaluation  and PT Discharge    Today's date: 2020  Patient name: Renee Hart  : 1945  MRN: [id number]  Referring provider: Katie Karimi MD  Dx:   Encounter Diagnosis     ICD-10-CM    1  Cervicalgia  M54 2                   Assessment  Assessment details: Renee Hart is a pleasant 66 y o  presenting to physical therapy with MD referral for Cervicalgia  Since time of initial evaluation, pt has made good improvements in cervical ROM; however, continues to report hot/cold sensations as well as tingling into levator scapulae and rhomboid musculature on left  Functionally, some improvements have been made; however, pt reports increased pain lifting, increased sleep disturbances, and increased pain reaching into overhead cabinets  At this point in time, it is recommended that pt follow up with neurologist for further medical intervention  Symptom irritability: moderateBarriers to therapy: GERD, HTN, hx of bilateral shoulder surgery  Understanding of Dx/Px/POC: good   Prognosis: good    Goals  ST  Pt will improve cervical SB to at least 20 degrees bilaterally in 3 weeks  MET  2  Pt will improve cervical rotation to at least 60 degrees in 3 weeks  PARTIALLY MET    LT  Pt will be able to rotate neck while driving with minimal to no pain in 6 weeks  NOT MET  2  Pt will be independent in a comprehensive HEP in 6 weeks  MET    Plan  Plan details: Discharge to an independent HEP and return to neurologist for further investigation  Treatment plan discussed with: patient        Subjective Evaluation    History of Present Illness  Mechanism of injury: Pt reports onset of cervical pain in 2020 when pt slipped on mud and braced himself  Pt states since that point, he notices an "electrical charge" that goes up his neck and down his left periscapular region  Patient reports in the morning he does not experience much pain; however, as the day progresses, the pain progresses   Pt denies any headaches, lightheadedness, or dizziness  Premorbid status:  - ADLs: Independent with no difficulty  - Work: Not a working individual- retired  - Recreation: none    Current status:  - ADLs/Functional activities:     - Rotating neck while driving with achiness in left shoulder and tingling when rotating neck to left (same)   - Looking up to wash hair with no pain (improved)   - Looking down to read a book with mild pain (improved)   - Reaching into overhead cabinets with Pain Levels: moderate/severe pain (worse)   - Lifting groceries/laundry with moderate neck discomfort (worse)   - Sleeping with 2 nightly sleep disturbances due to pain in neck (worse)  - Work: Not a working individual- retired  - Recreation: none    Since time of previous re-evaluation, functionally pt reports less pain looking up to wash his hair and looking down to read a book; however, increased pain reaching into overhead cabinets, lifting laundry/groceries, and increased sleep disturbances due to pain  Pain  Current pain ratin  At best pain ratin  At worst pain ratin  Location: left side of cervical spine and into left pericapular area  Quality: dull ache (tingling/electric charge going up left side of cervical spine into rhomboids on left)  Relieving factors: medications  Progression: worsening    Treatments  Previous treatment: medication  Current treatment: medication and physical therapy  Patient Goals  Patient goals for therapy: decreased pain, increased motion and independence with ADLs/IADLs          Objective     Postural Observations  Seated posture: fair  Standing posture: fair  Correction of posture: makes symptoms worse    Additional Postural Observation Details  Moderate forward rounded shoulders and forward head posture    Palpation   Left   Muscle spasm in the levator scapulae and upper trapezius  Tenderness of the upper trapezius       Right   Muscle spasm in the levator scapulae and upper trapezius  Tenderness of the levator scapulae and upper trapezius  Neurological Testing     Sensation   Cervical/Thoracic   Left   Intact: light touch    Right   Intact: light touch    Reflexes   Left   Biceps (C5/C6): normal (2+)  Brachioradialis (C6): normal (2+)  Triceps (C7): normal (2+)    Right   Biceps (C5/C6): normal (2+)  Brachioradialis (C6): normal (2+)  Triceps (C7): normal (2+)    Active Range of Motion   Cervical/Thoracic Spine       Cervical    Flexion: 48 degrees   Extension: 38 degrees     with pain  Left lateral flexion: 21 degrees     with pain  Right lateral flexion: 23 degrees     with pain  Left rotation: 58 degrees with pain  Right rotation: 80 degrees    with pain    Thoracic    Flexion:  Restriction level: moderate  Extension:  Restriction level: maximal  Left lateral flexion:  Restriction level: moderate  Right lateral flexion:  Restriction level: moderate  Left rotation:  Restriction level: moderate  Right rotation:  Restriction level: moderate    Joint Play     Hypomobile: C2, C3, C4, C5, C6, C7, T1, T2, T3, T4, T5, T6, T7, T8, T9, T10 and T11     Strength/Myotome Testing     Left Shoulder     Planes of Motion   Flexion: 3+ (pain in left shoulder)   Left shoulder extension strength: shoulder pain  Abduction: 3+   External rotation at 0°: 5   Internal rotation at 0°: 5     Right Shoulder     Planes of Motion   Flexion: 4   Abduction: 4   External rotation at 0°: 5   Internal rotation at 0°: 5     Left Elbow   Extension: 5    Right Elbow   Flexion: 5  Extension: 5    Left Wrist/Hand   Wrist extension: 5  Wrist flexion: 5  Thumb extension: 5    Right Wrist/Hand   Wrist extension: 5  Wrist flexion: 5  Thumb extension: 5    Tests   Cervical   Positive vertical compression and lumbar distraction test   Negative alar ligament test, transverse ligament test, VBI and neck flexor muscle endurance test      Left   Positive Spurling's Test A     Negative cervical flexion-rotation test  Right   Positive Spurling's Test A  Negative cervical flexion-rotation test      Lumbar   Positive vertical compression                Precautions: HTN, GERD     Manuals 8-3 (IE) 8-5 8-11 8-14 8-17 8/19 8/24 8/26 8-31  9-2 (RE)   Cervical traction in flexion NV NV   JG               STM to B UT/LS with pin and stretch NV JG JM JG JM SC  SC with masage stick  GR JG Pt declinde                                                   Neuro Re-Ed                       Scapular retraction 10 x 10" NV NV 10"  x10     5" 20x  np          Cervical retraction 10 x 10" NV NV 10"  x10     10" x 8  10" x 10          Slouch and overcorrect 10 x 10" NV NV       10"x 10            Backward shoulder rolls               30x                                  Bent Over:                       - I's                 10 x 5" ea     - T's                 10 x 5" ea     - Y's                 10 x 5" ea                             Ther Ex                       UBE ant/post 3'/3' L1 NV 3'/3' L1 3'/3'  L1 3'/3'  L1 3'/3'  L1 L1 4'/4' L1 4'4' L1 4'/4' L2 4'/4'  L2 4'/4'   Corner stretch 4 x 30" NV 4 x 30" ea arm 4x30" ea 4 x 30" 4x30" 4x30"  3x 30" 3x30" 4 x 30"     Thoracic ext over chair with half foam roll mid and upper thoracic 10 x 2" NV 10 x 2" ea 10 x 2"  ea 10 x 2" ea 10x   2" ea                                                             TB:                       - rows 2 x 10 RTB NV 2 x 10 RTB 2x10  RTB 3 x 10 GTB 3 x 10 GTB 3x 10 GTB  2x 10 BTB  2x10 BTB       - pulldowns 2 x 10 RTB NV 2 x 10 RTB 2x10  RTB 3 x 10 GTB 3 x 10 GTB 3x 10 GTB 3" hold  2x 10 BTB  2x10 BTB       - no money 20 x 5" NV 20 x 5" YTB  5"x10 2 x 10 GTB 3 x 10 GTB 7x GTB  Hold  p! 2x10 RTB       D2 flexion              NV trial  10x RTB P!       - D1 extension                 2 x 10 ea Blue TB (pain with L) L only   - Middle Trapezius                 2 x 10 Blue TB                                                     Seated:                       - UT str 2 x 30" ea NV 4 x 30" ea 4x30" ea 2 x 30" ea 2x30"  ea   3x 30"  Reviewed       - LS str 2 x 30" NV 4 x 30" ea 4x30"  ea 2 x 30" ea 2x30" ea   2x 30"  reviewed       - pulleys with cervical rot toward down arm 3 mins x 6" hold NV NV 5" x10 ea     3" 2 min  3" 2 min  3" 2 min 2 mins x 3" hold     Ther Activity                                                                       Gait Training                                                                       Modalities                       MH/Cryo as needed NV                                               Provided pt with updated HEP and theraband  Educated pt on importance of posture in overall improvement of symptoms  Access Code: [email or web address]   URL: [email or web address]   Date: 09/02/2020   Prepared by: Alicia Goldberg      Exercises  · Corner Pec Major Stretch - 4 reps - 30 seconds hold - 3x daily  · Standing Row with Resistance - 10 reps - 3 sets - 4x weekly  · Shoulder extension with resistance - Neutral - 10 reps - 3 sets - 4x weekly  · Standing Shoulder External Rotation with Resistance - 10 reps - 3 sets - 4x weekly  · Seated Gentle Upper Trapezius Stretch - 4 reps - 30 seconds hold - 3x daily  · Gentle Levator Scapulae Stretch - 4 reps - 30 seconds hold - 3x daily

## 2020-09-03 ENCOUNTER — APPOINTMENT (OUTPATIENT)
Dept: LAB | Facility: CLINIC | Age: 75
End: 2020-09-03
Payer: MEDICARE

## 2020-09-03 DIAGNOSIS — E78.2 MIXED HYPERLIPIDEMIA: ICD-10-CM

## 2020-09-03 LAB
ALBUMIN SERPL BCP-MCNC: 4.1 G/DL (ref 3.5–5)
ALP SERPL-CCNC: 75 U/L (ref 46–116)
ALT SERPL W P-5'-P-CCNC: 25 U/L (ref 12–78)
ANION GAP SERPL CALCULATED.3IONS-SCNC: 5 MMOL/L (ref 4–13)
AST SERPL W P-5'-P-CCNC: 18 U/L (ref 5–45)
BASOPHILS # BLD AUTO: 0.02 THOUSANDS/ΜL (ref 0–0.1)
BASOPHILS NFR BLD AUTO: 0 % (ref 0–1)
BILIRUB SERPL-MCNC: 0.56 MG/DL (ref 0.2–1)
BUN SERPL-MCNC: 22 MG/DL (ref 5–25)
CALCIUM SERPL-MCNC: 9.2 MG/DL (ref 8.3–10.1)
CHLORIDE SERPL-SCNC: 109 MMOL/L (ref 100–108)
CHOLEST SERPL-MCNC: 167 MG/DL (ref 50–200)
CO2 SERPL-SCNC: 27 MMOL/L (ref 21–32)
CREAT SERPL-MCNC: 1.1 MG/DL (ref 0.6–1.3)
EOSINOPHIL # BLD AUTO: 0.06 THOUSAND/ΜL (ref 0–0.61)
EOSINOPHIL NFR BLD AUTO: 1 % (ref 0–6)
ERYTHROCYTE [DISTWIDTH] IN BLOOD BY AUTOMATED COUNT: 13.2 % (ref 11.6–15.1)
GFR SERPL CREATININE-BSD FRML MDRD: 64 ML/MIN/1.73SQ M
GLUCOSE P FAST SERPL-MCNC: 97 MG/DL (ref 65–99)
HCT VFR BLD AUTO: 44.4 % (ref 36.5–49.3)
HDLC SERPL-MCNC: 53 MG/DL
HGB BLD-MCNC: 14.7 G/DL (ref 12–17)
IMM GRANULOCYTES # BLD AUTO: 0.01 THOUSAND/UL (ref 0–0.2)
IMM GRANULOCYTES NFR BLD AUTO: 0 % (ref 0–2)
LDLC SERPL CALC-MCNC: 82 MG/DL (ref 0–100)
LYMPHOCYTES # BLD AUTO: 1.53 THOUSANDS/ΜL (ref 0.6–4.47)
LYMPHOCYTES NFR BLD AUTO: 31 % (ref 14–44)
MCH RBC QN AUTO: 34.4 PG (ref 26.8–34.3)
MCHC RBC AUTO-ENTMCNC: 33.1 G/DL (ref 31.4–37.4)
MCV RBC AUTO: 104 FL (ref 82–98)
MONOCYTES # BLD AUTO: 0.55 THOUSAND/ΜL (ref 0.17–1.22)
MONOCYTES NFR BLD AUTO: 11 % (ref 4–12)
NEUTROPHILS # BLD AUTO: 2.79 THOUSANDS/ΜL (ref 1.85–7.62)
NEUTS SEG NFR BLD AUTO: 57 % (ref 43–75)
NONHDLC SERPL-MCNC: 114 MG/DL
NRBC BLD AUTO-RTO: 0 /100 WBCS
PLATELET # BLD AUTO: 189 THOUSANDS/UL (ref 149–390)
PMV BLD AUTO: 11 FL (ref 8.9–12.7)
POTASSIUM SERPL-SCNC: 4.5 MMOL/L (ref 3.5–5.3)
PROT SERPL-MCNC: 6.9 G/DL (ref 6.4–8.2)
RBC # BLD AUTO: 4.27 MILLION/UL (ref 3.88–5.62)
SODIUM SERPL-SCNC: 141 MMOL/L (ref 136–145)
TRIGL SERPL-MCNC: 161 MG/DL
WBC # BLD AUTO: 4.96 THOUSAND/UL (ref 4.31–10.16)

## 2020-09-03 PROCEDURE — 36415 COLL VENOUS BLD VENIPUNCTURE: CPT

## 2020-09-03 PROCEDURE — 80053 COMPREHEN METABOLIC PANEL: CPT

## 2020-09-03 PROCEDURE — 80061 LIPID PANEL: CPT

## 2020-09-03 PROCEDURE — 85025 COMPLETE CBC W/AUTO DIFF WBC: CPT

## 2020-09-08 ENCOUNTER — TELEPHONE (OUTPATIENT)
Dept: PULMONOLOGY | Facility: CLINIC | Age: 75
End: 2020-09-08

## 2020-09-08 DIAGNOSIS — G47.33 OSA (OBSTRUCTIVE SLEEP APNEA): Primary | ICD-10-CM

## 2020-09-08 NOTE — TELEPHONE ENCOUNTER
Patient would like to discuss his cpap machine with you  Can you please call him with medical advise when you have a moment? Thank you

## 2020-09-08 NOTE — TELEPHONE ENCOUNTER
SPOKE TO THE PATIENT HE WANTS A REFERRAL TO [facility] PLEASE MAIL THE REFERRAL TO THE PATIENT AND HE CAN CALL AND MAKE AN APPOINTMENT

## 2020-09-11 ENCOUNTER — OFFICE VISIT (OUTPATIENT)
Dept: INTERNAL MEDICINE CLINIC | Facility: CLINIC | Age: 75
End: 2020-09-11
Payer: MEDICARE

## 2020-09-11 VITALS
SYSTOLIC BLOOD PRESSURE: 120 MMHG | HEART RATE: 60 BPM | TEMPERATURE: 97.8 F | WEIGHT: 177 LBS | OXYGEN SATURATION: 96 % | DIASTOLIC BLOOD PRESSURE: 58 MMHG | HEIGHT: 64 IN | BODY MASS INDEX: 30.22 KG/M2

## 2020-09-11 DIAGNOSIS — D53.1 MEGALOBLASTIC RED BLOOD CELLS: ICD-10-CM

## 2020-09-11 DIAGNOSIS — I10 ESSENTIAL HYPERTENSION: ICD-10-CM

## 2020-09-11 DIAGNOSIS — M48.062 LUMBAR STENOSIS WITH NEUROGENIC CLAUDICATION: Primary | ICD-10-CM

## 2020-09-11 DIAGNOSIS — M54.2 CERVICALGIA: ICD-10-CM

## 2020-09-11 DIAGNOSIS — E78.2 MIXED HYPERLIPIDEMIA: ICD-10-CM

## 2020-09-11 PROBLEM — R10.32 LEFT LOWER QUADRANT PAIN: Status: RESOLVED | Noted: 2018-03-27 | Resolved: 2020-09-11

## 2020-09-11 PROBLEM — R60.0 LOWER EXTREMITY EDEMA: Status: RESOLVED | Noted: 2018-12-31 | Resolved: 2020-09-11

## 2020-09-11 PROCEDURE — 99214 OFFICE O/P EST MOD 30 MIN: CPT | Performed by: INTERNAL MEDICINE

## 2020-09-11 RX ORDER — FENOFIBRATE 145 MG/1
145 TABLET, COATED ORAL DAILY
Qty: 90 TABLET | Refills: 3 | Status: CANCELLED | OUTPATIENT
Start: 2020-09-11

## 2020-09-11 RX ORDER — LISINOPRIL 40 MG/1
40 TABLET ORAL DAILY
Qty: 90 TABLET | Refills: 3 | Status: SHIPPED | OUTPATIENT
Start: 2020-09-11 | End: 2021-05-17

## 2020-09-11 RX ORDER — AMLODIPINE BESYLATE 10 MG/1
10 TABLET ORAL EVERY MORNING
Qty: 90 TABLET | Refills: 3 | Status: SHIPPED | OUTPATIENT
Start: 2020-09-11 | End: 2021-06-08 | Stop reason: SDUPTHER

## 2020-09-11 RX ORDER — LOVASTATIN 40 MG/1
40 TABLET ORAL DAILY
Qty: 90 TABLET | Refills: 3 | Status: SHIPPED | OUTPATIENT
Start: 2020-09-11 | End: 2021-05-17 | Stop reason: SDUPTHER

## 2020-09-11 NOTE — PROGRESS NOTES
Assessment/Plan:  Regarding his hypertension he seems stable the present time with no cardiac complaints  Cholesterol was 167 with an LDL of 80 will continue medicines  His prostate is checked by the Urology  He has a ventral hernia which is easily reducible  He is going to check a B12 level because of the enlarged RBCs  He needs to have a colon assessment by his colon rectal surgeon  1  Lumbar stenosis with neurogenic claudication     2  Essential hypertension  amLODIPine (NORVASC) 10 mg tablet    lisinopril (ZESTRIL) 40 mg tablet   3  Mixed hyperlipidemia  lovastatin (MEVACOR) 40 MG tablet   4  Cervicalgia     5  Megaloblastic red blood cells  Vitamin B12       Orders Placed This Encounter   Procedures    Vitamin B12            Subjective:  He is generally feeling well  Denies any cardiopulmonary complaints  Has stable hypertension  Also found to have an elevated size of his RBCs  Recent cholesterol was 167 with an LDL of 80  Patient ID: Lakeisha Osborn is a 66 y o  male  HPI    The following portions of the patient's history were reviewed and updated as appropriate:   He has a past medical history of Abnormal stress test, Angina at rest [facility]), Apnea, Carpal tunnel syndrome on left, Chest pain, CPAP (continuous positive airway pressure) dependence, Diverticulitis, GERD (gastroesophageal reflux disease), Hypercholesterolemia, Hypercholesterolemia, Hyperlipidemia, Hypertension, Kidney stone, Mitral valve disorder, Sleep apnea, and Thoracic neuritis  ,  does not have any pertinent problems on file  ,   has a past surgical history that includes Knee surgery (Left); Colonoscopy; Esophagogastroduodenoscopy; Hernia repair; Tarsal tunnel release; Shoulder surgery; Neuroplasty / transposition median nerve at carpal tunnel; pr colonoscopy flx dx w/collj spec when pfrmd (N/A, 4/10/2018); pr cystourethro w/implant (N/A, 8/30/2018); pr transurethral incision of prostate (N/A, 8/30/2018);  Coronary angioplasty (2017); Foot surgery (Left); Knee arthroscopy (Left); Rotator cuff repair (Bilateral); pr cysto/uretero w/lithotripsy &indwell stent insrt (Right, 5/24/2019); and FL retrograde pyelogram (5/24/2019)  ,  family history includes Arthritis in his father; Cancer in his father; Coronary artery disease in his family; Diabetes in his brother and mother; Heart disease in his brother, brother, and mother; Hypertension in his brother and mother; Kidney disease in his sister; No Known Problems in his brother; Parkinsonism in his father and sister; Thyroid disease in his sister  ,   reports that he quit smoking about 42 years ago  His smoking use included cigarettes  He has a 17 00 pack-year smoking history  He has never used smokeless tobacco  He reports that he does not drink alcohol or use drugs  ,  is allergic to iodine       Current Outpatient Medications:     amLODIPine (NORVASC) 10 mg tablet, Take 1 tablet (10 mg total) by mouth every morning, Disp: 90 tablet, Rfl: 3    aspirin (ASPIRIN LOW DOSE) 81 MG tablet, Take 1 tablet by mouth daily, Disp: , Rfl:     fenofibrate (TRICOR) 145 mg tablet, TAKE 1 TABLET (145 MG TOTAL) BY MOUTH DAILY, Disp: 90 tablet, Rfl: 2    ketoconazole (NIZORAL) 2 % cream, Apply topically 2 (two) times a day To facial rash repeat as needed treat for 3 weeks, Disp: 30 g, Rfl: 3    lisinopril (ZESTRIL) 40 mg tablet, Take 1 tablet (40 mg total) by mouth daily, Disp: 90 tablet, Rfl: 3    loperamide (IMODIUM A-D) 2 MG tablet, Take 1 tablet by mouth daily as needed, Disp: , Rfl:     lovastatin (MEVACOR) 40 MG tablet, Take 1 tablet (40 mg total) by mouth daily, Disp: 90 tablet, Rfl: 3    meclizine (ANTIVERT) 25 mg tablet, Take 1 tablet (25 mg total) by mouth 3 (three) times a day as needed for dizziness, Disp: 30 tablet, Rfl: 0    meloxicam (MOBIC) 7 5 mg tablet, TAKE 1 TABLET (7 5 MG TOTAL) BY MOUTH DAILY MAY TAKE A SECOND PILL AS NEEDED, NO MORE THAN 2 PER DAY, Disp: 30 tablet, Rfl: 3    Multiple Vitamins-Minerals (CENTRUM ULTRA MENS PO), Take 1 tablet by mouth daily, Disp: , Rfl:     omeprazole (PriLOSEC) 20 mg delayed release capsule, TAKE ONE CAPSULE BY MOUTH DAILY, Disp: 90 capsule, Rfl: 1    pseudoephedrine (SUDAFED) 30 mg tablet, Take 60 mg by mouth daily at bedtime, Disp: , Rfl:     Review of Systems   Constitutional: Negative for activity change, appetite change, chills, diaphoresis, fatigue, fever and unexpected weight change  Generally feeling well  HENT: Negative for congestion, ear pain, hearing loss, mouth sores, nosebleeds, postnasal drip, sinus pressure, sinus pain, sore throat and trouble swallowing  Eyes: Negative for pain, discharge and visual disturbance  Wears corrective lenses  Respiratory: Negative for apnea, cough, chest tightness, shortness of breath and wheezing  Cardiovascular: Negative for chest pain, palpitations and leg swelling  Gastrointestinal: Negative for abdominal pain, anal bleeding, blood in stool, constipation, diarrhea, nausea and vomiting  Is due to have a cologard  Endocrine: Negative for polydipsia and polyphagia  Genitourinary: Negative for decreased urine volume, dysuria, flank pain, frequency, hematuria and urgency  Musculoskeletal: Negative for arthralgias, back pain, gait problem, joint swelling and myalgias  Skin: Negative for rash and wound  Allergic/Immunologic: Negative for environmental allergies and food allergies  Neurological: Negative for dizziness, tremors, seizures, syncope, speech difficulty, light-headedness, numbness and headaches  Hematological: Negative for adenopathy  Does not bruise/bleed easily  Psychiatric/Behavioral: Negative for agitation, confusion, hallucinations, sleep disturbance and suicidal ideas  The patient is not nervous/anxious            Objective:  /58 (BP Location: Left arm, Patient Position: Sitting, Cuff Size: Standard)   Pulse 60   Temp 97 8 °F (36 6 °C)   Ht 5' 3 5" (1 613 m)   Wt 80 3 kg (177 lb)   SpO2 96%   BMI 30 86 kg/m²      Physical Exam  Vitals signs and nursing note reviewed  Constitutional:       General: He is not in acute distress  Appearance: He is well-developed  Comments: Blood pressure is 130/80  Pulse is 60  O2 saturations 96  Weight is 177 lb  HENT:      Head: Normocephalic  Right Ear: External ear normal       Left Ear: External ear normal       Nose: Nose normal       Mouth/Throat:      Pharynx: No oropharyngeal exudate  Eyes:      General:         Right eye: No discharge  Left eye: No discharge  Conjunctiva/sclera: Conjunctivae normal       Pupils: Pupils are equal, round, and reactive to light  Neck:      Musculoskeletal: Normal range of motion and neck supple  Thyroid: No thyromegaly  Cardiovascular:      Rate and Rhythm: Normal rate and regular rhythm  Heart sounds: Normal heart sounds  No murmur  No friction rub  No gallop  Pulmonary:      Effort: Pulmonary effort is normal  No respiratory distress  Breath sounds: Normal breath sounds  No wheezing or rales  Abdominal:      General: Bowel sounds are normal  There is no distension  Palpations: Abdomen is soft  There is no mass  Tenderness: There is no abdominal tenderness  There is no guarding or rebound  Comments: He has not easily reducible ventral hernia present  Musculoskeletal: Normal range of motion  General: No tenderness or deformity  Comments: He has some arthritic changes of his hands  Lymphadenopathy:      Cervical: No cervical adenopathy  Skin:     General: Skin is warm and dry  Findings: No erythema or rash  Neurological:      Mental Status: He is alert  Cranial Nerves: No cranial nerve deficit  Coordination: Coordination normal       Deep Tendon Reflexes: Reflexes are normal and symmetric   Reflexes normal    Psychiatric:         Behavior: Behavior normal  Thought Content:  Thought content normal          Judgment: Judgment normal            Recent Results (from the past 1008 hour(s))   CBC and differential    Collection Time: 09/03/20  7:47 AM   Result Value Ref Range    WBC 4 96 4 31 - 10 16 Thousand/uL    RBC 4 27 3 88 - 5 62 Million/uL    Hemoglobin 14 7 12 0 - 17 0 g/dL    Hematocrit 44 4 36 5 - 49 3 %     (H) 82 - 98 fL    MCH 34 4 (H) 26 8 - 34 3 pg    MCHC 33 1 31 4 - 37 4 g/dL    RDW 13 2 11 6 - 15 1 %    MPV 11 0 8 9 - 12 7 fL    Platelets 318 946 - 483 Thousands/uL    nRBC 0 /100 WBCs    Neutrophils Relative 57 43 - 75 %    Immat GRANS % 0 0 - 2 %    Lymphocytes Relative 31 14 - 44 %    Monocytes Relative 11 4 - 12 %    Eosinophils Relative 1 0 - 6 %    Basophils Relative 0 0 - 1 %    Neutrophils Absolute 2 79 1 85 - 7 62 Thousands/µL    Immature Grans Absolute 0 01 0 00 - 0 20 Thousand/uL    Lymphocytes Absolute 1 53 0 60 - 4 47 Thousands/µL    Monocytes Absolute 0 55 0 17 - 1 22 Thousand/µL    Eosinophils Absolute 0 06 0 00 - 0 61 Thousand/µL    Basophils Absolute 0 02 0 00 - 0 10 Thousands/µL   Comprehensive metabolic panel    Collection Time: 09/03/20  7:47 AM   Result Value Ref Range    Sodium 141 136 - 145 mmol/L    Potassium 4 5 3 5 - 5 3 mmol/L    Chloride 109 (H) 100 - 108 mmol/L    CO2 27 21 - 32 mmol/L    ANION GAP 5 4 - 13 mmol/L    BUN 22 5 - 25 mg/dL    Creatinine 1 10 0 60 - 1 30 mg/dL    Glucose, Fasting 97 65 - 99 mg/dL    Calcium 9 2 8 3 - 10 1 mg/dL    AST 18 5 - 45 U/L    ALT 25 12 - 78 U/L    Alkaline Phosphatase 75 46 - 116 U/L    Total Protein 6 9 6 4 - 8 2 g/dL    Albumin 4 1 3 5 - 5 0 g/dL    Total Bilirubin 0 56 0 20 - 1 00 mg/dL    eGFR 64 ml/min/1 73sq m   Lipid panel    Collection Time: 09/03/20  7:47 AM   Result Value Ref Range    Cholesterol 167 50 - 200 mg/dL    Triglycerides 161 (H) <=150 mg/dL    HDL, Direct 53 >=40 mg/dL    LDL Calculated 82 0 - 100 mg/dL    Non-HDL-Chol (CHOL-HDL) 114 mg/dl

## 2020-09-22 ENCOUNTER — OFFICE VISIT (OUTPATIENT)
Dept: NEUROLOGY | Facility: CLINIC | Age: 75
End: 2020-09-22
Payer: MEDICARE

## 2020-09-22 VITALS
SYSTOLIC BLOOD PRESSURE: 142 MMHG | HEART RATE: 67 BPM | BODY MASS INDEX: 29.16 KG/M2 | HEIGHT: 64 IN | DIASTOLIC BLOOD PRESSURE: 64 MMHG | WEIGHT: 170.8 LBS | TEMPERATURE: 98.1 F

## 2020-09-22 DIAGNOSIS — M54.2 CERVICALGIA: Primary | ICD-10-CM

## 2020-09-22 PROCEDURE — 99213 OFFICE O/P EST LOW 20 MIN: CPT | Performed by: PSYCHIATRY & NEUROLOGY

## 2020-09-22 RX ORDER — LANOLIN ALCOHOL/MO/W.PET/CERES
1000 CREAM (GRAM) TOPICAL
COMMUNITY
End: 2021-08-06

## 2020-09-22 NOTE — PROGRESS NOTES
Danna Appiah is a 66 y o  male returns in follow-up today with history of neck pain    Assessment:  1  Cervicalgia        Plan:  MRI cervical spine  Pain management referral  Follow-up 3 months    Discussion:  Nai Jimenes has found that if anything physical therapy aggravated his neck pain symptoms  He states that the meloxicam as helped other arthralgias but not his neck pain  Will obtain an MRI of the cervical spine and refer him to pain management  I will see him back in follow-up in a couple of months      Subjective:    HPI  Nai Jimenes returns in follow-up today  He states he went to physical therapy and if anything he thinks his neck pain hurts more  He states it hurts to lay move his head laterally side to side where to turn his head 1 way or the other  He continues to note a pain radiating toward the medial scapular border region as well as the lower part of his skull at different times associated with some tingling  He did not find the meloxicam to help his neck pain but states it helps some of his other arthralgias        Past Medical History:   Diagnosis Date    Abnormal stress test     [facility])     Apnea     Carpal tunnel syndrome on left     Chest pain     CPAP (continuous positive airway pressure) dependence     Diverticulitis     GERD (gastroesophageal reflux disease)     Hypercholesterolemia     Hypercholesterolemia     Hyperlipidemia     Hypertension     Kidney stone     Mitral valve disorder     Sleep apnea     Thoracic neuritis        Family History:  Family History   Problem Relation Age of Onset    Diabetes Mother         Mellitus    Heart disease Mother         Arteriosclerotic Cardiovascular    Hypertension Mother     Cancer Father     Arthritis Father         Rheu Mult Site W Involv Of Organs And Systems    Parkinsonism Father     Thyroid disease Sister     Diabetes Brother         Diabetes:Mellitus    Heart disease Brother     Hypertension Brother     Coronary artery disease Family     Heart disease Brother     No Known Problems Brother     Kidney disease Sister     Parkinsonism Sister        Past Surgical History:  Past Surgical History:   Procedure Laterality Date    COLONOSCOPY      Complete    CORONARY ANGIOPLASTY  2017    ESOPHAGOGASTRODUODENOSCOPY      Diagnostic    FL RETROGRADE PYELOGRAM  5/24/2019    FOOT SURGERY Left     tarsal tunnel    HERNIA REPAIR      KNEE ARTHROSCOPY Left     KNEE SURGERY Left     NEUROPLASTY / TRANSPOSITION MEDIAN NERVE AT CARPAL TUNNEL      [place] COLONOSCOPY FLX DX W/COLLJ SPEC WHEN PFRMD N/A 4/10/2018    Procedure: EGD AND COLONOSCOPY;  Surgeon: Mario Shepherd MD;  [facility]: [facility]; Service: Gastroenterology    NY CYSTO/URETERO W/LITHOTRIPSY &INDWELL STENT INSRT Right 5/24/2019    Procedure: CYSTOSCOPY URETEROSCOPY WITH LITHOTRIPSY HOLMIUM LASER, RETROGRADE PYELOGRAM AND INSERTION STENT URETERAL;  Surgeon: Carroll Davis MD;  [facility];  Service: Urology    [place] CYSTOURETHRO W/IMPLANT N/A 8/30/2018    Procedure: CYSTOSCOPY WITH INSERTION Aneta Daunt;  Surgeon: Carroll Davis MD;  [facility];  Service: Urology    [place] TRANSURETHRAL INCISION OF PROSTATE N/A 8/30/2018    Procedure: TRANSURETHRAL INCISION OF PROSTATE (TUIP); Surgeon: Carroll Davis MD;  [facility];  Service: Urology    ROTATOR CUFF REPAIR Bilateral     SHOULDER SURGERY      TARSAL TUNNEL RELEASE      Neuroplasty Decompression       Social History:   reports that he quit smoking about 42 years ago  His smoking use included cigarettes  He has a 17 00 pack-year smoking history  He has never used smokeless tobacco  He reports that he does not drink alcohol or use drugs      Allergies:  Iodine      Current Outpatient Medications:     amLODIPine (NORVASC) 10 mg tablet, Take 1 tablet (10 mg total) by mouth every morning, Disp: 90 tablet, Rfl: 3    aspirin (ASPIRIN LOW DOSE) 81 MG tablet, Take 1 tablet by mouth daily, Disp: , Rfl:     fenofibrate (TRICOR) 145 mg tablet, TAKE 1 TABLET (145 MG TOTAL) BY MOUTH DAILY, Disp: 90 tablet, Rfl: 2    ketoconazole (NIZORAL) 2 % cream, Apply topically 2 (two) times a day To facial rash repeat as needed treat for 3 weeks, Disp: 30 g, Rfl: 3    lisinopril (ZESTRIL) 40 mg tablet, Take 1 tablet (40 mg total) by mouth daily, Disp: 90 tablet, Rfl: 3    loperamide (IMODIUM A-D) 2 MG tablet, Take 1 tablet by mouth daily as needed, Disp: , Rfl:     lovastatin (MEVACOR) 40 MG tablet, Take 1 tablet (40 mg total) by mouth daily, Disp: 90 tablet, Rfl: 3    meclizine (ANTIVERT) 25 mg tablet, Take 1 tablet (25 mg total) by mouth 3 (three) times a day as needed for dizziness, Disp: 30 tablet, Rfl: 0    meloxicam (MOBIC) 7 5 mg tablet, TAKE 1 TABLET (7 5 MG TOTAL) BY MOUTH DAILY MAY TAKE A SECOND PILL AS NEEDED, NO MORE THAN 2 PER DAY, Disp: 30 tablet, Rfl: 3    Multiple Vitamins-Minerals (CENTRUM ULTRA MENS PO), Take 1 tablet by mouth daily, Disp: , Rfl:     omeprazole (PriLOSEC) 20 mg delayed release capsule, TAKE ONE CAPSULE BY MOUTH DAILY, Disp: 90 capsule, Rfl: 1    pseudoephedrine (SUDAFED) 30 mg tablet, Take 60 mg by mouth daily at bedtime, Disp: , Rfl:     vitamin B-12 (VITAMIN B-12) 1,000 mcg tablet, Take 1,000 mcg by mouth daily, Disp: , Rfl:     I have reviewed the past medical, social and family history, current medications, allergies, vitals, review of systems and updated this information as appropriate today     Objective:    Vitals:  Blood pressure 142/64, pulse 67, temperature 98 1 °F (36 7 °C), height 5' 3 5" (1 613 m), weight 77 5 kg (170 lb 12 8 oz)      Physical Exam    Neurological Exam  GENERAL:  Well-developed well-nourished man in no acute distress  HEENT/NECK: Head is atraumatic normocephalic, neck is supple with restricted range of motion to lateral rotation bilaterally  NEUROLOGIC:  Mental Status: Awake and alert without aphasia  Cranial Nerves: Extraocular movements are full  Face is symmetrical  Coordination:  Gait is stable            ROS:    Review of Systems   Constitutional: Negative  Negative for appetite change and fever  HENT: Negative  Negative for hearing loss, tinnitus, trouble swallowing and voice change  Eyes: Negative  Negative for photophobia and pain  Respiratory: Negative  Negative for shortness of breath  Cardiovascular: Negative  Negative for palpitations  Gastrointestinal: Negative  Negative for nausea and vomiting  Endocrine: Negative  Negative for cold intolerance  Genitourinary: Negative  Negative for dysuria, frequency and urgency  Musculoskeletal: Positive for neck pain and neck stiffness  Negative for myalgias  Skin: Negative  Negative for rash  Neurological: Positive for numbness (Neck)  Negative for dizziness, tremors, seizures, syncope, facial asymmetry, speech difficulty, weakness, light-headedness and headaches  Tingling- Neck   Hematological: Negative  Does not bruise/bleed easily  Psychiatric/Behavioral: Negative  Negative for confusion, hallucinations and sleep disturbance

## 2020-10-07 ENCOUNTER — TELEMEDICINE (OUTPATIENT)
Dept: INTERNAL MEDICINE CLINIC | Facility: CLINIC | Age: 75
End: 2020-10-07
Payer: MEDICARE

## 2020-10-07 DIAGNOSIS — E78.2 MIXED HYPERLIPIDEMIA: ICD-10-CM

## 2020-10-07 DIAGNOSIS — R19.7 DIARRHEA, UNSPECIFIED TYPE: ICD-10-CM

## 2020-10-07 DIAGNOSIS — I10 ESSENTIAL HYPERTENSION: ICD-10-CM

## 2020-10-07 DIAGNOSIS — R50.9 FEVER, UNSPECIFIED FEVER CAUSE: ICD-10-CM

## 2020-10-07 DIAGNOSIS — M48.062 LUMBAR STENOSIS WITH NEUROGENIC CLAUDICATION: ICD-10-CM

## 2020-10-07 DIAGNOSIS — G89.4 CHRONIC PAIN DISORDER: ICD-10-CM

## 2020-10-07 DIAGNOSIS — J44.9 CHRONIC OBSTRUCTIVE PULMONARY DISEASE, UNSPECIFIED COPD TYPE (HCC): Primary | ICD-10-CM

## 2020-10-07 PROCEDURE — 99214 OFFICE O/P EST MOD 30 MIN: CPT | Performed by: PHYSICIAN ASSISTANT

## 2020-10-08 DIAGNOSIS — R50.9 FEVER, UNSPECIFIED FEVER CAUSE: ICD-10-CM

## 2020-10-08 DIAGNOSIS — R19.7 DIARRHEA, UNSPECIFIED TYPE: ICD-10-CM

## 2020-10-08 PROCEDURE — U0003 INFECTIOUS AGENT DETECTION BY NUCLEIC ACID (DNA OR RNA); SEVERE ACUTE RESPIRATORY SYNDROME CORONAVIRUS 2 (SARS-COV-2) (CORONAVIRUS DISEASE [COVID-19]), AMPLIFIED PROBE TECHNIQUE, MAKING USE OF HIGH THROUGHPUT TECHNOLOGIES AS DESCRIBED BY CMS-2020-01-R: HCPCS | Performed by: PHYSICIAN ASSISTANT

## 2020-10-09 LAB — SARS-COV-2 RNA SPEC QL NAA+PROBE: NOT DETECTED

## 2020-10-12 ENCOUNTER — TELEPHONE (OUTPATIENT)
Dept: INTERNAL MEDICINE CLINIC | Facility: CLINIC | Age: 75
End: 2020-10-12

## 2020-10-12 ENCOUNTER — HOSPITAL ENCOUNTER (OUTPATIENT)
Dept: MRI IMAGING | Facility: CLINIC | Age: 75
Discharge: HOME | End: 2020-10-12
Payer: MEDICARE

## 2020-10-12 DIAGNOSIS — M54.2 CERVICALGIA: ICD-10-CM

## 2020-10-12 PROCEDURE — G1004 CDSM NDSC: HCPCS

## 2020-10-12 PROCEDURE — 72141 MRI NECK SPINE W/O DYE: CPT

## 2020-10-14 ENCOUNTER — HOSPITAL ENCOUNTER (OUTPATIENT)
Dept: SLEEP CENTER | Facility: CLINIC | Age: 75
Discharge: HOME | End: 2020-10-14
Payer: MEDICARE

## 2020-10-14 DIAGNOSIS — G47.33 OSA (OBSTRUCTIVE SLEEP APNEA): ICD-10-CM

## 2020-10-14 PROCEDURE — 95810 POLYSOM 6/> YRS 4/> PARAM: CPT

## 2020-10-21 ENCOUNTER — CONSULT (OUTPATIENT)
Dept: PAIN MEDICINE | Facility: CLINIC | Age: 75
End: 2020-10-21
Payer: MEDICARE

## 2020-10-21 VITALS
TEMPERATURE: 97.7 F | HEART RATE: 60 BPM | BODY MASS INDEX: 29.37 KG/M2 | HEIGHT: 64 IN | WEIGHT: 172 LBS | RESPIRATION RATE: 18 BRPM | SYSTOLIC BLOOD PRESSURE: 145 MMHG | DIASTOLIC BLOOD PRESSURE: 70 MMHG

## 2020-10-21 DIAGNOSIS — M47.812 CERVICAL SPONDYLOSIS: ICD-10-CM

## 2020-10-21 DIAGNOSIS — M54.2 CERVICALGIA: ICD-10-CM

## 2020-10-21 DIAGNOSIS — M54.12 CERVICAL RADICULOPATHY: Primary | ICD-10-CM

## 2020-10-21 PROCEDURE — 99204 OFFICE O/P NEW MOD 45 MIN: CPT | Performed by: STUDENT IN AN ORGANIZED HEALTH CARE EDUCATION/TRAINING PROGRAM

## 2020-10-22 ENCOUNTER — TELEPHONE (OUTPATIENT)
Dept: SLEEP CENTER | Facility: CLINIC | Age: 75
End: 2020-10-22

## 2020-10-24 DIAGNOSIS — M54.2 CERVICALGIA: ICD-10-CM

## 2020-10-26 RX ORDER — MELOXICAM 7.5 MG/1
TABLET ORAL
Qty: 30 TABLET | Refills: 3 | Status: SHIPPED | OUTPATIENT
Start: 2020-10-26 | End: 2021-01-11

## 2020-10-27 ENCOUNTER — TELEPHONE (OUTPATIENT)
Dept: RADIOLOGY | Facility: CLINIC | Age: 75
End: 2020-10-27

## 2020-10-30 ENCOUNTER — TELEPHONE (OUTPATIENT)
Dept: INTERNAL MEDICINE CLINIC | Facility: CLINIC | Age: 75
End: 2020-10-30

## 2020-11-05 ENCOUNTER — HOSPITAL ENCOUNTER (OUTPATIENT)
Dept: RADIOLOGY | Facility: CLINIC | Age: 75
Discharge: HOME | End: 2020-11-05
Attending: STUDENT IN AN ORGANIZED HEALTH CARE EDUCATION/TRAINING PROGRAM | Admitting: STUDENT IN AN ORGANIZED HEALTH CARE EDUCATION/TRAINING PROGRAM
Payer: MEDICARE

## 2020-11-05 VITALS
TEMPERATURE: 96.7 F | SYSTOLIC BLOOD PRESSURE: 142 MMHG | RESPIRATION RATE: 20 BRPM | DIASTOLIC BLOOD PRESSURE: 69 MMHG | HEART RATE: 76 BPM | OXYGEN SATURATION: 98 %

## 2020-11-05 DIAGNOSIS — M54.2 CERVICALGIA: ICD-10-CM

## 2020-11-05 DIAGNOSIS — M47.812 CERVICAL SPONDYLOSIS: ICD-10-CM

## 2020-11-05 DIAGNOSIS — M54.12 CERVICAL RADICULOPATHY: ICD-10-CM

## 2020-11-05 RX ORDER — LIDOCAINE HYDROCHLORIDE 10 MG/ML
5 INJECTION, SOLUTION EPIDURAL; INFILTRATION; INTRACAUDAL; PERINEURAL ONCE
Status: COMPLETED | OUTPATIENT
Start: 2020-11-05 | End: 2020-11-05

## 2020-11-05 RX ORDER — METHYLPREDNISOLONE ACETATE 80 MG/ML
80 INJECTION, SUSPENSION INTRA-ARTICULAR; INTRALESIONAL; INTRAMUSCULAR; PARENTERAL; SOFT TISSUE ONCE
Status: COMPLETED | OUTPATIENT
Start: 2020-11-05 | End: 2020-11-05

## 2020-11-05 RX ORDER — 0.9 % SODIUM CHLORIDE 0.9 %
4 VIAL (ML) INJECTION ONCE
Status: COMPLETED | OUTPATIENT
Start: 2020-11-05 | End: 2020-11-05

## 2020-11-05 RX ADMIN — IOHEXOL 1 ML: 300 INJECTION, SOLUTION INTRAVENOUS at 15:23

## 2020-11-05 RX ADMIN — LIDOCAINE HYDROCHLORIDE 3 ML: 10 INJECTION, SOLUTION EPIDURAL; INFILTRATION; INTRACAUDAL; PERINEURAL at 15:13

## 2020-11-05 RX ADMIN — SODIUM CHLORIDE 4 ML: 9 INJECTION INTRAMUSCULAR; INTRAVENOUS; SUBCUTANEOUS at 15:26

## 2020-11-05 RX ADMIN — METHYLPREDNISOLONE ACETATE 80 MG: 80 INJECTION, SUSPENSION INTRA-ARTICULAR; INTRALESIONAL; INTRAMUSCULAR; PARENTERAL; SOFT TISSUE at 15:26

## 2020-11-12 ENCOUNTER — TELEPHONE (OUTPATIENT)
Dept: PAIN MEDICINE | Facility: CLINIC | Age: 75
End: 2020-11-12

## 2020-11-27 ENCOUNTER — OFFICE VISIT (OUTPATIENT)
Dept: INTERNAL MEDICINE CLINIC | Facility: CLINIC | Age: 75
End: 2020-11-27
Payer: MEDICARE

## 2020-11-27 ENCOUNTER — APPOINTMENT (OUTPATIENT)
Dept: LAB | Facility: CLINIC | Age: 75
End: 2020-11-27
Payer: MEDICARE

## 2020-11-27 ENCOUNTER — HOSPITAL ENCOUNTER (OUTPATIENT)
Dept: RADIOLOGY | Facility: HOSPITAL | Age: 75
Discharge: HOME | End: 2020-11-27
Payer: MEDICARE

## 2020-11-27 VITALS
HEIGHT: 64 IN | SYSTOLIC BLOOD PRESSURE: 140 MMHG | BODY MASS INDEX: 29.53 KG/M2 | TEMPERATURE: 97.9 F | HEART RATE: 86 BPM | WEIGHT: 173 LBS | DIASTOLIC BLOOD PRESSURE: 70 MMHG

## 2020-11-27 DIAGNOSIS — R25.2 LEG CRAMPS: ICD-10-CM

## 2020-11-27 DIAGNOSIS — R25.2 LEG CRAMPS: Primary | ICD-10-CM

## 2020-11-27 DIAGNOSIS — K21.9 CHRONIC GERD: ICD-10-CM

## 2020-11-27 DIAGNOSIS — N20.0 NEPHROLITHIASIS: ICD-10-CM

## 2020-11-27 LAB
ALBUMIN SERPL BCP-MCNC: 4.4 G/DL (ref 3.5–5)
ALP SERPL-CCNC: 68 U/L (ref 46–116)
ALT SERPL W P-5'-P-CCNC: 27 U/L (ref 12–78)
ANION GAP SERPL CALCULATED.3IONS-SCNC: 4 MMOL/L (ref 4–13)
AST SERPL W P-5'-P-CCNC: 20 U/L (ref 5–45)
BASOPHILS # BLD AUTO: 0.01 THOUSANDS/ΜL (ref 0–0.1)
BASOPHILS NFR BLD AUTO: 0 % (ref 0–1)
BILIRUB SERPL-MCNC: 0.83 MG/DL (ref 0.2–1)
BUN SERPL-MCNC: 22 MG/DL (ref 5–25)
CALCIUM SERPL-MCNC: 9.8 MG/DL (ref 8.3–10.1)
CHLORIDE SERPL-SCNC: 109 MMOL/L (ref 100–108)
CO2 SERPL-SCNC: 29 MMOL/L (ref 21–32)
CREAT SERPL-MCNC: 0.99 MG/DL (ref 0.6–1.3)
EOSINOPHIL # BLD AUTO: 0.09 THOUSAND/ΜL (ref 0–0.61)
EOSINOPHIL NFR BLD AUTO: 2 % (ref 0–6)
ERYTHROCYTE [DISTWIDTH] IN BLOOD BY AUTOMATED COUNT: 12.8 % (ref 11.6–15.1)
GFR SERPL CREATININE-BSD FRML MDRD: 73 ML/MIN/1.73SQ M
GLUCOSE SERPL-MCNC: 87 MG/DL (ref 65–140)
HCT VFR BLD AUTO: 43.9 % (ref 36.5–49.3)
HGB BLD-MCNC: 14.8 G/DL (ref 12–17)
IMM GRANULOCYTES # BLD AUTO: 0 THOUSAND/UL (ref 0–0.2)
IMM GRANULOCYTES NFR BLD AUTO: 0 % (ref 0–2)
LYMPHOCYTES # BLD AUTO: 1.5 THOUSANDS/ΜL (ref 0.6–4.47)
LYMPHOCYTES NFR BLD AUTO: 31 % (ref 14–44)
MAGNESIUM SERPL-MCNC: 2.4 MG/DL (ref 1.6–2.6)
MCH RBC QN AUTO: 34 PG (ref 26.8–34.3)
MCHC RBC AUTO-ENTMCNC: 33.7 G/DL (ref 31.4–37.4)
MCV RBC AUTO: 101 FL (ref 82–98)
MONOCYTES # BLD AUTO: 0.51 THOUSAND/ΜL (ref 0.17–1.22)
MONOCYTES NFR BLD AUTO: 10 % (ref 4–12)
NEUTROPHILS # BLD AUTO: 2.78 THOUSANDS/ΜL (ref 1.85–7.62)
NEUTS SEG NFR BLD AUTO: 57 % (ref 43–75)
NRBC BLD AUTO-RTO: 0 /100 WBCS
PLATELET # BLD AUTO: 167 THOUSANDS/UL (ref 149–390)
PMV BLD AUTO: 11 FL (ref 8.9–12.7)
POTASSIUM SERPL-SCNC: 4.3 MMOL/L (ref 3.5–5.3)
PROT SERPL-MCNC: 7.4 G/DL (ref 6.4–8.2)
RBC # BLD AUTO: 4.35 MILLION/UL (ref 3.88–5.62)
SODIUM SERPL-SCNC: 142 MMOL/L (ref 136–145)
WBC # BLD AUTO: 4.89 THOUSAND/UL (ref 4.31–10.16)

## 2020-11-27 PROCEDURE — 83735 ASSAY OF MAGNESIUM: CPT

## 2020-11-27 PROCEDURE — 36415 COLL VENOUS BLD VENIPUNCTURE: CPT

## 2020-11-27 PROCEDURE — 85025 COMPLETE CBC W/AUTO DIFF WBC: CPT

## 2020-11-27 PROCEDURE — 80053 COMPREHEN METABOLIC PANEL: CPT

## 2020-11-27 PROCEDURE — 74018 RADEX ABDOMEN 1 VIEW: CPT

## 2020-11-27 PROCEDURE — 99213 OFFICE O/P EST LOW 20 MIN: CPT | Performed by: NURSE PRACTITIONER

## 2020-11-30 RX ORDER — OMEPRAZOLE 20 MG/1
CAPSULE, DELAYED RELEASE ORAL
Qty: 90 CAPSULE | Refills: 1 | Status: SHIPPED | OUTPATIENT
Start: 2020-11-30 | End: 2021-08-31

## 2020-12-04 ENCOUNTER — OFFICE VISIT (OUTPATIENT)
Dept: PAIN MEDICINE | Facility: CLINIC | Age: 75
End: 2020-12-04
Payer: MEDICARE

## 2020-12-04 VITALS
BODY MASS INDEX: 29.88 KG/M2 | SYSTOLIC BLOOD PRESSURE: 147 MMHG | DIASTOLIC BLOOD PRESSURE: 66 MMHG | RESPIRATION RATE: 18 BRPM | HEIGHT: 64 IN | HEART RATE: 62 BPM | WEIGHT: 175 LBS

## 2020-12-04 DIAGNOSIS — M47.812 CERVICAL SPONDYLOSIS: Primary | ICD-10-CM

## 2020-12-04 DIAGNOSIS — M54.12 CERVICAL RADICULOPATHY: ICD-10-CM

## 2020-12-04 DIAGNOSIS — M54.2 CERVICALGIA: ICD-10-CM

## 2020-12-04 PROCEDURE — 99214 OFFICE O/P EST MOD 30 MIN: CPT | Performed by: STUDENT IN AN ORGANIZED HEALTH CARE EDUCATION/TRAINING PROGRAM

## 2020-12-07 ENCOUNTER — HOSPITAL ENCOUNTER (OUTPATIENT)
Dept: VASCULAR ULTRASOUND | Facility: HOSPITAL | Age: 75
Discharge: HOME | End: 2020-12-07
Payer: MEDICARE

## 2020-12-07 DIAGNOSIS — R25.2 LEG CRAMPS: ICD-10-CM

## 2020-12-07 PROCEDURE — 93971 EXTREMITY STUDY: CPT | Performed by: SURGERY

## 2020-12-07 PROCEDURE — 93971 EXTREMITY STUDY: CPT

## 2020-12-14 ENCOUNTER — TELEPHONE (OUTPATIENT)
Dept: INTERNAL MEDICINE CLINIC | Facility: CLINIC | Age: 75
End: 2020-12-14

## 2020-12-22 ENCOUNTER — OFFICE VISIT (OUTPATIENT)
Dept: NEUROLOGY | Facility: CLINIC | Age: 75
End: 2020-12-22
Payer: MEDICARE

## 2020-12-22 VITALS
DIASTOLIC BLOOD PRESSURE: 62 MMHG | HEIGHT: 64 IN | HEART RATE: 58 BPM | WEIGHT: 174 LBS | BODY MASS INDEX: 29.71 KG/M2 | SYSTOLIC BLOOD PRESSURE: 140 MMHG

## 2020-12-22 DIAGNOSIS — M54.2 CERVICALGIA: Primary | ICD-10-CM

## 2020-12-22 PROCEDURE — 99213 OFFICE O/P EST LOW 20 MIN: CPT | Performed by: PSYCHIATRY & NEUROLOGY

## 2020-12-23 ENCOUNTER — LAB (OUTPATIENT)
Dept: LAB | Facility: CLINIC | Age: 75
End: 2020-12-23
Payer: MEDICARE

## 2020-12-23 DIAGNOSIS — G47.33 OSA (OBSTRUCTIVE SLEEP APNEA): ICD-10-CM

## 2020-12-23 LAB
ANION GAP SERPL CALCULATED.3IONS-SCNC: 5 MMOL/L (ref 4–13)
BASOPHILS # BLD AUTO: 0.02 THOUSANDS/ΜL (ref 0–0.1)
BASOPHILS NFR BLD AUTO: 0 % (ref 0–1)
BUN SERPL-MCNC: 27 MG/DL (ref 5–25)
CALCIUM SERPL-MCNC: 10 MG/DL (ref 8.3–10.1)
CHLORIDE SERPL-SCNC: 110 MMOL/L (ref 100–108)
CO2 SERPL-SCNC: 28 MMOL/L (ref 21–32)
CREAT SERPL-MCNC: 0.93 MG/DL (ref 0.6–1.3)
EOSINOPHIL # BLD AUTO: 0.05 THOUSAND/ΜL (ref 0–0.61)
EOSINOPHIL NFR BLD AUTO: 1 % (ref 0–6)
ERYTHROCYTE [DISTWIDTH] IN BLOOD BY AUTOMATED COUNT: 13.1 % (ref 11.6–15.1)
GFR SERPL CREATININE-BSD FRML MDRD: 79 ML/MIN/1.73SQ M
GLUCOSE P FAST SERPL-MCNC: 94 MG/DL (ref 65–99)
HCT VFR BLD AUTO: 43.1 % (ref 36.5–49.3)
HGB BLD-MCNC: 14.4 G/DL (ref 12–17)
IMM GRANULOCYTES # BLD AUTO: 0.01 THOUSAND/UL (ref 0–0.2)
IMM GRANULOCYTES NFR BLD AUTO: 0 % (ref 0–2)
LYMPHOCYTES # BLD AUTO: 1.6 THOUSANDS/ΜL (ref 0.6–4.47)
LYMPHOCYTES NFR BLD AUTO: 32 % (ref 14–44)
MCH RBC QN AUTO: 34.1 PG (ref 26.8–34.3)
MCHC RBC AUTO-ENTMCNC: 33.4 G/DL (ref 31.4–37.4)
MCV RBC AUTO: 102 FL (ref 82–98)
MONOCYTES # BLD AUTO: 0.54 THOUSAND/ΜL (ref 0.17–1.22)
MONOCYTES NFR BLD AUTO: 11 % (ref 4–12)
NEUTROPHILS # BLD AUTO: 2.76 THOUSANDS/ΜL (ref 1.85–7.62)
NEUTS SEG NFR BLD AUTO: 56 % (ref 43–75)
NRBC BLD AUTO-RTO: 0 /100 WBCS
PLATELET # BLD AUTO: 187 THOUSANDS/UL (ref 149–390)
PMV BLD AUTO: 11 FL (ref 8.9–12.7)
POTASSIUM SERPL-SCNC: 4 MMOL/L (ref 3.5–5.3)
RBC # BLD AUTO: 4.22 MILLION/UL (ref 3.88–5.62)
SODIUM SERPL-SCNC: 143 MMOL/L (ref 136–145)
WBC # BLD AUTO: 4.98 THOUSAND/UL (ref 4.31–10.16)

## 2020-12-23 PROCEDURE — 36415 COLL VENOUS BLD VENIPUNCTURE: CPT

## 2020-12-23 PROCEDURE — 85025 COMPLETE CBC W/AUTO DIFF WBC: CPT

## 2020-12-23 PROCEDURE — 80048 BASIC METABOLIC PNL TOTAL CA: CPT

## 2020-12-23 PROCEDURE — U0003 INFECTIOUS AGENT DETECTION BY NUCLEIC ACID (DNA OR RNA); SEVERE ACUTE RESPIRATORY SYNDROME CORONAVIRUS 2 (SARS-COV-2) (CORONAVIRUS DISEASE [COVID-19]), AMPLIFIED PROBE TECHNIQUE, MAKING USE OF HIGH THROUGHPUT TECHNOLOGIES AS DESCRIBED BY CMS-2020-01-R: HCPCS | Performed by: OTOLARYNGOLOGY

## 2020-12-24 LAB — SARS-COV-2 RNA SPEC QL NAA+PROBE: NOT DETECTED

## 2021-01-04 ENCOUNTER — TELEPHONE (OUTPATIENT)
Dept: INTERNAL MEDICINE CLINIC | Facility: CLINIC | Age: 76
End: 2021-01-04

## 2021-01-04 NOTE — TELEPHONE ENCOUNTER
Pt has appt with Mathieu Sarah was a [facility] pt  Does he and his wife need lab work done prior to appt    Call pt [phone]

## 2021-01-09 DIAGNOSIS — M54.2 CERVICALGIA: ICD-10-CM

## 2021-01-11 RX ORDER — MELOXICAM 7.5 MG/1
TABLET ORAL
Qty: 30 TABLET | Refills: 3 | Status: SHIPPED | OUTPATIENT
Start: 2021-01-11 | End: 2021-04-27

## 2021-01-15 ENCOUNTER — VBI (OUTPATIENT)
Dept: ADMINISTRATIVE | Facility: OTHER | Age: 76
End: 2021-01-15

## 2021-01-15 ENCOUNTER — OFFICE VISIT (OUTPATIENT)
Dept: INTERNAL MEDICINE CLINIC | Facility: CLINIC | Age: 76
End: 2021-01-15
Payer: MEDICARE

## 2021-01-15 VITALS
TEMPERATURE: 98.4 F | DIASTOLIC BLOOD PRESSURE: 50 MMHG | SYSTOLIC BLOOD PRESSURE: 110 MMHG | BODY MASS INDEX: 30.29 KG/M2 | RESPIRATION RATE: 16 BRPM | HEART RATE: 56 BPM | OXYGEN SATURATION: 98 % | HEIGHT: 64 IN | WEIGHT: 177.4 LBS

## 2021-01-15 DIAGNOSIS — J44.9 CHRONIC OBSTRUCTIVE PULMONARY DISEASE, UNSPECIFIED COPD TYPE (HCC): Primary | ICD-10-CM

## 2021-01-15 DIAGNOSIS — E78.2 MIXED HYPERLIPIDEMIA: ICD-10-CM

## 2021-01-15 DIAGNOSIS — D53.1 MEGALOBLASTIC RED BLOOD CELLS: ICD-10-CM

## 2021-01-15 DIAGNOSIS — I10 ESSENTIAL HYPERTENSION: ICD-10-CM

## 2021-01-15 DIAGNOSIS — I51.89 DIASTOLIC DYSFUNCTION: ICD-10-CM

## 2021-01-15 PROCEDURE — 99214 OFFICE O/P EST MOD 30 MIN: CPT | Performed by: FAMILY MEDICINE

## 2021-01-15 NOTE — PROGRESS NOTES
FOLLOW-UP OFFICE VISIT  [facility] [address] [place] [address]    NAME: Varun Barakat  AGE: 66 y o  SEX: male  : 1945     DATE: 1/15/2021     Assessment and Plan:     Problem List Items Addressed This Visit        Respiratory    Chronic obstructive pulmonary disease ([address] ) - Primary       Cardiovascular and Mediastinum    Essential hypertension    Relevant Orders    Comprehensive metabolic panel       Other    Mixed hyperlipidemia    Relevant Orders    Lipid Panel with Direct LDL reflex    Diastolic dysfunction    Megaloblastic red blood cells    Relevant Orders    CBC and differential    Vitamin B12          Plan:  BP at goal   Patient continue current antihypertensive  COPD stable  No recent flares  Diastolic dysfunction grade 1 without evidence of the progression     On a statin  Will continue  repeat lipids ordered  will discuss risk benefit of continuing the medication for primary prevention at f/u   no anemia however increased MCV noted on multiple CBCs  repeat ordered with vitamin B12 to rule out deficiency  Stop asa for primary preventation due to bleeding risk  BMI Counseling: Body mass index is 30 93 kg/m²  The BMI is above normal  Nutrition recommendations include encouraging healthy choices of fruits and vegetables, reducing intake of saturated and trans fat and reducing intake of cholesterol  Return in about 6 months (around 7/15/2021)  Chief Complaint:     Chief Complaint   Patient presents with    Follow-up        History of Present Illness:   55-year-old male past medical history listed below presents for follow-up  Patient last seen by previous PCP on   Since last visit patient reports no significant issues  He stays active  Says he walks often  He has a primary cook for his household  Tries to cook healthy for him and his wife  Has history of sleep apnea and uses CPAP    He is actively trying to obtain inspire which is CPAP alternative for GREGG  Patient does have chronic neck pain to which he is followed by pain management  Review of Systems:     Review of Systems   HENT: Positive for rhinorrhea and sneezing  Eyes: Positive for visual disturbance (wears glasses  )  Respiratory: Negative for cough and shortness of breath  Cardiovascular: Negative for chest pain  Gastrointestinal: Negative for constipation and diarrhea  Musculoskeletal: Negative for gait problem  Neurological: Negative for headaches  Problem List:     Patient Active Problem List   Diagnosis    Chest pain    Essential hypertension    Mixed hyperlipidemia    Chronic pain disorder    Chronic left shoulder pain    Chronic bilateral low back pain with bilateral sciatica    Disc degeneration, lumbar    Lumbar stenosis with neurogenic claudication    Abnormal CT of the abdomen    Chronic pain syndrome    Moderate obstructive sleep apnea    Benign prostatic hyperplasia with lower urinary tract symptoms    Hydronephrosis    Nonrheumatic aortic valve insufficiency    Non-rheumatic mitral regurgitation    Diastolic dysfunction    Nephrolithiasis    Squamous cell carcinoma in situ (SCCIS) of scalp    Actinic keratosis    Chronic obstructive pulmonary disease (HCC)    Megaloblastic red blood cells        Objective:     /50   Pulse 56   Temp 98 4 °F (36 9 °C)   Resp 16   Ht 5' 3 5" (1 613 m)   Wt 80 5 kg (177 lb 6 4 oz)   SpO2 98%   BMI 30 93 kg/m²     Physical Exam  HENT:      Head: Normocephalic and atraumatic  Right Ear: External ear normal       Left Ear: External ear normal    Eyes:      Conjunctiva/sclera: Conjunctivae normal       Pupils: Pupils are equal, round, and reactive to light  Cardiovascular:      Rate and Rhythm: Normal rate and regular rhythm  Heart sounds: No murmur  Pulmonary:      Effort: Pulmonary effort is normal       Breath sounds: Normal breath sounds     Abdominal:      General: Bowel sounds are normal  There is no distension  Tenderness: There is no abdominal tenderness  Musculoskeletal:      Right lower leg: No edema  Left lower leg: No edema  Neurological:      Mental Status: He is alert and oriented to person, place, and time  Psychiatric:         Mood and Affect: Mood normal          Behavior: Behavior normal          Pertinent Laboratory/Diagnostic Studies:    Laboratory Results: I have personally reviewed the pertinent laboratory results/reports     CBC:   Results from Last 12 Months   Lab Units 12/23/20  0736   WBC Thousand/uL 4 98   RBC Million/uL 4 22   HEMOGLOBIN g/dL 14 4   HEMATOCRIT % 43 1   MCV fL 102*   MCH pg 34 1   MCHC g/dL 33 4   RDW % 13 1   MPV fL 11 0   PLATELETS Thousands/uL 187   NRBC AUTO /100 WBCs 0   NEUTROS PCT % 56   LYMPHS PCT % 32   MONOS PCT % 11   EOS PCT % 1   BASOS PCT % 0   NEUTROS ABS Thousands/µL 2 76   LYMPHS ABS Thousands/µL 1 60   MONOS ABS Thousand/µL 0 54   EOS ABS Thousand/µL 0 05     Chemistry Profile:   Results from Last 12 Months   Lab Units 12/23/20  0736 11/27/20  1132   POTASSIUM mmol/L 4 0 4 3   CHLORIDE mmol/L 110* 109*   CO2 mmol/L 28 29   BUN mg/dL 27* 22   CREATININE mg/dL 0 93 0 99   GLUCOSE FASTING mg/dL 94  --    GLUCOSE RANDOM mg/dL  --  87   CALCIUM mg/dL 10 0 9 8   MAGNESIUM mg/dL  --  2 4   AST U/L  --  20   ALT U/L  --  27   ALK PHOS U/L  --  68   EGFR ml/min/1 73sq m 79 73       Radiology/Other Diagnostic Testing Results: I have personally reviewed pertinent reports          [facility]  1/15/2021 11:44 AM

## 2021-01-15 NOTE — PATIENT INSTRUCTIONS
Stop taking a baby aspirin    Preventing Falls: Exercises to Improve Balance, Flexibility, Strength, and Staying Power      Certain types of exercises may help make you less likely to fall  Try the ones below  Or do other exercises that your healthcare provider suggests  Depending on your health, you may need to start slowly  Dont let that stop you  Even small amounts of exercise can help you  Be sure to talk to your healthcare provider before starting any exercise program     Improve Balance  Many types of exercise can help improve balance  Tyree chi and yoga are good examples  Heres another one to try  You can do it anytime and almost anywhere  · Stand next to a counter or solid support  · Push yourself up onto your tiptoes  · Hold for 5 seconds  If you start to lose your balance, hold on to the counter  · Rest and repeat 5 times  Work up to holding for 20 to 30 seconds, if you can  Increase Flexibility  Being more flexible makes it easier for you to move around safely  Try exercises like the seated hamstring stretch  · Sit in a chair and put one foot on a stool  · Straighten your leg and reach with both hands down either side of your leg  Reach as far down your leg as you can  · Hold for about 20 seconds  · Go back to the starting position  Then repeat 5 times  Switch legs  Build Strength  Resistance exercises help build strength  You can do them without equipment  Or you can use weights, elastic bands, or special machines  One such exercise is called the biceps curl  You can hold a 1 pound weight or even a can of soup  Do this exercise at least 3 times a week  Strive for every day  · Sit up straight in a chair  · Keep your elbow close to your body and your wrist straight  · Bend your arm, moving your hand up to your shoulder  Then slowly lower your arm  · Repeat 5 times   Switch to the other arm    Build Your Staying Power  Aerobic exercises make your heart and lungs stronger so you can keep moving longer  Walking and swimming are two of the best types of exercises you can do  Using a stationary bike is great, too  Find an aerobic exercise that you enjoy  Start slowly and build up  Even 5 minutes is helpful  Aim for a goal of 30 minutes, at least 3 times a week  You dont have to do 30 minutes in one session  Break it up and walk a little throughout the day  More Helpful Tips  · Start easy  Slowly work up to doing more  · Talk with your healthcare provider about the best exercises for you  · Call senior centers or health clubs about exercise programs  · If needed, have a family member watch you walk every so often to check your stability  · Exercise with a friend  Choose an activity you both enjoy  · Try exercises that you can do anytime, anywhere  Here are two examples  Have someone with you when you first try these:  · Practice walking by placing one foot right in front of the other  · Stand up and sit down 10 times  Repeat this throughout the day

## 2021-01-19 ENCOUNTER — TELEPHONE (OUTPATIENT)
Dept: PAIN MEDICINE | Facility: CLINIC | Age: 76
End: 2021-01-19

## 2021-01-19 ENCOUNTER — HOSPITAL ENCOUNTER (OUTPATIENT)
Dept: RADIOLOGY | Facility: CLINIC | Age: 76
Discharge: HOME | End: 2021-01-19
Attending: STUDENT IN AN ORGANIZED HEALTH CARE EDUCATION/TRAINING PROGRAM | Admitting: STUDENT IN AN ORGANIZED HEALTH CARE EDUCATION/TRAINING PROGRAM
Payer: MEDICARE

## 2021-01-19 VITALS
DIASTOLIC BLOOD PRESSURE: 66 MMHG | TEMPERATURE: 96.5 F | SYSTOLIC BLOOD PRESSURE: 145 MMHG | OXYGEN SATURATION: 98 % | RESPIRATION RATE: 20 BRPM | HEART RATE: 56 BPM

## 2021-01-19 DIAGNOSIS — M47.812 CERVICAL SPONDYLOSIS: ICD-10-CM

## 2021-01-19 DIAGNOSIS — M54.2 CERVICALGIA: ICD-10-CM

## 2021-01-19 PROCEDURE — 64492 INJ PARAVERT F JNT C/T 3 LEV: CPT | Performed by: STUDENT IN AN ORGANIZED HEALTH CARE EDUCATION/TRAINING PROGRAM

## 2021-01-19 PROCEDURE — 64490 INJ PARAVERT F JNT C/T 1 LEV: CPT | Performed by: STUDENT IN AN ORGANIZED HEALTH CARE EDUCATION/TRAINING PROGRAM

## 2021-01-19 PROCEDURE — 64491 INJ PARAVERT F JNT C/T 2 LEV: CPT | Performed by: STUDENT IN AN ORGANIZED HEALTH CARE EDUCATION/TRAINING PROGRAM

## 2021-01-19 RX ORDER — LIDOCAINE HYDROCHLORIDE 10 MG/ML
5 INJECTION, SOLUTION EPIDURAL; INFILTRATION; INTRACAUDAL; PERINEURAL ONCE
Status: COMPLETED | OUTPATIENT
Start: 2021-01-19 | End: 2021-01-19

## 2021-01-19 RX ADMIN — LIDOCAINE HYDROCHLORIDE 5 ML: 10 INJECTION, SOLUTION EPIDURAL; INFILTRATION; INTRACAUDAL; PERINEURAL at 09:30

## 2021-01-19 NOTE — TELEPHONE ENCOUNTER
Patient just had procedure he would like to know when can he start driving again       Please advise,    Thank you    [phone]

## 2021-01-19 NOTE — TELEPHONE ENCOUNTER
S/W pt who states he is having no dizziness, headache, numbness, or problems turning neck  Would like to get meds for his wife   Advised pt to give it a couple more hours and if he feel ok, ok to drive short distance

## 2021-01-19 NOTE — DISCHARGE INSTR - LAB

## 2021-01-19 NOTE — H&P
History of Present Illness: The patient is a 66 y o  male who presents with complaints of left sided neck pain    Patient Active Problem List   Diagnosis    Chest pain    Essential hypertension    Mixed hyperlipidemia    Chronic pain disorder    Chronic left shoulder pain    Chronic bilateral low back pain with bilateral sciatica    Disc degeneration, lumbar    Lumbar stenosis with neurogenic claudication    Abnormal CT of the abdomen    Chronic pain syndrome    Moderate obstructive sleep apnea    Benign prostatic hyperplasia with lower urinary tract symptoms    Hydronephrosis    Nonrheumatic aortic valve insufficiency    Non-rheumatic mitral regurgitation    Diastolic dysfunction    Nephrolithiasis    Squamous cell carcinoma in situ (SCCIS) of scalp    Actinic keratosis    Chronic obstructive pulmonary disease (HCC)    Megaloblastic red blood cells       Past Medical History:   Diagnosis Date    Abnormal stress test     [facility])     Apnea     Carpal tunnel syndrome on left     Chest pain     CPAP (continuous positive airway pressure) dependence     Diverticulitis     GERD (gastroesophageal reflux disease)     Hypercholesterolemia     Hypercholesterolemia     Hyperlipidemia     Hypertension     Joint pain     Right Shoulder    Kidney stone     Mitral valve disorder     Neck pain     Sleep apnea     Thoracic neuritis        Past Surgical History:   Procedure Laterality Date    COLONOSCOPY      Complete    CORONARY ANGIOPLASTY  2017    ESOPHAGOGASTRODUODENOSCOPY      Diagnostic    FL RETROGRADE PYELOGRAM  5/24/2019    FOOT SURGERY Left     tarsal tunnel    HERNIA REPAIR      KNEE ARTHROSCOPY Left     KNEE SURGERY Left     NEUROPLASTY / TRANSPOSITION MEDIAN NERVE AT CARPAL TUNNEL      W[place] COLONOSCOPY FLX DX W/COLLJ SPEC WHEN PFRMD N/A 4/10/2018    Procedure: EGD AND COLONOSCOPY;  Surgeon: Bradford Steen MD;  [facility];   Service: Gastroenterology  UT CYSTO/URETERO W/LITHOTRIPSY &INDWELL STENT INSRT Right 5/24/2019    Procedure: CYSTOSCOPY URETEROSCOPY WITH LITHOTRIPSY HOLMIUM LASER, RETROGRADE PYELOGRAM AND INSERTION STENT URETERAL;  Surgeon: Dominique Zamorano MD;  [facility];  Service: Urology    UT CYSTOURETHRO W/IMPLANT N/A 8/30/2018    Procedure: CYSTOSCOPY WITH INSERTION [place];  Surgeon: Dominique Zamorano MD;  [facility];  Service: Urology    UT TRANSURETHRAL INCISION OF PROSTATE N/A 8/30/2018    Procedure: TRANSURETHRAL INCISION OF PROSTATE (TUIP);   Surgeon: Dominique Zamorano MD;  [facility];  Service: Urology    ROTATOR CUFF REPAIR Bilateral     SHOULDER SURGERY      TARSAL TUNNEL RELEASE      Neuroplasty Decompression         Current Outpatient Medications:     amLODIPine (NORVASC) 10 mg tablet, Take 1 tablet (10 mg total) by mouth every morning, Disp: 90 tablet, Rfl: 3    fenofibrate (TRICOR) 145 mg tablet, TAKE 1 TABLET (145 MG TOTAL) BY MOUTH DAILY, Disp: 90 tablet, Rfl: 2    ketoconazole (NIZORAL) 2 % cream, Apply topically 2 (two) times a day To facial rash repeat as needed treat for 3 weeks, Disp: 30 g, Rfl: 3    lisinopril (ZESTRIL) 40 mg tablet, Take 1 tablet (40 mg total) by mouth daily, Disp: 90 tablet, Rfl: 3    loperamide (IMODIUM A-D) 2 MG tablet, Take 1 tablet by mouth daily as needed, Disp: , Rfl:     lovastatin (MEVACOR) 40 MG tablet, Take 1 tablet (40 mg total) by mouth daily, Disp: 90 tablet, Rfl: 3    meclizine (ANTIVERT) 25 mg tablet, Take 1 tablet (25 mg total) by mouth 3 (three) times a day as needed for dizziness (Patient not taking: Reported on 12/22/2020), Disp: 30 tablet, Rfl: 0    meloxicam (MOBIC) 7 5 mg tablet, TAKE 1 TABLET (7 5 MG TOTAL) BY MOUTH DAILY MAY TAKE A SECOND PILL AS NEEDED, NO MORE THAN 2 PER DAY, Disp: 30 tablet, Rfl: 3    Multiple Vitamins-Minerals (CENTRUM ULTRA MENS PO), Take 1 tablet by mouth daily, Disp: , Rfl:     omeprazole (PriLOSEC) 20 mg delayed release capsule, TAKE ONE CAPSULE BY MOUTH DAILY, Disp: 90 capsule, Rfl: 1    pseudoephedrine (SUDAFED) 30 mg tablet, Take 60 mg by mouth daily at bedtime as needed , Disp: , Rfl:     vitamin B-12 (VITAMIN B-12) 1,000 mcg tablet, Take 1,000 mcg by mouth every third day , Disp: , Rfl:     Allergies   Allergen Reactions    Iodine      Nausea, hot, sweaty -- had through an IV  Has had it since with no issues    Pt reports no issues with IV contrast - has had numerous times with no issues        Physical Exam:   Vitals:    01/19/21 0912   Pulse: 56   Resp: 20   Temp: (!) 96 5 °F (35 8 °C)   SpO2: 99%     General: Awake, Alert, Oriented x 3, Mood and affect appropriate  Respiratory: Respirations even and unlabored  Cardiovascular: Peripheral pulses intact; no edema  Musculoskeletal Exam: left sided neck pain    ASA Score: 3    Patient/Chart Verification  Patient ID Verified: Verbal  ID Band Applied: No  Consents Confirmed: To be obtained in the Pre-Procedure area  H&P( within 30 days) Verified: To be obtained in the Pre-Procedure area  Interval H&P(within 24 hr) Complete (required for Outpatients and Surgery Admit only): To be obtained in the Pre-Procedure area  Allergies Reviewed: Yes  Anticoag/NSAID held?: NA  Currently on antibiotics?: No  Pregnancy denied?: NA    Assessment:   1  Cervicalgia    2   Cervical spondylosis        Plan: Left C3-6 MBB#1

## 2021-01-20 ENCOUNTER — TELEPHONE (OUTPATIENT)
Dept: RADIOLOGY | Facility: CLINIC | Age: 76
End: 2021-01-20

## 2021-01-20 NOTE — TELEPHONE ENCOUNTER
S/P Left C3-C6 MBB "1 on 1/19/21  Pt reports 50-80% re;ief initially,  2 hours of % relief  Then 50-80% relief hour 3-10  Thereafter, less than 50% re;ief  Ok to move forward with MBB #2?

## 2021-01-21 NOTE — TELEPHONE ENCOUNTER
S/w pt and scheduled MBB #2 for 2/4/21 for 10 am  Gave pre procedure instructions, masking/ policy and COVID screening

## 2021-01-22 ENCOUNTER — VBI (OUTPATIENT)
Dept: ADMINISTRATIVE | Facility: OTHER | Age: 76
End: 2021-01-22

## 2021-01-24 DIAGNOSIS — Z23 ENCOUNTER FOR IMMUNIZATION: ICD-10-CM

## 2021-01-27 ENCOUNTER — VBI (OUTPATIENT)
Dept: ADMINISTRATIVE | Facility: OTHER | Age: 76
End: 2021-01-27

## 2021-02-03 ENCOUNTER — VBI (OUTPATIENT)
Dept: ADMINISTRATIVE | Facility: OTHER | Age: 76
End: 2021-02-03

## 2021-02-04 ENCOUNTER — HOSPITAL ENCOUNTER (OUTPATIENT)
Dept: RADIOLOGY | Facility: CLINIC | Age: 76
Discharge: HOME | End: 2021-02-04
Attending: STUDENT IN AN ORGANIZED HEALTH CARE EDUCATION/TRAINING PROGRAM
Payer: MEDICARE

## 2021-02-04 VITALS
RESPIRATION RATE: 20 BRPM | OXYGEN SATURATION: 98 % | HEART RATE: 60 BPM | DIASTOLIC BLOOD PRESSURE: 65 MMHG | TEMPERATURE: 97.5 F | SYSTOLIC BLOOD PRESSURE: 130 MMHG

## 2021-02-04 DIAGNOSIS — M47.812 CERVICAL SPONDYLOSIS: ICD-10-CM

## 2021-02-04 PROCEDURE — 64492 INJ PARAVERT F JNT C/T 3 LEV: CPT | Performed by: STUDENT IN AN ORGANIZED HEALTH CARE EDUCATION/TRAINING PROGRAM

## 2021-02-04 PROCEDURE — 64491 INJ PARAVERT F JNT C/T 2 LEV: CPT | Performed by: STUDENT IN AN ORGANIZED HEALTH CARE EDUCATION/TRAINING PROGRAM

## 2021-02-04 PROCEDURE — 64490 INJ PARAVERT F JNT C/T 1 LEV: CPT | Performed by: STUDENT IN AN ORGANIZED HEALTH CARE EDUCATION/TRAINING PROGRAM

## 2021-02-04 RX ORDER — BUPIVACAINE HCL/PF 2.5 MG/ML
5 VIAL (ML) INJECTION ONCE
Status: COMPLETED | OUTPATIENT
Start: 2021-02-04 | End: 2021-02-04

## 2021-02-04 RX ADMIN — Medication 2 ML: at 10:30

## 2021-02-04 NOTE — DISCHARGE INSTR - LAB

## 2021-02-08 ENCOUNTER — TELEPHONE (OUTPATIENT)
Dept: RADIOLOGY | Facility: CLINIC | Age: 76
End: 2021-02-08

## 2021-02-08 ENCOUNTER — IMMUNIZATIONS (OUTPATIENT)
Dept: FAMILY MEDICINE CLINIC | Facility: HOSPITAL | Age: 76
End: 2021-02-08

## 2021-02-08 DIAGNOSIS — Z23 ENCOUNTER FOR IMMUNIZATION: Primary | ICD-10-CM

## 2021-02-08 PROCEDURE — 91301 SARS-COV-2 / COVID-19 MRNA VACCINE (MODERNA) 100 MCG: CPT

## 2021-02-08 PROCEDURE — 0011A SARS-COV-2 / COVID-19 MRNA VACCINE (MODERNA) 100 MCG: CPT

## 2021-02-08 NOTE — TELEPHONE ENCOUNTER
S/p L C3-6 MBB #2 2/4  Pt reports less than 50% relief for the first 50 minutes, tehn 50-80% relief for 30 minutes, then % relief for 9 hours  Schedule RFA?

## 2021-02-08 NOTE — TELEPHONE ENCOUNTER
S/w pt and scheduled RFA for 3/2/21 1030 am arrival  Gave pre procedure instructions, masking/ policy and COVD screening

## 2021-03-02 ENCOUNTER — HOSPITAL ENCOUNTER (OUTPATIENT)
Dept: RADIOLOGY | Facility: CLINIC | Age: 76
Discharge: HOME | End: 2021-03-02
Attending: STUDENT IN AN ORGANIZED HEALTH CARE EDUCATION/TRAINING PROGRAM | Admitting: STUDENT IN AN ORGANIZED HEALTH CARE EDUCATION/TRAINING PROGRAM
Payer: MEDICARE

## 2021-03-02 VITALS
TEMPERATURE: 97.3 F | RESPIRATION RATE: 18 BRPM | HEART RATE: 61 BPM | DIASTOLIC BLOOD PRESSURE: 60 MMHG | OXYGEN SATURATION: 96 % | SYSTOLIC BLOOD PRESSURE: 137 MMHG

## 2021-03-02 DIAGNOSIS — M47.812 CERVICAL SPONDYLOSIS: ICD-10-CM

## 2021-03-02 PROCEDURE — 64633 DESTROY CERV/THOR FACET JNT: CPT | Performed by: STUDENT IN AN ORGANIZED HEALTH CARE EDUCATION/TRAINING PROGRAM

## 2021-03-02 PROCEDURE — 64634 DESTROY C/TH FACET JNT ADDL: CPT | Performed by: STUDENT IN AN ORGANIZED HEALTH CARE EDUCATION/TRAINING PROGRAM

## 2021-03-02 RX ORDER — LIDOCAINE HYDROCHLORIDE 10 MG/ML
10 INJECTION, SOLUTION EPIDURAL; INFILTRATION; INTRACAUDAL; PERINEURAL ONCE
Status: COMPLETED | OUTPATIENT
Start: 2021-03-02 | End: 2021-03-02

## 2021-03-02 RX ADMIN — Medication 4 ML: at 10:55

## 2021-03-02 RX ADMIN — LIDOCAINE HYDROCHLORIDE 4 ML: 10 INJECTION, SOLUTION EPIDURAL; INFILTRATION; INTRACAUDAL; PERINEURAL at 10:41

## 2021-03-02 NOTE — DISCHARGE INSTR - LAB

## 2021-03-02 NOTE — H&P
History of Present Illness: The patient is a 66 y o  male who presents with complaints of NECK PAIN    Patient Active Problem List   Diagnosis    Chest pain    Essential hypertension    Mixed hyperlipidemia    Chronic pain disorder    Chronic left shoulder pain    Chronic bilateral low back pain with bilateral sciatica    Disc degeneration, lumbar    Lumbar stenosis with neurogenic claudication    Abnormal CT of the abdomen    Chronic pain syndrome    Moderate obstructive sleep apnea    Benign prostatic hyperplasia with lower urinary tract symptoms    Hydronephrosis    Nonrheumatic aortic valve insufficiency    Non-rheumatic mitral regurgitation    Diastolic dysfunction    Nephrolithiasis    Squamous cell carcinoma in situ (SCCIS) of scalp    Actinic keratosis    Chronic obstructive pulmonary disease (HCC)    Megaloblastic red blood cells       Past Medical History:   Diagnosis Date    Abnormal stress test     [facility] rest [facility])     Apnea     Carpal tunnel syndrome on left     Chest pain     CPAP (continuous positive airway pressure) dependence     Diverticulitis     GERD (gastroesophageal reflux disease)     Hypercholesterolemia     Hypercholesterolemia     Hyperlipidemia     Hypertension     Joint pain     Right Shoulder    Kidney stone     Mitral valve disorder     Neck pain     Sleep apnea     Thoracic neuritis        Past Surgical History:   Procedure Laterality Date    COLONOSCOPY      Complete    CORONARY ANGIOPLASTY  2017    ESOPHAGOGASTRODUODENOSCOPY      Diagnostic    FL RETROGRADE PYELOGRAM  5/24/2019    FOOT SURGERY Left     tarsal tunnel    HERNIA REPAIR      KNEE ARTHROSCOPY Left     KNEE SURGERY Left     NEUROPLASTY / TRANSPOSITION MEDIAN NERVE AT CARPAL TUNNEL      RI COLONOSCOPY FLX DX W/COLLJ SPEC WHEN PFRMD N/A 4/10/2018    Procedure: EGD AND COLONOSCOPY;  Surgeon: Bradford Steen MD;  [facility];   Service: Gastroenterology    RI CYSTO/URETERO W/LITHOTRIPSY &INDWELL STENT INSRT Right 5/24/2019    Procedure: CYSTOSCOPY URETEROSCOPY WITH LITHOTRIPSY HOLMIUM LASER, RETROGRADE PYELOGRAM AND INSERTION STENT URETERAL;  Surgeon: Elizabeth Bolanos MD;  [facility];  Service: Urology    MI CYSTOURETHRO W/IMPLANT N/A 8/30/2018    Procedure: CYSTOSCOPY WITH INSERTION [place];  Surgeon: Elizabeth Bolanos MD;  [facility];  Service: Urology    MI TRANSURETHRAL INCISION OF PROSTATE N/A 8/30/2018    Procedure: TRANSURETHRAL INCISION OF PROSTATE (TUIP);   Surgeon: Elizabeth Bolanos MD;  [facility];  Service: Urology    ROTATOR CUFF REPAIR Bilateral     SHOULDER SURGERY      TARSAL TUNNEL RELEASE      Neuroplasty Decompression         Current Outpatient Medications:     amLODIPine (NORVASC) 10 mg tablet, Take 1 tablet (10 mg total) by mouth every morning, Disp: 90 tablet, Rfl: 3    fenofibrate (TRICOR) 145 mg tablet, TAKE 1 TABLET (145 MG TOTAL) BY MOUTH DAILY, Disp: 90 tablet, Rfl: 2    ketoconazole (NIZORAL) 2 % cream, Apply topically 2 (two) times a day To facial rash repeat as needed treat for 3 weeks, Disp: 30 g, Rfl: 3    lisinopril (ZESTRIL) 40 mg tablet, Take 1 tablet (40 mg total) by mouth daily, Disp: 90 tablet, Rfl: 3    loperamide (IMODIUM A-D) 2 MG tablet, Take 1 tablet by mouth daily as needed, Disp: , Rfl:     lovastatin (MEVACOR) 40 MG tablet, Take 1 tablet (40 mg total) by mouth daily, Disp: 90 tablet, Rfl: 3    meclizine (ANTIVERT) 25 mg tablet, Take 1 tablet (25 mg total) by mouth 3 (three) times a day as needed for dizziness (Patient not taking: Reported on 12/22/2020), Disp: 30 tablet, Rfl: 0    meloxicam (MOBIC) 7 5 mg tablet, TAKE 1 TABLET (7 5 MG TOTAL) BY MOUTH DAILY MAY TAKE A SECOND PILL AS NEEDED, NO MORE THAN 2 PER DAY, Disp: 30 tablet, Rfl: 3    Multiple Vitamins-Minerals (CENTRUM ULTRA MENS PO), Take 1 tablet by mouth daily, Disp: , Rfl:     omeprazole (PriLOSEC) 20 mg delayed release capsule, TAKE ONE CAPSULE BY MOUTH DAILY, Disp: 90 capsule, Rfl: 1    pseudoephedrine (SUDAFED) 30 mg tablet, Take 60 mg by mouth daily at bedtime as needed , Disp: , Rfl:     vitamin B-12 (VITAMIN B-12) 1,000 mcg tablet, Take 1,000 mcg by mouth every third day , Disp: , Rfl:     Allergies   Allergen Reactions    Iodine (Food Allergy)      Nausea, hot, sweaty -- had through an IV  Has had it since with no issues    Pt reports no issues with IV contrast - has had numerous times with no issues        Physical Exam: There were no vitals filed for this visit  General: Awake, Alert, Oriented x 3, Mood and affect appropriate  Respiratory: Respirations even and unlabored  Cardiovascular: Peripheral pulses intact; no edema  Musculoskeletal Exam: NECK PAIN    ASA Score: 3         Assessment:   1   Cervical spondylosis        Plan: LEFT C3-6 RFA

## 2021-03-03 ENCOUNTER — TELEPHONE (OUTPATIENT)
Dept: RADIOLOGY | Facility: CLINIC | Age: 76
End: 2021-03-03

## 2021-03-03 NOTE — TELEPHONE ENCOUNTER
S/W pt who states had a little bit of a rough day yesterday, but better today  Feels that it has helped    Denies s/s of infection, fever of sunburn  Will CB to make f/u appt

## 2021-03-08 ENCOUNTER — IMMUNIZATIONS (OUTPATIENT)
Dept: FAMILY MEDICINE CLINIC | Facility: HOSPITAL | Age: 76
End: 2021-03-08

## 2021-03-08 DIAGNOSIS — Z23 ENCOUNTER FOR IMMUNIZATION: Primary | ICD-10-CM

## 2021-03-08 PROCEDURE — 0012A SARS-COV-2 / COVID-19 MRNA VACCINE (MODERNA) 100 MCG: CPT

## 2021-03-08 PROCEDURE — 91301 SARS-COV-2 / COVID-19 MRNA VACCINE (MODERNA) 100 MCG: CPT

## 2021-03-15 ENCOUNTER — OFFICE VISIT (OUTPATIENT)
Dept: CARDIOLOGY CLINIC | Facility: CLINIC | Age: 76
End: 2021-03-15
Payer: MEDICARE

## 2021-03-15 VITALS
DIASTOLIC BLOOD PRESSURE: 64 MMHG | OXYGEN SATURATION: 98 % | BODY MASS INDEX: 29.32 KG/M2 | HEART RATE: 60 BPM | HEIGHT: 65 IN | SYSTOLIC BLOOD PRESSURE: 130 MMHG | RESPIRATION RATE: 18 BRPM | WEIGHT: 176 LBS

## 2021-03-15 DIAGNOSIS — E78.2 MIXED HYPERLIPIDEMIA: ICD-10-CM

## 2021-03-15 DIAGNOSIS — I10 ESSENTIAL HYPERTENSION: Primary | ICD-10-CM

## 2021-03-15 PROCEDURE — 99214 OFFICE O/P EST MOD 30 MIN: CPT | Performed by: INTERNAL MEDICINE

## 2021-03-15 NOTE — PROGRESS NOTES
Cardiology Office Note    Eladio Price 66 y o  male MRN: [id number]    03/15/21          Assessment:  1  Hypertension  2  Hyperlipidemia  3  GREGG on CPAP    Plan:  · Cont lisinopril 40 mg/day and amlodipine 10 mg/day  · Cont lovastatin and fenofibrate; repeat FLP pending  · Ambulatory blood pressure monitoring and maintaining a low sodium diet was advised  Follow up: 1 year or sooner as needed    1  Essential hypertension     2  Mixed hyperlipidemia         HPI: Eladio Price is a 66y o  year old male with history of hypertension and hyperlipidemia who presents for routine follow-up  Past cardiac evaluation:   · Cardiac catheterization 4/2017- nonobstructive CAD  · TTE 1/2019: EF: 60%, g1dd, mild MR, TR    He has been doing very well from a cardiac standpoint since his last evaluation  His blood pressure has been well controlled on lisinopril and amlodipine  He has noticed minimal lower extremity edema possibly secondary to amlodipine use  He also admits to eating salted pretzels regularly  He denies chest pain, palpitations, dyspnea on exertion or any other cardiac concerns at this time  He has been tolerating his medications without side effect        Family History: mother with hyperlipidemia and unclear cardiac history    Social history: quit smoking 40 years ago      Patient Active Problem List   Diagnosis    Chest pain    Essential hypertension    Mixed hyperlipidemia    Chronic pain disorder    Chronic left shoulder pain    Chronic bilateral low back pain with bilateral sciatica    Disc degeneration, lumbar    Lumbar stenosis with neurogenic claudication    Abnormal CT of the abdomen    Chronic pain syndrome    Moderate obstructive sleep apnea    Benign prostatic hyperplasia with lower urinary tract symptoms    Hydronephrosis    Nonrheumatic aortic valve insufficiency    Non-rheumatic mitral regurgitation    Diastolic dysfunction    Nephrolithiasis    Squamous cell carcinoma in situ (SCCIS) of scalp    Actinic keratosis    Chronic obstructive pulmonary disease (HCC)    Megaloblastic red blood cells       Allergies   Allergen Reactions    Iodine      Nausea, hot, sweaty -- had through an IV    Has had it since with no issues    Pt reports no issues with IV contrast - has had numerous times with no issues          Current Outpatient Medications:     amLODIPine (NORVASC) 10 mg tablet, Take 1 tablet (10 mg total) by mouth every morning, Disp: 90 tablet, Rfl: 3    fenofibrate (TRICOR) 145 mg tablet, TAKE 1 TABLET (145 MG TOTAL) BY MOUTH DAILY, Disp: 90 tablet, Rfl: 2    lisinopril (ZESTRIL) 40 mg tablet, Take 1 tablet (40 mg total) by mouth daily, Disp: 90 tablet, Rfl: 3    loperamide (IMODIUM A-D) 2 MG tablet, Take 1 tablet by mouth daily as needed, Disp: , Rfl:     lovastatin (MEVACOR) 40 MG tablet, Take 1 tablet (40 mg total) by mouth daily, Disp: 90 tablet, Rfl: 3    meloxicam (MOBIC) 7 5 mg tablet, TAKE 1 TABLET (7 5 MG TOTAL) BY MOUTH DAILY MAY TAKE A SECOND PILL AS NEEDED, NO MORE THAN 2 PER DAY, Disp: 30 tablet, Rfl: 3    Multiple Vitamins-Minerals (CENTRUM ULTRA MENS PO), Take 1 tablet by mouth daily, Disp: , Rfl:     omeprazole (PriLOSEC) 20 mg delayed release capsule, TAKE ONE CAPSULE BY MOUTH DAILY, Disp: 90 capsule, Rfl: 1    ketoconazole (NIZORAL) 2 % cream, Apply topically 2 (two) times a day To facial rash repeat as needed treat for 3 weeks (Patient not taking: Reported on 3/15/2021), Disp: 30 g, Rfl: 3    meclizine (ANTIVERT) 25 mg tablet, Take 1 tablet (25 mg total) by mouth 3 (three) times a day as needed for dizziness (Patient not taking: Reported on 12/22/2020), Disp: 30 tablet, Rfl: 0    pseudoephedrine (SUDAFED) 30 mg tablet, Take 60 mg by mouth daily at bedtime as needed , Disp: , Rfl:     vitamin B-12 (VITAMIN B-12) 1,000 mcg tablet, Take 1,000 mcg by mouth every third day , Disp: , Rfl:     Past Medical History:   Diagnosis Date    Abnormal stress test     [facility] rest [facility])     Apnea     Carpal tunnel syndrome on left     Chest pain     CPAP (continuous positive airway pressure) dependence     Diverticulitis     GERD (gastroesophageal reflux disease)     Hypercholesterolemia     Hypercholesterolemia     Hyperlipidemia     Hypertension     Joint pain     Right Shoulder    Kidney stone     Mitral valve disorder     Neck pain     Sleep apnea     Thoracic neuritis        Family History   Problem Relation Age of Onset    Diabetes Mother         Mellitus    Heart disease Mother         Arteriosclerotic Cardiovascular    Hypertension Mother     Cancer Father     Arthritis Father         Rheu Mult Site W Involv Of Organs And Systems    Parkinsonism Father     Thyroid disease Sister     Diabetes Brother         Diabetes:Mellitus    Heart disease Brother     Hypertension Brother     Coronary artery disease Family     Heart disease Brother     No Known Problems Brother     Kidney disease Sister     Parkinsonism Sister        Past Surgical History:   Procedure Laterality Date    COLONOSCOPY      Complete    CORONARY ANGIOPLASTY  2017    ESOPHAGOGASTRODUODENOSCOPY      Diagnostic    FL RETROGRADE PYELOGRAM  5/24/2019    FOOT SURGERY Left     tarsal tunnel    HERNIA REPAIR      KNEE ARTHROSCOPY Left     KNEE SURGERY Left     NEUROPLASTY / TRANSPOSITION MEDIAN NERVE AT CARPAL TUNNEL      VT COLONOSCOPY FLX DX W/COLLJ SPEC WHEN PFRMD N/A 4/10/2018    Procedure: EGD AND COLONOSCOPY;  Surgeon: Aleta Lozano MD;  [facility];   Service: Gastroenterology    VT CYSTO/URETERO W/LITHOTRIPSY &INDWELL STENT INSRT Right 5/24/2019    Procedure: CYSTOSCOPY URETEROSCOPY WITH LITHOTRIPSY HOLMIUM LASER, RETROGRADE PYELOGRAM AND INSERTION STENT URETERAL;  Surgeon: Maddie Spencer MD;  [facility];  Service: Urology    VT CYSTOURETHRO W/IMPLANT N/A 8/30/2018    Procedure: CYSTOSCOPY WITH INSERTION [place]; Surgeon: Foreign Alberto MD;  [facility];  Service: Urology    ID TRANSURETHRAL INCISION OF PROSTATE N/A 2018    Procedure: TRANSURETHRAL INCISION OF PROSTATE (TUIP);   Surgeon: Foreign Alberto MD;  [facility];  Service: Urology    ROTATOR CUFF REPAIR Bilateral     SHOULDER SURGERY      TARSAL TUNNEL RELEASE      Neuroplasty Decompression       Social History     Socioeconomic History    Marital status: /Civil Union     Spouse name: Not on file    Number of children: 4    Years of education: high school or GED    Highest education level: Not on file   Occupational History    Occupation: retired   Social Needs    Financial resource strain: Not on file    Food insecurity     Worry: Not on file     Inability: Not on file   [employer] needs     Medical: Not on file     Non-medical: Not on file   Tobacco Use    Smoking status: Former Smoker     Packs/day: 1 00     Years: 17 00     Pack years: 17 00     Types: Cigarettes     Quit date:      Years since quittin 2    Smokeless tobacco: Never Used   Substance and Sexual Activity    Alcohol use: No    Drug use: No    Sexual activity: Not Currently     Partners: Female   Lifestyle    Physical activity     Days per week: 7 days     Minutes per session: 40 min    Stress: Not at all   Relationships    Social connections     Talks on phone: Not on file     Gets together: Not on file     Attends Evangelical service: Not on file     Active member of club or organization: Not on file     Attends meetings of clubs or organizations: Not on file     Relationship status: Not on file    Intimate partner violence     Fear of current or ex partner: Not on file     Emotionally abused: Not on file     Physically abused: Not on file     Forced sexual activity: Not on file   Other Topics Concern    Not on file   Social History Narrative    Active Advance Directives    Lives With Spouse               Review of Systems Constitution: Negative for diaphoresis, weight gain and weight loss  HENT: Negative for congestion  Cardiovascular: Positive for leg swelling (trace, stable)  Negative for chest pain, dyspnea on exertion, irregular heartbeat, near-syncope, orthopnea, palpitations, paroxysmal nocturnal dyspnea and syncope  Respiratory: Negative for shortness of breath, sleep disturbances due to breathing and snoring  Hematologic/Lymphatic: Does not bruise/bleed easily  Skin: Negative for rash  Musculoskeletal: Negative for myalgias  Gastrointestinal: Negative for nausea and vomiting  Neurological: Negative for excessive daytime sleepiness and light-headedness  Psychiatric/Behavioral: The patient is not nervous/anxious  Vitals: /64   Pulse 60   Resp 18   Ht 5' 5" (1 651 m)   Wt 79 8 kg (176 lb)   SpO2 98%   BMI 29 29 kg/m²         Physical Exam:     GEN: Alert and oriented x 3, in no acute distress  Well appearing and well nourished  HEENT: Sclera anicteric, conjunctivae pink, mucous membranes moist  Oropharynx clear  NECK: Supple, no carotid bruits, no significant JVD  Trachea midline, no thyromegaly  HEART: Regular rhythm, normal S1 and S2, 2/6 MYRNA, no clicks, gallops or rubs  PMI nondisplaced, no thrills  LUNGS: Clear to auscultation bilaterally; no wheezes, rales, or rhonchi  No increased work of breathing or signs of respiratory distress  ABDOMEN: Soft, nontender, nondistended, normoactive bowel sounds  EXTREMITIES: Skin warm and well perfused, no clubbing, or cyanosis, trace edema  NEURO: No focal findings  Normal speech  Mood and affect normal    SKIN: Normal without suspicious lesions on exposed skin

## 2021-03-22 ENCOUNTER — OFFICE VISIT (OUTPATIENT)
Dept: PULMONOLOGY | Facility: CLINIC | Age: 76
End: 2021-03-22
Payer: MEDICARE

## 2021-03-22 VITALS
TEMPERATURE: 97.7 F | HEIGHT: 65 IN | SYSTOLIC BLOOD PRESSURE: 120 MMHG | OXYGEN SATURATION: 95 % | DIASTOLIC BLOOD PRESSURE: 74 MMHG | HEART RATE: 62 BPM | WEIGHT: 176 LBS | BODY MASS INDEX: 29.32 KG/M2

## 2021-03-22 DIAGNOSIS — G47.33 MODERATE OBSTRUCTIVE SLEEP APNEA: Primary | ICD-10-CM

## 2021-03-22 PROCEDURE — 99213 OFFICE O/P EST LOW 20 MIN: CPT | Performed by: PHYSICIAN ASSISTANT

## 2021-03-22 NOTE — PROGRESS NOTES
Assessment/Plan:   Diagnoses and all orders for this visit:    Moderate obstructive sleep apnea        Patient is here today for follow-up  Reviewed his CPAP compliance, he is using it every night for about 5 5 hours  Residual AHI is 3 7  He is not happy with using the CPAP every night, finds it uncomfortable, also finds his nasal passages are dry despite using the humidifier as well as Vaseline  He did see Dr Mayela Barreraday for evaluation for [facility]  He underwent a repeat sleep study which shows moderate sleep apnea with an AHI of 18  He has a follow-up with Dr Pedro Monday next week  He will continue to use the CPAP in the meantime  He can follow-up with us in 6 months or sooner if necessary  Return in about 6 months (around 9/22/2021)  All questions are answered to the patient's satisfaction and understanding  He verbalizes understanding  He is encouraged to call with any further questions or concerns  Portions of the record may have been created with voice recognition software  Occasional wrong word or "sound a like" substitutions may have occurred due to the inherent limitations of voice recognition software  Read the chart carefully and recognize, using context, where substitutions have occurred      Electronically Signed by Jayy Kahn PA-C    ______________________________________________________________________    Chief Complaint:   Chief Complaint   Patient presents with    Follow-up    Sleep Apnea       Patient ID: Estela Gomez is a 66 y o  y o  male has a past medical history of Abnormal stress test, Angina at [facility]), Apnea, Carpal tunnel syndrome on left, Chest pain, CPAP (continuous positive airway pressure) dependence, Diverticulitis, GERD (gastroesophageal reflux disease), Hypercholesterolemia, Hypercholesterolemia, Hyperlipidemia, Hypertension, Joint pain, Kidney stone, Mitral valve disorder, Neck pain, Sleep apnea, Sleep apnea, obstructive, and Thoracic neuritis  3/22/2021  Patient presents today for follow-up visit  Patient is a 67 yo male former smoker with PMH of GREGG on CPAP, HTN, HLD, BPH  He is here today for follow up  He continues with his CPAP on a nightly basis  He finds the CPAP uncomfortable to use, wakes up feeling very dry in the morning despite using Vaseline and also using the humidifier on the CPAP  He is interested in the Ivana device and did already see Dr Hwang Gather  He does have an upcoming appointment with him to discuss the procedure  He does also continue to wake during the night to urinate  Review of Systems   Constitutional: Negative  HENT: Positive for congestion  Respiratory: Negative  Cardiovascular: Negative  Gastrointestinal: Negative  Genitourinary: Negative  Musculoskeletal: Negative  Skin: Negative  Allergic/Immunologic: Negative  Neurological: Negative  Psychiatric/Behavioral: Negative  Smoking history: He reports that he quit smoking about 43 years ago  His smoking use included cigarettes  He has a 17 00 pack-year smoking history   He has never used smokeless tobacco     The following portions of the patient's history were reviewed and updated as appropriate: allergies, current medications, past family history, past medical history, past social history, past surgical history and problem list     Immunization History   Administered Date(s) Administered    INFLUENZA 11/02/2005, 09/23/2009, 10/06/2014, 10/01/2015, 09/10/2018    Influenza Split High Dose Preservative Free IM 10/01/2015, 08/30/2017    Influenza, seasonal, injectable 10/01/2016    Pneumococcal Conjugate 13-Valent 07/08/2020    Pneumococcal Polysaccharide PPV23 09/23/2009    SARS-CoV-2 / COVID-19 mRNA IM (Eleno Arellano) 02/08/2021, 03/08/2021    Tdap 1945, 05/03/2018    Zoster 1945    influenza, trivalent, adjuvanted 10/10/2019     Current Outpatient Medications   Medication Sig Dispense Refill    amLODIPine (NORVASC) 10 mg tablet Take 1 tablet (10 mg total) by mouth every morning 90 tablet 3    fenofibrate (TRICOR) 145 mg tablet TAKE 1 TABLET (145 MG TOTAL) BY MOUTH DAILY 90 tablet 2    lisinopril (ZESTRIL) 40 mg tablet Take 1 tablet (40 mg total) by mouth daily 90 tablet 3    loperamide (IMODIUM A-D) 2 MG tablet Take 1 tablet by mouth daily as needed      lovastatin (MEVACOR) 40 MG tablet Take 1 tablet (40 mg total) by mouth daily 90 tablet 3    meloxicam (MOBIC) 7 5 mg tablet TAKE 1 TABLET (7 5 MG TOTAL) BY MOUTH DAILY MAY TAKE A SECOND PILL AS NEEDED, NO MORE THAN 2 PER DAY 30 tablet 3    Multiple Vitamins-Minerals (CENTRUM ULTRA MENS PO) Take 1 tablet by mouth daily      omeprazole (PriLOSEC) 20 mg delayed release capsule TAKE ONE CAPSULE BY MOUTH DAILY 90 capsule 1    pseudoephedrine (SUDAFED) 30 mg tablet Take 60 mg by mouth daily at bedtime as needed       vitamin B-12 (VITAMIN B-12) 1,000 mcg tablet Take 1,000 mcg by mouth every third day       ketoconazole (NIZORAL) 2 % cream Apply topically 2 (two) times a day To facial rash repeat as needed treat for 3 weeks (Patient not taking: Reported on 3/15/2021) 30 g 3    meclizine (ANTIVERT) 25 mg tablet Take 1 tablet (25 mg total) by mouth 3 (three) times a day as needed for dizziness (Patient not taking: Reported on 12/22/2020) 30 tablet 0     No current facility-administered medications for this visit  Allergies: Iodine    Objective:  Vitals:    03/22/21 1306   BP: 120/74   Pulse: 62   Temp: 97 7 °F (36 5 °C)   SpO2: 95%   Weight: 79 8 kg (176 lb)   Height: 5' 5" (1 651 m)   Oxygen Therapy  SpO2: 95 %    Wt Readings from Last 3 Encounters:   03/22/21 79 8 kg (176 lb)   03/15/21 79 8 kg (176 lb)   01/15/21 80 5 kg (177 lb 6 4 oz)     Body mass index is 29 29 kg/m²  Physical Exam  Constitutional:       General: He is not in acute distress  Appearance: Normal appearance  He is not ill-appearing     HENT:      Head: Normocephalic and atraumatic  Eyes:      Conjunctiva/sclera: Conjunctivae normal    Neck:      Musculoskeletal: Normal range of motion  Cardiovascular:      Rate and Rhythm: Normal rate and regular rhythm  Pulmonary:      Effort: Pulmonary effort is normal  No respiratory distress  Breath sounds: Normal breath sounds  No wheezing, rhonchi or rales  Abdominal:      General: Abdomen is flat  There is no distension  Musculoskeletal: Normal range of motion  Right lower leg: No edema  Left lower leg: No edema  Skin:     General: Skin is warm and dry  Neurological:      Mental Status: He is alert and oriented to person, place, and time  Psychiatric:         Mood and Affect: Mood normal          Behavior: Behavior normal          Lab Review:   Lab Results   Component Value Date     10/29/2015    K 4 0 12/23/2020    K 4 2 10/29/2015     (H) 12/23/2020     10/29/2015    CO2 28 12/23/2020    CO2 26 10/29/2015    BUN 27 (H) 12/23/2020    BUN 22 10/29/2015    CREATININE 0 93 12/23/2020    CREATININE 1 04 10/29/2015    GLUCOSE 100 10/29/2015    CALCIUM 10 0 12/23/2020    CALCIUM 9 3 10/29/2015     Lab Results   Component Value Date    WBC 4 98 12/23/2020    WBC 5 77 10/29/2015    HGB 14 4 12/23/2020    HGB 14 7 10/29/2015    HCT 43 1 12/23/2020    HCT 43 8 10/29/2015     (H) 12/23/2020     (H) 10/29/2015     12/23/2020     10/29/2015       Diagnostics:  I have personally reviewed pertinent reports        Reviewed recent sleep study and CPAP compliance  Office Spirometry Results:     ESS: Total score: 3  [facility] Procedure    Result Date: 3/2/2021  Narrative: PROCEDURE NOTE PATIENT NAME:  Mariam Hatchet MEDICAL RECORD NUMBER:  [id number] YOB: 1945 DATE OF PROCEDURE:  03/02/21  PROCEDURE:  Radiofrequency ablation of the medial branch nerves corresponding to the left sided cervical facet joints at the C3-6 levels under fluoroscopic guidance   ANNIE See    PREPROCEDURE DIAGNOSIS:  LEFT cervical facet arthropathy, cervical spondylosis without myelopathy or radiculopathy, and cervical pain   POSTPROCEDURE DIAGNOSIS:   Diagnosis unchanged     ANESTHESIA:  Local   ESTIMATED BLOOD LOSS:  Minimal   COMPLICATIONS:  None      CONSENT:  Today's procedure, its potential benefits as well as its risks and potential side effects were reviewed   Discussed risks of the procedure include bleeding, infection, abscess formation, nerve irritation or damage, seizure, headache, reaction to medications administered, failure of the pain to improve, and exacerbation of the pain   These risks were explained in detail to the patient, who verbalized understanding and wished to proceed   Informed consent was signed    INDICATION:  It is our medical opinion that radiofrequency ablation of the nerves corresponding to the facet joint levels listed above is medically necessary given that the patient has disabling cervical pain which is facet-mediated, in absence of nerve root compression or radicular pain  The patient has also failed more than 3 months of conservative therapy including nonopioid medications, manipulation, physical therapy, and a home exercise program  The patient has not been treated with radiofrequency denervation at this anatomic location within the past 6 months, nor has had prior surgical spinal fusion at these vertebral levels    DESCRIPTION OF THE PROCEDURE: After informed consent was obtained, the patient was taken to the fluoroscopy suite and placed in the lateral decubitus position with the operative side up  The anatomical landmarks were identified with the aid of fluoroscopy in the lateral and oblique views  The patient's cervical region was prepped with antiseptic solution and draped in the usual sterile fashion  Strict aseptic technique was observed   The skin and subcutaneous tissues at each needle entry sites was infiltrated with a total amount of 4 ml 1% preservative-free lidocaine using a 25-gauge 1-1/2-inch needle    At each level, a 18 gauge needle with a 10 mm active tip needle was incrementally advanced under fluoroscopic guidance in the lateral views such that the needle tips were positioned along the posterior columns of at each of the above named levels   Needle placement was confirmed via the aid of fluoroscopy and also in oblique view    The position of the introducer and probe was adjusted to be in close proximity to the nerve corresponding to the needle placed at the C3 vertebral body level  Sensorimotor stimulatory threshold of 1 V at 2 hertz and 2V at 50 hertz was recorded   No distal extension was noted with 2 or 50 hertz stimulation   This procedure was then repeated at the remaining levels in a similar fashion  Again, at each level no distal extension was noted with 2 or 50 hertz stimulation   After positioning was verified, 1 mL of 2% preservative free lidocaine was injected into each level following negative aspiration   After a 90 second period, radiofrequency lesion at 90° C was performed for 90 seconds at all levels  The probes were then rotated and a 2nd lesion site was performed at each level for 90 seconds at 90° C    All needles were removed with tip intact   Hemostasis maintained throughout   The skin was wiped clean of antiseptic solution and Band-Aids were placed as appropriate  The patient was taken to the recovery area and observed for an appropriate period of time  During this time the patient remained hemodynamically stable and neurovascularly intact as prior to the procedure

## 2021-03-24 ENCOUNTER — OFFICE VISIT (OUTPATIENT)
Dept: PAIN MEDICINE | Facility: CLINIC | Age: 76
End: 2021-03-24
Payer: MEDICARE

## 2021-03-24 VITALS
BODY MASS INDEX: 27.99 KG/M2 | WEIGHT: 168 LBS | SYSTOLIC BLOOD PRESSURE: 151 MMHG | RESPIRATION RATE: 16 BRPM | HEIGHT: 65 IN | DIASTOLIC BLOOD PRESSURE: 79 MMHG | HEART RATE: 61 BPM

## 2021-03-24 DIAGNOSIS — M54.12 CERVICAL RADICULOPATHY: ICD-10-CM

## 2021-03-24 DIAGNOSIS — Z79.891 LONG-TERM CURRENT USE OF OPIATE ANALGESIC: ICD-10-CM

## 2021-03-24 DIAGNOSIS — F11.20 UNCOMPLICATED OPIOID DEPENDENCE (HCC): ICD-10-CM

## 2021-03-24 DIAGNOSIS — M47.812 CERVICAL SPONDYLOSIS: ICD-10-CM

## 2021-03-24 DIAGNOSIS — G89.4 CHRONIC PAIN SYNDROME: Primary | ICD-10-CM

## 2021-03-24 PROCEDURE — 99214 OFFICE O/P EST MOD 30 MIN: CPT | Performed by: STUDENT IN AN ORGANIZED HEALTH CARE EDUCATION/TRAINING PROGRAM

## 2021-03-24 PROCEDURE — 80305 DRUG TEST PRSMV DIR OPT OBS: CPT | Performed by: STUDENT IN AN ORGANIZED HEALTH CARE EDUCATION/TRAINING PROGRAM

## 2021-03-24 NOTE — PROGRESS NOTES
Pain Medicine Follow-Up Note    Assessment:  1  Chronic pain syndrome    2  Uncomplicated opioid dependence ([address] )    3  Long-term current use of opiate analgesic    4  Cervical spondylosis    5  Cervical radiculopathy        Plan:  Orders Placed This Encounter   Procedures    MM ALL_Prescribed Meds and Special Instructions    MM DT_Alprazolam Definitive Test    MM DT_Amphetamine Definitive Test    MM DT_Aripiprazole Definitive Test    MM DT_Buprenorphine Definitive Test    MM DT_Butalbital Definitive Test    MM DT_Clonazepam Definitive Test    MM DT_Clozapine Definitive Test    MM DT_Cocaine Definitive Test    MM DT_Codeine Definitive Test    MM DT_Desipramine Definitive Test    MM DT_Dextromethorphan Definitive Test    MM Diazepam Definitive Test    MM DT_Ethyl Glucuronide/Ethyl Sulfate Definitive Test    MM DT_Fentanyl Definitive Test    MM DT_Heroin Definitive Test    MM DT_Hydrocodone Definitive Test    MM DT_Imipramine Definitive Test    MM DT_Kratom Definitive Test    MM DT_Levorphanol Definitive Test    MM DT_MDMA Definitive Test    MM DT_Meperidine Definitive Test    MM DT_Methadone Definitive Test    MM DT_Methamphetamine Definitive Test    MM DT_Methylphenidate Definitive Test    MM DT_Morphine Definitive Test    MM DT_Olanzapine Definitive Test    MM DT_Oxazepam Definitive Test    MM DT_Oxycodone Definitive Test    MM DT_Oxymorphone Definitive Test    MM DT_Paroxetine Definitive Test    MM DT_Phencyclidine Definitive Test    MM DT_Phenobarbital Definitive Test    MM DT_Phentermine Definitive Test    MM DT_Risperidone Definitive Test    MM DT_Tapentadol Definitive Test    MM DT_Temazapam Definitive Test    MM DT_THC Definitive Test    MM DT_Tramadol Definitive Test    MM DT_Hydromorphone Confirm Positive       No orders of the defined types were placed in this encounter        My impressions and treatment recommendations were discussed in detail with the patient who verbalized understanding and had no further questions  this is a 49-year-old male who presents to our office follow-up after left C3-6 radiofrequency ablation about 3 weeks ago  Unfortunately, patient has had no improvement in his symptoms  In fact reports symptoms are worse than before  Continues to have migrating bilateral neck pain, left greater than right  Explained to patient that he is still a few weeks out from maximum medical improvement  Therefore we will wait to see how he responds, although I did discuss that right now his response is not favorable  Thus far he has had cervical epidural steroid injection and the radiofrequency ablation with minimal relief of his symptoms  We discussed that it is possible that his pain is secondary to high cervical stenosis, with severe left foraminal narrowing at the C3-4 and C4-5 levels  Given lack of meaningful response interventional therapies and level of pain continuing to impact his quality of life, will start the patient on low-dose opioid therapy to use as needed for episodes of severe pain  We will start tramadol 50 mg b i d  p r n  Donta Cochran Patient is not keen on being on long-term medications  I did discuss that the medication is intended to use only as needed for episodes of severe pain and that under utilizing the medication as much as possible will allow to be most effective  Given the patient's desire to use the medication only sparingly, I feel that he is an appropriate candidate for the therapy  I did also discuss membrane stabilizer such as gabapentin or Lyrica, however given that these have to be taken on a consistent basis, patient declined  Will send tramadol to his pharmacy once urine drug screen has returned  [place] Prescription Drug Monitoring Program report was reviewed and was appropriate     A urine drug screen was collected at today's office visit as part of our medication management protocol   The point of care testing results were appropriate for what was being prescribed  The specimen will be sent for confirmatory testing  The drug screen is medically necessary because the patient is either dependent on opioid medication or is being considered for opioid medication therapy and the results could impact ongoing or future treatment  The drug screen is to evaluate for the presences or absence of prescribed, non-prescribed, and/or illicit drugs/substances  There are risks associated with opioid medications, including dependence, addiction and tolerance  The patient understands and agrees to use these medications only as prescribed  Potential side effects of the medications include, but are not limited to, constipation, drowsiness, addiction, impaired judgment and risk of fatal overdose if not taken as prescribed  The patient was warned against driving while taking sedation medications  Sharing medications is a felony  At this point in time, the patient is showing no signs of addiction, abuse, diversion or suicidal ideation  Discharge instructions were provided  I personally saw and examined the patient and I agree with the above discussed plan of care  History of Present Illness:    Neftali Navas is a 66 y o  male who presents to [facility] for interval re-evaluation of the above stated pain complaints  The patient has a past medical and chronic pain history as outlined in the assessment section  He was last seen on   03/02/2021 for left C3-6 radiofrequency ablation  Unfortunately, patient did not have any relief of his symptoms  He reports his symptoms are worse than prior to the procedure  Pain score is not of 10  Pain is most notable in the morning, evening, and night and is constant, dull/aching, sharp, with pins and needles and numbness around the area of the procedure  He reports some hypersensitivity at the   Left side of his neck  Denies any swelling or discharge         Umu Heath patient has now had C6-7 cervical epidural steroid injection and now  Left C3-6 radiofrequency ablation both with minimal relief of his symptoms  Pain Contract Signed:    03/24/2021  Last Urine Drug Screen:   03/24/2021    Other than as stated above, the patient denies any interval changes in medications, medical condition, mental condition, symptoms, or allergies since the last office visit  Review of Systems:    Review of Systems   Respiratory: Negative for shortness of breath  Cardiovascular: Negative for chest pain  Gastrointestinal: Negative for constipation, diarrhea, nausea and vomiting  Musculoskeletal: Negative for arthralgias, gait problem, joint swelling and myalgias  Skin: Negative for rash  Neurological: Negative for dizziness, seizures and weakness  All other systems reviewed and are negative          Patient Active Problem List   Diagnosis    Chest pain    Essential hypertension    Mixed hyperlipidemia    Chronic pain disorder    Chronic left shoulder pain    Chronic bilateral low back pain with bilateral sciatica    Disc degeneration, lumbar    Lumbar stenosis with neurogenic claudication    Abnormal CT of the abdomen    Chronic pain syndrome    Moderate obstructive sleep apnea    Benign prostatic hyperplasia with lower urinary tract symptoms    Hydronephrosis    Nonrheumatic aortic valve insufficiency    Non-rheumatic mitral regurgitation    Diastolic dysfunction    Nephrolithiasis    Squamous cell carcinoma in situ (SCCIS) of scalp    Actinic keratosis    Chronic obstructive pulmonary disease (HCC)    Megaloblastic red blood cells       Past Medical History:   Diagnosis Date    Abnormal stress test     [facility])     Apnea     Carpal tunnel syndrome on left     Chest pain     CPAP (continuous positive airway pressure) dependence     Diverticulitis     GERD (gastroesophageal reflux disease)     Hypercholesterolemia     Hypercholesterolemia     Hyperlipidemia     Hypertension     Joint pain     Right Shoulder    Kidney stone     Mitral valve disorder     Neck pain     Sleep apnea     Sleep apnea, obstructive     Thoracic neuritis        Past Surgical History:   Procedure Laterality Date    COLONOSCOPY      Complete    CORONARY ANGIOPLASTY  2017    ESOPHAGOGASTRODUODENOSCOPY      Diagnostic    FL RETROGRADE PYELOGRAM  5/24/2019    FOOT SURGERY Left     tarsal tunnel    HERNIA REPAIR      KNEE ARTHROSCOPY Left     KNEE SURGERY Left     NEUROPLASTY / TRANSPOSITION MEDIAN NERVE AT CARPAL TUNNEL      MT COLONOSCOPY FLX DX W/COLLJ SPEC WHEN PFRMD N/A 4/10/2018    Procedure: EGD AND COLONOSCOPY;  Surgeon: Tiffanie Mesa MD;  Location: [facility]; Service: Gastroenterology    MT CYSTO/URETERO W/LITHOTRIPSY &INDWELL STENT INSRT Right 5/24/2019    Procedure: CYSTOSCOPY URETEROSCOPY WITH LITHOTRIPSY HOLMIUM LASER, RETROGRADE PYELOGRAM AND INSERTION STENT URETERAL;  Surgeon: Albaro Espinoza MD;  [facility];  Service: Urology    MT CYSTOURETHRO W/IMPLANT N/A 8/30/2018    Procedure: CYSTOSCOPY WITH INSERTION Finn Hedges;  Surgeon: Albaro Espinoza MD;  [facility];  Service: Urology    MT TRANSURETHRAL INCISION OF PROSTATE N/A 8/30/2018    Procedure: TRANSURETHRAL INCISION OF PROSTATE (TUIP);   Surgeon: Albaro Espinoza MD;  [facility];  Service: Urology    ROTATOR CUFF REPAIR Bilateral     SHOULDER SURGERY      TARSAL TUNNEL RELEASE      Neuroplasty Decompression       Family History   Problem Relation Age of Onset    Diabetes Mother         Mellitus    Heart disease Mother         Arteriosclerotic Cardiovascular    Hypertension Mother     Cancer Father     Arthritis Father         [facility] [employer]    Parkinsonism Father     Thyroid disease Sister     Diabetes Brother         Diabetes:Mellitus    Heart disease Brother     Hypertension Brother     Coronary artery disease Family     Heart disease Brother     No Known Problems Brother     Kidney disease Sister     Parkinsonism Sister        Social History     Occupational History    Occupation: retired   Tobacco Use    Smoking status: Former Smoker     Packs/day: 1 00     Years: 17 00     Pack years: 17 00     Types: Cigarettes     Quit date:      Years since quittin 2    Smokeless tobacco: Never Used   Substance and Sexual Activity    Alcohol use: No    Drug use: No    Sexual activity: Not Currently     Partners: Female         Current Outpatient Medications:     amLODIPine (NORVASC) 10 mg tablet, Take 1 tablet (10 mg total) by mouth every morning, Disp: 90 tablet, Rfl: 3    fenofibrate (TRICOR) 145 mg tablet, TAKE 1 TABLET (145 MG TOTAL) BY MOUTH DAILY, Disp: 90 tablet, Rfl: 2    ketoconazole (NIZORAL) 2 % cream, Apply topically 2 (two) times a day To facial rash repeat as needed treat for 3 weeks, Disp: 30 g, Rfl: 3    lisinopril (ZESTRIL) 40 mg tablet, Take 1 tablet (40 mg total) by mouth daily, Disp: 90 tablet, Rfl: 3    loperamide (IMODIUM A-D) 2 MG tablet, Take 1 tablet by mouth daily as needed, Disp: , Rfl:     lovastatin (MEVACOR) 40 MG tablet, Take 1 tablet (40 mg total) by mouth daily, Disp: 90 tablet, Rfl: 3    meclizine (ANTIVERT) 25 mg tablet, Take 1 tablet (25 mg total) by mouth 3 (three) times a day as needed for dizziness, Disp: 30 tablet, Rfl: 0    meloxicam (MOBIC) 7 5 mg tablet, TAKE 1 TABLET (7 5 MG TOTAL) BY MOUTH DAILY MAY TAKE A SECOND PILL AS NEEDED, NO MORE THAN 2 PER DAY, Disp: 30 tablet, Rfl: 3    Multiple Vitamins-Minerals (CENTRUM ULTRA MENS PO), Take 1 tablet by mouth daily, Disp: , Rfl:     omeprazole (PriLOSEC) 20 mg delayed release capsule, TAKE ONE CAPSULE BY MOUTH DAILY, Disp: 90 capsule, Rfl: 1    pseudoephedrine (SUDAFED) 30 mg tablet, Take 60 mg by mouth daily at bedtime as needed , Disp: , Rfl:     vitamin B-12 (VITAMIN B-12) 1,000 mcg tablet, Take 1,000 mcg by mouth every third day , Disp: , Rfl:     Allergies   Allergen Reactions    Iodine      Nausea, hot, sweaty -- had through an IV  Has had it since with no issues    Pt reports no issues with IV contrast - has had numerous times with no issues        Physical Exam:    /79   Pulse 61   Resp 16   Ht 5' 5" (1 651 m)   Wt 76 2 kg (168 lb)   BMI 27 96 kg/m²     Constitutional:normal, well developed, well nourished, alert, in no distress and non-toxic and no overt pain behavior  Eyes:anicteric  HEENT:grossly intact  Neck:supple, symmetric, trachea midline and no masses   Pulmonary:even and unlabored  Cardiovascular:No edema or pitting edema present  Skin:Normal without rashes or lesions and well hydrated  Psychiatric:Mood and affect appropriate  Neurologic:Cranial Nerves II-XII grossly intact  Musculoskeletal: increased sensitivity to light touch on left side of the neck  Decreased range of motion in all planes secondary to pain  Needle entry sites appear clean dry and intact      Imaging  No orders to display     MRI CERVICAL SPINE WITHOUT CONTRAST     INDICATION: M54 2: Cervicalgia      COMPARISON:  X-rays dated 4/28/2015     TECHNIQUE:  Sagittal T1, sagittal T2, sagittal inversion recovery, axial T2, axial  2D merge     IMAGE QUALITY:  Diagnostic     FINDINGS:     ALIGNMENT:  Slight anterior subluxation of C4 in relation to C3  No compression fracture  No scoliosis      MARROW SIGNAL:  Multilevel endplate marrow degenerative change, primarily type II fatty endplate marrow changes are noted      CERVICAL AND VISUALIZED THORACIC CORD:  Moderate canal stenosis within the mid cervical spine slight cord flattening  No abnormal cord signal      PREVERTEBRAL AND PARASPINAL SOFT TISSUES:  Normal      VISUALIZED POSTERIOR FOSSA:  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    White matter changes suggestive of chronic microangiopathy      CERVICAL DISC SPACES:     C2-C3:  Normal      C3-C4:  Loss of disc height  Mild annular bulging with uncinate joint hypertrophic change, left greater than right  Moderate canal stenosis with effacement of the CSF spaces  Severe left and moderate right foraminal narrowing      C4-C5:  Loss of disc height  Annular bulging and uncinate joint hypertrophic change  Moderate canal stenosis  Severe left and moderate right foraminal narrowing      C5-C6:  Loss of disc height with mild annular bulging and left greater than right uncinate joint hypertrophic degenerative change  Mild canal stenosis  Moderate left and mild right foraminal narrowing      C6-C7:  Loss of disc height  Left greater than right uncinate joint hypertrophic degenerative change  No canal stenosis or right foraminal narrowing  There is moderate left foraminal narrowing      C7-T1:  Loss of disc height with mild endplate and uncinate joint hypertrophic degenerative change  Mild posterior element hypertrophic change  Mild canal stenosis and left greater than right foraminal narrowing      UPPER THORACIC DISC SPACES:  Mild upper thoracic degenerative disc disease and posterior element hypertrophic change      IMPRESSION:     Diffuse cervical spondylitic degenerative change most pronounced at the C3-4 and C4-5 levels where there is moderate canal stenosis and severe left foraminal narrowing  Multilevel moderate foraminal narrowing is seen elsewhere      No abnormal cord signal present      Posterior fossa volume loss involving the cerebellar hemispheres and cerebellar vermis      Orders Placed This Encounter   Procedures    MM ALL_Prescribed Meds and Special Instructions    MM DT_Alprazolam Definitive Test    MM DT_Amphetamine Definitive Test    MM DT_Aripiprazole Definitive Test    MM DT_Buprenorphine Definitive Test    MM DT_Butalbital Definitive Test    MM DT_Clonazepam Definitive Test    MM DT_Clozapine Definitive Test    MM DT_Cocaine Definitive Test    MM DT_Codeine Definitive Test    MM DT_Desipramine Definitive Test    MM DT_Dextromethorphan Definitive Test    MM Diazepam Definitive Test    MM DT_Ethyl Glucuronide/Ethyl Sulfate Definitive Test    MM DT_Fentanyl Definitive Test    MM DT_Heroin Definitive Test    MM DT_Hydrocodone Definitive Test    MM DT_Imipramine Definitive Test    MM DT_Kratom Definitive Test    MM DT_Levorphanol Definitive Test    MM DT_MDMA Definitive Test    MM DT_Meperidine Definitive Test    MM DT_Methadone Definitive Test    MM DT_Methamphetamine Definitive Test    MM DT_Methylphenidate Definitive Test    MM DT_Morphine Definitive Test    MM DT_Olanzapine Definitive Test    MM DT_Oxazepam Definitive Test    MM DT_Oxycodone Definitive Test    MM DT_Oxymorphone Definitive Test    MM DT_Paroxetine Definitive Test    MM DT_Phencyclidine Definitive Test    MM DT_Phenobarbital Definitive Test    MM DT_Phentermine Definitive Test    MM DT_Risperidone Definitive Test    MM DT_Tapentadol Definitive Test    MM DT_Temazapam Definitive Test    MM DT_THC Definitive Test    MM DT_Tramadol Definitive Test    MM DT_Hydromorphone Confirm Positive

## 2021-03-30 LAB
6MAM UR QL CFM: NEGATIVE NG/ML
7AMINOCLONAZEPAM UR QL CFM: NEGATIVE NG/ML
A-OH ALPRAZ UR QL CFM: NEGATIVE NG/ML
AMPHET UR QL CFM: NEGATIVE NG/ML
AMPHET UR QL CFM: NEGATIVE NG/ML
BUPRENORPHINE UR QL CFM: NEGATIVE NG/ML
BUTALBITAL UR QL CFM: NEGATIVE NG/ML
BZE UR QL CFM: NEGATIVE NG/ML
CODEINE UR QL CFM: NEGATIVE NG/ML
DESIPRAMINE UR QL CFM: NEGATIVE NG/ML
DESIPRAMINE UR QL CFM: NEGATIVE NG/ML
EDDP UR QL CFM: NEGATIVE NG/ML
ETHYL GLUCURONIDE UR QL CFM: NEGATIVE NG/ML
ETHYL SULFATE UR QL SCN: NEGATIVE NG/ML
FENTANYL UR QL CFM: NEGATIVE NG/ML
GLIADIN IGG SER IA-ACNC: NEGATIVE NG/ML
GLUCOSE 30M P 50 G LAC PO SERPL-MCNC: NEGATIVE NG/ML
HYDROCODONE UR QL CFM: NEGATIVE NG/ML
HYDROCODONE UR QL CFM: NEGATIVE NG/ML
HYDROMORPHONE UR QL CFM: NEGATIVE NG/ML
HYDROMORPHONE UR QL CFM: NORMAL
IMIPRAMINE UR QL CFM: NEGATIVE NG/ML
MDMA UR QL CFM: NEGATIVE NG/ML
ME-PHENIDATE UR QL CFM: NEGATIVE NG/ML
MEPERIDINE UR QL CFM: NEGATIVE NG/ML
METHADONE UR QL CFM: NEGATIVE NG/ML
METHAMPHET UR QL CFM: NEGATIVE NG/ML
MORPHINE UR QL CFM: NEGATIVE NG/ML
MORPHINE UR QL CFM: NEGATIVE NG/ML
NORBUPRENORPHINE UR QL CFM: NEGATIVE NG/ML
NORDIAZEPAM UR QL CFM: NEGATIVE NG/ML
NORFENTANYL UR QL CFM: NEGATIVE NG/ML
NORHYDROCODONE UR QL CFM: NEGATIVE NG/ML
NORHYDROCODONE UR QL CFM: NEGATIVE NG/ML
NORMEPERIDINE UR QL CFM: NEGATIVE NG/ML
NOROXYCODONE UR QL CFM: NEGATIVE NG/ML
OLANZAPINE QUANTIFICATION: NEGATIVE NG/ML
OPC-3373 QUANTIFICATION: NEGATIVE
OXAZEPAM UR QL CFM: NEGATIVE NG/ML
OXYCODONE UR QL CFM: NEGATIVE NG/ML
OXYMORPHONE UR QL CFM: NEGATIVE NG/ML
OXYMORPHONE UR QL CFM: NEGATIVE NG/ML
PCP UR QL CFM: NEGATIVE NG/ML
PHENOBARB UR QL CFM: NEGATIVE NG/ML
PROLACTIN SERPL-MCNC: NEGATIVE NG/ML
RESULT ALL_PRESCRIBED MEDS AND SPECIAL INSTRUCTIONS: NORMAL
SL AMB 3-METHYL-FENTANYL QUANTIFICATION: NORMAL NG/ML
SL AMB 4-ANPP QUANTIFICATION: NORMAL NG/ML
SL AMB 4-FIBF QUANTIFICATION: NORMAL NG/ML
SL AMB 7-OH-MITRAGYNINE (KRATOM ALKALOID) QUANTIFICATION: NEGATIVE NG/ML
SL AMB ACETYL FENTANYL QUANTIFICATION: NORMAL NG/ML
SL AMB ACETYL NORFENTANYL QUANTIFICATION: NORMAL NG/ML
SL AMB ACRYL FENTANYL QUANTIFICATION: NORMAL NG/ML
SL AMB BUTRYL FENTANYL QUANTIFICATION: NORMAL NG/ML
SL AMB CARFENTANIL QUANTIFICATION: NORMAL NG/ML
SL AMB CLOZAPINE QUANTIFICATION: NEGATIVE NG/ML
SL AMB CTHC (MARIJUANA METABOLITE) QUANTIFICATION: NEGATIVE NG/ML
SL AMB CYCLOPROPYL FENTANYL QUANTIFICATION: NORMAL NG/ML
SL AMB DEXTROMETHORPHAN QUANTIFICATION: NEGATIVE NG/ML
SL AMB DEXTRORPHAN (DEXTROMETHORPHAN METABOLITE) QUANT: NEGATIVE NG/ML
SL AMB DEXTRORPHAN (DEXTROMETHORPHAN METABOLITE) QUANT: NEGATIVE NG/ML
SL AMB FURANYL FENTANYL QUANTIFICATION: NORMAL NG/ML
SL AMB HYDROXYRISPERIDONE QUANTIFICATION: NEGATIVE NG/ML
SL AMB METHOXYACETYL FENTANYL QUANTIFICATION: NORMAL NG/ML
SL AMB N-DESMETHYL U-47700 QUANTIFICATION: NORMAL NG/ML
SL AMB N-DESMETHYL-TRAMADOL QUANTIFICATION: NEGATIVE NG/ML
SL AMB N-DESMETHYLCLOZAPINE QUANTIFICATION: NEGATIVE NG/ML
SL AMB PHENTERMINE QUANTIFICATION: NEGATIVE NG/ML
SL AMB RISPERIDONE QUANTIFICATION: NEGATIVE NG/ML
SL AMB RITALINIC ACID QUANTIFICATION: NEGATIVE NG/ML
SL AMB U-47700 QUANTIFICATION: NORMAL NG/ML
TAPENTADOL UR QL CFM: NEGATIVE NG/ML
TEMAZEPAM UR QL CFM: NEGATIVE NG/ML
TEMAZEPAM UR QL CFM: NEGATIVE NG/ML
TRAMADOL UR QL CFM: NEGATIVE NG/ML
URATE/CREAT 24H UR: NEGATIVE NG/ML

## 2021-04-01 NOTE — PROGRESS NOTES
4/5/2021      Chief Complaint   Patient presents with    Nephrolithiasis       Assessment and Plan    66 y o  male managed by Dr Josie Whaley    1  BPH s/p Urolift 8/30/2018  - Stable   - Urine dip today negative  PVR=21 mL  - Increase compliance with CPAP for GREGG  Plans to get Inspire device  2  Nephrolithiasis s/p ureteroscopy 5/24/2019  - Obtain KUB and US in 1 year     History of Present Illness  Shonna Magaña is a 66 y o  male here for follow up evaluation of BPH s/p Urolift and nephrolithiasis s/p ureteroscopy  BPH: Patient underwent Urolift with concomitant TUIP on 8/30/2018  Reports overall minimal urinary symptoms  This note some daytime frequency urgency  Also admits to some postvoid dribble  Denies dysuria, hematuria, flank pain, incontinence, or weak stream      Nephrolithiasis: Pt was treated with ureteroscopy for kidney stone on 5/24/2019  Follow KUB from 11/27/20 revealed 5 mm R kidney lower pole calculus  Review of Systems   Constitutional: Negative for chills and fever  Respiratory: Negative for shortness of breath  Cardiovascular: Negative for chest pain  Gastrointestinal: Negative for abdominal pain, diarrhea, nausea and vomiting  Genitourinary: Positive for frequency and urgency  Negative for difficulty urinating, dysuria, flank pain and hematuria  Neurological: Negative for dizziness                    Past Medical History  Past Medical History:   Diagnosis Date    Abnormal stress test     [facility] rest [facility])     Apnea     Carpal tunnel syndrome on left     Chest pain     CPAP (continuous positive airway pressure) dependence     Diverticulitis     GERD (gastroesophageal reflux disease)     Hypercholesterolemia     Hypercholesterolemia     Hyperlipidemia     Hypertension     Joint pain     Right Shoulder    Kidney stone     Mitral valve disorder     Neck pain     Sleep apnea     Sleep apnea, obstructive     Thoracic neuritis        Past Social History  Past Surgical History:   Procedure Laterality Date    COLONOSCOPY      Complete    CORONARY ANGIOPLASTY  2017    ESOPHAGOGASTRODUODENOSCOPY      Diagnostic    FL RETROGRADE PYELOGRAM  2019    FOOT SURGERY Left     tarsal tunnel    HERNIA REPAIR      KNEE ARTHROSCOPY Left     KNEE SURGERY Left     NEUROPLASTY / TRANSPOSITION MEDIAN NERVE AT CARPAL TUNNEL      LA COLONOSCOPY FLX DX W/COLLJ SPEC WHEN PFRMD N/A 4/10/2018    Procedure: EGD AND COLONOSCOPY;  Surgeon: Ishaan Willis MD;  [facility]; Service: Gastroenterology    LA CYSTO/URETERO W/LITHOTRIPSY &INDWELL STENT INSRT Right 2019    Procedure: CYSTOSCOPY URETEROSCOPY WITH LITHOTRIPSY HOLMIUM LASER, RETROGRADE PYELOGRAM AND INSERTION STENT URETERAL;  Surgeon: Donta Barboza MD;  [facility];  Service: Urology    LA CYSTOURETHRO W/IMPLANT N/A 2018    Procedure: CYSTOSCOPY WITH INSERTION Lillian Anderson;  Surgeon: Donta Barboza MD;  [facility];  Service: Urology    LA TRANSURETHRAL INCISION OF PROSTATE N/A 2018    Procedure: TRANSURETHRAL INCISION OF PROSTATE (TUIP);   Surgeon: Donta Barboza MD;  [facility];  Service: Urology    ROTATOR CUFF REPAIR Bilateral     SHOULDER SURGERY      TARSAL TUNNEL RELEASE      Neuroplasty Decompression     Social History     Tobacco Use   Smoking Status Former Smoker    Packs/day: 1 00    Years: 17 00    Pack years: 17 00    Types: Cigarettes    Quit date: 1    Years since quittin 2   Smokeless Tobacco Never Used       Past Family History  Family History   Problem Relation Age of Onset    Diabetes Mother         Mellitus    Heart disease Mother         Arteriosclerotic Cardiovascular    Hypertension Mother     Cancer Father     Arthritis Father         Rheu Mult Site W Involv Of Organs And Systems    Parkinsonism Father     Thyroid disease Sister     Diabetes Brother         Diabetes:Mellitus    Heart disease Brother     Hypertension Brother     Coronary artery disease Family     Heart disease Brother     No Known Problems Brother     Kidney disease Sister     Parkinsonism Sister        Past Social history  Social History     Socioeconomic History    Marital status: /Civil Union     Spouse name: Not on file    Number of children: 4    Years of education: high school or GED    Highest education level: Not on file   Occupational History    Occupation: retired   Social Needs    Financial resource strain: Not on file    Food insecurity     Worry: Not on file     Inability: Not on file   [employer] needs     Medical: Not on file     Non-medical: Not on file   Tobacco Use    Smoking status: Former Smoker     Packs/day: 1 00     Years: 17 00     Pack years: 17 00     Types: Cigarettes     Quit date:      Years since quittin 2    Smokeless tobacco: Never Used   Substance and Sexual Activity    Alcohol use: No    Drug use: No    Sexual activity: Not Currently     Partners: Female   Lifestyle    Physical activity     Days per week: 7 days     Minutes per session: 40 min    Stress: Not at all   Relationships    Social connections     Talks on phone: Not on file     Gets together: Not on file     Attends Hinduism service: Not on file     Active member of club or organization: Not on file     Attends meetings of clubs or organizations: Not on file     Relationship status: Not on file    Intimate partner violence     Fear of current or ex partner: Not on file     Emotionally abused: Not on file     Physically abused: Not on file     Forced sexual activity: Not on file   Other Topics Concern    Not on file   Social History Narrative    Active Advance Directives    Lives With Spouse               Current Medications  Current Outpatient Medications   Medication Sig Dispense Refill    amLODIPine (NORVASC) 10 mg tablet Take 1 tablet (10 mg total) by mouth every morning 90 tablet 3    fenofibrate (TRICOR) 145 mg tablet TAKE 1 TABLET (145 MG TOTAL) BY MOUTH DAILY 90 tablet 2    ketoconazole (NIZORAL) 2 % cream Apply topically 2 (two) times a day To facial rash repeat as needed treat for 3 weeks 30 g 3    lisinopril (ZESTRIL) 40 mg tablet Take 1 tablet (40 mg total) by mouth daily 90 tablet 3    loperamide (IMODIUM A-D) 2 MG tablet Take 1 tablet by mouth daily as needed      lovastatin (MEVACOR) 40 MG tablet Take 1 tablet (40 mg total) by mouth daily 90 tablet 3    meclizine (ANTIVERT) 25 mg tablet Take 1 tablet (25 mg total) by mouth 3 (three) times a day as needed for dizziness 30 tablet 0    meloxicam (MOBIC) 7 5 mg tablet TAKE 1 TABLET (7 5 MG TOTAL) BY MOUTH DAILY MAY TAKE A SECOND PILL AS NEEDED, NO MORE THAN 2 PER DAY 30 tablet 3    Multiple Vitamins-Minerals (CENTRUM ULTRA MENS PO) Take 1 tablet by mouth daily      omeprazole (PriLOSEC) 20 mg delayed release capsule TAKE ONE CAPSULE BY MOUTH DAILY 90 capsule 1    pseudoephedrine (SUDAFED) 30 mg tablet Take 60 mg by mouth daily at bedtime as needed       traMADol (ULTRAM) 50 mg tablet Take 1 tablet (50 mg total) by mouth 3 (three) times a day as needed for severe pain 90 tablet 0    vitamin B-12 (VITAMIN B-12) 1,000 mcg tablet Take 1,000 mcg by mouth every third day        No current facility-administered medications for this visit  Allergies  Allergies   Allergen Reactions    Iodine - Food Allergy      Nausea, hot, sweaty -- had through an IV    Has had it since with no issues    Pt reports no issues with IV contrast - has had numerous times with no issues          The following portions of the patient's history were reviewed and updated as appropriate: allergies, current medications, past medical history, past social history, past surgical history and problem list       Vitals  Vitals:    04/05/21 0953   BP: 124/72   Pulse: 63   Weight: 79 4 kg (175 lb)   Height: 5' 5" (1 651 m)           Physical Exam  Physical Exam  Constitutional:       Appearance: Normal appearance  He is normal weight  HENT:      Head: Normocephalic and atraumatic  Eyes:      General: No scleral icterus  Conjunctiva/sclera: Conjunctivae normal    Neck:      Musculoskeletal: Normal range of motion  Pulmonary:      Effort: Pulmonary effort is normal    Musculoskeletal: Normal range of motion  Neurological:      General: No focal deficit present  Mental Status: He is alert and oriented to person, place, and time  Psychiatric:         Mood and Affect: Mood normal          Behavior: Behavior normal          Thought Content: Thought content normal          Judgment: Judgment normal            Results  Recent Results (from the past 1 hour(s))   POCT urine dip    Collection Time: 04/05/21  9:54 AM   Result Value Ref Range    LEUKOCYTE ESTERASE,UA -     NITRITE,UA -     SL AMB POCT UROBILINOGEN 0 2     POCT URINE PROTEIN -      PH,UA 5 0     BLOOD,UA -     SPECIFIC GRAVITY,UA 1 020     KETONES,UA -     BILIRUBIN,UA -     GLUCOSE, UA -      COLOR,UA yellow     CLARITY,UA clear    POCT Measure PVR    Collection Time: 04/05/21  9:54 AM   Result Value Ref Range    POST-VOID RESIDUAL VOLUME, ML POC 21 mL   ]  Lab Results   Component Value Date    PSA 0 4 02/15/2018    PSA 0 5 12/10/2015    PSA 0 78 12/03/2014     Lab Results   Component Value Date    GLUCOSE 100 10/29/2015    CALCIUM 10 0 12/23/2020     10/29/2015    K 4 0 12/23/2020    CO2 28 12/23/2020     (H) 12/23/2020    BUN 27 (H) 12/23/2020    CREATININE 0 93 12/23/2020     Lab Results   Component Value Date    WBC 4 98 12/23/2020    HGB 14 4 12/23/2020    HCT 43 1 12/23/2020     (H) 12/23/2020     12/23/2020     Orders  Orders Placed This Encounter   Procedures    XR abdomen 1 view kub     Standing Status:   Future     Standing Expiration Date:   4/5/2025     Scheduling Instructions:      Bring along any outside films relating to this procedure           [address] kidney and bladder     Standing Status:   Future     Standing Expiration Date:   4/5/2025     Scheduling Instructions:      13 years and Up - 1)  Full bladder required  2)  Please drink 24-32 oz of water 1 hour prior to appointment time  3)  No voiding 1 hour prior to appointment time  "Prep required if being scheduled in conjunction withother studies, refer to those examination's Preps first before scheduling  All patients for US Kidney and Bladder they must drink 24 oz of water 60 minutes before your scheduled appointment time  This test requiresyou to have a FULL bladder  Please do not urinate before your test             Please bring your insurance cards, a form of photo ID and a list of your medications with you  Arrive 15 minutes prior to yourappointment time in order to register  If you need to have lab work or a urinalysis, please do this AFTER your ultrasound  To schedulethis appointment, please contact Central Scheduling at [phone]  Order Specific Question:   Is a Renal Artery Doppler also being requested in addition to the Kidney/Renal ultrasound ?      Answer:   No    POCT urine dip    POCT Measure PVR       Shauna Ocampo PA-C

## 2021-04-02 ENCOUNTER — TELEPHONE (OUTPATIENT)
Dept: PAIN MEDICINE | Facility: CLINIC | Age: 76
End: 2021-04-02

## 2021-04-02 DIAGNOSIS — M54.12 CERVICAL RADICULOPATHY: ICD-10-CM

## 2021-04-02 DIAGNOSIS — M47.812 CERVICAL SPONDYLOSIS: ICD-10-CM

## 2021-04-02 DIAGNOSIS — G89.4 CHRONIC PAIN SYNDROME: Primary | ICD-10-CM

## 2021-04-02 RX ORDER — TRAMADOL HYDROCHLORIDE 50 MG/1
50 TABLET ORAL 3 TIMES DAILY PRN
Qty: 90 TABLET | Refills: 0 | Status: SHIPPED | OUTPATIENT
Start: 2021-04-02 | End: 2021-04-29

## 2021-04-02 NOTE — TELEPHONE ENCOUNTER
Gelacio Rios called in to get a prior auth for the medication traMADol (ULTRAM) 50 mg tablet   The insurance will only cover a 7 day supply             Insurance company:   Member ID: [address] phone:       Please be advise thank you        Please Gelacio Rios back @ [phone]

## 2021-04-02 NOTE — TELEPHONE ENCOUNTER
Patient called requesting clarification on a pain medication he discuss with Dr Bettina Neff on his last OVS & if it was called into his pharmacy  Patient doesn't recall the name of this medication   Please advise, antonio    Call back# [phone]

## 2021-04-02 NOTE — TELEPHONE ENCOUNTER
Tramadol sent  I also discussed his case with Dr Mp Escamilla of Neurosurgery  Given his significant pain it may be worth seeing him to discuss if there is a procedure he may benefit from, if he is interested in giong down taht route

## 2021-04-02 NOTE — TELEPHONE ENCOUNTER
S/w pt and advised same  Pt will cb if any side effects or no improvement  Pt not interested in neurosurgery referral at this time

## 2021-04-05 ENCOUNTER — TELEPHONE (OUTPATIENT)
Dept: UROLOGY | Facility: CLINIC | Age: 76
End: 2021-04-05

## 2021-04-05 ENCOUNTER — OFFICE VISIT (OUTPATIENT)
Dept: UROLOGY | Facility: CLINIC | Age: 76
End: 2021-04-05
Payer: MEDICARE

## 2021-04-05 VITALS
SYSTOLIC BLOOD PRESSURE: 124 MMHG | DIASTOLIC BLOOD PRESSURE: 72 MMHG | BODY MASS INDEX: 29.16 KG/M2 | HEART RATE: 63 BPM | HEIGHT: 65 IN | WEIGHT: 175 LBS

## 2021-04-05 DIAGNOSIS — N20.0 NEPHROLITHIASIS: Primary | ICD-10-CM

## 2021-04-05 LAB

## 2021-04-05 PROCEDURE — 99213 OFFICE O/P EST LOW 20 MIN: CPT | Performed by: PHYSICIAN ASSISTANT

## 2021-04-05 PROCEDURE — 51798 US URINE CAPACITY MEASURE: CPT | Performed by: PHYSICIAN ASSISTANT

## 2021-04-05 PROCEDURE — 81002 URINALYSIS NONAUTO W/O SCOPE: CPT | Performed by: PHYSICIAN ASSISTANT

## 2021-04-05 NOTE — TELEPHONE ENCOUNTER
Return in about 1 year (around 4/5/2022) for KUB and US PTV    Prefers morning - can schedule through [facility] - asks we set up his u/s too

## 2021-04-06 ENCOUNTER — OFFICE VISIT (OUTPATIENT)
Dept: NEUROLOGY | Facility: CLINIC | Age: 76
End: 2021-04-06
Payer: MEDICARE

## 2021-04-06 VITALS
DIASTOLIC BLOOD PRESSURE: 50 MMHG | HEIGHT: 65 IN | BODY MASS INDEX: 28.49 KG/M2 | WEIGHT: 171 LBS | HEART RATE: 66 BPM | SYSTOLIC BLOOD PRESSURE: 120 MMHG

## 2021-04-06 DIAGNOSIS — M54.2 CERVICALGIA: Primary | ICD-10-CM

## 2021-04-06 PROCEDURE — 99213 OFFICE O/P EST LOW 20 MIN: CPT | Performed by: PSYCHIATRY & NEUROLOGY

## 2021-04-06 RX ORDER — ACETAMINOPHEN 500 MG
1000 TABLET ORAL EVERY 6 HOURS PRN
COMMUNITY

## 2021-04-06 NOTE — PROGRESS NOTES
Nadira Sellers is a 66 y o  male  Returns in follow-up today with history of neck pain    Assessment:  1  Cervicalgia        Plan:  Continue pain management   Follow-up as needed    Discussion:   Francisca Martins reports that he has gotten some improvement in range of motion in levels of pain with recent rhizotomy but still not pain free  He anticipates starting tramadol today  He will continue current management and I will see him back in follow-up on an as-needed basis      Subjective:    HPI    Francisca Martins returns in follow-up today  He reports that since here last he did undergo rhizotomy  He states that initially it did not do much but states that over time he has seen some improvement in his pain level but still has pain especially when he is laying in bed at night  He states occasionally the pain radiates toward the medial scapular border region but denies any pain radiating to the upper extremities  He reports that at his last pain management appointment there was talk of initiating tramadol and he hopes to start this today  He will use this as needed and modify his pain symptoms    He states there was also some talk of a neuro surgical referral       Past Medical History:   Diagnosis Date    Abnormal stress test     [facility])     Apnea     Carpal tunnel syndrome on left     Chest pain     CPAP (continuous positive airway pressure) dependence     Diverticulitis     GERD (gastroesophageal reflux disease)     Hypercholesterolemia     Hypercholesterolemia     Hyperlipidemia     Hypertension     Joint pain     Right Shoulder    Kidney stone     Mitral valve disorder     Neck pain     Sleep apnea     Sleep apnea, obstructive     Thoracic neuritis        Family History:  Family History   Problem Relation Age of Onset    Diabetes Mother         Mellitus    Heart disease Mother         Arteriosclerotic Cardiovascular    Hypertension Mother     Cancer Father     Arthritis Father Roberto [employer] Of [employer]    Parkinsonism Father     Thyroid disease Sister     Diabetes Brother         Diabetes:Mellitus    Heart disease Brother     Hypertension Brother     Coronary artery disease Family     Heart disease Brother     No Known Problems Brother     Kidney disease Sister     Parkinsonism Sister        Past Surgical History:  Past Surgical History:   Procedure Laterality Date    COLONOSCOPY      Complete    CORONARY ANGIOPLASTY  2017    ESOPHAGOGASTRODUODENOSCOPY      Diagnostic    FL RETROGRADE PYELOGRAM  5/24/2019    FOOT SURGERY Left     tarsal tunnel    HERNIA REPAIR      KNEE ARTHROSCOPY Left     KNEE SURGERY Left     NEUROPLASTY / TRANSPOSITION MEDIAN NERVE AT CARPAL TUNNEL      MA COLONOSCOPY FLX DX W/COLLJ SPEC WHEN PFRMD N/A 4/10/2018    Procedure: EGD AND COLONOSCOPY;  Surgeon: Beatris Macias MD;  [facility]; Service: Gastroenterology    MA CYSTO/URETERO W/LITHOTRIPSY &INDWELL STENT INSRT Right 5/24/2019    Procedure: CYSTOSCOPY URETEROSCOPY WITH LITHOTRIPSY HOLMIUM LASER, RETROGRADE PYELOGRAM AND INSERTION STENT URETERAL;  Surgeon: Yvan Patel MD;  [facility];  Service: Urology    MA CYSTOURETHRO W/IMPLANT N/A 8/30/2018    Procedure: CYSTOSCOPY WITH INSERTION Jarad Christiana;  Surgeon: Yvan Patel MD;  [facility];  Service: Urology    MA TRANSURETHRAL INCISION OF PROSTATE N/A 8/30/2018    Procedure: TRANSURETHRAL INCISION OF PROSTATE (TUIP); Surgeon: Yvan Patel MD;  [facility];  Service: Urology    ROTATOR CUFF REPAIR Bilateral     SHOULDER SURGERY      TARSAL TUNNEL RELEASE      Neuroplasty Decompression       Social History:   reports that he quit smoking about 43 years ago  His smoking use included cigarettes  He has a 17 00 pack-year smoking history  He has never used smokeless tobacco  He reports that he does not drink alcohol or use drugs      Allergies:  Patient has no known allergies        Current Outpatient Medications:     acetaminophen (TYLENOL) 500 mg tablet, Take 1,000 mg by mouth every 6 (six) hours as needed for mild pain, Disp: , Rfl:     amLODIPine (NORVASC) 10 mg tablet, Take 1 tablet (10 mg total) by mouth every morning, Disp: 90 tablet, Rfl: 3    fenofibrate (TRICOR) 145 mg tablet, TAKE 1 TABLET (145 MG TOTAL) BY MOUTH DAILY, Disp: 90 tablet, Rfl: 2    ketoconazole (NIZORAL) 2 % cream, Apply topically 2 (two) times a day To facial rash repeat as needed treat for 3 weeks, Disp: 30 g, Rfl: 3    lisinopril (ZESTRIL) 40 mg tablet, Take 1 tablet (40 mg total) by mouth daily, Disp: 90 tablet, Rfl: 3    loperamide (IMODIUM A-D) 2 MG tablet, Take 1 tablet by mouth daily as needed, Disp: , Rfl:     lovastatin (MEVACOR) 40 MG tablet, Take 1 tablet (40 mg total) by mouth daily, Disp: 90 tablet, Rfl: 3    Multiple Vitamins-Minerals (CENTRUM ULTRA MENS PO), Take 1 tablet by mouth daily, Disp: , Rfl:     omeprazole (PriLOSEC) 20 mg delayed release capsule, TAKE ONE CAPSULE BY MOUTH DAILY, Disp: 90 capsule, Rfl: 1    meclizine (ANTIVERT) 25 mg tablet, Take 1 tablet (25 mg total) by mouth 3 (three) times a day as needed for dizziness (Patient not taking: Reported on 4/6/2021), Disp: 30 tablet, Rfl: 0    meloxicam (MOBIC) 7 5 mg tablet, TAKE 1 TABLET (7 5 MG TOTAL) BY MOUTH DAILY MAY TAKE A SECOND PILL AS NEEDED, NO MORE THAN 2 PER DAY (Patient not taking: Reported on 4/6/2021), Disp: 30 tablet, Rfl: 3    pseudoephedrine (SUDAFED) 30 mg tablet, Take 60 mg by mouth daily at bedtime as needed , Disp: , Rfl:     traMADol (ULTRAM) 50 mg tablet, Take 1 tablet (50 mg total) by mouth 3 (three) times a day as needed for severe pain (Patient not taking: Reported on 4/6/2021), Disp: 90 tablet, Rfl: 0    vitamin B-12 (VITAMIN B-12) 1,000 mcg tablet, Take 1,000 mcg by mouth every third day , Disp: , Rfl:       I have reviewed the past medical, social and family history, current medications, allergies, vitals, review of systems and updated this information as appropriate today     Objective:    Vitals:  Blood pressure 120/50, pulse 66, height 5' 5" (1 651 m), weight 77 6 kg (171 lb)  Physical Exam    Neurological Exam   GENERAL:  Well-developed well-nourished man in no acute distress  HEENT/NECK: Head is atraumatic normocephalic, neck is supple, restricted range of motion is noted the cervical spine  NEUROLOGIC:  Mental Status: Awake and alert without aphasia  Cranial Nerves: Extraocular movements are full  Face is symmetrical  Coordination:  Gait is stable        ROS:    Review of Systems   Constitutional: Positive for fatigue  Negative for appetite change and fever  HENT: Negative  Negative for hearing loss, tinnitus, trouble swallowing and voice change  Eyes: Negative  Negative for photophobia and pain  Respiratory: Negative  Negative for shortness of breath  Cardiovascular: Negative  Negative for palpitations  Gastrointestinal: Negative  Negative for nausea and vomiting  Endocrine: Negative  Negative for cold intolerance  Genitourinary: Negative  Negative for dysuria, frequency and urgency  Musculoskeletal: Positive for arthralgias, back pain, myalgias (back ) and neck pain  Skin: Negative  Negative for rash  Neurological: Positive for numbness (left side of neck - left shoulder)  Negative for dizziness, tremors, seizures, syncope, facial asymmetry, speech difficulty, weakness, light-headedness and headaches  Hematological: Negative  Does not bruise/bleed easily  Psychiatric/Behavioral: Positive for sleep disturbance  Negative for confusion and hallucinations

## 2021-04-12 ENCOUNTER — TELEPHONE (OUTPATIENT)
Dept: PAIN MEDICINE | Facility: CLINIC | Age: 76
End: 2021-04-12

## 2021-04-12 NOTE — TELEPHONE ENCOUNTER
Tramadol is not a known cause of myalgias  He is also on a statin and that can cause myalgias as well, but it seems this is a new issue  He may want to see if this improves with abstinence from medication, and then see if it returns when taking the medication again

## 2021-04-12 NOTE — TELEPHONE ENCOUNTER
S/w pt  States since starting tramadol he has noticed muscle aches and feels like "every bone in my body is sore "  Pt took last dose of tramadol yesterday and states these aches are new  Pt states was not taking tid , only when needed

## 2021-04-23 ENCOUNTER — LAB (OUTPATIENT)
Dept: LAB | Facility: HOSPITAL | Age: 76
End: 2021-04-23
Attending: OTOLARYNGOLOGY
Payer: MEDICARE

## 2021-04-23 ENCOUNTER — TRANSCRIBE ORDERS (OUTPATIENT)
Dept: ADMINISTRATIVE | Facility: HOSPITAL | Age: 76
End: 2021-04-23

## 2021-04-23 DIAGNOSIS — G47.33 OBSTRUCTIVE SLEEP APNEA (ADULT) (PEDIATRIC): ICD-10-CM

## 2021-04-23 DIAGNOSIS — G47.33 OBSTRUCTIVE SLEEP APNEA (ADULT) (PEDIATRIC): Primary | ICD-10-CM

## 2021-04-23 LAB
ANION GAP SERPL CALCULATED.3IONS-SCNC: 10 MMOL/L (ref 4–13)
BASOPHILS # BLD AUTO: 0.02 THOUSANDS/ΜL (ref 0–0.1)
BASOPHILS NFR BLD AUTO: 0 % (ref 0–1)
BUN SERPL-MCNC: 25 MG/DL (ref 5–25)
CALCIUM SERPL-MCNC: 8.8 MG/DL (ref 8.3–10.1)
CHLORIDE SERPL-SCNC: 103 MMOL/L (ref 100–108)
CO2 SERPL-SCNC: 27 MMOL/L (ref 21–32)
CREAT SERPL-MCNC: 0.93 MG/DL (ref 0.6–1.3)
EOSINOPHIL # BLD AUTO: 0.05 THOUSAND/ΜL (ref 0–0.61)
EOSINOPHIL NFR BLD AUTO: 1 % (ref 0–6)
ERYTHROCYTE [DISTWIDTH] IN BLOOD BY AUTOMATED COUNT: 12.3 % (ref 11.6–15.1)
GFR SERPL CREATININE-BSD FRML MDRD: 79 ML/MIN/1.73SQ M
GLUCOSE P FAST SERPL-MCNC: 114 MG/DL (ref 65–99)
HCT VFR BLD AUTO: 44.2 % (ref 36.5–49.3)
HGB BLD-MCNC: 15 G/DL (ref 12–17)
IMM GRANULOCYTES # BLD AUTO: 0.01 THOUSAND/UL (ref 0–0.2)
IMM GRANULOCYTES NFR BLD AUTO: 0 % (ref 0–2)
LYMPHOCYTES # BLD AUTO: 1.55 THOUSANDS/ΜL (ref 0.6–4.47)
LYMPHOCYTES NFR BLD AUTO: 30 % (ref 14–44)
MCH RBC QN AUTO: 33.6 PG (ref 26.8–34.3)
MCHC RBC AUTO-ENTMCNC: 33.9 G/DL (ref 31.4–37.4)
MCV RBC AUTO: 99 FL (ref 82–98)
MONOCYTES # BLD AUTO: 0.58 THOUSAND/ΜL (ref 0.17–1.22)
MONOCYTES NFR BLD AUTO: 11 % (ref 4–12)
NEUTROPHILS # BLD AUTO: 2.92 THOUSANDS/ΜL (ref 1.85–7.62)
NEUTS SEG NFR BLD AUTO: 58 % (ref 43–75)
NRBC BLD AUTO-RTO: 0 /100 WBCS
PLATELET # BLD AUTO: 199 THOUSANDS/UL (ref 149–390)
PMV BLD AUTO: 10 FL (ref 8.9–12.7)
POTASSIUM SERPL-SCNC: 4.2 MMOL/L (ref 3.5–5.3)
RBC # BLD AUTO: 4.47 MILLION/UL (ref 3.88–5.62)
SODIUM SERPL-SCNC: 140 MMOL/L (ref 136–145)
WBC # BLD AUTO: 5.13 THOUSAND/UL (ref 4.31–10.16)

## 2021-04-23 PROCEDURE — 93005 ELECTROCARDIOGRAM TRACING: CPT

## 2021-04-23 PROCEDURE — 80048 BASIC METABOLIC PNL TOTAL CA: CPT

## 2021-04-23 PROCEDURE — 36415 COLL VENOUS BLD VENIPUNCTURE: CPT

## 2021-04-23 PROCEDURE — 85025 COMPLETE CBC W/AUTO DIFF WBC: CPT

## 2021-04-24 LAB
ATRIAL RATE: 57 BPM
P AXIS: 54 DEGREES
PR INTERVAL: 212 MS
QRS AXIS: 25 DEGREES
QRSD INTERVAL: 80 MS
QT INTERVAL: 432 MS
QTC INTERVAL: 420 MS
T WAVE AXIS: 47 DEGREES
VENTRICULAR RATE: 57 BPM

## 2021-04-24 PROCEDURE — 93010 ELECTROCARDIOGRAM REPORT: CPT | Performed by: INTERNAL MEDICINE

## 2021-04-27 ENCOUNTER — HOSPITAL ENCOUNTER (EMERGENCY)
Facility: HOSPITAL | Age: 76
Discharge: HOME | End: 2021-04-27
Attending: EMERGENCY MEDICINE | Admitting: EMERGENCY MEDICINE
Payer: MEDICARE

## 2021-04-27 VITALS
OXYGEN SATURATION: 97 % | HEART RATE: 57 BPM | SYSTOLIC BLOOD PRESSURE: 149 MMHG | RESPIRATION RATE: 18 BRPM | DIASTOLIC BLOOD PRESSURE: 73 MMHG | TEMPERATURE: 98.9 F

## 2021-04-27 DIAGNOSIS — M62.830 SPASM OF THORACIC BACK MUSCLE: ICD-10-CM

## 2021-04-27 DIAGNOSIS — M62.838 TRAPEZIUS MUSCLE SPASM: Primary | ICD-10-CM

## 2021-04-27 PROCEDURE — 99283 EMERGENCY DEPT VISIT LOW MDM: CPT

## 2021-04-27 PROCEDURE — 20552 NJX 1/MLT TRIGGER POINT 1/2: CPT | Performed by: EMERGENCY MEDICINE

## 2021-04-27 PROCEDURE — 99284 EMERGENCY DEPT VISIT MOD MDM: CPT | Performed by: EMERGENCY MEDICINE

## 2021-04-27 RX ORDER — METHOCARBAMOL 500 MG/1
500 TABLET, FILM COATED ORAL ONCE
Status: COMPLETED | OUTPATIENT
Start: 2021-04-27 | End: 2021-04-27

## 2021-04-27 RX ORDER — METHOCARBAMOL 500 MG/1
500 TABLET, FILM COATED ORAL 3 TIMES DAILY PRN
Qty: 28 TABLET | Refills: 0 | Status: SHIPPED | OUTPATIENT
Start: 2021-04-27 | End: 2022-06-23 | Stop reason: ALTCHOICE

## 2021-04-27 RX ORDER — LIDOCAINE HYDROCHLORIDE 10 MG/ML
2 INJECTION, SOLUTION EPIDURAL; INFILTRATION; INTRACAUDAL; PERINEURAL ONCE
Status: COMPLETED | OUTPATIENT
Start: 2021-04-27 | End: 2021-04-27

## 2021-04-27 RX ADMIN — METHOCARBAMOL 500 MG: 500 TABLET ORAL at 07:49

## 2021-04-27 RX ADMIN — LIDOCAINE HYDROCHLORIDE 2 ML: 10 INJECTION, SOLUTION EPIDURAL; INFILTRATION; INTRACAUDAL; PERINEURAL at 06:53

## 2021-04-27 NOTE — ED PROVIDER NOTES
History  Chief Complaint   Patient presents with    Neck Pain     Pt reports neck pain ongoing for 1 week, denies known injury  Pt reports hx of ablation to left neck approx 1 month ago  HPI    Prior to Admission Medications   Prescriptions Last Dose Informant Patient Reported? Taking?    Multiple Vitamins-Minerals (CENTRUM ULTRA MENS PO)  Self Yes No   Sig: Take 1 tablet by mouth daily   acetaminophen (TYLENOL) 500 mg tablet   Yes No   Sig: Take 1,000 mg by mouth every 6 (six) hours as needed for mild pain   amLODIPine (NORVASC) 10 mg tablet  Self No No   Sig: Take 1 tablet (10 mg total) by mouth every morning   fenofibrate (TRICOR) 145 mg tablet  Self No No   Sig: TAKE 1 TABLET (145 MG TOTAL) BY MOUTH DAILY   ketoconazole (NIZORAL) 2 % cream  Self No No   Sig: Apply topically 2 (two) times a day To facial rash repeat as needed treat for 3 weeks   lisinopril (ZESTRIL) 40 mg tablet  Self No No   Sig: Take 1 tablet (40 mg total) by mouth daily   loperamide (IMODIUM A-D) 2 MG tablet  Self Yes No   Sig: Take 1 tablet by mouth daily as needed   lovastatin (MEVACOR) 40 MG tablet  Self No No   Sig: Take 1 tablet (40 mg total) by mouth daily   meclizine (ANTIVERT) 25 mg tablet  Self No No   Sig: Take 1 tablet (25 mg total) by mouth 3 (three) times a day as needed for dizziness   Patient not taking: Reported on 4/6/2021   meloxicam (MOBIC) 7 5 mg tablet  Self No No   Sig: TAKE 1 TABLET (7 5 MG TOTAL) BY MOUTH DAILY MAY TAKE A SECOND PILL AS NEEDED, NO MORE THAN 2 PER DAY   Patient not taking: Reported on 4/6/2021   omeprazole (PriLOSEC) 20 mg delayed release capsule  Self No No   Sig: TAKE ONE CAPSULE BY MOUTH DAILY   pseudoephedrine (SUDAFED) 30 mg tablet  Self Yes No   Sig: Take 60 mg by mouth daily at bedtime as needed    traMADol (ULTRAM) 50 mg tablet  Self No No   Sig: Take 1 tablet (50 mg total) by mouth 3 (three) times a day as needed for severe pain   Patient not taking: Reported on 4/6/2021   vitamin B-12 (VITAMIN B-12) 1,000 mcg tablet  Self Yes No   Sig: Take 1,000 mcg by mouth every third day       Facility-Administered Medications: None       Past Medical History:   Diagnosis Date    Abnormal stress test     Angina at rest [facility])     Apnea     Carpal tunnel syndrome on left     Chest pain     CPAP (continuous positive airway pressure) dependence     Diverticulitis     GERD (gastroesophageal reflux disease)     Hypercholesterolemia     Hypercholesterolemia     Hyperlipidemia     Hypertension     Joint pain     Right Shoulder    Kidney stone     Mitral valve disorder     Neck pain     Sleep apnea     Sleep apnea, obstructive     Thoracic neuritis        Past Surgical History:   Procedure Laterality Date    COLONOSCOPY      Complete    CORONARY ANGIOPLASTY  2017    ESOPHAGOGASTRODUODENOSCOPY      Diagnostic    FL RETROGRADE PYELOGRAM  5/24/2019    FOOT SURGERY Left     tarsal tunnel    HERNIA REPAIR      KNEE ARTHROSCOPY Left     KNEE SURGERY Left     NEUROPLASTY / TRANSPOSITION MEDIAN NERVE AT CARPAL TUNNEL      AZ COLONOSCOPY FLX DX W/COLLJ SPEC WHEN PFRMD N/A 4/10/2018    Procedure: EGD AND COLONOSCOPY;  Surgeon: Suad Reyes MD;  [facility]; Service: Gastroenterology    AZ CYSTO/URETERO W/LITHOTRIPSY &INDWELL STENT INSRT Right 5/24/2019    Procedure: CYSTOSCOPY URETEROSCOPY WITH LITHOTRIPSY HOLMIUM LASER, RETROGRADE PYELOGRAM AND INSERTION STENT URETERAL;  Surgeon: Verner Squires, MD;  [facility];  Service: Urology    AZ CYSTOURETHRO W/IMPLANT N/A 8/30/2018    Procedure: CYSTOSCOPY WITH INSERTION Siomara Oats;  Surgeon: Verner Squires, MD;  [facility];  Service: Urology    AZ TRANSURETHRAL INCISION OF PROSTATE N/A 8/30/2018    Procedure: TRANSURETHRAL INCISION OF PROSTATE (TUIP);   Surgeon: Verner Squires, MD;  [facility];  Service: Urology    ROTATOR CUFF REPAIR Bilateral     SHOULDER SURGERY      TARSAL TUNNEL RELEASE Neuroplasty Decompression       Family History   Problem Relation Age of Onset    Diabetes Mother         Mellitus    Heart disease Mother         Arteriosclerotic Cardiovascular    Hypertension Mother     Cancer Father     Arthritis Father         Maximu Mult Site W Involv Of Organs And Systems    Parkinsonism Father     Thyroid disease Sister     Diabetes Brother         Diabetes:Mellitus    Heart disease Brother     Hypertension Brother     Coronary artery disease Family     Heart disease Brother     No Known Problems Brother     Kidney disease Sister     Parkinsonism Sister      I have reviewed and agree with the history as documented  E-Cigarette/Vaping    E-Cigarette Use Never User      E-Cigarette/Vaping Substances    Nicotine No     THC No     CBD No     Flavoring No     Other No     Unknown No      Social History     Tobacco Use    Smoking status: Former Smoker     Packs/day: 1 00     Years: 17 00     Pack years: 17 00     Types: Cigarettes     Quit date:      Years since quittin 3    Smokeless tobacco: Never Used   Substance Use Topics    Alcohol use: No    Drug use: No       Review of Systems    Physical Exam  Physical Exam  Vitals signs and nursing note reviewed  Constitutional:       General: He is not in acute distress  Appearance: He is well-developed  HENT:      Head: Normocephalic and atraumatic  Eyes:      Conjunctiva/sclera: Conjunctivae normal       Pupils: Pupils are equal, round, and reactive to light  Neck:      Musculoskeletal: Decreased range of motion (due to pain in his back- rotation to left and extension of neck )  Muscular tenderness (L sided >R) present  No neck rigidity or spinous process tenderness  Trachea: No tracheal deviation  Cardiovascular:      Rate and Rhythm: Normal rate and regular rhythm  Pulses:           Radial pulses are 2+ on the right side  Heart sounds: Normal heart sounds     Pulmonary:      Effort: Pulmonary effort is normal  No respiratory distress  Breath sounds: Normal breath sounds  Abdominal:      General: There is no distension  Palpations: Abdomen is soft  Tenderness: There is no abdominal tenderness  Musculoskeletal:      Thoracic back: He exhibits decreased range of motion (mild, due to worsening pain), tenderness (diffusely to trapezius and thoracic back, worst at lower border of scapula) and spasm (scattered throughout upper trapezius; none near where area of worst pain is)  He exhibits no bony tenderness  Back:       Comments: Normal strength and sensation in both arms   Skin:     General: Skin is warm and dry  Neurological:      Mental Status: He is alert and oriented to person, place, and time  GCS: GCS eye subscore is 4  GCS verbal subscore is 5  GCS motor subscore is 6     Psychiatric:         Behavior: Behavior normal          Vital Signs  ED Triage Vitals   Temperature Pulse Respirations Blood Pressure SpO2   04/27/21 0108 04/27/21 0108 04/27/21 0108 04/27/21 0112 04/27/21 0108   98 9 °F (37 2 °C) 69 18 132/61 96 %      Temp Source Heart Rate Source Patient Position - Orthostatic VS BP Location FiO2 (%)   04/27/21 0108 04/27/21 0108 04/27/21 0108 04/27/21 0108 --   Oral Monitor Sitting Left arm       Pain Score       04/27/21 0448       Worst Possible Pain           Vitals:    04/27/21 0112 04/27/21 0448 04/27/21 0530 04/27/21 0600   BP: 132/61  151/68 153/67   Pulse:  65 57 56   Patient Position - Orthostatic VS: Sitting Lying Lying Lying         Visual Acuity      ED Medications  Medications   lidocaine (PF) (XYLOCAINE-MPF) 1 % injection 2 mL (2 mL Infiltration Given 4/27/21 0653)   methocarbamol (ROBAXIN) tablet 500 mg (500 mg Oral Given 4/27/21 0749)       Diagnostic Studies  Results Reviewed     None                 No orders to display              Procedures  General Procedure    Date/Time: 4/27/2021 6:59 AM  Performed by: Neel Bobo MD  Authorized by: Vibha Aguirre MD     Patient location:  ED  Assisting Provider(s): No    Consent:     Consent obtained:  Verbal    Consent given by:  Patient    Risks discussed:  Pain, bleeding and infection    Alternatives discussed:  No treatment  Indications:     Indications:  Muscle spasm  Pre-procedure details:     Skin preparation:  ChloraPrep  Procedure Detail:     Procedure note (site, laterality, method, findings): After discussion of risks and benefits, to include infection, failure to improve pain,  damage to deeper structures, bleeding, patient consented to a trigger point injection  Sites of maximum pain located by palpation, skin prepped with alcohol, and 2% lidocaine without epinephrine infiltrated into 3 sites of maximal pain  No air or blood seen on continuous aspiration while inserting the needle  Procedure was tolerated well without complications  Patient reported mild relief of pain immediately, and mild after approximately 10 min  He had improved range of motion in some directions of his neck, particularly rotation  Post-procedure details:     Patient tolerance of procedure: Tolerated well, no immediate complications             ED Course               Identification of Seniors at Risk      Most Recent Value   (ISAR) Identification of Seniors at Risk   Before the illness or injury that brought you to the Emergency, did you need someone to help you on a regular basis? 0 Filed at: 04/27/2021 0109   In the last 24 hours, have you needed more help than usual?  0 Filed at: 04/27/2021 4624   Have you been hospitalized for one or more nights during the past 6 months? 0 Filed at: 04/27/2021 0109   In general, do you see well?  0 Filed at: 04/27/2021 0109   In general, do you have serious problems with your memory? 0 Filed at: 04/27/2021 0109   Do you take more than three different medications every day?   1 Filed at: 04/27/2021 0109   ISAR Score  1 Filed at: 04/27/2021 9921 MDM  Number of Diagnoses or Management Options  Spasm of thoracic back muscle: new and requires workup  Trapezius muscle spasm: new and requires workup  Diagnosis management comments: This is a 69-year-old male who presents here today with neck pain  He states he has a history of chronic neck pain, as treated by pain management doctor for this  Had an ablation of the nerves to the left side of his neck over a month ago, which is not cause significant improvement of his pain  He states for about one week he has had pain the right side of his neck  He states with certain movements and positions the pain gets acutely worse, and radiates down his back  He denies injuries to the area, prior problems with neck pain right  He has not taken anything for the pain, as he does not like to take medications  Earlier, his wife applied diclofenac to the area which did not help his symptoms  He has not tried any other topical remedies, ice or heat to the area  He states he had been given tramadol for his left-sided neck pain which caused "muscle aching" diffusely  He denies any chest, weakness or numbness, paresthesia into the arm  He says certain movements, particularly extension of the neck and rotation to the left cause worsening pain, limiting his movement  He was having a hard time sleeping last night due to pain, prompting him to come to the ER for evaluation  He says prior to his ablation he had gone to physical therapy and was told he had "tight muscles" in his neck and upper back  He was told that he might benefit from a massage, but never had one  Review of systems:  Otherwise negative unless stated as above    He is well-appearing, in no acute distress  He has tenderness to the right trapezius and upper back muscles, with several areas of muscle spasm  He does have more tenderness at the lower end of the scapula where he states the worst pain is    He is diffusely tight muscles throughout the upper back, but no spasms in the area where he is having the worst pain  He does have decreased range of motion of the neck and of the shoulder due to pain in his back  He has no focal bony tenderness  Exam is otherwise unremarkable  I am not concerned that current pain is related to his known stenosis or other acute cervical spine process  He has previously been told he has tight muscles this likely acute exacerbation process, possibly due to decreased movement related to recent ablation and worsening of his left-sided neck pain  Given several areas of spasms with worst tenderness trigger point injections were performed as above with some relief of pain in that area, but not throughout the rest of his back  He was given muscle relaxers to help with additional pain  I discussed with him stretching exercises and follow-up for further evaluation, and indications for return, and he expresses understanding with this plan  Amount and/or Complexity of Data Reviewed  Decide to obtain previous medical records or to obtain history from someone other than the patient: yes  Review and summarize past medical records: yes        Disposition  Final diagnoses:   Trapezius muscle spasm   Spasm of thoracic back muscle     Time reflects when diagnosis was documented in both MDM as applicable and the Disposition within this note     Time User Action Codes Description Comment    4/27/2021  7:43 AM Malorie Bobo Add [N94 033] Trapezius muscle spasm     4/27/2021  7:43 AM Malorie Bobo Add [W19 [id number]] Spasm of thoracic back muscle       ED Disposition     ED Disposition Condition Date/Time Comment    [facility]        Follow-up Information     Follow up With Specialties Details Why June Claude, MD Pain Medicine Call  to discuss your symptoms, and additional treatment 3 Parkinsons [address] [facility] [address]  [address], DO Family Medicine Schedule an appointment as soon as possible for a visit  to follow up on your [address]            Patient's Medications   Discharge Prescriptions    METHOCARBAMOL (ROBAXIN) 500 MG TABLET    Take 1 tablet (500 mg total) by mouth 3 (three) times a day as needed for muscle spasms (neck/back pain)       Start Date: 4/27/2021 End Date: --       Order Dose: 500 mg       Quantity: 28 tablet    Refills: 0     No discharge procedures on file      PDMP Review     None          ED Provider  Electronically Signed by           Misael High MD  05/02/21 0677

## 2021-04-27 NOTE — DISCHARGE INSTRUCTIONS
Use ibuprofen (Motrin, Advil) or acetaminophen (Tylenol) as needed for pain, as per the instructions  Use ice or heat, and topical pain medications to the area  Do stretching exercises to keep the muscles loose  Take a muscle relaxer as needed to help with the tight muscles and muscle spasms  Follow-up with your primary care doctor and the pain management doctor for further evaluation and management of your symptoms

## 2021-04-29 ENCOUNTER — TELEPHONE (OUTPATIENT)
Dept: INTERNAL MEDICINE CLINIC | Facility: CLINIC | Age: 76
End: 2021-04-29

## 2021-04-29 ENCOUNTER — OFFICE VISIT (OUTPATIENT)
Dept: INTERNAL MEDICINE CLINIC | Facility: CLINIC | Age: 76
End: 2021-04-29
Payer: MEDICARE

## 2021-04-29 VITALS
TEMPERATURE: 97.5 F | OXYGEN SATURATION: 92 % | HEART RATE: 65 BPM | BODY MASS INDEX: 31.58 KG/M2 | SYSTOLIC BLOOD PRESSURE: 150 MMHG | HEIGHT: 62 IN | DIASTOLIC BLOOD PRESSURE: 68 MMHG | WEIGHT: 171.6 LBS

## 2021-04-29 DIAGNOSIS — G89.29 CHRONIC NECK PAIN: ICD-10-CM

## 2021-04-29 DIAGNOSIS — M62.838 NECK MUSCLE SPASM: Primary | ICD-10-CM

## 2021-04-29 DIAGNOSIS — S46.811D STRAIN OF RIGHT TRAPEZIUS MUSCLE, SUBSEQUENT ENCOUNTER: ICD-10-CM

## 2021-04-29 DIAGNOSIS — M54.2 CHRONIC NECK PAIN: ICD-10-CM

## 2021-04-29 PROCEDURE — 99214 OFFICE O/P EST MOD 30 MIN: CPT | Performed by: FAMILY MEDICINE

## 2021-04-29 NOTE — TELEPHONE ENCOUNTER
Patient states that [facility] nor [facility] are able to fill the script for the Tens Unit  He wants to know if it can be ordered from somewhere else and shipped to his home  Please advise

## 2021-04-29 NOTE — PROGRESS NOTES
FOLLOW-UP OFFICE VISIT  [facility] [address] [place] [address]    NAME: Nadira Sellers  AGE: 66 y o  SEX: male  : 1945     DATE: 2021     Assessment and Plan:     1  Neck muscle spasm  2  Chronic neck pain  3  Strain of right trapezius muscle, subsequent encounter-chronic but stable  Pt amendable to PT  Discussed the use of tens unit  Written script provided  Pt to continue to follow up with pain medicine  Continue Qnightly use of muscle relaxer as well  - Ambulatory referral to Physical Therapy; Future          Return if symptoms worsen or fail to improve  Chief Complaint:     Chief Complaint   Patient presents with    Spasms     right shoulder/back        History of Present Illness:     Presents for ED f/u  Presented to ed on 2021 for neck pain  He was given a trigger point injection and muscle relaxer prior to discharge from the ED  TODAY HE REPORTS   Pain isn't changed  Tried tramadol and it didn't help  Using muscle relaxer he was given and it allows sleep  Taking more tyenol than anything  Follows with pain medicine for chronic neck pain  Has an nerve ablation in the cervical spine a week prior to ED visit   Reports hx of pt earlier in the year for a few weeks  Says the result were miinimal  Was told he has very tight muscles  Review of Systems:     Review of Systems   Constitutional: Negative for fever  Respiratory: Negative for shortness of breath  Cardiovascular: Negative for chest pain  Musculoskeletal: Positive for arthralgias and back pain  Negative for gait problem  Neurological: Negative for light-headedness and headaches  Psychiatric/Behavioral: Positive for sleep disturbance (beryl and shoulder pain )          Problem List:     Patient Active Problem List   Diagnosis    Chest pain    Essential hypertension    Mixed hyperlipidemia    Chronic pain disorder    Chronic left shoulder pain    Chronic bilateral low back pain with bilateral sciatica    Disc degeneration, lumbar    Lumbar stenosis with neurogenic claudication    Abnormal CT of the abdomen    Chronic pain syndrome    Moderate obstructive sleep apnea    Benign prostatic hyperplasia with lower urinary tract symptoms    Hydronephrosis    Nonrheumatic aortic valve insufficiency    Non-rheumatic mitral regurgitation    Diastolic dysfunction    Nephrolithiasis    Squamous cell carcinoma in situ (SCCIS) of scalp    Actinic keratosis    Chronic obstructive pulmonary disease (HCC)    Megaloblastic red blood cells        Objective:     /68   Pulse 65   Temp 97 5 °F (36 4 °C) (Tympanic)   Ht 5' 2" (1 575 m)   Wt 77 8 kg (171 lb 9 6 oz)   SpO2 92%   BMI 31 39 kg/m²     Physical Exam  Constitutional:       Appearance: He is not diaphoretic  HENT:      Head: Normocephalic  Eyes:      Conjunctiva/sclera: Conjunctivae normal       Pupils: Pupils are equal, round, and reactive to light  Cardiovascular:      Rate and Rhythm: Normal rate and regular rhythm  Pulmonary:      Effort: Pulmonary effort is normal       Breath sounds: Normal breath sounds  Musculoskeletal:      Right shoulder: He exhibits spasm  He exhibits no tenderness and no bony tenderness  Cervical back: He exhibits spasm  He exhibits no bony tenderness and no swelling  Neurological:      Mental Status: He is alert and oriented to person, place, and time     Psychiatric:         Mood and Affect: Mood normal              [facility]: [facility]  4/29/2021 8:26 AM

## 2021-04-29 NOTE — PATIENT INSTRUCTIONS
Muscle Spasm   WHAT YOU NEED TO KNOW:   A muscle spasm is a sudden contraction of any muscle or group of muscles  A muscle cramp is a painful muscle spasm  Muscle cramps commonly occur after intense exercise or during pregnancy  They may also be caused by certain medications, dehydration, low calcium or magnesium levels, or another medical condition  DISCHARGE INSTRUCTIONS:   Medicines: You may need the following:  · NSAIDs  help decrease swelling and pain or fever  This medicine is available with or without a doctor's order  NSAIDs can cause stomach bleeding or kidney problems in certain people  If you take blood thinner medicine, always ask your healthcare provider if NSAIDs are safe for you  Always read the medicine label and follow directions  · Take your medicine as directed  Contact your healthcare provider if you think your medicine is not helping or if you have side effects  Tell him of her if you are allergic to any medicine  Keep a list of the medicines, vitamins, and herbs you take  Include the amounts, and when and why you take them  Bring the list or the pill bottles to follow-up visits  Carry your medicine list with you in case of an emergency  Follow up with your healthcare provider as directed: You may need other tests or treatment  You may also be referred to a physical therapist or other specialist  Write down your questions so you remember to ask them during your visits  Self-care:   · Stretch  your muscle to help relieve the cramp  It may be helpful to keep your muscle in the stretched position until the cramp is gone  · Apply heat  to help decrease pain and muscle spasms  Apply heat on the area for 20 to 30 minutes every 2 hours for as many days as directed  · Apply ice  to help decrease swelling and pain  Ice may also help prevent tissue damage  Use an ice pack, or put crushed ice in a plastic bag   Cover it with a towel and place it on your muscle for 15 to 20 minutes every hour or as directed  · Drink more liquids  to help prevent muscle cramps caused by dehydration  Sports drinks may help replace electrolytes you lose through sweat during exercise  Ask your healthcare provider how much liquid to drink each day and which liquids are best for you  · Eat healthy foods , such as fruits, vegetables, whole grains, low-fat dairy products, and lean proteins (meat, beans, and fish)  If you are pregnant, ask your healthcare provider about foods that are high in magnesium and sodium  They may help to relieve cramps during pregnancy  · Massage your muscle  to help relieve the cramp  · Take frequent deep breaths  until the cramp feels better  Lie down while you take the deep breaths so you do not get dizzy or lightheaded  Contact your healthcare provider if:   · You have signs of dehydration, such as a headache, dark yellow urine, dry eyes or mouth, or a fast heartbeat  · You have questions or concerns about your condition or care  Return to the emergency department if:   · You have warmth, swelling, or redness in the cramping muscle  · You have frequent or unrelieved muscle cramps in several different muscles  · You have muscle cramps with numbness, tingling, and burning in your hands and feet  © Copyright [address] Information is for End User's use only and may not be sold, redistributed or otherwise used for commercial purposes  All illustrations and images included in CareNotes® are the copyrighted property of A D A M , Inc  or [facility] [employer]   The above information is an  only  It is not intended as medical advice for individual conditions or treatments  Talk to your doctor, nurse or pharmacist before following any medical regimen to see if it is safe and effective for you

## 2021-04-29 NOTE — TELEPHONE ENCOUNTER
I called [facility]  There is additional information they need for me to order a TENS unit  I will fax over the information requested and another copy of the script today  The DME supplier should reach out to patient when available

## 2021-05-10 NOTE — PRE-PROCEDURE INSTRUCTIONS
Pre-Surgery Instructions:   Medication Instructions    acetaminophen (TYLENOL) 500 mg tablet Instructed patient per Anesthesia Guidelines  continue    amLODIPine (NORVASC) 10 mg tablet Instructed patient per Anesthesia Guidelines  take 5/13    fenofibrate (TRICOR) 145 mg tablet Instructed patient per Anesthesia Guidelines  take 5/13    ketoconazole (NIZORAL) 2 % cream Instructed patient per Anesthesia Guidelines   lisinopril (ZESTRIL) 40 mg tablet Instructed patient per Anesthesia Guidelines  pm useage    loperamide (IMODIUM A-D) 2 MG tablet Instructed patient per Anesthesia Guidelines  hold 5/13 prn    lovastatin (MEVACOR) 40 MG tablet Instructed patient per Anesthesia Guidelines  take 5/13    methocarbamol (ROBAXIN) 500 mg tablet Instructed patient per Anesthesia Guidelines  may take    Multiple Vitamins-Minerals (CENTRUM ULTRA MENS PO) Instructed patient per Anesthesia Guidelines  stopped for surgery    omeprazole (PriLOSEC) 20 mg delayed release capsule Instructed patient per Anesthesia Guidelines  take 5/13 prn   Pre procedure instructions rerviewed verbalizes understanding  NPO after MN  Bathing reviewed  Morning meds with water  Stop supplements/vitamins

## 2021-05-10 NOTE — PRE-PROCEDURE INSTRUCTIONS
Pre-Surgery Instructions:   Medication Instructions    acetaminophen (TYLENOL) 500 mg tablet Instructed patient per Anesthesia Guidelines   amLODIPine (NORVASC) 10 mg tablet Instructed patient per Anesthesia Guidelines   fenofibrate (TRICOR) 145 mg tablet Instructed patient per Anesthesia Guidelines   ketoconazole (NIZORAL) 2 % cream Instructed patient per Anesthesia Guidelines   lisinopril (ZESTRIL) 40 mg tablet Instructed patient per Anesthesia Guidelines  pm usage only    loperamide (IMODIUM A-D) 2 MG tablet Instructed patient per Anesthesia Guidelines   lovastatin (MEVACOR) 40 MG tablet Instructed patient per Anesthesia Guidelines   methocarbamol (ROBAXIN) 500 mg tablet Instructed patient per Anesthesia Guidelines   Multiple Vitamins-Minerals (CENTRUM ULTRA MENS PO) Instructed patient per Anesthesia Guidelines  stop 5/10    omeprazole (PriLOSEC) 20 mg delayed release capsule Instructed patient per Anesthesia Guidelines  Pre Procedure instructions given and verbalized understanding  NPO after MN  Bathing reviewed  Stop all supplement/vitamins   Morning meds with water

## 2021-05-11 ENCOUNTER — ANESTHESIA EVENT (OUTPATIENT)
Dept: PERIOP | Facility: HOSPITAL | Age: 76
End: 2021-05-11
Payer: MEDICARE

## 2021-05-13 ENCOUNTER — HOSPITAL ENCOUNTER (OUTPATIENT)
Facility: HOSPITAL | Age: 76
Setting detail: OUTPATIENT SURGERY
Discharge: HOME | End: 2021-05-13
Attending: OTOLARYNGOLOGY | Admitting: OTOLARYNGOLOGY
Payer: MEDICARE

## 2021-05-13 ENCOUNTER — ANESTHESIA (OUTPATIENT)
Dept: PERIOP | Facility: HOSPITAL | Age: 76
End: 2021-05-13
Payer: MEDICARE

## 2021-05-13 ENCOUNTER — APPOINTMENT (OUTPATIENT)
Dept: RADIOLOGY | Facility: HOSPITAL | Age: 76
End: 2021-05-13
Payer: MEDICARE

## 2021-05-13 VITALS
HEIGHT: 62 IN | WEIGHT: 171 LBS | TEMPERATURE: 98.6 F | DIASTOLIC BLOOD PRESSURE: 70 MMHG | RESPIRATION RATE: 18 BRPM | BODY MASS INDEX: 31.47 KG/M2 | SYSTOLIC BLOOD PRESSURE: 154 MMHG | OXYGEN SATURATION: 95 % | HEART RATE: 94 BPM

## 2021-05-13 DIAGNOSIS — G47.33 MODERATE OBSTRUCTIVE SLEEP APNEA: Primary | ICD-10-CM

## 2021-05-13 PROCEDURE — C1767 GENERATOR, NEURO NON-RECHARG: HCPCS | Performed by: OTOLARYNGOLOGY

## 2021-05-13 PROCEDURE — 64716 REVISION OF CRANIAL NERVE: CPT | Performed by: OTOLARYNGOLOGY

## 2021-05-13 PROCEDURE — 0466T PR INSRT CH WALL RESPIR ELTRD/RA & CONJ PULSE GEN: CPT | Performed by: OTOLARYNGOLOGY

## 2021-05-13 PROCEDURE — 71045 X-RAY EXAM CHEST 1 VIEW: CPT

## 2021-05-13 PROCEDURE — C1778 LEAD, NEUROSTIMULATOR: HCPCS | Performed by: OTOLARYNGOLOGY

## 2021-05-13 PROCEDURE — 64568 OPN IMPLTJ CRNL NRV NEA&PG: CPT | Performed by: OTOLARYNGOLOGY

## 2021-05-13 PROCEDURE — 72040 X-RAY EXAM NECK SPINE 2-3 VW: CPT

## 2021-05-13 PROCEDURE — C1787 PATIENT PROGR, NEUROSTIM: HCPCS | Performed by: OTOLARYNGOLOGY

## 2021-05-13 DEVICE — LEAD STIMULATION RESP INSPIRE: Type: IMPLANTABLE DEVICE | Site: NECK | Status: FUNCTIONAL

## 2021-05-13 DEVICE — LEAD SENSING RESP INSPIRE: Type: IMPLANTABLE DEVICE | Site: CHEST | Status: FUNCTIONAL

## 2021-05-13 DEVICE — IPG INSPIRE IV MODEL 3028: Type: IMPLANTABLE DEVICE | Site: NECK | Status: FUNCTIONAL

## 2021-05-13 RX ORDER — OXYCODONE HYDROCHLORIDE 5 MG/1
5 TABLET ORAL EVERY 4 HOURS PRN
Qty: 30 TABLET | Refills: 0 | Status: SHIPPED | OUTPATIENT
Start: 2021-05-13 | End: 2021-05-23

## 2021-05-13 RX ORDER — FENTANYL CITRATE/PF 50 MCG/ML
25 SYRINGE (ML) INJECTION
Status: DISCONTINUED | OUTPATIENT
Start: 2021-05-13 | End: 2021-05-13 | Stop reason: HOSPADM

## 2021-05-13 RX ORDER — EPHEDRINE SULFATE 50 MG/ML
INJECTION INTRAVENOUS AS NEEDED
Status: DISCONTINUED | OUTPATIENT
Start: 2021-05-13 | End: 2021-05-13

## 2021-05-13 RX ORDER — ONDANSETRON 2 MG/ML
4 INJECTION INTRAMUSCULAR; INTRAVENOUS ONCE AS NEEDED
Status: DISCONTINUED | OUTPATIENT
Start: 2021-05-13 | End: 2021-05-13 | Stop reason: HOSPADM

## 2021-05-13 RX ORDER — DEXAMETHASONE SODIUM PHOSPHATE 4 MG/ML
INJECTION, SOLUTION INTRA-ARTICULAR; INTRALESIONAL; INTRAMUSCULAR; INTRAVENOUS; SOFT TISSUE AS NEEDED
Status: DISCONTINUED | OUTPATIENT
Start: 2021-05-13 | End: 2021-05-13

## 2021-05-13 RX ORDER — SODIUM CHLORIDE, SODIUM LACTATE, POTASSIUM CHLORIDE, CALCIUM CHLORIDE 600; 310; 30; 20 MG/100ML; MG/100ML; MG/100ML; MG/100ML
125 INJECTION, SOLUTION INTRAVENOUS CONTINUOUS
Status: DISCONTINUED | OUTPATIENT
Start: 2021-05-13 | End: 2021-05-13 | Stop reason: HOSPADM

## 2021-05-13 RX ORDER — FENTANYL CITRATE 50 UG/ML
INJECTION, SOLUTION INTRAMUSCULAR; INTRAVENOUS AS NEEDED
Status: DISCONTINUED | OUTPATIENT
Start: 2021-05-13 | End: 2021-05-13

## 2021-05-13 RX ORDER — CEFAZOLIN SODIUM 2 G/50ML
2000 SOLUTION INTRAVENOUS ONCE
Status: DISCONTINUED | OUTPATIENT
Start: 2021-05-13 | End: 2021-05-13 | Stop reason: HOSPADM

## 2021-05-13 RX ORDER — ONDANSETRON 2 MG/ML
INJECTION INTRAMUSCULAR; INTRAVENOUS AS NEEDED
Status: DISCONTINUED | OUTPATIENT
Start: 2021-05-13 | End: 2021-05-13

## 2021-05-13 RX ORDER — ACETAMINOPHEN 160 MG/5ML
650 SUSPENSION, ORAL (FINAL DOSE FORM) ORAL ONCE
Status: COMPLETED | OUTPATIENT
Start: 2021-05-13 | End: 2021-05-13

## 2021-05-13 RX ORDER — LIDOCAINE HYDROCHLORIDE AND EPINEPHRINE 10; 10 MG/ML; UG/ML
INJECTION, SOLUTION INFILTRATION; PERINEURAL AS NEEDED
Status: DISCONTINUED | OUTPATIENT
Start: 2021-05-13 | End: 2021-05-13 | Stop reason: HOSPADM

## 2021-05-13 RX ORDER — PROPOFOL 10 MG/ML
INJECTION, EMULSION INTRAVENOUS AS NEEDED
Status: DISCONTINUED | OUTPATIENT
Start: 2021-05-13 | End: 2021-05-13

## 2021-05-13 RX ORDER — CEFAZOLIN SODIUM 2 G/50ML
SOLUTION INTRAVENOUS AS NEEDED
Status: DISCONTINUED | OUTPATIENT
Start: 2021-05-13 | End: 2021-05-13

## 2021-05-13 RX ORDER — OXYCODONE HCL 5 MG/5 ML
5 SOLUTION, ORAL ORAL EVERY 4 HOURS PRN
Status: DISCONTINUED | OUTPATIENT
Start: 2021-05-13 | End: 2021-05-13 | Stop reason: HOSPADM

## 2021-05-13 RX ORDER — ROCURONIUM BROMIDE 10 MG/ML
INJECTION, SOLUTION INTRAVENOUS AS NEEDED
Status: DISCONTINUED | OUTPATIENT
Start: 2021-05-13 | End: 2021-05-13

## 2021-05-13 RX ORDER — SUCCINYLCHOLINE/SOD CL,ISO/PF 100 MG/5ML
SYRINGE (ML) INTRAVENOUS AS NEEDED
Status: DISCONTINUED | OUTPATIENT
Start: 2021-05-13 | End: 2021-05-13

## 2021-05-13 RX ORDER — HYDROMORPHONE HCL/PF 1 MG/ML
SYRINGE (ML) INJECTION AS NEEDED
Status: DISCONTINUED | OUTPATIENT
Start: 2021-05-13 | End: 2021-05-13

## 2021-05-13 RX ORDER — HYDROMORPHONE HCL/PF 1 MG/ML
0.5 SYRINGE (ML) INJECTION
Status: DISCONTINUED | OUTPATIENT
Start: 2021-05-13 | End: 2021-05-13 | Stop reason: HOSPADM

## 2021-05-13 RX ORDER — MEPERIDINE HYDROCHLORIDE 25 MG/ML
12.5 INJECTION INTRAMUSCULAR; INTRAVENOUS; SUBCUTANEOUS
Status: DISCONTINUED | OUTPATIENT
Start: 2021-05-13 | End: 2021-05-13 | Stop reason: HOSPADM

## 2021-05-13 RX ADMIN — CEFAZOLIN SODIUM 2000 MG: 2 SOLUTION INTRAVENOUS at 08:00

## 2021-05-13 RX ADMIN — EPHEDRINE SULFATE 5 MG: 50 INJECTION, SOLUTION INTRAVENOUS at 09:04

## 2021-05-13 RX ADMIN — EPHEDRINE SULFATE 5 MG: 50 INJECTION, SOLUTION INTRAVENOUS at 09:54

## 2021-05-13 RX ADMIN — DEXAMETHASONE SODIUM PHOSPHATE 4 MG: 4 INJECTION INTRA-ARTICULAR; INTRALESIONAL; INTRAMUSCULAR; INTRAVENOUS; SOFT TISSUE at 08:23

## 2021-05-13 RX ADMIN — PROPOFOL 150 MG: 10 INJECTION, EMULSION INTRAVENOUS at 08:19

## 2021-05-13 RX ADMIN — REMIFENTANIL HYDROCHLORIDE 0.1 MCG/KG/MIN: 1 INJECTION, POWDER, LYOPHILIZED, FOR SOLUTION INTRAVENOUS at 08:16

## 2021-05-13 RX ADMIN — EPHEDRINE SULFATE 10 MG: 50 INJECTION, SOLUTION INTRAVENOUS at 08:48

## 2021-05-13 RX ADMIN — EPHEDRINE SULFATE 10 MG: 50 INJECTION, SOLUTION INTRAVENOUS at 08:34

## 2021-05-13 RX ADMIN — FENTANYL CITRATE 50 MCG: 50 INJECTION INTRAMUSCULAR; INTRAVENOUS at 08:28

## 2021-05-13 RX ADMIN — ROCURONIUM BROMIDE 10 MG: 10 INJECTION, SOLUTION INTRAVENOUS at 08:19

## 2021-05-13 RX ADMIN — FENTANYL CITRATE 50 MCG: 50 INJECTION INTRAMUSCULAR; INTRAVENOUS at 08:19

## 2021-05-13 RX ADMIN — ONDANSETRON 4 MG: 2 INJECTION INTRAMUSCULAR; INTRAVENOUS at 10:11

## 2021-05-13 RX ADMIN — ACETAMINOPHEN 650 MG: 650 SUSPENSION ORAL at 12:31

## 2021-05-13 RX ADMIN — Medication 100 MG: at 08:19

## 2021-05-13 RX ADMIN — LIDOCAINE HYDROCHLORIDE 100 MG: 20 INJECTION, SOLUTION INTRAVENOUS at 08:19

## 2021-05-13 RX ADMIN — SODIUM CHLORIDE, SODIUM LACTATE, POTASSIUM CHLORIDE, AND CALCIUM CHLORIDE 125 ML/HR: .6; .31; .03; .02 INJECTION, SOLUTION INTRAVENOUS at 11:18

## 2021-05-13 RX ADMIN — HYDROMORPHONE HYDROCHLORIDE 0.5 MG: 1 INJECTION, SOLUTION INTRAMUSCULAR; INTRAVENOUS; SUBCUTANEOUS at 10:33

## 2021-05-13 RX ADMIN — SODIUM CHLORIDE, SODIUM LACTATE, POTASSIUM CHLORIDE, AND CALCIUM CHLORIDE: .6; .31; .03; .02 INJECTION, SOLUTION INTRAVENOUS at 10:07

## 2021-05-13 RX ADMIN — EPHEDRINE SULFATE 10 MG: 50 INJECTION, SOLUTION INTRAVENOUS at 08:45

## 2021-05-13 RX ADMIN — SODIUM CHLORIDE, SODIUM LACTATE, POTASSIUM CHLORIDE, AND CALCIUM CHLORIDE 125 ML/HR: .6; .31; .03; .02 INJECTION, SOLUTION INTRAVENOUS at 06:35

## 2021-05-13 RX ADMIN — PROPOFOL 20 MG: 10 INJECTION, EMULSION INTRAVENOUS at 08:41

## 2021-05-13 NOTE — OP NOTE
OPERATIVE REPORT  PATIENT NAME: Myra Quinn    :  1945  MRN: [id number]  Pt Location: [facility] OR ROOM 04    SURGERY DATE: 2021    Surgeon(s) and Role:     * Yamel Dang MD - Primary  Beryle Devon DDS PGY5    Preop Diagnosis:  GREGG (obstructive sleep apnea) [G47 33]    Post-Op Diagnosis Codes:     * GREGG (obstructive sleep apnea) [G47 33]    Procedure(s) (LRB):  INSERTION UPPER AIRWAY STIMULATOR, INSPIRE IMPLANT (N/A)    Specimen(s):  * No specimens in log *    Estimated Blood Loss:   20 mL    Drains:  Ureteral Drain/Stent Right ureter 6 Fr  (Active)   Number of days: 720       Anesthesia Type:   General    Operative Indications:  GREGG (obstructive sleep apnea) [G47 33]  BMI 31 28    Operative Findings:  Good stimulation at 1 mA  No retraction of the tongue    Complications:   None    Procedure and Technique:  Indications for procedure: Myra Quinn is a 66 y o  male with a history of Moderate to Severe obstructive sleep apnea, who is intolerant and unable to achieve benefit from positive pressure therapy  Patient has passed the clinical, polysomnographic, and endoscopic screening criteria and presents today for the implant, whom I have seen in consultation for the above-listed procedure  After discussion of risks, benefits, and alternatives the patient elected to undergo the procedure and informed consent was obtained  Procedure in detail: The patient was brought back to the operating room laid in the supine position and general endotracheal anesthesia was administered  The patient was positioned appropriately  Appropriate time-out was taken and procedure, sidedness, and marking was confirmed  8 mL of 1% lidocaine with 1:100,000 epinephrine was infiltrated into the marked areas  Prior to prepping and draping, electrodes were placed in the genioglossus and styloglossus muscle and connected to the NIM box for intraoperative nerve monitoring   The patient was prepped and draped in standard fashion  Neuroplasty was performed as follow: The lateral branches to retrusor muscles were identified, and tested intra-operatively using the NIM stimulator  The branches were identified and the inclusion branches were stimulated with both visual and neurostimulator confirmation  The branches were dissected in 360 degrees for 1 5 cm around the TV and C1 branches with care not to include the HG branches  Insertion of an upper airway stimulator was performed as follows: The cuff electrode for the hypoglossal nerve stimulator  was placed distally to these branches on the medial nerve branch to the genioglossus muscle  Diagnostic evaluation confirmed activation of the genioglossus nerve, resulting in genioglossal activation and tongue protrusion, confirmed visually  The stimulation electrode was then looped under and secured to the digastric tendon on its lateral surface with the provided anchor  Insertion of a thoracic sensor lead was performed as follows:  A second 5 cm incision was made in the right upper chest approximately 3 cm below the clavicle  Dissection was carried down to the pectoralis muscle  An inferior pocket was created deep to the subcutaneous layer and superficial to the pectoralis muscle  Dissection was carried down through pectoralis muscle using blunt dissection  The interspace between the 2nd and 3rd ribs were exposed  The external oblique muscles were identified, and bluntly dissected, and a tunnel was created between the external and internal intercostals in the 2nd intercostal space just on the superior aspect of the third rib  The pleural respiration sensor was placed into the pocket in the inferior aspect of the intercostal space  The sensor was sutured to the fascia using the provided anchors to maintain the sensor facing the pleural space  The stimulation lead was then tunneled in a subplatysmal plane and brought out into the sub-clavicular pocket       Insertion of an upper airway stimulator was continued as follows: Both the cuff electrode and the respiration sensing lead were connected to the implantable pulse generator  Diagnostic evaluation was run, which confirmed a good respiration sensing signal as well as good tongue protrusion stimulation  The implantable pulse generator was placed in the subclavicular pocket and secured loosely to the pectoralis fascia using non-resorbable sutures  All the wounds were thoroughly irrigated with bacitracin irrigation  The wounds were then closed in three layers with deep layers closed with 3-0 Vicryl and the skin closed with 4-0 Monocryl  Wound dressings were placed  The patient was returned to the care of Anesthesia, extubated without difficulty, and taken to the recovery area in stable condition  All instruments and sponge counts were correct at the end of the procedure  Aranza Ying MD, was present for and performed all key elements of the procedure       I was present for the entire procedure    Patient Disposition:  PACU  and extubated and stable    SIGNATURE: Sundeep Castellano MD  DATE: May 13, 2021  TIME: 10:58 AM

## 2021-05-13 NOTE — ANESTHESIA POSTPROCEDURE EVALUATION
Post-Op Assessment Note    CV Status:  Stable    Pain management: adequate     Mental Status:  Alert and awake   Hydration Status:  Euvolemic   PONV Controlled:  Controlled   Airway Patency:  Patent      Post Op Vitals Reviewed: Yes      Staff: Anesthesiologist         No complications documented      /65 (05/13/21 1155)    Temp 97 9 °F (36 6 °C) (05/13/21 1155)    Pulse 86 (05/13/21 1155)   Resp 16 (05/13/21 1155)    SpO2 95 % (05/13/21 1155)

## 2021-05-13 NOTE — DISCHARGE INSTRUCTIONS
[facility] Hypoglossal Nerve Stimulator    Post-Operative Care  Office [phone]  Cell [phone]               At Home (in the days immediately following the procedure):   Try to sleep with your head elevated on 2-3 pillows   Iced packs placed over wound will help reduce swelling  They should be used 20 minutes on/ 20 minutes off while awake for the first full day  Crushed ice in ziplock bags or frozen peas or corn work well   The dressings may be removed 2 days after surgery  After the dressings are removed, the wounds should be cleaned with a Q-tip soaked in hydrogen peroxide mixed 50/50 with water   Apply antibiotic ointment (Bacitracin) or Vaseline to the external incisions 3-4 times per day   Take your medicines as prescribed  REMEMBER:  DO NOT DRIVE WHILE TAKING PAIN MEDICATIONS   You should do neck rolls 10 times clockwise and 10 times counterclockwise directions for 1 week after surgery   You may shower with luke-warm water only after the dressings are removed   You may use ibuprofen (Motrin, Advil) and acetaminophen (Tyelnol) for pain control in addition to any prescribed pain medications  You may get out of bed and go to the bathroom with assistance  Eat light, soft meals as tolerated, avoiding gas-stimulating foods  Follow-up care:   Eat before coming to the office for post-operative visits  Rest for the first week after the procedure, avoiding excessive physical activities, hard chewing, lifting objects over 8 lbs (about the weight of a phone book), or bending over  We request that you do not travel by plane for one week after surgery  Clean the wound at least twice daily using 1/2 hydrogen peroxide 1/2 water solution to remove any crusting (scabbing)  Lubricate your wound after cleaning with Q-tips and antibiotic ointment to soften hardened crusts  Follow-up visits:     At one week, the sutures will be removed; you may drive yourself to this appointment (as long as you are no longer taking prescription/narcotic pain medicines)  After dressing removal, make-up may be worn, avoiding the incision lines  Additional follow-up visits will be scheduled at this time  Note that final results from the incisions may not be apparent until [place] after surgery  Healing Care:   After surgery try not to roll onto the wound while asleep  Clean the external skin gently but thoroughly with soap  The use of alcohol and tobacco products prolong swelling and healing and are best avoided for 2 weeks after surgery  Do not expose your wound to sun for 4-6 weeks after surgery  Use sunscreen (SPF 30 or higher) for 6 months after surgery if sun exposure is absolutely necessary  Avoid any physical exercise that can cause over-heating or over-exertion for two weeks after the surgery  Your nose may be swollen and stuffy for several months  Complete healing may take 12 months  It is to your advantage to return for all postoperative visits so that long-term results may be evaluated  Frequently Asked Questions:  When can I shower and shampoo my hair? You may shower the day after your surgery, BUT KEEP ANY DRESSING DRY  This may mean you wash your face/hair in the sink instead  It is important that you do not use hot water, as this can increase the swelling  Lukewarm water is best       When will the swelling and bruising go away? This usually takes 7-10 days or so, but may take less or more time, depending on the individual     When can I take aspirin? You should not take aspirin for 2 weeks prior to or after surgery  The same is true for vitamin E, ginko, garlic pills, and other natural supplements  When can I take ibuprofen? Non-steroidal anti-inflammatory drugs such as advil (ibuprofen), alleve (naproxen), or other similar may be used immediately after surgery as per the guidelines on the package  When can I wear my glasses?    You will be able to wear contact lenses as soon as you feel comfortable  You may wear glasses one to two weeks after surgery, depending on the type of surgery you had  In any case, you must be careful not to place any pressure on the wound  When can I wear makeup? Make-up can be applied after suture removal, but not directly on the incisions until 3 weeks after the procedure  When will I activate my device? We will wait for your healing to finish prior to activation which usually means a few weeks  The plan will be set up at your first follow up appointment at 1 week after the procedure  What about exercise? Please adhere to the following schedule:   Up to week 1 after surgery:  REST! No strenuous exercise  Walking is ok  Week 1-3 after surgery: You may begin light aerobic exercise, but no bending over/straining/lifting weights  You may begin some range of motion exercises of your shoulder  Week 3+: You may begin more strenuous exercise, such as yoga, stretching, bending over, lifting weights  Please remember to start slowly  Week 6+: You may resume contact sports, such as soccer, basketball, etc    POST-OPERATIVE APPOINTMENTS:  1 week:  wound check in the office  1 month: device activation and wound check in the office  3 months: device titration sleep study at [address] [place] [address]  4 months: final wound check in the office  Yearly: device check at office  How to contact us:    Phone: If you have questions or concerns, please call us at [phone] during business hours (8 am to 5 pm)  On nights and weekends, you may page the ENT surgeon on call  at [facility]   In case of emergency, please call [phone]

## 2021-05-13 NOTE — ANESTHESIA PREPROCEDURE EVALUATION
Review of Systems/Medical History  Patient summary reviewed  Chart reviewed  No history of anesthetic complications     Cardiovascular  EKG reviewed , Exercise tolerance (METS): >4 ,  Hyperlipidemia, Hypertension ,    Pulmonary  Sleep apnea CPAP,        GI/Hepatic    GERD well controlled,        Kidney stones,        Endo/Other     GYN       Hematology   Musculoskeletal       Neurology   Psychology     Chronic pain,        ECHO 1/4/2019  SUMMARY     LEFT VENTRICLE:  Systolic function was normal  Ejection fraction was estimated in the range of 55 % to 65 %  There were no regional wall motion abnormalities  Doppler parameters were consistent with abnormal left ventricular relaxation (grade 1 diastolic dysfunction)      MITRAL VALVE:  There was mild regurgitation      TRICUSPID VALVE:  There was mild regurgitation  Estimated peak PA pressure was 25 mmHg      PULMONIC VALVE:  There was mild regurgitation  Physical Exam    Airway    Mallampati score: II  TM Distance: >3 FB  Neck ROM: full     Dental   upper dentures,     Cardiovascular  Rhythm: regular, Rate: normal, Cardiovascular exam normal    Pulmonary  Pulmonary exam normal Breath sounds clear to auscultation,     Other Findings        Anesthesia Plan  ASA Score- 3     Anesthesia Type- general with ASA Monitors  Additional Monitors:   Airway Plan: ETT  Comment: Cardiac "clearance" on chart          Plan Factors-Exercise tolerance (METS): >4     Chart reviewed  EKG reviewed  Existing labs reviewed  Patient summary reviewed  Patient is not a current smoker  Patient instructed to abstain from smoking on day of procedure  Patient did not smoke on day of surgery  Induction- intravenous  Postoperative Plan- Plan for postoperative opioid use  Planned trial extubation    Informed Consent- Anesthetic plan and risks discussed with patient

## 2021-05-13 NOTE — INTERVAL H&P NOTE
H&P reviewed  After examining the patient I find no changes in the patients condition since the H&P had been written      Vitals:    05/13/21 0621   BP: 163/74   Pulse: 65   Resp: 18   Temp: 98 °F (36 7 °C)   SpO2: 99%

## 2021-05-17 DIAGNOSIS — I10 ESSENTIAL HYPERTENSION: ICD-10-CM

## 2021-05-17 DIAGNOSIS — E78.2 MIXED HYPERLIPIDEMIA: ICD-10-CM

## 2021-05-17 RX ORDER — LOVASTATIN 40 MG/1
40 TABLET ORAL DAILY
Qty: 90 TABLET | Refills: 2 | Status: SHIPPED | OUTPATIENT
Start: 2021-05-17 | End: 2021-05-17 | Stop reason: SDUPTHER

## 2021-05-17 RX ORDER — LISINOPRIL 40 MG/1
TABLET ORAL
Qty: 90 TABLET | Refills: 2 | Status: SHIPPED | OUTPATIENT
Start: 2021-05-17 | End: 2021-05-17 | Stop reason: SDUPTHER

## 2021-05-18 RX ORDER — LOVASTATIN 40 MG/1
40 TABLET ORAL DAILY
Qty: 90 TABLET | Refills: 1 | Status: SHIPPED | OUTPATIENT
Start: 2021-05-18 | End: 2022-02-15 | Stop reason: SDUPTHER

## 2021-05-18 RX ORDER — LISINOPRIL 40 MG/1
40 TABLET ORAL DAILY
Qty: 90 TABLET | Refills: 1 | Status: SHIPPED | OUTPATIENT
Start: 2021-05-18 | End: 2022-02-15 | Stop reason: SDUPTHER

## 2021-06-08 ENCOUNTER — TELEPHONE (OUTPATIENT)
Dept: CARDIOLOGY CLINIC | Facility: CLINIC | Age: 76
End: 2021-06-08

## 2021-06-08 DIAGNOSIS — I10 ESSENTIAL HYPERTENSION: ICD-10-CM

## 2021-06-08 RX ORDER — AMLODIPINE BESYLATE 10 MG/1
10 TABLET ORAL EVERY MORNING
Qty: 90 TABLET | Refills: 3 | Status: SHIPPED | OUTPATIENT
Start: 2021-06-08 | End: 2021-06-10 | Stop reason: SDUPTHER

## 2021-06-10 ENCOUNTER — TELEPHONE (OUTPATIENT)
Dept: CARDIOLOGY CLINIC | Facility: CLINIC | Age: 76
End: 2021-06-10

## 2021-06-10 DIAGNOSIS — I10 ESSENTIAL HYPERTENSION: ICD-10-CM

## 2021-06-10 RX ORDER — AMLODIPINE BESYLATE 10 MG/1
10 TABLET ORAL EVERY MORNING
Qty: 90 TABLET | Refills: 3 | Status: SHIPPED | OUTPATIENT
Start: 2021-06-10 | End: 2022-02-22 | Stop reason: SDUPTHER

## 2021-06-21 ENCOUNTER — OFFICE VISIT (OUTPATIENT)
Dept: SLEEP CENTER | Facility: CLINIC | Age: 76
End: 2021-06-21
Payer: MEDICARE

## 2021-06-21 VITALS
WEIGHT: 177 LBS | SYSTOLIC BLOOD PRESSURE: 126 MMHG | BODY MASS INDEX: 32.57 KG/M2 | DIASTOLIC BLOOD PRESSURE: 70 MMHG | HEIGHT: 62 IN

## 2021-06-21 DIAGNOSIS — J44.9 CHRONIC OBSTRUCTIVE PULMONARY DISEASE, UNSPECIFIED COPD TYPE (HCC): ICD-10-CM

## 2021-06-21 DIAGNOSIS — G47.33 MODERATE OBSTRUCTIVE SLEEP APNEA: Primary | ICD-10-CM

## 2021-06-21 PROCEDURE — 99204 OFFICE O/P NEW MOD 45 MIN: CPT | Performed by: INTERNAL MEDICINE

## 2021-06-21 NOTE — PATIENT INSTRUCTIONS
Sleep Apnea   AMBULATORY CARE:   Sleep apnea  is a condition that causes you to stop breathing for 10 seconds or longer during sleep  Obstructive sleep apnea is the most common kind  The muscles and tissues around your throat relax and block air from passing through  Obesity, use of alcohol or cigarettes, or a family history are common causes  Central sleep apnea means your brain does not send signals to the muscles that control breathing  You do not take a breath even though your airway is open  Common causes include medical conditions such as heart failure, being older than 65, or use of opioids  Common signs and symptoms include the following:   · Snoring loudly, snorting, gasping or choking while you sleep, and waking up suddenly because of these    · A hard time thinking, remembering things, or focusing on your tasks the following day    · Headache or nausea    · Waking up often during the night to urinate    · Feeling sleepy, slow, and tired during the day    Call your local emergency number (911 in the [address] [facility]) if:   · You have chest pain or trouble breathing  Call your doctor if:   · You feel tired or depressed  · You have trouble staying awake during the day  · You have trouble thinking clearly  · You have questions or concerns about your condition or care  How sleep apnea is diagnosed:   · Your healthcare provider will ask about your signs and symptoms, when they began, and how bad they are  He or she may ask about medical conditions you have and what medicines you take  Tell your healthcare provider if you smoke and if anyone in your family has sleep apnea  Your healthcare provider may ask the person who sleeps beside you about your signs  · You may need to wear a device that monitors the oxygen level in your blood while you sleep  You may need to have a sleep study (polysomnography) if you have daytime sleepiness   A sleep study helps show your brain, heart, and respiratory system are working during sleep  Sleep studies may monitor the stages of sleep, oxygen levels, body position, eye movement, and snoring  Treatment  depends on the kind of sleep apnea you have:  · A machine  may be used to help you get more air during sleep  A mask may be placed over your nose and mouth, or just your nose  The mask is hooked to the machine  You will get air through the mask  ? A continuous positive airway pressure (CPAP) machine  is used to keep your airway open during sleep  The machine blows a gentle stream of air into the mask when you breathe  This helps keep your airway open so you can breathe more regularly  Extra oxygen may be given through the machine  ? A bilevel positive airway pressure (BiPAP) machine  gives air but lowers the pressure when you breathe out  ? An adaptive servo-ventilator  is a machine that only gives air when it senses you are not breathing  · A mouth device  that looks like a mouth guard stops your tongue and other tissues from blocking airflow  · Surgery  may be needed to remove extra tissues that block your mouth, throat, or nose  Manage or prevent sleep apnea:   · Do not smoke  Nicotine and other chemicals in cigarettes and cigars can cause lung damage  Ask your healthcare provider for information if you currently smoke and need help to quit  E-cigarettes or smokeless tobacco still contain nicotine  Talk to your healthcare provider before you use these products  · Do not drink alcohol or take sedative medicine before you go to sleep  Alcohol and sedatives can relax the muscles and tissues around your throat  This can block the airflow to your lungs  · Maintain a healthy weight  Your healthcare provider can tell you what weight is healthy for you  He or she can help you create a weight loss plan, if needed  The plan will include healthy foods and regular exercise to help you reach your healthy weight   Exercise can also help you sleep and may reduce stress  · Sleep on your side or use pillows designed to prevent sleep apnea  This prevents your tongue or other tissues from blocking your throat  You can also raise the head of your bed  Follow up with your doctor as directed: You may need to have blood tests during your follow-up visits  You will need to work with your healthcare provider to find the right breathing support equipment and settings for you  Write down your questions so you remember to ask them during your visits  © Copyright [address] Information is for End User's use only and may not be sold, redistributed or otherwise used for commercial purposes  All illustrations and images included in CareNotes® are the copyrighted property of A D [employer] M [employer]  or [address] [place] [address]  The above information is an  only  It is not intended as medical advice for individual conditions or treatments  Talk to your doctor, nurse or pharmacist before following any medical regimen to see if it is safe and effective for you

## 2021-06-21 NOTE — ASSESSMENT & PLAN NOTE
· Reviewed the PSG in 10/2020  · AHI 18 9 events/hr  · Underwent inspire insertion on 5/13/2021  · Activation today with settings of voltage range of 0 9 to 1 9 start delay 45 minutes, post time of 20 minutes and therapy duration of 8 hours per night  · He will have a sleep study with inspire after 3 months for optimization of his sitting and assuring adequate treatment of sleep disordered breathing

## 2021-06-21 NOTE — PROGRESS NOTES
Sleep Consultation   Aurora Mckeon 66 y o  male MRN: [id number]      Reason for consultation: GREGG    Requesting physician: Dr Jakob Luther     Assessment/Plan  1  Moderate obstructive sleep apnea  Assessment & Plan:  · Reviewed the PSG in 10/2020  · AHI 18 9 events/hr  · Underwent inspire insertion on 5/13/2021  · Activation today with settings of voltage range of 0 9 to 1 9 start delay 45 minutes, post time of 20 minutes and therapy duration of 8 hours per night  · He will have a sleep study with inspire after 3 months for optimization of his sitting and assuring adequate treatment of sleep disordered breathing    Orders:  -     Diagnostic Sleep Study with Inspire; Future; Expected date: 09/21/2021    2  BMI 31 0-31 9,adult  Assessment & Plan:  encouraged for weight loss       3  Chronic obstructive pulmonary disease, unspecified COPD type ([facility] [address] )  Assessment & Plan:  Suggested by long hx of nicotine abuse             History of Present Illness   HPI:  Aurora Mckeon is a 66 y o  male with PMHx as below who comes in for evaluation of here for activation of inspire device that was inserted on 05/13/2021 patient has long history of diagnosed obstructive sleep apnea in the past he has very low tolerance to CPAP he has been trying to use of for many years with very low success rates  He goes to bed around 9:00 p m  falls asleep in 1 hour wakes up at 6-630 a m  has 3-4 awakenings during the night and he feels sleepy during the day he was witnessed to snore and stop breathing while he is sleeping before he is using the CPAP he teeth grinds he has urinary frequency with frequent acid reflux symptoms he smokes he quit smoking many years ago denies recreational drug or alcohol abuse history his [place] scale is 11/24 without using CPAP he has been seen for evaluation of inspire few months back and he had his insertion on 05/13/2021        Review of Systems      Genitourinary none   Cardiology none   Gastrointestinal none Neurology none   Constitutional none   Integumentary none   Psychiatry none   Musculoskeletal none   Pulmonary none   ENT none   Endocrine none   Hematological none           Historical Information   Past Medical History:   Diagnosis Date    Abnormal stress test     Angina at rest [facility])     no issues currently 5/2021    Apnea     Carpal tunnel syndrome on left     Chest pain     CPAP (continuous positive airway pressure) dependence     uses CPAP    Diverticulitis     GERD (gastroesophageal reflux disease)     History of transfusion     childhood    Hypercholesterolemia     Hypercholesterolemia     Hyperlipidemia     Hypertension     Joint pain     Right Shoulder    Kidney stone     Mitral valve disorder     Neck pain     Sleep apnea     Sleep apnea, obstructive     Thoracic neuritis      Past Surgical History:   Procedure Laterality Date    COLONOSCOPY      Complete    CORONARY ANGIOPLASTY  2017    ESOPHAGOGASTRODUODENOSCOPY      Diagnostic    FL RETROGRADE PYELOGRAM  5/24/2019    FOOT SURGERY Left     tarsal tunnel    HERNIA REPAIR      KNEE ARTHROSCOPY Left     KNEE SURGERY Left     NEUROPLASTY / TRANSPOSITION MEDIAN NERVE AT CARPAL TUNNEL      UT COLONOSCOPY FLX DX W/COLLJ SPEC WHEN PFRMD N/A 4/10/2018    Procedure: EGD AND COLONOSCOPY;  Surgeon: Fabio Reyes MD;  Location: [facility];   Service: Gastroenterology    UT CYSTO/URETERO W/LITHOTRIPSY &INDWELL STENT INSRT Right 5/24/2019    Procedure: CYSTOSCOPY URETEROSCOPY WITH LITHOTRIPSY HOLMIUM LASER, RETROGRADE PYELOGRAM AND INSERTION STENT URETERAL;  Surgeon: Arvind Baugh MD;  Location[facility];  Service: Urology    UT CYSTOURETHRO W/IMPLANT N/A 8/30/2018    Procedure: CYSTOSCOPY WITH INSERTION Nita Hews;  Surgeon: Arvind Baugh MD;  [facility];  Service: Urology    UT INCISION IMPLANT CRANIAL NERVE STIM ELECTRODE/PULSE GEN N/A 5/13/2021    Procedure: INSERTION UPPER AIRWAY STIMULATOR, INSPIRE IMPLANT;  Surgeon: Partha Santillan MD;  Location: [facility];  Service: ENT    NV TRANSURETHRAL INCISION OF PROSTATE N/A 2018    Procedure: TRANSURETHRAL INCISION OF PROSTATE (TUIP);   Surgeon: Gildardo Bullock MD;  [facility];  Service: Urology    ROTATOR CUFF REPAIR Bilateral     SHOULDER SURGERY      TARSAL TUNNEL RELEASE      Neuroplasty Decompression     Family History   Problem Relation Age of Onset    Diabetes Mother         Mellitus    Heart disease Mother         Arteriosclerotic Cardiovascular    Hypertension Mother     Cancer Father     Arthritis Father         Rheu Mult Site W Involv Of Organs And Systems    Parkinsonism Father     Thyroid disease Sister     Diabetes Brother         Diabetes:Mellitus    Heart disease Brother     Hypertension Brother     Coronary artery disease Family     Heart disease Brother     No Known Problems Brother     Kidney disease Sister     Parkinsonism Sister      Social History     Socioeconomic History    Marital status: /Civil Union     Spouse name: Not on file    Number of children: 4    Years of education: high school or GED    Highest education level: Not on file   Occupational History    Occupation: retired   Tobacco Use    Smoking status: Former Smoker     Packs/day: 1 00     Years: 17 00     Pack years: 17 00     Types: Cigarettes     Quit date:      Years since quittin 4    Smokeless tobacco: Never Used   Vaping Use    Vaping Use: Never used   Substance and Sexual Activity    Alcohol use: No    Drug use: No    Sexual activity: Not Currently     Partners: Female   Other Topics Concern    Not on file   Social History Narrative    Active Advance Directives    Lives With Spouse             Social Determinants of Health     Financial Resource Strain:     Difficulty of Paying Living Expenses:    Food Insecurity:     Worried About Running Out of Food in the Last Year:     [address] in the Last Year: Transportation Needs:     Lack of Transportation (Medical):  Lack of Transportation (Non-Medical):    Physical Activity: Sufficiently Active    Days of Exercise per Week: 7 days    Minutes of Exercise per Session: 40 min   Stress: No Stress Concern Present    Feeling of Stress : Not at all   Social Connections:     Frequency of Communication with Friends and Family:     Frequency of Social Gatherings with Friends and Family:     Attends [employer] Services:     Active Member of Clubs or Organizations:     Attends Club or Organization Meetings:     Marital Status:    Intimate Partner Violence:     Fear of Current or Ex-Partner:     Emotionally Abused:     Physically Abused:     Sexually Abused:        Occupational History: Retired     Meds/Allergies   No Known Allergies    Home medications:  Prior to Admission medications    Medication Sig Start Date End Date Taking?  Authorizing Provider   acetaminophen (TYLENOL) 500 mg tablet Take 1,000 mg by mouth every 6 (six) hours as needed for mild pain   Yes Historical Provider, MD   amLODIPine (NORVASC) 10 mg tablet Take 1 tablet (10 mg total) by mouth every morning 6/10/21  Yes Niru Vernon MD   fenofibrate (TRICOR) 145 mg tablet TAKE 1 TABLET (145 MG TOTAL) BY MOUTH DAILY 8/24/20  Yes NHAN Yoo   ketoconazole (NIZORAL) 2 % cream Apply topically 2 (two) times a day To facial rash repeat as needed treat for 3 weeks 8/10/20  Yes Milli Luna MD   lisinopril (ZESTRIL) 40 mg tablet Take 1 tablet (40 mg total) by mouth daily 5/18/21  Yes Angel Buckley DO   loperamide (IMODIUM A-D) 2 MG tablet Take 1 tablet by mouth daily as needed   Yes Historical Provider, MD   lovastatin (MEVACOR) 40 MG tablet Take 1 tablet (40 mg total) by mouth daily 5/18/21  Yes Angel Buckley DO   methocarbamol (ROBAXIN) 500 mg tablet Take 1 tablet (500 mg total) by mouth 3 (three) times a day as needed for muscle spasms (neck/back pain) 4/27/21  Yes Angela Corea MD Jihan   Multiple Vitamins-Minerals (CENTRUM ULTRA MENS PO) Take 1 tablet by mouth daily   Yes Historical Provider, MD   omeprazole (PriLOSEC) 20 mg delayed release capsule TAKE ONE CAPSULE BY MOUTH DAILY 11/30/20  Yes HNAN Piña   meclizine (ANTIVERT) 25 mg tablet Take 1 tablet (25 mg total) by mouth 3 (three) times a day as needed for dizziness  Patient not taking: Reported on 6/21/2021 4/1/20   Nohelia Liriano MD   pseudoephedrine (SUDAFED) 30 mg tablet Take 60 mg by mouth daily at bedtime as needed   Patient not taking: Reported on 6/21/2021    Historical Provider, MD   vitamin B-12 (VITAMIN B-12) 1,000 mcg tablet Take 1,000 mcg by mouth every third day   Patient not taking: Reported on 6/21/2021    Historical Provider, MD       Vitals:   Blood pressure 126/70, height 5' 2" (1 575 m), weight 80 3 kg (177 lb)  ,  Body mass index is 32 37 kg/m²  Neck Circumference: 16    Physical Exam  General:  Awake alert and oriented x 3, conversant without conversational dyspnea, NAD, normal affect  HEENT:  PERRL, Sclera noninjected, nonicteric OU, Nares patent,  no craniofacial abnormalities, Mucous membranes, moist, no oral lesions, normal dentition, Mallampati class 4  NECK:  Trachea midline, no accessory muscle use, no stridor, no cervical or supraclavicular adenopathy, JVP not elevated, right submandibular inspire scar well healed  CARDIAC: Reg, single s1/S2, no m/r/g  PULM: CTA bilaterally no wheezing, rhonchi or rales right anterior chest wall inspire scar well healed  ABD: Normoactive bowel sounds, soft nontender, nondistended, no rebound, no rigidity, no guarding  EXT: No cyanosis, no clubbing, no edema, normal capillary refill  NEURO: no focal neurologic deficits, AAOx3, moving all extremities appropriately    Labs: I have personally reviewed pertinent lab results  , ABG: No results found for: PHART, ALG3HXA, PO2ART, HIF0NYK, H5UFORQM, BEART, SOURCE, BNP: No results found for: BNP, CBC: No results found for: WBC, HGB, HCT, MCV, PLT, ADJUSTEDWBC, MCH, MCHC, RDW, MPV, NRBC, CMP: No results found for: SODIUM, K, CL, CO2, ANIONGAP, BUN, CREATININE, GLUCOSE, CALCIUM, AST, ALT, ALKPHOS, PROT, BILITOT, EGFR, PT/INR: No results found for: PT, INR, Troponin: No results found for: TROPONINI  Lab Results   Component Value Date    WBC 5 13 04/23/2021    HGB 15 0 04/23/2021    HCT 44 2 04/23/2021    MCV 99 (H) 04/23/2021     04/23/2021      Lab Results   Component Value Date    GLUCOSE 100 10/29/2015    CALCIUM 8 8 04/23/2021     10/29/2015    K 4 2 04/23/2021    CO2 27 04/23/2021     04/23/2021    BUN 25 04/23/2021    CREATININE 0 93 04/23/2021     No results found for: IRON, TIBC, FERRITIN  No results found for: [id number]  No results found for: FOLATE        Sleep studies:  Diagnostic 10/2020: AHI 18 9 events per hour consistent with moderate obstructive sleep apnea       Elma Mulligan MD  [facility]       Portions of the record may have been created with voice recognition software  Occasional wrong word or "sound a like" substitutions may have occurred due to the inherent limitations of voice recognition software  Read the chart carefully and recognize, using context, where substitutions have occurred

## 2021-07-12 ENCOUNTER — TELEPHONE (OUTPATIENT)
Dept: SLEEP CENTER | Facility: CLINIC | Age: 76
End: 2021-07-12

## 2021-07-12 NOTE — TELEPHONE ENCOUNTER
Patient states that he is currently at step 4  When he originally advances to step 4, it was too intense so he decreased back to 3  This week he attempted to increase to 5, but again it was to much so he adjusted back to 4  He will try later this week to advance back to 5  He currently wakes up between 6 and 630  His wife denies snoring at night, but states he does snore when he naps  Patient does not use device when napping  He states that if he keeps busy he will not nap, but when he sits in his recliner he inevitable falls asleep  Patient was instructed to call if he is unable to advance to the next level as he may need a setting adjustment

## 2021-07-16 ENCOUNTER — TELEPHONE (OUTPATIENT)
Dept: INTERNAL MEDICINE CLINIC | Facility: CLINIC | Age: 76
End: 2021-07-16

## 2021-07-16 NOTE — TELEPHONE ENCOUNTER
PT has an appt on 8/2 and wanted to know if he should have labs done  If so can they be ordered?     Call PT when/if this is done  #[phone]

## 2021-07-16 NOTE — TELEPHONE ENCOUNTER
Patient left voice message  States he is having a problem with his Inspire  He cannot stay at level 5 because the intensity is keeping him awake  States the stimulator is working on his tongue and giving him problems  He switched back to level 4  He is asking what he can do

## 2021-07-19 NOTE — TELEPHONE ENCOUNTER
Pt called in to let Dr Genet Meléndez know that the medication  traMADol (ULTRAM) 50 mg tablet is giving him pain in his muscles  And wanted to know if that was normal             Please be advise thank you        Please call patient back @  [phone] MARKIE HERNANDEZ is a 63yo Female with left subcortical CVA  with dysphagia, right Hemiparesis, decreased functional mobility, gait instability and ADL impairments.    #CVA  -DAPT with aspirin & plavix.   -fall and aspiration precautions  -TTE outpt  -neurology and cardio follow up outpt  -Stopped the Lipitor due to elevated LFT. Now the Transaminitis has resolved.   - Ordered a brace for the R Wrist.     #Transaminitis  - stopped the Lipitor due to elevated LFT. Now resolved.   - Cholelithiasis without definite secondary signs of acute cholecystitis  - hospitalist note appreciated.   - Consulted G.I-  Continue to hold statin now and monitor & Avoid additional hepatotoxic meds.    --LFTs improved   -  nausea has resolved-- eating % of meals  - Hepatitis panel is non reactive.  --tolerating soft diet & thin liquids       Dyspepsia & Constipation   --cont. protonix  --cont. Maalox  - Glycolax   - Bisacodyl PRN constipation.   --Pepcid d/c'ed and Carafate added on 7/14    Insomnia  --cont. melatonin qhs    Dysphagia  -Diet upgraded to -Mechanical soft with thins.  Monitor PO tolerance     #HTN-  -Orthostatic hypotension improved off amlodipine.   -d/c amlodipine  -d/c IVFs.   -hospitalist  following    #NANCY--resolving  -monitor BMP  - creatinine back to baseline.     #Asthma  -continue symbicort and albuterol  - singulair  - monitor VS      Pain Mgmt - Tylenol PRN.   Cont. gabapentin 100 mg HS for leg paresthesias.   GI/Bowel Mgmt - Senna,   PRN  /Bladder Mgmt - Voiding        - DVT:  - Lovenox    IDT 7/15/21:    --FT 7/19  SW: Lives in  with son, DIL, daughter w/ 4 JONNA and 10 steps inside-- Needed Assistance PTA with ADLs and Ambulation.  HHA 42h/week  OT: Min A e/g; Max A dressing; Tot A toileting, transfers, LBD.   PT: Transfer Mod A.  Has not performed Ambulation or stairs.  Goal Min A.  Speech: Thin liquids with mechanical soft; Short-term memory deficits, decreased attention.  +dysarthria.    ELOS:  7/23/21 home with home care   MARKIE HERNANDEZ is a 65yo Female with left subcortical CVA  with dysphagia, right Hemiparesis, decreased functional mobility, gait instability and ADL impairments.    #CVA  -DAPT with aspirin & plavix.   -fall and aspiration precautions  -TTE outpt  -neurology and cardio follow up outpt  -Stopped the Lipitor due to elevated LFT. Now the Transaminitis has resolved.   - Ordered a brace for the R Wrist.     #Transaminitis  - stopped the Lipitor due to elevated LFT. Now resolved.   - Cholelithiasis without definite secondary signs of acute cholecystitis  - hospitalist note appreciated.   - Consulted G.I-  Continue to hold statin now and monitor & Avoid additional hepatotoxic meds.    --LFTs improved   -  nausea has resolved-- eating % of meals  - Hepatitis panel is non reactive.  --tolerating soft diet & thin liquids       Dyspepsia & Constipation   --cont. protonix  --cont. Maalox  - Glycolax   - Bisacodyl PRN constipation.   --Pepcid d/c'ed and Carafate added on 7/14    Insomnia  --cont. melatonin qhs    Dysphagia  -Diet upgraded to -Mechanical soft with thins.  Monitor PO tolerance     #HTN-  -Orthostatic hypotension improved off amlodipine.   -d/c amlodipine  -d/c IVFs.   -hospitalist  following    #NANCY--resolving  -monitor BMP  - creatinine back to baseline.     #Asthma  -continue symbicort and albuterol  - singulair  - monitor VS      Pain Mgmt - Tylenol PRN.   Cont. gabapentin 100 mg HS for leg paresthesias.   GI/Bowel Mgmt - Senna,   PRN  /Bladder Mgmt - Voiding        - DVT:  - Lovenox    IDT 7/15/21:    --FT 7/19  SW: Lives in  with son, DIL, daughter w/ 4 JONNA and 10 steps inside-- Needed Assistance PTA with ADLs and Ambulation.  HHA 42h/week  OT: Min A e/g; Max A dressing; Tot A toileting, transfers, LBD.   PT: Transfer Mod A.  Has not performed Ambulation or stairs.  Goal Min A.  Speech: Thin liquids with mechanical soft; Short-term memory deficits, decreased attention.  +dysarthria.    ELOS:  7/21 or 22/21 home with home care    ** Patient needs an Semi-Electric Hospital Bed  as he requires frequent changes in body positioning to alleviate pain, prevent contractures, reduce risk of pressure ulcers, and avoid respiratory infections in ways not feasible in an ordinary bed.  She requires the head of the bed to be elevated at least 30degrees to avoid aspiration and to treat GERD.  Pt. requires hospital bed to position her to allow her caregivers to safely transfer her as she has right hemiparesis and requires maximum assistance to transfer out of bed.

## 2021-07-26 ENCOUNTER — APPOINTMENT (OUTPATIENT)
Dept: LAB | Facility: CLINIC | Age: 76
End: 2021-07-26
Payer: MEDICARE

## 2021-07-26 DIAGNOSIS — E78.2 MIXED HYPERLIPIDEMIA: ICD-10-CM

## 2021-07-26 DIAGNOSIS — I10 ESSENTIAL HYPERTENSION: ICD-10-CM

## 2021-07-26 DIAGNOSIS — G47.33 OSA (OBSTRUCTIVE SLEEP APNEA): ICD-10-CM

## 2021-07-26 DIAGNOSIS — D53.1 MEGALOBLASTIC RED BLOOD CELLS: ICD-10-CM

## 2021-07-26 LAB
ALBUMIN SERPL BCP-MCNC: 3.7 G/DL (ref 3.5–5)
ALP SERPL-CCNC: 74 U/L (ref 46–116)
ALT SERPL W P-5'-P-CCNC: 25 U/L (ref 12–78)
ANION GAP SERPL CALCULATED.3IONS-SCNC: 7 MMOL/L (ref 4–13)
AST SERPL W P-5'-P-CCNC: 17 U/L (ref 5–45)
BASOPHILS # BLD AUTO: 0.02 THOUSANDS/ΜL (ref 0–0.1)
BASOPHILS NFR BLD AUTO: 0 % (ref 0–1)
BILIRUB SERPL-MCNC: 0.64 MG/DL (ref 0.2–1)
BUN SERPL-MCNC: 14 MG/DL (ref 5–25)
CALCIUM SERPL-MCNC: 9.1 MG/DL (ref 8.3–10.1)
CHLORIDE SERPL-SCNC: 109 MMOL/L (ref 100–108)
CHOLEST SERPL-MCNC: 145 MG/DL (ref 50–200)
CO2 SERPL-SCNC: 25 MMOL/L (ref 21–32)
CREAT SERPL-MCNC: 0.87 MG/DL (ref 0.6–1.3)
EOSINOPHIL # BLD AUTO: 0.06 THOUSAND/ΜL (ref 0–0.61)
EOSINOPHIL NFR BLD AUTO: 1 % (ref 0–6)
ERYTHROCYTE [DISTWIDTH] IN BLOOD BY AUTOMATED COUNT: 13.1 % (ref 11.6–15.1)
GFR SERPL CREATININE-BSD FRML MDRD: 83 ML/MIN/1.73SQ M
GLUCOSE P FAST SERPL-MCNC: 97 MG/DL (ref 65–99)
HCT VFR BLD AUTO: 42.5 % (ref 36.5–49.3)
HDLC SERPL-MCNC: 51 MG/DL
HGB BLD-MCNC: 14.4 G/DL (ref 12–17)
IMM GRANULOCYTES # BLD AUTO: 0.01 THOUSAND/UL (ref 0–0.2)
IMM GRANULOCYTES NFR BLD AUTO: 0 % (ref 0–2)
LDLC SERPL CALC-MCNC: 68 MG/DL (ref 0–100)
LYMPHOCYTES # BLD AUTO: 1.59 THOUSANDS/ΜL (ref 0.6–4.47)
LYMPHOCYTES NFR BLD AUTO: 33 % (ref 14–44)
MCH RBC QN AUTO: 34.4 PG (ref 26.8–34.3)
MCHC RBC AUTO-ENTMCNC: 33.9 G/DL (ref 31.4–37.4)
MCV RBC AUTO: 102 FL (ref 82–98)
MONOCYTES # BLD AUTO: 0.53 THOUSAND/ΜL (ref 0.17–1.22)
MONOCYTES NFR BLD AUTO: 11 % (ref 4–12)
NEUTROPHILS # BLD AUTO: 2.62 THOUSANDS/ΜL (ref 1.85–7.62)
NEUTS SEG NFR BLD AUTO: 55 % (ref 43–75)
NRBC BLD AUTO-RTO: 0 /100 WBCS
PLATELET # BLD AUTO: 181 THOUSANDS/UL (ref 149–390)
PMV BLD AUTO: 11.3 FL (ref 8.9–12.7)
POTASSIUM SERPL-SCNC: 4 MMOL/L (ref 3.5–5.3)
PROT SERPL-MCNC: 6.8 G/DL (ref 6.4–8.2)
RBC # BLD AUTO: 4.18 MILLION/UL (ref 3.88–5.62)
SODIUM SERPL-SCNC: 141 MMOL/L (ref 136–145)
TRIGL SERPL-MCNC: 128 MG/DL
VIT B12 SERPL-MCNC: 483 PG/ML (ref 100–900)
WBC # BLD AUTO: 4.83 THOUSAND/UL (ref 4.31–10.16)

## 2021-07-26 PROCEDURE — 80053 COMPREHEN METABOLIC PANEL: CPT

## 2021-07-26 PROCEDURE — 36415 COLL VENOUS BLD VENIPUNCTURE: CPT

## 2021-07-26 PROCEDURE — 80061 LIPID PANEL: CPT

## 2021-07-26 PROCEDURE — 82607 VITAMIN B-12: CPT

## 2021-07-26 PROCEDURE — 85025 COMPLETE CBC W/AUTO DIFF WBC: CPT

## 2021-07-27 ENCOUNTER — TELEPHONE (OUTPATIENT)
Dept: INTERNAL MEDICINE CLINIC | Facility: CLINIC | Age: 76
End: 2021-07-27

## 2021-07-28 ENCOUNTER — OFFICE VISIT (OUTPATIENT)
Dept: PULMONOLOGY | Facility: CLINIC | Age: 76
End: 2021-07-28
Payer: MEDICARE

## 2021-07-28 VITALS
HEART RATE: 75 BPM | SYSTOLIC BLOOD PRESSURE: 124 MMHG | DIASTOLIC BLOOD PRESSURE: 62 MMHG | OXYGEN SATURATION: 96 % | BODY MASS INDEX: 32.13 KG/M2 | WEIGHT: 174.6 LBS | TEMPERATURE: 97.7 F | HEIGHT: 62 IN

## 2021-07-28 DIAGNOSIS — G47.33 MODERATE OBSTRUCTIVE SLEEP APNEA: Primary | ICD-10-CM

## 2021-07-28 PROCEDURE — 99213 OFFICE O/P EST LOW 20 MIN: CPT | Performed by: PHYSICIAN ASSISTANT

## 2021-07-28 NOTE — PROGRESS NOTES
Assessment/Plan:   Diagnoses and all orders for this visit:    Moderate obstructive sleep apnea    BMI 31 0-31 9,adult       Patient is here today for follow-up  He was unable to tolerate the CPAP  He had the inspire device placed by Dr Tayler Escobar  He is so far tolerating it well, has been slowly increasing the settings  He has noticed a very mild change in his sleep so far, will hopefully continue to improve as he is able to increase the settings  He will follow with Dr Alejandra Neff and Dr Tayler Escobar for the Harris  Can return to follow up with us once he is stable on the Harris  No follow-ups on file  All questions are answered to the patient's satisfaction and understanding  He verbalizes understanding  He is encouraged to call with any further questions or concerns  Portions of the record may have been created with voice recognition software  Occasional wrong word or "sound a like" substitutions may have occurred due to the inherent limitations of voice recognition software  Read the chart carefully and recognize, using context, where substitutions have occurred  Electronically Signed by Siomara Sun PA-C    ______________________________________________________________________    Chief Complaint: No chief complaint on file  Patient ID: Ingrid Diggs is a 64 y o  y o  male has a past medical history of Abnormal stress test, Angina at rest [facility]), Apnea, Carpal tunnel syndrome on left, Chest pain, CPAP (continuous positive airway pressure) dependence, Diverticulitis, GERD (gastroesophageal reflux disease), History of transfusion, Hypercholesterolemia, Hypercholesterolemia, Hyperlipidemia, Hypertension, Joint pain, Kidney stone, Mitral valve disorder, Neck pain, Sleep apnea, Sleep apnea, obstructive, and Thoracic neuritis  7/28/2021  Patient presents today for follow-up visit  Patient is a 65 yo male former smoker with PMH of GREGG, HTN, HLD, BPH  He is here today for follow up    He had been unable to tolerate the CPAP  Was evaluated by ENT and had the inspire device implanted  He has been using it on a nightly basis, is slowly increasing the settings  He has noticed a slight improvement in his sleep, hoping to see more improvement as he is able to increase the settings  He does continue to take occasional afternoon naps  Review of Systems   Constitutional: Negative  HENT: Negative  Respiratory: Negative  Cardiovascular: Negative  Gastrointestinal: Negative  Genitourinary: Negative  Musculoskeletal: Negative  Skin: Negative  Allergic/Immunologic: Negative  Neurological: Negative  Psychiatric/Behavioral: Negative  Smoking history: He reports that he quit smoking about 43 years ago  His smoking use included cigarettes  He has a 17 00 pack-year smoking history   He has never used smokeless tobacco     The following portions of the patient's history were reviewed and updated as appropriate: allergies, current medications, past family history, past medical history, past social history, past surgical history and problem list     Immunization History   Administered Date(s) Administered    INFLUENZA 11/02/2005, 09/23/2009, 10/06/2014, 10/01/2015, 09/10/2018    Influenza Split High Dose Preservative Free IM 10/01/2015, 08/30/2017    Influenza, seasonal, injectable 10/01/2016    Pneumococcal Conjugate 13-Valent 07/08/2020    Pneumococcal Polysaccharide PPV23 09/23/2009    SARS-CoV-2 / COVID-19 mRNA IM (Moderna) 02/08/2021, 03/08/2021    Tdap 1945, 05/03/2018    Zoster 1945    influenza, trivalent, adjuvanted 10/10/2019     Current Outpatient Medications   Medication Sig Dispense Refill    acetaminophen (TYLENOL) 500 mg tablet Take 1,000 mg by mouth every 6 (six) hours as needed for mild pain      amLODIPine (NORVASC) 10 mg tablet Take 1 tablet (10 mg total) by mouth every morning 90 tablet 3    fenofibrate (TRICOR) 145 mg tablet TAKE 1 TABLET (145 MG TOTAL) BY MOUTH DAILY 90 tablet 2    ketoconazole (NIZORAL) 2 % cream Apply topically 2 (two) times a day To facial rash repeat as needed treat for 3 weeks 30 g 3    lisinopril (ZESTRIL) 40 mg tablet Take 1 tablet (40 mg total) by mouth daily 90 tablet 1    loperamide (IMODIUM A-D) 2 MG tablet Take 1 tablet by mouth daily as needed      lovastatin (MEVACOR) 40 MG tablet Take 1 tablet (40 mg total) by mouth daily 90 tablet 1    methocarbamol (ROBAXIN) 500 mg tablet Take 1 tablet (500 mg total) by mouth 3 (three) times a day as needed for muscle spasms (neck/back pain) 28 tablet 0    Multiple Vitamins-Minerals (CENTRUM ULTRA MENS PO) Take 1 tablet by mouth daily      omeprazole (PriLOSEC) 20 mg delayed release capsule TAKE ONE CAPSULE BY MOUTH DAILY 90 capsule 1    meclizine (ANTIVERT) 25 mg tablet Take 1 tablet (25 mg total) by mouth 3 (three) times a day as needed for dizziness (Patient not taking: Reported on 6/21/2021) 30 tablet 0    pseudoephedrine (SUDAFED) 30 mg tablet Take 60 mg by mouth daily at bedtime as needed  (Patient not taking: Reported on 6/21/2021)      vitamin B-12 (VITAMIN B-12) 1,000 mcg tablet Take 1,000 mcg by mouth every third day  (Patient not taking: Reported on 6/21/2021)       No current facility-administered medications for this visit  Allergies: Patient has no known allergies  Objective:  Vitals:    07/28/21 1115   BP: 124/62   Pulse: 75   Temp: 97 7 °F (36 5 °C)   SpO2: 96%   Weight: 79 2 kg (174 lb 9 6 oz)   Height: 5' 2" (1 575 m)   Oxygen Therapy  SpO2: 96 %    Wt Readings from Last 3 Encounters:   07/28/21 79 2 kg (174 lb 9 6 oz)   06/29/21 80 3 kg (177 lb)   06/21/21 80 3 kg (177 lb)     Body mass index is 31 93 kg/m²  Physical Exam  Vitals reviewed  Constitutional:       General: He is not in acute distress  Appearance: Normal appearance  He is not ill-appearing  HENT:      Head: Normocephalic and atraumatic     Eyes:      Conjunctiva/sclera: Conjunctivae normal    Cardiovascular:      Rate and Rhythm: Normal rate and regular rhythm  Pulmonary:      Effort: Pulmonary effort is normal  No respiratory distress  Breath sounds: Normal breath sounds  No decreased breath sounds, wheezing, rhonchi or rales  Abdominal:      General: Abdomen is flat  There is no distension  Musculoskeletal:         General: Normal range of motion  Cervical back: Normal range of motion  Right lower leg: No edema  Left lower leg: No edema  Skin:     General: Skin is warm and dry  Neurological:      Mental Status: He is alert and oriented to person, place, and time  Lab Review:   Lab Results   Component Value Date     10/29/2015    K 4 0 07/26/2021    K 4 2 10/29/2015     (H) 07/26/2021     10/29/2015    CO2 25 07/26/2021    CO2 26 10/29/2015    BUN 14 07/26/2021    BUN 22 10/29/2015    CREATININE 0 87 07/26/2021    CREATININE 1 04 10/29/2015    GLUCOSE 100 10/29/2015    CALCIUM 9 1 07/26/2021    CALCIUM 9 3 10/29/2015     Lab Results   Component Value Date    WBC 4 83 07/26/2021    WBC 5 77 10/29/2015    HGB 14 4 07/26/2021    HGB 14 7 10/29/2015    HCT 42 5 07/26/2021    HCT 43 8 10/29/2015     (H) 07/26/2021     (H) 10/29/2015     07/26/2021     10/29/2015       Diagnostics:    none pertinent  Office Spirometry Results:     ESS:    No results found

## 2021-07-28 NOTE — PATIENT INSTRUCTIONS
Continue Inspire, follow up with Dr Fitz Osorio  Sleep study in September, follow upw ith Dr Batsheva Bonilla in October

## 2021-08-02 ENCOUNTER — OFFICE VISIT (OUTPATIENT)
Dept: INTERNAL MEDICINE CLINIC | Facility: CLINIC | Age: 76
End: 2021-08-02
Payer: MEDICARE

## 2021-08-02 VITALS
WEIGHT: 177 LBS | DIASTOLIC BLOOD PRESSURE: 60 MMHG | TEMPERATURE: 97.9 F | BODY MASS INDEX: 32.57 KG/M2 | HEIGHT: 62 IN | SYSTOLIC BLOOD PRESSURE: 112 MMHG

## 2021-08-02 DIAGNOSIS — E53.8 B12 DEFICIENCY: ICD-10-CM

## 2021-08-02 DIAGNOSIS — G47.33 MODERATE OBSTRUCTIVE SLEEP APNEA: ICD-10-CM

## 2021-08-02 DIAGNOSIS — Z00.00 ENCOUNTER FOR SUBSEQUENT ANNUAL WELLNESS VISIT (AWV) IN MEDICARE PATIENT: ICD-10-CM

## 2021-08-02 DIAGNOSIS — I10 ESSENTIAL HYPERTENSION: ICD-10-CM

## 2021-08-02 DIAGNOSIS — J44.9 CHRONIC OBSTRUCTIVE PULMONARY DISEASE, UNSPECIFIED COPD TYPE (HCC): Primary | ICD-10-CM

## 2021-08-02 PROCEDURE — 1123F ACP DISCUSS/DSCN MKR DOCD: CPT | Performed by: FAMILY MEDICINE

## 2021-08-02 PROCEDURE — 99214 OFFICE O/P EST MOD 30 MIN: CPT | Performed by: FAMILY MEDICINE

## 2021-08-02 PROCEDURE — G0439 PPPS, SUBSEQ VISIT: HCPCS | Performed by: FAMILY MEDICINE

## 2021-08-02 NOTE — PATIENT INSTRUCTIONS
Heart Healthy Diet   WHAT YOU NEED TO KNOW:   A heart healthy diet is an eating plan low in unhealthy fats and sodium (salt)  The plan is high in healthy fats and fiber  A heart healthy diet helps improve your cholesterol levels and lowers your risk for heart disease and stroke  A dietitian will teach you how to read and understand food labels  DISCHARGE INSTRUCTIONS:   Heart healthy diet guidelines to follow:   · Choose foods that contain healthy fats  ? Unsaturated fats  include monounsaturated and polyunsaturated fats  Unsaturated fat is found in foods such as soybean, canola, olive, corn, and safflower oils  It is also found in soft tub margarine that is made with liquid vegetable oil  ? Omega-3 fat  is found in certain fish, such as salmon, tuna, and trout, and in walnuts and flaxseed  Eat fish high in omega-3 fats at least 2 times a week  · Get 20 to 30 grams of fiber each day  Fruits, vegetables, whole-grain foods, and legumes (cooked beans) are good sources of fiber  · Limit or do not have unhealthy fats  ? Cholesterol  is found in animal foods, such as eggs and lobster, and in dairy products made from whole milk  Limit cholesterol to less than 200 mg each day  ? Saturated fat  is found in meats, such as sanchez and hamburger  It is also found in chicken or turkey skin, whole milk, and butter  Limit saturated fat to less than 7% of your total daily calories  ? Trans fat  is found in packaged foods, such as potato chips and cookies  It is also in hard margarine, some fried foods, and shortening  Do not eat foods that contain trans fats  · Limit sodium as directed  You may be told to limit sodium to 2,000 to 2,300 mg each day  Choose low-sodium or no-salt-added foods  Add little or no salt to food you prepare  Use herbs and spices in place of salt         Include the following in your heart healthy plan:  Ask your dietitian or healthcare provider how many servings to have from each of the following food groups:  · Grains:      ? Whole-wheat breads, cereals, and pastas, and brown rice    ? Low-fat, low-sodium crackers and chips    · Vegetables:      ? Broccoli, green beans, green peas, and spinach    ? Collards, kale, and lima beans    ? Carrots, sweet potatoes, tomatoes, and peppers    ? Canned vegetables with no salt added    · Fruits:      ? Bananas, peaches, pears, and pineapple    ? Grapes, raisins, and dates    ? Oranges, tangerines, grapefruit, orange juice, and grapefruit juice    ? Apricots, mangoes, melons, and papaya    ? Raspberries and strawberries    ? Canned fruit with no added sugar    · Low-fat dairy:      ? Nonfat (skim) milk, 1% milk, and low-fat almond, cashew, or soy milks fortified with calcium    ? Low-fat cheese, regular or frozen yogurt, and cottage cheese    · Meats and proteins:      ? Lean cuts of beef and pork (loin, leg, round), skinless chicken and turkey    ? Legumes, soy products, egg whites, or nuts    Limit or do not include the following in your heart healthy plan:   · Unhealthy fats and oils:      ? Whole or 2% milk, cream cheese, sour cream, or cheese    ? High-fat cuts of beef (T-bone steaks, ribs), chicken or turkey with skin, and organ meats such as liver    ? Butter, stick margarine, shortening, and cooking oils such as coconut or palm oil    · Foods and liquids high in sodium:      ? Packaged foods, such as frozen dinners, cookies, macaroni and cheese, and cereals with more than 300 mg of sodium per serving    ? Vegetables with added sodium, such as instant potatoes, vegetables with added sauces, or regular canned vegetables    ? Cured or smoked meats, such as hot dogs, sanchez, and sausage    ? High-sodium ketchup, barbecue sauce, salad dressing, pickles, olives, soy sauce, or miso    · Foods and liquids high in sugar:      ? Candy, cake, cookies, pies, or doughnuts    ? Soft drinks (soda), sports drinks, or sweetened tea    ?  Canned or dry mixes for cakes, soups, sauces, or gravies    Other healthy heart guidelines:   · Do not smoke  Nicotine and other chemicals in cigarettes and cigars can cause lung and heart damage  Ask your healthcare provider for information if you currently smoke and need help to quit  E-cigarettes or smokeless tobacco still contain nicotine  Talk to your healthcare provider before you use these products  · Limit or do not drink alcohol as directed  Alcohol can damage your heart and raise your blood pressure  Your healthcare provider may give you specific daily and weekly limits  The general recommended limit is 1 drink a day for women 21 or older and for men 72 or older  Do not have more than 3 drinks in a day or 7 in a week  The recommended limit is 2 drinks a day for men 24to 59years of age  Do not have more than 4 drinks in a day or 14 in a week  A drink of alcohol is 12 ounces of beer, 5 ounces of wine, or 1½ ounces of liquor  · Exercise regularly  Exercise can help you maintain a healthy weight and improve your blood pressure and cholesterol levels  Regular exercise can also decrease your risk for heart problems  Ask your healthcare provider about the best exercise plan for you  Do not start an exercise program without asking your healthcare provider  Follow up with your doctor or cardiologist as directed:  Write down your questions so you remember to ask them during your visits  © Copyright [employer] 2021 Information is for End User's use only and may not be sold, redistributed or otherwise used for commercial purposes  All illustrations and images included in CareNotes® are the copyrighted property of [employer]  or [facility] [employer]   The above information is an  only  It is not intended as medical advice for individual conditions or treatments  Talk to your doctor, nurse or pharmacist before following any medical regimen to see if it is safe and effective for you

## 2021-08-02 NOTE — PROGRESS NOTES
Assessment and Plan:     Problem List Items Addressed This Visit        Respiratory    Moderate obstructive sleep apnea    Chronic obstructive pulmonary disease ([address] ) - Primary       Cardiovascular and Mediastinum    Essential hypertension      Other Visit Diagnoses     B12 deficiency        Encounter for subsequent annual wellness visit (AWV) in Medicare patient               Preventive health issues were discussed with patient, and age appropriate screening tests were ordered as noted in patient's After Visit Summary  Personalized health advice and appropriate referrals for health education or preventive services given if needed, as noted in patient's After Visit Summary       History of Present Illness:     Patient presents for Medicare Annual Wellness visit    Patient Care Team:  Marlon Andrade DO as PCP - General (Family Medicine)  MARELY Owens MD Jacelyn Le, MD Lajuan Bunch, PA-C  [facility], PA-C Odessa Dumas, MD Soledad Goodell, MD (Urology)  Tommie Meng MD as Endoscopist  Abigail Millan MD as Consulting Physician (Nephrology)     Problem List:     Patient Active Problem List   Diagnosis    Chest pain    Essential hypertension    Mixed hyperlipidemia    Chronic pain disorder    Chronic left shoulder pain    Chronic bilateral low back pain with bilateral sciatica    Disc degeneration, lumbar    Lumbar stenosis with neurogenic claudication    Abnormal CT of the abdomen    Chronic pain syndrome    Moderate obstructive sleep apnea    Benign prostatic hyperplasia with lower urinary tract symptoms    Hydronephrosis    Nonrheumatic aortic valve insufficiency    Non-rheumatic mitral regurgitation    Diastolic dysfunction    Nephrolithiasis    Squamous cell carcinoma in situ (SCCIS) of scalp    Actinic keratosis    Chronic obstructive pulmonary disease ([address] )    Megaloblastic red blood cells    BMI 31 0-31 9,adult      Past Medical and Surgical History:     Past Medical History:   Diagnosis Date    Abnormal stress test     Angina at rest [facility])     no issues currently 5/2021    Apnea     Carpal tunnel syndrome on left     Chest pain     CPAP (continuous positive airway pressure) dependence     uses CPAP    Diverticulitis     GERD (gastroesophageal reflux disease)     History of transfusion     childhood    Hypercholesterolemia     Hypercholesterolemia     Hyperlipidemia     Hypertension     Joint pain     Right Shoulder    Kidney stone     Mitral valve disorder     Neck pain     Sleep apnea     Sleep apnea, obstructive     Thoracic neuritis      Past Surgical History:   Procedure Laterality Date    COLONOSCOPY      Complete    CORONARY ANGIOPLASTY  2017    ESOPHAGOGASTRODUODENOSCOPY      Diagnostic    FL RETROGRADE PYELOGRAM  5/24/2019    FOOT SURGERY Left     tarsal tunnel    HERNIA REPAIR      KNEE ARTHROSCOPY Left     KNEE SURGERY Left     NEUROPLASTY / TRANSPOSITION MEDIAN NERVE AT CARPAL TUNNEL      MO COLONOSCOPY FLX DX W/COLLJ SPEC WHEN PFRMD N/A 4/10/2018    Procedure: EGD AND COLONOSCOPY;  Surgeon: Saman Cooper MD;  [facility];   Service: Gastroenterology    MO CYSTO/URETERO W/LITHOTRIPSY &INDWELL STENT INSRT Right 5/24/2019    Procedure: CYSTOSCOPY URETEROSCOPY WITH LITHOTRIPSY HOLMIUM LASER, RETROGRADE PYELOGRAM AND INSERTION STENT URETERAL;  Surgeon: Stuart Emanuel MD;  [facility];  Service: Urology    MO CYSTOURETHRO W/IMPLANT N/A 8/30/2018    Procedure: CYSTOSCOPY WITH INSERTION Bryant Flirt;  Surgeon: Stuart Emanuel MD;  [facility];  Service: Urology    MO INCISION IMPLANT CRANIAL NERVE STIM ELECTRODE/PULSE GEN N/A 5/13/2021    Procedure: INSERTION UPPER AIRWAY STIMULATOR, INSPIRE IMPLANT;  Surgeon: Rufino Ledbetter MD;  [facility];  Service: ENT    MO TRANSURETHRAL INCISION OF PROSTATE N/A 8/30/2018    Procedure: TRANSURETHRAL INCISION OF PROSTATE (TUIP); Surgeon: Nicola Stein MD;  Location: [facility];  Service: Urology    ROTATOR CUFF REPAIR Bilateral     SHOULDER SURGERY      TARSAL TUNNEL RELEASE      Neuroplasty Decompression      Family History:     Family History   Problem Relation Age of Onset    Diabetes Mother         Mellitus    Heart disease Mother         Arteriosclerotic Cardiovascular    Hypertension Mother     Cancer Father     Arthritis Father         Maximu Gaby Site W Involv Of [employer]gans And Systems    Parkinsonism Father     Thyroid disease Sister     Diabetes Brother         Diabetes:Mellitus    Heart disease Brother     Hypertension Brother     Coronary artery disease Family     Heart disease Brother     No Known Problems Brother     Kidney disease Sister     Parkinsonism Sister       Social History:     Social History     Socioeconomic History    Marital status: /Civil Union     Spouse name: None    Number of children: 3    Years of education: high school or GED    Highest education level: None   Occupational History    Occupation: retired   Tobacco Use    Smoking status: Former Smoker     Packs/day: 1 00     Years: 17 00     Pack years: 17 00     Types: Cigarettes     Quit date:      Years since quittin 6    Smokeless tobacco: Never Used   Vaping Use    Vaping Use: Never used   Substance and Sexual Activity    Alcohol use: No    Drug use: No    Sexual activity: Not Currently     Partners: Female   Other Topics Concern    None   Social History Narrative    Active Advance Directives    Lives With Spouse             Social Determinants of Health     Financial Resource Strain:     Difficulty of Paying Living Expenses:    Food Insecurity:     Worried About Running Out of Food in the Last Year:     Ran Out of Food in the Last Year:    Transportation Needs:     Lack of Transportation (Medical):      Lack of Transportation (Non-Medical):    Physical Activity: Sufficiently Active  Days of Exercise per Week: 7 days    Minutes of Exercise per Session: 40 min   Stress: No Stress Concern Present    Feeling of Stress : Not at all   Social Connections:     Frequency of Communication with Friends and Family:     Frequency of Social Gatherings with Friends and Family:     Attends Synagogue Services:     Active Member of Clubs or Organizations:     Attends Club or Organization Meetings:     Marital Status:    Intimate Partner Violence:     Fear of Current or Ex-Partner:     Emotionally Abused:     Physically Abused:     Sexually Abused:       Medications and Allergies:     Current Outpatient Medications   Medication Sig Dispense Refill    acetaminophen (TYLENOL) 500 mg tablet Take 1,000 mg by mouth every 6 (six) hours as needed for mild pain      amLODIPine (NORVASC) 10 mg tablet Take 1 tablet (10 mg total) by mouth every morning 90 tablet 3    fenofibrate (TRICOR) 145 mg tablet TAKE 1 TABLET (145 MG TOTAL) BY MOUTH DAILY 90 tablet 2    ketoconazole (NIZORAL) 2 % cream Apply topically 2 (two) times a day To facial rash repeat as needed treat for 3 weeks 30 g 3    lisinopril (ZESTRIL) 40 mg tablet Take 1 tablet (40 mg total) by mouth daily 90 tablet 1    loperamide (IMODIUM A-D) 2 MG tablet Take 1 tablet by mouth daily as needed      lovastatin (MEVACOR) 40 MG tablet Take 1 tablet (40 mg total) by mouth daily 90 tablet 1    methocarbamol (ROBAXIN) 500 mg tablet Take 1 tablet (500 mg total) by mouth 3 (three) times a day as needed for muscle spasms (neck/back pain) 28 tablet 0    Multiple Vitamins-Minerals (CENTRUM ULTRA MENS PO) Take 1 tablet by mouth daily      omeprazole (PriLOSEC) 20 mg delayed release capsule TAKE ONE CAPSULE BY MOUTH DAILY 90 capsule 1     No current facility-administered medications for this visit       No Known Allergies   Immunizations:     Immunization History   Administered Date(s) Administered    INFLUENZA 11/02/2005, 09/23/2009, 10/06/2014, 10/01/2015, 09/10/2018    Influenza Split High Dose Preservative Free IM 10/01/2015, 08/30/2017    Influenza, seasonal, injectable 10/01/2016    Pneumococcal Conjugate 13-Valent 07/08/2020    Pneumococcal Polysaccharide PPV23 09/23/2009    SARS-CoV-2 / COVID-19 mRNA IM (Moderna) 02/08/2021, 03/08/2021    Tdap 1945, 05/03/2018    Zoster 1945    influenza, trivalent, adjuvanted 10/10/2019      Health Maintenance:         Topic Date Due    Hepatitis C Screening  Never done    Colorectal Cancer Screening  04/10/2023         Topic Date Due    Influenza Vaccine (1) 09/01/2021      Medicare Health Risk Assessment:     /60 (BP Location: Left arm, Patient Position: Sitting)   Temp 97 9 °F (36 6 °C) (Tympanic)   Ht 5' 2" (1 575 m)   Wt 80 3 kg (177 lb)   BMI 32 37 kg/m²      [facility] is here for his Subsequent Wellness visit  Health Risk Assessment:   Patient rates overall health as fair  Patient feels that their physical health rating is same  Patient is satisfied with their life  Eyesight was rated as same  Hearing was rated as same  Patient feels that their emotional and mental health rating is same  Patients states they are never, rarely angry  Patient states they are sometimes unusually tired/fatigued  Pain experienced in the last 7 days has been none  Home Safety:  Patient does not have trouble with stairs inside or outside of their home  Patient has working smoke alarms and has working carbon monoxide detector  Nutrition:   Current diet is Regular  Medications:   Patient is currently taking over-the-counter supplements  OTC medications include: see medication list  Patient is able to manage medications  Activities of Daily Living (ADLs)/Instrumental Activities of Daily Living (IADLs):   Walk and transfer into and out of bed and chair?: Yes  Dress and groom yourself?: Yes    Bathe or shower yourself?: Yes    Feed yourself?  Yes  Do your laundry/housekeeping?: Yes  Manage your money, pay your bills and track your expenses?: Yes  Make your own meals?: Yes    Do your own shopping?: Yes    Previous Hospitalizations:   Any hospitalizations or ED visits within the last 12 months?: No      Advance Care Planning:   Living will: Yes    Advanced directive: Yes    End of Life Decisions reviewed with patient: Yes      PREVENTIVE SCREENINGS      Cardiovascular Screening:    General: Screening Not Indicated and History Lipid Disorder      Diabetes Screening:     General: Screening Current      Colorectal Cancer Screening:     General: Screening Current      Prostate Cancer Screening:    General: Screening Not Indicated      Abdominal Aortic Aneurysm (AAA) Screening:    Risk factors include: tobacco use        Lung Cancer Screening:     General: Screening Not Indicated    Screening, Brief Intervention, and Referral to Treatment (SBIRT)    Screening      Single Item Drug Screening:  How often have you used an illegal drug (including marijuana) or a prescription medication for non-medical reasons in the past year? never    Single Item Drug Screen Score: 0  Interpretation: Negative screen for possible drug use disorder      Consuelo Garces,

## 2021-08-02 NOTE — PROGRESS NOTES
FOLLOW-UP OFFICE VISIT  [facility] [address] [place] [address]    NAME: Oswald Palafox  AGE: 64 y o  SEX: male  : 1945     DATE: 2021     Assessment and Plan:     Problem List Items Addressed This Visit        Respiratory    Moderate obstructive sleep apnea    Chronic obstructive pulmonary disease ([address] ) - Primary       Cardiovascular and Mediastinum    Essential hypertension      Other Visit Diagnoses     B12 deficiency                  Medically stable  no COPD exacerbation in months  BP at goal   B12 wnl will recheck in 6 months  Pt to continue currently therapies as prescribed  Return in about 6 months (around 2022) for Next scheduled follow up  Chief Complaint:     Chief Complaint   Patient presents with    fu        History of Present Illness:   65 yo male present for f/u  No current concerns  States on  had Inspire implantation placed for beryl  Since has been feeling great  Its nice not to have a mask and no longer deals with so much noise while sleeping  Review of Systems:     Review of Systems   Respiratory: Negative for cough and shortness of breath  Cardiovascular: Negative for chest pain  Gastrointestinal: Negative for constipation and diarrhea  Musculoskeletal: Positive for back pain and gait problem  Psychiatric/Behavioral: Negative for sleep disturbance          Problem List:     Patient Active Problem List   Diagnosis    Chest pain    Essential hypertension    Mixed hyperlipidemia    Chronic pain disorder    Chronic left shoulder pain    Chronic bilateral low back pain with bilateral sciatica    Disc degeneration, lumbar    Lumbar stenosis with neurogenic claudication    Abnormal CT of the abdomen    Chronic pain syndrome    Moderate obstructive sleep apnea    Benign prostatic hyperplasia with lower urinary tract symptoms    Hydronephrosis    Nonrheumatic aortic valve insufficiency    Non-rheumatic mitral regurgitation    Diastolic dysfunction    Nephrolithiasis    Squamous cell carcinoma in situ (SCCIS) of scalp    Actinic keratosis    Chronic obstructive pulmonary disease (HCC)    Megaloblastic red blood cells    BMI 31 0-31 9,adult        Objective:     /60 (BP Location: Left arm, Patient Position: Sitting)   Temp 97 9 °F (36 6 °C) (Tympanic)   Ht 5' 2" (1 575 m)   Wt 80 3 kg (177 lb)   BMI 32 37 kg/m²     Physical Exam  HENT:      Head: Normocephalic  Right Ear: External ear normal       Left Ear: External ear normal    Eyes:      Conjunctiva/sclera: Conjunctivae normal    Cardiovascular:      Rate and Rhythm: Normal rate and regular rhythm  Pulmonary:      Effort: Pulmonary effort is normal       Breath sounds: Normal breath sounds  Chest:      Chest wall: Deformity present  Comments: Implant in the right ant  chest wall  Skin:     Capillary Refill: Capillary refill takes less than 2 seconds  Neurological:      Mental Status: He is alert and oriented to person, place, and time  Psychiatric:         Mood and Affect: Mood normal          Behavior: Behavior normal                     Pertinent Laboratory/Diagnostic Studies:    Laboratory Results: I have personally reviewed the pertinent laboratory results/reports         Radiology/Other Diagnostic Testing Results: I have personally reviewed pertinent reports          Garett DEE HSPTLKristen Maha [facility] [place]  8/6/2021 12:41 PM

## 2021-08-31 DIAGNOSIS — K21.9 CHRONIC GERD: ICD-10-CM

## 2021-08-31 RX ORDER — OMEPRAZOLE 20 MG/1
CAPSULE, DELAYED RELEASE ORAL
Qty: 90 CAPSULE | Refills: 1 | Status: SHIPPED | OUTPATIENT
Start: 2021-08-31 | End: 2022-02-15 | Stop reason: SDUPTHER

## 2021-09-13 DIAGNOSIS — E78.2 MIXED HYPERLIPIDEMIA: ICD-10-CM

## 2021-09-13 RX ORDER — FENOFIBRATE 145 MG/1
TABLET, COATED ORAL
Qty: 90 TABLET | Refills: 2 | Status: SHIPPED | OUTPATIENT
Start: 2021-09-13 | End: 2022-02-15 | Stop reason: SDUPTHER

## 2021-09-21 ENCOUNTER — HOSPITAL ENCOUNTER (OUTPATIENT)
Dept: SLEEP CENTER | Facility: CLINIC | Age: 76
Discharge: HOME | End: 2021-09-21
Payer: MEDICARE

## 2021-09-21 DIAGNOSIS — G47.33 MODERATE OBSTRUCTIVE SLEEP APNEA: ICD-10-CM

## 2021-09-21 PROCEDURE — 95810 POLYSOM 6/> YRS 4/> PARAM: CPT

## 2021-09-21 PROCEDURE — 95977 ALYS CPLX CN NPGT PRGRMG: CPT

## 2021-09-21 PROCEDURE — 95810 POLYSOM 6/> YRS 4/> PARAM: CPT | Performed by: INTERNAL MEDICINE

## 2021-09-22 DIAGNOSIS — R07.89 CHEST TIGHTNESS: Primary | ICD-10-CM

## 2021-09-22 NOTE — PROGRESS NOTES
Sleep Study Documentation    Pre-Sleep Study       Sleep testing procedure explained to patient:YES    Patient napped prior to study:YES- less than 30 minutes  Napped after 2PM: yes    Caffeine:Dayshift worker after 12PM   Caffeine use:YES- ice tea  12 ounces    Alcohol:Dayshift workers after 5PM: Alcohol use:NO    Typical day for patient:YES       Study Documentation    Sleep Study Indications: The patient is here for fine tuning of Inspire device  Sleep Study: Treatment   Optimal voltage: 1 7V (?)  Snore:Eliminated  REM Obtained:yes  Minimum SaO2 80%  Baseline SaO2 92%    EKG abnormalities: no     EEG abnormalities: no    Sleep Study Recorded < 2 hours: N/A    Sleep Study Recorded > 2 hours but incomplete study: N/A    Sleep Study Recorded 6 hours but no sleep obtained: NO    Patient classification: retired       Post-Sleep Study    Medication used at bedtime or during sleep study:NO    Patient reports time it took to fall asleep:20 to 30 minutes    Patient reports waking up during study:3 or more times  Patient reports returning to sleep in 10 to 30 minutes  Patient reports sleeping 4 to 6 hours with dreaming  Patient reports sleep during study:typical    Patient rated sleepiness: Not sleepy or tired    PAP treatment:no

## 2021-10-06 ENCOUNTER — OFFICE VISIT (OUTPATIENT)
Dept: SLEEP CENTER | Facility: CLINIC | Age: 76
End: 2021-10-06
Payer: MEDICARE

## 2021-10-06 VITALS
SYSTOLIC BLOOD PRESSURE: 128 MMHG | DIASTOLIC BLOOD PRESSURE: 64 MMHG | WEIGHT: 176.4 LBS | HEIGHT: 62 IN | BODY MASS INDEX: 32.46 KG/M2

## 2021-10-06 DIAGNOSIS — I10 ESSENTIAL HYPERTENSION: ICD-10-CM

## 2021-10-06 DIAGNOSIS — G47.33 MODERATE OBSTRUCTIVE SLEEP APNEA: Primary | ICD-10-CM

## 2021-10-06 DIAGNOSIS — J44.9 CHRONIC OBSTRUCTIVE PULMONARY DISEASE, UNSPECIFIED COPD TYPE (HCC): ICD-10-CM

## 2021-10-06 PROCEDURE — 99214 OFFICE O/P EST MOD 30 MIN: CPT | Performed by: INTERNAL MEDICINE

## 2022-01-17 ENCOUNTER — HOSPITAL ENCOUNTER (OUTPATIENT)
Dept: SLEEP CENTER | Facility: CLINIC | Age: 77
Discharge: HOME | End: 2022-01-17
Payer: COMMERCIAL

## 2022-01-17 DIAGNOSIS — G47.33 MODERATE OBSTRUCTIVE SLEEP APNEA: ICD-10-CM

## 2022-01-17 PROCEDURE — G0399 HOME SLEEP TEST/TYPE 3 PORTA: HCPCS

## 2022-01-18 NOTE — PROGRESS NOTES
Home Sleep Study Documentation    Pre-Sleep Home Study:    Set-up and instructions performed by: Wilton Peterson performed demonstration for Patient: yes    Return demonstration performed by Patient: yes    Written instructions provided to Patient: yes    Patient signed consent form: yes        Post-Sleep Home Study:    Additional comments by Patient: none    Home Sleep Study Failed:no:    Failure reason: N/A    Reported or Detected: N/A    Scored by: MAGDI Martinez, REGINOGT

## 2022-01-19 ENCOUNTER — TELEPHONE (OUTPATIENT)
Dept: SLEEP CENTER | Facility: CLINIC | Age: 77
End: 2022-01-19

## 2022-01-19 PROCEDURE — 95806 SLEEP STUDY UNATT&RESP EFFT: CPT | Performed by: INTERNAL MEDICINE

## 2022-01-19 NOTE — TELEPHONE ENCOUNTER
I called the patient updated him about the results of the sleep study, he has an appointment for follow-up in February 2nd  He will need slight adjustment of the inspire configuration but overall he has improvement of his sleep disordered breathing

## 2022-01-19 NOTE — TELEPHONE ENCOUNTER
Advised patient sleep study resulted  Per Dr Tex Feldman patient should continue to advance the device   Follow up is already scheduled for 2/2/2022

## 2022-01-24 ENCOUNTER — TELEPHONE (OUTPATIENT)
Dept: INTERNAL MEDICINE CLINIC | Facility: CLINIC | Age: 77
End: 2022-01-24

## 2022-01-24 DIAGNOSIS — Z13.9 SCREENING DUE: Primary | ICD-10-CM

## 2022-01-25 ENCOUNTER — APPOINTMENT (OUTPATIENT)
Dept: LAB | Facility: CLINIC | Age: 77
End: 2022-01-25
Payer: COMMERCIAL

## 2022-01-25 DIAGNOSIS — Z13.9 SCREENING DUE: ICD-10-CM

## 2022-01-25 LAB
ALBUMIN SERPL BCP-MCNC: 3.9 G/DL (ref 3.5–5)
ALP SERPL-CCNC: 76 U/L (ref 46–116)
ALT SERPL W P-5'-P-CCNC: 21 U/L (ref 12–78)
ANION GAP SERPL CALCULATED.3IONS-SCNC: 5 MMOL/L (ref 4–13)
AST SERPL W P-5'-P-CCNC: 15 U/L (ref 5–45)
BASOPHILS # BLD AUTO: 0.02 THOUSANDS/ΜL (ref 0–0.1)
BASOPHILS NFR BLD AUTO: 0 % (ref 0–1)
BILIRUB SERPL-MCNC: 1.07 MG/DL (ref 0.2–1)
BUN SERPL-MCNC: 17 MG/DL (ref 5–25)
CALCIUM SERPL-MCNC: 9.4 MG/DL (ref 8.3–10.1)
CHLORIDE SERPL-SCNC: 109 MMOL/L (ref 100–108)
CO2 SERPL-SCNC: 27 MMOL/L (ref 21–32)
CREAT SERPL-MCNC: 0.83 MG/DL (ref 0.6–1.3)
EOSINOPHIL # BLD AUTO: 0.08 THOUSAND/ΜL (ref 0–0.61)
EOSINOPHIL NFR BLD AUTO: 2 % (ref 0–6)
ERYTHROCYTE [DISTWIDTH] IN BLOOD BY AUTOMATED COUNT: 12.6 % (ref 11.6–15.1)
GFR SERPL CREATININE-BSD FRML MDRD: 84 ML/MIN/1.73SQ M
GLUCOSE P FAST SERPL-MCNC: 107 MG/DL (ref 65–99)
HCT VFR BLD AUTO: 42.1 % (ref 36.5–49.3)
HGB BLD-MCNC: 14.3 G/DL (ref 12–17)
IMM GRANULOCYTES # BLD AUTO: 0.01 THOUSAND/UL (ref 0–0.2)
IMM GRANULOCYTES NFR BLD AUTO: 0 % (ref 0–2)
LYMPHOCYTES # BLD AUTO: 1.53 THOUSANDS/ΜL (ref 0.6–4.47)
LYMPHOCYTES NFR BLD AUTO: 31 % (ref 14–44)
MCH RBC QN AUTO: 34.5 PG (ref 26.8–34.3)
MCHC RBC AUTO-ENTMCNC: 34 G/DL (ref 31.4–37.4)
MCV RBC AUTO: 102 FL (ref 82–98)
MONOCYTES # BLD AUTO: 0.5 THOUSAND/ΜL (ref 0.17–1.22)
MONOCYTES NFR BLD AUTO: 10 % (ref 4–12)
NEUTROPHILS # BLD AUTO: 2.76 THOUSANDS/ΜL (ref 1.85–7.62)
NEUTS SEG NFR BLD AUTO: 57 % (ref 43–75)
NRBC BLD AUTO-RTO: 0 /100 WBCS
PLATELET # BLD AUTO: 186 THOUSANDS/UL (ref 149–390)
PMV BLD AUTO: 10.7 FL (ref 8.9–12.7)
POTASSIUM SERPL-SCNC: 3.8 MMOL/L (ref 3.5–5.3)
PROT SERPL-MCNC: 7.1 G/DL (ref 6.4–8.2)
RBC # BLD AUTO: 4.14 MILLION/UL (ref 3.88–5.62)
SODIUM SERPL-SCNC: 141 MMOL/L (ref 136–145)
WBC # BLD AUTO: 4.9 THOUSAND/UL (ref 4.31–10.16)

## 2022-01-25 PROCEDURE — 36415 COLL VENOUS BLD VENIPUNCTURE: CPT

## 2022-01-25 PROCEDURE — 80053 COMPREHEN METABOLIC PANEL: CPT

## 2022-01-25 PROCEDURE — 85025 COMPLETE CBC W/AUTO DIFF WBC: CPT

## 2022-01-31 ENCOUNTER — RA CDI HCC (OUTPATIENT)
Dept: OTHER | Facility: HOSPITAL | Age: 77
End: 2022-01-31

## 2022-01-31 NOTE — PROGRESS NOTES
[facility]  coding opportunities       Chart reviewed, no opportunity found: CHART REVIEWED, NO OPPORTUNITY FOUND                        Patients insurance company: [employer]

## 2022-02-02 ENCOUNTER — TELEPHONE (OUTPATIENT)
Dept: SLEEP CENTER | Facility: CLINIC | Age: 77
End: 2022-02-02

## 2022-02-02 ENCOUNTER — OFFICE VISIT (OUTPATIENT)
Dept: SLEEP CENTER | Facility: CLINIC | Age: 77
End: 2022-02-02
Payer: COMMERCIAL

## 2022-02-02 VITALS
HEART RATE: 75 BPM | HEIGHT: 62 IN | DIASTOLIC BLOOD PRESSURE: 65 MMHG | SYSTOLIC BLOOD PRESSURE: 133 MMHG | BODY MASS INDEX: 32.02 KG/M2 | WEIGHT: 174 LBS

## 2022-02-02 DIAGNOSIS — I10 ESSENTIAL HYPERTENSION: ICD-10-CM

## 2022-02-02 DIAGNOSIS — G47.33 MODERATE OBSTRUCTIVE SLEEP APNEA: Primary | ICD-10-CM

## 2022-02-02 DIAGNOSIS — J44.9 CHRONIC OBSTRUCTIVE PULMONARY DISEASE, UNSPECIFIED COPD TYPE (HCC): ICD-10-CM

## 2022-02-02 DIAGNOSIS — E66.9 CLASS 1 OBESITY WITHOUT SERIOUS COMORBIDITY WITH BODY MASS INDEX (BMI) OF 31.0 TO 31.9 IN ADULT, UNSPECIFIED OBESITY TYPE: ICD-10-CM

## 2022-02-02 PROBLEM — E66.811 CLASS 1 OBESITY WITHOUT SERIOUS COMORBIDITY WITH BODY MASS INDEX (BMI) OF 31.0 TO 31.9 IN ADULT: Status: ACTIVE | Noted: 2022-02-02

## 2022-02-02 PROCEDURE — 99215 OFFICE O/P EST HI 40 MIN: CPT | Performed by: INTERNAL MEDICINE

## 2022-02-02 NOTE — ASSESSMENT & PLAN NOTE
· Status post inspire implantation activation, the patient had 2 sleep studies since then with fine adjustments he is currently using his inspire every night at an amplitude of 1 6 volts he is complaining of mild back lb soreness, he has very comfortable tongue protrusion on his recent home sleep study he had significant reduction of sleep disordered breathing down to an AHI of 7 7 and he feels better not snoring anymore    I changed his configuration today to  (    -    ) and his pulse with to 150 with rate of 33 up from 90 over 33 for comfort-I also increased his pause time to 30 minutes based his  request  · I would repeat his home study after 1 year, encouraged him to use it every night

## 2022-02-02 NOTE — PATIENT INSTRUCTIONS
Sleep Apnea   AMBULATORY CARE:   Sleep apnea  is a condition that causes you to stop breathing often during sleep  Types of sleep apnea:   · Obstructive sleep apnea (GREGG)  is the most common kind  The muscles and tissues around your throat relax and block air from passing through  Obesity, use of alcohol or cigarettes, or a family history are common causes  GREGG may increase your risk for complications after surgery  · Central sleep apnea (CSA)  means your brain does not send signals to the muscles that control breathing  You do not take a breath even though your airway is open  Common causes include medical conditions such as heart failure, being older than 40, or use of opioids  · Complex (or mixed) sleep apnea  means you have both obstructive and central sleep apnea  Common signs and symptoms:   · Loud snoring or long pauses in breathing    · Feeling sleepy, slow, and tired during the day    · Snorting, gasping, or choking while you sleep, and waking up suddenly because of these    · Feeling irritable during the day    · Dry mouth or a headache in the mornings    · Heavy night sweating    · A hard time thinking, remembering things, or focusing on your tasks the following day    Call your local emergency number (911 in the [address] [facility]) if:   · You have chest pain or trouble breathing  Call your doctor if:   · You have new or worsening signs or symptoms  · You have questions or concerns about your condition or care  Treatment  depends on the kind of apnea you have  · A mouth device  may be needed if you have mild sleep apnea  These are designed to keep your throat open  Ask your dentist or healthcare provider about the best mouth device for you  · A machine  may be used to help you get more air during sleep  A mask may be placed over your nose and mouth, or just your nose  The mask is hooked to the machine  You will get air through the mask      ? A continuous positive airway pressure (CPAP) machine is used to keep your airway open during sleep  The machine blows a gentle stream of air into the mask when you breathe  This helps keep your airway open so you can breathe more regularly  Extra oxygen may be given through the machine  ? A bilevel positive airway pressure (BiPAP) machine  gives air but lowers the pressure when you breathe out  ? An adaptive servo-ventilator (ASV)  is a machine that learns your usual breathing pattern  Then, it uses pressure to give you air and prevent stops in your breathing  · Surgery  to expand your airway or remove extra tissues may be needed  Surgery is usually only considered if other treatments do not work  Manage or prevent sleep apnea:   · Reach and maintain a healthy weight  Ask your healthcare provider what a healthy weight is for you  Ask him or her to help you create a safe weight loss plan if you are overweight  Even a small goal of a 10% weight loss can improve your symptoms  · Do not smoke  Nicotine and other chemicals in cigarettes and cigars can cause lung damage  Ask your healthcare provider for information if you currently smoke and need help to quit  E-cigarettes or smokeless tobacco still contain nicotine  Talk to your healthcare provider before you use these products  · Do not drink alcohol or take sedative medicine before you go to sleep  Alcohol and sedatives can relax the muscles and tissues around your throat  This can block the airflow to your lungs  · Sleep on your side or use pillows designed to prevent sleep apnea  This prevents your tongue or other tissues from blocking your throat  You can also raise the head of your bed  Follow up with your doctor or specialist as directed: You may need to have blood tests during your follow-up visits  Work with your provider to find the right breathing support equipment and settings for you  Write down your questions so you remember to ask them during your visits    © Copyright [employer] 2021 Information is for [employer] use only and may not be sold, redistributed or otherwise used for commercial purposes  All illustrations and images included in CareNotes® are the copyrighted property of A D A M , Inc  or Lianne Reinoso  The above information is an  only  It is not intended as medical advice for individual conditions or treatments  Talk to your doctor, nurse or pharmacist before following any medical regimen to see if it is safe and effective for you

## 2022-02-02 NOTE — PROGRESS NOTES
Review of Systems      Genitourinary need to urinate more than twice a night   Cardiology none   Gastrointestinal none   Neurology none   Constitutional none   Integumentary rash or dry skin and itching   Psychiatry none   Musculoskeletal joint pain, muscle aches and back pain   Pulmonary none   ENT none   Endocrine frequent urination   Hematological none

## 2022-02-02 NOTE — PROGRESS NOTES
Pulmonary/Sleep Follow Up Note   Lexie Frausto 64 y o  male MRN: [id number]  2/2/2022      Assessment and Plan:    1  Moderate obstructive sleep apnea  Assessment & Plan:  · Status post inspire implantation activation, the patient had 2 sleep studies since then with fine adjustments he is currently using his inspire every night at an amplitude of 1 6 volts he is complaining of mild back lb soreness, he has very comfortable tongue protrusion on his recent home sleep study he had significant reduction of sleep disordered breathing down to an AHI of 7 7 and he feels better not snoring anymore  I changed his configuration today to  (    -    ) and his pulse with to 150 with rate of 33 up from 90 over 33 for comfort-I also increased his pause time to 30 minutes based his  request  · I would repeat his home study after 1 year, encouraged him to use it every night    Orders:  -     Home Study; Future; Expected date: 02/02/2023    2  BMI 31 0-31 9,adult  Assessment & Plan:  BMI 31 83      3  Chronic obstructive pulmonary disease, unspecified COPD type ([facility] [address] )  Assessment & Plan:  Not in exacerbation continue inhalers      4  Essential hypertension  Assessment & Plan:  Controlled, continue home meds and inspire for sleep apnea      5  Class 1 obesity without serious comorbidity with body mass index (BMI) of 31 0 to 31 9 in adult, unspecified obesity type  Assessment & Plan: With BMI of 31 83        Return in about 1 year (around 2/2/2023)  History of Present Illness   HPI:  Lexie Frausto is a 64 y o  male who  Is here for follow-up the patient has history of GREGG diagnosed in 2020 moderate with an average AHI of 18 9 events per hour he was using CPAP but he failed using it and not tolerating  He underwent an evaluation by ENT for inspire implantation/hypoglossal nerve stimulation and he had the device implanted in May, activated in June 2021   He tells me that he had issues going up and titrating the voltage earlier with some sensitivity but he has been doing better recently and has been tolerating it better, he underwent an in-lab diagnostic study with inspire which showed improvement of his AHI down to 10 7 and that was followed by home study that showed even lower AHI of 7 7 events per hour  Review of Systems      Genitourinary need to urinate more than twice a night   Cardiology none   Gastrointestinal none   Neurology none   Constitutional none   Integumentary itching   Psychiatry none   Musculoskeletal joint pain and back pain   Pulmonary snoring   ENT none   Endocrine none   Hematological none           Historical Information   Past Medical History:   Diagnosis Date    Abnormal stress test     Angina at rest [facility])     no issues currently 5/2021    Apnea     Carpal tunnel syndrome on left     Chest pain     CPAP (continuous positive airway pressure) dependence     uses CPAP    Diverticulitis     GERD (gastroesophageal reflux disease)     History of transfusion     childhood    Hypercholesterolemia     Hypercholesterolemia     Hyperlipidemia     Hypertension     Joint pain     Right Shoulder    Kidney stone     Mitral valve disorder     Neck pain     Sleep apnea     Sleep apnea, obstructive     Thoracic neuritis      Past Surgical History:   Procedure Laterality Date    COLONOSCOPY      Complete    CORONARY ANGIOPLASTY  2017    ESOPHAGOGASTRODUODENOSCOPY      Diagnostic    FL RETROGRADE PYELOGRAM  5/24/2019    FOOT SURGERY Left     tarsal tunnel    HERNIA REPAIR      KNEE ARTHROSCOPY Left     KNEE SURGERY Left     NEUROPLASTY / TRANSPOSITION MEDIAN NERVE AT CARPAL TUNNEL      NC COLONOSCOPY FLX DX W/COLLJ SPEC WHEN PFRMD N/A 4/10/2018    Procedure: EGD AND COLONOSCOPY;  Surgeon: Talia Espinoza MD;  [facility];   Service: Gastroenterology    NC CYSTO/URETERO W/LITHOTRIPSY &INDWELL STENT INSRT Right 5/24/2019    Procedure: CYSTOSCOPY URETEROSCOPY WITH LITHOTRIPSY HOLMIUM LASER, RETROGRADE PYELOGRAM AND INSERTION STENT URETERAL;  Surgeon: Josué Cifuentes MD;  [facility];  Service: Urology    CA CYSTOURETHRO W/IMPLANT N/A 8/30/2018    Procedure: CYSTOSCOPY WITH INSERTION Gaynel Farhad;  Surgeon: Josué Cifuentes MD;  [facility];  Service: Urology    CA INCISION IMPLANT CRANIAL NERVE STIM ELECTRODE/PULSE GEN N/A 5/13/2021    Procedure: INSERTION UPPER AIRWAY STIMULATOR, INSPIRE IMPLANT;  Surgeon: Nithin Bejarano MD;  [facility];  Service: ENT    CA TRANSURETHRAL INCISION OF PROSTATE N/A 8/30/2018    Procedure: TRANSURETHRAL INCISION OF PROSTATE (TUIP);   Surgeon: Josué Cifuentes MD;  [facility];  Service: Urology    ROTATOR CUFF REPAIR Bilateral     SHOULDER SURGERY      TARSAL TUNNEL RELEASE      Neuroplasty Decompression     Family History   Problem Relation Age of Onset    Diabetes Mother         Mellitus    Heart disease Mother         Arteriosclerotic Cardiovascular    Hypertension Mother     Cancer Father     Arthritis Father         Rheu Mult Site W Involv Of Organs And Systems    Parkinsonism Father     Thyroid disease Sister     Diabetes Brother         Diabetes:Mellitus    Heart disease Brother     Hypertension Brother     Coronary artery disease Family     Heart disease Brother     No Known Problems Brother     Kidney disease Sister     Parkinsonism Sister          Meds/Allergies     Current Outpatient Medications:     acetaminophen (TYLENOL) 500 mg tablet, Take 1,000 mg by mouth every 6 (six) hours as needed for mild pain, Disp: , Rfl:     amLODIPine (NORVASC) 10 mg tablet, Take 1 tablet (10 mg total) by mouth every morning, Disp: 90 tablet, Rfl: 3    fenofibrate (TRICOR) 145 mg tablet, take 1 tablet by mouth once daily, Disp: 90 tablet, Rfl: 2    lisinopril (ZESTRIL) 40 mg tablet, Take 1 tablet (40 mg total) by mouth daily, Disp: 90 tablet, Rfl: 1    loperamide (IMODIUM A-D) 2 MG tablet, Take 1 tablet by mouth daily as needed, Disp: , Rfl:     lovastatin (MEVACOR) 40 MG tablet, Take 1 tablet (40 mg total) by mouth daily, Disp: 90 tablet, Rfl: 1    Multiple Vitamins-Minerals (CENTRUM ULTRA MENS PO), Take 1 tablet by mouth daily, Disp: , Rfl:     omeprazole (PriLOSEC) 20 mg delayed release capsule, take 1 capsule by mouth once daily, Disp: 90 capsule, Rfl: 1    ketoconazole (NIZORAL) 2 % cream, Apply topically 2 (two) times a day To facial rash repeat as needed treat for 3 weeks (Patient not taking: Reported on 2/2/2022 ), Disp: 30 g, Rfl: 3    methocarbamol (ROBAXIN) 500 mg tablet, Take 1 tablet (500 mg total) by mouth 3 (three) times a day as needed for muscle spasms (neck/back pain) (Patient not taking: Reported on 2/2/2022 ), Disp: 28 tablet, Rfl: 0  No Known Allergies    Vitals: Blood pressure 133/65, pulse 75, height 5' 2" (1 575 m), weight 78 9 kg (174 lb)  Body mass index is 31 83 kg/m²  Physical Exam  Physical Exam  Constitutional:       Appearance: Normal appearance  HENT:      Head: Normocephalic and atraumatic  Nose: No congestion or rhinorrhea  Mouth/Throat:      Mouth: Mucous membranes are moist       Pharynx: Oropharynx is clear  Eyes:      General: No scleral icterus  Right eye: No discharge  Left eye: No discharge  Pupils: Pupils are equal, round, and reactive to light  Cardiovascular:      Rate and Rhythm: Normal rate and regular rhythm  Pulses: Normal pulses  Heart sounds: Normal heart sounds  No murmur heard  No gallop  Pulmonary:      Effort: Pulmonary effort is normal  No respiratory distress  Breath sounds: Normal breath sounds  No wheezing, rhonchi or rales  Abdominal:      General: Abdomen is flat  Bowel sounds are normal  There is no distension  Palpations: Abdomen is soft  Tenderness: There is no abdominal tenderness  Musculoskeletal:         General: No swelling, tenderness or deformity        Cervical back: No rigidity  Skin:     General: Skin is warm and dry  Neurological:      General: No focal deficit present  Mental Status: He is alert and oriented to person, place, and time  Mental status is at baseline  Psychiatric:         Mood and Affect: Mood normal          Behavior: Behavior normal          Thought Content: Thought content normal          Judgment: Judgment normal          Labs: I have personally reviewed pertinent lab results  , ABG: No results found for: PHART, OXA4XNF, PO2ART, PIH8EHO, X9NPSKYN, BEART, SOURCE, BNP: No results found for: BNP, CBC: No results found for: WBC, HGB, HCT, MCV, PLT, ADJUSTEDWBC, MCH, MCHC, RDW, MPV, NRBC, CMP: No results found for: SODIUM, K, CL, CO2, ANIONGAP, BUN, CREATININE, GLUCOSE, CALCIUM, AST, ALT, ALKPHOS, PROT, BILITOT, EGFR, PT/INR: No results found for: PT, INR, Troponin: No results found for: TROPONINI  Lab Results   Component Value Date    WBC 4 90 01/25/2022    HGB 14 3 01/25/2022    HCT 42 1 01/25/2022     (H) 01/25/2022     01/25/2022     Lab Results   Component Value Date    GLUCOSE 100 10/29/2015    CALCIUM 9 4 01/25/2022     10/29/2015    K 3 8 01/25/2022    CO2 27 01/25/2022     (H) 01/25/2022    BUN 17 01/25/2022    CREATININE 0 83 01/25/2022     No results found for: IGE  Lab Results   Component Value Date    ALT 21 01/25/2022    AST 15 01/25/2022    ALKPHOS 76 01/25/2022    BILITOT 0 67 10/29/2015         Sleep studies: I have personally reviewed pertinent reports  Diagnostic 10/2020: AHI 18 9 events per hour consistent with moderate obstructive sleep apnea     Sleep study with Inspire 9/2021  The patient slept poorly during the night for a total of 202 minutes, representing sleep efficiency of 44%, possibly secondary to a first night effect in sleeping in a new environment   The patient had slight reduction of his sleep disordered breathing events down from a baseline of 18 9 events per hour under control night study to 10 7 events per hour with titrating Inspire up to 1 7 volts  The patient had an interrupted sleep pattern which could affect the results of his sleep disorder breathing events, and he has experienced brief chest heaviness during the study that has gotten better on its own, with no significant EKG changes  Adjusting the patients inspire voltage amplitude to start at 1 5 V with the patient controlled range of 1 3-2 3 V would be recommended, consideration for a repeat home sleep study within 6-8 weeks for further evaluation would be considered  Consider a cardiology outpatient evaluation, clinical correlation suggested  HST 1/2022  IMPRESSION:    The patient had slightly elevated sleep disordered breathing events with an average AHI of 7 7 events per hour, with still improvement from his baseline AHI with using the inspire/hypoglossal nerve stimulation  Continuing to use and advance inspire is recommended, clinical correlation suggested  Elma Mulligan MD  [address] [place] [address] [facility]       Portions of the record may have been created with voice recognition software  Occasional wrong word or "sound a like" substitutions may have occurred due to the inherent limitations of voice recognition software  Read the chart carefully and recognize, using context, where substitutions have occurred

## 2022-02-02 NOTE — TELEPHONE ENCOUNTER
----- Message from Rita Roa MD sent at 2/2/2022  2:58 PM EST -----  Lea can you follow up in 2 weeks with Dottie Ascencio to see how he is doing with inspire, I adjusted his configuration today and want to make sure its for better?

## 2022-02-03 ENCOUNTER — OFFICE VISIT (OUTPATIENT)
Dept: INTERNAL MEDICINE CLINIC | Facility: CLINIC | Age: 77
End: 2022-02-03
Payer: COMMERCIAL

## 2022-02-03 VITALS
DIASTOLIC BLOOD PRESSURE: 70 MMHG | TEMPERATURE: 98.9 F | WEIGHT: 172 LBS | BODY MASS INDEX: 31.65 KG/M2 | SYSTOLIC BLOOD PRESSURE: 130 MMHG | HEIGHT: 62 IN

## 2022-02-03 DIAGNOSIS — Z11.59 NEED FOR HEPATITIS C SCREENING TEST: ICD-10-CM

## 2022-02-03 DIAGNOSIS — J44.9 CHRONIC OBSTRUCTIVE PULMONARY DISEASE, UNSPECIFIED COPD TYPE (HCC): ICD-10-CM

## 2022-02-03 DIAGNOSIS — Z00.00 ENCOUNTER FOR SUBSEQUENT ANNUAL WELLNESS VISIT (AWV) IN MEDICARE PATIENT: Primary | ICD-10-CM

## 2022-02-03 DIAGNOSIS — I10 ESSENTIAL HYPERTENSION: ICD-10-CM

## 2022-02-03 DIAGNOSIS — G47.33 MODERATE OBSTRUCTIVE SLEEP APNEA: ICD-10-CM

## 2022-02-03 PROCEDURE — 1170F FXNL STATUS ASSESSED: CPT | Performed by: FAMILY MEDICINE

## 2022-02-03 PROCEDURE — 99213 OFFICE O/P EST LOW 20 MIN: CPT | Performed by: FAMILY MEDICINE

## 2022-02-03 PROCEDURE — 1125F AMNT PAIN NOTED PAIN PRSNT: CPT | Performed by: FAMILY MEDICINE

## 2022-02-03 PROCEDURE — 3288F FALL RISK ASSESSMENT DOCD: CPT | Performed by: FAMILY MEDICINE

## 2022-02-03 NOTE — TELEPHONE ENCOUNTER
Patient left message stating Dr Dalton Valenzuela adjusted his Keke Spire and he feels it is too high  States he cannot use it  Patient has a follow up with Dr Curly Merritt on 2/7/22

## 2022-02-03 NOTE — PROGRESS NOTES
Assessment and Plan:     Problem List Items Addressed This Visit        Respiratory    Moderate obstructive sleep apnea    Chronic obstructive pulmonary disease ([address] )       Cardiovascular and Mediastinum    Essential hypertension      Other Visit Diagnoses     Encounter for subsequent annual wellness visit (AWV) in Medicare patient    -  Primary    Need for hepatitis C screening test            BMI Counseling: Body mass index is 31 46 kg/m²  The BMI is above normal  Nutrition recommendations include encouraging healthy choices of fruits and vegetables, limiting drinks that contain sugar, increasing intake of lean protein and reducing intake of cholesterol  Rationale for BMI follow-up plan is due to patient being overweight or obese  Preventive health issues were discussed with patient, and age appropriate screening tests were ordered as noted in patient's After Visit Summary  Personalized health advice and appropriate referrals for health education or preventive services given if needed, as noted in patient's After Visit Summary       History of Present Illness:     Patient presents for Medicare Annual Wellness visit    Patient Care Team:  Angel Buckley DO as PCP - General (Family Medicine)  MARELY Jimenez MD Juanell Maizes, MD Bernice Elk, PA-C Mikle Rand, PA-C Boyce Madeira, MD Louvenia Rosa, MD (Urology)  Lamont Patel MD as Endoscopist  Carine Marcelino MD as Consulting Physician (Nephrology)     Problem List:     Patient Active Problem List   Diagnosis    Chest pain    Essential hypertension    Mixed hyperlipidemia    Chronic pain disorder    Chronic left shoulder pain    Chronic bilateral low back pain with bilateral sciatica    Disc degeneration, lumbar    Lumbar stenosis with neurogenic claudication    Abnormal CT of the abdomen    Chronic pain syndrome    Moderate obstructive sleep apnea    Benign prostatic hyperplasia with lower urinary tract symptoms    Hydronephrosis    Nonrheumatic aortic valve insufficiency    Non-rheumatic mitral regurgitation    Diastolic dysfunction    Nephrolithiasis    Squamous cell carcinoma in situ (SCCIS) of scalp    Actinic keratosis    Chronic obstructive pulmonary disease (HCC)    Megaloblastic red blood cells    BMI 31 0-31 9,adult    Class 1 obesity without serious comorbidity with body mass index (BMI) of 31 0 to 31 9 in adult      Past Medical and Surgical History:     Past Medical History:   Diagnosis Date    Abnormal stress test     Angina at rest [facility])     no issues currently 5/2021    Apnea     Carpal tunnel syndrome on left     Chest pain     CPAP (continuous positive airway pressure) dependence     uses CPAP    Diverticulitis     GERD (gastroesophageal reflux disease)     History of transfusion     childhood    Hypercholesterolemia     Hypercholesterolemia     Hyperlipidemia     Hypertension     Joint pain     Right Shoulder    Kidney stone     Mitral valve disorder     Neck pain     Sleep apnea     Sleep apnea, obstructive     Thoracic neuritis      Past Surgical History:   Procedure Laterality Date    COLONOSCOPY      Complete    CORONARY ANGIOPLASTY  2017    ESOPHAGOGASTRODUODENOSCOPY      Diagnostic    FL RETROGRADE PYELOGRAM  5/24/2019    FOOT SURGERY Left     tarsal tunnel    HERNIA REPAIR      KNEE ARTHROSCOPY Left     KNEE SURGERY Left     NEUROPLASTY / TRANSPOSITION MEDIAN NERVE AT CARPAL TUNNEL      CO COLONOSCOPY FLX DX W/COLLJ SPEC WHEN PFRMD N/A 4/10/2018    Procedure: EGD AND COLONOSCOPY;  Surgeon: Garth Murphy MD;  [facility];   Service: Gastroenterology    CO CYSTO/URETERO W/LITHOTRIPSY &INDWELL STENT INSRT Right 5/24/2019    Procedure: CYSTOSCOPY URETEROSCOPY WITH LITHOTRIPSY HOLMIUM LASER, RETROGRADE PYELOGRAM AND INSERTION STENT URETERAL;  Surgeon: Megan Robin MD;  [facility];  Service: Urology    CO CYSTOURETHRO W/IMPLANT N/A 2018    Procedure: CYSTOSCOPY WITH INSERTION Wan Freeze;  Surgeon: Nena Yang MD;  [facility];  Service: Urology    MI INCISION IMPLANT CRANIAL NERVE STIM ELECTRODE/PULSE GEN N/A 2021    Procedure: INSERTION UPPER AIRWAY STIMULATOR, INSPIRE IMPLANT;  Surgeon: Bryn Gloria MD;  [facility];  Service: ENT    MI TRANSURETHRAL INCISION OF PROSTATE N/A 2018    Procedure: TRANSURETHRAL INCISION OF PROSTATE (TUIP);   Surgeon: Nena Yang MD;  [facility];  Service: Urology    ROTATOR CUFF REPAIR Bilateral     SHOULDER SURGERY      TARSAL TUNNEL RELEASE      Neuroplasty Decompression      Family History:     Family History   Problem Relation Age of Onset    Diabetes Mother         Mellitus    Heart disease Mother         Arteriosclerotic Cardiovascular    Hypertension Mother     Cancer Father     Arthritis Father         Rheu Mult Site W Involv Of Organs And Systems    Parkinsonism Father     Thyroid disease Sister     Diabetes Brother         Diabetes:Mellitus    Heart disease Brother     Hypertension Brother     Coronary artery disease Family     Heart disease Brother     No Known Problems Brother     Kidney disease Sister     Parkinsonism Sister       Social History:     Social History     Socioeconomic History    Marital status: /Civil Union     Spouse name: None    Number of children: 4    Years of education: high school or GED    Highest education level: None   Occupational History    Occupation: retired   Tobacco Use    Smoking status: Former Smoker     Packs/day: 1 00     Years: 17 00     Pack years: 17 00     Types: Cigarettes     Quit date:      Years since quittin 1    Smokeless tobacco: Never Used   Vaping Use    Vaping Use: Never used   Substance and Sexual Activity    Alcohol use: No    Drug use: No    Sexual activity: Not Currently     Partners: Female   Other Topics Concern  None   Social History Narrative    Active Advance Directives    Lives With Spouse             Social Determinants of Health     Financial Resource Strain: Not on file   Food Insecurity: Not on file   Transportation Needs: Not on file   Physical Activity: Not on file   Stress: Not on file   Social Connections: Not on file   Intimate Partner Violence: Not on file   Housing Stability: Not on file      Medications and Allergies:     Current Outpatient Medications   Medication Sig Dispense Refill    acetaminophen (TYLENOL) 500 mg tablet Take 1,000 mg by mouth every 6 (six) hours as needed for mild pain      amLODIPine (NORVASC) 10 mg tablet Take 1 tablet (10 mg total) by mouth every morning 90 tablet 3    fenofibrate (TRICOR) 145 mg tablet take 1 tablet by mouth once daily 90 tablet 2    lisinopril (ZESTRIL) 40 mg tablet Take 1 tablet (40 mg total) by mouth daily 90 tablet 1    loperamide (IMODIUM A-D) 2 MG tablet Take 1 tablet by mouth daily as needed      lovastatin (MEVACOR) 40 MG tablet Take 1 tablet (40 mg total) by mouth daily 90 tablet 1    Multiple Vitamins-Minerals (CENTRUM ULTRA MENS PO) Take 1 tablet by mouth daily      omeprazole (PriLOSEC) 20 mg delayed release capsule take 1 capsule by mouth once daily 90 capsule 1    ketoconazole (NIZORAL) 2 % cream Apply topically 2 (two) times a day To facial rash repeat as needed treat for 3 weeks (Patient not taking: Reported on 2/2/2022 ) 30 g 3    methocarbamol (ROBAXIN) 500 mg tablet Take 1 tablet (500 mg total) by mouth 3 (three) times a day as needed for muscle spasms (neck/back pain) (Patient not taking: Reported on 2/2/2022 ) 28 tablet 0     No current facility-administered medications for this visit       No Known Allergies   Immunizations:     Immunization History   Administered Date(s) Administered    COVID-19 MODERNA VACC 0 5 ML IM 02/08/2021, 03/08/2021    INFLUENZA 11/02/2005, 09/23/2009, 10/06/2014, 10/01/2015, 09/10/2018    Influenza Split High Dose Preservative Free IM 10/01/2015, 08/30/2017    Influenza, seasonal, injectable 10/01/2016    Pneumococcal Conjugate 13-Valent 07/08/2020    Pneumococcal Polysaccharide PPV23 09/23/2009    Tdap 1943, 05/03/2018    Zoster 1945    influenza, trivalent, adjuvanted 10/10/2019      Health Maintenance:         Topic Date Due    Hepatitis C Screening  Never done    Colorectal Cancer Screening  04/10/2023         Topic Date Due    COVID-19 Vaccine (3 - Booster for Moderna series) 08/08/2021      Medicare Health Risk Assessment:     /70 (BP Location: Left arm, Patient Position: Sitting)   Temp 98 9 °F (37 2 °C) (Tympanic)   Ht 5' 2" (1 575 m)   Wt 78 kg (172 lb)   BMI 31 46 kg/m²      Dottie Ascencio is here for his Subsequent Wellness visit  Health Risk Assessment:   Patient rates overall health as good  Patient feels that their physical health rating is slightly better  Patient is satisfied with their life  Eyesight was rated as slightly worse  Hearing was rated as same  Patient feels that their emotional and mental health rating is same  Patients states they are sometimes angry  Patient states they are sometimes unusually tired/fatigued  Pain experienced in the last 7 days has been some  Patient's pain rating has been 6/10  Fall Risk Screening: In the past year, patient has experienced: history of falling in past year    Number of falls: 1  Injured during fall?: No    Feels unsteady when standing or walking?: Yes    Worried about falling?: Yes      Home Safety:  Patient does not have trouble with stairs inside or outside of their home  Patient has working smoke alarms and has working carbon monoxide detector  Nutrition:   Current diet is Regular  Medications:   Patient is currently taking over-the-counter supplements  OTC medications include: see medication list  Patient is able to manage medications       Activities of Daily Living (ADLs)/Instrumental Activities of Daily Living (IADLs):   Walk and transfer into and out of bed and chair?: Yes  Dress and groom yourself?: Yes    Bathe or shower yourself?: Yes    Feed yourself?  Yes  Do your laundry/housekeeping?: Yes  Manage your money, pay your bills and track your expenses?: Yes  Make your own meals?: Yes    Do your own shopping?: Yes    Previous Hospitalizations:   Any hospitalizations or ED visits within the last 12 months?: No      Advance Care Planning:   Living will: Yes    Advanced directive: Yes    End of Life Decisions reviewed with patient: Yes      PREVENTIVE SCREENINGS      Cardiovascular Screening:    General: Screening Not Indicated and History Lipid Disorder      Diabetes Screening:     General: Screening Current      Colorectal Cancer Screening:     General: Screening Current      Prostate Cancer Screening:    General: Screening Not Indicated      Abdominal Aortic Aneurysm (AAA) Screening:    Risk factors include: tobacco use        Lung Cancer Screening:     General: Screening Not Indicated      Rikki Garcia DO

## 2022-02-03 NOTE — PATIENT INSTRUCTIONS
Fall Prevention    In order to prevent falls, you are encouraged to:  · Exercise  · Utilize assistive/adaptive devices  · Avoid multifocal lenses when walking  · Avoid hazards in home  · Maintain a regular toileting schedule    Any questions please contact our office  Preventing Falls in the Home  (This education is for all patients over 65 regardless of symptoms)     As you get older, falls are more likely  Thats because your reaction time slows  Your muscles and joints may also get stiffer, making them less flexible  Illness, medications, and vision changes can also affect your balance  A fall could leave you unable to live on your own  To make your home safer, follow these tips:    Floors  · Put nonskid pads under area rugs  · Remove throw rugs  · Replace worn floor coverings  · Tack carpets firmly to each step on carpeted stairs   Put nonskid strips on the edges of uncarpeted stairs  · Keep floors and stairs free of clutter and cords  · Arrange furniture so there are clear pathways  · Clean up any spills right away    Bathrooms  · Install grab bars in the tub or shower  · Apply nonskid strips or put a nonskid rubber mat in the tub or shower  · Sit on a bath chair to bathe  · Use bathmats with nonskid backing    Lighting  · Keep a flashlight in each room  · Put a nightlight along the pathway between the bedroom and the bathroom

## 2022-02-07 ENCOUNTER — OFFICE VISIT (OUTPATIENT)
Dept: SLEEP CENTER | Facility: CLINIC | Age: 77
End: 2022-02-07
Payer: COMMERCIAL

## 2022-02-07 VITALS
DIASTOLIC BLOOD PRESSURE: 70 MMHG | HEIGHT: 62 IN | SYSTOLIC BLOOD PRESSURE: 180 MMHG | WEIGHT: 173.4 LBS | TEMPERATURE: 98.6 F | OXYGEN SATURATION: 98 % | BODY MASS INDEX: 31.91 KG/M2 | HEART RATE: 74 BPM

## 2022-02-07 DIAGNOSIS — G47.00 INSOMNIA, UNSPECIFIED TYPE: Primary | ICD-10-CM

## 2022-02-07 PROCEDURE — 99215 OFFICE O/P EST HI 40 MIN: CPT | Performed by: INTERNAL MEDICINE

## 2022-02-07 RX ORDER — DOXEPIN HYDROCHLORIDE 10 MG/1
10 CAPSULE ORAL
Qty: 30 CAPSULE | Refills: 0 | Status: SHIPPED | OUTPATIENT
Start: 2022-02-07 | End: 2022-06-01 | Stop reason: SDUPTHER

## 2022-02-07 NOTE — LETTER
February 7, 2022     [facility]– [place][note 2] [address]    Patient: Akhil Mcrae   YOB: 1945   Date of Visit: 2/7/2022       Dear Dr Fran Mejia: Thank you for referring Jackie Casey to me for evaluation  Below are my notes for this consultation  If you have questions, please do not hesitate to call me  I look forward to following your patient along with you  Sincerely,        Ruben Gotti DO        CC: No Recipients  Ruben Gotti DO  2/7/2022 12:40 PM  Sign when Signing Visit  Progress Note - Sleep Medicine  Akhil Mcrae 64 y o  male MRN: [id number]       Assessment/Plan  64 y o  M with PMHx of COPD, GERD, HTN, hyperlipidemia and GREGG who previously failed CPAP who comes in for initiation of Inspire therapy today  1   Moderate GREGG (AHI - 18 9) previously failed CPAP s/p implantation of Inspire on 5/31/21  Here for device adjustment  -  Device was recently adjusted by Dr Ton Laguna  Configuration was altereted to off - minus - off and he had adjusted comfort settings to pulse width 150 and frequency at 33 but he could not sleep at all with that  He reduced stimulation level all the way to 1 and cant tolerate turning it on so he stopped using it  -  I have set him back to default configuration and comfort settings  He seems more comfortable with that  I have asked him to attempt to get back to level 5 (1 5V) soon as it should be more comfortable now  Ideally he should get to level 6 or 7 if possible  -  Reduce delay set to 30 minutes but he will start later        -  Continue 15 minute pause and 8 hr duration           -  He has a home study ordered  -  I discussed in depth the risk of leaving sleep apnea untreated including hypertension, heart failure, arrhythmia, MI and stroke  2   Fragmented sleep/Insufficient sleep - his sleep hygiene makes adjusting Inspire very difficult    He falls asleep at 10 but only sleeps for 30 minutes before getting up to go to the bathroom, his dog then following him 10 minutes later to also go to the bathroom and then will finally sleep at 11 for  1-2 hrs  -  I asked him not to bother attempting to put the Inspire on at 9:15 in attempt to fall asleep at 10  If he will only get stop using it within 30 minutes  -  I will reduce start delay at that time so that it will turn on a bit earlier as he tends to fall asleep quicker at that time  I explained that I would rather him attempt to use it consistently, even if it is a later time  -  I emphasized the importance of maintaining a consistent sleep schedule  3   Sleep maintenance insomnia -  Will start Doxepin 10mg qHS to see if this helps him maintain sleep     ______________________________________________________________________    HPI:    Mary Never presents today for follow-up for his Inspire therapy  He states that since he last saw Dr Jorge Anton he has been struggling to use Inspire  He states that he feels like he cant fall asleep with the device at current settings  He states that he feels like he is sleeping better without the device altogether  He also explained he has a very fragmented sleep schedule where he is getting up every 1 5 - 2 hrs to go to the bathroom and at that time his dog is also getting him up to go to the bathroom  Review of Systems:  Review of Systems   Constitutional: Positive for fatigue  Negative for appetite change and unexpected weight change  HENT: Negative  Eyes: Negative  Cardiovascular: Negative  Gastrointestinal: Negative  Endocrine: Negative  Genitourinary: Negative  Musculoskeletal: Positive for back pain and myalgias  Skin: Negative  Allergic/Immunologic: Negative  Neurological: Negative  Hematological: Negative  Psychiatric/Behavioral: Positive for sleep disturbance  Negative for decreased concentration  The patient is nervous/anxious            Social history updates:  Social History     Tobacco Use   Smoking Status Former Smoker    Packs/day: 1 00    Years: 17 00    Pack years: 17 00    Types: Cigarettes    Quit date: 1    Years since quittin 1   Smokeless Tobacco Never Used     Social History     Socioeconomic History    Marital status: /Civil Union     Spouse name: Not on file    Number of children: 3    Years of education: high school or GED    Highest education level: Not on file   Occupational History    Occupation: retired   Tobacco Use    Smoking status: Former Smoker     Packs/day: 1 00     Years: 17 00     Pack years: 17 00     Types: Cigarettes     Quit date:      Years since quittin 1    Smokeless tobacco: Never Used   Vaping Use    Vaping Use: Never used   Substance and Sexual Activity    Alcohol use: No    Drug use: No    Sexual activity: Not Currently     Partners: Female   Other Topics Concern    Not on file   Social History Narrative    Active Advance Directives    Lives With Spouse             Social Determinants of Health     Financial Resource Strain: Not on file   Food Insecurity: Not on file   Transportation Needs: Not on file   Physical Activity: Not on file   Stress: Not on file   Social Connections: Not on file   Intimate Partner Violence: Not on file   Housing Stability: Not on file       PhysicalExamination:  Vitals:   BP (!) 180/70 (BP Location: Left arm, Patient Position: Sitting, Cuff Size: Standard)   Pulse 74   Temp 98 6 °F (37 °C)   Ht 5' 2" (1 575 m)   Wt 78 7 kg (173 lb 6 4 oz)   SpO2 98% Comment: room air  BMI 31 72 kg/m²   General: Pleasant, Awake alert and oriented x 3, conversant without conversational dyspnea, NAD, normal affect  HEENT:  PERRL, Sclera noninjected, nonicteric OU, Nares patent,  no craniofacial abnormalities, Mucous membranes, moist, no oral lesions, normal dentition, Mallampati class 3  NECK: Trachea midline, no accessory muscle use, no stridor, no cervical or supraclavicular adenopathy, JVP not elevated  CARDIAC: Reg, single s1/S2, no m/r/g  PULM: CTA bilaterally no wheezing, rhonchi or rales  ABD: Normoactive bowel sounds, soft nontender, nondistended, no rebound, no rigidity, no guarding  EXT: No cyanosis, no clubbing, no edema, normal capillary refill  NEURO: no focal neurologic deficits, AAOx3, moving all extremities appropriately      Diagnostic Data:  Labs: I personally reviewed the most recent laboratory data pertinent to today's visit  I have personally reviewed pertinent lab results  Lab Results   Component Value Date    WBC 4 90 01/25/2022    HGB 14 3 01/25/2022    HCT 42 1 01/25/2022     (H) 01/25/2022     01/25/2022     Lab Results   Component Value Date    GLUCOSE 100 10/29/2015    CALCIUM 9 4 01/25/2022     10/29/2015    K 3 8 01/25/2022    CO2 27 01/25/2022     (H) 01/25/2022    BUN 17 01/25/2022    CREATININE 0 83 01/25/2022     No results found for: IGE  Lab Results   Component Value Date    ALT 21 01/25/2022    AST 15 01/25/2022    ALKPHOS 76 01/25/2022    BILITOT 0 67 10/29/2015     No results found for: IRON, TIBC, FERRITIN  Lab Results   Component Value Date    [id number] 07/26/2021     No results found for: FOLATE    Diagnostic 10/2020: AHI 18 9 events per hour consistent with moderate obstructive sleep apnea      Sleep study with Inspire 9/2021  The patient slept poorly during the night for a total of 202 minutes, representing sleep efficiency of 44%, possibly secondary to a first night effect in sleeping in a new environment  The patient had slight reduction of his sleep disordered breathing events down from a baseline of 18 9 events per hour under control night study to 10 7 events per hour with titrating Inspire up to 1 7 volts   The patient had an interrupted sleep pattern which could affect the results of his sleep disorder breathing events, and he has experienced brief chest heaviness during the study that has gotten better on its own, with no significant EKG changes  Adjusting the patients inspire voltage amplitude to start at 1 5 V with the patient controlled range of 1 3-2 3 V would be recommended, consideration for a repeat home sleep study within 6-8 weeks for further evaluation would be considered   Consider a cardiology outpatient evaluation, clinical correlation suggested       HST 1/2022  IMPRESSION:    The patient had slightly elevated sleep disordered breathing events with an average AHI of 7 7 events per hour, with still improvement from his baseline AHI with using the inspire/hypoglossal nerve stimulation   Continuing to use and advance inspire is recommended, clinical correlation suggested                                             Antonia Christopher,

## 2022-02-07 NOTE — PROGRESS NOTES
Progress Note - Sleep Medicine  Ismael Go 64 y o  male MRN: [id number]       Assessment/Plan  64 y o  M with PMHx of COPD, GERD, HTN, hyperlipidemia and GREGG who previously failed CPAP who comes in for initiation of Inspire therapy today  1   Moderate GREGG (AHI - 18 9) previously failed CPAP s/p implantation of Inspire on 5/31/21  Here for device adjustment  -  Device was recently adjusted by Dr Madeleine Buckner  Configuration was altereted to off - minus - off and he had adjusted comfort settings to pulse width 150 and frequency at 33 but he could not sleep at all with that  He reduced stimulation level all the way to 1 and cant tolerate turning it on so he stopped using it  -  I have set him back to default configuration and comfort settings  He seems more comfortable with that  I have asked him to attempt to get back to level 5 (1 5V) soon as it should be more comfortable now  Ideally he should get to level 6 or 7 if possible  -  Reduce delay set to 30 minutes but he will start later        -  Continue 15 minute pause and 8 hr duration           -  He has a home study ordered  -  I discussed in depth the risk of leaving sleep apnea untreated including hypertension, heart failure, arrhythmia, MI and stroke  2   Fragmented sleep/Insufficient sleep - his sleep hygiene makes adjusting Inspire very difficult  He falls asleep at 10 but only sleeps for 30 minutes before getting up to go to the bathroom, his dog then following him 10 minutes later to also go to the bathroom and then will finally sleep at 11 for  1-2 hrs  -  I asked him not to bother attempting to put the Inspire on at 9:15 in attempt to fall asleep at 10  If he will only get stop using it within 30 minutes  -  I will reduce start delay at that time so that it will turn on a bit earlier as he tends to fall asleep quicker at that time    I explained that I would rather him attempt to use it consistently, even if it is a later time  -  I emphasized the importance of maintaining a consistent sleep schedule  3   Sleep maintenance insomnia -  Will start Doxepin 10mg qHS to see if this helps him maintain sleep     ______________________________________________________________________    HPI:    Myra Adilene presents today for follow-up for his Inspire therapy  He states that since he last saw Dr Rosalind Chawla he has been struggling to use Inspire  He states that he feels like he cant fall asleep with the device at current settings  He states that he feels like he is sleeping better without the device altogether  He also explained he has a very fragmented sleep schedule where he is getting up every 1 5 - 2 hrs to go to the bathroom and at that time his dog is also getting him up to go to the bathroom  Review of Systems:  Review of Systems   Constitutional: Positive for fatigue  Negative for appetite change and unexpected weight change  HENT: Negative  Eyes: Negative  Cardiovascular: Negative  Gastrointestinal: Negative  Endocrine: Negative  Genitourinary: Negative  Musculoskeletal: Positive for back pain and myalgias  Skin: Negative  Allergic/Immunologic: Negative  Neurological: Negative  Hematological: Negative  Psychiatric/Behavioral: Positive for sleep disturbance  Negative for decreased concentration  The patient is nervous/anxious            Social history updates:  Social History     Tobacco Use   Smoking Status Former Smoker    Packs/day: 1 00    Years: 17 00    Pack years: 17 00    Types: Cigarettes    Quit date: 1    Years since quittin 1   Smokeless Tobacco Never Used     Social History     Socioeconomic History    Marital status: /Civil Union     Spouse name: Not on file    Number of children: 3    Years of education: high school or GED    Highest education level: Not on file   Occupational History    Occupation: retired   Tobacco Use    Smoking status: Former Smoker     Packs/day: 1 00     Years: 17 00     Pack years: 17 00     Types: Cigarettes     Quit date:      Years since quittin 1    Smokeless tobacco: Never Used   Vaping Use    Vaping Use: Never used   Substance and Sexual Activity    Alcohol use: No    Drug use: No    Sexual activity: Not Currently     Partners: Female   Other Topics Concern    Not on file   Social History Narrative    Active Advance Directives    Lives With Spouse             Social Determinants of Health     Financial Resource Strain: Not on file   Food Insecurity: Not on file   Transportation Needs: Not on file   Physical Activity: Not on file   Stress: Not on file   Social Connections: Not on file   Intimate Partner Violence: Not on file   Housing Stability: Not on file       PhysicalExamination:  Vitals:   BP (!) 180/70 (BP Location: Left arm, Patient Position: Sitting, Cuff Size: Standard)   Pulse 74   Temp 98 6 °F (37 °C)   Ht 5' 2" (1 575 m)   Wt 78 7 kg (173 lb 6 4 oz)   SpO2 98% Comment: room air  BMI 31 72 kg/m²   General: Pleasant, Awake alert and oriented x 3, conversant without conversational dyspnea, NAD, normal affect  HEENT:  PERRL, Sclera noninjected, nonicteric OU, Nares patent,  no craniofacial abnormalities, Mucous membranes, moist, no oral lesions, normal dentition, Mallampati class 3  NECK: Trachea midline, no accessory muscle use, no stridor, no cervical or supraclavicular adenopathy, JVP not elevated  CARDIAC: Reg, single s1/S2, no m/r/g  PULM: CTA bilaterally no wheezing, rhonchi or rales  ABD: Normoactive bowel sounds, soft nontender, nondistended, no rebound, no rigidity, no guarding  EXT: No cyanosis, no clubbing, no edema, normal capillary refill  NEURO: no focal neurologic deficits, AAOx3, moving all extremities appropriately      Diagnostic Data:  Labs:   I personally reviewed the most recent laboratory data pertinent to today's visit  I have personally reviewed pertinent lab results  Lab Results   Component Value Date    WBC 4 90 01/25/2022    HGB 14 3 01/25/2022    HCT 42 1 01/25/2022     (H) 01/25/2022     01/25/2022     Lab Results   Component Value Date    GLUCOSE 100 10/29/2015    CALCIUM 9 4 01/25/2022     10/29/2015    K 3 8 01/25/2022    CO2 27 01/25/2022     (H) 01/25/2022    BUN 17 01/25/2022    CREATININE 0 83 01/25/2022     No results found for: IGE  Lab Results   Component Value Date    ALT 21 01/25/2022    AST 15 01/25/2022    ALKPHOS 76 01/25/2022    BILITOT 0 67 10/29/2015     No results found for: IRON, TIBC, FERRITIN  Lab Results   Component Value Date    [id number] 07/26/2021     No results found for: FOLATE    Diagnostic 10/2020: AHI 18 9 events per hour consistent with moderate obstructive sleep apnea      Sleep study with Inspire 9/2021  The patient slept poorly during the night for a total of 202 minutes, representing sleep efficiency of 44%, possibly secondary to a first night effect in sleeping in a new environment  The patient had slight reduction of his sleep disordered breathing events down from a baseline of 18 9 events per hour under control night study to 10 7 events per hour with titrating Inspire up to 1 7 volts  The patient had an interrupted sleep pattern which could affect the results of his sleep disorder breathing events, and he has experienced brief chest heaviness during the study that has gotten better on its own, with no significant EKG changes  Adjusting the patients inspire voltage amplitude to start at 1 5 V with the patient controlled range of 1 3-2 3 V would be recommended, consideration for a repeat home sleep study within 6-8 weeks for further evaluation would be considered   Consider a cardiology outpatient evaluation, clinical correlation suggested       HST 1/2022  IMPRESSION:    The patient had slightly elevated sleep disordered breathing events with an average AHI of 7 7 events per hour, with still improvement from his baseline AHI with using the inspire/hypoglossal nerve stimulation   Continuing to use and advance inspire is recommended, clinical correlation suggested                                             Jenene Cabot, DO

## 2022-02-15 DIAGNOSIS — K21.9 CHRONIC GERD: ICD-10-CM

## 2022-02-15 DIAGNOSIS — E78.2 MIXED HYPERLIPIDEMIA: ICD-10-CM

## 2022-02-15 DIAGNOSIS — I10 ESSENTIAL HYPERTENSION: ICD-10-CM

## 2022-02-15 RX ORDER — LISINOPRIL 40 MG/1
40 TABLET ORAL DAILY
Qty: 90 TABLET | Refills: 1 | Status: SHIPPED | OUTPATIENT
Start: 2022-02-15 | End: 2022-06-26

## 2022-02-15 RX ORDER — LOVASTATIN 40 MG/1
40 TABLET ORAL DAILY
Qty: 90 TABLET | Refills: 1 | Status: SHIPPED | OUTPATIENT
Start: 2022-02-15 | End: 2022-06-26

## 2022-02-15 RX ORDER — FENOFIBRATE 145 MG/1
145 TABLET, COATED ORAL DAILY
Qty: 90 TABLET | Refills: 0 | Status: SHIPPED | OUTPATIENT
Start: 2022-02-15 | End: 2022-05-26 | Stop reason: SDUPTHER

## 2022-02-15 RX ORDER — OMEPRAZOLE 20 MG/1
20 CAPSULE, DELAYED RELEASE ORAL DAILY
Qty: 90 CAPSULE | Refills: 1 | Status: SHIPPED | OUTPATIENT
Start: 2022-02-15 | End: 2022-06-26

## 2022-02-22 ENCOUNTER — OFFICE VISIT (OUTPATIENT)
Dept: CARDIOLOGY CLINIC | Facility: CLINIC | Age: 77
End: 2022-02-22
Payer: COMMERCIAL

## 2022-02-22 VITALS
WEIGHT: 176 LBS | SYSTOLIC BLOOD PRESSURE: 136 MMHG | HEART RATE: 62 BPM | HEIGHT: 62 IN | DIASTOLIC BLOOD PRESSURE: 62 MMHG | BODY MASS INDEX: 32.39 KG/M2 | OXYGEN SATURATION: 99 %

## 2022-02-22 DIAGNOSIS — G47.33 MODERATE OBSTRUCTIVE SLEEP APNEA: ICD-10-CM

## 2022-02-22 DIAGNOSIS — I10 ESSENTIAL HYPERTENSION: Primary | ICD-10-CM

## 2022-02-22 DIAGNOSIS — E78.2 MIXED HYPERLIPIDEMIA: ICD-10-CM

## 2022-02-22 PROCEDURE — 1160F RVW MEDS BY RX/DR IN RCRD: CPT | Performed by: PHYSICIAN ASSISTANT

## 2022-02-22 PROCEDURE — 3075F SYST BP GE 130 - 139MM HG: CPT | Performed by: PHYSICIAN ASSISTANT

## 2022-02-22 PROCEDURE — 99212 OFFICE O/P EST SF 10 MIN: CPT | Performed by: PHYSICIAN ASSISTANT

## 2022-02-22 PROCEDURE — 3078F DIAST BP <80 MM HG: CPT | Performed by: PHYSICIAN ASSISTANT

## 2022-02-22 PROCEDURE — 1036F TOBACCO NON-USER: CPT | Performed by: PHYSICIAN ASSISTANT

## 2022-02-22 RX ORDER — AMLODIPINE BESYLATE 10 MG/1
10 TABLET ORAL EVERY MORNING
Qty: 90 TABLET | Refills: 3 | Status: SHIPPED | OUTPATIENT
Start: 2022-02-22

## 2022-02-22 NOTE — PROGRESS NOTES
Cardiology Follow Up    Karan Gonzales  1945  [id number]  [facility] Raina Ricketts [address] [facility] [id number] [phone]    1  Essential hypertension  amLODIPine (NORVASC) 10 mg tablet    Blood Pressure Monitoring (Comfort Touch BP Cuff/Medium) MISC   2  Moderate obstructive sleep apnea     3  Mixed hyperlipidemia     4  BMI 31 0-31 9,adult         Interval History: Elisa Melgar is a 64year old male with history of hypertension, hyperlipidemia, COPD, GREGG s/p inspire device (5/2021), obesity who presents for routine follow up visit  He is known to Dr Bobo Dill  Past cardiac evaluation:   · Cardiac catheterization 4/2017- nonobstructive CAD  · TTE 1/2019: EF: 60%, g1dd, mild MR, TR    Patient reports he is feeling well overall over the past few months and denies any cardiac symptoms of concern  He has been following up with sleep medicine and ENT and had an inspire device implant in May which has vastly improved his quality of life  He reports of refreshing sleep and more energy throughout the day  He reports he is active around his home, can move furniture, clean and cook without any issues  He is able to get to his mailbox which is 600ft away but does not usually walk more then that due to knee/back/neck arthritic pain  He reports he does not watch his BP at home as he does not have a BP cuff  His diet could also be better, reports he eats deli meats, pretzels, cheese-its and other salty snacks throughout the day  He was advised to monitor his BPs and NA intake at home        Patient Active Problem List   Diagnosis    Chest pain    Essential hypertension    Mixed hyperlipidemia    Chronic pain disorder    Chronic left shoulder pain    Chronic bilateral low back pain with bilateral sciatica    Disc degeneration, lumbar    Lumbar stenosis with neurogenic claudication    Abnormal CT of the abdomen    Chronic pain syndrome    Moderate obstructive sleep apnea    Benign prostatic hyperplasia with lower urinary tract symptoms    Hydronephrosis    Nonrheumatic aortic valve insufficiency    Non-rheumatic mitral regurgitation    Diastolic dysfunction    Nephrolithiasis    Squamous cell carcinoma in situ (SCCIS) of scalp    Actinic keratosis    Chronic obstructive pulmonary disease (HCC)    Megaloblastic red blood cells    BMI 31 0-31 9,adult    Class 1 obesity without serious comorbidity with body mass index (BMI) of 31 0 to 31 9 in adult     Past Medical History:   Diagnosis Date    Abnormal stress test     Angina at rest [facility])     no issues currently 2021    Apnea     Carpal tunnel syndrome on left     Chest pain     CPAP (continuous positive airway pressure) dependence     uses CPAP    Diverticulitis     GERD (gastroesophageal reflux disease)     History of transfusion     childhood    Hypercholesterolemia     Hypercholesterolemia     Hyperlipidemia     Hypertension     Joint pain     Right Shoulder    Kidney stone     Mitral valve disorder     Neck pain     Sleep apnea     Sleep apnea, obstructive     Thoracic neuritis      Social History     Socioeconomic History    Marital status: /Civil Union     Spouse name: Not on file    Number of children: 4    Years of education: high school or GED    Highest education level: Not on file   Occupational History    Occupation: retired   Tobacco Use    Smoking status: Former Smoker     Packs/day: 1 00     Years: 17 00     Pack years: 17 00     Types: Cigarettes     Quit date:      Years since quittin 1    Smokeless tobacco: Never Used   Vaping Use    Vaping Use: Never used   Substance and Sexual Activity    Alcohol use: No    Drug use: No    Sexual activity: Not Currently     Partners: Female   Other Topics Concern    Not on file   Social History Narrative    Active Advance Directives    Lives With Spouse             Social Determinants of Health     Financial Resource Strain: Not on file   Food Insecurity: Not on file   Transportation Needs: Not on file   Physical Activity: Not on file   Stress: Not on file   Social Connections: Not on file   Intimate Partner Violence: Not on file   Housing Stability: Not on file      Family History   Problem Relation Age of Onset    Diabetes Mother         Mellitus    Heart disease Mother         Arteriosclerotic Cardiovascular    Hypertension Mother     Cancer Father     Arthritis Father         [facility] Involv Of Organs And Systems    Parkinsonism Father     Thyroid disease Sister     Diabetes Brother         Diabetes:Mellitus    Heart disease Brother     Hypertension Brother     Coronary artery disease Family     Heart disease Brother     No Known Problems Brother     Kidney disease Sister     Parkinsonism Sister      Past Surgical History:   Procedure Laterality Date    COLONOSCOPY      Complete    CORONARY ANGIOPLASTY  2017    ESOPHAGOGASTRODUODENOSCOPY      Diagnostic    FL RETROGRADE PYELOGRAM  5/24/2019    FOOT SURGERY Left     tarsal tunnel    HERNIA REPAIR      KNEE ARTHROSCOPY Left     KNEE SURGERY Left     NEUROPLASTY / TRANSPOSITION MEDIAN NERVE AT CARPAL TUNNEL      CT COLONOSCOPY FLX DX W/COLLJ SPEC WHEN PFRMD N/A 4/10/2018    Procedure: EGD AND COLONOSCOPY;  Surgeon: Jenny Durham MD;  [facility];   Service: Gastroenterology    CT CYSTO/URETERO W/LITHOTRIPSY &INDWELL STENT INSRT Right 5/24/2019    Procedure: CYSTOSCOPY URETEROSCOPY WITH LITHOTRIPSY HOLMIUM LASER, RETROGRADE PYELOGRAM AND INSERTION STENT URETERAL;  Surgeon: Angelia Jones MD;  [facility];  Service: Urology    CT CYSTOURETHRO W/IMPLANT N/A 8/30/2018    Procedure: CYSTOSCOPY WITH INSERTION Noris Euceda;  Surgeon: Angelia Jones MD;  [facility];  Service: Urology    [facility]PULSE [facility] N/A 5/13/2021    Procedure: INSERTION UPPER AIRWAY STIMULATOR, INSPIRE IMPLANT;  Surgeon: Partha Santillan MD;  [facility];  Service: ENT    MT TRANSURETHRAL INCISION OF PROSTATE N/A 8/30/2018    Procedure: TRANSURETHRAL INCISION OF PROSTATE (TUIP);   Surgeon: Gildardo Bullock MD;  [facility];  Service: Urology    ROTATOR CUFF REPAIR Bilateral     SHOULDER SURGERY      TARSAL TUNNEL RELEASE      Neuroplasty Decompression       Current Outpatient Medications:     acetaminophen (TYLENOL) 500 mg tablet, Take 1,000 mg by mouth every 6 (six) hours as needed for mild pain, Disp: , Rfl:     amLODIPine (NORVASC) 10 mg tablet, Take 1 tablet (10 mg total) by mouth every morning, Disp: 90 tablet, Rfl: 3    doxepin (SINEquan) 10 mg capsule, Take 1 capsule (10 mg total) by mouth daily at bedtime, Disp: 30 capsule, Rfl: 0    fenofibrate (TRICOR) 145 mg tablet, Take 1 tablet (145 mg total) by mouth daily, Disp: 90 tablet, Rfl: 0    ketoconazole (NIZORAL) 2 % cream, Apply topically 2 (two) times a day To facial rash repeat as needed treat for 3 weeks, Disp: 30 g, Rfl: 3    lisinopril (ZESTRIL) 40 mg tablet, Take 1 tablet (40 mg total) by mouth daily, Disp: 90 tablet, Rfl: 1    loperamide (IMODIUM A-D) 2 MG tablet, Take 1 tablet by mouth daily as needed, Disp: , Rfl:     lovastatin (MEVACOR) 40 MG tablet, Take 1 tablet (40 mg total) by mouth daily, Disp: 90 tablet, Rfl: 1    Multiple Vitamins-Minerals (CENTRUM ULTRA MENS PO), Take 1 tablet by mouth daily, Disp: , Rfl:     omeprazole (PriLOSEC) 20 mg delayed release capsule, Take 1 capsule (20 mg total) by mouth daily, Disp: 90 capsule, Rfl: 1    Blood Pressure Monitoring (Comfort Touch BP Cuff/Medium) MISC, Use 1 Units 1 (one) time for 1 dose, Disp: 1 each, Rfl: 0    methocarbamol (ROBAXIN) 500 mg tablet, Take 1 tablet (500 mg total) by mouth 3 (three) times a day as needed for muscle spasms (neck/back pain) (Patient not taking: Reported on 2/22/2022 ), Disp: 28 tablet, Rfl: 0  No Known Allergies    Labs:  Appointment on 01/25/2022   Component Date Value    Sodium 01/25/2022 141     Potassium 01/25/2022 3 8     Chloride 01/25/2022 109*    CO2 01/25/2022 27     ANION GAP 01/25/2022 5     BUN 01/25/2022 17     Creatinine 01/25/2022 0 83     Glucose, Fasting 01/25/2022 107*    Calcium 01/25/2022 9 4     AST 01/25/2022 15     ALT 01/25/2022 21     Alkaline Phosphatase 01/25/2022 76     Total Protein 01/25/2022 7 1     Albumin 01/25/2022 3 9     Total Bilirubin 01/25/2022 1 07*    eGFR 01/25/2022 84     WBC 01/25/2022 4 90     RBC 01/25/2022 4 14     Hemoglobin 01/25/2022 14 3     Hematocrit 01/25/2022 42 1     MCV 01/25/2022 102*    MCH 01/25/2022 34 5*    MCHC 01/25/2022 34 0     RDW 01/25/2022 12 6     MPV 01/25/2022 10 7     Platelets 48/57/7414 186     nRBC 01/25/2022 0     Neutrophils Relative 01/25/2022 57     Immat GRANS % 01/25/2022 0     Lymphocytes Relative 01/25/2022 31     Monocytes Relative 01/25/2022 10     Eosinophils Relative 01/25/2022 2     Basophils Relative 01/25/2022 0     Neutrophils Absolute 01/25/2022 2 76     Immature Grans Absolute 01/25/2022 0 01     Lymphocytes Absolute 01/25/2022 1 53     Monocytes Absolute 01/25/2022 0 50     Eosinophils Absolute 01/25/2022 0 08     Basophils Absolute 01/25/2022 0 02      Imaging: No results found  Review of Systems:  Review of Systems   Constitutional: Negative for appetite change, chills, diaphoresis, fatigue and fever  Respiratory: Negative for cough, chest tightness and shortness of breath  Cardiovascular: Negative for chest pain, palpitations and leg swelling  Gastrointestinal: Negative for diarrhea, nausea and vomiting  Endocrine: Negative for cold intolerance and heat intolerance  Genitourinary: Negative for difficulty urinating, dysuria and enuresis     Musculoskeletal: Negative for arthralgias, back pain and gait problem  Allergic/Immunologic: Negative for environmental allergies and food allergies  Neurological: Negative for dizziness, facial asymmetry and headaches  Hematological: Negative for adenopathy  Does not bruise/bleed easily  Psychiatric/Behavioral: Negative for agitation, behavioral problems and confusion  Physical Exam:  Physical Exam  Constitutional:       Appearance: He is well-developed  HENT:      Right Ear: External ear normal       Left Ear: External ear normal    Eyes:      Pupils: Pupils are equal, round, and reactive to light  Cardiovascular:      Rate and Rhythm: Normal rate and regular rhythm  Heart sounds: Normal heart sounds  No murmur heard  No friction rub  No gallop  Pulmonary:      Effort: Pulmonary effort is normal       Breath sounds: Normal breath sounds  Abdominal:      Palpations: Abdomen is soft  Musculoskeletal:         General: Normal range of motion  Cervical back: Normal range of motion  Skin:     General: Skin is warm and dry  Neurological:      Mental Status: He is alert and oriented to person, place, and time  Deep Tendon Reflexes: Reflexes are normal and symmetric  Psychiatric:         Behavior: Behavior normal          Thought Content: Thought content normal          Judgment: Judgment normal          Discussion/Summary:  1  Hypertension - Ordered BP cuff for patient to start taking readings at home  Instructed to report any readings >140/90  Advised to watch Na intake  Continue Norvasc 10mg and Lisinopril 40mg daily  2  Hyperlipidemia - Labwork ordered by PCP  LDL currently at goal  Advised on a cardiac diet  Continue Lovastatin 40mg and Fenofibrate 145 daily  Denies myalgias  3  GREGG s/p inspira device - follows with ENT/ sleep medicine  RTO in 6 months or sooner if needed

## 2022-05-26 DIAGNOSIS — E78.2 MIXED HYPERLIPIDEMIA: ICD-10-CM

## 2022-05-27 DIAGNOSIS — E78.2 MIXED HYPERLIPIDEMIA: ICD-10-CM

## 2022-05-27 DIAGNOSIS — I10 ESSENTIAL HYPERTENSION: Primary | ICD-10-CM

## 2022-05-27 RX ORDER — FENOFIBRATE 145 MG/1
145 TABLET, COATED ORAL DAILY
Qty: 90 TABLET | Refills: 0 | Status: SHIPPED | OUTPATIENT
Start: 2022-05-27 | End: 2022-05-27 | Stop reason: SDUPTHER

## 2022-05-27 NOTE — TELEPHONE ENCOUNTER
Voicemail left to call about the orders for blood work that has to be done before the next refill of med per provider

## 2022-05-31 ENCOUNTER — APPOINTMENT (OUTPATIENT)
Dept: LAB | Facility: CLINIC | Age: 77
End: 2022-05-31
Payer: COMMERCIAL

## 2022-05-31 DIAGNOSIS — I10 ESSENTIAL HYPERTENSION: ICD-10-CM

## 2022-05-31 DIAGNOSIS — E78.2 MIXED HYPERLIPIDEMIA: ICD-10-CM

## 2022-05-31 LAB
ALBUMIN SERPL BCP-MCNC: 3.9 G/DL (ref 3.5–5)
ALP SERPL-CCNC: 78 U/L (ref 46–116)
ALT SERPL W P-5'-P-CCNC: 23 U/L (ref 12–78)
ANION GAP SERPL CALCULATED.3IONS-SCNC: 3 MMOL/L (ref 4–13)
AST SERPL W P-5'-P-CCNC: 22 U/L (ref 5–45)
BASOPHILS # BLD AUTO: 0.02 THOUSANDS/ΜL (ref 0–0.1)
BASOPHILS NFR BLD AUTO: 0 % (ref 0–1)
BILIRUB SERPL-MCNC: 0.5 MG/DL (ref 0.2–1)
BUN SERPL-MCNC: 18 MG/DL (ref 5–25)
CALCIUM SERPL-MCNC: 9.6 MG/DL (ref 8.3–10.1)
CHLORIDE SERPL-SCNC: 108 MMOL/L (ref 100–108)
CHOLEST SERPL-MCNC: 169 MG/DL
CO2 SERPL-SCNC: 27 MMOL/L (ref 21–32)
CREAT SERPL-MCNC: 0.99 MG/DL (ref 0.6–1.3)
EOSINOPHIL # BLD AUTO: 0.05 THOUSAND/ΜL (ref 0–0.61)
EOSINOPHIL NFR BLD AUTO: 1 % (ref 0–6)
ERYTHROCYTE [DISTWIDTH] IN BLOOD BY AUTOMATED COUNT: 12.8 % (ref 11.6–15.1)
GFR SERPL CREATININE-BSD FRML MDRD: 72 ML/MIN/1.73SQ M
GLUCOSE P FAST SERPL-MCNC: 93 MG/DL (ref 65–99)
HCT VFR BLD AUTO: 44.3 % (ref 36.5–49.3)
HDLC SERPL-MCNC: 48 MG/DL
HGB BLD-MCNC: 14.9 G/DL (ref 12–17)
IMM GRANULOCYTES # BLD AUTO: 0.01 THOUSAND/UL (ref 0–0.2)
IMM GRANULOCYTES NFR BLD AUTO: 0 % (ref 0–2)
LDLC SERPL CALC-MCNC: 94 MG/DL (ref 0–100)
LYMPHOCYTES # BLD AUTO: 2.1 THOUSANDS/ΜL (ref 0.6–4.47)
LYMPHOCYTES NFR BLD AUTO: 34 % (ref 14–44)
MCH RBC QN AUTO: 33.7 PG (ref 26.8–34.3)
MCHC RBC AUTO-ENTMCNC: 33.6 G/DL (ref 31.4–37.4)
MCV RBC AUTO: 100 FL (ref 82–98)
MONOCYTES # BLD AUTO: 0.65 THOUSAND/ΜL (ref 0.17–1.22)
MONOCYTES NFR BLD AUTO: 10 % (ref 4–12)
NEUTROPHILS # BLD AUTO: 3.4 THOUSANDS/ΜL (ref 1.85–7.62)
NEUTS SEG NFR BLD AUTO: 55 % (ref 43–75)
NONHDLC SERPL-MCNC: 121 MG/DL
NRBC BLD AUTO-RTO: 0 /100 WBCS
PLATELET # BLD AUTO: 201 THOUSANDS/UL (ref 149–390)
PMV BLD AUTO: 10.9 FL (ref 8.9–12.7)
POTASSIUM SERPL-SCNC: 4.2 MMOL/L (ref 3.5–5.3)
PROT SERPL-MCNC: 7.4 G/DL (ref 6.4–8.2)
RBC # BLD AUTO: 4.42 MILLION/UL (ref 3.88–5.62)
SODIUM SERPL-SCNC: 138 MMOL/L (ref 136–145)
TRIGL SERPL-MCNC: 137 MG/DL
WBC # BLD AUTO: 6.23 THOUSAND/UL (ref 4.31–10.16)

## 2022-05-31 PROCEDURE — 80061 LIPID PANEL: CPT

## 2022-05-31 PROCEDURE — 85025 COMPLETE CBC W/AUTO DIFF WBC: CPT

## 2022-05-31 PROCEDURE — 80053 COMPREHEN METABOLIC PANEL: CPT

## 2022-05-31 PROCEDURE — 36415 COLL VENOUS BLD VENIPUNCTURE: CPT

## 2022-05-31 RX ORDER — FENOFIBRATE 145 MG/1
145 TABLET, COATED ORAL DAILY
Qty: 90 TABLET | Refills: 0 | Status: SHIPPED | OUTPATIENT
Start: 2022-05-31 | End: 2022-08-29

## 2022-06-01 ENCOUNTER — OFFICE VISIT (OUTPATIENT)
Dept: SLEEP CENTER | Facility: CLINIC | Age: 77
End: 2022-06-01
Payer: COMMERCIAL

## 2022-06-01 VITALS
HEART RATE: 72 BPM | DIASTOLIC BLOOD PRESSURE: 65 MMHG | WEIGHT: 173 LBS | SYSTOLIC BLOOD PRESSURE: 136 MMHG | HEIGHT: 62 IN | BODY MASS INDEX: 31.83 KG/M2

## 2022-06-01 DIAGNOSIS — G47.00 INSOMNIA, UNSPECIFIED TYPE: ICD-10-CM

## 2022-06-01 DIAGNOSIS — G47.33 MODERATE OBSTRUCTIVE SLEEP APNEA: Primary | ICD-10-CM

## 2022-06-01 PROCEDURE — 99214 OFFICE O/P EST MOD 30 MIN: CPT | Performed by: INTERNAL MEDICINE

## 2022-06-01 RX ORDER — DOXEPIN HYDROCHLORIDE 10 MG/1
10 CAPSULE ORAL
Qty: 30 CAPSULE | Refills: 3 | Status: SHIPPED | OUTPATIENT
Start: 2022-06-01

## 2022-06-01 NOTE — PROGRESS NOTES
Pulmonary/Sleep Follow Up Note   Chani Smith 64 y o  male MRN: [id number]  6/1/2022      Assessment and Plan:    1  Moderate obstructive sleep apnea  Assessment & Plan:  Status post inspire implantation, his previous sleep studies showed significant improvement of his sleep apnea events, his scheduled for repeat home study 1 year from the last 1 which is going to be in February of 2023  I reviewed his compliance today and he is averaging 7-8 hours of sleep with the inspire, he has been having sleep maintenance insomnia that is multifactorial secondary to frequently using the bathroom, nocturia and also due to his pets I recommended for him to continue using the doxepin 10 mg prescribed previously by Dr Ender Torres which has helped him relatively I refilled medication today  Adjusted his inspire to 60/40 timing for comfort and he liked      2  Insomnia, unspecified type  -     doxepin (SINEquan) 10 mg capsule; Take 1 capsule (10 mg total) by mouth daily at bedtime as needed for sleep      Return in about 6 months (around 12/1/2022)  History of Present Illness   HPI:  Chani Smith is a 64 y o  male who  Is here for follow-up the patient has history of GREGG diagnosed in 2020 moderate with an average AHI of 18 9 events per hour he was using CPAP but he failed using it and not tolerating  He underwent an evaluation by ENT for inspire implantation/hypoglossal nerve stimulation and he had the device implanted in May, activated in June 2021  he underwent an in-lab diagnostic study with inspire which showed improvement of his AHI down to 10 7 and that was followed by home study that showed even lower AHI of 7 7 events per hour          Review of Systems        Genitourinary need to urinate more than twice a night   Cardiology none   Gastrointestinal none   Neurology numbness/tingling of an extremity   Constitutional none   Integumentary itching   Psychiatry none   Musculoskeletal joint pain, muscle aches and back pain Pulmonary none   ENT none   Endocrine frequent urination   Hematological none               Historical Information   Past Medical History:   Diagnosis Date    Abnormal stress test     Ang[facility] rest [facility])     no issues currently 5/2021    Apnea     Carpal tunnel syndrome on left     Chest pain     CPAP (continuous positive airway pressure) dependence     uses CPAP    Diverticulitis     GERD (gastroesophageal reflux disease)     History of transfusion     childhood    Hypercholesterolemia     Hypercholesterolemia     Hyperlipidemia     Hypertension     Joint pain     Right Shoulder    Kidney stone     Mitral valve disorder     Neck pain     Sleep apnea     Sleep apnea, obstructive     Thoracic neuritis      Past Surgical History:   Procedure Laterality Date    COLONOSCOPY      Complete    CORONARY ANGIOPLASTY  2017    ESOPHAGOGASTRODUODENOSCOPY      Diagnostic    FL RETROGRADE PYELOGRAM  5/24/2019    FOOT SURGERY Left     tarsal tunnel    HERNIA REPAIR      KNEE ARTHROSCOPY Left     KNEE SURGERY Left     NEUROPLASTY / TRANSPOSITION MEDIAN NERVE AT CARPAL TUNNEL      HI COLONOSCOPY FLX DX W/COLLJ SPEC WHEN PFRMD N/A 4/10/2018    Procedure: EGD AND COLONOSCOPY;  Surgeon: Andra Engle MD;  [facility];   Service: Gastroenterology    HI CYSTO/URETERO W/LITHOTRIPSY &INDWELL STENT INSRT Right 5/24/2019    Procedure: CYSTOSCOPY URETEROSCOPY WITH LITHOTRIPSY HOLMIUM LASER, RETROGRADE PYELOGRAM AND INSERTION STENT URETERAL;  Surgeon: Parker Gray MD;  [facility];  Service: Urology    HI CYSTOURETHRO W/IMPLANT N/A 8/30/2018    Procedure: CYSTOSCOPY WITH INSERTION Josepha Giovani;  Surgeon: Parker Gray MD;  [facility];  Service: Urology    HI INCISION IMPLANT CRANIAL NERVE STIM ELECTRODE/PULSE GEN N/A 5/13/2021    Procedure: INSERTION UPPER AIRWAY STIMULATOR, INSPIRE IMPLANT;  Surgeon: Roman Gerber MD;  [facility];  Service: ENT    HI TRANSURETHRAL INCISION OF PROSTATE N/A 8/30/2018    Procedure: TRANSURETHRAL INCISION OF PROSTATE (TUIP);   Surgeon: Maddie Spencer MD;  [facility];  Service: Urology    ROTATOR CUFF REPAIR Bilateral     SHOULDER SURGERY      TARSAL TUNNEL RELEASE      Neuroplasty Decompression     Family History   Problem Relation Age of Onset    Diabetes Mother         Mellitus    Heart disease Mother         Arteriosclerotic Cardiovascular    Hypertension Mother     Cancer Father     Arthritis Father         [employer] Mult Site W Involv Of Organs And Systems    Parkinsonism Father     Thyroid disease Sister     Diabetes Brother         Diabetes:Mellitus    Heart disease Brother     Hypertension Brother     Coronary artery disease Family     Heart disease Brother     No Known Problems Brother     Kidney disease Sister     Parkinsonism Sister          Meds/Allergies     Current Outpatient Medications:     acetaminophen (TYLENOL) 500 mg tablet, Take 1,000 mg by mouth every 6 (six) hours as needed for mild pain, Disp: , Rfl:     amLODIPine (NORVASC) 10 mg tablet, Take 1 tablet (10 mg total) by mouth every morning, Disp: 90 tablet, Rfl: 3    doxepin (SINEquan) 10 mg capsule, Take 1 capsule (10 mg total) by mouth daily at bedtime as needed for sleep, Disp: 30 capsule, Rfl: 3    fenofibrate (TRICOR) 145 mg tablet, Take 1 tablet (145 mg total) by mouth daily, Disp: 90 tablet, Rfl: 0    ketoconazole (NIZORAL) 2 % cream, Apply topically 2 (two) times a day To facial rash repeat as needed treat for 3 weeks, Disp: 30 g, Rfl: 3    lisinopril (ZESTRIL) 40 mg tablet, Take 1 tablet (40 mg total) by mouth daily, Disp: 90 tablet, Rfl: 1    loperamide (IMODIUM A-D) 2 MG tablet, Take 1 tablet by mouth daily as needed, Disp: , Rfl:     lovastatin (MEVACOR) 40 MG tablet, Take 1 tablet (40 mg total) by mouth daily, Disp: 90 tablet, Rfl: 1    Multiple Vitamins-Minerals (CENTRUM ULTRA MENS PO), Take 1 tablet by mouth daily, Disp: , Rfl:     omeprazole (PriLOSEC) 20 mg delayed release capsule, Take 1 capsule (20 mg total) by mouth daily, Disp: 90 capsule, Rfl: 1    methocarbamol (ROBAXIN) 500 mg tablet, Take 1 tablet (500 mg total) by mouth 3 (three) times a day as needed for muscle spasms (neck/back pain) (Patient not taking: Reported on 2/22/2022 ), Disp: 28 tablet, Rfl: 0  No Known Allergies    Vitals: Blood pressure 136/65, pulse 72, height 5' 2" (1 575 m), weight 78 5 kg (173 lb)  Body mass index is 31 64 kg/m²  Physical Exam  Physical Exam  Constitutional:       Appearance: Normal appearance  HENT:      Head: Normocephalic and atraumatic  Nose: No congestion or rhinorrhea  Mouth/Throat:      Mouth: Mucous membranes are moist       Pharynx: Oropharynx is clear  Eyes:      General: No scleral icterus  Right eye: No discharge  Left eye: No discharge  Pupils: Pupils are equal, round, and reactive to light  Cardiovascular:      Rate and Rhythm: Normal rate and regular rhythm  Pulses: Normal pulses  Heart sounds: Normal heart sounds  No murmur heard  No gallop  Pulmonary:      Effort: Pulmonary effort is normal  No respiratory distress  Breath sounds: Normal breath sounds  No wheezing, rhonchi or rales  Abdominal:      General: Abdomen is flat  Bowel sounds are normal  There is no distension  Palpations: Abdomen is soft  Tenderness: There is no abdominal tenderness  Musculoskeletal:         General: No swelling, tenderness or deformity  Cervical back: No rigidity  Skin:     General: Skin is warm and dry  Neurological:      General: No focal deficit present  Mental Status: He is alert and oriented to person, place, and time  Mental status is at baseline  Psychiatric:         Mood and Affect: Mood normal          Behavior: Behavior normal          Thought Content: Thought content normal          Judgment: Judgment normal          Labs:  I have personally reviewed pertinent lab results  , ABG: No results found for: PHART, CKF7EQI, PO2ART, GLO0WKD, D8SGAINF, BEART, SOURCE, BNP: No results found for: BNP, CBC: No results found for: WBC, HGB, HCT, MCV, PLT, ADJUSTEDWBC, MCH, MCHC, RDW, MPV, NRBC, CMP: No results found for: SODIUM, K, CL, CO2, ANIONGAP, BUN, CREATININE, GLUCOSE, CALCIUM, AST, ALT, ALKPHOS, PROT, BILITOT, EGFR, PT/INR: No results found for: PT, INR, Troponin: No results found for: TROPONINI  Lab Results   Component Value Date    WBC 6 23 05/31/2022    HGB 14 9 05/31/2022    HCT 44 3 05/31/2022     (H) 05/31/2022     05/31/2022     Lab Results   Component Value Date    GLUCOSE 100 10/29/2015    CALCIUM 9 6 05/31/2022     10/29/2015    K 4 2 05/31/2022    CO2 27 05/31/2022     05/31/2022    BUN 18 05/31/2022    CREATININE 0 99 05/31/2022     No results found for: IGE  Lab Results   Component Value Date    ALT 23 05/31/2022    AST 22 05/31/2022    ALKPHOS 78 05/31/2022    BILITOT 0 67 10/29/2015         Sleep studies: I have personally reviewed pertinent reports  Diagnostic 10/2020: AHI 18 9 events per hour consistent with moderate obstructive sleep apnea     Sleep study with Inspire 9/2021  The patient slept poorly during the night for a total of 202 minutes, representing sleep efficiency of 44%, possibly secondary to a first night effect in sleeping in a new environment  The patient had slight reduction of his sleep disordered breathing events down from a baseline of 18 9 events per hour under control night study to 10 7 events per hour with titrating Inspire up to 1 7 volts  The patient had an interrupted sleep pattern which could affect the results of his sleep disorder breathing events, and he has experienced brief chest heaviness during the study that has gotten better on its own, with no significant EKG changes   Adjusting the patients inspire voltage amplitude to start at 1 5 V with the patient controlled range of 1 3-2 3 V would be recommended, consideration for a repeat home sleep study within 6-8 weeks for further evaluation would be considered  Consider a cardiology outpatient evaluation, clinical correlation suggested  HST 1/2022  IMPRESSION:    The patient had slightly elevated sleep disordered breathing events with an average AHI of 7 7 events per hour, with still improvement from his baseline AHI with using the inspire/hypoglossal nerve stimulation  Continuing to use and advance inspire is recommended, clinical correlation suggested  Inspire compliance report:         Ivette Zhu MD  [address] [place] [address] [facility]       Portions of the record may have been created with voice recognition software  Occasional wrong word or "sound a like" substitutions may have occurred due to the inherent limitations of voice recognition software  Read the chart carefully and recognize, using context, where substitutions have occurred

## 2022-06-01 NOTE — PATIENT INSTRUCTIONS
Sleep Apnea   AMBULATORY CARE:   Sleep apnea  is a condition that causes you to stop breathing often during sleep  Types of sleep apnea:   Obstructive sleep apnea (GREGG)  is the most common kind  The muscles and tissues around your throat relax and block air from passing through  Obesity, use of alcohol or cigarettes, or a family history are common causes  GREGG may increase your risk for complications after surgery  Central sleep apnea (CSA)  means your brain does not send signals to the muscles that control breathing  You do not take a breath even though your airway is open  Common causes include medical conditions such as heart failure, being older than 40, or use of opioids  Complex (or mixed) sleep apnea  means you have both obstructive and central sleep apnea  Common signs and symptoms:   Loud snoring or long pauses in breathing    Feeling sleepy, slow, and tired during the day    Snorting, gasping, or choking while you sleep, and waking up suddenly because of these    Feeling irritable during the day    Dry mouth or a headache in the mornings    Heavy night sweating    A hard time thinking, remembering things, or focusing on your tasks the following day    Call your local emergency number (911 in the [address] [facility]) if:   You have chest pain or trouble breathing  Call your doctor if:   You have new or worsening signs or symptoms  You have questions or concerns about your condition or care  Treatment  depends on the kind of apnea you have  A mouth device  may be needed if you have mild sleep apnea  These are designed to keep your throat open  Ask your dentist or healthcare provider about the best mouth device for you  A machine  may be used to help you get more air during sleep  A mask may be placed over your nose and mouth, or just your nose  The mask is hooked to the machine  You will get air through the mask      A continuous positive airway pressure (CPAP) machine  is used to keep your airway open during sleep  The machine blows a gentle stream of air into the mask when you breathe  This helps keep your airway open so you can breathe more regularly  Extra oxygen may be given through the machine  A bilevel positive airway pressure (BiPAP) machine  gives air but lowers the pressure when you breathe out  An adaptive servo-ventilator (ASV)  is a machine that learns your usual breathing pattern  Then, it uses pressure to give you air and prevent stops in your breathing  Surgery  to expand your airway or remove extra tissues may be needed  Surgery is usually only considered if other treatments do not work  Manage or prevent sleep apnea:   Reach and maintain a healthy weight  Ask your healthcare provider what a healthy weight is for you  Ask him or her to help you create a safe weight loss plan if you are overweight  Even a small goal of a 10% weight loss can improve your symptoms  Do not smoke  Nicotine and other chemicals in cigarettes and cigars can cause lung damage  Ask your healthcare provider for information if you currently smoke and need help to quit  E-cigarettes or smokeless tobacco still contain nicotine  Talk to your healthcare provider before you use these products  Do not drink alcohol or take sedative medicine before you go to sleep  Alcohol and sedatives can relax the muscles and tissues around your throat  This can block the airflow to your lungs  Sleep on your side or use pillows designed to prevent sleep apnea  This prevents your tongue or other tissues from blocking your throat  You can also raise the head of your bed  Follow up with your doctor or specialist as directed: You may need to have blood tests during your follow-up visits  Work with your provider to find the right breathing support equipment and settings for you  Write down your questions so you remember to ask them during your visits    © Copyright [employer] 2022 Information is for End User's use only and may not be sold, redistributed or otherwise used for commercial purposes  All illustrations and images included in CareNotes® are the copyrighted property of A D A M , Inc  or Lianne Reinoso  The above information is an  only  It is not intended as medical advice for individual conditions or treatments  Talk to your doctor, nurse or pharmacist before following any medical regimen to see if it is safe and effective for you

## 2022-06-01 NOTE — ASSESSMENT & PLAN NOTE
Status post inspire implantation, his previous sleep studies showed significant improvement of his sleep apnea events, his scheduled for repeat home study 1 year from the last 1 which is going to be in February of 2023  I reviewed his compliance today and he is averaging 7-8 hours of sleep with the inspire, he has been having sleep maintenance insomnia that is multifactorial secondary to frequently using the bathroom, nocturia and also due to his pets I recommended for him to continue using the doxepin 10 mg prescribed previously by Dr Conner Whitfield which has helped him relatively I refilled medication today  Adjusted his inspire to 60/40 timing for comfort and he liked

## 2022-06-01 NOTE — PROGRESS NOTES
Review of Systems      Genitourinary need to urinate more than twice a night   Cardiology none   Gastrointestinal none   Neurology numbness/tingling of an extremity   Constitutional none   Integumentary itching   Psychiatry none   Musculoskeletal joint pain, muscle aches and back pain   Pulmonary none   ENT none   Endocrine frequent urination   Hematological none

## 2022-06-02 NOTE — TELEPHONE ENCOUNTER
Patient called and needed to schedule his u/s and kub  Central scheduling number given and connected  He will call back to schedule follow up appointment

## 2022-06-13 ENCOUNTER — HOSPITAL ENCOUNTER (OUTPATIENT)
Dept: RADIOLOGY | Facility: HOSPITAL | Age: 77
Discharge: HOME | End: 2022-06-13
Payer: COMMERCIAL

## 2022-06-13 ENCOUNTER — HOSPITAL ENCOUNTER (OUTPATIENT)
Dept: ULTRASOUND IMAGING | Facility: HOSPITAL | Age: 77
Discharge: HOME | End: 2022-06-13
Payer: COMMERCIAL

## 2022-06-13 DIAGNOSIS — N20.0 NEPHROLITHIASIS: ICD-10-CM

## 2022-06-13 PROCEDURE — 74018 RADEX ABDOMEN 1 VIEW: CPT

## 2022-06-13 PROCEDURE — 76770 US EXAM ABDO BACK WALL COMP: CPT

## 2022-06-13 NOTE — TELEPHONE ENCOUNTER
Call transferred from [facility] patients states that kub xr order was only good for a year   New order placed in [facility]  Radiology pulled order unable to addend  Contacted radiology via imaging central line   Spoke with Tomeka and adviselder order was updated in the system

## 2022-06-13 NOTE — TELEPHONE ENCOUNTER
Patient called upset that he was told by radiologist at [facility] that his x-ray order was   I asked him to wait that I would try to get a nurse on the phone to put in a new one so he wouldn't have to come back to the hospital     Zan Amos and gave her all the info to f/u with the patient

## 2022-06-23 ENCOUNTER — CONSULT (OUTPATIENT)
Dept: INTERNAL MEDICINE CLINIC | Facility: CLINIC | Age: 77
End: 2022-06-23
Payer: COMMERCIAL

## 2022-06-23 VITALS
WEIGHT: 172.4 LBS | HEART RATE: 63 BPM | TEMPERATURE: 98 F | RESPIRATION RATE: 17 BRPM | BODY MASS INDEX: 31.73 KG/M2 | HEIGHT: 62 IN | SYSTOLIC BLOOD PRESSURE: 126 MMHG | OXYGEN SATURATION: 97 % | DIASTOLIC BLOOD PRESSURE: 68 MMHG

## 2022-06-23 DIAGNOSIS — J44.9 CHRONIC OBSTRUCTIVE PULMONARY DISEASE, UNSPECIFIED COPD TYPE (HCC): ICD-10-CM

## 2022-06-23 DIAGNOSIS — H25.9 AGE-RELATED CATARACT OF BOTH EYES, UNSPECIFIED AGE-RELATED CATARACT TYPE: ICD-10-CM

## 2022-06-23 DIAGNOSIS — R39.12 BENIGN PROSTATIC HYPERPLASIA WITH WEAK URINARY STREAM: ICD-10-CM

## 2022-06-23 DIAGNOSIS — I10 ESSENTIAL HYPERTENSION: ICD-10-CM

## 2022-06-23 DIAGNOSIS — G47.33 MODERATE OBSTRUCTIVE SLEEP APNEA: Primary | ICD-10-CM

## 2022-06-23 DIAGNOSIS — N40.1 BENIGN PROSTATIC HYPERPLASIA WITH WEAK URINARY STREAM: ICD-10-CM

## 2022-06-23 DIAGNOSIS — E78.2 MIXED HYPERLIPIDEMIA: ICD-10-CM

## 2022-06-23 DIAGNOSIS — I35.1 NONRHEUMATIC AORTIC VALVE INSUFFICIENCY: ICD-10-CM

## 2022-06-23 PROCEDURE — 99215 OFFICE O/P EST HI 40 MIN: CPT | Performed by: NURSE PRACTITIONER

## 2022-06-23 PROCEDURE — 1036F TOBACCO NON-USER: CPT | Performed by: NURSE PRACTITIONER

## 2022-06-23 PROCEDURE — 3725F SCREEN DEPRESSION PERFORMED: CPT | Performed by: NURSE PRACTITIONER

## 2022-06-23 PROCEDURE — 1160F RVW MEDS BY RX/DR IN RCRD: CPT | Performed by: NURSE PRACTITIONER

## 2022-06-23 RX ORDER — PREDNISOLONE ACETATE 10 MG/ML
SUSPENSION/ DROPS OPHTHALMIC
COMMUNITY
Start: 2022-06-15

## 2022-06-23 RX ORDER — OFLOXACIN 3 MG/ML
SOLUTION/ DROPS OPHTHALMIC
COMMUNITY
Start: 2022-06-15

## 2022-06-23 NOTE — ASSESSMENT & PLAN NOTE
Patient's blood pressure in the office today is 126/68  The patient continues on his amlodipine and lisinopril

## 2022-06-23 NOTE — PROGRESS NOTES
INTERNAL MEDICINE PRE-OPERATIVE EVALUATION  [facility]    NAME: German Khan  AGE: 64 y o  SEX: male  : 1945     DATE: 2022     Internal Medicine Pre-Operative Evaluation:     Chief Complaint: Pre-operative Evaluation     Surgery: bilateral cataract surgery  Anticipated Date of Surgery: 2022 and 2022  Referring Provider: Gi Everett MD        History of Present Illness:     German Khan is a 64 y o  male who presents to the office today for a preoperative consultation at the request of surgeon, Dr Luiz Hylton, who plans on performing bilateral cataract surgery on 2022 and 2022  Planned anesthesia is regional  Patient has a bleeding risk of: no recent abnormal bleeding  Current anti-platelet/anti-coagulation medications that the patient is prescribed includes: none  Assessment of Chronic Conditions:   1  Moderate obstructive sleep apnea  Assessment & Plan:   Patient with moderate sleep apnea  The patient did have an inspire device implantation approximately 1 year ago and continues to follow with pulmonology  The settings were recently adjusted  He continues on the doxepin 10 mg at bedtime for sleep  2  Chronic obstructive pulmonary disease, unspecified COPD type ([address] )    3  Nonrheumatic aortic valve insufficiency  Assessment & Plan:    Patient was evaluated with an echocardiogram back in 2019 which was normal   The patient does have a systolic murmur  4  Essential hypertension  Assessment & Plan:    Patient's blood pressure in the office today is 126/68  The patient continues on his amlodipine and lisinopril  5  Benign prostatic hyperplasia with weak urinary stream  Assessment & Plan:    Patient continues to follow with urology and does have an upcoming appointment  6  BMI 31 0-31 9,adult  Assessment & Plan:    Patient's BMI is stable  lifestyle modifications recommended      7  Mixed hyperlipidemia  Assessment & Plan:    Patient continues on his lovastatin  8  Age-related cataract of both eyes, unspecified age-related cataract type  Assessment & Plan:   Patient is scheduled for upcoming bilateral cataract surgery in July  Assessment of Cardiac Risk:  Denies unstable or severe angina or MI in the last 6 weeks or history of stent placement in the last year   Denies decompensated heart failure (e g  New onset heart failure, NYHA functional class IV heart failure, or worsening existing heart failure)  Denies significant arrhythmias such as high grade AV block, symptomatic ventricular arrhythmia, newly recognized ventricular tachycardia, supraventricular tachycardia with resting heart rate >100, or symptomatic bradycardia  Denies severe heart valve disease including aortic stenosis or symptomatic mitral stenosis     Exercise Capacity:  Able to walk 4 blocks without symptoms?: Yes  Able to walk 2 flights without symptoms?: Yes    Prior Anesthesia Reactions: No     Personal history of venous thromboembolic disease? No    History of steroid use for >2 weeks within last year? No       Review of Systems:     Review of Systems   Constitutional: Negative  Negative for fatigue  HENT: Negative  Negative for congestion, postnasal drip, rhinorrhea and trouble swallowing  Eyes: Negative  Negative for visual disturbance  Respiratory: Negative  Negative for choking and shortness of breath  Cardiovascular: Negative  Negative for chest pain  Gastrointestinal: Negative  Endocrine: Negative  Genitourinary: Negative  Musculoskeletal: Negative  Negative for arthralgias, back pain, myalgias and neck pain  Skin: Negative  Neurological: Negative for dizziness and headaches  Psychiatric/Behavioral: Negative           Problem List:     Patient Active Problem List   Diagnosis    Chest pain    Essential hypertension    Mixed hyperlipidemia    Chronic pain disorder    Chronic left shoulder pain    Chronic bilateral low back pain with bilateral sciatica    Disc degeneration, lumbar    Lumbar stenosis with neurogenic claudication    Abnormal CT of the abdomen    Chronic pain syndrome    Moderate obstructive sleep apnea    Benign prostatic hyperplasia with lower urinary tract symptoms    Hydronephrosis    Nonrheumatic aortic valve insufficiency    Non-rheumatic mitral regurgitation    Diastolic dysfunction    Nephrolithiasis    Squamous cell carcinoma in situ (SCCIS) of scalp    Actinic keratosis    Chronic obstructive pulmonary disease (HCC)    Megaloblastic red blood cells    BMI 31 0-31 9,adult    Class 1 obesity without serious comorbidity with body mass index (BMI) of 31 0 to 31 9 in adult    Insomnia    Age-related cataract of both eyes        Allergies:     No Known Allergies     Current Medications:       Current Outpatient Medications:     acetaminophen (TYLENOL) 500 mg tablet, Take 1,000 mg by mouth every 6 (six) hours as needed for mild pain, Disp: , Rfl:     amLODIPine (NORVASC) 10 mg tablet, Take 1 tablet (10 mg total) by mouth every morning, Disp: 90 tablet, Rfl: 3    doxepin (SINEquan) 10 mg capsule, Take 1 capsule (10 mg total) by mouth daily at bedtime as needed for sleep, Disp: 30 capsule, Rfl: 3    fenofibrate (TRICOR) 145 mg tablet, Take 1 tablet (145 mg total) by mouth daily, Disp: 90 tablet, Rfl: 0    ketoconazole (NIZORAL) 2 % cream, Apply topically 2 (two) times a day To facial rash repeat as needed treat for 3 weeks, Disp: 30 g, Rfl: 3    lisinopril (ZESTRIL) 40 mg tablet, Take 1 tablet (40 mg total) by mouth daily, Disp: 90 tablet, Rfl: 1    loperamide (IMODIUM A-D) 2 MG tablet, Take 1 tablet by mouth daily as needed, Disp: , Rfl:     lovastatin (MEVACOR) 40 MG tablet, Take 1 tablet (40 mg total) by mouth daily, Disp: 90 tablet, Rfl: 1    Multiple Vitamins-Minerals (CENTRUM ULTRA MENS PO), Take 1 tablet by mouth daily, Disp: , Rfl:     omeprazole (PriLOSEC) 20 mg delayed release capsule, Take 1 capsule (20 mg total) by mouth daily, Disp: 90 capsule, Rfl: 1    prednisoLONE acetate (PRED FORTE) 1 % ophthalmic suspension, Not yet using  Patient has to use drops 3 days before surgery 07/07/22, Disp: , Rfl:     ofloxacin (OCUFLOX) 0 3 % ophthalmic solution, Not yet using  Patient has to use drops 3 days before surgery 07/07/22, Disp: , Rfl:      Past History:     Past Medical History:   Diagnosis Date    Abnormal stress test     Angina at rest [facility])     no issues currently 5/2021    Apnea     Carpal tunnel syndrome on left     Chest pain     CPAP (continuous positive airway pressure) dependence     uses CPAP    Diverticulitis     GERD (gastroesophageal reflux disease)     History of transfusion     childhood    Hypercholesterolemia     Hypercholesterolemia     Hyperlipidemia     Hypertension     Joint pain     Right Shoulder    Kidney stone     Mitral valve disorder     Neck pain     Sleep apnea     Sleep apnea, obstructive     Thoracic neuritis         Past Surgical History:   Procedure Laterality Date    COLONOSCOPY      Complete    CORONARY ANGIOPLASTY  2017    ESOPHAGOGASTRODUODENOSCOPY      Diagnostic    FL RETROGRADE PYELOGRAM  5/24/2019    FOOT SURGERY Left     tarsal tunnel    HERNIA REPAIR      KNEE ARTHROSCOPY Left     KNEE SURGERY Left     NEUROPLASTY / TRANSPOSITION MEDIAN NERVE AT CARPAL TUNNEL      VT COLONOSCOPY FLX DX W/COLLJ SPEC WHEN PFRMD N/A 4/10/2018    Procedure: EGD AND COLONOSCOPY;  Surgeon: Jose Montilla MD;  Location: [facility];   Service: Gastroenterology    VT CYSTO/URETERO W/LITHOTRIPSY &INDWELL STENT INSRT Right 5/24/2019    Procedure: CYSTOSCOPY URETEROSCOPY WITH LITHOTRIPSY HOLMIUM LASER, RETROGRADE PYELOGRAM AND INSERTION STENT URETERAL;  Surgeon: Yvrose Hidalgo MD;  Location[facility];  Service: Urology    VT CYSTOURETHRO W/IMPLANT N/A 8/30/2018    Procedure: CYSTOSCOPY WITH INSERTION UROLIFT;  Surgeon: Chepe Holder MD;  [facility];  Service: Urology    MT INCISION IMPLANT CRANIAL NERVE STIM ELECTRODE/PULSE GEN N/A 2021    Procedure: INSERTION UPPER AIRWAY STIMULATOR, INSPIRE IMPLANT;  Surgeon: Katrin Hogue MD;  Location[facility];  Service: ENT    MT TRANSURETHRAL INCISION OF PROSTATE N/A 2018    Procedure: TRANSURETHRAL INCISION OF PROSTATE (TUIP);   Surgeon: Chepe Holder MD;  [facility];  Service: Urology    ROTATOR CUFF REPAIR Bilateral     SHOULDER SURGERY      TARSAL TUNNEL RELEASE      Neuroplasty Decompression        Family History   Problem Relation Age of Onset    Diabetes Mother         Mellitus    Heart disease Mother         Arteriosclerotic Cardiovascular    Hypertension Mother     Cancer Father     Arthritis Father         Rheu Mult Site W Involv Of Organs And Systems    Parkinsonism Father     Thyroid disease Sister     Diabetes Brother         Diabetes:Mellitus    Heart disease Brother     Hypertension Brother     Coronary artery disease Family     Heart disease Brother     No Known Problems Brother     Kidney disease Sister     Parkinsonism Sister         Social History     Socioeconomic History    Marital status: /Civil Union     Spouse name: Not on file    Number of children: 4    Years of education: high school or GED    Highest education level: Not on file   Occupational History    Occupation: retired   Tobacco Use    Smoking status: Former Smoker     Packs/day: 1 00     Years: 17 00     Pack years: 17 00     Types: Cigarettes     Quit date:      Years since quittin 5    Smokeless tobacco: Never Used   Vaping Use    Vaping Use: Never used   Substance and Sexual Activity    Alcohol use: No    Drug use: No    Sexual activity: Not Currently     Partners: Female   Other Topics Concern    Not on file   Social History Narrative    Active Advance Directives Lives With Spouse             Social Determinants of Health     Financial Resource Strain: Not on file   Food Insecurity: Not on file   Transportation Needs: Not on file   Physical Activity: Not on file   Stress: No Stress Concern Present    Feeling of Stress : Not at all   Social Connections: Not on file   Intimate Partner Violence: Not on file   Housing Stability: Not on file        Physical Exam:      /68 (BP Location: Left arm, Patient Position: Sitting, Cuff Size: Standard)   Pulse 63   Temp 98 °F (36 7 °C) (Temporal) Comment: no nsaids  Resp 17   Ht 5' 2" (1 575 m)   Wt 78 2 kg (172 lb 6 4 oz)   SpO2 97%   BMI 31 53 kg/m²     Physical Exam  Vitals reviewed  Constitutional:       General: He is not in acute distress  Appearance: Normal appearance  He is well-developed  He is obese  HENT:      Head: Normocephalic and atraumatic  Right Ear: Tympanic membrane, ear canal and external ear normal       Left Ear: Tympanic membrane, ear canal and external ear normal       Nose: Nose normal       Mouth/Throat:      Mouth: Mucous membranes are moist       Dentition: Has dentures (upper dentures)  Pharynx: Oropharynx is clear  No oropharyngeal exudate  Eyes:      General:         Right eye: No discharge  Left eye: No discharge  Conjunctiva/sclera: Conjunctivae normal       Pupils: Pupils are equal, round, and reactive to light  Neck:      Thyroid: No thyromegaly  Vascular: No JVD  Trachea: No tracheal deviation  Cardiovascular:      Rate and Rhythm: Normal rate and regular rhythm  Pulses: Normal pulses  Heart sounds: Murmur heard  Pulmonary:      Effort: Pulmonary effort is normal  No respiratory distress  Breath sounds: Normal breath sounds  No wheezing  Abdominal:      General: Bowel sounds are normal  There is no distension  Palpations: Abdomen is soft  There is no mass  Tenderness: There is no abdominal tenderness   There is no guarding or rebound  Hernia: No hernia is present  Musculoskeletal:         General: Normal range of motion  Cervical back: Normal range of motion and neck supple  Lymphadenopathy:      Cervical: No cervical adenopathy  Skin:     General: Skin is warm and dry  Capillary Refill: Capillary refill takes less than 2 seconds  Neurological:      General: No focal deficit present  Mental Status: He is alert and oriented to person, place, and time  Psychiatric:         Mood and Affect: Mood normal          Behavior: Behavior normal          Thought Content: Thought content normal          Judgment: Judgment normal            Data:     Pre-operative work-up    Laboratory Results: I have personally reviewed the pertinent laboratory results/reports  and recent labs stable           Plan:     64 y o  male with planned surgery: bilateral cataract surgery  1  Further preoperative workup as follows:   - None; no further preoperative work-up is required    2  Medication Management/Recommendations:   - Patient has been instructed to avoid herbs or non-directed vitamins the week prior to surgery to ensure no drug interactions with perioperative surgical and anesthetic medications  - Patient has been instructed to avoid aspirin containing medications or non-steroidal anti-inflammatory drugs for the week preceding surgery    -surgeon office to advise patient on medications the day of surgery    3  Prophylaxis for cardiac events with perioperative beta-blockers: not indicated  4  Patient requires further consultation with: None    Clearance  Patient is CLEARED for surgery without any additional cardiac testing       [facility] [address] [address]  Phone#  [phone]  Fax#  [phone]

## 2022-06-23 NOTE — ASSESSMENT & PLAN NOTE
Patient was evaluated with an echocardiogram back in 2019 which was normal   The patient does have a systolic murmur

## 2022-06-23 NOTE — ASSESSMENT & PLAN NOTE
Patient with moderate sleep apnea  The patient did have an inspire device implantation approximately 1 year ago and continues to follow with pulmonology  The settings were recently adjusted  He continues on the doxepin 10 mg at bedtime for sleep

## 2022-06-26 DIAGNOSIS — K21.9 CHRONIC GERD: ICD-10-CM

## 2022-06-26 DIAGNOSIS — E78.2 MIXED HYPERLIPIDEMIA: ICD-10-CM

## 2022-06-26 DIAGNOSIS — I10 ESSENTIAL HYPERTENSION: ICD-10-CM

## 2022-06-26 RX ORDER — LISINOPRIL 40 MG/1
TABLET ORAL
Qty: 90 TABLET | Refills: 1 | Status: SHIPPED | OUTPATIENT
Start: 2022-06-26

## 2022-06-26 RX ORDER — LOVASTATIN 40 MG/1
TABLET ORAL
Qty: 90 TABLET | Refills: 1 | Status: SHIPPED | OUTPATIENT
Start: 2022-06-26

## 2022-06-26 RX ORDER — OMEPRAZOLE 20 MG/1
CAPSULE, DELAYED RELEASE ORAL
Qty: 90 CAPSULE | Refills: 1 | Status: SHIPPED | OUTPATIENT
Start: 2022-06-26

## 2022-06-29 NOTE — PROGRESS NOTES
6/30/2022      Chief Complaint   Patient presents with    Follow-up     Assessment and Plan    1  BPH s/p Urolift and TUIP 8/30/2018   - No complaints  - PVR=7   - Uroflow: Peak flow 13, average flow 5 6, voided volume 201    2  Nephrolithiasis s/p ureteroscopy 5/24/2019  - KUB - 5 mm right lower pole renal calculus, stable since 2020    - US - stable 5 mm right lower pole calculus  No additional calculi  9 mm simple cyst in right kidney  Bilateral ureteral jets detected  No hydronephrosis  - Continue dietary modifications and proper hydration to prevent future kidney stone formation      - Given stable and small stone burden as well as lack of urinary issues, patient can f/u PRN at this time  Can contact our office for any signs and symptoms of stone passage or bothersome LUTS  History of Present Illness  Eladio Price is a 64 y o  male here for follow up evaluation of  BPH and nephrolithiasis  Patient underwent UroLift with concomitant TUIP in August of 2018  He has done well since that time  He also has history of nephrolithiasis and required ureteroscopic intervention in 2019  He presents today for imaging review which remains stable revealing a 5 mm right lower pole calculus  No obstructive uropathy  He denies any flank pain or urinary complaints  Review of Systems   Constitutional: Negative for chills and fever  Respiratory: Negative for shortness of breath  Cardiovascular: Negative for chest pain  Gastrointestinal: Negative for abdominal pain  Genitourinary: Negative for difficulty urinating, dysuria, flank pain, frequency, hematuria and urgency  Neurological: Negative for dizziness                    Past Medical History  Past Medical History:   Diagnosis Date    Abnormal stress test     Angina at rest [facility])     no issues currently 5/2021    Apnea     Carpal tunnel syndrome on left     Chest pain     CPAP (continuous positive airway pressure) dependence     uses CPAP    Diverticulitis     GERD (gastroesophageal reflux disease)     History of transfusion     childhood    Hypercholesterolemia     Hypercholesterolemia     Hyperlipidemia     Hypertension     Joint pain     Right Shoulder    Kidney stone     Mitral valve disorder     Neck pain     Sleep apnea     Sleep apnea, obstructive     Thoracic neuritis        Past Social History  Past Surgical History:   Procedure Laterality Date    COLONOSCOPY      Complete    CORONARY ANGIOPLASTY  2017    ESOPHAGOGASTRODUODENOSCOPY      Diagnostic    FL RETROGRADE PYELOGRAM  5/24/2019    FOOT SURGERY Left     tarsal tunnel    HERNIA REPAIR      KNEE ARTHROSCOPY Left     KNEE SURGERY Left     NEUROPLASTY / TRANSPOSITION MEDIAN NERVE AT CARPAL TUNNEL      NV COLONOSCOPY FLX DX W/COLLJ SPEC WHEN PFRMD N/A 4/10/2018    Procedure: EGD AND COLONOSCOPY;  Surgeon: Stacey Harvey MD;  Location: [facility]; Service: Gastroenterology    NV CYSTO/URETERO W/LITHOTRIPSY &INDWELL STENT INSRT Right 5/24/2019    Procedure: CYSTOSCOPY URETEROSCOPY WITH LITHOTRIPSY HOLMIUM LASER, RETROGRADE PYELOGRAM AND INSERTION STENT URETERAL;  Surgeon: Siva Sebastian MD;  [facility];  Service: Urology    NV CYSTOURETHRO W/IMPLANT N/A 8/30/2018    Procedure: CYSTOSCOPY WITH INSERTION Medhat Paredesin;  Surgeon: Siva Sebastian MD;  [facility];  Service: Urology    NV INCISION IMPLANT CRANIAL NERVE STIM ELECTRODE/PULSE GEN N/A 5/13/2021    Procedure: INSERTION UPPER AIRWAY STIMULATOR, INSPIRE IMPLANT;  Surgeon: Taty Parks MD;  Location[facility];  Service: ENT    NV TRANSURETHRAL INCISION OF PROSTATE N/A 8/30/2018    Procedure: TRANSURETHRAL INCISION OF PROSTATE (TUIP);   Surgeon: Siva Sebastian MD;  [facility];  Service: Urology    ROTATOR CUFF REPAIR Bilateral     SHOULDER SURGERY      TARSAL TUNNEL RELEASE      Neuroplasty Decompression     Social History     Tobacco Use   Smoking Status Former Smoker    Packs/day: 1     Years: 17 00    Pack years: 17 00    Types: Cigarettes    Quit date: 1    Years since quittin 5   Smokeless Tobacco Never Used       Past Family History  Family History   Problem Relation Age of Onset    Diabetes Mother         Mellitus    Heart disease Mother         Arteriosclerotic Cardiovascular    Hypertension Mother     Cancer Father     Arthritis Father         [facility]to [facility] W Involv Of Organs And Systems    Parkinsonism Father     Thyroid disease Sister     Diabetes Brother         Diabetes:Mellitus    Heart disease Brother     Hypertension Brother     Coronary artery disease Family     Heart disease Brother     No Known Problems Brother     Kidney disease Sister     Parkinsonism Sister        Past Social history  Social History     Socioeconomic History    Marital status: /Civil Union     Spouse name: Not on file    Number of children: 3    Years of education: high school or GED    Highest education level: Not on file   Occupational History    Occupation: retired   Tobacco Use    Smoking status: Former Smoker     Packs/day:      Years: 17      Pack years: 17 00     Types: Cigarettes     Quit date:      Years since quittin 5    Smokeless tobacco: Never Used   Vaping Use    Vaping Use: Never used   Substance and Sexual Activity    Alcohol use: No    Drug use: No    Sexual activity: Not Currently     Partners: Female   Other Topics Concern    Not on file   Social History Narrative    Active Advance Directives    Lives With Spouse             Social Determinants of Health     Financial Resource Strain: Not on file   Food Insecurity: Not on file   Transportation Needs: Not on file   Physical Activity: Not on file   Stress: No Stress Concern Present    Feeling of Stress : Not at all   Social Connections: Not on file   Intimate Partner Violence: Not on file   Housing Stability: Not on file       Current Medications  Current Outpatient Medications   Medication Sig Dispense Refill    acetaminophen (TYLENOL) 500 mg tablet Take 1,000 mg by mouth every 6 (six) hours as needed for mild pain      amLODIPine (NORVASC) 10 mg tablet Take 1 tablet (10 mg total) by mouth every morning 90 tablet 3    doxepin (SINEquan) 10 mg capsule Take 1 capsule (10 mg total) by mouth daily at bedtime as needed for sleep 30 capsule 3    fenofibrate (TRICOR) 145 mg tablet Take 1 tablet (145 mg total) by mouth daily 90 tablet 0    ketoconazole (NIZORAL) 2 % cream Apply topically 2 (two) times a day To facial rash repeat as needed treat for 3 weeks 30 g 3    lisinopril (ZESTRIL) 40 mg tablet TAKE ONE TABLET BY MOUTH EVERY DAY 90 tablet 1    loperamide (IMODIUM A-D) 2 MG tablet Take 1 tablet by mouth daily as needed      lovastatin (MEVACOR) 40 MG tablet TAKE ONE TABLET BY MOUTH EVERY DAY 90 tablet 1    Multiple Vitamins-Minerals (CENTRUM ULTRA MENS PO) Take 1 tablet by mouth daily      omeprazole (PriLOSEC) 20 mg delayed release capsule TAKE ONE CAPSULE BY MOUTH EVERY DAY 90 capsule 1    ofloxacin (OCUFLOX) 0 3 % ophthalmic solution Not yet using  Patient has to use drops 3 days before surgery 07/07/22      prednisoLONE acetate (PRED FORTE) 1 % ophthalmic suspension Not yet using  Patient has to use drops 3 days before surgery 07/07/22       No current facility-administered medications for this visit  Allergies  No Known Allergies      The following portions of the patient's history were reviewed and updated as appropriate: allergies, current medications, past medical history, past social history, past surgical history and problem list       Vitals  Vitals:    06/30/22 1325   BP: 136/70   Pulse: 72   SpO2: 99%   Weight: 78 kg (172 lb)   Height: 5' 2" (1 575 m)           Physical Exam  Physical Exam  Constitutional:       Appearance: Normal appearance  HENT:      Head: Normocephalic and atraumatic        Right Ear: External ear normal       Left Ear: External ear normal    Eyes:      General: No scleral icterus  Conjunctiva/sclera: Conjunctivae normal    Cardiovascular:      Pulses: Normal pulses  Pulmonary:      Effort: Pulmonary effort is normal    Musculoskeletal:         General: Normal range of motion  Cervical back: Normal range of motion  Neurological:      General: No focal deficit present  Mental Status: He is alert and oriented to person, place, and time  Psychiatric:         Mood and Affect: Mood normal          Behavior: Behavior normal          Thought Content:  Thought content normal          Judgment: Judgment normal            Results  Recent Results (from the past 1 hour(s))   POCT Measure PVR    Collection Time: 06/30/22  1:31 PM   Result Value Ref Range    POST-VOID RESIDUAL VOLUME, ML POC 7 mL   ]  Lab Results   Component Value Date    PSA 0 4 02/15/2018    PSA 0 5 12/10/2015    PSA 0 78 12/03/2014     Lab Results   Component Value Date    GLUCOSE 100 10/29/2015    CALCIUM 9 6 05/31/2022     10/29/2015    K 4 2 05/31/2022    CO2 27 05/31/2022     05/31/2022    BUN 18 05/31/2022    CREATININE 0 99 05/31/2022     Lab Results   Component Value Date    WBC 6 23 05/31/2022    HGB 14 9 05/31/2022    HCT 44 3 05/31/2022     (H) 05/31/2022     05/31/2022           Orders  Orders Placed This Encounter   Procedures    POCT Measure PVR     Miguel A Espinal PA-C

## 2022-06-30 ENCOUNTER — OFFICE VISIT (OUTPATIENT)
Dept: UROLOGY | Facility: CLINIC | Age: 77
End: 2022-06-30
Payer: COMMERCIAL

## 2022-06-30 VITALS
DIASTOLIC BLOOD PRESSURE: 70 MMHG | HEART RATE: 72 BPM | WEIGHT: 172 LBS | BODY MASS INDEX: 31.65 KG/M2 | SYSTOLIC BLOOD PRESSURE: 136 MMHG | HEIGHT: 62 IN | OXYGEN SATURATION: 99 %

## 2022-06-30 DIAGNOSIS — N40.1 BENIGN PROSTATIC HYPERPLASIA WITH WEAK URINARY STREAM: Primary | ICD-10-CM

## 2022-06-30 DIAGNOSIS — R39.12 BENIGN PROSTATIC HYPERPLASIA WITH WEAK URINARY STREAM: Primary | ICD-10-CM

## 2022-06-30 LAB — POST-VOID RESIDUAL VOLUME, ML POC: 7 ML

## 2022-06-30 PROCEDURE — 51798 US URINE CAPACITY MEASURE: CPT | Performed by: PHYSICIAN ASSISTANT

## 2022-06-30 PROCEDURE — 99213 OFFICE O/P EST LOW 20 MIN: CPT | Performed by: PHYSICIAN ASSISTANT

## 2022-07-20 ENCOUNTER — TELEPHONE (OUTPATIENT)
Dept: INTERNAL MEDICINE CLINIC | Facility: CLINIC | Age: 77
End: 2022-07-20

## 2022-07-20 DIAGNOSIS — E78.2 MIXED HYPERLIPIDEMIA: Primary | ICD-10-CM

## 2022-07-20 NOTE — TELEPHONE ENCOUNTER
Pt had blood work completed may  Only additional study needed is cholesterol  That has been ordered

## 2022-07-20 NOTE — TELEPHONE ENCOUNTER
Pt has an appt with dr Stew Devine on 08/11/22, would like to know if he needs labs done before his appt

## 2022-07-29 ENCOUNTER — RA CDI HCC (OUTPATIENT)
Dept: OTHER | Facility: HOSPITAL | Age: 77
End: 2022-07-29

## 2022-07-29 NOTE — PROGRESS NOTES
[facility]  coding opportunities       Chart reviewed, no opportunity found:   [address]        Patients Insurance     Medicare Insurance: [employer]

## 2022-08-05 ENCOUNTER — APPOINTMENT (OUTPATIENT)
Dept: LAB | Facility: CLINIC | Age: 77
End: 2022-08-05
Payer: COMMERCIAL

## 2022-08-05 DIAGNOSIS — E78.2 MIXED HYPERLIPIDEMIA: ICD-10-CM

## 2022-08-05 LAB
CHOLEST SERPL-MCNC: 172 MG/DL
HDLC SERPL-MCNC: 51 MG/DL
LDLC SERPL CALC-MCNC: 95 MG/DL (ref 0–100)
TRIGL SERPL-MCNC: 130 MG/DL

## 2022-08-05 PROCEDURE — 36415 COLL VENOUS BLD VENIPUNCTURE: CPT

## 2022-08-05 PROCEDURE — 80061 LIPID PANEL: CPT

## 2022-08-08 ENCOUNTER — TELEPHONE (OUTPATIENT)
Dept: INTERNAL MEDICINE CLINIC | Facility: CLINIC | Age: 77
End: 2022-08-08

## 2022-08-08 NOTE — TELEPHONE ENCOUNTER
----- Message from Ferny Keene DO sent at 8/8/2022  8:17 AM EDT -----  Cholesterol levels are normal

## 2022-08-11 ENCOUNTER — OFFICE VISIT (OUTPATIENT)
Dept: INTERNAL MEDICINE CLINIC | Facility: CLINIC | Age: 77
End: 2022-08-11
Payer: COMMERCIAL

## 2022-08-11 VITALS
RESPIRATION RATE: 20 BRPM | OXYGEN SATURATION: 96 % | TEMPERATURE: 98.3 F | SYSTOLIC BLOOD PRESSURE: 124 MMHG | BODY MASS INDEX: 31.83 KG/M2 | HEART RATE: 62 BPM | HEIGHT: 62 IN | DIASTOLIC BLOOD PRESSURE: 60 MMHG | WEIGHT: 173 LBS

## 2022-08-11 DIAGNOSIS — G47.00 INSOMNIA, UNSPECIFIED TYPE: ICD-10-CM

## 2022-08-11 DIAGNOSIS — F11.20 UNCOMPLICATED OPIOID DEPENDENCE (HCC): ICD-10-CM

## 2022-08-11 DIAGNOSIS — K57.90 DIVERTICULOSIS: ICD-10-CM

## 2022-08-11 DIAGNOSIS — J44.9 CHRONIC OBSTRUCTIVE PULMONARY DISEASE, UNSPECIFIED COPD TYPE (HCC): ICD-10-CM

## 2022-08-11 DIAGNOSIS — H25.9 AGE-RELATED CATARACT OF BOTH EYES, UNSPECIFIED AGE-RELATED CATARACT TYPE: ICD-10-CM

## 2022-08-11 DIAGNOSIS — I10 ESSENTIAL HYPERTENSION: Primary | ICD-10-CM

## 2022-08-11 DIAGNOSIS — G47.33 MODERATE OBSTRUCTIVE SLEEP APNEA: ICD-10-CM

## 2022-08-11 PROBLEM — R07.9 CHEST PAIN: Status: RESOLVED | Noted: 2017-04-22 | Resolved: 2022-08-11

## 2022-08-11 PROCEDURE — 3725F SCREEN DEPRESSION PERFORMED: CPT | Performed by: FAMILY MEDICINE

## 2022-08-11 PROCEDURE — 1160F RVW MEDS BY RX/DR IN RCRD: CPT | Performed by: FAMILY MEDICINE

## 2022-08-11 PROCEDURE — 3078F DIAST BP <80 MM HG: CPT | Performed by: FAMILY MEDICINE

## 2022-08-11 PROCEDURE — 99214 OFFICE O/P EST MOD 30 MIN: CPT | Performed by: FAMILY MEDICINE

## 2022-08-11 PROCEDURE — 3074F SYST BP LT 130 MM HG: CPT | Performed by: FAMILY MEDICINE

## 2022-08-11 NOTE — PROGRESS NOTES
FOLLOW-UP OFFICE VISIT  [facility]    NAME: Lakeisha Osborn  AGE: 76 y o  SEX: male  : 1945     DATE: 2022     Assessment and Plan:     1  Essential hypertension-well controlled on lisinopril 40mg qd and amlodipine 10mg qd  Pt to continue  2  Chronic obstructive pulmonary disease, unspecified COPD type (HCC)-stable  No recent exacerbations  Doesn't require PRN albuterol  3  Moderate obstructive sleep apnea-well controlled with inspire cpap  4  Age-related cataract of both eyes, unspecified age-related cataract type-resolved  S/p removal     5  Insomnia, unspecified type-improved with doxepin 10mg qhs  Pt to continue     6  Diverticulosis-stable  No recent exacerbations  Fiber rich diet reviewed with patient  7  Uncomplicated opioid dependence (HCC)-resolved  No longer taking opiate therapy  Depression Screening and Follow-up Plan: Patient was screened for depression during today's encounter  They screened negative with a PHQ-2 score of 0  No follow-ups on file  Chief Complaint:     Chief Complaint   Patient presents with    6 month check     Now new complaints has some procedures done recently, denies any pains        History of Present Illness:   Presents for f/u  No issues  Recently had b/l cataract surgery and dental implants placed  Feeling good  Not using daily opiates  Sleep still much improved since inspire placement  Review of Systems:     Review of Systems   Constitutional: Negative for fatigue and fever  Eyes: Positive for visual disturbance (improved)  Respiratory: Negative for shortness of breath  Cardiovascular: Negative for chest pain  Gastrointestinal: Positive for diarrhea  Negative for constipation  Genitourinary: Negative for frequency  Musculoskeletal: Negative for gait problem          Problem List:     Patient Active Problem List   Diagnosis    Chest pain    Essential hypertension    Mixed hyperlipidemia    Chronic pain disorder    Chronic left shoulder pain    Chronic bilateral low back pain with bilateral sciatica    Disc degeneration, lumbar    Lumbar stenosis with neurogenic claudication    Abnormal CT of the abdomen    Chronic pain syndrome    Moderate obstructive sleep apnea    Benign prostatic hyperplasia with lower urinary tract symptoms    Hydronephrosis    Nonrheumatic aortic valve insufficiency    Non-rheumatic mitral regurgitation    Diastolic dysfunction    Nephrolithiasis    Squamous cell carcinoma in situ (SCCIS) of scalp    Actinic keratosis    Chronic obstructive pulmonary disease (HCC)    Megaloblastic red blood cells    BMI 31 0-31 9,adult    Class 1 obesity without serious comorbidity with body mass index (BMI) of 31 0 to 31 9 in adult    Insomnia    Age-related cataract of both eyes        Objective:     /60 (BP Location: Left arm, Patient Position: Sitting, Cuff Size: Large)   Pulse 62   Temp 98 3 °F (36 8 °C) (Tympanic)   Resp 20   Ht 5' 2" (1 575 m)   Wt 78 5 kg (173 lb)   SpO2 96%   BMI 31 64 kg/m²     Physical Exam  HENT:      Head: Normocephalic and atraumatic  Right Ear: External ear normal       Left Ear: External ear normal    Eyes:      Extraocular Movements: Extraocular movements intact  Conjunctiva/sclera: Conjunctivae normal       Pupils: Pupils are equal, round, and reactive to light  Cardiovascular:      Rate and Rhythm: Normal rate and regular rhythm  Heart sounds: No murmur heard  Pulmonary:      Effort: Pulmonary effort is normal       Breath sounds: Normal breath sounds  Abdominal:      General: Bowel sounds are normal       Palpations: Abdomen is soft  Tenderness: There is no abdominal tenderness  Musculoskeletal:      Right lower leg: No edema  Left lower leg: No edema  Neurological:      Mental Status: He is alert and oriented to person, place, and time        Gait: Gait normal    Psychiatric:         Mood and Affect: Mood normal          Behavior: Behavior normal          Pertinent Laboratory/Diagnostic Studies:    Laboratory Results: I have personally reviewed the pertinent laboratory results/reports     Chemistry Profile:   Results from Last 12 Months   Lab Units 05/31/22  0748   POTASSIUM mmol/L 4 2   CHLORIDE mmol/L 108   CO2 mmol/L 27   BUN mg/dL 18   CREATININE mg/dL 0 99   GLUCOSE FASTING mg/dL 93   CALCIUM mg/dL 9 6   AST U/L 22   ALT U/L 23   ALK PHOS U/L 78   EGFR ml/min/1 73sq m 72     Endocrine Studies:   Results from Last 12 Months   Lab Units 08/05/22  0719   TRIGLYCERIDES mg/dL 130   CHOLESTEROL mg/dL 172   HDL mg/dL 51   LDL CALC mg/dL 95       Radiology/Other Diagnostic Testing Results: I have personally reviewed pertinent reports          Danny DEE [facility]  8/11/2022 3:24 PM

## 2022-08-16 PROBLEM — F11.20 UNCOMPLICATED OPIOID DEPENDENCE (HCC): Status: ACTIVE | Noted: 2022-08-16

## 2022-09-16 ENCOUNTER — TELEPHONE (OUTPATIENT)
Dept: INTERNAL MEDICINE CLINIC | Facility: CLINIC | Age: 77
End: 2022-09-16

## 2022-09-16 DIAGNOSIS — E78.2 MIXED HYPERLIPIDEMIA: ICD-10-CM

## 2022-09-16 RX ORDER — FENOFIBRATE 145 MG/1
145 TABLET, COATED ORAL DAILY
Qty: 90 TABLET | Refills: 0 | OUTPATIENT
Start: 2022-09-16 | End: 2022-12-15

## 2022-09-16 RX ORDER — FENOFIBRATE 145 MG/1
TABLET, COATED ORAL
Qty: 90 TABLET | Refills: 0 | Status: SHIPPED | OUTPATIENT
Start: 2022-09-16

## 2022-09-16 NOTE — TELEPHONE ENCOUNTER
Pt needs his rx for fenobrate  I see it has Leah's name attached to it  He is a [facility] pt  Just FYI  I didn't know if having Osbaldo name would slow down the RX process

## 2022-10-14 ENCOUNTER — APPOINTMENT (EMERGENCY)
Dept: RADIOLOGY | Facility: HOSPITAL | Age: 77
End: 2022-10-14
Payer: COMMERCIAL

## 2022-10-14 ENCOUNTER — HOSPITAL ENCOUNTER (EMERGENCY)
Facility: HOSPITAL | Age: 77
Discharge: HOME | End: 2022-10-14
Attending: EMERGENCY MEDICINE
Payer: COMMERCIAL

## 2022-10-14 ENCOUNTER — APPOINTMENT (EMERGENCY)
Dept: CT IMAGING | Facility: HOSPITAL | Age: 77
End: 2022-10-14
Payer: COMMERCIAL

## 2022-10-14 VITALS
RESPIRATION RATE: 19 BRPM | DIASTOLIC BLOOD PRESSURE: 60 MMHG | HEIGHT: 64 IN | SYSTOLIC BLOOD PRESSURE: 137 MMHG | OXYGEN SATURATION: 96 % | WEIGHT: 173 LBS | HEART RATE: 68 BPM | BODY MASS INDEX: 29.53 KG/M2 | TEMPERATURE: 98.5 F

## 2022-10-14 DIAGNOSIS — M25.512 LEFT SHOULDER PAIN: ICD-10-CM

## 2022-10-14 DIAGNOSIS — S01.81XA FACIAL LACERATION, INITIAL ENCOUNTER: ICD-10-CM

## 2022-10-14 DIAGNOSIS — S09.90XA CLOSED HEAD INJURY, INITIAL ENCOUNTER: Primary | ICD-10-CM
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
## 2022-10-14 DIAGNOSIS — M54.2 NECK PAIN: ICD-10-CM

## 2022-10-14 PROCEDURE — 99284 EMERGENCY DEPT VISIT MOD MDM: CPT

## 2022-10-14 PROCEDURE — 90471 IMMUNIZATION ADMIN: CPT

## 2022-10-14 PROCEDURE — 70450 CT HEAD/BRAIN W/O DYE: CPT

## 2022-10-14 PROCEDURE — 72125 CT NECK SPINE W/O DYE: CPT

## 2022-10-14 PROCEDURE — 12011 RPR F/E/E/N/L/M 2.5 CM/<: CPT | Performed by: EMERGENCY MEDICINE

## 2022-10-14 PROCEDURE — 99284 EMERGENCY DEPT VISIT MOD MDM: CPT | Performed by: EMERGENCY MEDICINE

## 2022-10-14 PROCEDURE — G1004 CDSM NDSC: HCPCS

## 2022-10-14 PROCEDURE — 73030 X-RAY EXAM OF SHOULDER: CPT

## 2022-10-14 PROCEDURE — 71045 X-RAY EXAM CHEST 1 VIEW: CPT

## 2022-10-14 PROCEDURE — 90715 TDAP VACCINE 7 YRS/> IM: CPT | Performed by: EMERGENCY MEDICINE

## 2022-10-14 RX ORDER — LIDOCAINE HYDROCHLORIDE AND EPINEPHRINE 20; 5 MG/ML; UG/ML
1 INJECTION, SOLUTION EPIDURAL; INFILTRATION; INTRACAUDAL; PERINEURAL ONCE
Status: COMPLETED | OUTPATIENT
Start: 2022-10-14 | End: 2022-10-14

## 2022-10-14 RX ADMIN — LIDOCAINE HYDROCHLORIDE AND EPINEPHRINE 1 ML: 20; 5 INJECTION, SOLUTION EPIDURAL; INFILTRATION; INTRACAUDAL; PERINEURAL at 15:59

## 2022-10-14 RX ADMIN — TETANUS TOXOID, REDUCED DIPHTHERIA TOXOID AND ACELLULAR PERTUSSIS VACCINE, ADSORBED 0.5 ML: 5; 2.5; 8; 8; 2.5 SUSPENSION INTRAMUSCULAR at 15:59

## 2022-10-17 ENCOUNTER — RA CDI HCC (OUTPATIENT)
Dept: OTHER | Facility: HOSPITAL | Age: 77
End: 2022-10-17

## 2022-10-17 NOTE — PROGRESS NOTES
[facility]  coding opportunities       Chart reviewed, no opportunity found:   [address]        Patients Insurance     Medicare Insurance: [employer]

## 2022-10-19 ENCOUNTER — OFFICE VISIT (OUTPATIENT)
Dept: INTERNAL MEDICINE CLINIC | Facility: CLINIC | Age: 77
End: 2022-10-19
Payer: COMMERCIAL

## 2022-10-19 VITALS
HEIGHT: 64 IN | DIASTOLIC BLOOD PRESSURE: 64 MMHG | OXYGEN SATURATION: 98 % | HEART RATE: 62 BPM | SYSTOLIC BLOOD PRESSURE: 120 MMHG | BODY MASS INDEX: 29.74 KG/M2 | WEIGHT: 174.2 LBS
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
## 2022-10-19 DIAGNOSIS — M48.02 CERVICAL STENOSIS OF SPINE: ICD-10-CM

## 2022-10-19 DIAGNOSIS — S43.102S SEPARATION OF LEFT ACROMIOCLAVICULAR JOINT, SEQUELA: ICD-10-CM

## 2022-10-19 DIAGNOSIS — S01.112S: Primary | ICD-10-CM

## 2022-10-19 PROCEDURE — 99214 OFFICE O/P EST MOD 30 MIN: CPT | Performed by: FAMILY MEDICINE

## 2022-10-19 NOTE — PROGRESS NOTES
FOLLOW-UP OFFICE VISIT  [facility] [address] [place] [address]    NAME: Israel Mei  AGE: 76 y o  SEX: male  : 1945     DATE: 10/19/2022     Assessment and Plan:     Problem List Items Addressed This Visit    None     Visit Diagnoses     Laceration of left periocular area without foreign body, sequela    -  Primary    Fall, sequela        Separation of left acromioclavicular joint, sequela        Relevant Orders    Ambulatory Referral to Orthopedic Surgery    Cervical stenosis of spine        Relevant Orders    Ambulatory Referral to Orthopedic Surgery      3 suture removed w/o issue  Pt to fu with ortho for left AC joint separation and severe cervical stenosis  Return if symptoms worsen or fail to improve  Chief Complaint:     Chief Complaint   Patient presents with   • Suture / Staple Removal     Left eye brow  History of Present Illness:     Presents for suture removal  3 placed in ER s/p fall and head lac 5 days ago  Fall was mechanical  No presyncopal symptoms  reviewed CT head/neck and shoulder imaging with pt  Suture removal    Date/Time: 10/19/2022 8:40 AM  Performed by: Rae Diaz DO  Authorized by: Rae Diaz DO   Universal Protocol:  Consent: Verbal consent obtained  Consent given by: patient  Timeout called at: 10/19/2022 8:41 AM   Patient understanding: patient states understanding of the procedure being performed  Relevant documents: relevant documents present and verified  Patient identity confirmed: verbally with patient        Patient location:  Clinic  Location:     Laterality:  Left    Location:  [address] [facility] location:  Eyebrow    Eyebrow location:  L eyebrow  Procedure details: Tools used:  Suture removal kit    Wound appearance:  No sign(s) of infection    Number of sutures removed:  3  Post-procedure details:     Post-removal:  No dressing applied    Patient tolerance of procedure:   Tolerated well, no immediate complications           Review of Systems:     Review of Systems   Eyes: Positive for visual disturbance (recent cataract removal )  Musculoskeletal: Positive for arthralgias and neck pain  Neurological: Negative for dizziness  Problem List:     Patient Active Problem List   Diagnosis   • Essential hypertension   • Mixed hyperlipidemia   • Chronic pain disorder   • Chronic left shoulder pain   • Chronic bilateral low back pain with bilateral sciatica   • Disc degeneration, lumbar   • Lumbar stenosis with neurogenic claudication   • Abnormal CT of the abdomen   • Chronic pain syndrome   • Moderate obstructive sleep apnea   • Benign prostatic hyperplasia with lower urinary tract symptoms   • Hydronephrosis   • Nonrheumatic aortic valve insufficiency   • Non-rheumatic mitral regurgitation   • Diastolic dysfunction   • Nephrolithiasis   • Squamous cell carcinoma in situ (SCCIS) of scalp   • Actinic keratosis   • Chronic obstructive pulmonary disease (HCC)   • Megaloblastic red blood cells   • BMI 31 0-31 9,adult   • Class 1 obesity without serious comorbidity with body mass index (BMI) of 31 0 to 31 9 in adult   • Insomnia   • Age-related cataract of both eyes   • Uncomplicated opioid dependence ([facility] [address] )        Objective:     /64 (BP Location: Left arm, Patient Position: Sitting, Cuff Size: Standard)   Pulse 62   Ht 5' 4" (1 626 m)   Wt 79 kg (174 lb 3 2 oz)   SpO2 98%   BMI 29 90 kg/m²     Physical Exam  HENT:      Head: Laceration present  Eyes:      Comments: Left periorbital ecchymosis  Cardiovascular:      Rate and Rhythm: Normal rate  Pulmonary:      Effort: Pulmonary effort is normal    Neurological:      Mental Status: He is alert and oriented to person, place, and time     Psychiatric:         Mood and Affect: Mood normal          Behavior: Behavior normal          Pertinent Laboratory/Diagnostic Studies:      Radiology/Other Diagnostic Testing Results: I have personally reviewed pertinent reports          Alejandra DEE HSP[facility] Les[facility]  10/19/2022 8:58 AM

## 2022-10-25 ENCOUNTER — OFFICE VISIT (OUTPATIENT)
Dept: OBGYN CLINIC | Facility: CLINIC | Age: 77
End: 2022-10-25
Payer: COMMERCIAL

## 2022-10-25 VITALS
SYSTOLIC BLOOD PRESSURE: 148 MMHG | HEART RATE: 66 BPM | HEIGHT: 64 IN | WEIGHT: 173 LBS | DIASTOLIC BLOOD PRESSURE: 72 MMHG | BODY MASS INDEX: 29.53 KG/M2

## 2022-10-25 DIAGNOSIS — M75.102 ROTATOR CUFF TEAR ARTHROPATHY OF LEFT SHOULDER: ICD-10-CM

## 2022-10-25 DIAGNOSIS — M12.812 ROTATOR CUFF TEAR ARTHROPATHY OF LEFT SHOULDER: ICD-10-CM

## 2022-10-25 DIAGNOSIS — M12.812 ROTATOR CUFF ARTHROPATHY OF LEFT SHOULDER: Primary | ICD-10-CM

## 2022-10-25 PROCEDURE — 20610 DRAIN/INJ JOINT/BURSA W/O US: CPT | Performed by: ORTHOPAEDIC SURGERY

## 2022-10-25 PROCEDURE — 99203 OFFICE O/P NEW LOW 30 MIN: CPT | Performed by: ORTHOPAEDIC SURGERY

## 2022-10-25 RX ORDER — TRIAMCINOLONE ACETONIDE 40 MG/ML
80 INJECTION, SUSPENSION INTRA-ARTICULAR; INTRAMUSCULAR
Status: COMPLETED | OUTPATIENT
Start: 2022-10-25 | End: 2022-10-25

## 2022-10-25 RX ORDER — BUPIVACAINE HYDROCHLORIDE 2.5 MG/ML
2 INJECTION, SOLUTION INFILTRATION; PERINEURAL
Status: COMPLETED | OUTPATIENT
Start: 2022-10-25 | End: 2022-10-25

## 2022-10-25 RX ADMIN — TRIAMCINOLONE ACETONIDE 80 MG: 40 INJECTION, SUSPENSION INTRA-ARTICULAR; INTRAMUSCULAR at 10:58

## 2022-10-25 RX ADMIN — BUPIVACAINE HYDROCHLORIDE 2 ML: 2.5 INJECTION, SOLUTION INFILTRATION; PERINEURAL at 10:58

## 2022-10-25 NOTE — PATIENT INSTRUCTIONS
Rotator Cuff Injury Exercises   AMBULATORY CARE:   What you need to know about rotator cuff injury exercises:  Exercises help improve shoulder movement and strength, and decrease pain  Your physical therapist or healthcare provider will tell you when to start doing the exercises  He or she will tell you how often to do them  You will need to start slowly to prevent more injury  You will move through several levels over time as you get stronger and more flexible  Safety guidelines:   Always warm up before you do the exercises  Walk or ride a stationary bike for 5 to 10 minutes to help you warm up  Do not put your arm over your head until directed  You will need to wait until your injury has healed  The movement of some exercises could continue until your arm is over your head  You will need to stop the movement where directed  Stop if you feel pain  You may feel some tight or stiff areas when you start  This should get better as you continue the exercises  You should not feel pain  Pain means you are not ready to do the exercise yet  Stop and call your physical therapist or healthcare provider right away  Always work both rotator cuffs  Even if your injury is only on 1 side, it is important to do each exercise on both sides  This helps prevent injury and maintain balance in your shoulders and back  Posture is important  Your physical therapist or healthcare provider will show you the proper posture for each exercise  You will be shown how to pull your shoulders back and down to engage the correct muscles  Remember not to let your shoulders shrug during an exercise unless it is part of the movement  How to do stretching exercises: You will not feel every exercise in your shoulder area  You may feel some of the stretches in your back, side, or upper arm muscles  You need to work muscles in and around your rotator cuffs and down your arms  This helps stabilize your shoulders   Your physical therapist or healthcare provider will tell you how long to hold each stretch  He or she will also tell you how many times to repeat each stretch during an exercise session  You may be told to do only certain exercises, or to do them in a specific order  The following are general directions to help you remember the exercises you are taught:  Pendulum swings:  Lean forward and rest your arm on a table  Do not round your back or lock your knees during the exercise  Let your other arm hang freely by your side  Gently swing your free arm forward and backward, side to side, and in circles  Crossover arm stretch:  Relax your shoulders  Hold your upper arm with the opposite hand  Pull your arm across your chest until you feel a stretch  Hold the stretch  Return to the starting position  Sleeper stretch:  Lie on your side on a firm, flat surface  Bend the elbow of your top arm 90°  Use your other hand to slowly push your arm down  Stop when you feel a stretch at the back of your shoulder  Hold the stretch  Slowly return to the starting position  Shoulder movement, up and down: This exercise may also be called shoulder extension  Sit in a chair that has a back but no armrests  Raise your arm like you are reaching for the wall in front of you  Continue to raise the arm until it is over your head, or as high as directed  Bring your arm back down to your side  Bring it back as far as possible behind your body  Return your arm to the starting position  Shoulder movement, side to side: These movements may be called flexion, internal rotation, and external rotation  Sit in a chair that has a back but no armrests  Raise your arm to the side and then up over your head as far as directed  Return your arm to your side  Bring your arm across the front of your body and reach for the opposite shoulder  Return your arm to the starting position           Shoulder rolls:  Stand and raise both shoulders toward your ears  Lower them to the starting position  Relax your shoulders  Pull your shoulders back  Then relax them again  Roll your shoulders in a smooth Jackson  Then roll your shoulders in a smooth Jackson in the other direction  Wall reach exercise: This may also be called a flexion stretch  Stand facing a wall  Slowly walk your fingers up the wall until you feel a stretch  Hold the stretch  Return to the starting position  Arm reach exercise:  Lie on your back with your legs straight  Reach your arms like you are trying to touch the ceiling  Reach as high as you can so you feel a stretch in the back of your arms  Hold the stretch  Then lower your arms to your sides  Elbow bends:  Stand with your arms down to your sides  Keep your palm facing forward  Bend your elbow and try to touch your shoulder with your fingertips  Return your arm to the starting position  Up the back stretch:  Stand and put both arms behind your back  Put one hand under the other  Move the bottom hand and slowly push the upper hand up toward your head  You should feel a stretch in the front of your arm and shoulder  Be careful not to push too hard  Hold the stretch  Then return to the starting position  Triceps stretch:  Stand and drop your forearm down your back so your hand is pointing to the ground behind you  Your elbow should be pointing at the ceiling  Take your other hand and place it on your elbow  Gently and slowly push on the elbow until you feel a stretch in the back of your arm  Hold the stretch  Let go of your elbow and relax your arm  You may be shown how to do this stretch with a towel  The towel can be pulled gently with a hand behind your back at waist level  How to do strengthening exercises:  Strengthening exercises may include handheld weights or resistance bands  Your physical therapist or healthcare provider will tell you how much weight or resistance to use   The general guide is to use light weights or low resistance and to do a high number of repetitions  You may be told to do only certain exercises, or to do them in a specific order  The following are general directions to help you remember the exercises you are taught:  Scapular squeeze:  Stand with your arms at your sides  Squeeze your shoulder blades together and hold this position  Then relax the muscles  Keep your shoulder back during the entire exercise  Wall pushups: This exercise is similar to a pushup done on the ground  The goal is to use your back and shoulder muscles to bring your upper body toward and away from the wall  Stand facing a wall  Put your hands on the wall  Bend your elbows to bring your upper body toward the wall  Straighten your arms to return to the starting position  Keep your feet close enough to the wall that you do not take a step when you bend your elbows  Standing row with exercise band:  Wrap the exercise band around a heavy, stable object at waist level  Make loop in the end of the band to create a handle, if needed  Hold the handle or loop and pull the band straight back toward your hip  Keep your shoulder down  Squeeze your shoulder blade  Hold this position  Then slowly return to the starting position  External rotation with arm abducted 90 degrees:  Wrap the exercise band around a heavy, stable object at waist level  Make loop in the end of the band to create a handle, if needed  Stand and hold the handle or loop  Bend your elbow and raise your arm to shoulder height  Keep your arm in this position  Raise your hand like you are pointing at the ceiling  Slowly return to the starting position  You may also be shown how to do this exercise lying down and with a weight  Shoulder abduction with weight:  Stand and hold a weight in your hand with your palm facing your body  Slowly raise your arm to the side with your thumb pointing up   Then raise your arm as far as you can without pain  Hold this position  Then return to the starting position  Shoulder abduction with exercise band:  Wrap the exercise band around a heavy, stable object near your foot  Grab the band  Keep your arm straight  Slowly raise your arm to the side with your thumb pointing up  Then, slowly pull the band as far as you can without pain  Slowly return to the starting position  Shoulder adduction with weight:  Lie on your back on a firm surface  Hold a weight in your hand at your shoulder  Slowly raise your arm toward the ceiling and straighten your elbow  Hold this position  Then slowly return to the starting position  Shoulder adduction with exercise band:  Wrap the exercise band around a heavy, stable object  Stand and face away from where the band is anchored  Hold each end of the band in both hands with your elbows bent  Your elbows should not be behind your body  Keep your arms parallel to the floor and slowly straighten your elbows  Hold this position  Slowly return to the starting position  Call your doctor or physical therapist if:   You have sharp or worsening pain during exercise or at rest     You have questions or concerns about your rotator cuff injury exercises  © Copyright [employer] 2022 Information is for End User's use only and may not be sold, redistributed or otherwise used for commercial purposes  All illustrations and images included in CareNotes® are the copyrighted property of [employer] , Inc  or [facility] Vin Brush   The above information is an  only  It is not intended as medical advice for individual conditions or treatments  Talk to your doctor, nurse or pharmacist before following any medical regimen to see if it is safe and effective for you

## 2022-10-25 NOTE — PROGRESS NOTES
Orthopaedics Office Visit - New Patient Visit    ASSESSMENT/PLAN:    Assessment:   Left shoulder rotator cuff tear arthropathy     Plan:   · X-rays were reviewed   · He was provided with a physician directed HEP, to be performed daily   · Left shoulder subacromial CSI was performed in the office without complication, post injection protocol/expectations were reviewed  · If the subacromial bursa CSI is not significantly beneficial for him, a glenohumeral joint CSI may be performed at his next visit   · Briefly discussed a referral to one of my colleagues to discuss a shoulder replacement   · Follow up in 4 weeks time for re-evaluation     To Do Next Visit:  Re-evaluation, possible glenohumeral joint CSI     _____________________________________________________  CHIEF COMPLAINT:  Chief Complaint   Patient presents with   • Left Shoulder - Pain         SUBJECTIVE:  Eunice Le is a 76 y o  LHD male who presents to the office today for left shoulder pain  He notes pain to the anterior aspect of his left shoulder  Pain can radiate down his biceps  Pain has been chronic for multiple years but has worsened after a fall on 10/14/22  He presented to the ED, at which time x-rays were performed  Shoulder pain will worsen when laying on the shoulder or when reaching across his body  He did undergo 2 past shoulder surgeries in his 40's  One of the surgeries was for his rotator cuff the other was for a bone spur  He is taking  Tylenol for paion control          PAST MEDICAL HISTORY:  Past Medical History:   Diagnosis Date   • Abnormal stress test    • Angina at rest [facility])     no issues currently 5/2021   • Apnea    • Cancer [facility])     Skin   • Carpal tunnel syndrome on left    • Chest pain    • CPAP (continuous positive airway pressure) dependence     uses CPAP   • Diverticulitis    • GERD (gastroesophageal reflux disease)    • History of transfusion     childhood   • Hypercholesterolemia    • Hypercholesterolemia    • Hyperlipidemia    • Hypertension    • Joint pain     Right Shoulder   • Kidney stone    • Mitral valve disorder    • Neck pain    • Sleep apnea    • Sleep apnea, obstructive    • Thoracic neuritis        PAST SURGICAL HISTORY:  Past Surgical History:   Procedure Laterality Date   • COLONOSCOPY      Complete   • CORONARY ANGIOPLASTY  2017   • ESOPHAGOGASTRODUODENOSCOPY      Diagnostic   • FL RETROGRADE PYELOGRAM  5/24/2019   • FOOT SURGERY Left     tarsal tunnel   • HERNIA REPAIR     • KNEE ARTHROSCOPY Left    • KNEE SURGERY Left    • NEUROPLASTY / TRANSPOSITION MEDIAN NERVE AT CARPAL TUNNEL     • TX COLONOSCOPY FLX DX W/COLLJ SPEC WHEN PFRMD N/A 4/10/2018    Procedure: EGD AND COLONOSCOPY;  Surgeon: Shanti Bravo MD;  Location: [facility]; Service: Gastroenterology   • TX CYSTO/URETERO W/LITHOTRIPSY &INDWELL STENT INSRT Right 5/24/2019    Procedure: CYSTOSCOPY URETEROSCOPY WITH LITHOTRIPSY HOLMIUM LASER, RETROGRADE PYELOGRAM AND INSERTION STENT URETERAL;  Surgeon: Racheal Moritz, MD;  [facility];  Service: Urology   • TX CYSTOURETHRO W/IMPLANT N/A 8/30/2018    Procedure: CYSTOSCOPY WITH INSERTION Syble Mulders;  Surgeon: Racheal Moritz, MD;  [facility];  Service: Urology   • TX INCISION IMPLANT CRANIAL NERVE STIM ELECTRODE/PULSE GEN N/A 5/13/2021    Procedure: INSERTION UPPER AIRWAY STIMULATOR, INSPIRE IMPLANT;  Surgeon: Sue Kendall MD;  Location[facility];  Service: ENT   • TX TRANSURETHRAL INCISION OF PROSTATE N/A 8/30/2018    Procedure: TRANSURETHRAL INCISION OF PROSTATE (TUIP);   Surgeon: Racheal Moritz, MD;  Location[facility];  Service: Urology   • ROTATOR CUFF REPAIR Bilateral    • SHOULDER SURGERY     • TARSAL TUNNEL RELEASE      Neuroplasty Decompression       FAMILY HISTORY:  Family History   Problem Relation Age of Onset   • Diabetes Mother         Mellitus   • Heart disease Mother         Arteriosclerotic Cardiovascular   • Hypertension Mother    • Cancer Father    • Arthritis Father         Roberto Griffin Site W Involv Of Organs And Systems   • Parkinsonism Father    • Thyroid disease Sister    • Diabetes Brother         Diabetes:Mellitus   • Heart disease Brother    • Hypertension Brother    • Coronary artery disease Family    • Heart disease Brother    • No Known Problems Brother    • Kidney disease Sister    • Parkinsonism Sister        SOCIAL HISTORY:  Social History     Tobacco Use   • Smoking status: Former Smoker     Packs/day: 1 00     Years: 17 00     Pack years: 17 00     Types: Cigarettes     Quit date:      Years since quittin 8   • Smokeless tobacco: Never Used   Vaping Use   • Vaping Use: Never used   Substance Use Topics   • Alcohol use: No   • Drug use: No       MEDICATIONS:    Current Outpatient Medications:   •  acetaminophen (TYLENOL) 500 mg tablet, Take 1,000 mg by mouth every 6 (six) hours as needed for mild pain, Disp: , Rfl:   •  amLODIPine (NORVASC) 10 mg tablet, Take 1 tablet (10 mg total) by mouth every morning, Disp: 90 tablet, Rfl: 3  •  doxepin (SINEquan) 10 mg capsule, Take 1 capsule (10 mg total) by mouth daily at bedtime as needed for sleep, Disp: 30 capsule, Rfl: 3  •  fenofibrate (TRICOR) 145 mg tablet, TAKE ONE TABLET BY MOUTH EVERY DAY, Disp: 90 tablet, Rfl: 0  •  lisinopril (ZESTRIL) 40 mg tablet, TAKE ONE TABLET BY MOUTH EVERY DAY, Disp: 90 tablet, Rfl: 1  •  loperamide (IMODIUM A-D) 2 MG tablet, Take 1 tablet by mouth daily as needed, Disp: , Rfl:   •  lovastatin (MEVACOR) 40 MG tablet, TAKE ONE TABLET BY MOUTH EVERY DAY, Disp: 90 tablet, Rfl: 1  •  Multiple Vitamins-Minerals (CENTRUM ULTRA MENS PO), Take 1 tablet by mouth daily, Disp: , Rfl:   •  ofloxacin (OCUFLOX) 0 3 % ophthalmic solution, Not yet using   Patient has to use drops 3 days before surgery 22, Disp: , Rfl:   •  omeprazole (PriLOSEC) 20 mg delayed release capsule, TAKE ONE CAPSULE BY MOUTH EVERY DAY, Disp: 90 capsule, Rfl: 1  •  prednisoLONE acetate (PRED FORTE) 1 % ophthalmic suspension, Not yet using  Patient has to use drops 3 days before surgery 07/07/22, Disp: , Rfl:   •  ketoconazole (NIZORAL) 2 % cream, Apply topically 2 (two) times a day To facial rash repeat as needed treat for 3 weeks (Patient not taking: No sig reported), Disp: 30 g, Rfl: 3    ALLERGIES:  No Known Allergies    REVIEW OF SYSTEMS:  MSK: as noted in HPI  Neuro: WNL  Pertinent items are otherwise noted in HPI  A comprehensive review of systems was otherwise negative  LABS:  HgA1c:   Lab Results   Component Value Date    HGBA1C 5 8 07/02/2018     BMP:   Lab Results   Component Value Date    GLUCOSE 100 10/29/2015    CALCIUM 9 6 05/31/2022     10/29/2015    K 4 2 05/31/2022    CO2 27 05/31/2022     05/31/2022    BUN 18 05/31/2022    CREATININE 0 99 05/31/2022     CBC: No components found for: CBC    _____________________________________________________  PHYSICAL EXAMINATION:  Vital signs: /72   Pulse 66   Ht 5' 4" (1 626 m)   Wt 78 5 kg (173 lb)   BMI 29 70 kg/m²   General: No acute distress, awake and alert  Psychiatric: Mood and affect appear appropriate  HEENT: Trachea Midline, No torticollis, no apparent facial trauma  Cardiovascular: No audible murmurs; Extremities appear perfused  Pulmonary: No audible wheezing or stridor  Skin: No open lesions; see further details (if any) below    MUSCULOSKELETAL EXAMINATION:    Extremities:  Left shoulder     No erythema, ecchymosis or edema  Forward flexion aprox  120-130 degrees   No pain with forward flexion   Pain with cross body   Rotator cuff strength 5/5   Extremity appears warm and well perfused   Healed surgical incision sites      _____________________________________________________  STUDIES REVIEWED:  I personally reviewed the images and interpretation is as follows:  X-rays of the left shoulder demonstrate no acute fracture or dislocation  Rotator cuff tear arthropathy       PROCEDURES PERFORMED:  Large joint arthrocentesis: L subacromial bursa  Universal Protocol:  Consent: Verbal consent obtained  Written consent not obtained  Risks and benefits: risks, benefits and alternatives were discussed  Consent given by: patient  Time out: Immediately prior to procedure a "time out" was called to verify the correct patient, procedure, equipment, support staff and site/side marked as required    Site marked: the operative site was marked  Patient identity confirmed: verbally with patient    Supporting Documentation  Indications: pain   Procedure Details  Location: shoulder - L subacromial bursa  Preparation: Patient was prepped and draped in the usual sterile fashion  Needle size: 22 G  Ultrasound guidance: no  Medications administered: 2 mL bupivacaine 0 25 %; 80 mg triamcinolone acetonide 40 mg/mL    Patient tolerance: patient tolerated the procedure well with no immediate complications  Dressing:  Sterile dressing applied        Scribe Attestation    I,:  Bradford Bentley am acting as a scribe while in the presence of the attending physician :       I,:  Katharine Fitzpatrick MD personally performed the services described in this documentation    as scribed in my presence :

## 2022-11-01 ENCOUNTER — OFFICE VISIT (OUTPATIENT)
Dept: CARDIOLOGY CLINIC | Facility: CLINIC | Age: 77
End: 2022-11-01

## 2022-11-01 VITALS
WEIGHT: 169 LBS | BODY MASS INDEX: 28.85 KG/M2 | DIASTOLIC BLOOD PRESSURE: 60 MMHG | HEART RATE: 74 BPM | HEIGHT: 64 IN | SYSTOLIC BLOOD PRESSURE: 130 MMHG | OXYGEN SATURATION: 97 % | RESPIRATION RATE: 16 BRPM

## 2022-11-01 DIAGNOSIS — I10 ESSENTIAL HYPERTENSION: Primary | ICD-10-CM

## 2022-11-01 DIAGNOSIS — E78.2 MIXED HYPERLIPIDEMIA: ICD-10-CM

## 2022-11-01 NOTE — PROGRESS NOTES
Cardiology Office Note    Eunice Le 76 y o  male MRN: [id number]    11/01/22          Assessment:  1  Hypertension  2  Hyperlipidemia  3  GREGG s/p inspire device    Plan:  · BP at goal; cont lisinopril and norvasc  · Cont lovastatin and fenofibrate  · Ambulatory blood pressure monitoring and maintaining a low sodium diet was advised  · He was advised to notify us with the onset of cardiac symptoms  Follow up: 1 year or sooner as needed    1  Essential hypertension     2  Mixed hyperlipidemia         HPI: Eunice Le is a 76y o  year old male with history of hypertension and hyperlipidemia who presents for routine follow-up  Past cardiac evaluation:   · Cardiac catheterization 4/2017- nonobstructive CAD  · TTE 1/2019: EF: 60%, g1dd, mild MR, TR      He has been doing well from a cardiac standpoint since his last evaluation  He had a recent mechanical fall  He denies LOC and cardiac prodromal symptoms  His blood pressure has been well controlled on his antihypertensive regimen  He enjoys eating salted pretzels regularly  Lipids reasonably well controlled  He has been active without limitation  He denies chest pain, palpitations, dyspnea on exertion or any other cardiac concerns at this time        Family History: mother with hyperlipidemia and unclear cardiac history    Social history: quit smoking 40 years ago      Patient Active Problem List   Diagnosis   • Essential hypertension   • Mixed hyperlipidemia   • Chronic pain disorder   • Chronic left shoulder pain   • Chronic bilateral low back pain with bilateral sciatica   • Disc degeneration, lumbar   • Lumbar stenosis with neurogenic claudication   • Abnormal CT of the abdomen   • Chronic pain syndrome   • Moderate obstructive sleep apnea   • Benign prostatic hyperplasia with lower urinary tract symptoms   • Hydronephrosis   • Nonrheumatic aortic valve insufficiency   • Non-rheumatic mitral regurgitation   • Diastolic dysfunction   • Nephrolithiasis   • Squamous cell carcinoma in situ (SCCIS) of scalp   • Actinic keratosis   • Chronic obstructive pulmonary disease (HCC)   • Megaloblastic red blood cells   • BMI 31 0-31 9,adult   • Class 1 obesity without serious comorbidity with body mass index (BMI) of 31 0 to 31 9 in adult   • Insomnia   • Age-related cataract of both eyes   • Uncomplicated opioid dependence (HCC)   • Rotator cuff arthropathy of left shoulder       No Known Allergies      Current Outpatient Medications:   •  acetaminophen (TYLENOL) 500 mg tablet, Take 1,000 mg by mouth every 6 (six) hours as needed for mild pain, Disp: , Rfl:   •  amLODIPine (NORVASC) 10 mg tablet, Take 1 tablet (10 mg total) by mouth every morning, Disp: 90 tablet, Rfl: 3  •  doxepin (SINEquan) 10 mg capsule, Take 1 capsule (10 mg total) by mouth daily at bedtime as needed for sleep, Disp: 30 capsule, Rfl: 3  •  fenofibrate (TRICOR) 145 mg tablet, TAKE ONE TABLET BY MOUTH EVERY DAY, Disp: 90 tablet, Rfl: 0  •  lisinopril (ZESTRIL) 40 mg tablet, TAKE ONE TABLET BY MOUTH EVERY DAY, Disp: 90 tablet, Rfl: 1  •  loperamide (IMODIUM A-D) 2 MG tablet, Take 1 tablet by mouth daily as needed, Disp: , Rfl:   •  lovastatin (MEVACOR) 40 MG tablet, TAKE ONE TABLET BY MOUTH EVERY DAY, Disp: 90 tablet, Rfl: 1  •  Multiple Vitamins-Minerals (CENTRUM ULTRA MENS PO), Take 1 tablet by mouth daily, Disp: , Rfl:   •  omeprazole (PriLOSEC) 20 mg delayed release capsule, TAKE ONE CAPSULE BY MOUTH EVERY DAY, Disp: 90 capsule, Rfl: 1  •  ketoconazole (NIZORAL) 2 % cream, Apply topically 2 (two) times a day To facial rash repeat as needed treat for 3 weeks (Patient not taking: No sig reported), Disp: 30 g, Rfl: 3    Past Medical History:   Diagnosis Date   • Abnormal stress test    • Angina at rest [facility])     no issues currently 5/2021   • Apnea    • Cancer [facility])     Skin   • Carpal tunnel syndrome on left    • Chest pain    • CPAP (continuous positive airway pressure) dependence     uses CPAP   • Diverticulitis    • GERD (gastroesophageal reflux disease)    • History of transfusion     childhood   • Hypercholesterolemia    • Hypercholesterolemia    • Hyperlipidemia    • Hypertension    • Joint pain     Right Shoulder   • Kidney stone    • Mitral valve disorder    • Neck pain    • Sleep apnea    • Sleep apnea, obstructive    • Thoracic neuritis        Family History   Problem Relation Age of Onset   • Diabetes Mother         Mellitus   • Heart disease Mother         Arteriosclerotic Cardiovascular   • Hypertension Mother    • Cancer Father    • Arthritis Father         Rheu Mult Site W Involv Of Organs And Systems   • Parkinsonism Father    • Thyroid disease Sister    • Diabetes Brother         Diabetes:Mellitus   • Heart disease Brother    • Hypertension Brother    • Coronary artery disease Family    • Heart disease Brother    • No Known Problems Brother    • Kidney disease Sister    • Parkinsonism Sister        Past Surgical History:   Procedure Laterality Date   • COLONOSCOPY      Complete   • CORONARY ANGIOPLASTY  2017   • ESOPHAGOGASTRODUODENOSCOPY      Diagnostic   • FL RETROGRADE PYELOGRAM  5/24/2019   • FOOT SURGERY Left     tarsal tunnel   • HERNIA REPAIR     • KNEE ARTHROSCOPY Left    • KNEE SURGERY Left    • NEUROPLASTY / TRANSPOSITION MEDIAN NERVE AT CARPAL TUNNEL     • AZ COLONOSCOPY FLX DX W/COLLJ SPEC WHEN PFRMD N/A 4/10/2018    Procedure: EGD AND COLONOSCOPY;  Surgeon: Ashly Marshall MD;  Location: [facility];   Service: Gastroenterology   • AZ CYSTO/URETERO W/LITHOTRIPSY &INDWELL STENT INSRT Right 5/24/2019    Procedure: CYSTOSCOPY URETEROSCOPY WITH LITHOTRIPSY HOLMIUM LASER, RETROGRADE PYELOGRAM AND INSERTION STENT URETERAL;  Surgeon: Dalton Segovia MD;  Location: [facility];  Service: Urology   • AZ CYSTOURETHRO W/IMPLANT N/A 8/30/2018    Procedure: CYSTOSCOPY WITH INSERTION Rockne Gault;  Surgeon: Dalton Segovia MD;  Location: [facility];  Service: Urology   • MA INCISION IMPLANT CRANIAL NERVE STIM ELECTRODE/PULSE GEN N/A 2021    Procedure: INSERTION UPPER AIRWAY STIMULATOR, INSPIRE IMPLANT;  Surgeon: Crystal Pinto MD;  Location: [facility];  Service: ENT   • MA TRANSURETHRAL INCISION OF PROSTATE N/A 2018    Procedure: TRANSURETHRAL INCISION OF PROSTATE (TUIP); Surgeon: Ashleigh Thurman MD;  Location: [facility];  Service: Urology   • ROTATOR CUFF REPAIR Bilateral    • SHOULDER SURGERY     • TARSAL TUNNEL RELEASE      Neuroplasty Decompression       Social History     Socioeconomic History   • Marital status: /Civil Union     Spouse name: Not on file   • Number of children: 4   • Years of education: high school or GED   • Highest education level: Not on file   Occupational History   • Occupation: retired   Tobacco Use   • Smoking status: Former Smoker     Packs/day: 1 00     Years: 17      Pack years: 17      Types: Cigarettes     Quit date:      Years since quittin 8   • Smokeless tobacco: Never Used   Vaping Use   • Vaping Use: Never used   Substance and Sexual Activity   • Alcohol use: No   • Drug use: No   • Sexual activity: Not Currently     Partners: Female   Other Topics Concern   • Not on file   Social History Narrative    Active Advance Directives    Lives With Spouse             Social Determinants of Health     Financial Resource Strain: Not on file   Food Insecurity: Not on file   Transportation Needs: Not on file   Physical Activity: Not on file   Stress: No Stress Concern Present   • Feeling of Stress : Not at all   Social Connections: Not on file   Intimate Partner Violence: Not on file   Housing Stability: Not on file       Review of Systems   Constitutional: Negative for diaphoresis, weight gain and weight loss  HENT: Negative for congestion      Cardiovascular: Negative for chest pain, dyspnea on exertion, irregular heartbeat, leg swelling, near-syncope, orthopnea, palpitations, paroxysmal nocturnal dyspnea and syncope  Respiratory: Negative for shortness of breath, sleep disturbances due to breathing and snoring  Hematologic/Lymphatic: Does not bruise/bleed easily  Skin: Negative for rash  Musculoskeletal: Negative for myalgias  Gastrointestinal: Negative for nausea and vomiting  Neurological: Negative for excessive daytime sleepiness and light-headedness  Psychiatric/Behavioral: The patient is not nervous/anxious  Vitals: /60 (BP Location: Left arm, Patient Position: Sitting)   Pulse 74   Resp 16   Ht 5' 4" (1 626 m)   Wt 76 7 kg (169 lb)   SpO2 97%   BMI 29 01 kg/m²         Physical Exam:     GEN: Alert and oriented x 3, in no acute distress  Well appearing and well nourished  HEENT: Sclera anicteric, conjunctivae pink, mucous membranes moist  Oropharynx clear  NECK: Supple, no carotid bruits, no significant JVD  Trachea midline, no thyromegaly  HEART: Regular rhythm, normal S1 and S2, no murmurs, clicks, gallops or rubs  PMI nondisplaced, no thrills  LUNGS: Clear to auscultation bilaterally; no wheezes, rales, or rhonchi  No increased work of breathing or signs of respiratory distress  ABDOMEN: Soft, nontender, nondistended  EXTREMITIES: Skin warm and well perfused, no clubbing, cyanosis, or edema  NEURO: No focal findings  Normal speech  Mood and affect normal    SKIN: Normal without suspicious lesions on exposed skin

## 2022-12-07 DIAGNOSIS — G47.00 INSOMNIA, UNSPECIFIED TYPE: ICD-10-CM

## 2022-12-08 RX ORDER — DOXEPIN HYDROCHLORIDE 10 MG/1
10 CAPSULE ORAL
Qty: 30 CAPSULE | Refills: 3 | Status: SHIPPED | OUTPATIENT
Start: 2022-12-08

## 2022-12-11 DIAGNOSIS — E78.2 MIXED HYPERLIPIDEMIA: ICD-10-CM

## 2022-12-11 DIAGNOSIS — K21.9 CHRONIC GERD: ICD-10-CM

## 2022-12-11 DIAGNOSIS — I10 ESSENTIAL HYPERTENSION: ICD-10-CM

## 2022-12-11 RX ORDER — LISINOPRIL 40 MG/1
TABLET ORAL
Qty: 90 TABLET | Refills: 1 | Status: SHIPPED | OUTPATIENT
Start: 2022-12-11

## 2022-12-11 RX ORDER — OMEPRAZOLE 20 MG/1
CAPSULE, DELAYED RELEASE ORAL
Qty: 90 CAPSULE | Refills: 1 | Status: SHIPPED | OUTPATIENT
Start: 2022-12-11

## 2022-12-11 RX ORDER — LOVASTATIN 40 MG/1
TABLET ORAL
Qty: 90 TABLET | Refills: 1 | Status: SHIPPED | OUTPATIENT
Start: 2022-12-11

## 2022-12-26 NOTE — ED PROVIDER NOTES
Pt Name: Israel Mei  MRN: [id number]  [place] 1945  Age/Sex: 76 y o  male  Date of evaluation: 10/14/2022  PCP: Dee Hernandes DO    CHIEF COMPLAINT    Chief Complaint   Patient presents with   • Fall     Pt states he was walking down a road, tripped and fell, c/o forehead laceration, denies loc, not on BT  Trauma: Evaluation    Mechanism of Injury:  Fall    LOC:  None  Airway:  Intact  Breathing:  Equal breath sounds bilaterally  Circulation:  2+ pulses x4  Disability:  None  Exposure:  As indicated        Numbers; 3-15 (gcs): 15      Alcohol Use: none      Pt Direct To CT: no         HPI    76 y o  male presenting with head injury and forehead laceration  Patient states he was walking down the road to get his mail when he stepped in a small hole and tripped, falling forwards  He states he landed on his left shoulder and also struck his head, sustaining a small laceration  He complains of mild to moderate dull pain to the left shoulder as well as the forehead, nonradiating, worse with pressing on the area or moving left arm and better rest   He denies numbness, weakness, loss of conscious, fevers, chest pain, shortness of breath, abdominal pain, other symptoms        HPI      Past Medical and Surgical History    Past Medical History:   Diagnosis Date   • Abnormal stress test    • Angina at rest [facility])     no issues currently 5/2021   • Apnea    • Cancer [facility])     Skin   • Carpal tunnel syndrome on left    • Chest pain    • CPAP (continuous positive airway pressure) dependence     uses CPAP   • Diverticulitis    • GERD (gastroesophageal reflux disease)    • History of transfusion     childhood   • Hypercholesterolemia    • Hypercholesterolemia    • Hyperlipidemia    • Hypertension    • Joint pain     Right Shoulder   • Kidney stone    • Mitral valve disorder    • Neck pain    • Sleep apnea    • Sleep apnea, obstructive    • Thoracic neuritis        Past Surgical History:   Procedure Laterality Date   • COLONOSCOPY      Complete   • CORONARY ANGIOPLASTY  2017   • ESOPHAGOGASTRODUODENOSCOPY      Diagnostic   • FL RETROGRADE PYELOGRAM  5/24/2019   • FOOT SURGERY Left     tarsal tunnel   • HERNIA REPAIR     • KNEE ARTHROSCOPY Left    • KNEE SURGERY Left    • NEUROPLASTY / TRANSPOSITION MEDIAN NERVE AT CARPAL TUNNEL     • NE COLONOSCOPY FLX DX W/COLLJ SPEC WHEN PFRMD N/A 4/10/2018    Procedure: EGD AND COLONOSCOPY;  Surgeon: Fritz Thompson MD;  Location: [facility]; Service: Gastroenterology   • NE CYSTO/URETERO W/LITHOTRIPSY &INDWELL STENT INSRT Right 5/24/2019    Procedure: CYSTOSCOPY URETEROSCOPY WITH LITHOTRIPSY HOLMIUM LASER, RETROGRADE PYELOGRAM AND INSERTION STENT URETERAL;  Surgeon: Gildardo Bullock MD;  [facility];  Service: Urology   • NE CYSTOURETHRO W/IMPLANT N/A 8/30/2018    Procedure: CYSTOSCOPY WITH INSERTION Paul Pitcher;  Surgeon: Gildardo Bullock MD;  [facility];  Service: Urology   • NE INCISION IMPLANT CRANIAL NERVE STIM ELECTRODE/PULSE GEN N/A 5/13/2021    Procedure: INSERTION UPPER AIRWAY STIMULATOR, INSPIRE IMPLANT;  Surgeon: Partha Santillan MD;  Location[facility];  Service: ENT   • NE TRANSURETHRAL INCISION OF PROSTATE N/A 8/30/2018    Procedure: TRANSURETHRAL INCISION OF PROSTATE (TUIP);   Surgeon: Gildardo Bullock MD;  [facility];  Service: Urology   • ROTATOR CUFF REPAIR Bilateral    • SHOULDER SURGERY     • TARSAL TUNNEL RELEASE      Neuroplasty Decompression       Family History   Problem Relation Age of Onset   • Diabetes Mother         Mellitus   • Heart disease Mother         Arteriosclerotic Cardiovascular   • Hypertension Mother    • Cancer Father    • Arthritis Father         Rheu Mult Site W Involv Of Organs And Systems   • Parkinsonism Father    • Thyroid disease Sister    • Diabetes Brother         Diabetes:Mellitus   • Heart disease Brother    • Hypertension Brother    • Coronary artery disease Family    • Heart disease Brother    • No Known Problems Brother    • Kidney disease Sister    • Parkinsonism Sister        Social History     Tobacco Use   • Smoking status: Former Smoker     Packs/day: 1 00     Years: 17 00     Pack years: 17 00     Types: Cigarettes     Quit date:      Years since quittin 8   • Smokeless tobacco: Never Used   Vaping Use   • Vaping Use: Never used   Substance Use Topics   • Alcohol use: No   • Drug use: No            Allergies    No Known Allergies    Home Medications    Prior to Admission medications    Medication Sig Start Date End Date Taking? Authorizing Provider   acetaminophen (TYLENOL) 500 mg tablet Take 1,000 mg by mouth every 6 (six) hours as needed for mild pain    Historical Provider, MD   amLODIPine (NORVASC) 10 mg tablet Take 1 tablet (10 mg total) by mouth every morning 22   Alma Delia Alejandro PA-C   doxepin (SINEquan) 10 mg capsule Take 1 capsule (10 mg total) by mouth daily at bedtime as needed for sleep 22   Irineo Alvarado MD   fenofibrate (TRICOR) 145 mg tablet TAKE ONE TABLET BY MOUTH EVERY DAY 22   NHAN Hanson   ketoconazole (NIZORAL) 2 % cream Apply topically 2 (two) times a day To facial rash repeat as needed treat for 3 weeks  Patient not taking: Reported on 2022 8/10/20   Valerie Chicas MD   lisinopril (ZESTRIL) 40 mg tablet TAKE ONE TABLET BY MOUTH EVERY DAY 22   Ilana Umaña DO   loperamide (IMODIUM A-D) 2 MG tablet Take 1 tablet by mouth daily as needed    Historical Provider, MD   lovastatin (MEVACOR) 40 MG tablet TAKE ONE TABLET BY MOUTH EVERY DAY 22   Ilana Umaña DO   Multiple Vitamins-Minerals (CENTRUM ULTRA MENS PO) Take 1 tablet by mouth daily    Historical Provider, MD   ofloxacin (OCUFLOX) 0 3 % ophthalmic solution Not yet using   Patient has to use drops 3 days before surgery 07/07/22 6/15/22   Historical Provider, MD   omeprazole (PriLOSEC) 20 mg delayed release capsule TAKE ONE CAPSULE BY MOUTH EVERY DAY 22 Marline Nielson,    prednisoLONE acetate (PRED FORTE) 1 % ophthalmic suspension Not yet using  Patient has to use drops 3 days before surgery 07/07/22 6/15/22   Historical Provider, MD           Review of Systems    Review of Systems   Constitutional: Negative for appetite change, chills and diaphoresis  HENT: Negative for drooling, facial swelling, trouble swallowing and voice change  Respiratory: Negative for apnea, shortness of breath and wheezing  Cardiovascular: Negative for chest pain and leg swelling  Gastrointestinal: Negative for abdominal distention, abdominal pain, diarrhea, nausea and vomiting  Genitourinary: Negative for dysuria and urgency  Musculoskeletal: Positive for arthralgias  Negative for back pain, gait problem and neck pain  Skin: Positive for wound  Negative for color change and rash  Neurological: Positive for headaches  Negative for seizures, speech difficulty and weakness  Psychiatric/Behavioral: Negative for agitation, behavioral problems and dysphoric mood  The patient is not nervous/anxious  All other systems reviewed and negative  Physical Exam      ED Triage Vitals [10/14/22 1444]   Temperature Pulse Respirations Blood Pressure SpO2   98 5 °F (36 9 °C) 68 19 137/60 96 %      Temp Source Heart Rate Source Patient Position - Orthostatic VS BP Location FiO2 (%)   Temporal Monitor Sitting Left arm --      Pain Score       --               Physical Exam  Vitals and nursing note reviewed  Constitutional:       General: He is not in acute distress  Appearance: Normal appearance  He is well-developed  He is not ill-appearing, toxic-appearing or diaphoretic  HENT:      Head: Normocephalic  Comments: Swelling as well as a stellate appearing laceration to left forehead and eyebrow, some tissue missing in the center, approximately 2 5 cm in length       Right Ear: External ear normal       Left Ear: External ear normal       Nose: Nose normal  Mouth/Throat:      Mouth: Mucous membranes are moist       Pharynx: Oropharynx is clear  Eyes:      Conjunctiva/sclera: Conjunctivae normal       Pupils: Pupils are equal, round, and reactive to light  Neck:      Trachea: No tracheal deviation  Comments: Tender to palpation along midline cervical spine from C3-C7  Cardiovascular:      Rate and Rhythm: Normal rate and regular rhythm  Heart sounds: Normal heart sounds  No murmur heard  Pulmonary:      Effort: Pulmonary effort is normal  No respiratory distress  Breath sounds: Normal breath sounds  No stridor  No wheezing or rales  Abdominal:      General: There is no distension  Palpations: Abdomen is soft  Tenderness: There is no abdominal tenderness  There is no guarding or rebound  Musculoskeletal:         General: Tenderness present  No deformity  Normal range of motion  Cervical back: Normal range of motion and neck supple  Tenderness present  Comments: Patient tender to palpation overlying the [facility] joint on the left shoulder, full active and passive range of motion although pain reproduced with abduction  Strength sensation pulse and cap refill intact distal    Skin:     General: Skin is warm and dry  Findings: No rash  Neurological:      Mental Status: He is alert and oriented to person, place, and time  Cranial Nerves: No cranial nerve deficit  Sensory: No sensory deficit  Motor: No weakness  Coordination: Coordination normal       Gait: Gait normal    Psychiatric:         Behavior: Behavior normal          Thought Content:  Thought content normal          Judgment: Judgment normal               Diagnostic Results      Labs:    Results Reviewed     None          All labs reviewed and utilized in the medical decision making process    Radiology:    XR shoulder 2+ views LEFT   ED Interpretation   Acromioclavicular separation of uncertain chronicity      CT head without contrast   Final Result 1   No acute intracranial abnormality  2   Mild chronic microtraumatic ischemic changes  Workstation performed: [id number]         CT spine cervical without contrast   Final Result      1  No cervical spine fracture or traumatic malalignment  2   Severe multilevel degenerative disease of the cervical spine and visualized upper thoracic spine with a posterior disc osteophyte complex at C4-C5 with associated mild canal stenosis  3   Bilateral bony foraminal stenosis, as detailed above  Workstation performed: [id number]         XR chest 1 view portable    (Results Pending)       All radiology studies independently viewed by me and interpreted by the radiologist     Procedure    Laceration repair    Date/Time: 10/14/2022 4:45 PM  Performed by: Maryse Ordonez MD  Authorized by: Maryse Ordonez MD   Consent: Verbal consent obtained  Risks and benefits: risks, benefits and alternatives were discussed  Consent given by: patient  Required items: required blood products, implants, devices, and special equipment available  Patient identity confirmed: provided demographic data  Time out: Immediately prior to procedure a "time out" was called to verify the correct patient, procedure, equipment, support staff and site/side marked as required  Body area: head/neck  Location details: left eyebrow  Laceration length: 2 5 cm  Foreign bodies: no foreign bodies  Tendon involvement: none  Nerve involvement: none  Vascular damage: no  Anesthesia: local infiltration    Anesthesia:  Local Anesthetic: lidocaine 2% with epinephrine  Anesthetic total: 3 mL    Sedation:  Patient sedated: no        Procedure Details:  Preparation: Patient was prepped and draped in the usual sterile fashion    Irrigation solution: saline  Irrigation method: syringe  Amount of cleaning: standard  Debridement: minimal  Degree of undermining: minimal  Skin closure: 5-0 nylon  Number of sutures: 3  Suture technique: Combination of to simple interrupted sutures with pursestring to close dog ear portion in the center  Approximation: close  Approximation difficulty: complex  Patient tolerance: patient tolerated the procedure well with no immediate complications              ED Course of Care and Re-Assessments      Tetanus updated, wound repaired as above  Some concern for [facility] separation, of unclear chronicity in setting of postoperative shoulder, patient given sling and referred to Ortho  Medications   tetanus-diphtheria-acellular pertussis (BOOSTRIX) IM injection 0 5 mL (0 5 mL Intramuscular Given 10/14/22 1559)   lidocaine-epinephrine (XYLOCAINE-MPF/EPINEPHRINE) 2 %-1:200,000 injection 1 mL (1 mL Infiltration Given 10/14/22 1559)           FINAL IMPRESSION    Final diagnoses:   Fall, initial encounter   Closed head injury, initial encounter   Neck pain   Left shoulder pain   Facial laceration, initial encounter         DISPOSITION/PLAN    Presentation as above with neck pain left shoulder pain and headache status post mechanical fall  Vital signs reassuring, examination likewise reassuring, remarkable wound and shoulder tenderness  As above, shoulder pain possibly due to [facility] separation versus simple bruise, referred to Orthopedics and placed in sling  Wound repaired as above, referred back to primary care doctor for further care as needed  No life-threatening traumatic injuries discovered on careful history and physical as well as imaging studies  Hemodynamically stable and comfortable at time of discharge  Time reflects when diagnosis was documented in both MDM as applicable and the Disposition within this note     Time User Action Codes Description Comment    10/14/2022  4:34 PM Rodney Dominguez  XTWO] Fall, initial encounter     10/14/2022  4:34 PM Rodney Jones [S09 90XA] Closed head injury, initial encounter     10/14/2022  4:34 PM Rodney Jones [M54 2] Neck pain     10/14/2022  4:34 PM Lilliam Asp Add [B32 872] Left shoulder pain     10/14/2022  4:34 PM Lilliam Asp Add [S01 81XA] Facial laceration, initial encounter     10/14/2022  4:36 PM Lilliam Asp Modify [G77  JBYJ] Fall, initial encounter     10/14/2022  4:36 PM Lilliam Asp Modify [S09 90XA] Closed head injury, initial encounter       ED Disposition     ED Disposition   Discharge    Condition   Stable    Date/Time   Fri Oct 14, 2022  4:34 PM    Comment   Souleymane Barth discharge to home/self care  Follow-up Information     Follow up With Specialties Details Why Contact Info Additional [address] [facility] Emergency Department Emergency Medicine Go to  If symptoms worsen [address] [facility] [id number] [facility] Emergency Department, [address] [address], DO Family Medicine Go to  In 3-5 days for suture removal [address] Orthopedic Surgery Call in 1 day To schedule close follow-up for your shoulder injury  [address] [facility] [address] [id number]  [address] [facility] [place], [address] [address] [place], [place], [phone]            PATIENT REFERRED TO:    [address] [facility] Emergency Department  [address] [facility] [address] [phone]  Go to   If symptoms worsen    Dian Mcconnell DO  [address] [id number] [phone]    Go to   In 3-5 days for suture removal    [address] [facility]  [address] [place] [address]  [phone]  Call in 1 day  To schedule close follow-up for your shoulder injury        DISCHARGE MEDICATIONS:    Discharge Medication List as of 10/14/2022  4:36 PM      CONTINUE these medications which have NOT CHANGED    Details   acetaminophen (TYLENOL) 500 mg tablet Take 1,000 mg by mouth every 6 (six) hours as needed for mild pain, Historical Med      amLODIPine (NORVASC) 10 mg tablet Take 1 tablet (10 mg total) by mouth every morning, Starting Tue 2/22/2022, Normal      doxepin (SINEquan) 10 mg capsule Take 1 capsule (10 mg total) by mouth daily at bedtime as needed for sleep, Starting Wed 6/1/2022, Normal      fenofibrate (TRICOR) 145 mg tablet TAKE ONE TABLET BY MOUTH EVERY DAY, Normal      ketoconazole (NIZORAL) 2 % cream Apply topically 2 (two) times a day To facial rash repeat as needed treat for 3 weeks, Starting Mon 8/10/2020, Normal      lisinopril (ZESTRIL) 40 mg tablet TAKE ONE TABLET BY MOUTH EVERY DAY, Normal      loperamide (IMODIUM A-D) 2 MG tablet Take 1 tablet by mouth daily as needed, Historical Med      lovastatin (MEVACOR) 40 MG tablet TAKE ONE TABLET BY MOUTH EVERY DAY, Normal      Multiple Vitamins-Minerals (CENTRUM ULTRA MENS PO) Take 1 tablet by mouth daily, Historical Med      ofloxacin (OCUFLOX) 0 3 % ophthalmic solution Not yet using  Patient has to use drops 3 days before surgery 07/07/22, Historical Med      omeprazole (PriLOSEC) 20 mg delayed release capsule TAKE ONE CAPSULE BY MOUTH EVERY DAY, Normal      prednisoLONE acetate (PRED FORTE) 1 % ophthalmic suspension Not yet using  Patient has to use drops 3 days before surgery 07/07/22, Historical Med             No discharge procedures on file  Karishma Tracy MD    Portions of the record may have been created with voice recognition software  Occasional wrong word or "sound alike" substitutions may have occurred due to the inherent limitations of voice recognition software    Please read the chart carefully and recognize, using context, where substitutions have occurred     Karishma Tracy MD  10/14/22 8637 UNK

## 2023-01-11 DIAGNOSIS — I10 ESSENTIAL HYPERTENSION: ICD-10-CM

## 2023-01-11 RX ORDER — AMLODIPINE BESYLATE 10 MG/1
10 TABLET ORAL EVERY MORNING
Qty: 90 TABLET | Refills: 3 | Status: SHIPPED | OUTPATIENT
Start: 2023-01-11

## 2023-01-11 NOTE — TELEPHONE ENCOUNTER
Name of Caller:  Briana Ling ( [place] [address])     Call back Number: [phone]    Medication(s):  amLODIPine (NORVASC) 10 mg tablet   Are we prescribing provider?:    30 or 90 day supply: 90 days    Pharmacy name/number:  [address] [address] or Next appt?:

## 2023-01-24 ENCOUNTER — APPOINTMENT (OUTPATIENT)
Dept: RADIOLOGY | Facility: CLINIC | Age: 78
End: 2023-01-24

## 2023-01-24 ENCOUNTER — OFFICE VISIT (OUTPATIENT)
Dept: OBGYN CLINIC | Facility: CLINIC | Age: 78
End: 2023-01-24

## 2023-01-24 VITALS
HEIGHT: 64 IN | BODY MASS INDEX: 27.79 KG/M2 | SYSTOLIC BLOOD PRESSURE: 125 MMHG | HEART RATE: 5 BPM | WEIGHT: 162.8 LBS | DIASTOLIC BLOOD PRESSURE: 76 MMHG

## 2023-01-24 DIAGNOSIS — M54.12 RADICULOPATHY, CERVICAL REGION: ICD-10-CM

## 2023-01-24 DIAGNOSIS — M50.30 DDD (DEGENERATIVE DISC DISEASE), CERVICAL: ICD-10-CM

## 2023-01-24 DIAGNOSIS — M12.812 ROTATOR CUFF ARTHROPATHY OF LEFT SHOULDER: Primary | ICD-10-CM

## 2023-01-24 DIAGNOSIS — M12.812 ROTATOR CUFF TEAR ARTHROPATHY OF LEFT SHOULDER: ICD-10-CM

## 2023-01-24 DIAGNOSIS — M75.102 ROTATOR CUFF TEAR ARTHROPATHY OF LEFT SHOULDER: ICD-10-CM

## 2023-01-24 NOTE — PROGRESS NOTES
Orthopaedics Office Visit - Established Patient Visit    ASSESSMENT/PLAN:    Assessment:   Left shoulder rotator cuff tear arthropathy, only 1-2 wks of relief from CSI to SA space  Cervical spine DDD,severe,  radiculopathy into L periscapular area and upper arm    Plan:   · The patient's cervical x-rays were reviewed today  · Discussed a referral to physical therapy for left shoulder  The patient is agreeable to this prior to a referral to Dr Daniel Rockwell  If he does not have improvement with PT he will be referred to Dr Daniel Rockwell  · Discussed a possible referral to orthopedic spine surgeon  · Follow-up in 1 month    To Do Next Visit:  Re-evaluate     _____________________________________________________  CHIEF COMPLAINT:  Chief Complaint   Patient presents with   • Left Shoulder - Follow-up         SUBJECTIVE:  Willard Herman is a 76 y o  LHD male who presents for follow-up of left shoulder rotator cuff tear arthropathy  The patient was last seen in the office on 10/25/2022 where he received a corticosteroid injection to the left subacromial bursa  The injection provided about 1 5-2 weeks of relief  His pain has worsened over the past month  He notes he has neck pain as well and nobody is treating him for this currently  He has numbness at the upper trapezius, triceps and lateral scapular border  Pain is described as sharp        PAST MEDICAL HISTORY:  Past Medical History:   Diagnosis Date   • Abnormal stress test    • Angina at rest [facility])     no issues currently 5/2021   • Apnea    • Cancer [facility])     Skin   • Carpal tunnel syndrome on left    • Chest pain    • CPAP (continuous positive airway pressure) dependence     uses CPAP   • Diverticulitis    • GERD (gastroesophageal reflux disease)    • History of transfusion     childhood   • Hypercholesterolemia    • Hypercholesterolemia    • Hyperlipidemia    • Hypertension    • Joint pain     Right Shoulder   • Kidney stone    • Mitral valve disorder    • Neck pain • Sleep apnea    • Sleep apnea, obstructive    • Thoracic neuritis        PAST SURGICAL HISTORY:  Past Surgical History:   Procedure Laterality Date   • COLONOSCOPY      Complete   • CORONARY ANGIOPLASTY  2017   • ESOPHAGOGASTRODUODENOSCOPY      Diagnostic   • FL RETROGRADE PYELOGRAM  5/24/2019   • FOOT SURGERY Left     tarsal tunnel   • HERNIA REPAIR     • KNEE ARTHROSCOPY Left    • KNEE SURGERY Left    • NEUROPLASTY / TRANSPOSITION MEDIAN NERVE AT CARPAL TUNNEL     • UT COLONOSCOPY FLX DX W/COLLJ SPEC WHEN PFRMD N/A 4/10/2018    Procedure: EGD AND COLONOSCOPY;  Surgeon: Cullen Saldaña MD;  Location: [facility]; Service: Gastroenterology   • UT CYSTO INSERTION TRANSPROSTATIC IMPLANT SINGLE N/A 8/30/2018    Procedure: Devere Chard WITH INSERTION [place];  Surgeon: Yulisa Méndez MD;  Location: [facility];  Service: Urology   • UT CYSTO/URETERO W/LITHOTRIPSY &INDWELL STENT INSRT Right 5/24/2019    Procedure: CYSTOSCOPY URETEROSCOPY WITH LITHOTRIPSY HOLMIUM LASER, RETROGRADE PYELOGRAM AND INSERTION STENT URETERAL;  Surgeon: Yulisa Méndez MD;  [facility];  Service: Urology   • UT OPEN IMPLANTATION CRANIAL NERVE JONAS & PULSE GEN N/A 5/13/2021    Procedure: INSERTION UPPER AIRWAY STIMULATOR, INSPIRE IMPLANT;  Surgeon: Onofre Kuo MD;  Location[facility];  Service: ENT   • UT TRANSURETHRAL INCISION PROSTATE N/A 8/30/2018    Procedure: TRANSURETHRAL INCISION OF PROSTATE (TUIP);   Surgeon: Yulisa Méndez MD;  Location[facility];  Service: Urology   • ROTATOR CUFF REPAIR Bilateral    • SHOULDER SURGERY     • TARSAL TUNNEL RELEASE      Neuroplasty Decompression       FAMILY HISTORY:  Family History   Problem Relation Age of Onset   • Diabetes Mother         Mellitus   • Heart disease Mother         Arteriosclerotic Cardiovascular   • Hypertension Mother    • Cancer Father    • Arthritis Father         Rheu Mult Site W Involv Of Organs And Systems   • Parkinsonism Father    • Thyroid disease Sister    • Diabetes Brother         Diabetes:Mellitus   • Heart disease Brother    • Hypertension Brother    • Coronary artery disease Family    • Heart disease Brother    • No Known Problems Brother    • Kidney disease Sister    • Parkinsonism Sister        SOCIAL HISTORY:  Social History     Tobacco Use   • Smoking status: Former     Packs/day: 1 00     Years: 17 00     Pack years: 17 00     Types: Cigarettes     Quit date:      Years since quittin 0   • Smokeless tobacco: Never   Vaping Use   • Vaping Use: Never used   Substance Use Topics   • Alcohol use: No   • Drug use: No       MEDICATIONS:    Current Outpatient Medications:   •  acetaminophen (TYLENOL) 500 mg tablet, Take 1,000 mg by mouth every 6 (six) hours as needed for mild pain, Disp: , Rfl:   •  amLODIPine (NORVASC) 10 mg tablet, Take 1 tablet (10 mg total) by mouth every morning, Disp: 90 tablet, Rfl: 3  •  doxepin (SINEquan) 10 mg capsule, Take 1 capsule (10 mg total) by mouth daily at bedtime as needed for sleep, Disp: 30 capsule, Rfl: 3  •  fenofibrate (TRICOR) 145 mg tablet, Take 1 tablet (145 mg total) by mouth daily, Disp: 90 tablet, Rfl: 0  •  ketoconazole (NIZORAL) 2 % cream, Apply topically 2 (two) times a day To facial rash repeat as needed treat for 3 weeks, Disp: 30 g, Rfl: 3  •  lisinopril (ZESTRIL) 40 mg tablet, TAKE ONE TABLET BY MOUTH EVERY DAY, Disp: 90 tablet, Rfl: 1  •  loperamide (IMODIUM A-D) 2 MG tablet, Take 1 tablet by mouth daily as needed, Disp: , Rfl:   •  lovastatin (MEVACOR) 40 MG tablet, TAKE ONE TABLET BY MOUTH EVERY DAY, Disp: 90 tablet, Rfl: 1  •  Multiple Vitamins-Minerals (CENTRUM ULTRA MENS PO), Take 1 tablet by mouth daily, Disp: , Rfl:   •  omeprazole (PriLOSEC) 20 mg delayed release capsule, TAKE ONE CAPSULE BY MOUTH EVERY DAY, Disp: 90 capsule, Rfl: 1    ALLERGIES:  No Known Allergies    REVIEW OF SYSTEMS:  MSK: left shoulder  Neuro: WNL  Pertinent items are otherwise noted in HPI    A comprehensive review of systems was otherwise negative  LABS:  HgA1c:   Lab Results   Component Value Date    HGBA1C 5 8 07/02/2018     BMP:   Lab Results   Component Value Date    GLUCOSE 100 10/29/2015    CALCIUM 9 6 05/31/2022     10/29/2015    K 4 2 05/31/2022    CO2 27 05/31/2022     05/31/2022    BUN 18 05/31/2022    CREATININE 0 99 05/31/2022     CBC: No components found for: CBC    _____________________________________________________  PHYSICAL EXAMINATION:  Vital signs: /76   Pulse (!) 5   Ht 5' 4" (1 626 m)   Wt 73 8 kg (162 lb 12 8 oz)   BMI 27 94 kg/m²   General: No acute distress, awake and alert  Psychiatric: Mood and affect appear appropriate  HEENT: Trachea Midline, No torticollis, no apparent facial trauma  Cardiovascular: No audible murmurs; Extremities appear perfused  Pulmonary: No audible wheezing or stridor  Skin: No open lesions; see further details (if any) below    MUSCULOSKELETAL EXAMINATION:  Extremities:    Left Shoulder: Active range of motion limited to neutral  4/5 resisted abduction strength  95 degrees forward flexion  Fingers are pink and mobile  Compartments are soft  Brisk capillary refill  Sensation is intact   The patient is neurovascularly intact distally in the extremity  _____________________________________________________  STUDIES REVIEWED:  I personally reviewed the images and interpretation is as follows:    X-rays, C-spine, 1/24/23: Demonstrates severe multilevel degenerative changes, cervical kyphosis  No acute osseous abnormality or fracture  PROCEDURES PERFORMED:  Procedures  None performed          Scribe Attestation    I,:  Mattie Moore am acting as a scribe while in the presence of the attending physician :       I,:  Love Rubin MD personally performed the services described in this documentation    as scribed in my presence :

## 2023-02-02 ENCOUNTER — HOSPITAL ENCOUNTER (OUTPATIENT)
Dept: SLEEP CENTER | Facility: CLINIC | Age: 78
Discharge: HOME | End: 2023-02-02

## 2023-02-02 DIAGNOSIS — G47.33 MODERATE OBSTRUCTIVE SLEEP APNEA: ICD-10-CM

## 2023-02-06 NOTE — PROGRESS NOTES
Home Sleep Study Documentation    HOME STUDY DEVICE: Noxturnal yes                                           Annmarie G3 no      Pre-Sleep Home Study:    Set-up and instructions performed by: -    Technician performed demonstration for Patient: yes    Return demonstration performed by Patient: yes    Written instructions provided to Patient: yes    Patient signed consent form: yes        Post-Sleep Home Study:    Additional comments by Patient: none    Home Sleep Study Failed:no:    Failure reason: N/A    Reported or Detected: N/A    Scored by:  Diego Herrera

## 2023-02-07 NOTE — PROGRESS NOTES
PT Evaluation     Today's date: 2/10/2023  Patient name: Peg Sutton  : 1945  MRN: [id number]  Referring provider: Guevara Carreno MD  Dx:   Encounter Diagnosis     ICD-10-CM    1  Rotator cuff arthropathy of left shoulder  M12 812 Ambulatory Referral to Physical Therapy      2  Rotator cuff tear arthropathy of left shoulder  M75 102 Ambulatory Referral to Physical Therapy    M12 812       3  Radiculopathy, cervical region  M54 12 Ambulatory Referral to Physical Therapy                     Assessment  Assessment details: Peg Sutton is a a pleasant 76 y o  male who presents today with pain, decreased strength, decreased ROM, decreased joint mobility, decreased sensation and postural dysfunction  The patient's clinical presentation is consistent with his diagnosis of L shoulder arthropathy  Anton Ortiz complains of sharp pain in the left arm and shoulder and neck ranging from 0/10 to 9/10  Pain is exacerbated by activity and made better by medication or rest     The patient demonstrates significantly decreased strength during resisted muscle testing  Pt ROM is minimally decreased with pain at end ranges  The patient has difficulty with transfers, dressing, leisure, sleeping and athletics  Patient will benefit from skilled physical therapy, including therapeutic exercise, stretching, manual therapy and modalities prn to improve their level of function, to increase overall quality of life, and to address his impairments  Dispensed home exercise program and educated patient on proper technique, frequency, and the possibility of DOMS for the next 24 to 48 hours  Patient demonstrated understanding and was advised to call us if he has any further questions    Impairments: abnormal coordination, abnormal muscle firing, abnormal or restricted ROM, activity intolerance, impaired physical strength, lacks appropriate home exercise program, pain with function, scapular dyskinesis and poor posture Understanding of Dx/Px/POC: excellent  Goals  ST  Independent with HEP in 2 weeks  2  Pt will have verbal report of improvement in symptoms by >/=25% in 2 weeks  3  Decrease pain to 7/10 at it's worst   4  Increase strength by 1/2 grade in all deficient planes  To be achieved by D/C   LT  Pt will improve FOTO score by >/= goal points in 6 weeks  2  Pt will improve FOTO score to >/= goal score by visit # 12   3  Pt will be able to reach overhead with little to no difficulty  4  Pt will be able to reach behind the back with little to no difficulty  5  Pt will be able to drive a car for 20 minutes with little to no difficulty  Plan  Patient would benefit from: skilled PT  Planned modality interventions: cryotherapy, hydrotherapy, unattended electrical stimulation and thermotherapy: hydrocollator packs  Planned therapy interventions: activity modification, ADL retraining, behavior modification, body mechanics training, functional ROM exercises, home exercise program, IADL retraining, joint mobilization, manual therapy, massage, neuromuscular re-education, patient education, postural training, strengthening, stretching, therapeutic activities and therapeutic exercise  Frequency: 1x week  Duration in weeks: 8  Plan of Care beginning date: 2/10/2023  Plan of Care expiration date: 4/10/2023  Treatment plan discussed with: patient        Subjective Evaluation    History of Present Illness  Mechanism of injury: Stephany Whelan presents today with complaints of L shoulder pain that began over 20 years ago  He had no exacerbation, but it has been slowly been getting worse  He notes difficulty with overhead activity, and feels pain with resisted motion  He is a lefty and is forced to use his R hand often due to L shoulder pain  He notes some radicular symptoms from the neck that go down to his shoulder blade and the left arm to the elbow   He had a shot in his L shoulder last year that only lasted a few weeks until his pain returned  Pt had X-ray performed on  that shows the following: Disc height loss and endplate and facet joint degenerative change throughout the cervical spine  Shoulder X-ray on 10/14 - No acute osseous abnormality  The patient also reports diminished strength, decreased ROM, decreased endurance, diminished sensation, disrupted sleep and difficulty with function  Relevant PMH includes bilateral shoulder surgeries 20+ years, INSPIRE sleep apnea device, Cataract surgery in July, HTN    Pain  Current pain ratin  At best pain ratin  At worst pain ratin  Quality: sharp  Aggravating factors: overhead activity and lifting  Progression: worsening    Treatments  No previous or current treatments  Patient Goals  Patient goals for therapy: decreased pain, increased motion, return to sport/leisure activities, increased strength and independence with ADLs/IADLs          Objective     Neurological Testing     Sensation     Shoulder   Left Shoulder   Diminished: light touch    Comments   Left light touch: occasional Numbness in the L hand    Active Range of Motion   Cervical/Thoracic Spine       Cervical    Flexion:  WFL  Extension:  with pain Restriction level: moderate  Left lateral flexion:  with pain Restriction level: moderate  Right lateral flexion:  Restriction level moderate  Left rotation:  with pain Restriction level: moderate  Right rotation:  WFL    Passive Range of Motion   Left Shoulder   Flexion: 160 degrees with pain  Abduction: 155 degrees with pain  External rotation 0°: 55 degrees with pain  Internal rotation 0°: 55 degrees with pain    Right Shoulder   Flexion: 145 degrees   Abduction: 135 degrees   External rotation 0°: 65 degrees   Internal rotation 0°: 75 degrees     Strength/Myotome Testing     Left Shoulder     Planes of Motion   Flexion: 4-   Abduction: 4-   External rotation at 0°: 3   Internal rotation at 0°: 4     Right Shoulder     Planes of Motion   Flexion: 4+   Abduction: 4+   External rotation at 0°: 4+   Internal rotation at 0°: 4+     Left Elbow   Flexion: 4  Extension: 4    Right Elbow   Flexion: 4+  Extension: 4+    Tests   Cervical   Positive vertical compression and cervical distraction test     Left   Positive Spurling's Test A  Right   Positive Spurling's Test A  Left Shoulder   Positive empty can, Hawkin's, Neer's, painful arc and passive horizontal adduction  Right Shoulder   Negative Hawkin's and Neer's  Lumbar   Positive vertical compression   Precautions: HTN, Hx of bilateral RCT Repairs, COPD    Access Code: [email or web address]  URL: [email or web address]  Date: 02/10/2023  Prepared by: Cassandra Sage  • Seated Scapular Retraction - 1 x daily - 7 x weekly - 3 sets - 10 reps  • Isometric Shoulder Flexion at Wall - 1 x daily - 7 x weekly - 3 sets - 10 reps - 5 seconds hold  • Standing Isometric Shoulder External Rotation with Doorway - 1 x daily - 7 x weekly - 3 sets - 10 reps - 5 sec hold  • Standing Isometric Shoulder Internal Rotation at Doorway - 1 x daily - 7 x weekly - 3 sets - 10 reps - 5 seconds hold  • Isometric Shoulder Extension at Wall - 1 x daily - 7 x weekly - 3 sets - 10 reps - 5 seconds hold  • Isometric Shoulder Abduction at Wall - 1 x daily - 7 x weekly - 3 sets - 10 reps - 5 seconds hold  • Sternocleidomastoid Stretch - 1 x daily - 7 x weekly - 3 sets - 10 reps - 10 hold  • Seated Assisted Cervical Rotation with Towel - 1 x daily - 7 x weekly - 3 sets - 10 reps    Manuals 2/10            Manual traction TS            Upper cervical traction TS            suboccippital release TS            SNAG, NAG             Thoracic PA mobs             Sidebend MWM             Neuro Re-Ed             DNF w/ towel             DNF w/ BP cuff             Chin tucks seated                                                                 Ther Ex             Towel rotation 10x5"            UT stretch HEP LS stretch             Pec minor str               Prone W             Wall angels             Scapular retraction             Cervical isometrics             Anatoliy M,L             Matthew w/ TB             Shoulder  submaximal isometrics 10x5" ea way            Ther Activity                                       Gait Training                                       Modalities             Rufus LAZCANO

## 2023-02-10 ENCOUNTER — TELEPHONE (OUTPATIENT)
Dept: SLEEP CENTER | Facility: CLINIC | Age: 78
End: 2023-02-10

## 2023-02-10 ENCOUNTER — EVALUATION (OUTPATIENT)
Dept: PHYSICAL THERAPY | Facility: CLINIC | Age: 78
End: 2023-02-10

## 2023-02-10 DIAGNOSIS — M12.812 ROTATOR CUFF TEAR ARTHROPATHY OF LEFT SHOULDER: ICD-10-CM

## 2023-02-10 DIAGNOSIS — M12.812 ROTATOR CUFF ARTHROPATHY OF LEFT SHOULDER: Primary | ICD-10-CM

## 2023-02-10 DIAGNOSIS — M54.12 RADICULOPATHY, CERVICAL REGION: ICD-10-CM

## 2023-02-10 DIAGNOSIS — M75.102 ROTATOR CUFF TEAR ARTHROPATHY OF LEFT SHOULDER: ICD-10-CM

## 2023-02-13 ENCOUNTER — RA CDI HCC (OUTPATIENT)
Dept: OTHER | Facility: HOSPITAL | Age: 78
End: 2023-02-13

## 2023-02-13 NOTE — PROGRESS NOTES
[facility]  coding opportunities       Chart reviewed, no opportunity found:   [address]        Patients Insurance     Medicare Insurance: [employer]

## 2023-02-15 ENCOUNTER — OFFICE VISIT (OUTPATIENT)
Dept: SLEEP CENTER | Facility: CLINIC | Age: 78
End: 2023-02-15

## 2023-02-15 VITALS
BODY MASS INDEX: 27.49 KG/M2 | HEART RATE: 69 BPM | SYSTOLIC BLOOD PRESSURE: 133 MMHG | HEIGHT: 64 IN | DIASTOLIC BLOOD PRESSURE: 62 MMHG | WEIGHT: 161 LBS

## 2023-02-15 DIAGNOSIS — G47.33 OSA (OBSTRUCTIVE SLEEP APNEA): Primary | ICD-10-CM

## 2023-02-15 NOTE — PROGRESS NOTES
Progress Note - Sleep Medicine  Kendrick Sousa 76 y o  male MRN: [id number]       Impression & Plan:   64 y o  M with PMHx of COPD, GERD, HTN, hyperlipidemia and GREGG who previously failed CPAP who comes in for initiation of Inspire therapy today  1   Moderate GREGG (AHI - 18 9) previously failed CPAP s/p implantation of Inspire on 5/31/21  Here for device adjustment  -  Current stimulation level 1 4 - 2 4  He is set to level 6 (1 9V)  -  Repeat HST still demonstrated increase AHI 7 8  He attempted to advance the device further but is not able      -  We discussed additional therapy including a positional device  -  He also has a lose tooth which is causing irritation when the device advances at night  Therefore we discussed the addition of an oral appliance as well  He did take a list of dentists home to explore this further          -  Start delay and pause time are both 30 minutes       -  Pulse width and frequency still 60/40  -  I discussed in depth the risk of leaving sleep apnea untreated including hypertension, heart failure, arrhythmia, MI and stroke      2   Fragmented sleep/Insufficient sleep - this still seems to be problematic where he gets up 2-3 times a night but this has significantly improved compared ot before  -  I encouraged him to extend his sleep as much as possible  -  Continue Doxepin 10mg qHS    3  Daytime sleepiness - he still naps 1 time a day  He still notes some daytime sleepiness as well     ______________________________________________________________________    HPI:    Kendrick Sousa presents today for follow-up for his inspire  He had a recent sleep study during which he had his device set to level 6  He states that he is tolerating level 6 but still notes some difficulty with dry mouth on occasion  He is unable to advance it any further  He still notes daytime sleepiness and will nap once a day    He gets up 2-3 times at night to go to the bathroom  We did discuss alternative devices such as a positional device  However, he has significant shoulder pain and does not tolerate laying on his side long  He also mentioned that he has a missing tooth so when the inspire device advances it will irritate his tongue  Review of Systems:  Review of Systems   Constitutional: Positive for fatigue  Negative for appetite change and unexpected weight change  HENT: Negative  Eyes: Negative  Respiratory: Negative  Cardiovascular: Negative  Gastrointestinal: Negative  Endocrine: Negative  Genitourinary: Negative  Musculoskeletal: Negative  Allergic/Immunologic: Negative  Neurological: Negative  Hematological: Negative  Negative for adenopathy  Psychiatric/Behavioral: Negative            Social history updates:  Social History     Tobacco Use   Smoking Status Former   • Packs/day: 1 00   • Years: 17    • Pack years: 17    • Types: Cigarettes   • Quit date:    • Years since quittin 1   Smokeless Tobacco Never     Social History     Socioeconomic History   • Marital status: /Civil Union     Spouse name: Not on file   • Number of children: 4   • Years of education: high school or GED   • Highest education level: Not on file   Occupational History   • Occupation: retired   Tobacco Use   • Smoking status: Former     Packs/day: 1 00     Years:      Pack years: 17      Types: Cigarettes     Quit date:      Years since quittin 1   • Smokeless tobacco: Never   Vaping Use   • Vaping Use: Never used   Substance and Sexual Activity   • Alcohol use: No   • Drug use: No   • Sexual activity: Not Currently     Partners: Female   Other Topics Concern   • Not on file   Social History Narrative    Active Advance Directives    Lives With Spouse             Social Determinants of Health     Financial Resource Strain: Not on file   Food Insecurity: Not on file   Transportation Needs: Not on file Physical Activity: Not on file   Stress: No Stress Concern Present   • Feeling of Stress : Not at all   Social Connections: Not on file   Intimate Partner Violence: Not on file   Housing Stability: Not on file       PhysicalExamination:  Vitals:   /62 (BP Location: Left arm, Patient Position: Sitting, Cuff Size: Adult)   Pulse 69   Ht 5' 4" (1 626 m)   Wt 73 kg (161 lb)   BMI 27 64 kg/m²   General: Pleasant, Awake alert and oriented x 3, conversant without conversational dyspnea, NAD, normal affect  HEENT:  PERRL, Sclera noninjected, nonicteric OU, Nares patent,  no craniofacial abnormalities, Mucous membranes, moist, no oral lesions, normal dentition, Mallampati class 4  NECK: Trachea midline, no accessory muscle use, no stridor, no cervical or supraclavicular adenopathy, JVP not elevated  CARDIAC: Reg, single s1/S2, no m/r/g  PULM: CTA bilaterally no wheezing, rhonchi or rales  ABD: Normoactive bowel sounds, soft nontender, nondistended, no rebound, no rigidity, no guarding  EXT: No cyanosis, no clubbing, no edema, normal capillary refill  NEURO: no focal neurologic deficits, AAOx3, moving all extremities appropriately    Diagnostic Data:  Labs: I personally reviewed the most recent laboratory data pertinent to today's visit  I have personally reviewed pertinent lab results    Lab Results   Component Value Date    WBC 6 23 05/31/2022    HGB 14 9 05/31/2022    HCT 44 3 05/31/2022     (H) 05/31/2022     05/31/2022     Lab Results   Component Value Date    GLUCOSE 100 10/29/2015    CALCIUM 9 6 05/31/2022     10/29/2015    K 4 2 05/31/2022    CO2 27 05/31/2022     05/31/2022    BUN 18 05/31/2022    CREATININE 0 99 05/31/2022     No results found for: IGE  Lab Results   Component Value Date    ALT 23 05/31/2022    AST 22 05/31/2022    ALKPHOS 78 05/31/2022    BILITOT 0 67 10/29/2015     No results found for: IRON, TIBC, FERRITIN  Lab Results   Component Value Date    [id number] 046 07/26/2021     Diagnostic 10/2020: AHI 18 9 events per hour consistent with moderate obstructive sleep apnea      Sleep study with Inspire 9/2021  The patient slept poorly during the night for a total of 202 minutes, representing sleep efficiency of 44%, possibly secondary to a first night effect in sleeping in a new environment  The patient had slight reduction of his sleep disordered breathing events down from a baseline of 18 9 events per hour under control night study to 10 7 events per hour with titrating Inspire up to 1 7 volts  The patient had an interrupted sleep pattern which could affect the results of his sleep disorder breathing events, and he has experienced brief chest heaviness during the study that has gotten better on its own, with no significant EKG changes  Adjusting the patient’s inspire voltage amplitude to start at 1 5 V with the patient controlled range of 1 3-2 3 V would be recommended, consideration for a repeat home sleep study within 6-8 weeks for further evaluation would be considered  Consider a cardiology outpatient evaluation, clinical correlation suggested       HST 1/2022  IMPRESSION:    The patient had slightly elevated sleep disordered breathing events with an average AHI of 7 7 events per hour, with still improvement from his baseline AHI with using the inspire/hypoglossal nerve stimulation   Continuing to use and advance inspire is recommended, clinical correlation suggested      HST 2/2023  IMPRESSION:    The patient had slightly elevated sleep disordered breathing events with an average AHI of 7 7 events per hour, with still improvement from his baseline AHI with using the inspire/hypoglossal nerve stimulation  Continuing to use and advance inspire is recommended, clinical correlation suggested      Compliance data 2/15/23  99% usage, average use 7 9 hrs  1 4 - 2 4, currently at level 1 9V    Roman Maher DO

## 2023-02-15 NOTE — LETTER
February 15, 2023     Lesli Body, DO  [address] [facility] [address]    Patient: Max Mendoza   YOB: 1945   Date of Visit: 2/15/2023       Dear Dr Jana Mesa: Thank you for referring Rebekah Hodgkins to me for evaluation  Below are my notes for this consultation  If you have questions, please do not hesitate to call me  I look forward to following your patient along with you  Sincerely,        Barbara Manuel DO        CC: No Recipients  Barbara Manuel DO  2/15/2023  3:49 PM  Sign when Signing Visit  Progress Note - Sleep Medicine  Max Mendoza 76 y o  male MRN: [id number]       Impression & Plan:   64 y o  M with PMHx of COPD, GERD, HTN, hyperlipidemia and GREGG who previously failed CPAP who comes in for initiation of Inspire therapy today  1   Moderate GREGG (AHI - 18 9) previously failed CPAP s/p implantation of Inspire on 5/31/21  Here for device adjustment  -  Current stimulation level 1 4 - 2 4  He is set to level 6 (1 9V)  -  Repeat HST still demonstrated increase AHI 7 8  He attempted to advance the device further but is not able      -  We discussed additional therapy including a positional device  -  He also has a lose tooth which is causing irritation when the device advances at night  Therefore we discussed the addition of an oral appliance as well  He did take a list of dentists home to explore this further          -  Start delay and pause time are both 30 minutes       -  Pulse width and frequency still 60/40  -  I discussed in depth the risk of leaving sleep apnea untreated including hypertension, heart failure, arrhythmia, MI and stroke      2   Fragmented sleep/Insufficient sleep - this still seems to be problematic where he gets up 2-3 times a night but this has significantly improved compared ot before  -  I encouraged him to extend his sleep as much as possible          -  Continue Doxepin 10mg qHS    3  Daytime sleepiness - he still naps 1 time a day  He still notes some daytime sleepiness as well     ______________________________________________________________________    HPI:    Acacia Suraez presents today for follow-up for his inspire  He had a recent sleep study during which he had his device set to level 6  He states that he is tolerating level 6 but still notes some difficulty with dry mouth on occasion  He is unable to advance it any further  He still notes daytime sleepiness and will nap once a day  He gets up 2-3 times at night to go to the bathroom  We did discuss alternative devices such as a positional device  However, he has significant shoulder pain and does not tolerate laying on his side long  He also mentioned that he has a missing tooth so when the inspire device advances it will irritate his tongue  Review of Systems:  Review of Systems   Constitutional: Positive for fatigue  Negative for appetite change and unexpected weight change  HENT: Negative  Eyes: Negative  Respiratory: Negative  Cardiovascular: Negative  Gastrointestinal: Negative  Endocrine: Negative  Genitourinary: Negative  Musculoskeletal: Negative  Allergic/Immunologic: Negative  Neurological: Negative  Hematological: Negative  Negative for adenopathy  Psychiatric/Behavioral: Negative            Social history updates:  Social History     Tobacco Use   Smoking Status Former   • Packs/day: 1 00   • Years: 17 00   • Pack years: 17 00   • Types: Cigarettes   • Quit date: 1   • Years since quittin 1   Smokeless Tobacco Never     Social History     Socioeconomic History   • Marital status: /Civil Union     Spouse name: Not on file   • Number of children: 4   • Years of education: high school or GED   • Highest education level: Not on file   Occupational History   • Occupation: retired   Tobacco Use   • Smoking status: Former Packs/day: 1 00     Years: 17 00     Pack years: 17 00     Types: Cigarettes     Quit date: 1     Years since quittin 1   • Smokeless tobacco: Never   Vaping Use   • Vaping Use: Never used   Substance and Sexual Activity   • Alcohol use: No   • Drug use: No   • Sexual activity: Not Currently     Partners: Female   Other Topics Concern   • Not on file   Social History Narrative    Active Advance Directives    Lives With Spouse             Social Determinants of Health     Financial Resource Strain: Not on file   Food Insecurity: Not on file   Transportation Needs: Not on file   Physical Activity: Not on file   Stress: No Stress Concern Present   • Feeling of Stress : Not at all   Social Connections: Not on file   Intimate Partner Violence: Not on file   Housing Stability: Not on file       PhysicalExamination:  Vitals:   /62 (BP Location: Left arm, Patient Position: Sitting, Cuff Size: Adult)   Pulse 69   Ht 5' 4" (1 626 m)   Wt 73 kg (161 lb)   BMI 27 64 kg/m²   General: Pleasant, Awake alert and oriented x 3, conversant without conversational dyspnea, NAD, normal affect  HEENT:  PERRL, Sclera noninjected, nonicteric OU, Nares patent,  no craniofacial abnormalities, Mucous membranes, moist, no oral lesions, normal dentition, Mallampati class 4  NECK: Trachea midline, no accessory muscle use, no stridor, no cervical or supraclavicular adenopathy, JVP not elevated  CARDIAC: Reg, single s1/S2, no m/r/g  PULM: CTA bilaterally no wheezing, rhonchi or rales  ABD: Normoactive bowel sounds, soft nontender, nondistended, no rebound, no rigidity, no guarding  EXT: No cyanosis, no clubbing, no edema, normal capillary refill  NEURO: no focal neurologic deficits, AAOx3, moving all extremities appropriately    Diagnostic Data:  Labs: I personally reviewed the most recent laboratory data pertinent to today's visit  I have personally reviewed pertinent lab results    Lab Results   Component Value Date    WBC  05/31/2022    HGB 14 9 05/31/2022    HCT 44 3 05/31/2022     (H) 05/31/2022     05/31/2022     Lab Results   Component Value Date    GLUCOSE 100 10/29/2015    CALCIUM 9 6 05/31/2022     10/29/2015    K 4 2 05/31/2022    CO2 27 05/31/2022     05/31/2022    BUN 18 05/31/2022    CREATININE 0 99 05/31/2022     No results found for: IGE  Lab Results   Component Value Date    ALT 23 05/31/2022    AST 22 05/31/2022    ALKPHOS 78 05/31/2022    BILITOT 0 67 10/29/2015     No results found for: IRON, TIBC, FERRITIN  Lab Results   Component Value Date    [id number] 533 07/26/2021     Diagnostic 10/2020: AHI 18 9 events per hour consistent with moderate obstructive sleep apnea      Sleep study with Inspire 9/2021  The patient slept poorly during the night for a total of 202 minutes, representing sleep efficiency of 44%, possibly secondary to a first night effect in sleeping in a new environment  The patient had slight reduction of his sleep disordered breathing events down from a baseline of 18 9 events per hour under control night study to 10 7 events per hour with titrating Inspire up to 1 7 volts  The patient had an interrupted sleep pattern which could affect the results of his sleep disorder breathing events, and he has experienced brief chest heaviness during the study that has gotten better on its own, with no significant EKG changes  Adjusting the patient’s inspire voltage amplitude to start at 1 5 V with the patient controlled range of 1 3-2 3 V would be recommended, consideration for a repeat home sleep study within 6-8 weeks for further evaluation would be considered  Consider a cardiology outpatient evaluation, clinical correlation suggested       HST 1/2022  IMPRESSION:    The patient had slightly elevated sleep disordered breathing events with an average AHI of 7 7 events per hour, with still improvement from his baseline AHI with using the inspire/hypoglossal nerve stimulation   Continuing to use and advance inspire is recommended, clinical correlation suggested      HST 2/2023  IMPRESSION:    The patient had slightly elevated sleep disordered breathing events with an average AHI of 7 7 events per hour, with still improvement from his baseline AHI with using the inspire/hypoglossal nerve stimulation  Continuing to use and advance inspire is recommended, clinical correlation suggested      Compliance data 2/15/23  99% usage, average use 7 9 hrs  1 4 - 2 4, currently at level 1 [address] [place] [address], DO

## 2023-02-16 ENCOUNTER — OFFICE VISIT (OUTPATIENT)
Dept: INTERNAL MEDICINE CLINIC | Facility: CLINIC | Age: 78
End: 2023-02-16

## 2023-02-16 VITALS
WEIGHT: 161 LBS | RESPIRATION RATE: 18 BRPM | OXYGEN SATURATION: 98 % | SYSTOLIC BLOOD PRESSURE: 112 MMHG | TEMPERATURE: 97.5 F | HEART RATE: 58 BPM | BODY MASS INDEX: 27.64 KG/M2 | DIASTOLIC BLOOD PRESSURE: 68 MMHG

## 2023-02-16 DIAGNOSIS — J44.9 CHRONIC OBSTRUCTIVE PULMONARY DISEASE, UNSPECIFIED COPD TYPE (HCC): ICD-10-CM

## 2023-02-16 DIAGNOSIS — Z00.00 ENCOUNTER FOR SUBSEQUENT ANNUAL WELLNESS VISIT (AWV) IN MEDICARE PATIENT: Primary | ICD-10-CM

## 2023-02-16 DIAGNOSIS — D53.1 MEGALOBLASTIC RED BLOOD CELLS: ICD-10-CM

## 2023-02-16 DIAGNOSIS — Z11.59 NEED FOR HEPATITIS C SCREENING TEST: ICD-10-CM

## 2023-02-16 DIAGNOSIS — I51.89 DIASTOLIC DYSFUNCTION: ICD-10-CM

## 2023-02-16 DIAGNOSIS — M12.812 ROTATOR CUFF ARTHROPATHY OF LEFT SHOULDER: ICD-10-CM

## 2023-02-16 DIAGNOSIS — Z59.9 FINANCIAL DIFFICULTIES: ICD-10-CM

## 2023-02-16 DIAGNOSIS — I10 ESSENTIAL HYPERTENSION: ICD-10-CM

## 2023-02-16 DIAGNOSIS — F11.20 UNCOMPLICATED OPIOID DEPENDENCE (HCC): ICD-10-CM

## 2023-02-16 DIAGNOSIS — E78.2 MIXED HYPERLIPIDEMIA: ICD-10-CM

## 2023-02-16 SDOH — ECONOMIC STABILITY - INCOME SECURITY: PROBLEM RELATED TO HOUSING AND ECONOMIC CIRCUMSTANCES, UNSPECIFIED: Z59.9

## 2023-02-16 NOTE — PROGRESS NOTES
Assessment and Plan:     Problem List Items Addressed This Visit        Respiratory    Chronic obstructive pulmonary disease (HCC)-mild  No respiratory therapy required  Cardiovascular and Mediastinum    Essential hypertension-well controlled on amlodipine and lisionpril       Musculoskeletal and Integument    Rotator cuff arthropathy of left shoulder- chronic issues  Follows with orthopedics  Possible upcoming repair       Other    Mixed hyperlipidemia- stable on statin and fenofibrate  Will continue to monitor  Relevant Orders    Lipid Panel with Direct LDL reflex    Diastolic dysfunction- noted on 2018 echocardiogram  Compensated  Megaloblastic red blood cells- no anemia  Normal folate and b12  Will continue to monitor     Relevant Orders    CBC and differential    RESOLVED: Uncomplicated opioid dependence ([facility] [address] )   Other Visit Diagnoses     Encounter for subsequent annual wellness visit (AWV) in Medicare patient    -  Primary    Relevant Orders    CBC and differential    Comprehensive metabolic panel    Financial difficulties        Relevant Orders    Ambulatory referral to social work care management program    Need for hepatitis C screening test        Relevant Orders    Hepatitis C Antibody ([facility])          Depression Screening and Follow-up Plan: Patient was screened for depression during today's encounter  They screened negative with a PHQ-2 score of 0  Preventive health issues were discussed with patient, and age appropriate screening tests were ordered as noted in patient's After Visit Summary  Personalized health advice and appropriate referrals for health education or preventive services given if needed, as noted in patient's After Visit Summary  History of Present Illness:     Patient presents for a Medicare Wellness Visit    Presents for annual  No concerns   He has an upcoming surgery for his old rotator cuff injury      Patient Care Team:  Og Zepeda DO as PCP - General (Family Medicine)  MARELY Hammond MD Sylvia Rend, MD Clent Laster, PA-C Eura Muff, MD Helmut Schooner, MD (Urology)  Mario Unger MD as Endoscopist  Paul Brennan MD as Consulting Physician (Nephrology)         Problem List:     Patient Active Problem List   Diagnosis   • Essential hypertension   • Mixed hyperlipidemia   • Chronic pain disorder   • Chronic left shoulder pain   • Chronic bilateral low back pain with bilateral sciatica   • Disc degeneration, lumbar   • Lumbar stenosis with neurogenic claudication   • Abnormal CT of the abdomen   • Chronic pain syndrome   • GREGG (obstructive sleep apnea)   • Benign prostatic hyperplasia with lower urinary tract symptoms   • Hydronephrosis   • Nonrheumatic aortic valve insufficiency   • Non-rheumatic mitral regurgitation   • Diastolic dysfunction   • Nephrolithiasis   • Squamous cell carcinoma in situ (SCCIS) of scalp   • Actinic keratosis   • Chronic obstructive pulmonary disease (HCC)   • Megaloblastic red blood cells   • BMI 31 0-31 9,adult   • Class 1 obesity without serious comorbidity with body mass index (BMI) of 31 0 to 31 9 in adult   • Insomnia   • Age-related cataract of both eyes   • Rotator cuff arthropathy of left shoulder   • DDD (degenerative disc disease), cervical      Past Medical and Surgical History:     Past Medical History:   Diagnosis Date   • Abnormal stress test    • Angina at rest [facility])     no issues currently 5/2021   • Apnea    • Cancer [facility])     Skin   • Carpal tunnel syndrome on left    • Chest pain    • CPAP (continuous positive airway pressure) dependence     uses CPAP   • Diverticulitis    • GERD (gastroesophageal reflux disease)    • History of transfusion     childhood   • Hypercholesterolemia    • Hypercholesterolemia    • Hyperlipidemia    • Hypertension    • Joint pain     Right Shoulder   • Kidney stone    • Mitral valve disorder    • Neck pain    • Sleep apnea    • Sleep apnea, obstructive    • Thoracic neuritis      Past Surgical History:   Procedure Laterality Date   • COLONOSCOPY      Complete   • CORONARY ANGIOPLASTY  2017   • ESOPHAGOGASTRODUODENOSCOPY      Diagnostic   • FL RETROGRADE PYELOGRAM  5/24/2019   • FOOT SURGERY Left     tarsal tunnel   • HERNIA REPAIR     • KNEE ARTHROSCOPY Left    • KNEE SURGERY Left    • NEUROPLASTY / TRANSPOSITION MEDIAN NERVE AT CARPAL TUNNEL     • ME COLONOSCOPY FLX DX W/COLLJ SPEC WHEN PFRMD N/A 4/10/2018    Procedure: EGD AND COLONOSCOPY;  Surgeon: Valerie Jaimes MD;  Location: [facility]; Service: Gastroenterology   • ME CYSTO INSERTION TRANSPROSTATIC IMPLANT SINGLE N/A 8/30/2018    Procedure: Agustín Sal WITH INSERTION Particia Maryann;  Surgeon: Rafa Tinoco MD;  [facility];  Service: Urology   • ME CYSTO/URETERO W/LITHOTRIPSY &INDWELL STENT INSRT Right 5/24/2019    Procedure: CYSTOSCOPY URETEROSCOPY WITH LITHOTRIPSY HOLMIUM LASER, RETROGRADE PYELOGRAM AND INSERTION STENT URETERAL;  Surgeon: Rafa Tinoco MD;  [facility];  Service: Urology   • ME OPEN IMPLANTATION CRANIAL NERVE JONAS & PULSE GEN N/A 5/13/2021    Procedure: INSERTION UPPER AIRWAY STIMULATOR, INSPIRE IMPLANT;  Surgeon: Sri Chaves MD;  [facility];  Service: ENT   • ME TRANSURETHRAL INCISION PROSTATE N/A 8/30/2018    Procedure: TRANSURETHRAL INCISION OF PROSTATE (TUIP);   Surgeon: Rafa Tinoco MD;  [facility];  Service: Urology   • ROTATOR CUFF REPAIR Bilateral    • SHOULDER SURGERY     • TARSAL TUNNEL RELEASE      Neuroplasty Decompression      Family History:     Family History   Problem Relation Age of Onset   • Diabetes Mother         Mellitus   • Heart disease Mother         Arteriosclerotic Cardiovascular   • Hypertension Mother    • Cancer Father    • Arthritis Father         [facility] [employer]   • Parkinsonism Father    • Thyroid disease Sister    • Diabetes Brother         Diabetes:Mellitus   • Heart disease Brother    • Hypertension Brother    • Coronary artery disease Family    • Heart disease Brother    • No Known Problems Brother    • Kidney disease Sister    • Parkinsonism Sister       Social History:     Social History     Socioeconomic History   • Marital status: /Civil Union     Spouse name: None   • Number of children: 4   • Years of education: high school or GED   • Highest education level: None   Occupational History   • Occupation: retired   Tobacco Use   • Smoking status: Former     Packs/day: 1 00     Years: 17 00     Pack years: 17 00     Types: Cigarettes     Quit date:      Years since quittin 1   • Smokeless tobacco: Never   Vaping Use   • Vaping Use: Never used   Substance and Sexual Activity   • Alcohol use: No   • Drug use: No   • Sexual activity: Not Currently     Partners: Female   Other Topics Concern   • None   Social History Narrative    Active Advance Directives    Lives With Spouse             Social Determinants of Health     Financial Resource Strain: Medium Risk   • Difficulty of Paying Living Expenses: Somewhat hard   Food Insecurity: Not on file   Transportation Needs: No Transportation Needs   • Lack of Transportation (Medical): No   • Lack of Transportation (Non-Medical):  No   Physical Activity: Not on file   Stress: No Stress Concern Present   • Feeling of Stress : Not at all   Social Connections: Not on file   Intimate Partner Violence: Not on file   Housing Stability: Not on file      Medications and Allergies:     Current Outpatient Medications   Medication Sig Dispense Refill   • acetaminophen (TYLENOL) 500 mg tablet Take 1,000 mg by mouth every 6 (six) hours as needed for mild pain     • amLODIPine (NORVASC) 10 mg tablet Take 1 tablet (10 mg total) by mouth every morning 90 tablet 3   • doxepin (SINEquan) 10 mg capsule Take 1 capsule (10 mg total) by mouth daily at bedtime as needed for sleep 30 capsule 3   • fenofibrate (TRICOR) 145 mg tablet Take 1 tablet (145 mg total) by mouth daily 90 tablet 0   • lisinopril (ZESTRIL) 40 mg tablet TAKE ONE TABLET BY MOUTH EVERY DAY 90 tablet 1   • loperamide (IMODIUM A-D) 2 MG tablet Take 1 tablet by mouth daily as needed     • lovastatin (MEVACOR) 40 MG tablet TAKE ONE TABLET BY MOUTH EVERY DAY 90 tablet 1   • Multiple Vitamins-Minerals (CENTRUM ULTRA MENS PO) Take 1 tablet by mouth daily     • omeprazole (PriLOSEC) 20 mg delayed release capsule TAKE ONE CAPSULE BY MOUTH EVERY DAY 90 capsule 1   • Multiple Vitamins-Minerals (ICAPS AREDS 2 PO) Take by mouth       No current facility-administered medications for this visit  No Known Allergies   Immunizations:     Immunization History   Administered Date(s) Administered   • COVID-19 MODERNA VACC 0 5 ML IM 02/08/2021, 03/08/2021, 10/22/2021, 08/16/2022   • INFLUENZA 11/02/2005, 09/23/2009, 10/06/2014, 10/01/2015, 09/10/2018, 09/15/2021, 08/16/2022   • Influenza Split High Dose Preservative Free IM 10/01/2015, 08/30/2017   • Influenza, seasonal, injectable 10/01/2016   • Pneumococcal Conjugate 13-Valent 07/08/2020   • Pneumococcal Polysaccharide PPV23 09/23/2009, 08/16/2022   • Tdap 1945, 05/03/2018, 10/14/2022   • Zoster 1945   • Zoster Vaccine Recombinant 08/16/2022   • influenza, trivalent, adjuvanted 10/10/2019      Health Maintenance:         Topic Date Due   • Hepatitis C Screening  Never done   • Colorectal Cancer Screening  04/10/2023         Topic Date Due   • COVID-19 Vaccine (5 - Booster for July Anderson series) 10/11/2022      Medicare Screening Tests and Risk Assessments:         Health Risk Assessment:   Patient rates overall health as very good  Patient feels that their physical health rating is slightly worse  Patient is satisfied with their life  Eyesight was rated as same  Hearing was rated as same  Patient feels that their emotional and mental health rating is same   Patients states they are never, rarely angry  Patient states they are sometimes unusually tired/fatigued  Pain experienced in the last 7 days has been some  Patient's pain rating has been 7/10  Patient states that he has experienced no weight loss or gain in last 6 months  Depression Screening:   PHQ-2 Score: 0      Fall Risk Screening: In the past year, patient has experienced: history of falling in past year    Number of falls: 1  Injured during fall?: Yes    Feels unsteady when standing or walking?: No    Worried about falling?: No      Home Safety:  Patient does not have trouble with stairs inside or outside of their home  Patient has working smoke alarms and has working carbon monoxide detector  Home safety hazards include: loose rugs on the floor  throw rugs     Medications:   Patient is currently taking over-the-counter supplements  OTC medications include: see medication list  Patient is able to manage medications  Multi vitamins capsules for eye vision    Activities of Daily Living (ADLs)/Instrumental Activities of Daily Living (IADLs):   Walk and transfer into and out of bed and chair?: Yes  Dress and groom yourself?: Yes    Bathe or shower yourself?: Yes    Feed yourself?  Yes  Do your laundry/housekeeping?: Yes  Manage your money, pay your bills and track your expenses?: Yes  Make your own meals?: Yes    Do your own shopping?: Yes    Previous Hospitalizations:   Any hospitalizations or ED visits within the last 12 months?: Yes      Hospitalization Comments: [facility] fell and had a cut on his left eye brow with in the past year    Advance Care Planning:   Living will: No    Advanced directive counseling given: Yes      PREVENTIVE SCREENINGS      Cardiovascular Screening:    General: Screening Not Indicated and History Lipid Disorder      Diabetes Screening:     General: Screening Current      Colorectal Cancer Screening:     General: Screening Current      Prostate Cancer Screening:    General: Screening Not Indicated Abdominal Aortic Aneurysm (AAA) Screening:    Risk factors include: tobacco use        Lung Cancer Screening:     General: Screening Not Indicated    Screening, Brief Intervention, and Referral to Treatment (SBIRT)    Screening  Typical number of drinks in a day: 0  Typical number of drinks in a week: 0  Interpretation: Low risk drinking behavior  Single Item Drug Screening:  How often have you used an illegal drug (including marijuana) or a prescription medication for non-medical reasons in the past year? never    Single Item Drug Screen Score: 0  Interpretation: Negative screen for possible drug use disorder    No results found  Physical Exam:     /68 (BP Location: Left arm, Patient Position: Sitting, Cuff Size: Standard)   Pulse 58   Temp 97 5 °F (36 4 °C) (Temporal)   Resp 18   Wt 73 kg (161 lb)   SpO2 98%   BMI 27 64 kg/m²     Physical Exam  HENT:      Head: Normocephalic  Eyes:      Conjunctiva/sclera: Conjunctivae normal    Cardiovascular:      Rate and Rhythm: Normal rate and regular rhythm  Heart sounds: No murmur heard  Pulmonary:      Effort: Pulmonary effort is normal       Breath sounds: Normal breath sounds  Abdominal:      General: Bowel sounds are normal       Palpations: Abdomen is soft  Neurological:      Mental Status: He is alert and oriented to person, place, and time     Psychiatric:         Mood and Affect: Mood normal          Behavior: Behavior normal           Sourav Walter DO

## 2023-02-17 ENCOUNTER — PATIENT OUTREACH (OUTPATIENT)
Dept: INTERNAL MEDICINE CLINIC | Facility: CLINIC | Age: 78
End: 2023-02-17

## 2023-02-17 NOTE — PROGRESS NOTES
OPCM  is responding to referral from PCP regarding patient reporting having financial concerns on his SDOH assessment  Telephone call placed to Keagan Griffith and I introduced myself and explained my role  Keagan Nacho informed me that he resides with his wife and they receive SS  He reports that they are over income for SNAP benefits  Keagan Nacho informed me that he and his wife are on [employer]  He denies being behind on his mortgage or utilities but he reports having outstanding medical bills and co payments with [employer] and [employer]  I provided supportive listening and information on available community resources  Keagan Nacho informed me that he goes to the local food pantry that's affiliated with his [employer]  I also informed him of [facility] and I provided him with the telephone number for the Financial Counselors  He agrees to call to get an application for himself and his wife  Keagan Nacho denies having any other needs that I can assist him with and he reports having sufficient food, medications, transportation, housing and all basic necessities  I am closing this referral at this time and I advised Keagan Griffith to contact the CM dept if he needs any further assistance

## 2023-02-22 ENCOUNTER — OFFICE VISIT (OUTPATIENT)
Dept: PHYSICAL THERAPY | Facility: CLINIC | Age: 78
End: 2023-02-22

## 2023-02-22 DIAGNOSIS — M54.12 RADICULOPATHY, CERVICAL REGION: ICD-10-CM

## 2023-02-22 DIAGNOSIS — M12.812 ROTATOR CUFF ARTHROPATHY OF LEFT SHOULDER: Primary | ICD-10-CM

## 2023-02-22 DIAGNOSIS — M12.812 ROTATOR CUFF TEAR ARTHROPATHY OF LEFT SHOULDER: ICD-10-CM

## 2023-02-22 DIAGNOSIS — M75.102 ROTATOR CUFF TEAR ARTHROPATHY OF LEFT SHOULDER: ICD-10-CM

## 2023-02-22 NOTE — PROGRESS NOTES
Daily Note     Today's date: 2023  Patient name: Myra Head  : 1945  MRN: [id number]  Referring provider: Tamra Miranda MD  Dx:   Encounter Diagnosis     ICD-10-CM    1  Rotator cuff arthropathy of left shoulder  M12 812       2  Rotator cuff tear arthropathy of left shoulder  M75 102     M12 812       3  Radiculopathy, cervical region  M54 12           Start Time: 1358  Stop Time: 1455  Total time in clinic (min): 57 minutes    Subjective: Patient reports that his wife is sick and was in the hospital with congestive heart failure and he is too busy caring for her to have any time to perform his HEP  Patient's neck and left shoulder remain painful  Patient wishes to place formal PT on hold as this is not a good time for him to focus on himself  Objective: See treatment diary below      Assessment: Tolerated treatment well  Patient demonstrated fatigue post treatment and would benefit from continued PT  Patient demonstrated improved left shoulder PROM in all planes with crepitus into ER  Patient with improved cervical rotation  Deferred progressions to HEP at this time as Patient clearly stated that he will be unable to perform HEP d/t commitments to wife's health care  Plan: Patient will be placed on hold for the forseeable future d/t complications with his wife's health  Patient will contact PT if/when he is able to resume PT  Patient understands that he will be discharged if he does not return within 30 days  Precautions: HTN, Hx of bilateral RCT Repairs, COPD    Access Code: [email or web address]  URL: [email or web address]  Date: 02/10/2023  Prepared by: Flakita Ceballos    Exercises  • Seated Scapular Retraction - 1 x daily - 7 x weekly - 3 sets - 10 reps  • Isometric Shoulder Flexion at Wall - 1 x daily - 7 x weekly - 3 sets - 10 reps - 5 seconds hold  • Standing Isometric Shoulder External Rotation with Doorway - 1 x daily - 7 x weekly - 3 sets - 10 reps - 5 sec hold  • Standing Isometric Shoulder Internal Rotation at Doorway - 1 x daily - 7 x weekly - 3 sets - 10 reps - 5 seconds hold  • Isometric Shoulder Extension at Wall - 1 x daily - 7 x weekly - 3 sets - 10 reps - 5 seconds hold  • Isometric Shoulder Abduction at Wall - 1 x daily - 7 x weekly - 3 sets - 10 reps - 5 seconds hold  • Sternocleidomastoid Stretch - 1 x daily - 7 x weekly - 3 sets - 10 reps - 10 hold  • Seated Assisted Cervical Rotation with Towel - 1 x daily - 7 x weekly - 3 sets - 10 reps    Manuals 2/10 2/22           Manual traction TS            Upper cervical traction TS GR           suboccippital release TS            SNAG, NAG  SNAG GR           Thoracic PA mobs  GR           Sidebend MWM  GR           L shoulder PROM  GR           Neuro Re-Ed             DNF w/ towel             DNF w/ BP cuff             Chin tucks seated                                                                 Ther Ex             HEP review  GR           Towel rotation 10x5"            UT stretch HEP            LS stretch             Pec minor str               Prone W             Wall angels             Scapular retraction             Cervical isometrics             Anatoliy M,L             Matthew w/ TB             UBE  3' / 3'           Shoulder  submaximal isometrics 10x5" ea way            Ther Activity                                       Gait Training                                       Modalities             Hooper MT  5 ' x 2           HP

## 2023-03-07 DIAGNOSIS — E78.2 MIXED HYPERLIPIDEMIA: ICD-10-CM

## 2023-03-08 RX ORDER — FENOFIBRATE 145 MG/1
145 TABLET, COATED ORAL DAILY
Qty: 90 TABLET | Refills: 0 | Status: SHIPPED | OUTPATIENT
Start: 2023-03-08

## 2023-03-08 RX ORDER — FENOFIBRATE 145 MG/1
TABLET, COATED ORAL
Qty: 90 TABLET | Refills: 0 | Status: SHIPPED | OUTPATIENT
Start: 2023-03-08

## 2023-03-13 ENCOUNTER — TELEPHONE (OUTPATIENT)
Dept: INTERNAL MEDICINE CLINIC | Facility: CLINIC | Age: 78
End: 2023-03-13

## 2023-03-13 NOTE — TELEPHONE ENCOUNTER
PT   LEFT   A  FORM  FOR  DR MARIE   TO  GET  HELP   IN  HIS  HOME   PAPER  WORK IN   [place]   [facility] Received report from day shift RN. Greeted patient and tended to immediate needs and informed patient I would be back within the next hour. Reviewed pt's chart to include medications due, lab values for the day and upcoming due times, and orders. Bed in low locked position; with x2 side rails up, non-slip socks in place before ambulating; call bell and personal items within reach of patient. Reminded patient to use call bell before getting out of bed. Pt ambulated x2 times during nightshift to relieve leg cramps/burning in leg. Relieved with walks and gabapentin. Will continue to monitor until end of shift.

## 2023-03-14 ENCOUNTER — HOSPITAL ENCOUNTER (EMERGENCY)
Facility: HOSPITAL | Age: 78
Discharge: HOME | End: 2023-03-14
Attending: EMERGENCY MEDICINE

## 2023-03-14 ENCOUNTER — TELEPHONE (OUTPATIENT)
Dept: INTERNAL MEDICINE CLINIC | Facility: CLINIC | Age: 78
End: 2023-03-14

## 2023-03-14 ENCOUNTER — APPOINTMENT (EMERGENCY)
Dept: CT IMAGING | Facility: HOSPITAL | Age: 78
End: 2023-03-14

## 2023-03-14 VITALS
RESPIRATION RATE: 18 BRPM | HEART RATE: 71 BPM | OXYGEN SATURATION: 98 % | SYSTOLIC BLOOD PRESSURE: 148 MMHG | DIASTOLIC BLOOD PRESSURE: 70 MMHG | TEMPERATURE: 97.3 F

## 2023-03-14 DIAGNOSIS — R19.7 DIARRHEA, UNSPECIFIED TYPE: Primary | ICD-10-CM

## 2023-03-14 LAB
ALBUMIN SERPL BCP-MCNC: 4.7 G/DL (ref 3.5–5)
ALP SERPL-CCNC: 73 U/L (ref 34–104)
ALT SERPL W P-5'-P-CCNC: 16 U/L (ref 7–52)
ANION GAP SERPL CALCULATED.3IONS-SCNC: 9 MMOL/L (ref 4–13)
AST SERPL W P-5'-P-CCNC: 20 U/L (ref 13–39)
BASOPHILS # BLD AUTO: 0.02 THOUSANDS/ÂΜL (ref 0–0.1)
BASOPHILS NFR BLD AUTO: 0 % (ref 0–1)
BILIRUB SERPL-MCNC: 0.55 MG/DL (ref 0.2–1)
BUN SERPL-MCNC: 18 MG/DL (ref 5–25)
CALCIUM SERPL-MCNC: 9.5 MG/DL (ref 8.4–10.2)
CHLORIDE SERPL-SCNC: 107 MMOL/L (ref 96–108)
CO2 SERPL-SCNC: 24 MMOL/L (ref 21–32)
CREAT SERPL-MCNC: 1.03 MG/DL (ref 0.6–1.3)
EOSINOPHIL # BLD AUTO: 0.02 THOUSAND/ÂΜL (ref 0–0.61)
EOSINOPHIL NFR BLD AUTO: 0 % (ref 0–6)
ERYTHROCYTE [DISTWIDTH] IN BLOOD BY AUTOMATED COUNT: 12.4 % (ref 11.6–15.1)
GFR SERPL CREATININE-BSD FRML MDRD: 68 ML/MIN/1.73SQ M
GLUCOSE SERPL-MCNC: 99 MG/DL (ref 65–140)
HCT VFR BLD AUTO: 44.6 % (ref 36.5–49.3)
HGB BLD-MCNC: 15.3 G/DL (ref 12–17)
IMM GRANULOCYTES # BLD AUTO: 0.03 THOUSAND/UL (ref 0–0.2)
IMM GRANULOCYTES NFR BLD AUTO: 0 % (ref 0–2)
LIPASE SERPL-CCNC: 25 U/L (ref 11–82)
LYMPHOCYTES # BLD AUTO: 1.15 THOUSANDS/ÂΜL (ref 0.6–4.47)
LYMPHOCYTES NFR BLD AUTO: 13 % (ref 14–44)
MCH RBC QN AUTO: 34.6 PG (ref 26.8–34.3)
MCHC RBC AUTO-ENTMCNC: 34.3 G/DL (ref 31.4–37.4)
MCV RBC AUTO: 101 FL (ref 82–98)
MONOCYTES # BLD AUTO: 0.71 THOUSAND/ÂΜL (ref 0.17–1.22)
MONOCYTES NFR BLD AUTO: 8 % (ref 4–12)
NEUTROPHILS # BLD AUTO: 6.87 THOUSANDS/ÂΜL (ref 1.85–7.62)
NEUTS SEG NFR BLD AUTO: 79 % (ref 43–75)
NRBC BLD AUTO-RTO: 0 /100 WBCS
PLATELET # BLD AUTO: 172 THOUSANDS/UL (ref 149–390)
PMV BLD AUTO: 9.7 FL (ref 8.9–12.7)
POTASSIUM SERPL-SCNC: 3.8 MMOL/L (ref 3.5–5.3)
PROT SERPL-MCNC: 7.3 G/DL (ref 6.4–8.4)
RBC # BLD AUTO: 4.42 MILLION/UL (ref 3.88–5.62)
SODIUM SERPL-SCNC: 140 MMOL/L (ref 135–147)
WBC # BLD AUTO: 8.8 THOUSAND/UL (ref 4.31–10.16)

## 2023-03-14 RX ORDER — HYOSCYAMINE SULFATE 0.125 MG
0.12 TABLET ORAL EVERY 4 HOURS PRN
Qty: 20 TABLET | Refills: 0 | Status: SHIPPED | OUTPATIENT
Start: 2023-03-14 | End: 2023-03-17

## 2023-03-14 RX ORDER — DIPHENOXYLATE HYDROCHLORIDE AND ATROPINE SULFATE 2.5; .025 MG/1; MG/1
1 TABLET ORAL 4 TIMES DAILY PRN
Qty: 30 TABLET | Refills: 0 | Status: SHIPPED | OUTPATIENT
Start: 2023-03-14 | End: 2023-03-24

## 2023-03-14 RX ADMIN — IOHEXOL 100 ML: 350 INJECTION, SOLUTION INTRAVENOUS at 17:03

## 2023-03-14 RX ADMIN — SODIUM CHLORIDE 1000 ML: 0.9 INJECTION, SOLUTION INTRAVENOUS at 16:32

## 2023-03-14 NOTE — TELEPHONE ENCOUNTER
Patient LVM that he is having mid section pain, thinks its a diverticulitis flare up    Also has low grade temp

## 2023-03-14 NOTE — TELEPHONE ENCOUNTER
Needs appointment  If can't be seen in office  Can go to urgent care   If pain becomes worse and he has any vomiting he needs to go to ER immediately

## 2023-03-14 NOTE — DISCHARGE INSTRUCTIONS
A  personal message from Dr Loren Schaeffer,  Thank you so much for allowing me to care for you today  I pride myself in the care and attention I give all my patients  I hope you were a witness to this tonight  If for any reason your condition does not improve, worsens, or you have a question that was not answered during your visit you can feel free to text me on my personal phone   # [phone]  I will answer to your message and continue your care past your emergency room visit

## 2023-03-14 NOTE — ED PROVIDER NOTES
History  Chief Complaint   Patient presents with   • Abdominal Pain     Pt states, "I think I have a bout of diverticulitis acting up, I had it about 15 years ago " PT reporting chills, diarrhea, lower abd pain localized to the R side  This is a 64-year-old gentleman who presents emergency department with onset of diarrhea and abdominal pain  The abdominal pain localized to the right lower quadrant  Is mild to moderate  Onset of symptoms was yesterday  Got worse today  He feels cold sweats and chills  And the pain travels across his lower abdomen, patient has underlying history of diverticulitis  Feels similar  Past surgical history of shoulder surgery and hernia surgery  Patient does not smoke or drink      History provided by:  Patient   used: No        Prior to Admission Medications   Prescriptions Last Dose Informant Patient Reported? Taking?    Multiple Vitamins-Minerals (CENTRUM ULTRA MENS PO)   Yes No   Sig: Take 1 tablet by mouth daily   Multiple Vitamins-Minerals (ICAPS AREDS 2 PO)   Yes No   Sig: Take by mouth   acetaminophen (TYLENOL) 500 mg tablet   Yes No   Sig: Take 1,000 mg by mouth every 6 (six) hours as needed for mild pain   amLODIPine (NORVASC) 10 mg tablet   No No   Sig: Take 1 tablet (10 mg total) by mouth every morning   doxepin (SINEquan) 10 mg capsule   No No   Sig: Take 1 capsule (10 mg total) by mouth daily at bedtime as needed for sleep   fenofibrate (TRICOR) 145 mg tablet   No No   Sig: Take 1 tablet (145 mg total) by mouth daily   fenofibrate (TRICOR) 145 mg tablet   No No   Sig: TAKE ONE TABLET BY MOUTH EVERY DAY   lisinopril (ZESTRIL) 40 mg tablet   No No   Sig: TAKE ONE TABLET BY MOUTH EVERY DAY   loperamide (IMODIUM A-D) 2 MG tablet   Yes No   Sig: Take 1 tablet by mouth daily as needed   lovastatin (MEVACOR) 40 MG tablet   No No   Sig: TAKE ONE TABLET BY MOUTH EVERY DAY   omeprazole (PriLOSEC) 20 mg delayed release capsule   No No   Sig: TAKE ONE CAPSULE BY MOUTH EVERY DAY      Facility-Administered Medications: None       Past Medical History:   Diagnosis Date   • Abnormal stress test    • Angina at rest [facility])     no issues currently 5/2021   • Apnea    • Cancer [facility])     Skin   • Carpal tunnel syndrome on left    • Chest pain    • CPAP (continuous positive airway pressure) dependence     uses CPAP   • Diverticulitis    • GERD (gastroesophageal reflux disease)    • History of transfusion     childhood   • Hypercholesterolemia    • Hypercholesterolemia    • Hyperlipidemia    • Hypertension    • Joint pain     Right Shoulder   • Kidney stone    • Mitral valve disorder    • Neck pain    • Sleep apnea    • Sleep apnea, obstructive    • Thoracic neuritis        Past Surgical History:   Procedure Laterality Date   • COLONOSCOPY      Complete   • CORONARY ANGIOPLASTY  2017   • ESOPHAGOGASTRODUODENOSCOPY      Diagnostic   • FL RETROGRADE PYELOGRAM  5/24/2019   • FOOT SURGERY Left     tarsal tunnel   • HERNIA REPAIR     • KNEE ARTHROSCOPY Left    • KNEE SURGERY Left    • NEUROPLASTY / TRANSPOSITION MEDIAN NERVE AT CARPAL TUNNEL     • OH COLONOSCOPY FLX DX W/COLLJ SPEC WHEN PFRMD N/A 4/10/2018    Procedure: EGD AND COLONOSCOPY;  Surgeon: Mario Unger MD;  Location: [facility];   Service: Gastroenterology   • OH CYSTO INSERTION TRANSPROSTATIC IMPLANT SINGLE N/A 8/30/2018    Procedure: Carey Alden WITH INSERTION Linda Guest;  Surgeon: Alan Howell MD;  [facility];  Service: Urology   • OH CYSTO/URETERO W/LITHOTRIPSY &INDWELL STENT INSRT Right 5/24/2019    Procedure: CYSTOSCOPY URETEROSCOPY WITH LITHOTRIPSY HOLMIUM LASER, RETROGRADE PYELOGRAM AND INSERTION STENT URETERAL;  Surgeon: Alan Howell MD;  [facility];  Service: Urology   • OH OPEN IMPLANTATION CRANIAL NERVE JONAS & PULSE GEN N/A 5/13/2021    Procedure: INSERTION UPPER AIRWAY STIMULATOR, INSPIRE IMPLANT;  Surgeon: Alyssa Gonzalez MD;  Location[facility];  Service: ENT   • AL TRANSURETHRAL INCISION PROSTATE N/A 2018    Procedure: TRANSURETHRAL INCISION OF PROSTATE (TUIP); Surgeon: Alan Howell MD;  [facility];  Service: Urology   • ROTATOR CUFF REPAIR Bilateral    • SHOULDER SURGERY     • TARSAL TUNNEL RELEASE      Neuroplasty Decompression       Family History   Problem Relation Age of Onset   • Diabetes Mother         Mellitus   • Heart disease Mother         Arteriosclerotic Cardiovascular   • Hypertension Mother    • Cancer Father    • Arthritis Father         Rheu Mult Site W Involv Of Organs And Systems   • Parkinsonism Father    • Thyroid disease Sister    • Diabetes Brother         Diabetes:Mellitus   • Heart disease Brother    • Hypertension Brother    • Coronary artery disease Family    • Heart disease Brother    • No Known Problems Brother    • Kidney disease Sister    • Parkinsonism Sister      I have reviewed and agree with the history as documented  E-Cigarette/Vaping   • E-Cigarette Use Never User      E-Cigarette/Vaping Substances   • Nicotine No    • THC No    • CBD No    • Flavoring No    • Other No    • Unknown No      Social History     Tobacco Use   • Smoking status: Former     Packs/day: 1 00     Years: 17 00     Pack years: 17 00     Types: Cigarettes     Quit date:      Years since quittin 2   • Smokeless tobacco: Never   Vaping Use   • Vaping Use: Never used   Substance Use Topics   • Alcohol use: No   • Drug use: No       Review of Systems   Constitutional: Negative for chills and fever  HENT: Negative for ear pain and sore throat  Eyes: Negative for pain and visual disturbance  Respiratory: Negative for cough and shortness of breath  Cardiovascular: Negative for chest pain and palpitations  Gastrointestinal: Positive for abdominal pain and diarrhea  Negative for vomiting  Genitourinary: Negative for dysuria and hematuria  Musculoskeletal: Negative for arthralgias and back pain     Skin: Negative for color change and rash  Neurological: Negative for seizures and syncope  All other systems reviewed and are negative  Physical Exam  Physical Exam  Vitals and nursing note reviewed  Constitutional:       General: He is not in acute distress  Appearance: He is well-developed  HENT:      Head: Normocephalic and atraumatic  Mouth/Throat:      Mouth: Mucous membranes are moist       Pharynx: Oropharynx is clear  Eyes:      Extraocular Movements: Extraocular movements intact  Conjunctiva/sclera: Conjunctivae normal       Pupils: Pupils are equal, round, and reactive to light  Cardiovascular:      Rate and Rhythm: Normal rate and regular rhythm  Heart sounds: No murmur heard  Pulmonary:      Effort: Pulmonary effort is normal  No respiratory distress  Breath sounds: Normal breath sounds  Abdominal:      General: Bowel sounds are normal       Palpations: Abdomen is soft  Tenderness: There is abdominal tenderness in the right lower quadrant  There is no guarding or rebound  Negative signs include Pelayo's sign, Rovsing's sign and McBurney's sign  Hernia: No hernia is present  Musculoskeletal:         General: No swelling  Cervical back: Neck supple  Skin:     General: Skin is warm and dry  Capillary Refill: Capillary refill takes less than 2 seconds  Neurological:      General: No focal deficit present  Mental Status: He is alert and oriented to person, place, and time     Psychiatric:         Mood and Affect: Mood normal          Behavior: Behavior normal          Vital Signs  ED Triage Vitals [03/14/23 1556]   Temperature Pulse Respirations Blood Pressure SpO2   (!) 97 3 °F (36 3 °C) 71 18 148/70 98 %      Temp Source Heart Rate Source Patient Position - Orthostatic VS BP Location FiO2 (%)   Tympanic -- -- -- --      Pain Score       --           Vitals:    03/14/23 1556   BP: 148/70   Pulse: 71         Visual Acuity      ED Medications  Medications sodium chloride 0 9 % bolus 1,000 mL (0 mL Intravenous Stopped 3/14/23 1811)   iohexol (OMNIPAQUE) 350 MG/ML injection (SINGLE-DOSE) 100 mL (100 mL Intravenous Given 3/14/23 1703)       Diagnostic Studies  Results Reviewed     Procedure Component Value Units Date/Time    Comprehensive metabolic panel [[id number]] Collected: 03/14/23 1630    Lab Status: Final result Specimen: Blood from Arm, Right Updated: 03/14/23 1657     Sodium 140 mmol/L      Potassium 3 8 mmol/L      Chloride 107 mmol/L      CO2 24 mmol/L      ANION GAP 9 mmol/L      BUN 18 mg/dL      Creatinine 1 03 mg/dL      Glucose 99 mg/dL      Calcium 9 5 mg/dL      AST 20 U/L      ALT 16 U/L      Alkaline Phosphatase 73 U/L      Total Protein 7 3 g/dL      Albumin 4 7 g/dL      Total Bilirubin 0 55 mg/dL      eGFR 68 ml/min/1 73sq m     Narrative:      [place] guidelines for Chronic Kidney Disease (CKD):   •  Stage 1 with normal or high GFR (GFR > 90 mL/min/1 73 square meters)  •  Stage 2 Mild CKD (GFR = 60-89 mL/min/1 73 square meters)  •  Stage 3A Moderate CKD (GFR = 45-59 mL/min/1 73 square meters)  •  Stage 3B Moderate CKD (GFR = 30-44 mL/min/1 73 square meters)  •  Stage 4 Severe CKD (GFR = 15-29 mL/min/1 73 square meters)  •  Stage 5 End Stage CKD (GFR <15 mL/min/1 73 square meters)  Note: GFR calculation is accurate only with a steady state creatinine    Lipase [[id number]]  (Normal) Collected: 03/14/23 1630    Lab Status: Final result Specimen: Blood from Arm, Right Updated: 03/14/23 1657     Lipase 25 u/L     CBC and differential [[id number]]  (Abnormal) Collected: 03/14/23 1630    Lab Status: Final result Specimen: Blood from Arm, Right Updated: 03/14/23 1639     WBC 8 80 Thousand/uL      RBC 4 42 Million/uL      Hemoglobin 15 3 g/dL      Hematocrit 44 6 %       fL      MCH 34 6 pg      MCHC 34 3 g/dL      RDW 12 4 %      MPV 9 7 fL      Platelets 921 Thousands/uL      nRBC 0 /100 WBCs      Neutrophils Relative 79 %      Immat GRANS % 0 %      Lymphocytes Relative 13 %      Monocytes Relative 8 %      Eosinophils Relative 0 %      Basophils Relative 0 %      Neutrophils Absolute 6 87 Thousands/µL      Immature Grans Absolute 0 03 Thousand/uL      Lymphocytes Absolute 1 15 Thousands/µL      Monocytes Absolute 0 71 Thousand/µL      Eosinophils Absolute 0 02 Thousand/µL      Basophils Absolute 0 02 Thousands/µL                  CT abdomen pelvis with contrast   Final Result by Madonna Olsen MD (03/14 1814)   1  Diverticular disease without evidence of diverticulitis or other findings to account for left lower quadrant pain  2   No acute inflammatory or infectious process with a normal appendix identified  3   7 mm right lower renal pole nonobstructive calculus without additional urinary calculi  Workstation performed: [id number]                    Procedures  Procedures         ED Course  ED Course as of 03/14/23 1823   Tue Mar 14, 2023   1706 WBC: 8 80   1706 Hemoglobin: 15 3   1706 Sodium: 140   1706 Potassium: 3 8   1706 Creatinine: 1 03   1706 Lipase: 25                               SBIRT 20yo+    Flowsheet Row Most Recent Value   SBIRT (23 yo +)    In order to provide better care to our patients, we are screening all of our patients for alcohol and drug use  Would it be okay to ask you these screening questions? Yes Filed at: 03/14/2023 1724   Initial Alcohol Screen: US AUDIT-C     1  How often do you have a drink containing alcohol? 0 Filed at: 03/14/2023 1724   2  How many drinks containing alcohol do you have on a typical day you are drinking? 0 Filed at: 03/14/2023 1724   3b  FEMALE Any Age, or MALE 65+: How often do you have 4 or more drinks on one occassion? 0 Filed at: 03/14/2023 1724   Audit-C Score 0 Filed at: 03/14/2023 1724   FLACO: How many times in the past year have you    Used an illegal drug or used a prescription medication for non-medical reasons?  Never Filed at: 03/14/2023 1724 Medical Decision Making  Differential diagnosis includes but not limited to, colitis, diverticulitis, appendicitis, kidney stone, pancreatitis, hepatitis, gastritis  Amount and/or Complexity of Data Reviewed  Labs: ordered  Decision-making details documented in ED Course  Details: Normal white count, normal hemoglobin, normal platelet  Radiology: ordered  Discussion of management or test interpretation with external provider(s): CT scan NON acute  No diverticulitis / colitis / SBO     Risk  OTC drugs  Prescription drug management  Disposition  Final diagnoses:   Diarrhea, unspecified type     Time reflects when diagnosis was documented in both MDM as applicable and the Disposition within this note     Time User Action Codes Description Comment    3/14/2023  6:22 PM Tiffanie Orellana Add [R19 7] Diarrhea, unspecified type       ED Disposition     ED Disposition   Discharge    Condition   Stable    Date/Time   Tue Mar 14, 2023  6:22 PM    Comment   Kendrick Sousa discharge to home/self care  Follow-up Information     Follow up With Specialties Details Why Contact Info    Roseann Bower,  Family Medicine In 3 days  [address]            Patient's Medications   Discharge Prescriptions    DIPHENOXYLATE-ATROPINE (LOMOTIL) 2 5-0 025 MG PER TABLET    Take 1 tablet by mouth 4 (four) times a day as needed for diarrhea for up to 10 days       Start Date: 3/14/2023 End Date: 3/24/2023       Order Dose: 1 tablet       Quantity: 30 tablet    Refills: 0    HYOSCYAMINE (LEVSIN) 0 125 MG TABLET    Take 1 tablet (0 125 mg total) by mouth every 4 (four) hours as needed for cramping       Start Date: 3/14/2023 End Date: --       Order Dose: 0 125 mg       Quantity: 20 tablet    Refills: 0       No discharge procedures on file      PDMP Review       Value Time User    PDMP Reviewed  Yes 8/16/2022  9:52 AM Earline Martinez Santiago Funes, DO          ED Provider  Electronically Signed by           Hannah Parham MD  03/14/23 8526

## 2023-03-15 RX ORDER — DICYCLOMINE HCL 20 MG
20 TABLET ORAL 2 TIMES DAILY
Qty: 20 TABLET | Refills: 0 | Status: SHIPPED | OUTPATIENT
Start: 2023-03-15

## 2023-03-17 ENCOUNTER — OFFICE VISIT (OUTPATIENT)
Dept: INTERNAL MEDICINE CLINIC | Facility: CLINIC | Age: 78
End: 2023-03-17

## 2023-03-17 VITALS
RESPIRATION RATE: 16 BRPM | SYSTOLIC BLOOD PRESSURE: 120 MMHG | OXYGEN SATURATION: 96 % | HEART RATE: 59 BPM | WEIGHT: 161.6 LBS | BODY MASS INDEX: 27.59 KG/M2 | DIASTOLIC BLOOD PRESSURE: 68 MMHG | HEIGHT: 64 IN | TEMPERATURE: 97.8 F

## 2023-03-17 DIAGNOSIS — K52.9 ENTERITIS: Primary | ICD-10-CM

## 2023-03-17 NOTE — PROGRESS NOTES
Name: Ismael Go      : 1945      MRN: [id number]  Encounter Provider: Leoncio Butt DO  Encounter Date: 3/17/2023   Encounter department: [facility]  [address]     1  Enteritis-resolving  Either virus or toxin mediated  Unclear at this time  ,  CT showed no acute gastroenterology related issue  Diverticula were noted without inflammation associated  Pt to Continue supportive therapy, adequate fluid intake, and low residue foods until his return to per usual            Subjective      Presents for ED follow-up  Patient was seen for recurrent diarrhea  Associated chills and diuresis  Work-up in the ER was benign  Since being home diarrhea has substantially decreased  Stools a little loose but not as frequent as it was prior  Review of Systems   Constitutional: Positive for chills (resolved  ) and diaphoresis (resolved )  Respiratory: Negative for shortness of breath  Cardiovascular: Negative for chest pain  Gastrointestinal: Positive for diarrhea         Current Outpatient Medications on File Prior to Visit   Medication Sig   • acetaminophen (TYLENOL) 500 mg tablet Take 1,000 mg by mouth every 6 (six) hours as needed for mild pain   • amLODIPine (NORVASC) 10 mg tablet Take 1 tablet (10 mg total) by mouth every morning   • dicyclomine (BENTYL) 20 mg tablet Take 1 tablet (20 mg total) by mouth 2 (two) times a day (Patient taking differently: Take 20 mg by mouth if needed)   • diphenoxylate-atropine (LOMOTIL) 2 5-0 025 mg per tablet Take 1 tablet by mouth 4 (four) times a day as needed for diarrhea for up to 10 days   • doxepin (SINEquan) 10 mg capsule Take 1 capsule (10 mg total) by mouth daily at bedtime as needed for sleep   • fenofibrate (TRICOR) 145 mg tablet Take 1 tablet (145 mg total) by mouth daily   • fenofibrate (TRICOR) 145 mg tablet TAKE ONE TABLET BY MOUTH EVERY DAY   • lisinopril (ZESTRIL) 40 mg tablet TAKE ONE TABLET BY MOUTH EVERY DAY   • loperamide (IMODIUM A-D) 2 MG tablet Take 1 tablet by mouth daily as needed   • lovastatin (MEVACOR) 40 MG tablet TAKE ONE TABLET BY MOUTH EVERY DAY   • Multiple Vitamins-Minerals (CENTRUM ULTRA MENS PO) Take 1 tablet by mouth daily   • Multiple Vitamins-Minerals (ICAPS AREDS 2 PO) Take by mouth   • omeprazole (PriLOSEC) 20 mg delayed release capsule TAKE ONE CAPSULE BY MOUTH EVERY DAY       Objective     /68 (BP Location: Left arm, Patient Position: Sitting, Cuff Size: Standard)   Pulse 59   Temp 97 8 °F (36 6 °C) (Temporal)   Resp 16   Ht 5' 4" (1 626 m)   Wt 73 3 kg (161 lb 9 6 oz) Comment: with shoes on  SpO2 96%   BMI 27 74 kg/m²     Physical Exam  HENT:      Head: Normocephalic  Eyes:      Conjunctiva/sclera: Conjunctivae normal    Cardiovascular:      Rate and Rhythm: Normal rate and regular rhythm  Heart sounds: No murmur heard  Abdominal:      General: Bowel sounds are increased  Palpations: Abdomen is soft  Tenderness: There is no abdominal tenderness  Neurological:      Mental Status: He is alert and oriented to person, place, and time     Psychiatric:         Mood and Affect: Mood normal          Behavior: Behavior normal        Nini Hook, DO

## 2023-03-24 DIAGNOSIS — G47.00 INSOMNIA, UNSPECIFIED TYPE: ICD-10-CM

## 2023-03-24 RX ORDER — DOXEPIN HYDROCHLORIDE 10 MG/1
CAPSULE ORAL
Qty: 30 CAPSULE | Refills: 3 | Status: SHIPPED | OUTPATIENT
Start: 2023-03-24 | End: 2023-03-27 | Stop reason: SDUPTHER

## 2023-03-27 DIAGNOSIS — G47.00 INSOMNIA, UNSPECIFIED TYPE: ICD-10-CM

## 2023-03-27 RX ORDER — DOXEPIN HYDROCHLORIDE 10 MG/1
10 CAPSULE ORAL
Qty: 90 CAPSULE | Refills: 0 | Status: SHIPPED | OUTPATIENT
Start: 2023-03-27 | End: 2023-06-25

## 2023-03-27 NOTE — TELEPHONE ENCOUNTER
Pharmacy received a 30 day supply RX for Doxepin, please cancel and resubmit for a 90 day supply  Thank you

## 2023-05-02 DIAGNOSIS — G47.00 INSOMNIA, UNSPECIFIED TYPE: ICD-10-CM

## 2023-05-03 RX ORDER — DOXEPIN HYDROCHLORIDE 10 MG/1
10 CAPSULE ORAL
Qty: 90 CAPSULE | Refills: 0 | Status: SHIPPED | OUTPATIENT
Start: 2023-05-03 | End: 2023-08-01

## 2023-05-23 ENCOUNTER — OFFICE VISIT (OUTPATIENT)
Dept: OBGYN CLINIC | Facility: CLINIC | Age: 78
End: 2023-05-23

## 2023-05-23 VITALS
HEART RATE: 60 BPM | HEIGHT: 64 IN | WEIGHT: 161 LBS | DIASTOLIC BLOOD PRESSURE: 71 MMHG | BODY MASS INDEX: 27.49 KG/M2 | SYSTOLIC BLOOD PRESSURE: 145 MMHG

## 2023-05-23 DIAGNOSIS — M24.812 INTERNAL DERANGEMENT OF LEFT SHOULDER: Primary | ICD-10-CM

## 2023-05-23 DIAGNOSIS — Z98.890 HISTORY OF REPAIR OF LEFT ROTATOR CUFF: ICD-10-CM

## 2023-05-23 DIAGNOSIS — M25.512 LEFT SHOULDER PAIN, UNSPECIFIED CHRONICITY: ICD-10-CM

## 2023-05-23 NOTE — PROGRESS NOTES
"Patient Name:  Teo Langley  MRN:  [id number]    [address] [place] [address]     1  Internal derangement of left shoulder  -     MRI shoulder left wo contrast; Future; Expected date: 05/23/2023    2  History of repair of left rotator cuff    3  Left shoulder pain, unspecified chronicity        Left shoulder internal derangement  · X-rays reviewed in office today with patient  · Advised patient he likely has cervical spine and shoulder etiologies of pain, but shoulder pain more prevalent during physical exam today  · In light of patient's significant Left shoulder weakness, pain, difficulty with ADLs/function, positive special testing, will move forward with MRI of the Left shoulder to rule out rotator cuff tear, evaluate for atrophy or fatty infiltration suggestive of chronicity  · Patient has Inspire implant secondary to sleep apnea  Briefly discussed with Pulmonologist and it is possibly MRI compatible as patient recently had implanted about 2 years ago  · In the meantime, advised patient to continue with home exercises to maintain range of motion of Left shoulder and prevent stiffness  · Continue OTC medications as needed  · Follow up after MRI Left shoulder resulted to discuss possible surgical intervention  Chief Complaint     Left shoulder pain    History of the Present Illness     Teo Langley is a LHD 76 y o  male with Left shoulder pain ongoing for many years  Patient admits to rotator cuff repair about 20 years ago  Patient had subsequent surgery \"for a bone spur\"  Patient had a fall about 5 years ago which exacerbated his shoulder pain  Patient admits to worsening of pain, especially with opening the door of his car  Denies numbness and tingling into the Left arm  He previously had one episode of \"hot burning\" pain in the back of his shoulder when standing over the sink  Review of Systems     Review of Systems   Constitutional: Negative for chills and fever     HENT: Negative for ear pain and " "sore throat  Eyes: Negative for pain and visual disturbance  Respiratory: Negative for cough and shortness of breath  Cardiovascular: Negative for chest pain and palpitations  Gastrointestinal: Negative for abdominal pain and vomiting  Genitourinary: Negative for dysuria and hematuria  Musculoskeletal: Negative for arthralgias and back pain  Skin: Negative for color change and rash  Neurological: Negative for seizures and syncope  All other systems reviewed and are negative  Physical Exam     /71   Pulse 60   Ht 5' 4\" (1 626 m)   Wt 73 kg (161 lb)   BMI 27 64 kg/m²     Left Shoulder: Active range of motion   110 degrees forward flexion with pain  120 degrees abduction  20 degrees external rotation   2 level restriction internal rotation    Passive range of motion   170 degrees of forward flexion with pain at terminal flexion  Supraspinatus testing 4/5  Infraspinatus testing 3/5  Subscapularis testing 4/5  Patel test is negative   Colona's test is negative    Speed's test is Negative  The patient is neurovascularly intact distally in the extremity  Cervical Spine:   Active range of motion restriction extension and bilateral rotation  There is no midline tenderness  There is tenderness over Left upper trapezius  Sensation intact to light touch C5 through T1 dermatomes left upper extremity  Sensation intact to light touch C5 through T1 dermatomes right upper extremity  Left Motor: 5/5 biceps, 5/5 triceps, 5/5 wrist extension, 5/5 finger flexion, 5/5 finger abduction   Right Motor: 5/5 biceps, 5/5 triceps, 5/5 wrist extension, 5/5 finger flexion, 5/5 finger abduction   Spurling test is  negative  Jackson's sign negative      Eyes:  Anicteric sclerae  Neck:  Supple  Lungs:  Normal respiratory effort  Cardiovascular:  Capillary refill is less than 2 seconds  Skin:  Intact without erythema  Neurologic:  Sensation grossly intact to light touch    Psychiatric:  Mood " and affect are appropriate  Data Review     I have personally reviewed pertinent films in PACS, and my interpretation follows:    X-rays taken 10/14/2022 of Left shoulder demonstrates possible decreased acromiohumeral space  Likely previous rotator cuff repair and hardware still intact  Minimal degenerative changes at glenohumeral joint  X-rays taken 01/24/2023 of cervical spine demonstrates moderate to severe degenerative changes throughout cervical spine  Physical therapy notes reviewed displaying persistent left shoulder pain despite formal physical therapy and home exercise program       Past Medical History:   Diagnosis Date   • Abnormal stress test    • Angina at rest [facility])     no issues currently 5/2021   • Apnea    • Cancer [facility])     Skin   • Carpal tunnel syndrome on left    • Chest pain    • CPAP (continuous positive airway pressure) dependence     uses CPAP   • Diverticulitis    • GERD (gastroesophageal reflux disease)    • History of transfusion     childhood   • Hypercholesterolemia    • Hypercholesterolemia    • Hyperlipidemia    • Hypertension    • Joint pain     Right Shoulder   • Kidney stone    • Mitral valve disorder    • Neck pain    • Sleep apnea    • Sleep apnea, obstructive    • Thoracic neuritis        Past Surgical History:   Procedure Laterality Date   • COLONOSCOPY      Complete   • CORONARY ANGIOPLASTY  2017   • ESOPHAGOGASTRODUODENOSCOPY      Diagnostic   • FL RETROGRADE PYELOGRAM  05/24/2019   • FOOT SURGERY Left     tarsal tunnel   • HERNIA REPAIR     • KNEE ARTHROSCOPY Left    • KNEE SURGERY Left    • NEUROPLASTY / TRANSPOSITION MEDIAN NERVE AT CARPAL TUNNEL     • PA COLONOSCOPY FLX DX W/COLLJ SPEC WHEN PFRMD N/A 04/10/2018    Procedure: EGD AND COLONOSCOPY;  Surgeon: Angelica Grey MD;  Location: [facility];   Service: Gastroenterology   • PA CYSTO INSERTION TRANSPROSTATIC IMPLANT SINGLE N/A 08/30/2018    Procedure: Daniel Blind WITH INSERTION Zeina Noland;  Surgeon: Bernadette Hameed Kwame Andrews MD;  Location[facility];  Service: Urology   • MA CYSTO/URETERO W/LITHOTRIPSY &INDWELL STENT INSRT Right 05/24/2019    Procedure: CYSTOSCOPY URETEROSCOPY WITH LITHOTRIPSY HOLMIUM LASER, RETROGRADE PYELOGRAM AND INSERTION STENT URETERAL;  Surgeon: Jorge Lewis MD;  [facility];  Service: Urology   • MA OPEN IMPLANTATION CRANIAL NERVE JONAS & PULSE GEN N/A 05/13/2021    Procedure: INSERTION UPPER AIRWAY STIMULATOR, INSPIRE IMPLANT;  Surgeon: Kim Du MD;  Location: [facility];  Service: ENT   • MA TRANSURETHRAL INCISION PROSTATE N/A 08/30/2018    Procedure: TRANSURETHRAL INCISION OF PROSTATE (TUIP);   Surgeon: Jorge Lewis MD;  [facility];  Service: Urology   • ROTATOR CUFF REPAIR Bilateral    • SHOULDER SURGERY      2 surgeries 30/20yrs ago   • TARSAL TUNNEL RELEASE      Neuroplasty Decompression       No Known Allergies    Current Outpatient Medications on File Prior to Visit   Medication Sig Dispense Refill   • acetaminophen (TYLENOL) 500 mg tablet Take 1,000 mg by mouth every 6 (six) hours as needed for mild pain     • amLODIPine (NORVASC) 10 mg tablet Take 1 tablet (10 mg total) by mouth every morning 90 tablet 3   • dicyclomine (BENTYL) 20 mg tablet Take 1 tablet (20 mg total) by mouth 2 (two) times a day (Patient taking differently: Take 20 mg by mouth if needed) 20 tablet 0   • doxepin (SINEquan) 10 mg capsule Take 1 capsule (10 mg total) by mouth daily at bedtime as needed for sleep 90 capsule 0   • fenofibrate (TRICOR) 145 mg tablet TAKE ONE TABLET BY MOUTH EVERY DAY 90 tablet 0   • lisinopril (ZESTRIL) 40 mg tablet TAKE ONE TABLET BY MOUTH EVERY DAY 90 tablet 1   • loperamide (IMODIUM A-D) 2 MG tablet Take 1 tablet by mouth daily as needed     • lovastatin (MEVACOR) 40 MG tablet TAKE ONE TABLET BY MOUTH EVERY DAY 90 tablet 1   • Multiple Vitamins-Minerals (CENTRUM ULTRA MENS PO) Take 1 tablet by mouth daily     • Multiple Vitamins-Minerals (ICAPS AREDS 2 PO) Take by mouth     • omeprazole (PriLOSEC) 20 mg delayed release capsule TAKE ONE CAPSULE BY MOUTH EVERY DAY 90 capsule 1   • [DISCONTINUED] fenofibrate (TRICOR) 145 mg tablet Take 1 tablet (145 mg total) by mouth daily 90 tablet 0     No current facility-administered medications on file prior to visit         Social History     Tobacco Use   • Smoking status: Former     Packs/day: 1 00     Years: 17 00     Pack years: 17 00     Types: Cigarettes     Quit date:      Years since quittin 4   • Smokeless tobacco: Never   Vaping Use   • Vaping Use: Never used   Substance Use Topics   • Alcohol use: No   • Drug use: No       Family History   Problem Relation Age of Onset   • Diabetes Mother         Mellitus   • Heart disease Mother         Arteriosclerotic Cardiovascular   • Hypertension Mother    • Cancer Father    • Arthritis Father         Rheu [facility]gans And Systems   • Parkinsonism Father    • Thyroid disease Sister    • Diabetes Brother         Diabetes:Mellitus   • Heart disease Brother    • Hypertension Brother    • Coronary artery disease Family    • Heart disease Brother    • No Known Problems Brother    • Kidney disease Sister    • Parkinsonism Sister              Procedures Performed     Procedures  None       Marysol Infante DO

## 2023-05-29 DIAGNOSIS — E78.2 MIXED HYPERLIPIDEMIA: ICD-10-CM

## 2023-05-29 DIAGNOSIS — K21.9 CHRONIC GERD: ICD-10-CM

## 2023-05-29 DIAGNOSIS — I10 ESSENTIAL HYPERTENSION: ICD-10-CM

## 2023-05-29 RX ORDER — LOVASTATIN 40 MG/1
TABLET ORAL
Qty: 90 TABLET | Refills: 1 | Status: SHIPPED | OUTPATIENT
Start: 2023-05-29

## 2023-05-29 RX ORDER — LISINOPRIL 40 MG/1
TABLET ORAL
Qty: 90 TABLET | Refills: 1 | Status: SHIPPED | OUTPATIENT
Start: 2023-05-29

## 2023-05-29 RX ORDER — OMEPRAZOLE 20 MG/1
CAPSULE, DELAYED RELEASE ORAL
Qty: 90 CAPSULE | Refills: 1 | Status: SHIPPED | OUTPATIENT
Start: 2023-05-29

## 2023-06-07 ENCOUNTER — HOSPITAL ENCOUNTER (OUTPATIENT)
Dept: MRI IMAGING | Facility: HOSPITAL | Age: 78
Discharge: HOME | End: 2023-06-07
Payer: COMMERCIAL

## 2023-06-07 ENCOUNTER — TELEPHONE (OUTPATIENT)
Dept: MRI IMAGING | Facility: HOSPITAL | Age: 78
End: 2023-06-07

## 2023-06-07 DIAGNOSIS — M24.812 INTERNAL DERANGEMENT OF LEFT SHOULDER: ICD-10-CM

## 2023-06-07 PROCEDURE — G1004 CDSM NDSC: HCPCS

## 2023-06-07 PROCEDURE — 73221 MRI JOINT UPR EXTREM W/O DYE: CPT

## 2023-06-07 NOTE — TELEPHONE ENCOUNTER
Patient has an inspire sleep apnea stimulator, Valdo GRESHAM and myself verfied stimulator was working properly and off prior to the MRI

## 2023-06-10 DIAGNOSIS — E78.2 MIXED HYPERLIPIDEMIA: ICD-10-CM

## 2023-06-12 ENCOUNTER — TELEPHONE (OUTPATIENT)
Dept: OBGYN CLINIC | Facility: CLINIC | Age: 78
End: 2023-06-12

## 2023-06-12 ENCOUNTER — OFFICE VISIT (OUTPATIENT)
Dept: OBGYN CLINIC | Facility: CLINIC | Age: 78
End: 2023-06-12
Payer: COMMERCIAL

## 2023-06-12 ENCOUNTER — TELEPHONE (OUTPATIENT)
Dept: PULMONOLOGY | Facility: MEDICAL CENTER | Age: 78
End: 2023-06-12

## 2023-06-12 VITALS
SYSTOLIC BLOOD PRESSURE: 128 MMHG | BODY MASS INDEX: 27.31 KG/M2 | HEIGHT: 64 IN | WEIGHT: 160 LBS | DIASTOLIC BLOOD PRESSURE: 67 MMHG | HEART RATE: 62 BPM

## 2023-06-12 DIAGNOSIS — M75.122 COMPLETE TEAR OF LEFT ROTATOR CUFF, UNSPECIFIED WHETHER TRAUMATIC: ICD-10-CM

## 2023-06-12 DIAGNOSIS — M12.812 ROTATOR CUFF ARTHROPATHY OF LEFT SHOULDER: Primary | ICD-10-CM

## 2023-06-12 DIAGNOSIS — Z01.818 PREOPERATIVE TESTING: Primary | ICD-10-CM

## 2023-06-12 DIAGNOSIS — M25.512 CHRONIC LEFT SHOULDER PAIN: ICD-10-CM

## 2023-06-12 DIAGNOSIS — G89.29 CHRONIC LEFT SHOULDER PAIN: ICD-10-CM

## 2023-06-12 PROCEDURE — 99214 OFFICE O/P EST MOD 30 MIN: CPT | Performed by: ORTHOPAEDIC SURGERY

## 2023-06-12 RX ORDER — MULTIVITAMIN
1 TABLET ORAL DAILY
Qty: 30 TABLET | Refills: 1 | Status: SHIPPED | OUTPATIENT
Start: 2023-06-12

## 2023-06-12 RX ORDER — FOLIC ACID 1 MG/1
1 TABLET ORAL DAILY
Qty: 30 TABLET | Refills: 1 | Status: SHIPPED | OUTPATIENT
Start: 2023-06-12

## 2023-06-12 RX ORDER — CHLORHEXIDINE GLUCONATE 4 G/100ML
SOLUTION TOPICAL DAILY PRN
OUTPATIENT
Start: 2023-06-12

## 2023-06-12 RX ORDER — CHLORHEXIDINE GLUCONATE 0.12 MG/ML
15 RINSE ORAL ONCE
OUTPATIENT
Start: 2023-06-12 | End: 2023-06-12

## 2023-06-12 RX ORDER — MULTIVIT WITH MINERALS/LUTEIN
1000 TABLET ORAL DAILY
Qty: 30 TABLET | Refills: 1 | Status: SHIPPED | OUTPATIENT
Start: 2023-06-12

## 2023-06-12 RX ORDER — FENOFIBRATE 145 MG/1
145 TABLET, COATED ORAL DAILY
Qty: 90 TABLET | Refills: 1 | Status: SHIPPED | OUTPATIENT
Start: 2023-06-12

## 2023-06-12 NOTE — PROGRESS NOTES
Patient Name:  Chriss Chen  MRN:  [id number]    [address] [place] [address]     1  Rotator cuff arthropathy of left shoulder  -     CT shoulder left blue print; Future; Expected date: 06/12/2023  -     Comprehensive metabolic panel; Future  -     Hemoglobin A1C W/EAG Estimation; Future  -     CBC and differential; Future  -     Anemia Panel w/Reflex; Future  -     Protime-INR; Future  -     APTT; Future  -     Type and screen; Future  -     Ambulatory referral to [facility]; Future  -     Ambulatory referral to surgical optimization; Future  -     Ambulatory referral to Physical Therapy; Future  -     Case request operating room: ARTHROPLASTY SHOULDER REVERSE- Left reverse shoulder arthroplasty; Standing  -     EKG 12 lead; Future  -     XR chest pa & lateral; Future; Expected date: 06/12/2023  -     Ambulatory referral to Pulmonology; Future  -     Ambulatory referral to Cardiology; Future  -     folic acid (KP Folic Acid) 1 mg tablet; Take 1 tablet (1 mg total) by mouth daily  -     Multiple Vitamin (multivitamin) tablet; Take 1 tablet by mouth daily  -     Ascorbic Acid (vitamin C) 1000 MG tablet; Take 1 tablet (1,000 mg total) by mouth daily  -     Case request operating room: ARTHROPLASTY SHOULDER REVERSE- Left reverse shoulder arthroplasty    2  Complete tear of left rotator cuff, unspecified whether traumatic  -     CT shoulder left blue print; Future; Expected date: 06/12/2023  -     Comprehensive metabolic panel; Future  -     Hemoglobin A1C W/EAG Estimation; Future  -     CBC and differential; Future  -     Anemia Panel w/Reflex; Future  -     Protime-INR; Future  -     APTT; Future  -     Type and screen; Future  -     Ambulatory referral to [facility]; Future  -     Ambulatory referral to surgical optimization; Future  -     Ambulatory referral to Physical Therapy;  Future  -     Case request operating room: ARTHROPLASTY SHOULDER REVERSE- Left reverse shoulder arthroplasty; Standing  -     EKG 12 lead; Future  -     XR chest pa & lateral; Future; Expected date: 06/12/2023  -     Ambulatory referral to Pulmonology; Future  -     Ambulatory referral to Cardiology; Future  -     folic acid (KP Folic Acid) 1 mg tablet; Take 1 tablet (1 mg total) by mouth daily  -     Multiple Vitamin (multivitamin) tablet; Take 1 tablet by mouth daily  -     Ascorbic Acid (vitamin C) 1000 MG tablet; Take 1 tablet (1,000 mg total) by mouth daily  -     Case request operating room: ARTHROPLASTY SHOULDER REVERSE- Left reverse shoulder arthroplasty    3  Chronic left shoulder pain      Left shoulder massive rotator cuff tear  MRI reviewed in office today with patient  In depth conversation had with patient regarding nonoperative vs surgical management of Left shoulder rotator cuff tears  Patient no longer wants to try nonoperative treatment secondary to persistent pain despite CSI and outpatient PT  Discussed surgical intervention by means of Left reverse shoulder arthroplasty and educated patient about procedure and main indication for pain relief  Discussed risks including by not limited to infection, injury to surrounding structures, iatrogenic fracture, acromion fracture, dislocation, need for revision or subsequent surgery, numbness, wound healing complications, stiffness, decreased motion, bleeding complications, anesthesia complications  Discussed post operative immobilization, outpatient PT following post op protocol, return to driving, return to function  Detailed consent obtained  Patient will be scheduled for CT blueprint of Left shoulder for pre operative planning and does not need subsequent office visit for review  Patient will go to OR on 07/19/2023 for Left reverse shoulder arthroplasty  He will require PCP, Cardiology and Pulmonology pre op risk stratification  History of the Present Illness   [place] Branch is a 76 y o  male with Left shoulder internal derangement   Today, patient reports his Left shoulder "continues to cause significant pain  He is LHD and has a difficult time with ADLs and function  Patient would like to consider surgical intervention at this time  Review of Systems     Review of Systems   Constitutional: Negative for chills and fever  HENT: Negative for ear pain and sore throat  Eyes: Negative for pain and visual disturbance  Respiratory: Negative for cough and shortness of breath  Cardiovascular: Negative for chest pain and palpitations  Gastrointestinal: Negative for abdominal pain and vomiting  Genitourinary: Negative for dysuria and hematuria  Musculoskeletal: Negative for arthralgias and back pain  Skin: Negative for color change and rash  Neurological: Negative for seizures and syncope  All other systems reviewed and are negative  Physical Exam     /67   Pulse 62   Ht 5' 4\" (1 626 m)   Wt 72 6 kg (160 lb)   BMI 27 46 kg/m²     Left Shoulder: Active range of motion   110 degrees forward flexion with pain  90 degrees abduction with pain  60 degrees external rotation   2 level restriction internal rotation    Passive range of motion   160 degrees of forward flexion, pain at terminal flexion  Supraspinatus testing 4-/5  Infraspinatus testing 4-/5  Subscapularis testing 4-/5  Patel test is positive  [place]'s test is negative    Speed's test is Negative  The patient is neurovascularly intact distally in the extremity  Data Review     I have personally reviewed pertinent films in PACS, and my interpretation follows  MRI performed 06/07/2023 of Left shoulder demonstrates complete tears of supraspinatus, infraspinatus, and subscapularis with retraction and moderate muscle atrophy  X-rays taken 10/14/2022 of Left shoulder demonstrates possible decreased acromiohumeral space  Likely previous rotator cuff repair and hardware still intact  Minimal degenerative changes at glenohumeral joint      Social History     Tobacco Use   • Smoking status: " Former     Packs/day: 1 00     Years: 17 00     Total pack years: 17 00     Types: Cigarettes     Quit date:      Years since quittin 4   • Smokeless tobacco: Never   Vaping Use   • Vaping Use: Never used   Substance Use Topics   • Alcohol use: No   • Drug use: No           Procedures  None     Lorenza Prieto DO

## 2023-06-12 NOTE — TELEPHONE ENCOUNTER
Felecia Pierre from [facility] asking to schedule this patient with Dr Ena Lara in the [facility]   She can be reached at [phone]

## 2023-06-12 NOTE — TELEPHONE ENCOUNTER
Patient called with questions on when to start the preop vitamins  I let him know that he should start them 4 weeks prior to his sx which would fall on 6 19 23  He had no further questions

## 2023-06-12 NOTE — TELEPHONE ENCOUNTER
Called Dr Rojas Oconnor's office   [phone] [facility] office sent a message to the provider to see if they can fit the patient in for a Cardiac clearance appt prior to his upcoming surgery on 7 19 23

## 2023-06-12 NOTE — TELEPHONE ENCOUNTER
Called patient's PCP to schedule Preop clearance  No answer  Voicemail was left asking them to call me back at

## 2023-06-12 NOTE — TELEPHONE ENCOUNTER
Amanda was left requesting to schedule an appt for Pulmonology Clearance  I asked that they please call me back at

## 2023-06-13 NOTE — TELEPHONE ENCOUNTER
Per chart, orthopedics needs pulmonary clearance prior to surgery scheduled 7/19/23  Patient already scheduled for Inspire follow up with Dr Goodwin Breath 9/13/23  No sooner appointments available  Returned call and left voicemail for Tiny Rashid advising of above  Requested call back to discuss further

## 2023-06-14 ENCOUNTER — TELEPHONE (OUTPATIENT)
Dept: PULMONOLOGY | Facility: CLINIC | Age: 78
End: 2023-06-14

## 2023-06-14 ENCOUNTER — OFFICE VISIT (OUTPATIENT)
Dept: CARDIOLOGY CLINIC | Facility: CLINIC | Age: 78
End: 2023-06-14
Payer: COMMERCIAL

## 2023-06-14 VITALS
BODY MASS INDEX: 27.31 KG/M2 | SYSTOLIC BLOOD PRESSURE: 124 MMHG | RESPIRATION RATE: 16 BRPM | WEIGHT: 160 LBS | DIASTOLIC BLOOD PRESSURE: 52 MMHG | OXYGEN SATURATION: 98 % | HEIGHT: 64 IN | HEART RATE: 57 BPM

## 2023-06-14 DIAGNOSIS — Z01.810 PREOP CARDIOVASCULAR EXAM: Primary | ICD-10-CM

## 2023-06-14 DIAGNOSIS — E78.2 MIXED HYPERLIPIDEMIA: ICD-10-CM

## 2023-06-14 DIAGNOSIS — I10 ESSENTIAL HYPERTENSION: ICD-10-CM

## 2023-06-14 PROCEDURE — 93000 ELECTROCARDIOGRAM COMPLETE: CPT | Performed by: INTERNAL MEDICINE

## 2023-06-14 PROCEDURE — 99213 OFFICE O/P EST LOW 20 MIN: CPT | Performed by: INTERNAL MEDICINE

## 2023-06-14 NOTE — PROGRESS NOTES
Cardiology Follow Up    Maite Arriaza  1945  [id number]  [facility] [address] [address] [phone]    1  Preop cardiovascular exam  -     POCT ECG    2  Mixed hyperlipidemia    3  Essential hypertension          Interval History: Patient of Dr Andrew Goldberg who wants to have his third left shoulder surgery  He is physically active without exertional chest discomfort  He had a cath in 2017 which showed nonobstructive disease  He has been treated for hypertension and hyper lipidemia and this has been well controlled      Patient Active Problem List   Diagnosis   • Essential hypertension   • Mixed hyperlipidemia   • Chronic pain disorder   • Chronic left shoulder pain   • Chronic bilateral low back pain with bilateral sciatica   • Disc degeneration, lumbar   • Lumbar stenosis with neurogenic claudication   • Abnormal CT of the abdomen   • Chronic pain syndrome   • GREGG (obstructive sleep apnea)   • Benign prostatic hyperplasia with lower urinary tract symptoms   • Hydronephrosis   • Nonrheumatic aortic valve insufficiency   • Non-rheumatic mitral regurgitation   • Diastolic dysfunction   • Nephrolithiasis   • Squamous cell carcinoma in situ (SCCIS) of scalp   • Actinic keratosis   • Chronic obstructive pulmonary disease (HCC)   • Megaloblastic red blood cells   • BMI 31 0-31 9,adult   • Class 1 obesity without serious comorbidity with body mass index (BMI) of 31 0 to 31 9 in adult   • Insomnia   • Age-related cataract of both eyes   • Rotator cuff arthropathy of left shoulder   • DDD (degenerative disc disease), cervical     Past Medical History:   Diagnosis Date   • Abnormal stress test    • Angina at rest [facility])     no issues currently 5/2021   • Apnea    • Cancer [facility])     Skin   • Carpal tunnel syndrome on left    • Chest pain    • CPAP (continuous positive airway pressure) dependence     uses CPAP   • Diverticulitis    • GERD (gastroesophageal reflux disease)    • History of transfusion     childhood   • Hypercholesterolemia    • Hypercholesterolemia    • Hyperlipidemia    • Hypertension    • Joint pain     Right Shoulder   • Kidney stone    • Mitral valve disorder    • Neck pain    • Sleep apnea    • Sleep apnea, obstructive    • Thoracic neuritis      Social History     Socioeconomic History   • Marital status: /Civil Union     Spouse name: Not on file   • Number of children: 4   • Years of education: high school or GED   • Highest education level: Not on file   Occupational History   • Occupation: retired   Tobacco Use   • Smoking status: Former     Packs/day: 1 00     Years: 17      Total pack years: 17 00     Types: Cigarettes     Quit date:      Years since quittin 4   • Smokeless tobacco: Never   Vaping Use   • Vaping Use: Never used   Substance and Sexual Activity   • Alcohol use: No   • Drug use: No   • Sexual activity: Not Currently     Partners: Female   Other Topics Concern   • Not on file   Social History Narrative    Active Advance Directives    Lives With Spouse             Social Determinants of Health     Financial Resource Strain: Medium Risk (2023)    Overall Financial Resource Strain (CARDIA)    • Difficulty of Paying Living Expenses: Somewhat hard   Food Insecurity: Not on file   Transportation Needs: No Transportation Needs (2023)    PRAPARE - Transportation    • Lack of Transportation (Medical): No    • Lack of Transportation (Non-Medical):  No   Physical Activity: Sufficiently Active (2020)    Exercise Vital Sign    • Days of Exercise per Week: 7 days    • Minutes of Exercise per Session: 40 min   Stress: No Stress Concern Present (3/17/2023)    [address]    • Feeling of Stress : Not at all   Social Connections: Not on file   Intimate Partner Violence: Not on file   Housing Stability: Not on file      Family History   Problem Relation Age of Onset   • Diabetes Mother         Mellitus   • Heart disease Mother         Arteriosclerotic Cardiovascular   • Hypertension Mother    • Cancer Father    • Arthritis Father         Rheu Mult Site W Involv Of Organs And Systems   • Parkinsonism Father    • Thyroid disease Sister    • Diabetes Brother         Diabetes:Mellitus   • Heart disease Brother    • Hypertension Brother    • Coronary artery disease Family    • Heart disease Brother    • No Known Problems Brother    • Kidney disease Sister    • Parkinsonism Sister      Past Surgical History:   Procedure Laterality Date   • COLONOSCOPY      Complete   • CORONARY ANGIOPLASTY  2017   • ESOPHAGOGASTRODUODENOSCOPY      Diagnostic   • FL RETROGRADE PYELOGRAM  05/24/2019   • FOOT SURGERY Left     tarsal tunnel   • HERNIA REPAIR     • KNEE ARTHROSCOPY Left    • KNEE SURGERY Left    • NEUROPLASTY / TRANSPOSITION MEDIAN NERVE AT CARPAL TUNNEL     • TX COLONOSCOPY FLX DX W/COLLJ SPEC WHEN PFRMD N/A 04/10/2018    Procedure: EGD AND COLONOSCOPY;  Surgeon: Annabel Pham MD;  Location: [facility];   Service: Gastroenterology   • TX CYSTO INSERTION TRANSPROSTATIC IMPLANT SINGLE N/A 08/30/2018    Procedure: CYSTOSCOPY WITH INSERTION Mayelin Ordonez;  Surgeon: Pramod Lara MD;  Location: [facility];  Service: Urology   • TX CYSTO/URETERO W/LITHOTRIPSY &INDWELL STENT INSRT Right 05/24/2019    Procedure: CYSTOSCOPY URETEROSCOPY WITH LITHOTRIPSY HOLMIUM LASER, RETROGRADE PYELOGRAM AND INSERTION STENT URETERAL;  Surgeon: Pramod Lara MD;  [facility];  Service: Urology   • TX OPEN IMPLANTATION CRANIAL NERVE JONAS & PULSE GEN N/A 05/13/2021    Procedure: INSERTION UPPER AIRWAY STIMULATOR, INSPIRE IMPLANT;  Surgeon: María Bryant MD;  Location: [facility];  Service: ENT   • TX TRANSURETHRAL INCISION PROSTATE N/A 08/30/2018    Procedure: TRANSURETHRAL INCISION OF PROSTATE (TUIP); Surgeon: Daysi Waddell MD;  Location[facility];  Service: Urology   • ROTATOR CUFF REPAIR Bilateral    • SHOULDER SURGERY      2 surgeries 30/20yrs ago   • TARSAL TUNNEL RELEASE      Neuroplasty Decompression       Current Outpatient Medications:   •  acetaminophen (TYLENOL) 500 mg tablet, Take 1,000 mg by mouth every 6 (six) hours as needed for mild pain, Disp: , Rfl:   •  amLODIPine (NORVASC) 10 mg tablet, Take 1 tablet (10 mg total) by mouth every morning, Disp: 90 tablet, Rfl: 1  •  Ascorbic Acid (vitamin C) 1000 MG tablet, Take 1 tablet (1,000 mg total) by mouth daily, Disp: 30 tablet, Rfl: 1  •  doxepin (SINEquan) 10 mg capsule, Take 1 capsule (10 mg total) by mouth daily at bedtime as needed for sleep, Disp: 90 capsule, Rfl: 0  •  fenofibrate (TRICOR) 145 mg tablet, Take 1 tablet (145 mg total) by mouth daily, Disp: 90 tablet, Rfl: 1  •  folic acid (KP Folic Acid) 1 mg tablet, Take 1 tablet (1 mg total) by mouth daily, Disp: 30 tablet, Rfl: 1  •  lisinopril (ZESTRIL) 40 mg tablet, TAKE ONE TABLET BY MOUTH EVERY DAY, Disp: 90 tablet, Rfl: 1  •  loperamide (IMODIUM A-D) 2 MG tablet, Take 1 tablet by mouth daily as needed, Disp: , Rfl:   •  lovastatin (MEVACOR) 40 MG tablet, TAKE ONE TABLET BY MOUTH EVERY DAY, Disp: 90 tablet, Rfl: 1  •  Multiple Vitamins-Minerals (CENTRUM ULTRA MENS PO), Take 1 tablet by mouth daily, Disp: , Rfl:   •  Multiple Vitamins-Minerals (ICAPS AREDS 2 PO), Take by mouth, Disp: , Rfl:   •  omeprazole (PriLOSEC) 20 mg delayed release capsule, TAKE ONE CAPSULE BY MOUTH EVERY DAY, Disp: 90 capsule, Rfl: 1  •  dicyclomine (BENTYL) 20 mg tablet, Take 1 tablet (20 mg total) by mouth 2 (two) times a day (Patient not taking: Reported on 6/14/2023), Disp: 20 tablet, Rfl: 0  •  Multiple Vitamin (multivitamin) tablet, Take 1 tablet by mouth daily (Patient not taking: Reported on 6/14/2023), Disp: 30 tablet, Rfl: 1  No Known Allergies    Labs:  No visits with results within 2 Month(s) from this visit  Latest known visit with results is:   Admission on 03/14/2023, Discharged on 03/14/2023   Component Date Value   • WBC 03/14/2023 8 80    • RBC 03/14/2023 4 42    • Hemoglobin 03/14/2023 15 3    • Hematocrit 03/14/2023 44 6    • MCV 03/14/2023 101 (H)    • MCH 03/14/2023 34 6 (H)    • MCHC 03/14/2023 34 3    • RDW 03/14/2023 12 4    • MPV 03/14/2023 9 7    • Platelets 51/73/7300 172    • nRBC 03/14/2023 0    • Neutrophils Relative 03/14/2023 79 (H)    • Immat GRANS % 03/14/2023 0    • Lymphocytes Relative 03/14/2023 13 (L)    • Monocytes Relative 03/14/2023 8    • Eosinophils Relative 03/14/2023 0    • Basophils Relative 03/14/2023 0    • Neutrophils Absolute 03/14/2023 6 87    • Immature Grans Absolute 03/14/2023 0 03    • Lymphocytes Absolute 03/14/2023 1 15    • Monocytes Absolute 03/14/2023 0 71    • Eosinophils Absolute 03/14/2023 0 02    • Basophils Absolute 03/14/2023 0 02    • Sodium 03/14/2023 140    • Potassium 03/14/2023 3 8    • Chloride 03/14/2023 107    • CO2 03/14/2023 24    • ANION GAP 03/14/2023 9    • BUN 03/14/2023 18    • Creatinine 03/14/2023 1 03    • Glucose 03/14/2023 99    • Calcium 03/14/2023 9 5    • AST 03/14/2023 20    • ALT 03/14/2023 16    • Alkaline Phosphatase 03/14/2023 73    • Total Protein 03/14/2023 7 3    • Albumin 03/14/2023 4 7    • Total Bilirubin 03/14/2023 0 55    • eGFR 03/14/2023 68    • Lipase 03/14/2023 25      Imaging: MRI shoulder left wo contrast    Result Date: 6/8/2023  Narrative: MRI LEFT SHOULDER INDICATION:   M24 812: Other specific joint derangements of left shoulder, not elsewhere classified  Rotator cuff repair ~20 years prior with additional subsequent surgery  Experienced fall 5 years prior which exacerbated shoulder pain  COMPARISON: X-ray 10/14/2022 TECHNIQUE:   Multiplanar/multisequence MR of the left shoulder was performed  FINDINGS: SUBCUTANEOUS TISSUES: Normal JOINT EFFUSION: There is a moderate glenohumeral joint effusion   ACROMION PROCESS: Normal  ROTATOR CUFF: There are complete tears of the supraspinatus, infraspinatus, and tendinous portion of the subscapularis  Proximal retraction of the tendon stump measures about 5 5 cm, to the level just proximal to the glenoid  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  Moderate fatty atrophy throughout the subscapularis and infraspinatus  SUBACROMIAL/SUBDELTOID BURSA: Open communication between the bursa and joint space  Susceptibility artifact seen within the bursa and adjacent soft tissues from prior surgery LONG HEAD OF BICEPS TENDON: Not visualized within the joint, possibly status post tenotomy/tenodesis GLENOID LABRUM: Degenerative GLENOHUMERAL JOINT: Superior migration of the humeral head with acromiohumeral abutment  Grade 2 chondrosis scattered throughout both surfaces  ACROMIOCLAVICULAR JOINT: Likely changes of distal clavicular resection BONES: Normal      Impression: Massive rotator cuff tear with muscle atrophy Workstation performed: [id number]     XR follow up    Result Date: 5/25/2023  Narrative: INDICATION:   Z96 82: Presence of neurostimulator  Evaluate for broken/abandoned leads prior to MRI  COMPARISON: Cervical spine radiographs from 1/24/2023  VIEWS: AP and lateral views of the neck soft tissues and upper thorax - 4 images FINDINGS: The patient is status post hypoglossal nerve stimulator on the right  The sensing lead projects over the 5th intercostal space posteriorly and the stimulation lead projects over the right submandibular region  The leads are intact  There are no abandoned  leads  Clear visualized lungs  No evidence of pneumothorax  Degenerative changes of the visualized spine  Impression: Status post right-sided hypoglossal nerve stimulator with no abandoned or fractured leads  Workstation performed: [id number]       Review of Systems:  Review of Systems    Physical Exam:  128/67  Heart rate 62 and regular  Lungs clear  Rhythm regular    Grade 1/6 "short systolic ejection murmur at the base  No carotid bruits  No organomegaly  No edema  Discussion/Summary:    1  Hypertension-controlled  2  Hyperlipidemia    Recommendations:     The patient is \"cleared\" from a cardiovascular standpoint for shoulder surgery      Nicole Celeste MD  "

## 2023-06-14 NOTE — TELEPHONE ENCOUNTER
Omar Stovall called back and advised they could fit the patient in for Pulmonology clearance with Dr Supriya Wang on 6 29 23 @ 12:20

## 2023-06-14 NOTE — TELEPHONE ENCOUNTER
Called [facility]  and spoke with Jose  They will reach out to the PA and see if they can fit the patient in for a pulmonology clearance prior to his surgery  She advised she will call me back at

## 2023-06-14 NOTE — TELEPHONE ENCOUNTER
Cardiologist called and left a voicemail to let me know that they were able to get the patient in today for cardiac clearance at 11:40 am with Dr Genesis Drew

## 2023-06-15 NOTE — TELEPHONE ENCOUNTER
Per chart, patient is now scheduled for appointment with pulmonologist Dr Shana Stockton on 6/29/23 for pulmonary clearance for surgery  Returned call from Agatha Fuentes at [facility] and provided nurse line number to call back if anything needed from sleep medicine

## 2023-06-19 ENCOUNTER — HOSPITAL ENCOUNTER (OUTPATIENT)
Dept: RADIOLOGY | Facility: MEDICAL CENTER | Age: 78
Discharge: HOME | End: 2023-06-19
Payer: COMMERCIAL

## 2023-06-19 DIAGNOSIS — M75.122 COMPLETE TEAR OF LEFT ROTATOR CUFF, UNSPECIFIED WHETHER TRAUMATIC: ICD-10-CM

## 2023-06-19 DIAGNOSIS — M12.812 ROTATOR CUFF ARTHROPATHY OF LEFT SHOULDER: ICD-10-CM

## 2023-06-19 PROCEDURE — G1004 CDSM NDSC: HCPCS

## 2023-06-19 PROCEDURE — 73200 CT UPPER EXTREMITY W/O DYE: CPT

## 2023-06-20 ENCOUNTER — TELEMEDICINE (OUTPATIENT)
Dept: ANESTHESIOLOGY | Facility: CLINIC | Age: 78
End: 2023-06-20
Payer: COMMERCIAL

## 2023-06-20 DIAGNOSIS — G47.00 INSOMNIA, UNSPECIFIED TYPE: ICD-10-CM

## 2023-06-20 DIAGNOSIS — J44.9 CHRONIC OBSTRUCTIVE PULMONARY DISEASE, UNSPECIFIED COPD TYPE (HCC): ICD-10-CM

## 2023-06-20 DIAGNOSIS — I34.0 NON-RHEUMATIC MITRAL REGURGITATION: ICD-10-CM

## 2023-06-20 DIAGNOSIS — M25.512 CHRONIC LEFT SHOULDER PAIN: ICD-10-CM

## 2023-06-20 DIAGNOSIS — M75.122 COMPLETE TEAR OF LEFT ROTATOR CUFF, UNSPECIFIED WHETHER TRAUMATIC: ICD-10-CM

## 2023-06-20 DIAGNOSIS — I35.1 NONRHEUMATIC AORTIC VALVE INSUFFICIENCY: ICD-10-CM

## 2023-06-20 DIAGNOSIS — G89.29 CHRONIC LEFT SHOULDER PAIN: ICD-10-CM

## 2023-06-20 DIAGNOSIS — Z01.89 ENCOUNTER FOR GERIATRIC ASSESSMENT: Primary | ICD-10-CM

## 2023-06-20 DIAGNOSIS — M12.812 ROTATOR CUFF ARTHROPATHY OF LEFT SHOULDER: ICD-10-CM

## 2023-06-20 DIAGNOSIS — G47.33 OSA (OBSTRUCTIVE SLEEP APNEA): ICD-10-CM

## 2023-06-20 DIAGNOSIS — I10 ESSENTIAL HYPERTENSION: ICD-10-CM

## 2023-06-20 PROCEDURE — 99214 OFFICE O/P EST MOD 30 MIN: CPT | Performed by: NURSE PRACTITIONER

## 2023-06-20 RX ORDER — DOXEPIN HYDROCHLORIDE 10 MG/1
10 CAPSULE ORAL
Qty: 90 CAPSULE | Refills: 0 | Status: SHIPPED | OUTPATIENT
Start: 2023-06-20 | End: 2023-09-18

## 2023-06-20 NOTE — PROGRESS NOTES
[facility]   CONSULT: GERIATRIC SURGERY   Virtual Brief Visit    This Visit is being completed by telephone  The Patient is located at Home and in the following state in which I hold an active license [place]    The patient was identified by name and date of birth  Tanika Chance was informed that this is a telemedicine visit and that the visit is being conducted through the [employer]  He agrees to proceed     My office door was closed  No one else was in the room  He acknowledged consent and understanding of privacy and security of the video platform  The patient has agreed to participate and understands they can discontinue the visit at any time  Patient is aware this is a billable service       Assessment/Plan:  · 76Year old male referred to [facility] for pre-surgery geriatric screening   · Consult for advanced age  · He is scheduled on 7 19 23    Case: [id number] Date/Time: 07/19/23 0830   Procedure: ARTHROPLASTY SHOULDER REVERSE- Left reverse shoulder arthroplasty (Left: Shoulder)   Anesthesia type: General w/ Interscalene Block   Diagnosis:        Complete tear of left rotator cuff, unspecified whether traumatic [M75 122]       Rotator cuff arthropathy of left shoulder [M12 812]   Pre-op diagnosis:        Complete tear of left rotator cuff, unspecified whether traumatic [M75 122]       Rotator cuff arthropathy of left shoulder [M12 812]   Location: [facility] ROOM 04 / [facility]   Surgeons: Horris Brittle, DO       LAST ANESTHESIA   · reports no problems     Surgery TO DO:   6 29 - PC  7 7 blood & chest XRAY   7 11- MC     Problem List Items Addressed This Visit        Respiratory    GREGG (obstructive sleep apnea)    Chronic obstructive pulmonary disease ([facility] [address] )- DENIES COPD   · DIAGNOSED 12 years ago - with GREGG, had inspire inserted 2 years ago - did well and tolerating well    · Ex SMOKER:  Quit over 40 years ago   · DENIES COPD   · Follows pulmonary   · Will have "PC clearance 6 29  · Will have [facility] 7 11 23       Cardiovascular and Mediastinum    Essential hypertension    Nonrheumatic aortic valve insufficiency    Non-rheumatic mitral regurgitation  · Stable - reports no concerns   · METS 6- denies CP/SOB   · Active   · Has received CC   Recommendations:     The patient is \"cleared\" from a cardiovascular standpoint for shoulder surgery          Musculoskeletal and Integument    Rotator cuff arthropathy of left shoulder   Chronic left shoulder pain     Other Visit Diagnoses     Complete tear of left rotator cuff, unspecified whether traumatic            · Seen for surgical optimization   · Seen for geriatric asseeemnt   · Has received CC   · Will receive PC on 6 29  · Will receive MC on 7 11  · Needs to complete PATS- CHEST XRAY will do 7 7 23  · Reviewed past labs from [facility] 2023- stable        Other        Encounter for geriatric assessment - Primary  RECOMMEND DELIRIUM PRECAUTIONS ON ADMIT 2 2 ADVANCED AGE AND HAVING ANESTHESIA   Date of Surgery: 7/19   Geriatric Assessment:   · Falls (last 6 months): 1 fall   · Neftali Total Score: 20   · PHQ- 9 Depression Scale: 0   · Nutrition Assessment Score: 13   · METS: 6 79   2-3 Meals a Day:    Breakfast: eggs cereal toast oatmeal   Lunch: sandwich salad soup leftover   Dinner: beef chicken pork fish   Snacks: yogurt peanut butter   Sleep: 5 hours of sleep   Health goals:  -What are your overall health goals? No goals- limit pain   -What brings you strength? Family   -What activities are important to you? Watch TV               Recent Visits  No visits were found meeting these conditions  Showing recent visits within past 7 days and meeting all other requirements  Today's Visits  Date Type Provider Dept   06/20/23 [facility], [address] [place] [address] [facility]   Showing today's visits and meeting all other requirements  Future Appointments  No visits were found meeting these conditions    Showing future " appointments within next 150 days and meeting all other requirements     Ros   Denies fevers and denies chills  Denies congestion and denies sore throat  Denies chest pain  Denies palpitations  Denies shortness of breath  Denies abdominal pain  Denies nausea, vomiting, and diarrhea  Denies any issues with their skin    example NO open wounds or sores  Denies rashes  Denies difficulty urinating  Denies any issues with their urine    example a dark color or an odor  Denies dizziness  Denies headaches  Denies confusion and denies hallucinations    Admits to being in well health today    PHYSICAL   · Patient was unable to connect via video  · She was alert and orient x3  · Mood appropriate  · I heard no respiratory distress over the telephone    Jorge Gonzalez is a 20-year-old male who was referred to [facility] for presurgery geriatric screening secondary to adv he has been managing left shoulder pain and is electing to have a left shoulder replacement  I met patient over the telephone  He was unable to connect via video  We did use [employer] services but he could not get his video on  Patient requested to complete his appointment via phone call  Patient was pleasant and a pleasure to care for  There were no cognitive concerns identified today  I did recommend delirium precautions on admit due to advanced age of 76 and having surgery and anesthesia  Patient lives at home  He is active  METS is 6  Denies chest pain denies shortness of breath  He is independent with all ADLs  He did have record 1 fall  I recommend fall precautions on admit  He already received cardiac clearance  He still needs to have medical clearance and pulmonary clearance  These are scheduled  Await results for further needs  He needs to do his blood work and chest x-ray  Await results for further needs  I did review blood from February 2023 which was all stable  I do not anticipate any upcoming concerns on the new blood work  Visit Time  Total Visit Duration: 15  Minutes with patient     [facility] on: 6/20   Date of Surgery: 7/19   Geriatric Assessment:   · Falls (last 6 months): 1 fall   · Neftali Total Score: 20   · PHQ- 9 Depression Scale: 0   · Nutrition Assessment Score: 13   · METS: 6 79   2-3 Meals a Day:    Breakfast: eggs cereal toast oatmeal   Lunch: sandwich salad soup leftover   Dinner: beef chicken pork fish   Snacks: yogurt peanut butter   Sleep: 5 hours of sleep   Health goals:  -What are your overall health goals? No goals- limit pain   -What brings you strength? Family   -What activities are important to you? Watch TV     Patient & I talked about [facility]  Surgery  Breathing exercises    Patient was encouraged to begin lung exercises today  Eating/nutrition   Encouraged patient to increase oral protein intake prior to surgery  This can be accomplished by consuming chicken, fish, tuna fish, cottage cheese, cheese, eggs, [place] yogurt, and protein shakes as needed  Sleep/Stress management  Patient was encouraged to rest their body prior to surgery  Encouraged attempting to get 8 hours of sleep at night  Training exercises  Patient was encouraged to remain active as possible

## 2023-06-29 ENCOUNTER — OFFICE VISIT (OUTPATIENT)
Age: 78
End: 2023-06-29
Payer: COMMERCIAL

## 2023-06-29 ENCOUNTER — HOSPITAL ENCOUNTER (OUTPATIENT)
Dept: RADIOLOGY | Facility: HOSPITAL | Age: 78
End: 2023-06-29
Payer: COMMERCIAL

## 2023-06-29 ENCOUNTER — APPOINTMENT (OUTPATIENT)
Dept: LAB | Facility: CLINIC | Age: 78
End: 2023-06-29
Payer: COMMERCIAL

## 2023-06-29 VITALS
HEIGHT: 64 IN | DIASTOLIC BLOOD PRESSURE: 80 MMHG | BODY MASS INDEX: 27.31 KG/M2 | OXYGEN SATURATION: 98 % | WEIGHT: 160 LBS | HEART RATE: 61 BPM | TEMPERATURE: 98.6 F | SYSTOLIC BLOOD PRESSURE: 124 MMHG

## 2023-06-29 DIAGNOSIS — D53.1 MEGALOBLASTIC RED BLOOD CELLS: ICD-10-CM

## 2023-06-29 DIAGNOSIS — Z00.00 ENCOUNTER FOR SUBSEQUENT ANNUAL WELLNESS VISIT (AWV) IN MEDICARE PATIENT: ICD-10-CM

## 2023-06-29 DIAGNOSIS — E66.3 OVERWEIGHT (BMI 25.0-29.9): ICD-10-CM

## 2023-06-29 DIAGNOSIS — M75.122 COMPLETE TEAR OF LEFT ROTATOR CUFF, UNSPECIFIED WHETHER TRAUMATIC: ICD-10-CM

## 2023-06-29 DIAGNOSIS — E78.2 MIXED HYPERLIPIDEMIA: ICD-10-CM

## 2023-06-29 DIAGNOSIS — G47.33 MODERATE OBSTRUCTIVE SLEEP APNEA: ICD-10-CM

## 2023-06-29 DIAGNOSIS — Z11.59 NEED FOR HEPATITIS C SCREENING TEST: ICD-10-CM

## 2023-06-29 DIAGNOSIS — Z01.818 PREOPERATIVE TESTING: ICD-10-CM

## 2023-06-29 DIAGNOSIS — M12.812 ROTATOR CUFF ARTHROPATHY OF LEFT SHOULDER: ICD-10-CM

## 2023-06-29 DIAGNOSIS — Z01.818 PREOPERATIVE CLEARANCE: Primary | ICD-10-CM

## 2023-06-29 PROBLEM — J44.9 CHRONIC OBSTRUCTIVE PULMONARY DISEASE (HCC): Status: RESOLVED | Noted: 2019-04-09 | Resolved: 2023-06-29

## 2023-06-29 LAB
ALBUMIN SERPL BCP-MCNC: 4.1 G/DL (ref 3.5–5)
ALP SERPL-CCNC: 73 U/L (ref 46–116)
ALT SERPL W P-5'-P-CCNC: 20 U/L (ref 12–78)
ANION GAP SERPL CALCULATED.3IONS-SCNC: 3 MMOL/L
AST SERPL W P-5'-P-CCNC: 19 U/L (ref 5–45)
BASOPHILS # BLD AUTO: 0.01 THOUSANDS/ÂΜL (ref 0–0.1)
BASOPHILS NFR BLD AUTO: 0 % (ref 0–1)
BILIRUB SERPL-MCNC: 0.77 MG/DL (ref 0.2–1)
BUN SERPL-MCNC: 17 MG/DL (ref 5–25)
CALCIUM SERPL-MCNC: 9.8 MG/DL (ref 8.3–10.1)
CHLORIDE SERPL-SCNC: 112 MMOL/L (ref 96–108)
CO2 SERPL-SCNC: 27 MMOL/L (ref 21–32)
CREAT SERPL-MCNC: 0.96 MG/DL (ref 0.6–1.3)
CRP SERPL QL: <3 MG/L
EOSINOPHIL # BLD AUTO: 0.03 THOUSAND/ÂΜL (ref 0–0.61)
EOSINOPHIL NFR BLD AUTO: 1 % (ref 0–6)
ERYTHROCYTE [DISTWIDTH] IN BLOOD BY AUTOMATED COUNT: 12.8 % (ref 11.6–15.1)
EST. AVERAGE GLUCOSE BLD GHB EST-MCNC: 103 MG/DL
GFR SERPL CREATININE-BSD FRML MDRD: 74 ML/MIN/1.73SQ M
GLUCOSE P FAST SERPL-MCNC: 96 MG/DL (ref 65–99)
HBA1C MFR BLD: 5.2 %
HCT VFR BLD AUTO: 45.8 % (ref 36.5–49.3)
HGB BLD-MCNC: 15.1 G/DL (ref 12–17)
IMM GRANULOCYTES # BLD AUTO: 0 THOUSAND/UL (ref 0–0.2)
IMM GRANULOCYTES NFR BLD AUTO: 0 % (ref 0–2)
LYMPHOCYTES # BLD AUTO: 1.71 THOUSANDS/ÂΜL (ref 0.6–4.47)
LYMPHOCYTES NFR BLD AUTO: 35 % (ref 14–44)
MCH RBC QN AUTO: 33.9 PG (ref 26.8–34.3)
MCHC RBC AUTO-ENTMCNC: 33 G/DL (ref 31.4–37.4)
MCV RBC AUTO: 103 FL (ref 82–98)
MONOCYTES # BLD AUTO: 0.54 THOUSAND/ÂΜL (ref 0.17–1.22)
MONOCYTES NFR BLD AUTO: 11 % (ref 4–12)
NEUTROPHILS # BLD AUTO: 2.55 THOUSANDS/ÂΜL (ref 1.85–7.62)
NEUTS SEG NFR BLD AUTO: 53 % (ref 43–75)
NRBC BLD AUTO-RTO: 0 /100 WBCS
PLATELET # BLD AUTO: 150 THOUSANDS/UL (ref 149–390)
PMV BLD AUTO: 11.1 FL (ref 8.9–12.7)
POTASSIUM SERPL-SCNC: 4.6 MMOL/L (ref 3.5–5.3)
PROT SERPL-MCNC: 7.2 G/DL (ref 6.4–8.4)
RBC # BLD AUTO: 4.46 MILLION/UL (ref 3.88–5.62)
SODIUM SERPL-SCNC: 142 MMOL/L (ref 135–147)
WBC # BLD AUTO: 4.84 THOUSAND/UL (ref 4.31–10.16)

## 2023-06-29 PROCEDURE — 86901 BLOOD TYPING SEROLOGIC RH(D): CPT

## 2023-06-29 PROCEDURE — 80053 COMPREHEN METABOLIC PANEL: CPT

## 2023-06-29 PROCEDURE — 71046 X-RAY EXAM CHEST 2 VIEWS: CPT

## 2023-06-29 PROCEDURE — 36415 COLL VENOUS BLD VENIPUNCTURE: CPT

## 2023-06-29 PROCEDURE — 86850 RBC ANTIBODY SCREEN: CPT

## 2023-06-29 PROCEDURE — 99215 OFFICE O/P EST HI 40 MIN: CPT | Performed by: INTERNAL MEDICINE

## 2023-06-29 PROCEDURE — 85730 THROMBOPLASTIN TIME PARTIAL: CPT

## 2023-06-29 PROCEDURE — 83036 HEMOGLOBIN GLYCOSYLATED A1C: CPT

## 2023-06-29 PROCEDURE — 94010 BREATHING CAPACITY TEST: CPT | Performed by: INTERNAL MEDICINE

## 2023-06-29 PROCEDURE — 86900 BLOOD TYPING SEROLOGIC ABO: CPT

## 2023-06-29 PROCEDURE — 85610 PROTHROMBIN TIME: CPT

## 2023-06-29 PROCEDURE — 85025 COMPLETE CBC W/AUTO DIFF WBC: CPT

## 2023-06-29 PROCEDURE — 86140 C-REACTIVE PROTEIN: CPT

## 2023-06-29 NOTE — PROGRESS NOTES
"Assessment/Plan:   Diagnoses and all orders for this visit:    Preoperative clearance  -     POCT spirometry    Rotator cuff arthropathy of left shoulder  -     Ambulatory referral to Pulmonology    Complete tear of left rotator cuff, unspecified whether traumatic  -     Ambulatory referral to Pulmonology    Moderate obstructive sleep apnea    Overweight (BMI 25 0-29 9)        Spirometry done in the office today with no evidence of any obstructive lung disease normal spirometry  Again discussed with the patient no evidence of any COPD  I took the diagnosis of COPD out of the chart  Moderate obstructive sleep apnea status post inspire consistently using it and he states his titration has been good and is not having any problems using the inspire  There is no pulmonary contraindication for the surgery indicated  He is at low risk for pulmonary complications for the surgery intended  For his obstructive sleep apnea status post inspire discussed to make sure that he takes his remote unit for activation and using the inspire in case he needs to stay overnight at the hospital after the surgery  Understands and verbalizes  Follow-up in the sleep clinic for the sleep apnea    No follow-ups on file  All questions are answered to the patient's satisfaction and understanding  He verbalizes understanding  He is encouraged to call with any further questions or concerns  Portions of the record may have been created with voice recognition software  Occasional wrong word or \"sound a like\" substitutions may have occurred due to the inherent limitations of voice recognition software  Read the chart carefully and recognize, using context, where substitutions have occurred      Electronically Signed by Sofya Vasquez MD    ______________________________________________________________________    Chief Complaint:   Chief Complaint   Patient presents with   • Pre-op Exam       Patient ID: Seymour Ruiz is a 76 y o  y o  male has a " past medical history of Abnormal stress test, Angina at rest [facility]), Apnea, Cancer ([address] ), Carpal tunnel syndrome on left, Chest pain, CPAP (continuous positive airway pressure) dependence, Diverticulitis, GERD (gastroesophageal reflux disease), History of transfusion, Hypercholesterolemia, Hypercholesterolemia, Hyperlipidemia, Hypertension, Joint pain, Kidney stone, Mitral valve disorder, Neck pain, Sleep apnea, Sleep apnea, obstructive, and Thoracic neuritis  6/29/2023  Patient presents today for follow-up visit  Patient is a 77 yo male former smoker with PMH of GREGG, HTN, HLD, BPH  He is currently status post inspire placed in May 2021 and he states since it has been activated in June 2021 he has been doing well also titrated and has been consistently using it  Following up in [facility] sleep center  He is planning to have rotator cuff surgery left shoulder, and wants preoperative clearance he states there is a diagnosis of COPD in his chart not sure if he really has it he states  He does have a very remote history of smoking with less than 10-12-pack-year smoking history who quit several years ago  Does not have any cough on a daily basis no history of any shortness of breath or dyspnea on exertion and he states he has never been on any inhalers  He has had prior surgeries hernia surgery several years ago without any problems with anesthesia  At baseline he is able to walk around more than 4-5 blocks does not have any difficulty climbing up 2 flights of stairs  Review of Systems   Constitutional: Negative  HENT: Negative  Eyes: Negative  Respiratory: Negative  Cardiovascular: Negative  Gastrointestinal: Negative  Endocrine: Negative  Genitourinary: Negative  Musculoskeletal: Negative  Allergic/Immunologic: Negative  Neurological: Negative  Hematological: Negative  Psychiatric/Behavioral: Negative          Smoking history: He reports that he quit smoking about 45 years ago  His smoking use included cigarettes  He started smoking about 63 years ago  He has a 18 00 pack-year smoking history   He has never used smokeless tobacco     The following portions of the patient's history were reviewed and updated as appropriate: allergies, current medications, past family history, past medical history, past social history, past surgical history and problem list     Immunization History   Administered Date(s) Administered   • COVID-19 MODERNA VACC 0 5 ML IM 02/08/2021, 03/08/2021, 10/22/2021, 08/16/2022   • INFLUENZA 11/02/2005, 09/23/2009, 10/06/2014, 10/01/2015, 09/10/2018, 09/15/2021, 08/16/2022   • Influenza Split High Dose Preservative Free IM 10/01/2015, 08/30/2017   • Influenza, seasonal, injectable 10/01/2016   • Pneumococcal Conjugate 13-Valent 07/08/2020   • Pneumococcal Polysaccharide PPV23 09/23/2009, 08/16/2022   • Tdap 1945, 05/03/2018, 10/14/2022   • Zoster 1945   • Zoster Vaccine Recombinant 08/16/2022, 10/27/2022   • influenza, trivalent, adjuvanted 10/10/2019     Current Outpatient Medications   Medication Sig Dispense Refill   • acetaminophen (TYLENOL) 500 mg tablet Take 1,000 mg by mouth every 6 (six) hours as needed for mild pain     • amLODIPine (NORVASC) 10 mg tablet Take 1 tablet (10 mg total) by mouth every morning 90 tablet 1   • Ascorbic Acid (vitamin C) 1000 MG tablet Take 1 tablet (1,000 mg total) by mouth daily 30 tablet 1   • doxepin (SINEquan) 10 mg capsule Take 1 capsule (10 mg total) by mouth daily at bedtime as needed for sleep 90 capsule 0   • fenofibrate (TRICOR) 145 mg tablet Take 1 tablet (145 mg total) by mouth daily 90 tablet 1   • folic acid (KP Folic Acid) 1 mg tablet Take 1 tablet (1 mg total) by mouth daily 30 tablet 1   • lisinopril (ZESTRIL) 40 mg tablet TAKE ONE TABLET BY MOUTH EVERY DAY 90 tablet 1   • loperamide (IMODIUM A-D) 2 MG tablet Take 1 tablet by mouth daily as needed     • lovastatin (MEVACOR) 40 MG tablet TAKE ONE "TABLET BY MOUTH EVERY DAY 90 tablet 1   • Multiple Vitamin (multivitamin) tablet Take 1 tablet by mouth daily 30 tablet 1   • Multiple Vitamins-Minerals (CENTRUM ULTRA MENS PO) Take 1 tablet by mouth daily     • Multiple Vitamins-Minerals (ICAPS AREDS 2 PO) Take by mouth     • omeprazole (PriLOSEC) 20 mg delayed release capsule TAKE ONE CAPSULE BY MOUTH EVERY DAY 90 capsule 1   • dicyclomine (BENTYL) 20 mg tablet Take 1 tablet (20 mg total) by mouth 2 (two) times a day (Patient not taking: Reported on 6/14/2023) 20 tablet 0     No current facility-administered medications for this visit  Allergies: Patient has no known allergies  Objective:  Vitals:    06/29/23 1212   BP: 124/80   Pulse: 61   Temp: 98 6 °F (37 °C)   SpO2: 98%   Weight: 72 6 kg (160 lb)   Height: 5' 4\" (1 626 m)   Oxygen Therapy  SpO2: 98 %    Wt Readings from Last 3 Encounters:   06/29/23 72 6 kg (160 lb)   06/14/23 72 6 kg (160 lb)   06/12/23 72 6 kg (160 lb)     Body mass index is 27 46 kg/m²  Physical Exam  Vitals and nursing note reviewed  Constitutional:       Appearance: He is well-developed  HENT:      Head: Normocephalic and atraumatic  Eyes:      Conjunctiva/sclera: Conjunctivae normal       Pupils: Pupils are equal, round, and reactive to light  Neck:      Thyroid: No thyromegaly  Vascular: No JVD  Cardiovascular:      Rate and Rhythm: Normal rate and regular rhythm  Heart sounds: Normal heart sounds  No murmur heard  No friction rub  No gallop  Pulmonary:      Effort: Pulmonary effort is normal  No respiratory distress  Breath sounds: Normal breath sounds  No wheezing or rales  Chest:      Chest wall: No tenderness  Musculoskeletal:         General: No tenderness or deformity  Normal range of motion  Cervical back: Normal range of motion and neck supple  Lymphadenopathy:      Cervical: No cervical adenopathy  Skin:     General: Skin is warm and dry     Neurological:      Mental Status: " He is alert and oriented to person, place, and time  Diagnostics:  I have personally reviewed pertinent reports  ESS: Total score: 7  CT shoulder left blue print    Result Date: 6/22/2023  Narrative: CT left shoulder without IV contrast (Preoperative shoulder replacement planning protocol ) INDICATION: M12 812: Other specific arthropathies, not elsewhere classified, left shoulder M75 122: Complete rotator cuff tear or rupture of left shoulder, not specified as traumatic  COMPARISON: 10/14/2022, plain film and 6/7/2023, MR    TECHNIQUE: CT examination of the left shoulder was performed according to a protocol specified by shoulder replacement preoperative planning such as [facility] for [employer] (also [employer] and [employer]) shoulder replacement to facilitate pre-op planning  This examination, like all CT scans performed in the [facility], was performed utilizing techniques to minimize radiation dose exposure, including the use of iterative reconstruction and automated exposure control software  Sagittal and coronal two dimensional reconstructed images were also submitted for interpretation  Rad dose  671 mGy-cm FINDINGS: OSSEOUS STRUCTURES:  No fracture, dislocation or destructive osseous lesion  Glenohumeral degenerative change with mild glenoid hypoplasia  Associated joint effusion evident  No significant retroversion  Good glenoid bone stock  Apparent distal clavicular partial resection  VISUALIZED MUSCULATURE: Tendon anchor noted after presumptive supraspinatus tendon repair with chronic recurrent supraspinatus tear as demonstrated by superior humeral head migration and undersurface remodeling of a broad-based subacromial spur  SOFT TISSUES: Prominent glenohumeral degenerative change noted    OTHER PERTINENT FINDINGS:  None  Impression: CT of the left shoulder performed according to preoperative shoulder replacement planning protocol   No significant retroversion  Good glenoid bone stock with minimal glenoid hypoplasia  Chronic recurrent rotator cuff tear as above  The patient is preoperative for reverse total shoulder arthroplasty  Workstation performed: [id number]     MRI shoulder left wo contrast    Result Date: 6/8/2023  Narrative: MRI LEFT SHOULDER INDICATION:   M24 812: Other specific joint derangements of left shoulder, not elsewhere classified  Rotator cuff repair ~20 years prior with additional subsequent surgery  Experienced fall 5 years prior which exacerbated shoulder pain  COMPARISON: X-ray 10/14/2022 TECHNIQUE:   Multiplanar/multisequence MR of the left shoulder was performed  FINDINGS: SUBCUTANEOUS TISSUES: Normal JOINT EFFUSION: There is a moderate glenohumeral joint effusion  ACROMION PROCESS: Normal  ROTATOR CUFF: There are complete tears of the supraspinatus, infraspinatus, and tendinous portion of the subscapularis  Proximal retraction of the tendon stump measures about 5 5 cm, to the level just proximal to the glenoid  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  Moderate fatty atrophy throughout the subscapularis and infraspinatus  SUBACROMIAL/SUBDELTOID BURSA: Open communication between the bursa and joint space  Susceptibility artifact seen within the bursa and adjacent soft tissues from prior surgery LONG HEAD OF BICEPS TENDON: Not visualized within the joint, possibly status post tenotomy/tenodesis GLENOID LABRUM: Degenerative GLENOHUMERAL JOINT: Superior migration of the humeral head with acromiohumeral abutment  Grade 2 chondrosis scattered throughout both surfaces   ACROMIOCLAVICULAR JOINT: Likely changes of distal clavicular resection BONES: Normal      Impression: Massive rotator cuff tear with muscle atrophy Workstation performed: [id number]

## 2023-06-30 LAB
ABO GROUP BLD: NORMAL
APTT PPP: 33 SECONDS (ref 23–37)
BLD GP AB SCN SERPL QL: NEGATIVE
INR PPP: 1.02 (ref 0.84–1.19)
PROTHROMBIN TIME: 13.6 SECONDS (ref 11.6–14.5)
RH BLD: NEGATIVE
SPECIMEN EXPIRATION DATE: NORMAL

## 2023-07-07 ENCOUNTER — TELEPHONE (OUTPATIENT)
Dept: OBGYN CLINIC | Facility: HOSPITAL | Age: 78
End: 2023-07-07

## 2023-07-07 RX ORDER — CETIRIZINE HYDROCHLORIDE 10 MG/1
10 TABLET, CHEWABLE ORAL AS NEEDED
COMMUNITY

## 2023-07-07 NOTE — TELEPHONE ENCOUNTER
Called patient to let him know he will be staying overnight for his surgery. He had no further questions.

## 2023-07-07 NOTE — TELEPHONE ENCOUNTER
Caller: Salvador aZpata     Doctor: Xavi Fairbanks    Reason for call: Would like to know if he will be staying overnight at the hospital or will be going home the same day as his surgery     Call back#: [phone]

## 2023-07-07 NOTE — PRE-PROCEDURE INSTRUCTIONS
Pre-Surgery Instructions:   Medication Instructions   • acetaminophen (TYLENOL) 500 mg tablet Uses PRN- OK to take day of surgery   • amLODIPine (NORVASC) 10 mg tablet Take day of surgery. • Ascorbic Acid (vitamin C) 1000 MG tablet Hold day of surgery. • cetirizine (ZyrTEC) 10 MG chewable tablet Hold day of surgery. • doxepin (SINEquan) 10 mg capsule Take night before surgery PRN   • fenofibrate (TRICOR) 145 mg tablet Take day of surgery. • folic acid (KP Folic Acid) 1 mg tablet Hold day of surgery. • lisinopril (ZESTRIL) 40 mg tablet Hold day of surgery. • loperamide (IMODIUM A-D) 2 MG tablet Uses PRN- OK to take day of surgery   • lovastatin (MEVACOR) 40 MG tablet evening   • Multiple Vitamin (multivitamin) tablet Hold day of surgery. • Multiple Vitamins-Minerals (CENTRUM ULTRA MENS PO) Hold day of surgery. • Multiple Vitamins-Minerals (ICAPS AREDS 2 PO) Stop taking 7 days prior to surgery. • omeprazole (PriLOSEC) 20 mg delayed release capsule Take day of surgery. Review with patient via phone medications and showering instructions. Instructed to avoid all ASA and OTC Vit/Supp 1 week prior to surgery and to avoid NSAIDs 3 days prior to surgery per anesthesia instructions. Tylenol ok to take prn. [facility] call with surgery schedule time, nothing eat or drink after midnight. Verbalized understanding.

## 2023-07-10 ENCOUNTER — EVALUATION (OUTPATIENT)
Dept: PHYSICAL THERAPY | Facility: MEDICAL CENTER | Age: 78
End: 2023-07-10
Payer: COMMERCIAL

## 2023-07-10 DIAGNOSIS — M25.512 CHRONIC LEFT SHOULDER PAIN: Primary | ICD-10-CM

## 2023-07-10 DIAGNOSIS — G89.29 CHRONIC LEFT SHOULDER PAIN: Primary | ICD-10-CM

## 2023-07-10 PROCEDURE — 97110 THERAPEUTIC EXERCISES: CPT | Performed by: PHYSICAL THERAPIST

## 2023-07-10 PROCEDURE — 97162 PT EVAL MOD COMPLEX 30 MIN: CPT | Performed by: PHYSICAL THERAPIST

## 2023-07-10 NOTE — TELEPHONE ENCOUNTER
Patient called regarding med holds prior to surgery. He spoke with the nurse navigator and forgot what she went over. I gave him the number for the nurse navigator for review.

## 2023-07-10 NOTE — PROGRESS NOTES
PT Evaluation     Today's date: 7/10/2023  Patient name: Sergey Herrera  : 1945  MRN: [id number]  Referring provider: Lul Tomlinson DO  Dx:   Encounter Diagnosis     ICD-10-CM    1. Chronic left shoulder pain  M25.512     G89.29                      Assessment  Assessment details: Pt is an alert and oriented LHD 77 yo male, who presents to PT for pre-op appt for L TSA scheduled for 23. Pt has a massive L RTC tear, and conservative therapy failed. Pt states having continuous pain, and difficulty with OH reaching and reaching across his body. Pt states he does all of the housework at home, but makes small loads because he can't carry heavy objects. Upon examination, pt demonstrates pain, tissue tenderness, decreased L shoulder strength and ROM, postural deficits and functional limitations. Discussed post-op rehab guidelines. Pt will benefit from skilled PT to address the deficits listed above and maximize function. Thank you for your referral.  Impairments: abnormal muscle firing, abnormal or restricted ROM, activity intolerance, impaired physical strength, lacks appropriate home exercise program, pain with function, scapular dyskinesis and poor posture   Understanding of Dx/Px/POC: good   Prognosis: good    Goals  ST: Decrease pain by 25% in 4 weeks with all functional activities  2: Improved ROM by 25% in 4 weeks to assist with all functional activities  3: Improve strength by 1/2 grade in 4 weeks to assist with all functional activities  LT:  Decrease pain to 0-1/10 with all functional activities in 4-6 weeks  2: Improved ROM to WFL's in 4-6 weeks to assist with all functional activities  3:  Increase strength to WFL's in 4-6 weeks to assist with all functional activities    Plan  Patient would benefit from: skilled physical therapy  Planned modality interventions: cryotherapy  Planned therapy interventions: joint mobilization, manual therapy, neuromuscular re-education, patient education, postural training, strengthening, stretching, therapeutic activities, therapeutic exercise and home exercise program  Frequency: 2x week  Duration in weeks: 6  Plan of Care beginning date: 7/10/2023  Plan of Care expiration date: 9/3/2023  Treatment plan discussed with: patient        Subjective Evaluation    History of Present Illness  Mechanism of injury: surgery  Mechanism of injury: DOS   Pain  Current pain ratin  At best pain ratin  At worst pain ratin  Quality: dull ache  Relieving factors: ice and medications  Aggravating factors: lifting and overhead activity    Hand dominance: left      Diagnostic Tests  MRI studies: abnormal  Treatments  Current treatment: physical therapy  Patient Goals  Patient goals for therapy: decreased pain, increased motion, increased strength and independence with ADLs/IADLs          Objective     Postural Observations    Additional Postural Observation Details  Posture/observation:  75 yo male, generally deconditioned, mod scap protraction and forward head posture present.   UT recruitment with elevation observed    Palpation     Additional Palpation Details  Palpation:  Pain/tenderness R ant/lat and sup shoulder joint    Neurological Testing     Sensation     Shoulder   Left Shoulder   Intact: light touch    Right Shoulder   Intact: light touch    Active Range of Motion   Left Shoulder   Flexion: 95 degrees with pain  Extension: 48 degrees with pain  Abduction: 90 degrees with pain  External rotation BTH: C2 with pain  Internal rotation BTB: sacrum with pain    Right Shoulder   External rotation BTH: C5   Internal rotation BTB: L3     Strength/Myotome Testing     Left Shoulder     Planes of Motion   Flexion: 3-   Extension: 3+   Abduction: 3-   Adduction: 3+   External rotation at 0°: 2+   Internal rotation at 0°: 3+     Isolated Muscles   Biceps: 3+   Subscapularis: 2+   Supraspinatus: 2+     Right Shoulder     Planes of Motion   Flexion: 4   Extension: 4   Abduction: 4   Adduction: 4   External rotation at 0°: 4   Internal rotation at 0°: 4     Additional Strength Details  Pain with all shoulder MMT reported.              Precautions: PROTOCOL (flex-120, abd 90, ER 30), GERD, mitral valve, neck pain, HTN      Manuals 7/10            PROM per protocol nv                                                   Neuro Re-Ed                                                                                                        Ther Ex 7/10            pendulums HEP            scap retract HEP            Cervical AROM HEP            gripping HEP                                                                Ther Activity                                       Gait Training                                       Modalities 7/10            CP nv

## 2023-07-11 ENCOUNTER — CONSULT (OUTPATIENT)
Age: 78
End: 2023-07-11
Payer: COMMERCIAL

## 2023-07-11 VITALS
WEIGHT: 159 LBS | RESPIRATION RATE: 18 BRPM | SYSTOLIC BLOOD PRESSURE: 138 MMHG | HEIGHT: 62 IN | OXYGEN SATURATION: 97 % | BODY MASS INDEX: 29.26 KG/M2 | TEMPERATURE: 96.8 F | HEART RATE: 60 BPM | DIASTOLIC BLOOD PRESSURE: 70 MMHG

## 2023-07-11 DIAGNOSIS — Z01.818 PRE-OP EXAM: Primary | ICD-10-CM

## 2023-07-11 PROCEDURE — 99214 OFFICE O/P EST MOD 30 MIN: CPT

## 2023-07-11 PROCEDURE — 93000 ELECTROCARDIOGRAM COMPLETE: CPT

## 2023-07-11 NOTE — PROGRESS NOTES
Presurgical Evaluation    Subjective:      Patient ID: Alejandra Campos is a 76 y.o. male. Chief Complaint   Patient presents with   • Pre-op Exam       States has had shoulder pain for many years, has had rotator cuff surgeries in the past, and is having left reverse shoulder arthroplasty on 19 July. The following portions of the patient's history were reviewed and updated as appropriate: allergies, current medications, past family history, past medical history, past social history, past surgical history and problem list.    Procedure date: 07/19/2023    Surgeon:  Dr Shannan Yeung  Planned procedure: Left reverse shoulder arthroplasty  Diagnosis for procedure: Complete tear of left rotator cuff, Rotator cuff arthropathy of left shoulder     Prior anesthesia: Yes   General; Complications:  None / Tolerated well    CAD History: HTN, nonrheumatic aortic valve insufficiency   NOTE: Patient should see Cardiology if time available before surgery, and if appropriate. Pulmonary History: GREGG (Sleep Apnea)    Renal history: CKD stage 2 - GFR 60-89    Diabetes History:  None     Neurological History: None     On Immunosuppressant meds/biologics: No      Review of Systems   Constitutional: Negative for chills and fever. HENT: Negative for ear pain and sore throat. Eyes: Negative for pain and visual disturbance. Respiratory: Negative for cough and shortness of breath. Cardiovascular: Negative for chest pain and palpitations. Gastrointestinal: Negative for abdominal pain and vomiting. Genitourinary: Negative for dysuria and hematuria. Musculoskeletal: Positive for arthralgias (shoulder). Negative for back pain. Skin: Negative for color change and rash. Neurological: Negative for seizures and syncope. All other systems reviewed and are negative.         Current Outpatient Medications   Medication Sig Dispense Refill   • acetaminophen (TYLENOL) 500 mg tablet Take 1,000 mg by mouth every 6 (six) hours as needed for mild pain     • amLODIPine (NORVASC) 10 mg tablet Take 1 tablet (10 mg total) by mouth every morning 90 tablet 1   • Ascorbic Acid (vitamin C) 1000 MG tablet Take 1 tablet (1,000 mg total) by mouth daily 30 tablet 1   • cetirizine (ZyrTEC) 10 MG chewable tablet Chew 10 mg if needed for allergies     • doxepin (SINEquan) 10 mg capsule Take 1 capsule (10 mg total) by mouth daily at bedtime as needed for sleep 90 capsule 0   • fenofibrate (TRICOR) 145 mg tablet Take 1 tablet (145 mg total) by mouth daily 90 tablet 1   • folic acid (KP Folic Acid) 1 mg tablet Take 1 tablet (1 mg total) by mouth daily 30 tablet 1   • lisinopril (ZESTRIL) 40 mg tablet TAKE ONE TABLET BY MOUTH EVERY DAY 90 tablet 1   • loperamide (IMODIUM A-D) 2 MG tablet Take 1 tablet by mouth daily as needed     • lovastatin (MEVACOR) 40 MG tablet TAKE ONE TABLET BY MOUTH EVERY DAY 90 tablet 1   • Multiple Vitamin (multivitamin) tablet Take 1 tablet by mouth daily 30 tablet 1   • Multiple Vitamins-Minerals (CENTRUM ULTRA MENS PO) Take 1 tablet by mouth daily     • Multiple Vitamins-Minerals (ICAPS AREDS 2 PO) Take by mouth     • omeprazole (PriLOSEC) 20 mg delayed release capsule TAKE ONE CAPSULE BY MOUTH EVERY DAY 90 capsule 1   • dicyclomine (BENTYL) 20 mg tablet Take 1 tablet (20 mg total) by mouth 2 (two) times a day (Patient not taking: Reported on 6/14/2023) 20 tablet 0     No current facility-administered medications for this visit. Allergies on file:   Patient has no known allergies.     Patient Active Problem List   Diagnosis   • Essential hypertension   • Mixed hyperlipidemia   • Chronic pain disorder   • Chronic left shoulder pain   • Chronic bilateral low back pain with bilateral sciatica   • Disc degeneration, lumbar   • Lumbar stenosis with neurogenic claudication   • Abnormal CT of the abdomen   • Chronic pain syndrome   • GREGG (obstructive sleep apnea)   • Benign prostatic hyperplasia with lower urinary tract symptoms   • Hydronephrosis   • Nonrheumatic aortic valve insufficiency   • Non-rheumatic mitral regurgitation   • Diastolic dysfunction   • Nephrolithiasis   • Squamous cell carcinoma in situ (SCCIS) of scalp   • Actinic keratosis   • Megaloblastic red blood cells   • BMI 31.0-31.9,adult   • Class 1 obesity without serious comorbidity with body mass index (BMI) of 31.0 to 31.9 in adult   • Insomnia   • Age-related cataract of both eyes   • Rotator cuff arthropathy of left shoulder   • DDD (degenerative disc disease), cervical   • Encounter for geriatric assessment        Past Medical History:   Diagnosis Date   • Abnormal stress test    • Angina at rest [facility])     no issues currently 5/2021   • Apnea    • Arthritis    • Cancer (HCC)     Skin   • Carpal tunnel syndrome on left    • Chest pain    • CPAP (continuous positive airway pressure) dependence     no longer used   • Diverticulitis    • GERD (gastroesophageal reflux disease)    • History of transfusion     childhood   • Hypercholesterolemia    • Hypercholesterolemia    • Hyperlipidemia    • Hypertension    • Joint pain     Right Shoulder   • Kidney stone    • Mitral valve disorder    • Neck pain    • Sleep apnea    • Sleep apnea, obstructive    • Thoracic neuritis        Past Surgical History:   Procedure Laterality Date   • COLONOSCOPY      Complete   • CORONARY ANGIOPLASTY  2017   • ESOPHAGOGASTRODUODENOSCOPY      Diagnostic   • FL RETROGRADE PYELOGRAM  05/24/2019   • FOOT SURGERY Left     tarsal tunnel   • HERNIA REPAIR     • KNEE ARTHROSCOPY Left    • KNEE SURGERY Left    • NEUROPLASTY / TRANSPOSITION MEDIAN NERVE AT CARPAL TUNNEL     • KY COLONOSCOPY FLX DX W/COLLJ SPEC WHEN PFRMD N/A 04/10/2018    Procedure: EGD AND COLONOSCOPY;  Surgeon: Fany Patel MD;  Location: [facility];   Service: Gastroenterology   • KY CYSTO INSERTION TRANSPROSTATIC IMPLANT SINGLE N/A 08/30/2018    Procedure: CYSTOSCOPY WITH INSERTION UROLIFT;  Surgeon: Octaviano Lucero MD;  Location[facility];  Service: Urology   • MD CYSTO/URETERO W/LITHOTRIPSY &INDWELL STENT INSRT Right 2019    Procedure: CYSTOSCOPY URETEROSCOPY WITH LITHOTRIPSY HOLMIUM LASER, RETROGRADE PYELOGRAM AND INSERTION STENT URETERAL;  Surgeon: Naresh Goodman MD;  [facility];  Service: Urology   • MD OPEN IMPLANTATION CRANIAL NERVE JONAS & PULSE GEN N/A 2021    Procedure: INSERTION UPPER AIRWAY STIMULATOR, INSPIRE IMPLANT;  Surgeon: Zonia Graham MD;  Location: [facility];  Service: ENT   • MD TRANSURETHRAL INCISION PROSTATE N/A 2018    Procedure: TRANSURETHRAL INCISION OF PROSTATE (TUIP);   Surgeon: Naresh Goodman MD;  [facility];  Service: Urology   • ROTATOR CUFF REPAIR Bilateral    • SHOULDER SURGERY      2 surgeries 30/20yrs ago   • TARSAL TUNNEL RELEASE      Neuroplasty Decompression       Family History   Problem Relation Age of Onset   • Diabetes Mother         Mellitus   • Heart disease Mother         Arteriosclerotic Cardiovascular   • Hypertension Mother    • Cancer Father    • Arthritis Father         Rheu Mult Site W Involv Of Organs And Systems   • Parkinsonism Father    • Thyroid disease Sister    • Diabetes Brother         Diabetes:Mellitus   • Heart disease Brother    • Hypertension Brother    • Coronary artery disease Family    • Heart disease Brother    • No Known Problems Brother    • Kidney disease Sister    • Parkinsonism Sister        Social History     Tobacco Use   • Smoking status: Former     Packs/day: 1.00     Years: 18.00     Total pack years: 18.00     Types: Cigarettes     Start date:      Quit date:      Years since quittin.5   • Smokeless tobacco: Never   Vaping Use   • Vaping Use: Never used   Substance Use Topics   • Alcohol use: Never   • Drug use: Never       Objective:    Vitals:    23 1117   BP: 138/70   BP Location: Left arm   Patient Position: Sitting   Cuff Size: Standard   Pulse: 60   Resp: 18   Temp: (!) 96.8 °F (36 °C)   TempSrc: Tympanic   SpO2: 97%   Weight: 72.1 kg (159 lb)   Height: 5' 2" (1.575 m)        Physical Exam  Constitutional:       Appearance: Normal appearance. HENT:      Head: Normocephalic. Right Ear: Tympanic membrane normal.      Left Ear: Tympanic membrane normal.      Nose: Nose normal. No congestion. Mouth/Throat:      Pharynx: No oropharyngeal exudate or posterior oropharyngeal erythema. Eyes:      Extraocular Movements: Extraocular movements intact. Conjunctiva/sclera: Conjunctivae normal.      Pupils: Pupils are equal, round, and reactive to light. Cardiovascular:      Rate and Rhythm: Normal rate and regular rhythm. Pulses: Normal pulses. Heart sounds: Murmur heard. Pulmonary:      Effort: Pulmonary effort is normal. No respiratory distress. Abdominal:      General: Bowel sounds are normal.   Musculoskeletal:         General: Normal range of motion. Cervical back: Normal range of motion. Lymphadenopathy:      Cervical: No cervical adenopathy. Skin:     General: Skin is warm and dry. Neurological:      Mental Status: He is alert and oriented to person, place, and time. Psychiatric:         Mood and Affect: Mood normal.         Behavior: Behavior normal.         Thought Content:  Thought content normal.           Preop labs/testing available and reviewed: yes    eGFR   Date Value Ref Range Status   06/29/2023 74 ml/min/1.73sq m Final     WBC   Date Value Ref Range Status   06/29/2023 4.84 4.31 - 10.16 Thousand/uL Final     Hemoglobin   Date Value Ref Range Status   06/29/2023 15.1 12.0 - 17.0 g/dL Final     Hematocrit   Date Value Ref Range Status   06/29/2023 45.8 36.5 - 49.3 % Final     Platelets   Date Value Ref Range Status   06/29/2023 150 149 - 390 Thousands/uL Final     INR   Date Value Ref Range Status   06/29/2023 1.02 0.84 - 1.19 Final       EKG yes    Echo no    Stress test/cath no    PFT/Adam no    Functional capacity: Dancing 4.8 Mets   Pick the highest level patient can comfortably perform   4 mets or greater for surgery    RCRI  High Risk surgery? 1 Point  CAD History:         1 Point   MI; Positive Stress Test; CP due to Mi;  Nitrate Usage to control Angina; Pathologic Q wave on EKG  CHF Active:         1 Point   Pulm Edema; Paroxysmal Nocturnal Dyspnea;  Bibasilar Rales (crackles);S3; CHF on CXR  Cerebrovascular Disease (TIA or CVA):     1 Point  DM on Insulin:        1 Point  Serum Creat >2.0 mg/dl:       1 Point          Total Points: 1     Scorin: Class I, Very Low Risk (0.4%)     1: Class II, Low risk (0.9%)     2: Class III Moderate (6.6%)     3: Class IV High (>11%)      DANIKA Risk:  GFR:   eGFR   Date Value Ref Range Status   2023 74 ml/min/1.73sq m Final         For PCP:  If GFR>60, Hold ACE/ARB/Diuretic on the day of surgery, and NSAIDS 10 days before. If GFR<45, Consider PRE and POST op Nephrology Consult. If 46 <GFR> 59 : Has Patient had DANIKA in last 6 Months? no   If YES: Preop Nephrology consult   If No:  [id number] Micheal Umaña [facility] Nephrology consult. Assessment/Plan:    Patient is medically optimized (cleared) for the planned procedure. Further testing/evaluation is not required. Postop concerns: no    Problem List Items Addressed This Visit    None  Visit Diagnoses     Pre-op exam    -  Primary  Preop labs reviewed, EKG done in the office, sinus bradycardia otherwise normal ECG. Patient does have a heart murmur, already had cardiology clearance for this surgery. Patient cleared from the medical standpoint at this visit, for left reverse shoulder arthroplasty.     Relevant Orders    POCT ECG             Pre-Surgery Instructions:   Medication Instructions   • acetaminophen (TYLENOL) 500 mg tablet Stop taking 1 day before surgery   • amLODIPine (NORVASC) 10 mg tablet Take morning of surgery   • Ascorbic Acid (vitamin C) 1000 MG tablet per anesthesia guidelines    • cetirizine (ZyrTEC) 10 MG chewable tablet Stop taking 1 days prior to surgery   • doxepin (SINEquan) 10 mg capsule Stop taking 1 days prior to surgery   • fenofibrate (TRICOR) 145 mg tablet Stop taking 1 day before surgery    • folic acid (KP Folic Acid) 1 mg tablet Stop taking 1 days prior to surgery   • lisinopril (ZESTRIL) 40 mg tablet Stop taking 1 days prior to surgery then resume back evening after surgery. • loperamide (IMODIUM A-D) 2 MG tablet Stop taking 1 days prior to surgery   • lovastatin (MEVACOR) 40 MG tablet Stop taking 1 days prior to surgery   • Multiple Vitamin (multivitamin) tablet Stop taking 1 days prior to surgery   • Multiple Vitamins-Minerals (CENTRUM ULTRA MENS PO) Stop taking 1 days prior to surgery   • Multiple Vitamins-Minerals (ICAPS AREDS 2 PO) Stop taking 1 days prior to surgery   • omeprazole (PriLOSEC) 20 mg delayed release capsule Stop taking 1 days prior to surgery        NOTE: Please use the above to review important meds for your specialty, the remainder "per anesthesia Guidelines."    NOTE: Please place an Inbasket message for "[facility]" pool for complicated patients.

## 2023-07-14 ENCOUNTER — TELEPHONE (OUTPATIENT)
Dept: OBGYN CLINIC | Facility: HOSPITAL | Age: 78
End: 2023-07-14

## 2023-07-14 NOTE — TELEPHONE ENCOUNTER
Caller: Luzmaria Evans    Doctor: Miroslava Garcia    Reason for call: Patient calling to ask if he will be spending the night following his sx. Advised patient, per previous message, it was discussed with him that he will be spending the night. Patient verbally understood.     Call back#: na

## 2023-07-18 ENCOUNTER — TELEPHONE (OUTPATIENT)
Dept: OBGYN CLINIC | Facility: CLINIC | Age: 78
End: 2023-07-18

## 2023-07-18 NOTE — TELEPHONE ENCOUNTER
Caller: Patient     Doctor: Santiago Garcia     Reason for call: Patient missed pre op call about surgery time and medications .         Call back#: [phone]

## 2023-07-19 ENCOUNTER — ANESTHESIA (OUTPATIENT)
Dept: PERIOP | Facility: HOSPITAL | Age: 78
End: 2023-07-19
Payer: COMMERCIAL

## 2023-07-19 ENCOUNTER — HOSPITAL ENCOUNTER (OUTPATIENT)
Facility: HOSPITAL | Age: 78
Setting detail: OUTPATIENT SURGERY
Discharge: HOME | End: 2023-07-20
Attending: ORTHOPAEDIC SURGERY | Admitting: ORTHOPAEDIC SURGERY
Payer: COMMERCIAL

## 2023-07-19 ENCOUNTER — APPOINTMENT (OUTPATIENT)
Dept: RADIOLOGY | Facility: HOSPITAL | Age: 78
End: 2023-07-19
Payer: COMMERCIAL

## 2023-07-19 ENCOUNTER — ANESTHESIA EVENT (OUTPATIENT)
Dept: PERIOP | Facility: HOSPITAL | Age: 78
End: 2023-07-19
Payer: COMMERCIAL

## 2023-07-19 DIAGNOSIS — Z96.612 S/P REVERSE TOTAL SHOULDER ARTHROPLASTY, LEFT: Primary | ICD-10-CM

## 2023-07-19 PROCEDURE — C1776 JOINT DEVICE (IMPLANTABLE): HCPCS | Performed by: ORTHOPAEDIC SURGERY

## 2023-07-19 PROCEDURE — C9290 INJ, BUPIVACAINE LIPOSOME: HCPCS | Performed by: ANESTHESIOLOGY

## 2023-07-19 PROCEDURE — C1713 ANCHOR/SCREW BN/BN,TIS/BN: HCPCS | Performed by: ORTHOPAEDIC SURGERY

## 2023-07-19 PROCEDURE — 23472 RECONSTRUCT SHOULDER JOINT: CPT | Performed by: ORTHOPAEDIC SURGERY

## 2023-07-19 PROCEDURE — 23472 RECONSTRUCT SHOULDER JOINT: CPT | Performed by: PHYSICIAN ASSISTANT

## 2023-07-19 PROCEDURE — NC001 PR NO CHARGE: Performed by: ORTHOPAEDIC SURGERY

## 2023-07-19 PROCEDURE — 73030 X-RAY EXAM OF SHOULDER: CPT

## 2023-07-19 DEVICE — IMPLANTABLE DEVICE
Type: IMPLANTABLE DEVICE | Status: FUNCTIONAL
Brand: TORNIER PERFORM™ HUMERAL SYSTEM

## 2023-07-19 DEVICE — SCREW CENTRAL 6 X 35MM: Type: IMPLANTABLE DEVICE | Site: SHOULDER | Status: FUNCTIONAL

## 2023-07-19 DEVICE — SCREW PERIPHERAL 5 X 26MM: Type: IMPLANTABLE DEVICE | Site: SHOULDER | Status: FUNCTIONAL

## 2023-07-19 DEVICE — SCREW PERIPHERAL 5 X 18MM: Type: IMPLANTABLE DEVICE | Site: SHOULDER | Status: FUNCTIONAL

## 2023-07-19 DEVICE — SCREW PERIPHERAL 5 X 30MM: Type: IMPLANTABLE DEVICE | Site: SHOULDER | Status: FUNCTIONAL

## 2023-07-19 DEVICE — IMPLANTABLE DEVICE
Type: IMPLANTABLE DEVICE | Site: SHOULDER | Status: FUNCTIONAL
Brand: TORNIER PERFORM™ REVERSED

## 2023-07-19 DEVICE — IMPLANTABLE DEVICE
Type: IMPLANTABLE DEVICE | Site: SHOULDER | Status: FUNCTIONAL
Brand: TORNIER PERFORM® REVERSED AUGMENTED GLENOID

## 2023-07-19 RX ORDER — LISINOPRIL 20 MG/1
40 TABLET ORAL DAILY
Status: DISCONTINUED | OUTPATIENT
Start: 2023-07-20 | End: 2023-07-20 | Stop reason: HOSPADM

## 2023-07-19 RX ORDER — PROPOFOL 10 MG/ML
INJECTION, EMULSION INTRAVENOUS AS NEEDED
Status: DISCONTINUED | OUTPATIENT
Start: 2023-07-19 | End: 2023-07-19

## 2023-07-19 RX ORDER — ROCURONIUM BROMIDE 10 MG/ML
INJECTION, SOLUTION INTRAVENOUS AS NEEDED
Status: DISCONTINUED | OUTPATIENT
Start: 2023-07-19 | End: 2023-07-19

## 2023-07-19 RX ORDER — SODIUM CHLORIDE, SODIUM LACTATE, POTASSIUM CHLORIDE, CALCIUM CHLORIDE 600; 310; 30; 20 MG/100ML; MG/100ML; MG/100ML; MG/100ML
INJECTION, SOLUTION INTRAVENOUS CONTINUOUS PRN
Status: DISCONTINUED | OUTPATIENT
Start: 2023-07-19 | End: 2023-07-19

## 2023-07-19 RX ORDER — MIDAZOLAM HYDROCHLORIDE 2 MG/2ML
INJECTION, SOLUTION INTRAMUSCULAR; INTRAVENOUS AS NEEDED
Status: DISCONTINUED | OUTPATIENT
Start: 2023-07-19 | End: 2023-07-19

## 2023-07-19 RX ORDER — ONDANSETRON 2 MG/ML
4 INJECTION INTRAMUSCULAR; INTRAVENOUS ONCE AS NEEDED
Status: DISCONTINUED | OUTPATIENT
Start: 2023-07-19 | End: 2023-07-19 | Stop reason: HOSPADM

## 2023-07-19 RX ORDER — BUPIVACAINE HYDROCHLORIDE 2.5 MG/ML
INJECTION, SOLUTION EPIDURAL; INFILTRATION; INTRACAUDAL
Status: COMPLETED | OUTPATIENT
Start: 2023-07-19 | End: 2023-07-19

## 2023-07-19 RX ORDER — DICYCLOMINE HCL 20 MG
20 TABLET ORAL 2 TIMES DAILY
Status: DISCONTINUED | OUTPATIENT
Start: 2023-07-19 | End: 2023-07-20 | Stop reason: HOSPADM

## 2023-07-19 RX ORDER — ONDANSETRON 2 MG/ML
INJECTION INTRAMUSCULAR; INTRAVENOUS AS NEEDED
Status: DISCONTINUED | OUTPATIENT
Start: 2023-07-19 | End: 2023-07-19

## 2023-07-19 RX ORDER — DEXAMETHASONE SODIUM PHOSPHATE 10 MG/ML
INJECTION, SOLUTION INTRAMUSCULAR; INTRAVENOUS AS NEEDED
Status: DISCONTINUED | OUTPATIENT
Start: 2023-07-19 | End: 2023-07-19

## 2023-07-19 RX ORDER — AMLODIPINE BESYLATE 10 MG/1
10 TABLET ORAL EVERY MORNING
Status: DISCONTINUED | OUTPATIENT
Start: 2023-07-20 | End: 2023-07-20 | Stop reason: HOSPADM

## 2023-07-19 RX ORDER — CEFAZOLIN SODIUM 2 G/50ML
2000 SOLUTION INTRAVENOUS EVERY 8 HOURS
Status: COMPLETED | OUTPATIENT
Start: 2023-07-19 | End: 2023-07-20

## 2023-07-19 RX ORDER — METOCLOPRAMIDE HYDROCHLORIDE 5 MG/ML
10 INJECTION INTRAMUSCULAR; INTRAVENOUS ONCE AS NEEDED
Status: DISCONTINUED | OUTPATIENT
Start: 2023-07-19 | End: 2023-07-19 | Stop reason: HOSPADM

## 2023-07-19 RX ORDER — VANCOMYCIN HYDROCHLORIDE 1 G/20ML
INJECTION, POWDER, LYOPHILIZED, FOR SOLUTION INTRAVENOUS AS NEEDED
Status: DISCONTINUED | OUTPATIENT
Start: 2023-07-19 | End: 2023-07-19 | Stop reason: HOSPADM

## 2023-07-19 RX ORDER — DOXEPIN HYDROCHLORIDE 10 MG/1
10 CAPSULE ORAL
Status: DISCONTINUED | OUTPATIENT
Start: 2023-07-19 | End: 2023-07-20 | Stop reason: HOSPADM

## 2023-07-19 RX ORDER — SODIUM CHLORIDE, SODIUM LACTATE, POTASSIUM CHLORIDE, CALCIUM CHLORIDE 600; 310; 30; 20 MG/100ML; MG/100ML; MG/100ML; MG/100ML
75 INJECTION, SOLUTION INTRAVENOUS CONTINUOUS
Status: DISCONTINUED | OUTPATIENT
Start: 2023-07-19 | End: 2023-07-20 | Stop reason: HOSPADM

## 2023-07-19 RX ORDER — KETOROLAC TROMETHAMINE 30 MG/ML
15 INJECTION, SOLUTION INTRAMUSCULAR; INTRAVENOUS EVERY 6 HOURS SCHEDULED
Status: COMPLETED | OUTPATIENT
Start: 2023-07-19 | End: 2023-07-20

## 2023-07-19 RX ORDER — PRAVASTATIN SODIUM 40 MG
40 TABLET ORAL
Status: DISCONTINUED | OUTPATIENT
Start: 2023-07-19 | End: 2023-07-20 | Stop reason: HOSPADM

## 2023-07-19 RX ORDER — MAGNESIUM HYDROXIDE 1200 MG/15ML
LIQUID ORAL AS NEEDED
Status: DISCONTINUED | OUTPATIENT
Start: 2023-07-19 | End: 2023-07-19 | Stop reason: HOSPADM

## 2023-07-19 RX ORDER — FENTANYL CITRATE 50 UG/ML
INJECTION, SOLUTION INTRAMUSCULAR; INTRAVENOUS AS NEEDED
Status: DISCONTINUED | OUTPATIENT
Start: 2023-07-19 | End: 2023-07-19

## 2023-07-19 RX ORDER — OXYCODONE HYDROCHLORIDE 10 MG/1
10 TABLET ORAL EVERY 4 HOURS PRN
Status: DISCONTINUED | OUTPATIENT
Start: 2023-07-19 | End: 2023-07-20 | Stop reason: HOSPADM

## 2023-07-19 RX ORDER — ACETAMINOPHEN 325 MG/1
650 TABLET ORAL EVERY 6 HOURS SCHEDULED
Status: DISCONTINUED | OUTPATIENT
Start: 2023-07-19 | End: 2023-07-20 | Stop reason: HOSPADM

## 2023-07-19 RX ORDER — CHLORHEXIDINE GLUCONATE 0.12 MG/ML
15 RINSE ORAL ONCE
Status: COMPLETED | OUTPATIENT
Start: 2023-07-19 | End: 2023-07-19

## 2023-07-19 RX ORDER — OXYCODONE HYDROCHLORIDE 5 MG/1
5 TABLET ORAL EVERY 4 HOURS PRN
Status: DISCONTINUED | OUTPATIENT
Start: 2023-07-19 | End: 2023-07-20 | Stop reason: HOSPADM

## 2023-07-19 RX ORDER — FENOFIBRATE 145 MG/1
145 TABLET, COATED ORAL DAILY
Status: DISCONTINUED | OUTPATIENT
Start: 2023-07-19 | End: 2023-07-20 | Stop reason: HOSPADM

## 2023-07-19 RX ORDER — TRANEXAMIC ACID 10 MG/ML
1000 INJECTION, SOLUTION INTRAVENOUS ONCE
Status: DISCONTINUED | OUTPATIENT
Start: 2023-07-19 | End: 2023-07-19 | Stop reason: HOSPADM

## 2023-07-19 RX ORDER — PANTOPRAZOLE SODIUM 20 MG/1
20 TABLET, DELAYED RELEASE ORAL
Status: DISCONTINUED | OUTPATIENT
Start: 2023-07-20 | End: 2023-07-20 | Stop reason: HOSPADM

## 2023-07-19 RX ORDER — TRAMADOL HYDROCHLORIDE 50 MG/1
50 TABLET ORAL EVERY 6 HOURS SCHEDULED
Status: DISCONTINUED | OUTPATIENT
Start: 2023-07-19 | End: 2023-07-20 | Stop reason: HOSPADM

## 2023-07-19 RX ORDER — HYDROMORPHONE HCL/PF 1 MG/ML
0.2 SYRINGE (ML) INJECTION
Status: DISCONTINUED | OUTPATIENT
Start: 2023-07-19 | End: 2023-07-19 | Stop reason: HOSPADM

## 2023-07-19 RX ORDER — CHLORHEXIDINE GLUCONATE 4 G/100ML
SOLUTION TOPICAL DAILY PRN
Status: DISCONTINUED | OUTPATIENT
Start: 2023-07-19 | End: 2023-07-19 | Stop reason: HOSPADM

## 2023-07-19 RX ORDER — LORATADINE 10 MG/1
10 TABLET ORAL DAILY
Status: DISCONTINUED | OUTPATIENT
Start: 2023-07-19 | End: 2023-07-20 | Stop reason: HOSPADM

## 2023-07-19 RX ORDER — ONDANSETRON 2 MG/ML
4 INJECTION INTRAMUSCULAR; INTRAVENOUS EVERY 8 HOURS PRN
Status: DISCONTINUED | OUTPATIENT
Start: 2023-07-19 | End: 2023-07-20 | Stop reason: HOSPADM

## 2023-07-19 RX ORDER — CEFAZOLIN SODIUM 2 G/50ML
2000 SOLUTION INTRAVENOUS ONCE
Status: COMPLETED | OUTPATIENT
Start: 2023-07-19 | End: 2023-07-19

## 2023-07-19 RX ORDER — EPHEDRINE SULFATE 50 MG/ML
INJECTION INTRAVENOUS AS NEEDED
Status: DISCONTINUED | OUTPATIENT
Start: 2023-07-19 | End: 2023-07-19

## 2023-07-19 RX ORDER — DOCUSATE SODIUM 100 MG/1
100 CAPSULE, LIQUID FILLED ORAL 2 TIMES DAILY
Status: DISCONTINUED | OUTPATIENT
Start: 2023-07-19 | End: 2023-07-20 | Stop reason: HOSPADM

## 2023-07-19 RX ADMIN — PROPOFOL 150 MG: 10 INJECTION, EMULSION INTRAVENOUS at 10:59

## 2023-07-19 RX ADMIN — TRAMADOL HYDROCHLORIDE 50 MG: 50 TABLET, COATED ORAL at 17:41

## 2023-07-19 RX ADMIN — ACETAMINOPHEN 650 MG: 325 TABLET, FILM COATED ORAL at 17:39

## 2023-07-19 RX ADMIN — BUPIVACAINE HYDROCHLORIDE 10 ML: 2.5 INJECTION, SOLUTION EPIDURAL; INFILTRATION; INTRACAUDAL at 10:17

## 2023-07-19 RX ADMIN — KETOROLAC TROMETHAMINE 15 MG: 30 INJECTION, SOLUTION INTRAMUSCULAR; INTRAVENOUS at 17:40

## 2023-07-19 RX ADMIN — ASPIRIN 81 MG: 81 TABLET, COATED ORAL at 17:39

## 2023-07-19 RX ADMIN — ROCURONIUM BROMIDE 50 MG: 10 INJECTION, SOLUTION INTRAVENOUS at 10:59

## 2023-07-19 RX ADMIN — DOCUSATE SODIUM 100 MG: 100 CAPSULE, LIQUID FILLED ORAL at 17:41

## 2023-07-19 RX ADMIN — CEFAZOLIN SODIUM 2000 MG: 2 SOLUTION INTRAVENOUS at 21:55

## 2023-07-19 RX ADMIN — HYDROMORPHONE HYDROCHLORIDE 0.2 MG: 1 INJECTION, SOLUTION INTRAMUSCULAR; INTRAVENOUS; SUBCUTANEOUS at 15:18

## 2023-07-19 RX ADMIN — ROCURONIUM BROMIDE 20 MG: 10 INJECTION, SOLUTION INTRAVENOUS at 12:21

## 2023-07-19 RX ADMIN — SODIUM CHLORIDE, SODIUM LACTATE, POTASSIUM CHLORIDE, AND CALCIUM CHLORIDE: .6; .31; .03; .02 INJECTION, SOLUTION INTRAVENOUS at 13:27

## 2023-07-19 RX ADMIN — DICYCLOMINE HYDROCHLORIDE 20 MG: 20 TABLET ORAL at 17:39

## 2023-07-19 RX ADMIN — FENTANYL CITRATE 100 MCG: 50 INJECTION, SOLUTION INTRAMUSCULAR; INTRAVENOUS at 10:59

## 2023-07-19 RX ADMIN — FENOFIBRATE 145 MG: 145 TABLET, FILM COATED ORAL at 17:00

## 2023-07-19 RX ADMIN — SODIUM CHLORIDE, SODIUM LACTATE, POTASSIUM CHLORIDE, AND CALCIUM CHLORIDE 75 ML/HR: .6; .31; .03; .02 INJECTION, SOLUTION INTRAVENOUS at 16:18

## 2023-07-19 RX ADMIN — SUGAMMADEX 200 MG: 100 INJECTION, SOLUTION INTRAVENOUS at 14:08

## 2023-07-19 RX ADMIN — MIDAZOLAM HYDROCHLORIDE 2 MG: 1 INJECTION, SOLUTION INTRAMUSCULAR; INTRAVENOUS at 10:13

## 2023-07-19 RX ADMIN — BUPIVACAINE 10 ML: 13.3 INJECTION, SUSPENSION, LIPOSOMAL INFILTRATION at 10:17

## 2023-07-19 RX ADMIN — LORATADINE 10 MG: 10 TABLET ORAL at 17:00

## 2023-07-19 RX ADMIN — DEXAMETHASONE SODIUM PHOSPHATE 10 MG: 10 INJECTION, SOLUTION INTRAMUSCULAR; INTRAVENOUS at 11:08

## 2023-07-19 RX ADMIN — SODIUM CHLORIDE, SODIUM LACTATE, POTASSIUM CHLORIDE, AND CALCIUM CHLORIDE: .6; .31; .03; .02 INJECTION, SOLUTION INTRAVENOUS at 09:39

## 2023-07-19 RX ADMIN — HYDROMORPHONE HYDROCHLORIDE 0.2 MG: 1 INJECTION, SOLUTION INTRAMUSCULAR; INTRAVENOUS; SUBCUTANEOUS at 15:30

## 2023-07-19 RX ADMIN — EPHEDRINE SULFATE 10 MG: 50 INJECTION, SOLUTION INTRAVENOUS at 11:45

## 2023-07-19 RX ADMIN — CEFAZOLIN SODIUM 2000 MG: 2 SOLUTION INTRAVENOUS at 10:59

## 2023-07-19 RX ADMIN — ONDANSETRON 4 MG: 2 INJECTION INTRAMUSCULAR; INTRAVENOUS at 14:01

## 2023-07-19 RX ADMIN — EPHEDRINE SULFATE 10 MG: 50 INJECTION, SOLUTION INTRAVENOUS at 11:48

## 2023-07-19 RX ADMIN — CHLORHEXIDINE GLUCONATE 0.12% ORAL RINSE 15 ML: 1.2 LIQUID ORAL at 09:41

## 2023-07-19 NOTE — OP NOTE
OPERATIVE REPORT  PATIENT NAME: Shamar Lyons    :  1945  MRN: [id number]  Pt Location: [facility]OM 04    SURGERY DATE: 2023    Surgeon(s) and Role:     * Alyssa Obando DO - Primary     * Alyssa Obando PA-C - Assisting    Preop Diagnosis:  Complete tear of left rotator cuff, unspecified whether traumatic [M75.122]  Rotator cuff arthropathy of left shoulder [M12.812]    Post-Op Diagnosis Codes:     * Complete tear of left rotator cuff, unspecified whether traumatic [M75.122]     * Rotator cuff arthropathy of left shoulder [M12.812]   Full-thickness retracted tears of supraspinatus, infraspinatus, and subscapularis tendons with development of rotator cuff arthropathy    Procedure(s):  Left - ARTHROPLASTY SHOULDER REVERSE- Left reverse shoulder arthroplasty    Specimen(s):  * No specimens in log *    Estimated Blood Loss:   150 mL      Anesthesia Type:   General w/ Interscalene Block    Operative Indications:  Complete tear of left rotator cuff, unspecified whether traumatic [M75.122]  Rotator cuff arthropathy of left shoulder [M12.812]  Persistent pain and limited function affecting activities of daily living despite nonoperative treatment including oral analgesics, activity modification, formal physical therapy, and injection therapy. Operative Findings:  Full-thickness retracted tears of supraspinatus, infraspinatus, and subscapularis tendons status post prior rotator cuff repair. Glenohumeral degenerative changes.     Complications:   None    Procedure and Technique:    Antibiotics: 2 g Ancef IV, 1 g vancomycin powder topical at surgical site  IV fluids: 1300 cc crystalloid  Implants: Tornier perform 25 mm full wedge baseplate with 36 mm glenosphere, 6.5 x 35 mm central screw, 5.0 x 18 mm peripheral screw, 5.0 x 26 mm peripheral screw, 5.0 x 30 mm peripheral screw, perform inlay size 3 standard 135 degree stem with 3 mm poly    The patient was seen in the preoperative holding area and the appropriate site of surgery was confirmed and the patient was marked. Interscalene block was provided by the anesthesia team in the preoperative holding area. Upon bring the patient back to the operative room he was placed supine on the operating table. General anesthesia was provided. The patient was positioned in the semibeachchair position with the torso elevated approximately 40 degrees at both hips and knees flexed to approximately 30 degrees, respectively. All bony prominences were appropriately padded and patient was held in place by seatbelt and tape. The left upper extremity was prepped and draped in usual sterile fashion. Preoperative timeout once again confirm the site of surgery and procedure. A 10 blade was used to make an incision along the deltopectoral interval from the coracoid process extending distally. Bovie cautery was used to achieve hemostasis. Full-thickness skin flaps were developed. Cephalic vein was identified and Metzenbaum scissors were used to dissect the cephalic vein and take it laterally with the deltoid muscle. Retraction of the deltoid laterally helped revealed the pectoralis major insertion. The superior 1 cm of pectoralis major insertion was released. Patient had a prior long head of biceps tendon rupture so no tenodesis was performed. Conjoined tendon was identified and Bovie was used to release clavipectoral fascia lateral to the conjoined tendon. Conjoined tendon was retracted laterally. Upon freeing the subdeltoid space with a [place] elevator, Brown retractor was placed for further exposure of the humeral head. Prior sutures from supraspinatus repair were identified and excised from the footprint on the greater tuberosity. [place] was not identified and was left in place. Thin tissue was identified over the anterior aspect of the humeral head and area of previous subscapularis tear.   This tissue was released along the humeral neck and a capsular release was performed while externally rotating and dislocating the humeral head. Upon exposure of the humeral head, Major retractors were placed medially and posteriorly. Intramedullary cut guide was then placed down the humeral canal and appropriate 135 degree cut guide was pinned in place in 20 degrees of retroversion. Humeral cut was made and a size 3 protector was placed over the cut surface. Attention was then turned to the noted exposure. Fukuda retractor was placed posteriorly and a 2 pronged Bankart retractor was placed anteriorly. Bovie was used to excise labrum circumferentially. Capsule was then released superiorly, anteriorly, inferiorly and posteriorly inferiorly. Upon appropriate exposure the glenoid was noted to have superior inclination as templated preoperatively. A full wedge baseplate was noted to be the appropriate fill for the defect. A pin was placed in the appropriate position on the glenoid bicortically. Appropriate reaming was performed for the full wedge baseplate. Rongeur was used to remove inferior glenoid rim osteophytes. The pin site was then overdrilled 1 confirm proper position. A drill guide was placed into the hole for a 6.5 millimeter screw and the hole was drilled and the appropriately sized screw was measured to be 35 mm. This plate was assembled with central screw and inserted. Excellent compression was noted of the baseplate to the glenoid. Peripheral screws were then drilled superiorly, inferiorly, and posteriorly. Peripheral reamer was then placed prior to glenosphere placement. Surgical site was copiously irrigated and a 36 mm glenosphere was inserted and secured in place with screw after impaction. Attention was then turned back to humeral stem preparation. After analyzing the humeral cut surface, an additional 2 mm cut was made to improve the height of the cut and location of the humeral stem. Size 3 was noted to be the proper size for the perform stem. After placing the guide on the cut surface, a pin was drilled bicortically. Appropriate size 3 reamer was used to ream the metaphysis. An awl was then used appropriately to find the canal.  A size 3 trial stem was then inserted. Calcar planer was used. Rongeur was then used to remove the humeral osteophytes. Trialing was performed and sized 3 mm poly was noted to be the proper size imparting for stability throughout range of motion and joint. Trial was then dislocated and surgical site was copiously irrigated. Trial stem was then removed prior to placement and impaction of final size 3 stem. 3 mm poly was then inserted prior to final impaction. After reduction, proper reduction and range of motion were once again confirmed without instability or impingement. Surgical site was copiously irrigated. A tear was noted in the cephalic vein which was subsequently tied off with 0 Vicryl suture at both sides of the tear. 1 g of vancomycin powder was used at the surgical site. Deltopectoral interval was then closed with 0 Vicryl suture followed by 2-0 Vicryl and 4-0 Monocryl for skin closure. Steri-Strips and a sterile dressing were applied and the patient was placed in a sling with abduction pillow. He tolerated the procedure well no complications were noted. I was present for the entire procedure., A qualified resident physician was not available. and A physician assistant, Evy Moreau PA-C,  was required during the procedure for patient positioning, tissue retraction and handling, dissection and suturing.     Patient Disposition:  PACU         SIGNATURE: Evy Moreau,   DATE: July 19, 2023  TIME: 2:16 PM

## 2023-07-19 NOTE — TELEPHONE ENCOUNTER
Called patient this morning when I received the message . He is at the hospital now to check in for surgery.

## 2023-07-19 NOTE — ANESTHESIA PREPROCEDURE EVALUATION
Procedure:  ARTHROPLASTY SHOULDER REVERSE- Left reverse shoulder arthroplasty (Left: Shoulder)    Relevant Problems   CARDIO   (+) Essential hypertension   (+) Mixed hyperlipidemia   (+) Non-rheumatic mitral regurgitation   (+) Nonrheumatic aortic valve insufficiency      /RENAL   (+) Benign prostatic hyperplasia with lower urinary tract symptoms   (+) Hydronephrosis   (+) Nephrolithiasis      MUSCULOSKELETAL   (+) Chronic bilateral low back pain with bilateral sciatica   (+) DDD (degenerative disc disease), cervical   (+) Disc degeneration, lumbar      NEURO/PSYCH   (+) Chronic bilateral low back pain with bilateral sciatica   (+) Chronic left shoulder pain   (+) Chronic pain disorder   (+) Chronic pain syndrome      PULMONARY   (+) GREGG (obstructive sleep apnea)        Physical Exam    Airway    Mallampati score: II  TM Distance: >3 FB  Neck ROM: full     Dental       Cardiovascular  Cardiovascular exam normal    Pulmonary  Pulmonary exam normal     Other Findings        Anesthesia Plan  ASA Score- 3     Anesthesia Type- general with ASA Monitors. Additional Monitors:   Airway Plan: ETT. Comment: Isc for post op analgesia. Plan Factors-Exercise tolerance (METS): >4 METS. Chart reviewed. EKG reviewed. Imaging results reviewed. Existing labs reviewed. Patient summary reviewed. Patient is not a current smoker. Induction- intravenous. Postoperative Plan-     Informed Consent- Anesthetic plan and risks discussed with patient. I personally reviewed this patient with the CRNA. Discussed and agreed on the Anesthesia Plan with the CRNA. Orquidea Newby

## 2023-07-19 NOTE — ANESTHESIA POSTPROCEDURE EVALUATION
Post-Op Assessment Note    CV Status:  Stable  Pain Score: 0    Pain management: adequate  Multimodal analgesia used between 6 hours prior to anesthesia start to PACU discharge    Mental Status:  Sleepy   Hydration Status:  Stable   PONV Controlled:  None   Airway Patency:  Patent  Airway: intubated   Two or more mitigation strategies used for obstructive sleep apnea   Post Op Vitals Reviewed: Yes      Staff: CRNA         No notable events documented.     BP  141/63   Temp   97.4   Pulse 70 (07/19/23 1430)   Resp 19 (07/19/23 1430)    SpO2 100 % (07/19/23 1430)

## 2023-07-19 NOTE — H&P
Patient was seen and evaluated in the office where continued nonoperative vs surgical management of Left shoulder osteoarthritis was discussed. In light of patients persistent pain despite nonoperative treatments, patient would like to move forward with surgical intervention. Risks of surgical intervention discussed in the office with patient and detailed consent obtained. Patient will go to OR on 07/19/2023 with Dr Miroslava Garcia for Left reverse shoulder arthroplasty. 14 point ROS in office   Musculoskeletal Left shoulder pain      Heart RRR  Lungs CTA BL  Abdomen Presernt nontender      Vitals:    07/19/23 0929   BP: (!) 173/74   Pulse: 83   Resp: 18   Temp: 97.8 °F (36.6 °C)   SpO2: 98%            Left Shoulder: Active range of motion   110 degrees forward flexion with pain  90 degrees abduction with pain  60 degrees external rotation   2 level restriction internal rotation    Passive range of motion   160 degrees of forward flexion, pain at terminal flexion  Supraspinatus testing 4-/5  Infraspinatus testing 4-/5  Subscapularis testing 4-/5  Patel test is positive  [place]'s test is negative    Speed's test is Negative  The patient is neurovascularly intact distally in the extremity.

## 2023-07-19 NOTE — PLAN OF CARE
Problem: PAIN - ADULT  Goal: Verbalizes/displays adequate comfort level or baseline comfort level  Description: Interventions:  - Encourage patient to monitor pain and request assistance  - Assess pain using appropriate pain scale  - Administer analgesics based on type and severity of pain and evaluate response  - Implement non-pharmacological measures as appropriate and evaluate response  - Consider cultural and social influences on pain and pain management  - Notify physician/advanced practitioner if interventions unsuccessful or patient reports new pain  Outcome: Progressing     Problem: INFECTION - ADULT  Goal: Absence or prevention of progression during hospitalization  Description: INTERVENTIONS:  - Assess and monitor for signs and symptoms of infection  - Monitor lab/diagnostic results  - Monitor all insertion sites, i.e. indwelling lines, tubes, and drains  - Monitor endotracheal if appropriate and nasal secretions for changes in amount and color  - [place] appropriate cooling/warming therapies per order  - Administer medications as ordered  - Instruct and encourage patient and family to use good hand hygiene technique  - Identify and instruct in appropriate isolation precautions for identified infection/condition  Outcome: Progressing  Goal: Absence of fever/infection during neutropenic period  Description: INTERVENTIONS:  - Monitor WBC    Outcome: Progressing     Problem: SAFETY ADULT  Goal: Patient will remain free of falls  Description: INTERVENTIONS:  - Educate patient/family on patient safety including physical limitations  - Instruct patient to call for assistance with activity   - Consult OT/PT to assist with strengthening/mobility   - Keep Call bell within reach  - Keep bed low and locked with side rails adjusted as appropriate  - Keep care items and personal belongings within reach  - Initiate and maintain comfort rounds  - Make Fall Risk Sign visible to staff  - Offer Toileting every    Hours, in advance of need  - Initiate/Maintain   alarm  - Obtain necessary fall risk management equipment:     - Apply yellow socks and bracelet for high fall risk patients  - Consider moving patient to room near nurses station  Outcome: Progressing  Goal: Maintain or return to baseline ADL function  Description: INTERVENTIONS:  -  Assess patient's ability to carry out ADLs; assess patient's baseline for ADL function and identify physical deficits which impact ability to perform ADLs (bathing, care of mouth/teeth, toileting, grooming, dressing, etc.)  - Assess/evaluate cause of self-care deficits   - Assess range of motion  - Assess patient's mobility; develop plan if impaired  - Assess patient's need for assistive devices and provide as appropriate  - Encourage maximum independence but intervene and supervise when necessary  - Involve family in performance of ADLs  - Assess for home care needs following discharge   - Consider OT consult to assist with ADL evaluation and planning for discharge  - Provide patient education as appropriate  Outcome: Progressing  Goal: Maintains/Returns to pre admission functional level  Description: INTERVENTIONS:  - Perform BMAT or MOVE assessment daily.   - Set and communicate daily mobility goal to care team and patient/family/caregiver. - Collaborate with rehabilitation services on mobility goals if consulted  - Perform Range of Motion    times a day. - Reposition patient every    hours.   - Dangle patient      times a day  - Stand patient    times a day  - Ambulate patient    times a day  - Out of bed to chair    times a day   - Out of bed for meals  times a day  - Out of bed for toileting  - Record patient progress and toleration of activity level   Outcome: Progressing     Problem: DISCHARGE PLANNING  Goal: Discharge to home or other facility with appropriate resources  Description: INTERVENTIONS:  - Identify barriers to discharge w/patient and caregiver  - Arrange for needed discharge resources and transportation as appropriate  - Identify discharge learning needs (meds, wound care, etc.)  - Arrange for interpretive services to assist at discharge as needed  - Refer to Case Management Department for coordinating discharge planning if the patient needs post-hospital services based on physician/advanced practitioner order or complex needs related to functional status, cognitive ability, or social support system  Outcome: Progressing     Problem: Knowledge Deficit  Goal: Patient/family/caregiver demonstrates understanding of disease process, treatment plan, medications, and discharge instructions  Description: Complete learning assessment and assess knowledge base.   Interventions:  - Provide teaching at level of understanding  - Provide teaching via preferred learning methods  Outcome: Progressing

## 2023-07-19 NOTE — ANESTHESIA PROCEDURE NOTES
Peripheral Block    Patient location during procedure: holding area  Start time: 7/19/2023 10:17 AM  Reason for block: at surgeon's request and post-op pain management  Staffing  Performed by: Dolly Reynolds DO  Authorized by: Dolly Reynolds DO    Preanesthetic Checklist  Completed: patient identified, IV checked, site marked, risks and benefits discussed, surgical consent, monitors and equipment checked, pre-op evaluation and timeout performed  Peripheral Block  Prep: ChloraPrep  Patient monitoring: continuous pulse ox and frequent blood pressure checks  Laterality: left  Procedures: ultrasound guided, Ultrasound guidance required for the procedure to increase accuracy and safety of medication placement and decrease risk of complications.   Ultrasound permanent image savedbupivacaine (PF) (MARCAINE) 0.25 % 10 mL - Perineural   10 mL - 7/19/2023 10:17:00 AM  Needle  Needle type: Stimuplex   Needle gauge: 21 G  Needle length: 4 in  Needle localization: ultrasound guidance  Test dose: negative  Assessment  Injection assessment: incremental injection and local visualized surrounding nerve on ultrasound  Paresthesia pain: none  Heart rate change: no  Slow fractionated injection: yes  Post-procedure:  site cleaned  patient tolerated the procedure well with no immediate complications  Additional Notes  Uncomplicated left interscalene nerve block  [id number]

## 2023-07-20 VITALS
TEMPERATURE: 97.9 F | HEIGHT: 62 IN | DIASTOLIC BLOOD PRESSURE: 62 MMHG | SYSTOLIC BLOOD PRESSURE: 136 MMHG | WEIGHT: 155.87 LBS | RESPIRATION RATE: 17 BRPM | OXYGEN SATURATION: 93 % | HEART RATE: 71 BPM | BODY MASS INDEX: 28.68 KG/M2

## 2023-07-20 PROBLEM — Z96.612 S/P REVERSE TOTAL SHOULDER ARTHROPLASTY, LEFT: Status: ACTIVE | Noted: 2023-07-20

## 2023-07-20 LAB
ALBUMIN SERPL BCP-MCNC: 3.9 G/DL (ref 3.5–5)
ALP SERPL-CCNC: 45 U/L (ref 34–104)
ALT SERPL W P-5'-P-CCNC: 11 U/L (ref 7–52)
ANION GAP SERPL CALCULATED.3IONS-SCNC: 10 MMOL/L
AST SERPL W P-5'-P-CCNC: 18 U/L (ref 13–39)
BILIRUB SERPL-MCNC: 0.51 MG/DL (ref 0.2–1)
BUN SERPL-MCNC: 14 MG/DL (ref 5–25)
CALCIUM SERPL-MCNC: 9.1 MG/DL (ref 8.4–10.2)
CHLORIDE SERPL-SCNC: 105 MMOL/L (ref 96–108)
CO2 SERPL-SCNC: 20 MMOL/L (ref 21–32)
CREAT SERPL-MCNC: 0.86 MG/DL (ref 0.6–1.3)
ERYTHROCYTE [DISTWIDTH] IN BLOOD BY AUTOMATED COUNT: 12.3 % (ref 11.6–15.1)
GFR SERPL CREATININE-BSD FRML MDRD: 82 ML/MIN/1.73SQ M
GLUCOSE P FAST SERPL-MCNC: 153 MG/DL (ref 65–99)
GLUCOSE SERPL-MCNC: 153 MG/DL (ref 65–140)
HCT VFR BLD AUTO: 36.6 % (ref 36.5–49.3)
HGB BLD-MCNC: 13.1 G/DL (ref 12–17)
MCH RBC QN AUTO: 34.8 PG (ref 26.8–34.3)
MCHC RBC AUTO-ENTMCNC: 35.8 G/DL (ref 31.4–37.4)
MCV RBC AUTO: 97 FL (ref 82–98)
PLATELET # BLD AUTO: 153 THOUSANDS/UL (ref 149–390)
PMV BLD AUTO: 9.8 FL (ref 8.9–12.7)
POTASSIUM SERPL-SCNC: 4 MMOL/L (ref 3.5–5.3)
PROT SERPL-MCNC: 6 G/DL (ref 6.4–8.4)
RBC # BLD AUTO: 3.76 MILLION/UL (ref 3.88–5.62)
SODIUM SERPL-SCNC: 135 MMOL/L (ref 135–147)
WBC # BLD AUTO: 10.82 THOUSAND/UL (ref 4.31–10.16)

## 2023-07-20 PROCEDURE — 97166 OT EVAL MOD COMPLEX 45 MIN: CPT

## 2023-07-20 PROCEDURE — 80053 COMPREHEN METABOLIC PANEL: CPT | Performed by: PHYSICIAN ASSISTANT

## 2023-07-20 PROCEDURE — 97110 THERAPEUTIC EXERCISES: CPT

## 2023-07-20 PROCEDURE — 99024 POSTOP FOLLOW-UP VISIT: CPT | Performed by: PHYSICIAN ASSISTANT

## 2023-07-20 PROCEDURE — NC001 PR NO CHARGE: Performed by: PHYSICIAN ASSISTANT

## 2023-07-20 PROCEDURE — 97163 PT EVAL HIGH COMPLEX 45 MIN: CPT

## 2023-07-20 PROCEDURE — 97116 GAIT TRAINING THERAPY: CPT

## 2023-07-20 PROCEDURE — 85027 COMPLETE CBC AUTOMATED: CPT | Performed by: PHYSICIAN ASSISTANT

## 2023-07-20 PROCEDURE — 97530 THERAPEUTIC ACTIVITIES: CPT

## 2023-07-20 RX ORDER — DOCUSATE SODIUM 100 MG/1
100 CAPSULE, LIQUID FILLED ORAL 2 TIMES DAILY
Qty: 20 CAPSULE | Refills: 0 | Status: SHIPPED | OUTPATIENT
Start: 2023-07-20

## 2023-07-20 RX ORDER — OXYCODONE HYDROCHLORIDE 5 MG/1
5 TABLET ORAL EVERY 4 HOURS PRN
Qty: 20 TABLET | Refills: 0 | Status: SHIPPED | OUTPATIENT
Start: 2023-07-20

## 2023-07-20 RX ORDER — CELECOXIB 100 MG/1
100 CAPSULE ORAL 2 TIMES DAILY
Qty: 20 CAPSULE | Refills: 0 | Status: SHIPPED | OUTPATIENT
Start: 2023-07-20

## 2023-07-20 RX ADMIN — TRAMADOL HYDROCHLORIDE 50 MG: 50 TABLET, COATED ORAL at 01:00

## 2023-07-20 RX ADMIN — CEFAZOLIN SODIUM 2000 MG: 2 SOLUTION INTRAVENOUS at 05:48

## 2023-07-20 RX ADMIN — PRAVASTATIN SODIUM 40 MG: 40 TABLET ORAL at 16:55

## 2023-07-20 RX ADMIN — FENOFIBRATE 145 MG: 145 TABLET, FILM COATED ORAL at 09:00

## 2023-07-20 RX ADMIN — TRAMADOL HYDROCHLORIDE 50 MG: 50 TABLET, COATED ORAL at 13:02

## 2023-07-20 RX ADMIN — PANTOPRAZOLE SODIUM 20 MG: 20 TABLET, DELAYED RELEASE ORAL at 05:47

## 2023-07-20 RX ADMIN — OXYCODONE HYDROCHLORIDE 5 MG: 5 TABLET ORAL at 09:04

## 2023-07-20 RX ADMIN — ASPIRIN 81 MG: 81 TABLET, COATED ORAL at 09:00

## 2023-07-20 RX ADMIN — ACETAMINOPHEN 650 MG: 325 TABLET, FILM COATED ORAL at 13:02

## 2023-07-20 RX ADMIN — LISINOPRIL 40 MG: 20 TABLET ORAL at 13:00

## 2023-07-20 RX ADMIN — TRAMADOL HYDROCHLORIDE 50 MG: 50 TABLET, COATED ORAL at 05:47

## 2023-07-20 RX ADMIN — ACETAMINOPHEN 650 MG: 325 TABLET, FILM COATED ORAL at 01:00

## 2023-07-20 RX ADMIN — ACETAMINOPHEN 650 MG: 325 TABLET, FILM COATED ORAL at 05:47

## 2023-07-20 RX ADMIN — DICYCLOMINE HYDROCHLORIDE 20 MG: 20 TABLET ORAL at 09:01

## 2023-07-20 RX ADMIN — KETOROLAC TROMETHAMINE 15 MG: 30 INJECTION, SOLUTION INTRAMUSCULAR; INTRAVENOUS at 01:00

## 2023-07-20 RX ADMIN — LORATADINE 10 MG: 10 TABLET ORAL at 09:01

## 2023-07-20 RX ADMIN — DOCUSATE SODIUM 100 MG: 100 CAPSULE, LIQUID FILLED ORAL at 09:01

## 2023-07-20 NOTE — PLAN OF CARE
Problem: OCCUPATIONAL THERAPY ADULT  Goal: Performs self-care activities at highest level of function for planned discharge setting. See evaluation for individualized goals. Description: Treatment Interventions: ADL retraining, Functional transfer training, Endurance training, Patient/family training, Equipment evaluation/education, Compensatory technique education, Continued evaluation          See flowsheet documentation for full assessment, interventions and recommendations. Note: Limitation: Decreased ADL status, Decreased endurance, Decreased self-care trans, Decreased high-level ADLs  Prognosis: Good  Assessment: Patient is a 76 y.o. male seen for OT evaluation s/p admit to  on 7/19/2023. Commorbidities affecting patient's functional performance at time of assessment include:  POD#1 s/p Left reverse shoulder arthroplasty/ NWB/ Abductor sling in place/ ROM to wrist and fingers only per Ortho orders, HTN, HLD, chronic pain disorder, lumbar disc degeneration, lumbar stenosis, GREGG, DDD cervical.   Orders placed for OT evaluation and treatment. Performed at least two patient identifiers during session including name and wristband. Prior to admission, Patient reports independnet with ADLs/ IADLs. Patient lives with his spouse and family in a one story house, 4 + 1 DESIREE. Patient was ambulatory without AD. Personal factors affecting patient at time of initial evaluation include: steps to enter, difficulty performing ADLs and difficulty performing IADLs. Upon evaluation, patient requires minimal  assist for UB ADLs, maximal assist for LB ADLs, transfers and functional ambulation in room and bathroom with minimal  assist, with SPC.   Occupational performance is affected by the following deficits: decreased functional use of BUEs, degenerative arthritic joint changes, dynamic sit/ stand balance deficit with poor standing tolerance time for self care and functional mobility, decreased activity tolerance, increased pain and orthopedic restrictions. Patient to benefit from continued Occupational Therapy treatment while in the hospital to address deficits as defined above and maximize level of functional independence with ADLs and functional mobility. Occupational Performance areas to address include: grooming , bathing/ shower, dressing, toilet hygiene, transfer to all surfaces, functional mobility, health maintenance and IADLs: safety procedures. From OT standpoint, recommendation at time of d/c would be Home with family support/ OP Therapy per Orthopedic Protocol.      OT Discharge Recommendation: Home with outpatient rehabilitation (per Orthopedic Protocol)

## 2023-07-20 NOTE — PHYSICAL THERAPY NOTE
Physical Therapy Evaluation     07/20/23 1050   PT Last Visit   PT Visit Date 07/20/23   Pain Assessment   Pain Assessment Tool 0-10   Pain Score 4   Pain Location/Orientation Orientation: Left; Location: Shoulder   Hospital Pain Intervention(s) Repositioned; Ambulation/increased activity   Restrictions/Precautions   Weight Bearing Precautions Per Order Yes   LUE Weight Bearing Per Order NWB   Braces or Orthoses Other (Comment)  (shoulder immobilizer upon arrival; denies use of any other bracing at baseline)   Other Precautions Pain; Fall Risk;Bed Alarm; Chair Alarm;WBS   General   Family/Caregiver Present No   Cognition   Overall Cognitive Status WFL   Attention Within functional limits   Orientation Level Oriented X4   Memory Within functional limits   Following Commands Follows all commands and directions without difficulty   Comments pt agreeable to PT session   Subjective   Subjective my daughter took off 2 weeks of work to help at home   Bed Mobility   Additional Comments pt received seated in bedside chair   Transfers   Sit to Stand 4  Minimal assistance   Additional items Assist x 1; Armrests; Increased time required;Verbal cues   Stand to Sit 4  Minimal assistance   Additional items Assist x 1; Armrests; Increased time required;Verbal cues   Additional Comments Pt denied dizziness/lightheadedness upon standing   Ambulation/Elevation   Gait pattern Antalgic; Shuffling; Short stride;Decreased hip extension;Decreased heel strike;Decreased toe off;Excessively slow;Decreased foot clearance;Narrow KAYA   Gait Assistance 4  Minimal assist  (CGA)   Additional items Assist x 1  (CGA)   Assistive Device Straight cane   Distance 80'   Stair Management Assistance 4  Minimal assist  (CGA)   Additional items Assist x 1;Verbal cues; Tactile cues; Increased time required  (step stool within room)   Stair Management Technique One rail R;Step to pattern; With cane   Number of Stairs 4  (6" step stool within room)   Ambulation/Elevation Additional Comments pt with improved SPC management, requiring vc 10% of the time for management   Balance   Static Sitting Fair +   Dynamic Sitting Fair   Static Standing Fair   Dynamic Standing Fair -   Ambulatory Fair -   Endurance Deficit   Endurance Deficit Yes   Activity Tolerance   Activity Tolerance Patient limited by fatigue;Patient limited by pain   Medical Staff Made Aware ADDIE Recio notified of session outcomes   Nurse Made Aware Ena   Assessment   Prognosis Good   Problem List Decreased strength;Decreased endurance;Decreased range of motion; Impaired balance;Decreased mobility;Pain;Orthopedic restrictions   Assessment Pt seen for PT treatment today with a focus on ther act and ther ex to facilitate return to PLOF and promote functional strengthening. Ther act consisted of sit<>stand transfers; as well as gait training for level surface ambulation with [facility], and stair negotiation. Since previous session, pt has demonstrated good progress in terms of completing sit<>stand with CGA, amb of 80' CGA and SPC, and stair negotiation of 4 steps with handrail on the R for both ascend/descend. Pt denied c/o lightheadedness or dizziness t/o session. Education provided on stair negotiation with the handrail on the R side, pt demonstrating and verbalizing understanding. Pt is progressing towards their goals pending approval from a medical standpoint. Prognosis is good due to the listed progress above. Pt is appropriate for continued PT to reach STGs and return to high PLOF.    Barriers to Discharge Inaccessible home environment   Goals   Patient Goals to go home   STG Expiration Date 07/30/23   Short Term Goal #1 In 7-10 days: Increase bilateral LE strength 1/2 grade to facilitate independent mobility, Perform all bed mobility tasks modified independent to decrease caregiver burden, Perform all transfers modified independent to improve independence, Ambulate > 150 ft. with least restrictive assistive device with distant S w/o LOB and w/ normalized gait pattern 100% of the time, Navigate 4+1 stairs with close S with unilateral handrail to facilitate return to previous living environment, Increase all balance 1/2 grade to decrease risk for falls, Improve Barthel Index score to 55 or greater to facilitate independence and PT provider will perform functional balance assessment to determine fall risk   PT Treatment Day 1   Plan   Treatment/Interventions Functional transfer training;LE strengthening/ROM; Elevations; Therapeutic exercise; Endurance training;Patient/family training;Equipment eval/education;Gait training;Spoke to advanced practitioner;Spoke to nursing;Bed mobility   PT Frequency 3-5x/wk   Recommendation   PT Discharge Recommendation Home with outpatient rehabilitation  (per ortho protocol)   Equipment Recommended Cane   AM-PAC Basic Mobility Inpatient   Turning in Flat Bed Without Bedrails 3   Lying on Back to Sitting on Edge of Flat Bed Without Bedrails 3   Moving Bed to Chair 3   Standing Up From Chair Using Arms 3   Walk in Room 3   Climb 3-5 Stairs With Railing 3   Basic Mobility Inpatient Raw Score 18   Basic Mobility Standardized Score 41.05   Highest Level Of Mobility   JH-HLM Goal 6: Walk 10 steps or more   JH-HLM Achieved 7: Walk 25 feet or more   Exercises   Glute Sets Sitting;10 reps;AROM; Bilateral   Knee AROM Long Arc Quad Sitting;10 reps;AROM; Bilateral   Marching Sitting;10 reps;AROM; Bilateral   End of Consult   Patient Position at End of Consult Seated edge of bed;Bed/Chair alarm activated; All needs within reach   Signed by Amanda Ellsworth SPT

## 2023-07-20 NOTE — OCCUPATIONAL THERAPY NOTE
Occupational Therapy Treatment Note        Patient Name: Gómez WELLS Date: 7/20/2023 07/20/23 1344   OT Last Visit   OT Visit Date 07/20/23   Note Type   Note Type Treatment  (pt received seated in chair with 4/10 resting pain)   Pain Assessment   Pain Assessment Tool 0-10   Pain Score No Pain   Restrictions/Precautions   Weight Bearing Precautions Per Order Yes   LUE Weight Bearing Per Order NWB   Braces or Orthoses Other (Comment)  (shoulder immobilizer/ abductor sling in place)   Therapeutic Exercise - ROM   UE-ROM Yes   ROM - Left Upper Extremities    L Wrist AROM; Wrist flexion;Wrist extension;Radial deviation;Ulnar deviation   L Hand AROM; Thumb; Index finger; Long finger;Ring finger;Little finger   L Position Seated   L Weight/Reps/Sets 1 set of 10 repetitions each   LUE ROM Comment encouraged to complete hand/ finger exercises 3-4 times per day   Cognition   Overall Cognitive Status [facility]   Arousal/Participation Alert; Responsive; Cooperative   Attention Within functional limits   Orientation Level Oriented X4   Memory Within functional limits   Following Commands Follows all commands and directions without difficulty   Additional Activities   Additional Activities Other (Comment)  (Abductor sling doning/ opening and closing straps to complete hygiene activities without removing sling/ maintaining NWB precautions as well as no AROM to shoulder joint.)   Additional Activities Comments Patient and wife verbalized understanding, patient demonstrated wrist and finger ROM correctly. Shoulder sling hand out issued to patient.    Activity Tolerance   Activity Tolerance Patient tolerated treatment well   Assessment   Assessment Patient participated in Skilled OT session this date with interventions consisting of ADL re training with the use of correct body mechnaics, therapeutic exercise to: increase functional use of BUEs, increase BUE muscle strength  and increase cardiovascular endurance . Patient agreeable to OT treatment session, wife present in room. Patient completed prescribed AROM exercises to left hand and wrist. Also educated on all movement restrictions and use and care of abductor sling. Therapist demonstrated Abductor sling doning/ opening and closing straps to complete hygiene activities without removing sling/ maintaining NWB precautions as well as no AROM to shoulder joint. Patient and wife verbalized understanding, patient demonstrated wrist and finger ROM correctly. Shoulder sling hand out issued to patient. From OT standpoint, recommendation at time of d/c would be Home with family support/ OP therapy per Orthopedic follow up.    Recommendation   OT Discharge Recommendation Home with outpatient rehabilitation  (per Orthopedic protocol)   AM-PAC Daily Activity Inpatient   Lower Body Dressing 2   Bathing 2   Toileting 2   Upper Body Dressing 3   Grooming 3   Eating 4   Daily Activity Raw Score 16   Daily Activity Standardized Score (Calc for Raw Score >=11) 35.96   AM-PAC Applied Cognition Inpatient   Following a Speech/Presentation 4   Understanding Ordinary Conversation 4   Taking Medications 4   Remembering Where Things Are Placed or Put Away 4   Remembering List of 4-5 Errands 4   Taking Care of Complicated Tasks 4   Applied Cognition Raw Score 24   Applied Cognition Standardized Score 62.21

## 2023-07-20 NOTE — PLAN OF CARE
Problem: PAIN - ADULT  Goal: Verbalizes/displays adequate comfort level or baseline comfort level  Description: Interventions:  - Encourage patient to monitor pain and request assistance  - Assess pain using appropriate pain scale  - Administer analgesics based on type and severity of pain and evaluate response  - Implement non-pharmacological measures as appropriate and evaluate response  - Consider cultural and social influences on pain and pain management  - Notify physician/advanced practitioner if interventions unsuccessful or patient reports new pain  Outcome: Progressing     Problem: INFECTION - ADULT  Goal: Absence or prevention of progression during hospitalization  Description: INTERVENTIONS:  - Assess and monitor for signs and symptoms of infection  - Monitor lab/diagnostic results  - Monitor all insertion sites, i.e. indwelling lines, tubes, and drains  - Monitor endotracheal if appropriate and nasal secretions for changes in amount and color  - [place] appropriate cooling/warming therapies per order  - Administer medications as ordered  - Instruct and encourage patient and family to use good hand hygiene technique  - Identify and instruct in appropriate isolation precautions for identified infection/condition  Outcome: Progressing  Goal: Absence of fever/infection during neutropenic period  Description: INTERVENTIONS:  - Monitor WBC    Outcome: Progressing     Problem: SAFETY ADULT  Goal: Patient will remain free of falls  Description: INTERVENTIONS:  - Educate patient/family on patient safety including physical limitations  - Instruct patient to call for assistance with activity   - Consult OT/PT to assist with strengthening/mobility   - Keep Call bell within reach  - Keep bed low and locked with side rails adjusted as appropriate  - Keep care items and personal belongings within reach  - Initiate and maintain comfort rounds  - Make Fall Risk Sign visible to staff  - Offer Toileting every    Hours, in advance of need  - Initiate/Maintain   alarm  - Obtain necessary fall risk management equipment:     - Apply yellow socks and bracelet for high fall risk patients  - Consider moving patient to room near nurses station  Outcome: Progressing  Goal: Maintain or return to baseline ADL function  Description: INTERVENTIONS:  -  Assess patient's ability to carry out ADLs; assess patient's baseline for ADL function and identify physical deficits which impact ability to perform ADLs (bathing, care of mouth/teeth, toileting, grooming, dressing, etc.)  - Assess/evaluate cause of self-care deficits   - Assess range of motion  - Assess patient's mobility; develop plan if impaired  - Assess patient's need for assistive devices and provide as appropriate  - Encourage maximum independence but intervene and supervise when necessary  - Involve family in performance of ADLs  - Assess for home care needs following discharge   - Consider OT consult to assist with ADL evaluation and planning for discharge  - Provide patient education as appropriate  Outcome: Progressing  Goal: Maintains/Returns to pre admission functional level  Description: INTERVENTIONS:  - Perform BMAT or MOVE assessment daily.   - Set and communicate daily mobility goal to care team and patient/family/caregiver. - Collaborate with rehabilitation services on mobility goals if consulted  - Perform Range of Motion    times a day. - Reposition patient every    hours.   - Dangle patient    times a day  - Stand patient    times a day  - Ambulate patient    times a day  - Out of bed to chair      times a day   - Out of bed for meals    times a day  - Out of bed for toileting  - Record patient progress and toleration of activity level   Outcome: Progressing     Problem: DISCHARGE PLANNING  Goal: Discharge to home or other facility with appropriate resources  Description: INTERVENTIONS:  - Identify barriers to discharge w/patient and caregiver  - Arrange for needed discharge resources and transportation as appropriate  - Identify discharge learning needs (meds, wound care, etc.)  - Arrange for interpretive services to assist at discharge as needed  - Refer to Case Management Department for coordinating discharge planning if the patient needs post-hospital services based on physician/advanced practitioner order or complex needs related to functional status, cognitive ability, or social support system  Outcome: Progressing     Problem: Knowledge Deficit  Goal: Patient/family/caregiver demonstrates understanding of disease process, treatment plan, medications, and discharge instructions  Description: Complete learning assessment and assess knowledge base. Interventions:  - Provide teaching at level of understanding  - Provide teaching via preferred learning methods  Outcome: Progressing     Problem: MOBILITY - ADULT  Goal: Maintain or return to baseline ADL function  Description: INTERVENTIONS:  -  Assess patient's ability to carry out ADLs; assess patient's baseline for ADL function and identify physical deficits which impact ability to perform ADLs (bathing, care of mouth/teeth, toileting, grooming, dressing, etc.)  - Assess/evaluate cause of self-care deficits   - Assess range of motion  - Assess patient's mobility; develop plan if impaired  - Assess patient's need for assistive devices and provide as appropriate  - Encourage maximum independence but intervene and supervise when necessary  - Involve family in performance of ADLs  - Assess for home care needs following discharge   - Consider OT consult to assist with ADL evaluation and planning for discharge  - Provide patient education as appropriate  Outcome: Progressing  Goal: Maintains/Returns to pre admission functional level  Description: INTERVENTIONS:  - Perform BMAT or MOVE assessment daily.   - Set and communicate daily mobility goal to care team and patient/family/caregiver.    - Collaborate with rehabilitation services on mobility goals if consulted  - Perform Range of Motion    times a day. - Reposition patient every    hours.   - Dangle patient    times a day  - Stand patient    times a day  - Ambulate patient      times a day  - Out of bed to chair    times a day   - Out of bed for meals  times a day  - Out of bed for toileting  - Record patient progress and toleration of activity level   Outcome: Progressing

## 2023-07-20 NOTE — PLAN OF CARE
Problem: PHYSICAL THERAPY ADULT  Goal: Performs mobility at highest level of function for planned discharge setting. See evaluation for individualized goals. Description: Treatment/Interventions: Functional transfer training, Therapeutic exercise, Endurance training, Patient/family training, Equipment eval/education, Bed mobility, Gait training, Elevations, Spoke to nursing  Equipment Recommended: Jessi Butt (continue trials with SPC; pt will require U/L AD)       See flowsheet documentation for full assessment, interventions and recommendations. Outcome: Progressing  Note: Prognosis: Good  Problem List: Decreased strength, Decreased endurance, Decreased range of motion, Impaired balance, Decreased mobility, Pain, Orthopedic restrictions  Assessment: Pt seen for PT treatment today with a focus on ther act and ther ex to facilitate return to PLOF and promote functional strengthening. Ther act consisted of sit<>stand transfers; as well as gait training for level surface ambulation with [facility] management, and stair negotiation. Since previous session, pt has demonstrated good progress in terms of completing sit<>stand with CGA, amb of 80' CGA and SPC, and stair negotiation of 4 steps with handrail on the R for both ascend/descend. Pt denied c/o lightheadedness or dizziness t/o session. Education provided on stair negotiation with the handrail on the R side, pt demonstrating and verbalizing understanding. Pt is progressing towards their goals pending approval from a medical standpoint. Prognosis is good due to the listed progress above. Pt is appropriate for continued PT to reach STGs and return to high PLOF. Barriers to Discharge: Inaccessible home environment     PT Discharge Recommendation: Home with outpatient rehabilitation (per ortho protocol)    See flowsheet documentation for full assessment.

## 2023-07-20 NOTE — DISCHARGE SUMMARY
ORTHOPEDICS DISCHARGE SUMMARY  Tyree Vora 76 y.o. male MRN: [id number]  Unit/Bed#: -01    Attending Physician: Dr Valerie Russell    Admitting diagnosis: Complete tear of left rotator cuff, unspecified whether traumatic [M75.122]  Rotator cuff arthropathy of left shoulder [M12.812]    Discharge diagnosis: Complete tear of left rotator cuff, unspecified whether traumatic [M75.122]  Rotator cuff arthropathy of left shoulder [M12.812]    Date of admission: 7/19/2023    Date of discharge: 07/20/23         Procedure: Left reverse shoulder arthroplasty    HPI:  This is a 76y.o. year old male that presented to the office with signs and symptoms of left shoulder osteoarthritis and/or other pathology. They tried and failed conservative treatment measures and wished to proceed with surgical intervention. The risks, benefits, and complications of the procedure were discussed with the patient and informed consent was obtained. Hospital Course: The patient was admitted to the hospital on 7/19/2023 and underwent an uncomplicated left reverse total shoulder arthroplasty. They were transferred to the floor after a brief stay in the post-anesthesia care unit. Their pain was well managed with IV and oral pain medications.  On discharge date pt was cleared by PT and the medicine team and determined to be safe for discharge    0   Lab Value Date/Time    HGB 13.1 07/20/2023 0422    HGB 15.1 06/29/2023 0721    HGB 15.3 03/14/2023 1630    HGB 14.9 05/31/2022 0748    HGB 14.3 01/25/2022 0730    HGB 14.4 07/26/2021 0738    HGB 15.0 04/23/2021 0743    HGB 14.4 12/23/2020 0736    HGB 14.8 11/27/2020 1132    HGB 14.7 09/03/2020 0747    HGB 14.6 04/01/2020 1113    HGB 15.4 02/06/2020 1009    HGB 14.2 12/23/2019 0708    HGB 14.0 05/10/2019 0045    HGB 14.3 04/29/2019 1927    HGB 15.5 10/23/2018 2234    HGB 15.2 07/02/2018 0757    HGB 15.3 03/08/2018 2050    HGB 14.3 03/01/2018 1646    HGB 14.7 02/15/2018 0758    HGB 14.7 05/25/2017 0744 HGB 15.8 05/13/2017 1811    HGB 13.8 04/24/2017 0510    HGB 14.0 04/22/2017 0802    HGB 15.5 04/22/2017 0345    HGB 14.7 01/02/2017 0757    HGB 15.3 03/09/2016 0813    HGB 14.7 10/29/2015 0713    HGB 14.8 12/03/2014 0950    HGB 14.6 02/25/2014 0800       Greater than 2 gram drop which qualifies for diagnosis of acute blood loss anemia. Vital signs remained stable and pt was resuscitated with IVF as needed     Discharge Instructions: The patient was discharged nonweight bearing to the left upper extremity. Take pain medications as instructed. Aspirin 81mg twice daily x 2 weeks     Discharge Medications: For the complete list of discharge medications, please refer to the patient's medication reconciliation.

## 2023-07-20 NOTE — PLAN OF CARE
Problem: PHYSICAL THERAPY ADULT  Goal: Performs mobility at highest level of function for planned discharge setting. See evaluation for individualized goals. Description: Treatment/Interventions: Functional transfer training, Therapeutic exercise, Endurance training, Patient/family training, Equipment eval/education, Bed mobility, Gait training, Elevations, Spoke to nursing  Equipment Recommended: Estela Coles (continue trials with SPC; pt will require U/L AD)       See flowsheet documentation for full assessment, interventions and recommendations. 7/20/2023 1048 by Charley Urbina PT  Note: Prognosis: Good  Problem List: Decreased strength, Decreased endurance, Decreased range of motion, Impaired balance, Decreased mobility, Pain, Orthopedic restrictions  Assessment: Pt is 76 y.o. male seen for PT evaluation on 7/20/2023 s/p admit to [facility] on 7/19/2023. POD#1 s/p Left reverse shoulder arthroplasty. PT was consulted to assess pt's functional mobility and d/c needs. Order placed for PT eval and tx. PTA, pt resides with family in [facility] with 4+1 DESIREE, ambulates without AD, + fall history. At time of eval, pt requiring min A for bed mobility, transfers, household distance gait trials. Use of HHA vs SPC trialed, pt requiring continuous vc/education for [facility] management/ sequencing; pt with improved ambulatory balance/ stride length, gait velocity with use of SPC vs no AD. Upon evaluation, pt presenting with impaired functional mobility d/t decreased strength, decreased ROM, decreased endurance, impaired balance, decreased mobility, orthopedic restrictions of NWB LUE, pain and activity intolerance.  Pertinent PMHx and current co-morbidities affecting pt's physical performance at time of assessment include: HTN, HLD, chronic pain disorder, lumbar disc degeneration, lumbar stenosis, GREGG, DDD cervical. Personal factors affecting pt at time of eval include: inaccessible home environment, ambulating w/ assistive device, stairs to enter home, inability to navigate community distances and positive fall history. The following objective measures performed on IE also reveal limitations: Barthel Index: 40/100, Modified Monique: 4 (moderate/severe disability) and AM-PAC 6-Clicks: 07/10. Pt's clinical presentation is currently unstable/unpredictable seen in pt's presentation of abnormal lab value(s), need for input for task focus and mobility technique, L UE pain impacting overall mobility status and ongoing medical assessment. Overall, pt's rehab potential and prognosis to return to PLOF is good as impacted by objective findings, warranting pt to receive further skilled PT interventions to address identified impairments, activity limitation(s), and participation restriction(s). Pt to benefit from continued PT tx to address deficits as defined above and maximize level of functional independent mobility. From PT/mobility standpoint, recommendation at time of d/c would be home with home health rehabilitation pending progress in order to facilitate return to PLOF. Barriers to Discharge: Inaccessible home environment, Decreased caregiver support     PT Discharge Recommendation: Home with home health rehabilitation    See flowsheet documentation for full assessment. 7/20/2023 1048 by Theresa Jaimes PT  Note: Prognosis: Good  Problem List: Decreased strength, Decreased endurance, Decreased range of motion, Impaired balance, Decreased mobility, Pain, Orthopedic restrictions  Assessment: Pt is 76 y.o. male seen for PT evaluation on 7/20/2023 [id number]. POD#1 s/p Left reverse shoulder arthroplasty. PT was consulted to assess pt's functional mobility and d/c needs. Order placed for PT eval and tx. PTA, pt resides with family in [facility] with 4+1 DESIREE, ambulates without AD, + fall history. At time of eval, pt requiring min A for bed mobility, transfers, household distance gait trials.   Use of HHA vs SPC trialed, pt requiring continuous vc/education for [facility] management/ sequencing; pt with improved ambulatory balance/ stride length, gait velocity with use of SPC vs no AD. Upon evaluation, pt presenting with impaired functional mobility d/t decreased strength, decreased ROM, decreased endurance, impaired balance, decreased mobility, orthopedic restrictions of NWB LUE, pain and activity intolerance. Pertinent PMHx and current co-morbidities affecting pt's physical performance at time of assessment include: HTN, HLD, chronic pain disorder, lumbar disc degeneration, lumbar stenosis, GREGG, DDD cervical. Personal factors affecting pt at time of eval include: inaccessible home environment, ambulating w/ assistive device, stairs to enter home, inability to navigate community distances and positive fall history. The following objective measures performed on IE also reveal limitations: Barthel Index: 40/100, Modified Monique: 4 (moderate/severe disability) and AM-PAC 6-Clicks: 69/46. Pt's clinical presentation is currently unstable/unpredictable seen in pt's presentation of abnormal lab value(s), need for input for task focus and mobility technique, L UE pain impacting overall mobility status and ongoing medical assessment. Overall, pt's rehab potential and prognosis to return to PLOF is good as impacted by objective findings, warranting pt to receive further skilled PT interventions to address identified impairments, activity limitation(s), and participation restriction(s). Pt to benefit from continued PT tx to address deficits as defined above and maximize level of functional independent mobility. From PT/mobility standpoint, recommendation at time of d/c would be home with home health rehabilitation pending progress in order to facilitate return to PLOF.   Barriers to Discharge: Inaccessible home environment, Decreased caregiver support     PT Discharge Recommendation: Home with home health rehabilitation    See flowsheet documentation for full assessment.

## 2023-07-20 NOTE — PLAN OF CARE
Problem: PAIN - ADULT  Goal: Verbalizes/displays adequate comfort level or baseline comfort level  Description: Interventions:  - Encourage patient to monitor pain and request assistance  - Assess pain using appropriate pain scale  - Administer analgesics based on type and severity of pain and evaluate response  - Implement non-pharmacological measures as appropriate and evaluate response  - Consider cultural and social influences on pain and pain management  - Notify physician/advanced practitioner if interventions unsuccessful or patient reports new pain  Outcome: Progressing     Problem: INFECTION - ADULT  Goal: Absence or prevention of progression during hospitalization  Description: INTERVENTIONS:  - Assess and monitor for signs and symptoms of infection  - Monitor lab/diagnostic results  - Monitor all insertion sites, i.e. indwelling lines, tubes, and drains  - Monitor endotracheal if appropriate and nasal secretions for changes in amount and color  - [place] appropriate cooling/warming therapies per order  - Administer medications as ordered  - Instruct and encourage patient and family to use good hand hygiene technique  - Identify and instruct in appropriate isolation precautions for identified infection/condition  Outcome: Progressing  Goal: Absence of fever/infection during neutropenic period  Description: INTERVENTIONS:  - Monitor WBC    Outcome: Progressing     Problem: SAFETY ADULT  Goal: Patient will remain free of falls  Description: INTERVENTIONS:  - Educate patient/family on patient safety including physical limitations  - Instruct patient to call for assistance with activity   - Consult OT/PT to assist with strengthening/mobility   - Keep Call bell within reach  - Keep bed low and locked with side rails adjusted as appropriate  - Keep care items and personal belongings within reach  - Initiate and maintain comfort rounds  - Make Fall Risk Sign visible to staff  - Offer Toileting every  Hours, in advance of need  - Initiate/Maintain alarm  - Obtain necessary fall risk management equipment:   - Apply yellow socks and bracelet for high fall risk patients  - Consider moving patient to room near nurses station  Outcome: Progressing  Goal: Maintain or return to baseline ADL function  Description: INTERVENTIONS:  -  Assess patient's ability to carry out ADLs; assess patient's baseline for ADL function and identify physical deficits which impact ability to perform ADLs (bathing, care of mouth/teeth, toileting, grooming, dressing, etc.)  - Assess/evaluate cause of self-care deficits   - Assess range of motion  - Assess patient's mobility; develop plan if impaired  - Assess patient's need for assistive devices and provide as appropriate  - Encourage maximum independence but intervene and supervise when necessary  - Involve family in performance of ADLs  - Assess for home care needs following discharge   - Consider OT consult to assist with ADL evaluation and planning for discharge  - Provide patient education as appropriate  Outcome: Progressing  Goal: Maintains/Returns to pre admission functional level  Description: INTERVENTIONS:  - Perform BMAT or MOVE assessment daily.   - Set and communicate daily mobility goal to care team and patient/family/caregiver. - Collaborate with rehabilitation services on mobility goals if consulted  - Perform Range of Motion  times a day. - Reposition patient every  hours.   - Dangle patient  times a day  - Stand patient  times a day  - Ambulate patient  times a day  - Out of bed to chair  times a day   - Out of bed for meal times a day  - Out of bed for toileting  - Record patient progress and toleration of activity level   Outcome: Progressing     Problem: DISCHARGE PLANNING  Goal: Discharge to home or other facility with appropriate resources  Description: INTERVENTIONS:  - Identify barriers to discharge w/patient and caregiver  - Arrange for needed discharge resources and transportation as appropriate  - Identify discharge learning needs (meds, wound care, etc.)  - Arrange for interpretive services to assist at discharge as needed  - Refer to Case Management Department for coordinating discharge planning if the patient needs post-hospital services based on physician/advanced practitioner order or complex needs related to functional status, cognitive ability, or social support system  Outcome: Progressing     Problem: Knowledge Deficit  Goal: Patient/family/caregiver demonstrates understanding of disease process, treatment plan, medications, and discharge instructions  Description: Complete learning assessment and assess knowledge base. Interventions:  - Provide teaching at level of understanding  - Provide teaching via preferred learning methods  Outcome: Progressing     Problem: MOBILITY - ADULT  Goal: Maintain or return to baseline ADL function  Description: INTERVENTIONS:  -  Assess patient's ability to carry out ADLs; assess patient's baseline for ADL function and identify physical deficits which impact ability to perform ADLs (bathing, care of mouth/teeth, toileting, grooming, dressing, etc.)  - Assess/evaluate cause of self-care deficits   - Assess range of motion  - Assess patient's mobility; develop plan if impaired  - Assess patient's need for assistive devices and provide as appropriate  - Encourage maximum independence but intervene and supervise when necessary  - Involve family in performance of ADLs  - Assess for home care needs following discharge   - Consider OT consult to assist with ADL evaluation and planning for discharge  - Provide patient education as appropriate  Outcome: Progressing  Goal: Maintains/Returns to pre admission functional level  Description: INTERVENTIONS:  - Perform BMAT or MOVE assessment daily.   - Set and communicate daily mobility goal to care team and patient/family/caregiver.    - Collaborate with rehabilitation services on mobility goals if consulted  - Perform Range of Motion  times a day. - Reposition patient every  hours.   - Dangle patient  times a day  - Stand patient  times a day  - Ambulate patient  times a day  - Out of bed to chair  times a day   - Out of bed for meals  times a day  - Out of bed for toileting  - Record patient progress and toleration of activity level   Outcome: Progressing

## 2023-07-20 NOTE — OCCUPATIONAL THERAPY NOTE
Occupational Therapy Evaluation        Patient Name: Ashok Gates  AGHPV'R Date: 7/20/2023 07/20/23 0810   OT Last Visit   OT Visit Date 07/20/23   Note Type   Note type Evaluation   Pain Assessment   Pain Assessment Tool 0-10   Pain Score 3   Pain Location/Orientation Orientation: Left; Location: Shoulder  (radiates down to the elbow)   Pain Onset/Description Onset: Ongoing; Descriptor: [facility] Pain Intervention(s) Ambulation/increased activity;Repositioned   Restrictions/Precautions   Weight Bearing Precautions Per Order Yes   LUE Weight Bearing Per Order NWB   Braces or Orthoses Other (Comment)  (shoulder immobilizer upon arrival; denies use of any other bracing at baseline)   Other Precautions Pain; Fall Risk;Bed Alarm; Chair Alarm;WBS   Home Living   Type of [address] [place] [address] One level;Performs ADLs on one level;Stairs to enter with rails  (4 + 1 DESIREE)   Bathroom Shower/Tub Tub/shower unit   [employer] Raised   Bathroom Equipment Grab bars in shower; [address]; Wheelchair-manual   Additional Comments ambulatory without AD   Prior Function   Level of [place] Independent with ADLs; Independent with functional mobility   Lives With Family  (wife; daughter lives in basement)   [address] Help From Family   IADLs Independent with driving; Independent with meal prep; Independent with medication management   Falls in the last 6 months 1 to 4  (1 fall; tripped walking down uneven road resulting in 3 stitches)   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 Patient lives with his spouse and family in a one story house, 4 + 1 DESIREE. Patient was ambulatory without AD.    Reciprocal Relationships Supportive Family   Service to Others Retired multiple jobs   Intrinsic Gratification "I just take care of everyone at home"   General Family/Caregiver Present No   ADL   Eating Assistance 5  Supervision/Setup   Grooming Assistance 5  Supervision/Setup   UB Bathing Assistance 4  Minimal Assistance   LB Bathing Assistance 2  Maximal Assistance   UB Dressing Assistance 4  Minimal Assistance   LB Dressing Assistance 2  Maximal [address]  3  Moderate Assistance   Functional Assistance 4  Minimal Assistance   Bed Mobility   Supine to Sit 4  Minimal assistance   Additional items Assist x 1;HOB elevated; Bedrails; Increased time required;Verbal cues   Transfers   Sit to Stand 4  Minimal assistance   Additional items Assist x 1; Armrests; Increased time required;Verbal cues   Stand to Sit 4  Minimal assistance   Additional items Assist x 1; Armrests; Increased time required;Verbal cues   Additional Comments pt c/o feeling lightheaded following functional mobility: vitals taken seated in recliner chair, 104/64mmHg, 70bpm, 94% SpO2 on RA. RN Gloria Naik made aware. pt reporting mild improvement in sx s/p seated rest break   Balance   Static Sitting Fair +   Dynamic Sitting Fair   Static Standing Poor +   Dynamic Standing Poor +   Activity Tolerance   Activity Tolerance Patient limited by fatigue;Patient limited by pain   RUE Assessment   RUE Assessment WNL   LUE Assessment   LUE Assessment X  (immobilized via abductor sling/ hand and wrist AROM WFL)   Hand Function   Gross Motor Coordination Functional   Fine Motor Coordination Functional   Sensation   Light Touch Partial deficits in the LUE  (numbnes to left upper arm)   Vision-Basic Assessment   Current Vision Wears glasses only for reading   Cognition   Overall Cognitive Status [facility]   Arousal/Participation Alert; Responsive; Cooperative   Attention Within functional limits   Orientation Level Oriented X4   Memory Within functional limits   Following Commands Follows all commands and directions without difficulty   Assessment   Limitation Decreased ADL status; Decreased endurance;Decreased self-care trans;Decreased high-level ADLs   Prognosis Good   Assessment Patient is a 76 y.o. male seen for OT evaluation s/p admit to  on 7/19/2023. Commorbidities affecting patient's functional performance at time of assessment include:  POD#1 s/p Left reverse shoulder arthroplasty/ NWB/ Abductor sling in place/ ROM to wrist and fingers only per Ortho orders, HTN, HLD, chronic pain disorder, lumbar disc degeneration, lumbar stenosis, GREGG, DDD cervical.   Orders placed for OT evaluation and treatment. Performed at least two patient identifiers during session including name and wristband. Prior to admission, Patient reports independnet with ADLs/ IADLs. Patient lives with his spouse and family in a one story house, 4 + 1 DESIREE. Patient was ambulatory without AD. Personal factors affecting patient at time of initial evaluation include: steps to enter, difficulty performing ADLs and difficulty performing IADLs. Upon evaluation, patient requires minimal  assist for UB ADLs, maximal assist for LB ADLs, transfers and functional ambulation in room and bathroom with minimal  assist, with SPC. Occupational performance is affected by the following deficits: decreased functional use of BUEs, degenerative arthritic joint changes, dynamic sit/ stand balance deficit with poor standing tolerance time for self care and functional mobility, decreased activity tolerance, increased pain and orthopedic restrictions. Patient to benefit from continued Occupational Therapy treatment while in the hospital to address deficits as defined above and maximize level of functional independence with ADLs and functional mobility. Occupational Performance areas to address include: grooming , bathing/ shower, dressing, toilet hygiene, transfer to all surfaces, functional mobility, health maintenance and IADLs: safety procedures. From OT standpoint, recommendation at time of d/c would be Home with family support/ OP Therapy per Orthopedic Protocol. Plan   Treatment Interventions ADL retraining;Functional transfer training; Endurance training;Patient/family training;Equipment evaluation/education; Compensatory technique education;Continued evaluation   Goal Expiration Date 07/21/23   OT Frequency 1-2x/wk   Recommendation   OT Discharge Recommendation Home with outpatient rehabilitation  (per Orthopedic Protocol)   AM-PAC Daily Activity Inpatient   Lower Body Dressing 1   Bathing 2   Toileting 2   Upper Body Dressing 3   Grooming 3   Eating 4   Daily Activity Raw Score 15   Daily Activity Standardized Score (Calc for Raw Score >=11) 34.69   AM-PAC Applied Cognition Inpatient   Following a Speech/Presentation 4   Understanding Ordinary Conversation 4   Taking Medications 4   Remembering Where Things Are Placed or Put Away 4   Remembering List of 4-5 Errands 4   Taking Care of Complicated Tasks 4   Applied Cognition Raw Score 24   Applied Cognition Standardized Score 62.21   Barthel Index   Feeding 5   Bathing 0   Grooming Score 0   Dressing Score 5   Bladder Score 10   Bowels Score 10   Toilet Use Score 5   Transfers (Bed/Chair) Score 5   Mobility (Level Surface) Score 0   Stairs Score 0   Barthel Index Score 40       Occupational therapy Goals: In 7-10 days    Patient will verbalize/ demonstrate joint safety precautions during functional activity  with No verbal cues    Patient will demonstrate correct of  use of long handle equipment to complete LB ADLs @ Mod I  level. Patient will increase standing tolerance time to 5 minutes with  Unilateral UE support to complete sink level ADLs @  Mod I  level    Patient will restore  independence in bed mobility using Log Rolling technique to transfer OOB at Mod I  level. Patient will verbalize 3  safety awareness/ body mechanics principles to  prevent falls in the home setting. Patient will complete UB/LB ADLs @ Mod I level. Patient will complete light meal prep @ Mod I level.     Patient will complete transfers to all surfaces @ Mod I level with AD as indicated.

## 2023-07-20 NOTE — PHYSICAL THERAPY NOTE
Physical Therapy Evaluation     Patient's Name: Mike Jacobsen    Admitting Diagnosis  Complete tear of left rotator cuff, unspecified whether traumatic [M75.122]  Rotator cuff arthropathy of left shoulder [M12.812]    Problem List  Patient Active Problem List   Diagnosis    Essential hypertension    Mixed hyperlipidemia    Chronic pain disorder    Chronic left shoulder pain    Chronic bilateral low back pain with bilateral sciatica    Disc degeneration, lumbar    Lumbar stenosis with neurogenic claudication    Abnormal CT of the abdomen    Chronic pain syndrome    GREGG (obstructive sleep apnea)    Benign prostatic hyperplasia with lower urinary tract symptoms    Hydronephrosis    Nonrheumatic aortic valve insufficiency    Non-rheumatic mitral regurgitation    Diastolic dysfunction    Nephrolithiasis    Squamous cell carcinoma in situ (SCCIS) of scalp    Actinic keratosis    Megaloblastic red blood cells    BMI 31.0-31.9,adult    Class 1 obesity without serious comorbidity with body mass index (BMI) of 31.0 to 31.9 in adult    Insomnia    Age-related cataract of both eyes    Rotator cuff arthropathy of left shoulder    DDD (degenerative disc disease), cervical    Encounter for geriatric assessment       Past Medical History  Past Medical History:   Diagnosis Date    Abnormal stress test     Angina at rest [facility])     no issues currently 5/2021    Apnea     Arthritis     Cancer ([address])     Skin    Carpal tunnel syndrome on left     Chest pain     CPAP (continuous positive airway pressure) dependence     no longer used    Diverticulitis     GERD (gastroesophageal reflux disease)     History of transfusion     childhood    Hypercholesterolemia     Hypercholesterolemia     Hyperlipidemia     Hypertension     Joint pain     Right Shoulder    Kidney stone     Mitral valve disorder     Neck pain     Sleep apnea     Sleep apnea, obstructive     Thoracic neuritis        Past Surgical History  Past Surgical History:   Procedure Laterality Date    COLONOSCOPY      Complete    CORONARY ANGIOPLASTY  2017    ESOPHAGOGASTRODUODENOSCOPY      Diagnostic    FL RETROGRADE PYELOGRAM  05/24/2019    FOOT SURGERY Left     tarsal tunnel    HERNIA REPAIR      KNEE ARTHROSCOPY Left     KNEE SURGERY Left     NEUROPLASTY / TRANSPOSITION MEDIAN NERVE AT CARPAL TUNNEL      MI ARTHROPLASTY GLENOHUMERAL JOINT TOTAL SHOULDER Left 7/19/2023    Procedure: ARTHROPLASTY SHOULDER REVERSE- Left reverse shoulder arthroplasty;  Surgeon: Penelope Cuevas DO;  Location: [facility];  Service: Orthopedics    MI COLONOSCOPY FLX DX W/COLLJ SPEC WHEN PFRMD N/A 04/10/2018    Procedure: EGD AND COLONOSCOPY;  Surgeon: Fatou Coleman MD;  Location: [facility]; Service: Gastroenterology    MI CYSTO INSERTION TRANSPROSTATIC IMPLANT SINGLE N/A 08/30/2018    Procedure: CYSTOSCOPY WITH INSERTION UROLIFT;  Surgeon: Aniceto Judge MD;  [facility];  Service: Urology    MI CYSTO/URETERO W/LITHOTRIPSY &INDWELL STENT INSRT Right 05/24/2019    Procedure: CYSTOSCOPY URETEROSCOPY WITH LITHOTRIPSY HOLMIUM LASER, RETROGRADE PYELOGRAM AND INSERTION STENT URETERAL;  Surgeon: Aniceto Judge MD;  [facility];  Service: Urology    MI OPEN IMPLANTATION CRANIAL NERVE JONAS & PULSE GEN N/A 05/13/2021    Procedure: INSERTION UPPER AIRWAY STIMULATOR, INSPIRE IMPLANT;  Surgeon: Makeda Cary MD;  Location: [facility];  Service: ENT    MI TRANSURETHRAL INCISION PROSTATE N/A 08/30/2018    Procedure: TRANSURETHRAL INCISION OF PROSTATE (TUIP); Surgeon: Aniceto Judge MD;  [facility];  Service: Urology    ROTATOR CUFF REPAIR Bilateral     SHOULDER SURGERY      2 surgeries 30/20yrs ago    TARSAL TUNNEL RELEASE      Neuroplasty Decompression        07/20/23 0739   PT Last Visit   PT Visit Date 07/20/23   Note Type   Note type Evaluation   Pain Assessment   Pain Assessment Tool 0-10   Pain Score 3   Pain Location/Orientation Orientation: Left; Location: Shoulder  (radiates down to the elbow)   Pain Onset/Description Onset: Ongoing; Descriptor: Dull   Restrictions/Precautions   Weight Bearing Precautions Per Order Yes   LUE Weight Bearing Per Order NWB   Braces or Orthoses Other (Comment)  (shoulder immobilizer upon arrival; denies use at baseline)   Other Precautions Pain; Fall Risk;Bed Alarm; Chair Alarm;WBS  (NWB LUE)   Home Living   Type of [address] [place] [address] One level;Performs ADLs on one level;Stairs to enter with rails  (4+1 DESIREE w B handrails)   Bathroom Shower/Tub Tub/shower unit   [employer] Raised   Bathroom Equipment Grab bars in shower; Shower chair  (owns shower chair and can be installed)   [address] Walker;Cane  (wife owns walker/cane; denies use at baseline)   Prior Function   Level of [place] Independent with ADLs; Independent with functional mobility   Lives With Family  (wife; daughter lives in basement)   [address] Help From Family   IADLs Independent with driving; Independent with meal prep; Independent with medication management   Falls in the last 6 months 1 to 4  (1 fall; tripped walking down uneven road resulting in 3 stitches)   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[employer], )   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   General   Family/Caregiver Present No   Cognition   Overall Cognitive Status WFL   Arousal/Participation Alert   Orientation Level Oriented X4   Memory Within functional limits   Following Commands Follows all commands and directions without difficulty   Subjective   Subjective i feel a bit unsteady standing   RLE Assessment   RLE Assessment WFL  (assessed with functional mobility)   LLE Assessment   LLE Assessment WFL  (assessed with functional mobility)   Coordination   Sensation WFL   Light Touch   RLE Light Touch Grossly intact   LLE Light Touch Grossly intact   Bed Mobility   Supine to Sit 4  Minimal assistance   Additional items Assist x 1;HOB elevated; Bedrails; Increased time required;Verbal cues   Transfers   Sit to Stand 4  Minimal assistance   Additional items Assist x 1; Armrests; Increased time required;Verbal cues   Stand to Sit 4  Minimal assistance   Additional items Assist x 1; Armrests; Increased time required;Verbal cues   Additional Comments pt c/o feeling lightheaded following functional mobility: vitals taken seated in recliner chair, 104/64mmHg, 70bpm, 94% SpO2 on RA. MAE Stockton made aware. pt reporting mild improvement in sx s/p seated rest break   Ambulation/Elevation   Gait pattern Antalgic; Shuffling; Short stride;Decreased hip extension;Decreased heel strike;Decreased toe off;Excessively slow;Decreased foot clearance;Narrow KAYA   Gait Assistance 4  Minimal assist   Additional items Assist x 1  (handheld)   Assistive Device Straight cane  (handheldx1)   Distance 15' ([facility]) + 20' [facility])   Ambulation/Elevation Additional Comments began handheld x1 with MinAx1; progressed to [facility] and improved steadiness with ambulation with CGA with improved longer stride length and improved gait velocity. pt requiring continuous vc for [facility] management/ proper sequencing   Balance   Static Sitting Fair +   Dynamic Sitting Fair   Static Standing Poor +   Dynamic Standing Poor +   Ambulatory Poor +   Endurance Deficit   Endurance Deficit Yes   Activity Tolerance   Activity Tolerance Patient limited by fatigue;Patient limited by pain   Medical Staff Made Aware MARELY Aguilar Harris notified of session outcomes   Nurse Made Aware MAE [place]ton   Assessment   Prognosis Good   Problem List Decreased strength;Decreased endurance;Decreased range of motion; Impaired balance;Decreased mobility;Pain;Orthopedic restrictions   Assessment Pt is 76 y.o. male seen for PT evaluation on 7/20/2023 [id number]. POD#1 s/p Left reverse shoulder arthroplasty. PT was consulted to assess pt's functional mobility and d/c needs. Order placed for PT eval and tx.  PTA, pt resides with family in [facility] with 4+1 DESIREE, ambulates without AD, + fall history. At time of eval, pt requiring min A for bed mobility, transfers, household distance gait trials. Use of HHA vs SPC trialed, pt requiring continuous vc/education for [facility] management/ sequencing; pt with improved ambulatory balance/ stride length, gait velocity with use of SPC vs no AD. Upon evaluation, pt presenting with impaired functional mobility d/t decreased strength, decreased ROM, decreased endurance, impaired balance, decreased mobility, orthopedic restrictions of NWB LUE, pain and activity intolerance. Pertinent PMHx and current co-morbidities affecting pt's physical performance at time of assessment include: HTN, HLD, chronic pain disorder, lumbar disc degeneration, lumbar stenosis, GREGG, DDD cervical. Personal factors affecting pt at time of eval include: inaccessible home environment, ambulating w/ assistive device, stairs to enter home, inability to navigate community distances and positive fall history. The following objective measures performed on IE also reveal limitations: Barthel Index: 40/100, Modified [place]: 4 (moderate/severe disability) and AM-PAC 6-Clicks: 60/73. Pt's clinical presentation is currently unstable/unpredictable seen in pt's presentation of abnormal lab value(s), need for input for task focus and mobility technique, L UE pain impacting overall mobility status and ongoing medical assessment. Overall, pt's rehab potential and prognosis to return to PLOF is good as impacted by objective findings, warranting pt to receive further skilled PT interventions to address identified impairments, activity limitation(s), and participation restriction(s). Pt to benefit from continued PT tx to address deficits as defined above and maximize level of functional independent mobility.  From PT/mobility standpoint, recommendation at time of d/c would be home with home health rehabilitation pending progress in order to facilitate return to PLOF. Barriers to Discharge Inaccessible home environment;Decreased caregiver support   Goals   Patient Goals to return home   STG Expiration Date 07/30/23   Short Term Goal #1 In 7-10 days: Increase bilateral LE strength 1/2 grade to facilitate independent mobility, Perform all bed mobility tasks modified independent to decrease caregiver burden, Perform all transfers modified independent to improve independence, Ambulate > 50 ft. with least restrictive assistive device with distant S w/o LOB and w/ normalized gait pattern 100% of the time, Navigate 4+1 stairs with close S with unilateral handrail to facilitate return to previous living environment, Increase all balance 1/2 grade to decrease risk for falls, Improve Barthel Index score to 55 or greater to facilitate independence and PT provider will perform functional balance assessment to determine fall risk   PT Treatment Day 0   Plan   Treatment/Interventions Functional transfer training; Therapeutic exercise; Endurance training;Patient/family training;Equipment eval/education; Bed mobility;Gait training;Elevations; Spoke to nursing   PT Frequency 3-5x/wk   Recommendation   PT Discharge Recommendation Home with home health rehabilitation   Equipment Recommended Ryan Lentz  (continue trials with SPC; pt will require U/L AD)   AM-PAC Basic Mobility Inpatient   Turning in Flat Bed Without Bedrails 3   Lying on Back to Sitting on Edge of Flat Bed Without Bedrails 3   Moving Bed to Chair 3   Standing Up From Chair Using Arms 3   Walk in Room 3   Climb 3-5 Stairs With Railing 2   Basic Mobility Inpatient Raw Score 17   Basic Mobility Standardized Score 39.67   Highest Level Of Mobility   JH-HLM Goal 5: Stand one or more mins   JH-HLM Achieved 7: Walk 25 feet or more   Modified [place]ique Scale   Modified [place] Scale 4   Barthel Index   Feeding 5   Bathing 0   Grooming Score 0   Dressing Score 5   Bladder Score 10   Bowels Score 10   Toilet Use Score 5   Transfers (Bed/Chair) Score 5   Mobility (Level Surface) Score 0   Stairs Score 0   Barthel Index Score 40           Christiano Bullard, PT, DPT

## 2023-07-20 NOTE — DISCHARGE INSTR - AVS FIRST PAGE
Discharge Instructions - [address] 76 y.o. male MRN: [id number]  Unit/Bed#: -01    Weight Bearing Status:                                           Nonweightbearing Left upper extremity, maintain sling at all times except for hygenic purposes    DVT prophylaxis  Aspirin 81mg twice daily x 2 weeks    Pain:  Continue analgesics as directed    Dressing Instructions:   Please keep clean, dry and intact for 7 days post operatively. May shower with dressing intact and once dressing is removed. Allow warm soapy water to run over surgical incision. Do not scrub, do not remove steri strips or sutures. If showering, remove sling without active shoulder motion and replace after shower. Appt Instructions: Follow up in office 08/03/2023    Contact the office sooner if you experience any increased numbness/tingling in the extremities.

## 2023-07-20 NOTE — PROGRESS NOTES
Progress Note - Orthopedics   Vibha Tripp 76 y.o. male MRN: [id number]  Unit/Bed#: -01      Subjective:    76 y.o.male POD#1 s/p Left reverse shoulder arthroplasty, resting comfortable in bed, and in no acute distress. Today, patient reports he is doing great. He admits he felt a little uneasy on his feet last night when walking to the bathroom with nursing staff. He admits pain is well controlled.      Labs:  0   Lab Value Date/Time    HCT 36.6 07/20/2023 0422    HCT 45.8 06/29/2023 0721    HCT 44.6 03/14/2023 1630    HCT 43.8 10/29/2015 0713    HCT 43.3 12/03/2014 0950    HCT 43.9 02/25/2014 0800    HGB 13.1 07/20/2023 0422    HGB 15.1 06/29/2023 0721    HGB 15.3 03/14/2023 1630    HGB 14.7 10/29/2015 0713    HGB 14.8 12/03/2014 0950    HGB 14.6 02/25/2014 0800    INR 1.02 06/29/2023 0721    WBC 10.82 (H) 07/20/2023 0422    WBC 4.84 06/29/2023 0721    WBC 8.80 03/14/2023 1630    WBC 5.77 10/29/2015 0713    WBC 4.7 12/03/2014 0950    WBC 5.59 02/25/2014 0800    CRP <3.0 06/29/2023 0721       Meds:    Current Facility-Administered Medications:   •  acetaminophen (TYLENOL) tablet 650 mg, 650 mg, Oral, Q6H [address], Leslee Cardoso PA-C, 650 mg at 07/20/23 0547  •  amLODIPine (NORVASC) tablet 10 mg, 10 mg, Oral, Leslee HWANG PA-C  •  aspirin (ECOTRIN LOW STRENGTH) EC tablet 81 mg, 81 mg, Oral, BID, Leslee Cardoso PA-C, 81 mg at 07/19/23 3719  •  dicyclomine (BENTYL) tablet 20 mg, 20 mg, Oral, BID, Leslee Cardoso PA-C, 20 mg at 07/19/23 1739  •  docusate sodium (COLACE) capsule 100 mg, 100 mg, Oral, BID, Marvel Bullard PA-C, 100 mg at 07/19/23 1741  •  doxepin (SINEquan) capsule 10 mg, 10 mg, Oral, HS PRN, Marvel Bullard PA-C  •  fenofibrate (TRICOR) tablet 145 mg, 145 mg, Oral, Daily, Leslee Cardoso PA-C, 145 mg at 07/19/23 1700  •  lactated ringers infusion, 75 mL/hr, Intravenous, Continuous, Sherice Billy CRNA, Last Rate: 75 mL/hr at 07/19/23 1618, 75 mL/hr at 07/19/23 1618  •  lisinopril (ZESTRIL) tablet 40 mg, 40 mg, Oral, Daily, Leslee Cardoso PA-C  •  loratadine (CLARITIN) tablet 10 mg, 10 mg, Oral, Daily, Leslee Cardoso PA-C, 10 mg at 07/19/23 1700  •  ondansetron (ZOFRAN) injection 4 mg, 4 mg, Intravenous, Q8H PRN, Alyssa Obando PA-C  •  oxyCODONE (ROXICODONE) immediate release tablet 10 mg, 10 mg, Oral, Q4H PRN, Alyssa bOando PA-C  •  oxyCODONE (ROXICODONE) IR tablet 5 mg, 5 mg, Oral, Q4H PRN, Alyssa Obando PA-C  •  pantoprazole (PROTONIX) EC tablet 20 mg, 20 mg, Oral, Early Morning, Leslee Cardoso PA-C, 20 mg at 07/20/23 0547  •  pravastatin (PRAVACHOL) tablet 40 mg, 40 mg, Oral, Daily With Sumanth Cardoso PA-C  •  traMADol (ULTRAM) tablet 50 mg, 50 mg, Oral, Q6H [address], Leslee Cardoso PA-C, 50 mg at 07/20/23 0547    Blood Culture:   Lab Results   Component Value Date    BLOODCX No Growth After 5 Days. 04/01/2020    BLOODCX No Growth After 5 Days. 04/01/2020       Wound Culture:   No results found for: "WOUNDCULT"    Ins and Outs:  I/O last 24 hours: In: 1350 [I.V.:1300; IV Piggyback:50]  Out: 750 [Urine:600; Blood:150]          Physical:  Vitals:    07/19/23 2321   BP: 116/60   Pulse: 82   Resp: 18   Temp: 97.9 °F (36.6 °C)   SpO2: 91%     Musculoskeletal:   Left shoulder  · Dressing with mild serosangunious strike through, replaced today. Surgical incision without evidence of active drainage noted.   · Sensation intact to median/radial/ulnar/axillary nerve distribution   · Motor intact anterior interosseous nerve/posterior interosseous nerve/median/radial/ulnar/axillary nerve distributions  · Deltoid contraction appreciated  · Full composite fist  · 2+ radial pulse, symmetric bilaterally  · Digits warm and well perfused  · Capillary refill < 2 seconds      Assessment:    77 y.o.male s/p Left reverse shoulder arthroplasty    Plan:  · Nonweightbearing Left upper extremity, shoulder abduction sling intact at all times  · May participate in therapy sessions for finger and wrist ROM and gait training  · No passive or active shoulder motion at this time  · Will monitor for ABLA and administer IVF/prbc as indicated for Greater than 2 gram drop or Hgb < 7. Hgb today 13. 1. · PT/OT- please see above  · Pain control  · DVT ppx- aspirin 81mg twice daily   · Dispo: Will discharge today after working with therapy.     Danette Heredia PA-C

## 2023-08-01 ENCOUNTER — EVALUATION (OUTPATIENT)
Dept: PHYSICAL THERAPY | Facility: CLINIC | Age: 78
End: 2023-08-01
Payer: COMMERCIAL

## 2023-08-01 DIAGNOSIS — Z96.612 S/P REVERSE TOTAL SHOULDER ARTHROPLASTY, LEFT: Primary | ICD-10-CM

## 2023-08-01 PROCEDURE — 97110 THERAPEUTIC EXERCISES: CPT | Performed by: PHYSICAL THERAPIST

## 2023-08-01 PROCEDURE — 97140 MANUAL THERAPY 1/> REGIONS: CPT | Performed by: PHYSICAL THERAPIST

## 2023-08-01 NOTE — PROGRESS NOTES
PT Evaluation     Today's date: 2023  Patient name: Vibha Tripp  : 1945  MRN: [id number]  Referring provider: Marvel Bullard PA-C  Dx:   Encounter Diagnosis     ICD-10-CM    1. S/P reverse total shoulder arthroplasty, left  Z96.612                      Assessment  Assessment details: Felipe Pagan a 78 y.o. male referred with primary diagnosis of S/P reverse total shoulder arthroplasty, left  (primary encounter diagnosis) . Patient presents with the following functional limitations: Left UE in sling. Relies on right UE for all ADLs. Significant ROM and strength deficits s/p surrgery. Treatment to include: Manual therapy techniques, extremity/core strengthening, neuromuscular control exercises,  instruction in a comprehensive HEP, and modalities as needed. They will benefit from skilled PT services to address the above functional deficits and to decrease pain to promote a return to their premorbid level of function. Impairments: abnormal or restricted ROM, activity intolerance, impaired physical strength, lacks appropriate home exercise program, pain with function, scapular dyskinesis and weight-bearing intolerance  Functional limitations: Left UE in sling. Relies on right UE for all ADLs. Understanding of Dx/Px/POC: good   Prognosis: good    Goals  STG (4 weeks)  1. Patient will demonstrate the ability to correct posture with minimal VC's  2. Patient will demonstrate 25% gains in shoulder ROM in all deficient planes  3. Patient will report pain as a 4-5/10 at worst with all normal activities  4. Patient will report 50% reduction in sleep disturbances related to pain  LTG (8 weeks)  1. Patient will report pain as a 0-1/10 at worst with normal activities  2. Patient will sleep through the night without disturbances related to pain  3. Patient will demonstrate shoulder ROM WNL to facilitate improved function with overhead activities  4.  Patient will demonstrate shoulder and scapuler strength 4/5 to improve posture and restore normal joint kinematics  5. Patient will be independent and compliant with a HEP in order to maintain gains made with skilled PT services. Plan  Patient would benefit from: skilled physical therapy  Planned modality interventions: cryotherapy  Planned therapy interventions: joint mobilization, IASTM, manual therapy, neuromuscular re-education, patient education, strengthening, stretching, therapeutic activities, therapeutic exercise and home exercise program  Frequency: 2x week  Duration in weeks: 16  Plan of Care beginning date: 2023  Plan of Care expiration date: 2023  Treatment plan discussed with: patient        Subjective Evaluation    History of Present Illness  Date of surgery: 2023  Mechanism of injury: Patient underwent left reverse TSA on 23. Was seen pre-operatively at [facility] office. Occasionally gets intermittently bouts of pain. (+) sleep disturbances and is uncomfortable sleeping with his sling. Currently sleeps in a recliner. Presents today for first post op visit. Patient Goals  Patient goals for therapy: increased strength, decreased pain, increased motion and independence with ADLs/IADLs    Pain  Current pain ratin  At best pain ratin  At worst pain ratin  Location: Left shoulder (diffuse)  Quality: sharp and pulling  Relieving factors: medications (Tylenol)  Exacerbated by: changing clothes. Dressing upper body. Progression: no change    Social Support  Lives with: spouse and adult children    Employment status: not working (retired)    Diagnostic Tests  X-ray: abnormal    FCE comments: Daughter does yardwork, cooking and cleaning. Treatments  Previous treatment: physical therapy, medication and injection treatment        Objective     Observations   Left Shoulder   Positive for edema.      Additional Observation Details  Significant ecchymosis left pectoral region and medial brachium    Passive Range of Motion   Left Shoulder   Flexion: 67 degrees with pain  Abduction: 90 degrees   External rotation 45°: 15 degrees with pain    Additional Passive Range of Motion Details  AROM elbow extension 45 degrees  PROM elbow extension 35 degrees  Forearm and wrist AROM WFL             Precautions: Mitral Valve disorder, HTN, Angina at rest, CA  Access Code: [email or web address]  URL: [email or web address]  Date: 08/01/2023  Prepared by: Dewayne Machuca  POC expires Auth Status Unit limit Start date  Expiration date PT/OT + Visit Limit?  [address] [place] [address]   11/21/23 Pending NA 8/1 Pending Pending $35/visit              Visit/Unit Tracking  AUTH Status:  Date 8/1              Pending Used 1               Remaining                    Manuals 8/1            PROM (up to 6 weeks) Flexion 120, ABD 90, ER to 60, IR to 0 JF muscle guarding                                                   Neuro Re-Ed             Scapular retractions             Bkwd shoulder rolls                                                                              Ther Ex             Pendulums             Gripper Instructed            Elbow flex/ext Instructed            Forearm Pro/sup Instructed            Left UT and LS stretches             Pendulums Instructed                                      Ther Activity                                       Gait Training                                       Modalities

## 2023-08-03 ENCOUNTER — OFFICE VISIT (OUTPATIENT)
Dept: OBGYN CLINIC | Facility: CLINIC | Age: 78
End: 2023-08-03

## 2023-08-03 ENCOUNTER — APPOINTMENT (OUTPATIENT)
Dept: RADIOLOGY | Facility: CLINIC | Age: 78
End: 2023-08-03
Payer: COMMERCIAL

## 2023-08-03 VITALS
HEIGHT: 62 IN | SYSTOLIC BLOOD PRESSURE: 127 MMHG | BODY MASS INDEX: 28.52 KG/M2 | WEIGHT: 155 LBS | DIASTOLIC BLOOD PRESSURE: 69 MMHG | HEART RATE: 69 BPM

## 2023-08-03 DIAGNOSIS — Z96.612 S/P REVERSE TOTAL SHOULDER ARTHROPLASTY, LEFT: Primary | ICD-10-CM

## 2023-08-03 DIAGNOSIS — Z96.612 S/P REVERSE TOTAL SHOULDER ARTHROPLASTY, LEFT: ICD-10-CM

## 2023-08-03 PROCEDURE — 73030 X-RAY EXAM OF SHOULDER: CPT

## 2023-08-03 PROCEDURE — 99024 POSTOP FOLLOW-UP VISIT: CPT | Performed by: ORTHOPAEDIC SURGERY

## 2023-08-03 NOTE — PROGRESS NOTES
Patient Name:  Karie Cormier  MRN:  [id number]    [address] [place] [address]. S/P reverse total shoulder arthroplasty, left  -     XR shoulder 2+ vw left; Future; Expected date: 08/03/2023        Approximately 2 weeks s/p Left reverse shoulder arthroplasty performed on 7/19/23. · The patient's x-rays were reviewed today. · Sutures and suture tails were removed. Steri-strips were applied. · The patient was provided with home exercises to begin. They were advised to come out of the sing 3 times per day to perform gentle range of motion exercises of the elbow along with home exercises. · The patient was instructed to wash the incisions with warm soapy water in the shower and to avoid vigorous scrubbing. The patient was instructed to bend at the waist to wash under the arm pit. · The patient is to continue with physical therapy as prescribed. He may perform AAROM to ROM thresholds with physical therapy. · I will see the patient back in approximately 4 weeks for re-evaluation. History of the Present Illness   Karie Cormier is a 78 y.o. male approximately 2 weeks s/p Left reverse shoulder arthroplasty performed on 7/19/23. He is doing fairly well. He notes he used a knife the other day in his left hand which caused pain. He is sleeping in his recliner. He denies any numbness or tingling. Review of Systems     Review of Systems   Constitutional: Negative for appetite change and unexpected weight change. HENT: Negative for congestion and trouble swallowing. Eyes: Negative for visual disturbance. Respiratory: Negative for cough and shortness of breath. Cardiovascular: Negative for chest pain and palpitations. Gastrointestinal: Negative for nausea and vomiting. Endocrine: Negative for cold intolerance and heat intolerance. Musculoskeletal: Negative for joint swelling and myalgias. Skin: Positive for wound. Negative for rash. Neurological: Negative for numbness.        Physical Exam     /69   Pulse 69   Ht 5' 2" (1.575 m)   Wt 70.3 kg (155 lb)   BMI 28.35 kg/m²     Left Shoulder:   Surgical incisions well healing  Active range of motion of elbow  degrees  Active assisted range of motion  70 degrees forward flexion  Passive range of motion   90 degrees of forward flexion   The patient is neurovascularly intact distally in the extremity. Data Review     I have personally reviewed pertinent films in PACS, and my interpretation follows. X-rays taken today 8/3/23 of Left  shoulder independently reviewed and demonstrate orthopedic hardware in stable alignment without evidence of loosening or failure. Previous surgical anchor remains in place. No acute fracture. Social History     Tobacco Use   • Smoking status: Former     Packs/day: 1.00     Years: 18.00     Total pack years: 18.00     Types: Cigarettes     Start date:      Quit date:      Years since quittin.6   • Smokeless tobacco: Never   Vaping Use   • Vaping Use: Never used   Substance Use Topics   • Alcohol use: Never   • Drug use: Never           Procedures  None performed.      Abbi Coffman   Scribe Attestation    I,:  Abbi Coffman am acting as a scribe while in the presence of the attending physician.:       I,:  Lissa Craig, DO personally performed the services described in this documentation    as scribed in my presence.:

## 2023-08-04 ENCOUNTER — APPOINTMENT (OUTPATIENT)
Dept: PHYSICAL THERAPY | Facility: CLINIC | Age: 78
End: 2023-08-04
Payer: COMMERCIAL

## 2023-08-08 ENCOUNTER — OFFICE VISIT (OUTPATIENT)
Dept: PHYSICAL THERAPY | Facility: CLINIC | Age: 78
End: 2023-08-08
Payer: COMMERCIAL

## 2023-08-08 DIAGNOSIS — G89.29 CHRONIC LEFT SHOULDER PAIN: ICD-10-CM

## 2023-08-08 DIAGNOSIS — Z96.612 S/P REVERSE TOTAL SHOULDER ARTHROPLASTY, LEFT: Primary | ICD-10-CM

## 2023-08-08 DIAGNOSIS — M25.512 CHRONIC LEFT SHOULDER PAIN: ICD-10-CM

## 2023-08-08 PROCEDURE — 97110 THERAPEUTIC EXERCISES: CPT

## 2023-08-08 PROCEDURE — 97140 MANUAL THERAPY 1/> REGIONS: CPT

## 2023-08-08 PROCEDURE — 97112 NEUROMUSCULAR REEDUCATION: CPT

## 2023-08-08 NOTE — PROGRESS NOTES
Daily Note     Today's date: 2023  Patient name: Karie Cormier  : 1945  MRN: [id number]  Referring provider: Zach Garcia PA-C  Dx:   Encounter Diagnosis     ICD-10-CM    1. S/P reverse total shoulder arthroplasty, left  Z96.612       2. Chronic left shoulder pain  M25.512     G89.29           Start Time: 1216  Stop Time: 1257  Total time in clinic (min): 41 minutes    Subjective: pt noted that his L shoulder does have some tenderness today secondary to sleeping in his bed last night instead of the recliner. Pt reported that his daughter reminds  Him if he is doing something he should not be doing even if he is in the sling. Example- noted that he was trying to cut his meal with his L arm while in the sling. Objective: See treatment diary below      Assessment: Continued with treatment session within protocol. As of this date patient is 2 weeks and 6 days OOS. At this time pt was able to complete the exercises with no changes in pain status of the L shoulder. Tolerated treatment fair. Pt required increased verbal cues to decrease muscle guarding with stretches this visit. Patient exhibited good technique with therapeutic exercises and would benefit from continued PT . Advised patient to continue with HEP compliance at this time. At least 1x a day. Pt aware of his precautions and restrictions at this time. Reminder to patient that he may have some increase tenderness/ DOMS after treatment session. Pt advised on using CP 10 - 20 min on and at least 60 min off prior to additional application as needed. Plan: Continue per plan of care. Precautions: Mitral Valve disorder, HTN, Angina at rest, CA  Access Code: [email or web address]  URL: [email or web address]  Date: 2023  Prepared by: Rosanne Green  POC expires Auth Status Unit limit Start date  Expiration date PT/OT + Visit Limit?  [employer]/ Co- Insurance   23 approved N/A 8/1 10/15/23 12 $35/visit              Visit/Unit Tracking  AUTH Status:  Date 8/1 8/8             Approved Used 1 2              Remaining  11 10                 Manuals 8/1 8/8           PROM (up to 6 weeks) Flexion 120, ABD 90, ER to 60, IR to 0 JF muscle guarding SC VC's to avoid Muscle gaurding                                                  Neuro Re-Ed             Scapular retractions  5" 3x 10            Bkwd shoulder rolls  NV                                                                            Ther Ex             Pendulums Instructed 1 min ea.             Gripper Instructed Green 5" 30x            Elbow flex/ext Instructed 3x 10            Forearm Pro/sup Instructed 30x            Left UT and LS stretches  30"x 3                                                   Ther Activity                                       Gait Training                                       Modalities

## 2023-08-10 ENCOUNTER — OFFICE VISIT (OUTPATIENT)
Dept: PHYSICAL THERAPY | Facility: CLINIC | Age: 78
End: 2023-08-10
Payer: COMMERCIAL

## 2023-08-10 DIAGNOSIS — M25.512 CHRONIC LEFT SHOULDER PAIN: ICD-10-CM

## 2023-08-10 DIAGNOSIS — Z96.612 S/P REVERSE TOTAL SHOULDER ARTHROPLASTY, LEFT: Primary | ICD-10-CM

## 2023-08-10 DIAGNOSIS — G89.29 CHRONIC LEFT SHOULDER PAIN: ICD-10-CM

## 2023-08-10 PROCEDURE — 97110 THERAPEUTIC EXERCISES: CPT | Performed by: PHYSICAL MEDICINE & REHABILITATION

## 2023-08-10 PROCEDURE — 97140 MANUAL THERAPY 1/> REGIONS: CPT | Performed by: PHYSICAL MEDICINE & REHABILITATION

## 2023-08-10 NOTE — PROGRESS NOTES
Daily Note     Today's date: 8/10/2023  Patient name: Kaylan Maloney  : 1945  MRN: [id number]  Referring provider: Mimi Burciaga PA-C  Dx:   Encounter Diagnosis     ICD-10-CM    1. S/P reverse total shoulder arthroplasty, left  Z96.612       2. Chronic left shoulder pain  M25.512     G89.29                      Subjective: Patient notes minor pain, intermittent twinges throughout the day. Objective: See treatment diary below    Assessment: Patient tolerated treatment well overall. Mild guarding with PROM, improved with verbal and tactile cueing. Patient exhibited good technique with therapeutic exercises and would benefit from continued PT. Plan: Continue per plan of care. Precautions: Mitral Valve disorder, HTN, Angina at rest, CA  Access Code: [email or web address]  URL: [email or web address]  Date: 2023  Prepared by: Delia Abbott  POC expires Auth Status Unit limit Start date  Expiration date PT/OT + Visit Limit? Copay/ Co- Insurance   23 approved N/A 8/1 10/15/23 12 $35/visit              Visit/Unit Tracking  AUTH Status:  Date 8/1 8/8 8/10            Approved Used 1 2 3             Remaining  11 10 9                Manuals 8/1 8/8 8/10          PROM (up to 6 weeks) Flexion 120, ABD 90, ER to 60, IR to 0 JF muscle guarding SC VC's to avoid Muscle gaurding LH                                                 Neuro Re-Ed   8/10          Scapular retractions  5" 3x 10  3x10, 5"          Bkwd shoulder rolls  NV 10x                                                                           Ther Ex   8/10          Pendulums Instructed 1 min ea.   1' ea           Gripper Instructed Green 5" 30x  Green, 30x5"          Elbow flex/ext Instructed 3x 10  3x10          Forearm Pro/sup Instructed 30x  30x          Left UT and LS stretches  30"x 3  3x30" ea                                                 Ther Activity                                       Gait Training Modalities

## 2023-08-12 ENCOUNTER — RA CDI HCC (OUTPATIENT)
Dept: OTHER | Facility: HOSPITAL | Age: 78
End: 2023-08-12

## 2023-08-12 NOTE — PROGRESS NOTES
[address] [facility] coding opportunities       Chart reviewed, no opportunity found: CHART REVIEWED, [place]    Patients Insurance     Medicare Insurance: [employer]

## 2023-08-15 ENCOUNTER — OFFICE VISIT (OUTPATIENT)
Dept: PHYSICAL THERAPY | Facility: CLINIC | Age: 78
End: 2023-08-15
Payer: COMMERCIAL

## 2023-08-15 DIAGNOSIS — Z96.612 S/P REVERSE TOTAL SHOULDER ARTHROPLASTY, LEFT: Primary | ICD-10-CM

## 2023-08-15 DIAGNOSIS — G89.29 CHRONIC LEFT SHOULDER PAIN: ICD-10-CM

## 2023-08-15 DIAGNOSIS — M25.512 CHRONIC LEFT SHOULDER PAIN: ICD-10-CM

## 2023-08-15 PROCEDURE — 97140 MANUAL THERAPY 1/> REGIONS: CPT | Performed by: PHYSICAL THERAPIST

## 2023-08-15 PROCEDURE — 97110 THERAPEUTIC EXERCISES: CPT | Performed by: PHYSICAL THERAPIST

## 2023-08-15 NOTE — PROGRESS NOTES
Daily Note     Today's date: 8/15/2023  Patient name: Enedelia Riley  : 1945  MRN: [id number]  Referring provider: Arturo Manning PA-C  Dx:   Encounter Diagnosis     ICD-10-CM    1. S/P reverse total shoulder arthroplasty, left  Z96.612       2. Chronic left shoulder pain  M25.512     G89.29           Start Time: 1208          Subjective: Patient slept on his shoulder last night and it was sore this AM.  In general, shoulder has been feeling good. Objective: See treatment diary below      Assessment: Tolerated treatment well. Patient would benefit from continued PT. Pain-free PROM as allowed by surgical protocol. Had slight discomfort posterior shoulder with gripper exercise. No complaints of pain after session. Plan: Continue per plan of care. Precautions: Mitral Valve disorder, HTN, Angina at rest, CA  Access Code: [email or web address]  URL: [email or web address]  Date: 2023  Prepared by: Shani Lizama  POC expires Auth Status Unit limit Start date  Expiration date PT/OT + Visit Limit? Copay/ Co- Insurance   23 approved N/A 8/1 10/15/23 12 $35/visit              Visit/Unit Tracking  AUTH Status:  Date 8/1 8/8 8/10 8/15           Approved Used 1 2 3 4            Remaining  11 10 9 8               Manuals 8/1 8/8 8/10 8/15         PROM (up to 6 weeks) Flexion 120, ABD 90, ER to 60, IR to 0 JF muscle guarding SC VC's to avoid Muscle gaurding  JF                                                Neuro Re-Ed   8/10          Scapular retractions  5" 3x 10  3x10, 5" 3x10, 5"         Bkwd shoulder rolls  NV 10x x30                                                                          Ther Ex   8/10          Pendulums Instructed 1 min ea.   1' ea  1'         Gripper Instructed Green 5" 30x  Green, 30x5" Green, 30x5"         Elbow flex/ext Instructed 3x 10  3x10 3x10         Forearm Pro/sup Instructed 30x  30x 30x         Left UT and LS stretches  30"x 3  3x30" ea 3x30" ea Ther Activity                                       Gait Training                                       Modalities

## 2023-08-17 ENCOUNTER — OFFICE VISIT (OUTPATIENT)
Dept: PHYSICAL THERAPY | Facility: CLINIC | Age: 78
End: 2023-08-17
Payer: COMMERCIAL

## 2023-08-17 ENCOUNTER — OFFICE VISIT (OUTPATIENT)
Age: 78
End: 2023-08-17
Payer: COMMERCIAL

## 2023-08-17 VITALS
OXYGEN SATURATION: 98 % | BODY MASS INDEX: 29.12 KG/M2 | HEART RATE: 67 BPM | RESPIRATION RATE: 18 BRPM | DIASTOLIC BLOOD PRESSURE: 54 MMHG | SYSTOLIC BLOOD PRESSURE: 120 MMHG | WEIGHT: 159.2 LBS | TEMPERATURE: 97.5 F

## 2023-08-17 DIAGNOSIS — E78.2 MIXED HYPERLIPIDEMIA: ICD-10-CM

## 2023-08-17 DIAGNOSIS — I10 ESSENTIAL HYPERTENSION: Primary | ICD-10-CM

## 2023-08-17 DIAGNOSIS — Z96.612 S/P REVERSE TOTAL SHOULDER ARTHROPLASTY, LEFT: ICD-10-CM

## 2023-08-17 DIAGNOSIS — M25.512 CHRONIC LEFT SHOULDER PAIN: ICD-10-CM

## 2023-08-17 DIAGNOSIS — Z96.612 S/P REVERSE TOTAL SHOULDER ARTHROPLASTY, LEFT: Primary | ICD-10-CM

## 2023-08-17 DIAGNOSIS — G89.29 CHRONIC LEFT SHOULDER PAIN: ICD-10-CM

## 2023-08-17 DIAGNOSIS — M12.812 ROTATOR CUFF ARTHROPATHY OF LEFT SHOULDER: ICD-10-CM

## 2023-08-17 PROCEDURE — 97140 MANUAL THERAPY 1/> REGIONS: CPT | Performed by: PHYSICAL THERAPIST

## 2023-08-17 PROCEDURE — 99214 OFFICE O/P EST MOD 30 MIN: CPT | Performed by: FAMILY MEDICINE

## 2023-08-17 PROCEDURE — 97110 THERAPEUTIC EXERCISES: CPT | Performed by: PHYSICAL THERAPIST

## 2023-08-17 NOTE — PROGRESS NOTES
Daily Note     Today's date: 2023  Patient name: Choco Navarro  : 1945  MRN: [id number]  Referring provider: Brittany Garcia PA-C  Dx:   Encounter Diagnosis     ICD-10-CM    1. S/P reverse total shoulder arthroplasty, left  Z96.612       2. Chronic left shoulder pain  M25.512     G89.29                      Subjective: No new complaints with shoulder. Doing better sleeping in bed. Objective: See treatment diary below      Assessment: Tolerated treatment well. Patient would benefit from continued PT. Empty end-feel with good ROM through allowable range. No complaints of pain. Plan: Continue per plan of care. Precautions: Mitral Valve disorder, HTN, Angina at rest, CA  Access Code: [email or web address]  URL: [email or web address]  Date: 2023  Prepared by: Mariia GRAY expires Auth Status Unit limit Start date  Expiration date PT/OT + Visit Limit? Copay/ Co- Insurance   23 approved N/A 8/1 10/15/23 12 $35/visit              Visit/Unit Tracking  AUTH Status:  Date 8/1 8/8 8/10 8/15           Approved Used 1 2 3 4            Remaining  11 10 9 8               Manuals 8/1 8/8 8/10 8/15 8/17        PROM (up to 6 weeks) Flexion 120, ABD 90, ER to 60, IR to 0 JF muscle guarding SC VC's to avoid Muscle gaurding LH JF Jf                                               Neuro Re-Ed   8/10          Scapular retractions  5" 3x 10  3x10, 5" 3x10, 5" 3x10, 5"        Bkwd shoulder rolls  NV 10x x30 x30                                                                         Ther Ex   8/10          Pendulums Instructed 1 min ea.   1' ea  1'         Gripper Instructed Green 5" 30x  Green, 30x5" Green, 30x5" Blue, 30x5"        Elbow flex/ext Instructed 3x 10  3x10 3x10 3x10        Forearm Pro/sup Instructed 30x  30x 30x 30x        Left UT and LS stretches  30"x 3  3x30" ea 3x30" ea 3x30" ea                                               Ther Activity Gait Training                                       Modalities

## 2023-08-17 NOTE — PATIENT INSTRUCTIONS
Preventing Falls: Exercises to Improve Balance, Flexibility, Strength, and Staying Power      Certain types of exercises may help make you less likely to fall. Try the ones below. Or do other exercises that your healthcare provider suggests. Depending on your health, you may need to start slowly. Don’t let that stop you. Even small amounts of exercise can help you. Be sure to talk to your healthcare provider before starting any exercise program.    Improve Balance  Many types of exercise can help improve balance. Tyree chi and yoga are good examples. Here’s another one to try. You can do it anytime and almost anywhere. · Stand next to a counter or solid support. · Push yourself up onto your tiptoes. · Hold for 5 seconds. If you start to lose your balance, hold on to the counter. · Rest and repeat 5 times. Work up to holding for 20 to 30 seconds, if you can. Increase Flexibility  Being more flexible makes it easier for you to move around safely. Try exercises like the seated hamstring stretch. · Sit in a chair and put one foot on a stool. · Straighten your leg and reach with both hands down either side of your leg. Reach as far down your leg as you can. · Hold for about 20 seconds. · Go back to the starting position. Then repeat 5 times. Switch legs. Build Strength  “Resistance” exercises help build strength. You can do them without equipment. Or you can use weights, elastic bands, or special machines. One such exercise is called the biceps curl. You can hold a 1 pound weight or even a can of soup. Do this exercise at least 3 times a week. Strive for every day. · Sit up straight in a chair. · Keep your elbow close to your body and your wrist straight. · Bend your arm, moving your hand up to your shoulder. Then slowly lower your arm. · Repeat 5 times. Switch to the other arm    Build Your Staying Power  “Aerobic” exercises make your heart and lungs stronger so you can keep moving longer.  Walking and swimming are two of the best types of exercises you can do. Using a stationary bike is great, too. Find an aerobic exercise that you enjoy. Start slowly and build up. Even 5 minutes is helpful. Aim for a goal of 30 minutes, at least 3 times a week. You don’t have to do 30 minutes in one session. Break it up and walk a little throughout the day. More Helpful Tips  · Start easy. Slowly work up to doing more. · Talk with your healthcare provider about the best exercises for you. · Call senior centers or health clubs about exercise programs. · If needed, have a family member watch you walk every so often to check your stability. · Exercise with a friend. Choose an activity you both enjoy. · Try exercises that you can do anytime, anywhere. Here are two examples. Have someone with you when you first try these:  · Practice walking by placing one foot right in front of the other. · Stand up and sit down 10 times. Repeat this throughout the day.

## 2023-08-17 NOTE — PROGRESS NOTES
Name: Tania Simons      : 1945      MRN: [id number]  Encounter Provider: Nicholas Snider DO  Encounter Date: 2023   Encounter department: [address]     1. Essential hypertension-BP normal with just amlodipine 10 mg daily. Patient to continue to hold lisinopril 40mg qd    2. Rotator cuff arthropathy of left shoulder  3. S/P reverse total shoulder arthroplasty, left-doing well since procedure. Associated bruising is resolving. Pain managed with Tylenol. Patient undergoing physical therapy. Patient encouraged to follow-up with surgeon as schedule           Subjective      Presents for follow-up. Status post cuff repair. Going to physical therapy twice weekly. Uses Tylenol only for pain control. Has been missing lisinopril dosing. still takes amilopine during the day. Today only took amilodipine. He is wondering if he even needs the ace     Review of Systems   Constitutional: Negative for fever. Respiratory: Negative for shortness of breath. Cardiovascular: Negative for chest pain. Gastrointestinal: Negative for constipation and diarrhea. Musculoskeletal: Positive for arthralgias. Negative for gait problem.        Current Outpatient Medications on File Prior to Visit   Medication Sig   • acetaminophen (TYLENOL) 500 mg tablet Take 1,000 mg by mouth every 6 (six) hours as needed for mild pain   • amLODIPine (NORVASC) 10 mg tablet Take 1 tablet (10 mg total) by mouth every morning   • Ascorbic Acid (vitamin C) 1000 MG tablet Take 1 tablet (1,000 mg total) by mouth daily   • aspirin (ECOTRIN LOW STRENGTH) 81 mg EC tablet Take 1 tablet (81 mg total) by mouth 2 (two) times a day   • celecoxib (CeleBREX) 100 mg capsule Take 1 capsule (100 mg total) by mouth 2 (two) times a day   • cetirizine (ZyrTEC) 10 MG chewable tablet Chew 10 mg if needed for allergies   • dicyclomine (BENTYL) 20 mg tablet Take 1 tablet (20 mg total) by mouth 2 (two) times a day (Patient not taking: Reported on 6/14/2023)   • docusate sodium (COLACE) 100 mg capsule Take 1 capsule (100 mg total) by mouth 2 (two) times a day   • doxepin (SINEquan) 10 mg capsule Take 1 capsule (10 mg total) by mouth daily at bedtime as needed for sleep   • fenofibrate (TRICOR) 145 mg tablet Take 1 tablet (145 mg total) by mouth daily   • folic acid (KP Folic Acid) 1 mg tablet Take 1 tablet (1 mg total) by mouth daily   • lisinopril (ZESTRIL) 40 mg tablet TAKE ONE TABLET BY MOUTH EVERY DAY   • loperamide (IMODIUM A-D) 2 MG tablet Take 1 tablet by mouth daily as needed   • lovastatin (MEVACOR) 40 MG tablet TAKE ONE TABLET BY MOUTH EVERY DAY   • Multiple Vitamin (multivitamin) tablet Take 1 tablet by mouth daily   • Multiple Vitamins-Minerals (CENTRUM ULTRA MENS PO) Take 1 tablet by mouth daily   • Multiple Vitamins-Minerals (ICAPS AREDS 2 PO) Take by mouth   • omeprazole (PriLOSEC) 20 mg delayed release capsule TAKE ONE CAPSULE BY MOUTH EVERY DAY   • oxyCODONE (ROXICODONE) 5 immediate release tablet Take 1 tablet (5 mg total) by mouth every 4 (four) hours as needed for moderate pain Max Daily Amount: 30 mg       Objective     /54 (BP Location: Left arm, Patient Position: Sitting, Cuff Size: Standard)   Pulse 67   Temp 97.5 °F (36.4 °C) (Temporal)   Resp 18   Wt 72.2 kg (159 lb 3.2 oz)   SpO2 98%   BMI 29.12 kg/m²     Physical Exam  HENT:      Head: Normocephalic. Cardiovascular:      Rate and Rhythm: Normal rate. Pulmonary:      Effort: Pulmonary effort is normal.   Musculoskeletal:         General: Deformity (brace over left upper arm ) present. Skin:     Findings: Bruising (under the left axilla ) present. Neurological:      Mental Status: He is alert and oriented to person, place, and time.             Mare Hernandez DO

## 2023-08-21 NOTE — PROGRESS NOTES
Daily Note     Today's date: 2023  Patient name: Francisco Schaeffer  : 1945  MRN: [id number]  Referring provider: Suszanne Dakins, PA-C  Dx:   Encounter Diagnosis     ICD-10-CM    1. S/P reverse total shoulder arthroplasty, left  Z96.612       2. Chronic left shoulder pain  M25.512     G89.29                      Subjective: Patient states his shoulder is feeling pretty good. No complaints of pain      Objective: See treatment diary below      Assessment: Tolerated treatment well. Patient would benefit from continued PT. Good ROM through allowable range. No complaints of pain. Progressing well. Plan: Continue per plan of care. Precautions: Mitral Valve disorder, HTN, Angina at rest, CA. Surgery . Progress to Phase 2 starting   Access Code: [email or web address]  URL: [email or web address]  Date: 2023  Prepared by: Jacy Almeida  POC expires Auth Status Unit limit Start date  Expiration date PT/OT + Visit Limit? Copay/ Co- Insurance   23 approved N/A 8/1 10/15/23 12 $35/visit              Visit/Unit Tracking  AUTH Status:  Date 8/1 8/8 8/10 8/15 8/22          Approved Used 1 2 3 4 5           Remaining  11 10 9 8 7              Manuals 8/1 8/8 8/10 8/15 8/17 8/22       PROM (up to 6 weeks) Flexion 120, ABD 90, ER to 60, IR to 0 JF muscle guarding SC VC's to avoid Muscle gaurding LH JF Jf JF                                              Neuro Re-Ed   8/10          Scapular retractions  5" 3x 10  3x10, 5" 3x10, 5" 3x10, 5" 3x10, 5"       Bkwd shoulder rolls  NV 10x x30 x30 x30                                                                        Ther Ex   8/10          Pendulums Instructed 1 min ea.   1' ea  1'         Gripper Instructed Green 5" 30x  Green, 30x5" Green, 30x5" Blue, 30x5" Blue, 30x5"       Elbow flex/ext Instructed 3x 10  3x10 3x10 3x10 3x10       Forearm Pro/sup Instructed 30x  30x 30x 30x 30x       Left UT and LS stretches  30"x 3  3x30" ea 3x30" ea 3x30" ea 3x30" ea                                              Ther Activity                                       Gait Training                                       Modalities

## 2023-08-22 ENCOUNTER — OFFICE VISIT (OUTPATIENT)
Dept: PHYSICAL THERAPY | Facility: CLINIC | Age: 78
End: 2023-08-22
Payer: COMMERCIAL

## 2023-08-22 ENCOUNTER — CLINICAL SUPPORT (OUTPATIENT)
Age: 78
End: 2023-08-22

## 2023-08-22 VITALS — SYSTOLIC BLOOD PRESSURE: 122 MMHG | DIASTOLIC BLOOD PRESSURE: 60 MMHG

## 2023-08-22 DIAGNOSIS — I10 HYPERTENSION, UNSPECIFIED TYPE: Primary | ICD-10-CM

## 2023-08-22 DIAGNOSIS — G89.29 CHRONIC LEFT SHOULDER PAIN: ICD-10-CM

## 2023-08-22 DIAGNOSIS — M25.512 CHRONIC LEFT SHOULDER PAIN: ICD-10-CM

## 2023-08-22 DIAGNOSIS — Z96.612 S/P REVERSE TOTAL SHOULDER ARTHROPLASTY, LEFT: Primary | ICD-10-CM

## 2023-08-22 PROCEDURE — 97140 MANUAL THERAPY 1/> REGIONS: CPT | Performed by: PHYSICAL THERAPIST

## 2023-08-22 PROCEDURE — 97110 THERAPEUTIC EXERCISES: CPT | Performed by: PHYSICAL THERAPIST

## 2023-08-24 ENCOUNTER — OFFICE VISIT (OUTPATIENT)
Dept: PHYSICAL THERAPY | Facility: CLINIC | Age: 78
End: 2023-08-24
Payer: COMMERCIAL

## 2023-08-24 DIAGNOSIS — G89.29 CHRONIC LEFT SHOULDER PAIN: ICD-10-CM

## 2023-08-24 DIAGNOSIS — Z96.612 S/P REVERSE TOTAL SHOULDER ARTHROPLASTY, LEFT: Primary | ICD-10-CM

## 2023-08-24 DIAGNOSIS — M25.512 CHRONIC LEFT SHOULDER PAIN: ICD-10-CM

## 2023-08-24 PROCEDURE — 97110 THERAPEUTIC EXERCISES: CPT | Performed by: PHYSICAL THERAPIST

## 2023-08-24 PROCEDURE — 97140 MANUAL THERAPY 1/> REGIONS: CPT | Performed by: PHYSICAL THERAPIST

## 2023-08-24 NOTE — PROGRESS NOTES
Daily Note     Today's date: 2023  Patient name: Yoly Hurtado  : 1945  MRN: [id number]  Referring provider: Molly Barrios PA-C  Dx:   Encounter Diagnosis     ICD-10-CM    1. S/P reverse total shoulder arthroplasty, left  Z96.612       2. Chronic left shoulder pain  M25.512     G89.29                      Subjective: Patient states he sometimes reaches for small things with his left UE. Objective: See treatment diary below      Assessment: Tolerated treatment well. Patient demonstrated fatigue post treatment and would benefit from continued PT. Advised patient he is not to be reaching for things with his left UE at this time. Increased resistance with bicep curls and forearm pro/supination today. Patient did well. .      Plan: Continue per plan of care. Precautions: Mitral Valve disorder, HTN, Angina at rest, CA. Surgery . Progress to Phase 2 starting   Access Code: [email or web address]  URL: [email or web address]  Date: 2023  Prepared by: Zunilda Chavis  POC expires Auth Status Unit limit Start date  Expiration date PT/OT + Visit Limit? Copay/ Co- Insurance   23 approved N/A 8/1 10/15/23 12 $35/visit              Visit/Unit Tracking  AUTH Status:  Date 8/1 8/8 8/10 8/15 8/22          Approved Used 1 2 3 4 5           Remaining  11 10 9 8 7              Manuals 8/1 8/8 8/10 8/15 8/17 8/22       PROM (up to 6 weeks) Flexion 120, ABD 90, ER to 60, IR to 0 JF muscle guarding SC VC's to avoid Muscle gaurding  JF Jf JF                                              Neuro Re-Ed   8/10          Scapular retractions  5" 3x 10  3x10, 5" 3x10, 5" 3x10, 5" 3x10, 5"       Bkwd shoulder rolls  NV 10x x30 x30 x30                                                                        Ther Ex   8/10          Pendulums Instructed 1 min ea.   1' ea  1'         Gripper Instructed Green 5" 30x  Green, 30x5" Green, 30x5" Blue, 30x5" Blue, 30x5"       Elbow flex/ext Instructed 3x 10  3x10 3x10 3x10 3x10       Forearm Pro/sup Instructed 30x  30x 30x 30x 30x       Left UT and LS stretches  30"x 3  3x30" ea 3x30" ea 3x30" ea 3x30" ea                                              Ther Activity                                       Gait Training                                       Modalities

## 2023-08-28 ENCOUNTER — TELEPHONE (OUTPATIENT)
Age: 78
End: 2023-08-28

## 2023-08-28 DIAGNOSIS — R30.0 DYSURIA: Primary | ICD-10-CM

## 2023-08-28 NOTE — TELEPHONE ENCOUNTER
Pt notified stated that he doesn't have a ride to have the UA done today   Will have it done tomorrow.

## 2023-08-28 NOTE — TELEPHONE ENCOUNTER
Needs an UA first. He can come to the lab and do the study today. Doesn't need appointment as long as the urine study is completed.

## 2023-08-29 ENCOUNTER — APPOINTMENT (OUTPATIENT)
Dept: LAB | Facility: CLINIC | Age: 78
End: 2023-08-29
Payer: COMMERCIAL

## 2023-08-29 ENCOUNTER — OFFICE VISIT (OUTPATIENT)
Dept: PHYSICAL THERAPY | Facility: CLINIC | Age: 78
End: 2023-08-29
Payer: COMMERCIAL

## 2023-08-29 DIAGNOSIS — R30.0 DYSURIA: ICD-10-CM

## 2023-08-29 DIAGNOSIS — M25.512 CHRONIC LEFT SHOULDER PAIN: ICD-10-CM

## 2023-08-29 DIAGNOSIS — G89.29 CHRONIC LEFT SHOULDER PAIN: ICD-10-CM

## 2023-08-29 DIAGNOSIS — Z96.612 S/P REVERSE TOTAL SHOULDER ARTHROPLASTY, LEFT: Primary | ICD-10-CM

## 2023-08-29 LAB
BACTERIA UR QL AUTO: ABNORMAL /HPF
BILIRUB UR QL STRIP: NEGATIVE
CLARITY UR: ABNORMAL
COLOR UR: ABNORMAL
GLUCOSE UR STRIP-MCNC: NEGATIVE MG/DL
HGB UR QL STRIP.AUTO: ABNORMAL
KETONES UR STRIP-MCNC: NEGATIVE MG/DL
LEUKOCYTE ESTERASE UR QL STRIP: ABNORMAL
MUCOUS THREADS UR QL AUTO: ABNORMAL
NITRITE UR QL STRIP: NEGATIVE
NON-SQ EPI CELLS URNS QL MICRO: ABNORMAL /HPF
PH UR STRIP.AUTO: 6 [PH]
PROT UR STRIP-MCNC: ABNORMAL MG/DL
RBC #/AREA URNS AUTO: ABNORMAL /HPF
SP GR UR STRIP.AUTO: 1.02 (ref 1–1.03)
TRANS CELLS #/AREA URNS HPF: PRESENT /[HPF]
UROBILINOGEN UR STRIP-ACNC: <2 MG/DL
WBC #/AREA URNS AUTO: ABNORMAL /HPF

## 2023-08-29 PROCEDURE — 87186 SC STD MICRODIL/AGAR DIL: CPT

## 2023-08-29 PROCEDURE — 87077 CULTURE AEROBIC IDENTIFY: CPT

## 2023-08-29 PROCEDURE — 97140 MANUAL THERAPY 1/> REGIONS: CPT

## 2023-08-29 PROCEDURE — 87086 URINE CULTURE/COLONY COUNT: CPT

## 2023-08-29 PROCEDURE — 81001 URINALYSIS AUTO W/SCOPE: CPT

## 2023-08-29 PROCEDURE — 97110 THERAPEUTIC EXERCISES: CPT

## 2023-08-29 NOTE — PROGRESS NOTES
Daily Note     Today's date: 2023  Patient name: Ida Land  : 1945  MRN: [id number]  Referring provider: River Van PA-C  Dx:   Encounter Diagnosis     ICD-10-CM    1. S/P reverse total shoulder arthroplasty, left  Z96.612       2. Chronic left shoulder pain  M25.512     G89.29                      Subjective: Patient reports that he takes his sling off at times when he's home to get a break from the sling, however he always wears his sling when he's outside of his house. Objective: See treatment diary below      Assessment: Tolerated treatment well. Muscle guarding present during shoulder PROM, requiring cues to avoid to allow for shoulder PROM. Advised patient again on current restrictions including no active shoulder movements to ensure appropriate healing post operatively. Patient exhibited good technique with therapeutic exercises and would benefit from continued PT      Plan: Continue per plan of care. Precautions: Mitral Valve disorder, HTN, Angina at rest, CA. Surgery . Progress to Phase 2 starting   Access Code: [email or web address]  URL: [email or web address]  Date: 2023  Prepared by: Elly Christy  POC expires Auth Status Unit limit Start date  Expiration date PT/OT + Visit Limit?  Copay/ Co- Insurance   23 approved N/A 8/1 10/15/23 12 $35/visit              Visit/Unit Tracking  AUTH Status:  Date 8/1 8/8 8/10 8/15 8/22 8/29         Approved Used 1 2 3 4 5 6          Remaining  11 10 9 8 7 6             Manuals 8/1 8/8 8/10 8/15 8/17 8/22 8/29      PROM (up to 6 weeks) Flexion 120, ABD 90, ER to 60, IR to 0 JF muscle guarding SC VC's to avoid Muscle gaurding LH JF Jf JF BE                                             Neuro Re-Ed   8/10          Scapular retractions  5" 3x 10  3x10, 5" 3x10, 5" 3x10, 5" 3x10, 5" 5" x30      Bkwd shoulder rolls  NV 10x x30 x30 x30 x30                                                                       Ther Ex   8/10 Pendulums Instructed 1 min ea.   1' ea  1'         Gripper Instructed Green 5" 30x  Green, 30x5" Green, 30x5" Blue, 30x5" Blue, 30x5" Blue, 30x5"      Elbow flex/ext Instructed 3x 10  3x10 3x10 3x10 3x10 2# x30      Forearm Pro/sup Instructed 30x  30x 30x 30x 30x 2# x30      Left UT and LS stretches  30"x 3  3x30" ea 3x30" ea 3x30" ea 3x30" ea 3x30"                                             Ther Activity                                       Gait Training                                       Modalities

## 2023-08-30 NOTE — TELEPHONE ENCOUNTER
Patient had urinalysis done   Waiting to hear results and what he should do.   Make an appt with urologist?    Please Advise: [phone]

## 2023-08-31 ENCOUNTER — APPOINTMENT (OUTPATIENT)
Dept: RADIOLOGY | Facility: CLINIC | Age: 78
End: 2023-08-31
Payer: COMMERCIAL

## 2023-08-31 ENCOUNTER — EVALUATION (OUTPATIENT)
Dept: PHYSICAL THERAPY | Facility: CLINIC | Age: 78
End: 2023-08-31
Payer: COMMERCIAL

## 2023-08-31 ENCOUNTER — TELEPHONE (OUTPATIENT)
Age: 78
End: 2023-08-31

## 2023-08-31 ENCOUNTER — OFFICE VISIT (OUTPATIENT)
Dept: OBGYN CLINIC | Facility: CLINIC | Age: 78
End: 2023-08-31

## 2023-08-31 VITALS
HEART RATE: 82 BPM | WEIGHT: 159 LBS | DIASTOLIC BLOOD PRESSURE: 69 MMHG | BODY MASS INDEX: 29.26 KG/M2 | SYSTOLIC BLOOD PRESSURE: 137 MMHG | HEIGHT: 62 IN

## 2023-08-31 DIAGNOSIS — Z96.612 S/P REVERSE TOTAL SHOULDER ARTHROPLASTY, LEFT: ICD-10-CM

## 2023-08-31 DIAGNOSIS — M25.512 CHRONIC LEFT SHOULDER PAIN: ICD-10-CM

## 2023-08-31 DIAGNOSIS — Z96.612 S/P REVERSE TOTAL SHOULDER ARTHROPLASTY, LEFT: Primary | ICD-10-CM

## 2023-08-31 DIAGNOSIS — G89.29 CHRONIC LEFT SHOULDER PAIN: ICD-10-CM

## 2023-08-31 DIAGNOSIS — N30.90 CYSTITIS: Primary | ICD-10-CM

## 2023-08-31 LAB — BACTERIA UR CULT: ABNORMAL

## 2023-08-31 PROCEDURE — 97110 THERAPEUTIC EXERCISES: CPT | Performed by: PHYSICAL THERAPIST

## 2023-08-31 PROCEDURE — 97140 MANUAL THERAPY 1/> REGIONS: CPT | Performed by: PHYSICAL THERAPIST

## 2023-08-31 PROCEDURE — 99024 POSTOP FOLLOW-UP VISIT: CPT | Performed by: ORTHOPAEDIC SURGERY

## 2023-08-31 PROCEDURE — 73030 X-RAY EXAM OF SHOULDER: CPT

## 2023-08-31 RX ORDER — CIPROFLOXACIN 500 MG/1
500 TABLET, FILM COATED ORAL EVERY 24 HOURS
Qty: 5 TABLET | Refills: 0 | Status: SHIPPED | OUTPATIENT
Start: 2023-08-31 | End: 2023-09-05

## 2023-08-31 NOTE — PROGRESS NOTES
PT Re-Evaluation     Today's date: 2023  Patient name: Marleny Ceballos  : 1945  MRN: [id number]  Referring provider: Jourdan Lan PA-C  Dx:   Encounter Diagnosis     ICD-10-CM    1. S/P reverse total shoulder arthroplasty, left  Z96.612       2. Chronic left shoulder pain  M25.512     G89.29                      Assessment  Assessment details: Patient reports feeling 65% improved since starting PT services. He has no pain in his shoulder at rest, but does have discomfort with end-range PROM or when he actively moves his arm. Performs HEP regularly. Objectively, PROM has progressed very nicely and is now at ROM allowed per  Surgical protocol. Elbow and forearm ROM is now WNL. Patient is sleeping better at night and has no disturbances related to pain. Allowed to progress ROM to tolerance and initiate AROM of the shoulder next week. Patient will benefit from continuation of PT services in order to restore AROM shoulder WFL and improve strength to promote a return to his prior level of function. Impairments: abnormal or restricted ROM, activity intolerance, impaired physical strength, lacks appropriate home exercise program, pain with function, scapular dyskinesis and weight-bearing intolerance  Functional limitations: Left UE in sling. Relies on right UE for all ADLs. Understanding of Dx/Px/POC: good   Prognosis: good    Goals  STG (4 weeks)  1. Patient will demonstrate the ability to correct posture with minimal VC's - met  2. Patient will demonstrate 25% gains in shoulder ROM in all deficient planes - met  3. Patient will report pain as a 4-5/10 at worst with all normal activities - met  4. Patient will report 50% reduction in sleep disturbances related to pain - met  LTG (8 weeks)  1. Patient will report pain as a 0-1/10 at worst with normal activities - not met  2. Patient will sleep through the night without disturbances related to pain - met  3.  Patient will demonstrate shoulder ROM WNL to facilitate improved function with overhead activities - not met  4. Patient will demonstrate shoulder and scapuler strength 4/5 to improve posture and restore normal joint kinematics - not met  5. Patient will be independent and compliant with a HEP in order to maintain gains made with skilled PT services. - not met      Plan  Patient would benefit from: skilled physical therapy  Planned modality interventions: cryotherapy  Planned therapy interventions: joint mobilization, IASTM, manual therapy, neuromuscular re-education, patient education, strengthening, stretching, therapeutic activities, therapeutic exercise and home exercise program  Frequency: 2x week  Duration in weeks: 12  Plan of Care beginning date: 2023  Plan of Care expiration date: 2023  Treatment plan discussed with: patient        Subjective Evaluation    History of Present Illness  Date of surgery: 2023  Mechanism of injury: Patient reports feeling 65% improved since starting PT services. He notices improvement in shoulder ROM and strength. He has begun lifting his arm a little at home to open at door and to reach to shoulder height. (-) sleep disturbances related to to shoulder pain. Sleeping in bed. Quality of life: fair    Patient Goals  Patient goals for therapy: increased strength, decreased pain, increased motion and independence with ADLs/IADLs    Pain  Current pain ratin  At best pain ratin  At worst pain ratin  Location: Left shoulder (diffuse)  Quality: pulling  Relieving factors: medications (Tylenol)  Exacerbated by: changing clothes. Dressing upper body. Progression: improved    Social Support  Lives with: spouse and adult children    Employment status: not working (retired)    Diagnostic Tests  X-ray: abnormal    FCE comments: Daughter does yardwork, cooking and cleaning. Has started reheating meals in the oven and air fryer. Treatments  Previous treatment: physical therapy, medication and injection treatment        Objective     Passive Range of Motion   Left Shoulder   Flexion: 120 degrees with pain  Abduction: 90 degrees   External rotation 45°: 60 degrees     Additional Passive Range of Motion Details  AROM elbow extension WFL  PROM elbow extension WFL  Forearm and wrist AROM WFL             Precautions: Mitral Valve disorder, HTN, Angina at rest, CA. Surgery 7/19. Progress to Phase 2 starting 9/6  Access Code: [email or web address]  URL: [email or web address]  Date: 08/01/2023  Prepared by: Carlota Colindres  POC expires Auth Status Unit limit Start date  Expiration date PT/OT + Visit Limit? Copay/ Co- Insurance   11/21/23 approved N/A 8/1 10/15/23 12 $35/visit              Visit/Unit Tracking  AUTH Status:  Date 8/1 8/8 8/10 8/15 8/17 8/22 8/29 8/31      Approved Used 1 2 3 4 5 6 7 8       Remaining  11 10 9 8 7 6 5 4            Manuals 8/1 8/8 8/10 8/15 8/17 8/22 8/29 8/31     PROM (up to 6 weeks) Flexion 120, ABD 90, ER to 60, IR to 0 JF muscle guarding SC VC's to avoid Muscle gaurding LH JF Jf JF BE JF     PROM to tolerance        NV                               Neuro Re-Ed   8/10          Scapular retractions  5" 3x 10  3x10, 5" 3x10, 5" 3x10, 5" 3x10, 5" 5" x30 5" x30     Bkwd shoulder rolls  NV 10x x30 x30 x30 x30 x30                                                                      Ther Ex   8/10          Pulleys flexion and scaption        NV     Shoulder isometrics ABD, IR, ER        NV     Supine shoulder flexion        NV     Table slides flexion and Scaption        NV     Pendulums Instructed 1 min ea.   1' ea  1'         Gripper Instructed Green 5" 30x  Green, 30x5" Green, 30x5" Blue, 30x5" Blue, 30x5" Blue, 30x5"  DC    Elbow flex/ext Instructed 3x 10  3x10 3x10 3x10 3x10 2# x30 2# x30 DC    Forearm Pro/sup Instructed 30x  30x 30x 30x 30x 2# x30 2# x30 DC    Left UT and LS stretches  30"x 3  3x30" ea 3x30" ea 3x30" ea 3x30" ea 3x30" 3x30" DC Ther Activity                                       Gait Training                                       Modalities

## 2023-08-31 NOTE — TELEPHONE ENCOUNTER
----- Message from Shae Castro DO sent at 8/31/2023  8:14 AM EDT -----  Please notify pt that yo are growing a particular bacteria in the urine.    I will send an antibiotic today

## 2023-08-31 NOTE — TELEPHONE ENCOUNTER
----- Message from Nisha Stone DO sent at 8/31/2023  8:14 AM EDT -----  Please notify pt that yo are growing a particular bacteria in the urine.    I will send an antibiotic today

## 2023-08-31 NOTE — PROGRESS NOTES
Patient Name:  June Lozano  MRN:  [id number]    [id number] [place] [id number]. S/P reverse total shoulder arthroplasty, left  -     XR shoulder 2+ vw left; Future; Expected date: 08/31/2023        Approximately 6 week s/p Left reverse shoulder arthroplasty performed on 07/19/2023  · X-rays reviewed in office with patient   · At this time, patient may discontinue the sling. · Continue outpatient PT and progression per post op protocol including AAROM and AROM. Can continue with pulleys to work on stretching. · Continue OTC medications for pain relief  · Follow up in 6 weeks for reevaluation and new Left shoulder x-rays upon arrival     History of the Present Illness   June Lozano is a 78 y.o. male approximately 6 week s/p Left reverse shoulder arthroplasty performed on 07/19/2023. Today, patient reports he is doing well s/p surgical intervention. He admits he is attending outpatient PT and is having some pain with passive motion. He was using the sling when out of the home. He wants to know if he can have something for pain when performing more therapy. Overall, he feels much better than compared to before surgery. Review of Systems     Review of Systems   Constitutional: Negative for chills and fever. HENT: Negative for ear pain and sore throat. Eyes: Negative for pain and visual disturbance. Respiratory: Negative for cough and shortness of breath. Cardiovascular: Negative for chest pain and palpitations. Gastrointestinal: Negative for abdominal pain and vomiting. Genitourinary: Negative for dysuria and hematuria. Musculoskeletal: Negative for arthralgias and back pain. Skin: Negative for color change and rash. Neurological: Negative for seizures and syncope. All other systems reviewed and are negative.       Physical Exam     /69   Pulse 82   Ht 5' 2" (1.575 m)   Wt 72.1 kg (159 lb)   BMI 29.08 kg/m²     Left Shoulder:   Surgical incision well healing   Active range of motion   70 degrees forward flexion  60 degrees abduction  30 degrees external rotation   Passive range of motion   100 degrees of forward flexion   The patient is neurovascularly intact distally in the extremity. Data Review     I have personally reviewed pertinent films in PACS, and my interpretation follows. X-rays taken today 2023 of Left shoulder independently reviewed and demonstrate maintained reverse shoulder prosthesis in appropriate alignment. No acute fracture, dislocation, loosening or lucency.      Social History     Tobacco Use   • Smoking status: Former     Packs/day: 1.00     Years: 18.00     Total pack years: 18.00     Types: Cigarettes     Start date:      Quit date:      Years since quittin.6   • Smokeless tobacco: Never   Vaping Use   • Vaping Use: Never used   Substance Use Topics   • Alcohol use: Never   • Drug use: Never           Procedures  None     Vangie Langley PA-C

## 2023-09-05 ENCOUNTER — OFFICE VISIT (OUTPATIENT)
Dept: PHYSICAL THERAPY | Facility: CLINIC | Age: 78
End: 2023-09-05
Payer: COMMERCIAL

## 2023-09-05 DIAGNOSIS — Z96.612 S/P REVERSE TOTAL SHOULDER ARTHROPLASTY, LEFT: Primary | ICD-10-CM

## 2023-09-05 DIAGNOSIS — M25.512 CHRONIC LEFT SHOULDER PAIN: ICD-10-CM

## 2023-09-05 DIAGNOSIS — G89.29 CHRONIC LEFT SHOULDER PAIN: ICD-10-CM

## 2023-09-05 PROCEDURE — 97140 MANUAL THERAPY 1/> REGIONS: CPT

## 2023-09-05 PROCEDURE — 97112 NEUROMUSCULAR REEDUCATION: CPT

## 2023-09-05 PROCEDURE — 97110 THERAPEUTIC EXERCISES: CPT

## 2023-09-05 NOTE — PROGRESS NOTES
Daily Note     Today's date: 2023  Patient name: Bria Patel  : 1945  MRN: [id number]  Referring provider: Marcelina Meyers PA-C  Dx:   Encounter Diagnosis     ICD-10-CM    1. S/P reverse total shoulder arthroplasty, left  Z96.612       2. Chronic left shoulder pain  M25.512     G89.29           Start Time: 1027  Stop Time: 1105  Total time in clinic (min): 38 minutes    Subjective:   Pt noted having minor aches and pains in his L shoulder but nothing out to the ordinary. Objective: See treatment diary below      Assessment: Pt is currently 6 weeks and 6 days OOS. Continued with treatment session within POC as well as protocol and MD restrictions. As of last ortho note patient is allowed to progress to pulleys as well as AROM/AAROM or L shoulder. Held on deltopid  isometrics until patient is 7 weeks post op. Tolerated treatment well. No p! Noted during and after new progressions. Patient demonstrated fatigue post treatment, exhibited good technique with therapeutic exercises and would benefit from continued PT. Education reviewed with patient as well as verba agreement and understanding.   - HEP - advised to complete on a daily basis. - DOMS - 24 to 48 hours of muscle soreness may be noted   - Modalities such as CP to decrease p! Swelling as well as any muscle soreness discomfort. 10 - 20 min on and 60 min off prior to additional application. Plan: Continue per plan of care. Precautions: Mitral Valve disorder, HTN, Angina at rest, CA. Surgery . Progress to Phase 2 starting   Access Code: [email or web address]  URL: [email or web address]  Date: 2023  Prepared by: Mee Fothergill  POC expires Auth Status Unit limit Start date  Expiration date PT/OT + Visit Limit?  Copay/ Co- Insurance   23 approved N/A 8/1 10/15/23 12 $35/visit              Visit/Unit Tracking  AUTH Status:  Date 8/1 8/8 8/10 8/15 8/17 8/22 8/29 8/31 9/5     Approved Used 1 2 3 4 5 6 7 8 9 Remaining  11 10 9 8 7 6 5 4 3           Manuals 8/1 8/8 8/10 8/15 8/17 8/22 8/29 8/31 9/5    PROM (up to 6 weeks) Flexion 120, ABD 90, ER to 60, IR to 0 JF muscle guarding SC VC's to avoid Muscle gaurding LH JF Jf JF BE JF     PROM to tolerance        NV SC                              Neuro Re-Ed   8/10          Scapular retractions  5" 3x 10  3x10, 5" 3x10, 5" 3x10, 5" 3x10, 5" 5" x30 5" x30 5 3x 10     Bkwd shoulder rolls  NV 10x x30 x30 x30 x30 x30 30x                                                                      Ther Ex   8/10          Pulleys flexion and scaption        NV 5 min     Shoulder isometrics ABD, IR, ER        NV NV    Supine shoulder flexion        NV W/ cane 10x 5"  W/o cane NV   Table slides flexion and Scaption        NV 5" 2x 10     Pendulums Instructed 1 min ea.   1' ea  1'         Gripper Instructed Green 5" 30x  Green, 30x5" Green, 30x5" Blue, 30x5" Blue, 30x5" Blue, 30x5"  DC    Elbow flex/ext Instructed 3x 10  3x10 3x10 3x10 3x10 2# x30 2# x30 DC    Forearm Pro/sup Instructed 30x  30x 30x 30x 30x 2# x30 2# x30 DC    Left UT and LS stretches  30"x 3  3x30" ea 3x30" ea 3x30" ea 3x30" ea 3x30" 3x30" DC                                           Ther Activity                                       Gait Training                                       Modalities

## 2023-09-07 ENCOUNTER — OFFICE VISIT (OUTPATIENT)
Dept: PHYSICAL THERAPY | Facility: CLINIC | Age: 78
End: 2023-09-07
Payer: COMMERCIAL

## 2023-09-07 DIAGNOSIS — Z96.612 S/P REVERSE TOTAL SHOULDER ARTHROPLASTY, LEFT: Primary | ICD-10-CM

## 2023-09-07 DIAGNOSIS — M25.512 CHRONIC LEFT SHOULDER PAIN: ICD-10-CM

## 2023-09-07 DIAGNOSIS — G89.29 CHRONIC LEFT SHOULDER PAIN: ICD-10-CM

## 2023-09-07 PROCEDURE — 97110 THERAPEUTIC EXERCISES: CPT

## 2023-09-07 PROCEDURE — 97140 MANUAL THERAPY 1/> REGIONS: CPT

## 2023-09-07 NOTE — PROGRESS NOTES
Daily Note     Today's date: 2023  Patient name: Yolanda Dolan  : 1945  MRN: [id number]  Referring provider: Quincy Whitney PA-C  Dx:   Encounter Diagnosis     ICD-10-CM    1. S/P reverse total shoulder arthroplasty, left  Z96.612       2. Chronic left shoulder pain  M25.512     G89.29           Start Time: 1200  Stop Time: 1243  Total time in clinic (min): 43 minutes    Subjective: Pt noted some ache in his L shouder. Noted no muscle soreness s/p last PT session with progressions. Objective: See treatment diary below      Assessment: Continued with treatment session for left shoulder. Pt is OOS for 7 weeks and 1 day as of today. Tolerated treatment well. Added some submax isometrics this visit for deltoid and IR & ER. No pain noted with any additional exercises completed this visit. Patient demonstrated fatigue post treatment, exhibited good technique with therapeutic exercises and would benefit from continued PT. PROM making some improvements with no pain reported. Education reviewed with patient as well as verba agreement and understanding.   - HEP - advised to complete on a daily basis. - DOMS - 24 to 48 hours of muscle soreness may be noted   - Modalities such as CP to decrease p! Swelling as well as any muscle soreness discomfort. 10 - 20 min on and 60 min off prior to additional application. Plan: Continue per plan of care. Precautions: Mitral Valve disorder, HTN, Angina at rest, CA. Surgery . Progress to Phase 2 starting   Access Code: [email or web address]  URL: [email or web address]  Date: 2023  Prepared by: Natalee Teague  POC expires Auth Status Unit limit Start date  Expiration date PT/OT + Visit Limit?  Copay/ Co- Insurance   23 approved N/A 8/1 10/15/23 12 $35/visit              Visit/Unit Tracking  AUTH Status:  Date 8/1 8/8 8/10 8/15 8/17 8/22 8/29 8/31 9 9/    Approved Used 1 2 3 4 5 6 7 8 9 10     Remaining  11 10 9 8 7 6 5 4 3 2 Manuals 8/1 8/8 8/10 8/15 8/17 8/22 8/29 8/31 9/5 9/7   PROM (up to 6 weeks) Flexion 120, ABD 90, ER to 60, IR to 0 JF muscle guarding SC VC's to avoid Muscle gaurding LH JF Jf JF BE JF     PROM to tolerance        NV SC SC                             Neuro Re-Ed   8/10          Scapular retractions  5" 3x 10  3x10, 5" 3x10, 5" 3x10, 5" 3x10, 5" 5" x30 5" x30 5 3x 10     Bkwd shoulder rolls  NV 10x x30 x30 x30 x30 x30 30x                                                                      Ther Ex   8/10          Pulleys flexion and scaption        NV 5 min  5 min    Shoulder isometrics ABD, IR, ER        NV NV    Supine shoulder flexion        NV W/ cane 10x 5"  W/o cane 2x 10    Table slides flexion and Scaption        NV 5" 2x 10  5" 30x    Pendulums Instructed 1 min ea. 1' ea  1'      D/C   Gripper Instructed Green 5" 30x  Green, 30x5" Green, 30x5" Blue, 30x5" Blue, 30x5" Blue, 30x5"  DC    Elbow flex/ext Instructed 3x 10  3x10 3x10 3x10 3x10 2# x30 2# x30 DC    Forearm Pro/sup Instructed 30x  30x 30x 30x 30x 2# x30 2# x30 DC    Left UT and LS stretches  30"x 3  3x30" ea 3x30" ea 3x30" ea 3x30" ea 3x30" 3x30" DC                                           Ther Activity                                       Gait Training                                       Modalities                                              1 on 1 time for 24 minutes on 9/7/23.

## 2023-09-11 NOTE — PROGRESS NOTES
Daily Note     Today's date: 2023  Patient name: Fiorella Fu  : 1945  MRN: [id number]  Referring provider: Annetta Hutchins PA-C  Dx:   Encounter Diagnosis     ICD-10-CM    1. S/P reverse total shoulder arthroplasty, left  Z96.612       2. Chronic left shoulder pain  M25.512     G89.29                      Subjective: Patient states he has an ache when his arm is hanging by his side. He states he has reached behind him a few times to grab something with his left UE. Objective: See treatment diary below      Assessment: Tolerated treatment well. Patient would benefit from continued PT . Patient reported some pain in shoulder with scapular retractions. Pain in posterior shoulder. ROM progressing very nicely. Plan: Continue per plan of care. Precautions: Mitral Valve disorder, HTN, Angina at rest, CA. Surgery . Progress to Phase 2 starting   Access Code: [email or web address]  URL: [email or web address]  Date: 2023  Prepared by: Socrates GRAY expires Auth Status Unit limit Start date  Expiration date PT/OT + Visit Limit?  Copay/ Co- Insurance   23 approved N/A 8/1 10/15/23 12 $35/visit              Visit/Unit Tracking  AUTH Status:  Date 8/1 8/8 8/10 8/15 8/17 8/22 8/29 8/31 9/5 9/7 9/12   Approved Used 1 2 3 4 5 6 7 8 9 10 11    Remaining  11 10 9 8 7 6 5 4 3 2 1         Manuals 9/12 8/8 8/10 8/15 8/17 8/22 8/29 8/31 9/5 9/7   PROM to tolerance JF       NV SC SC                             Neuro Re-Ed   8/10          Scapular retractions 5" 3x 10  5" 3x 10  3x10, 5" 3x10, 5" 3x10, 5" 3x10, 5" 5" x30 5" x30 5 3x 10     Bkwd shoulder rolls 30x NV 10x x30 x30 x30 x30 x30 30x                                                                      Ther Ex   8/10          Pulleys flexion and scaption 5 min        NV 5 min  5 min    Shoulder isometrics ABD, IR, ER 10"x10 ea       NV NV    Supine shoulder flexion 2x10 w/o cane       NV W/ cane 10x 5"  W/o cane 2x 10 Table slides flexion and Scaption 5" 30x       NV 5" 2x 10  5" 30x                                           Ther Activity                                       Gait Training                                       Modalities

## 2023-09-12 ENCOUNTER — OFFICE VISIT (OUTPATIENT)
Dept: PHYSICAL THERAPY | Facility: CLINIC | Age: 78
End: 2023-09-12
Payer: COMMERCIAL

## 2023-09-12 DIAGNOSIS — M25.512 CHRONIC LEFT SHOULDER PAIN: ICD-10-CM

## 2023-09-12 DIAGNOSIS — Z96.612 S/P REVERSE TOTAL SHOULDER ARTHROPLASTY, LEFT: Primary | ICD-10-CM

## 2023-09-12 DIAGNOSIS — G89.29 CHRONIC LEFT SHOULDER PAIN: ICD-10-CM

## 2023-09-12 PROCEDURE — 97140 MANUAL THERAPY 1/> REGIONS: CPT | Performed by: PHYSICAL THERAPIST

## 2023-09-12 PROCEDURE — 97110 THERAPEUTIC EXERCISES: CPT | Performed by: PHYSICAL THERAPIST

## 2023-09-14 ENCOUNTER — OFFICE VISIT (OUTPATIENT)
Dept: PHYSICAL THERAPY | Facility: CLINIC | Age: 78
End: 2023-09-14
Payer: COMMERCIAL

## 2023-09-14 DIAGNOSIS — M25.512 CHRONIC LEFT SHOULDER PAIN: ICD-10-CM

## 2023-09-14 DIAGNOSIS — G89.29 CHRONIC LEFT SHOULDER PAIN: ICD-10-CM

## 2023-09-14 DIAGNOSIS — Z96.612 S/P REVERSE TOTAL SHOULDER ARTHROPLASTY, LEFT: Primary | ICD-10-CM

## 2023-09-14 PROCEDURE — 97110 THERAPEUTIC EXERCISES: CPT

## 2023-09-14 PROCEDURE — 97140 MANUAL THERAPY 1/> REGIONS: CPT

## 2023-09-14 NOTE — PROGRESS NOTES
Daily Note     Today's date: 2023  Patient name: Mathew Bueno  : 1945  MRN: [id number]  Referring provider: Sabi Leon PA-C  Dx:   Encounter Diagnosis     ICD-10-CM    1. S/P reverse total shoulder arthroplasty, left  Z96.612       2. Chronic left shoulder pain  M25.512     G89.29                      Subjective: Pt noted that his shoulder doesn't bother him unless he moves the wrong way. Objective: See treatment diary below      Assessment: Continued with treatment session at this time patient is 8 weeks and 1 day OOS. Required some increased verbal cues for proper performance of exercises. Noted some soreness s/p scap retraction. Tolerated treatment well. Patient demonstrated fatigue post treatment, exhibited good technique with therapeutic exercises and would benefit from continued PT    Education reviewed with patient as well as verba agreement and understanding.   - HEP - advised to complete on a daily basis. - DOMS - 24 to 48 hours of muscle soreness may be noted   - Modalities such as CP to decrease p! Swelling as well as any muscle soreness discomfort. 10 - 20 min on and 60 min off prior to additional application.     Plan: Continue per plan of care. Precautions: Mitral Valve disorder, HTN, Angina at rest, CA. Surgery . Progress to Phase 2 starting   Access Code: [email or web address]  URL: [email or web address]  Date: 2023  Prepared by: Carlee Timmons  POC expires Auth Status Unit limit Start date  Expiration date PT/OT + Visit Limit?  Copay/ Co- Insurance   23 approved N/A 8/1 10/15/23 12 $35/visit              Visit/Unit Tracking  AUTH Status:  Date 9/14  8/10 8/15 8/17 8/22 8/29 8/31 9/5 9   Approved Used 12  3 4 5 6 7 8 9 10 11    Remaining  0  9 8 7 6 5 4 3 2 1         Manuals 9/12 9/14  8/15 8/17 8/22 8/29 8/31 9/5 9/7   PROM to tolerance JF       NV SC SC                             Neuro Re-Ed             Scapular retractions 5" 3x 10  5" 3x 10   3x10, 5" 3x10, 5" 3x10, 5" 5" x30 5" x30 5 3x 10     Bkwd shoulder rolls 30x 30x   x30 x30 x30 x30 x30 30x                                                                      Ther Ex             Pulleys flexion and scaption 5 min  5 min       NV 5 min  5 min    Shoulder isometrics ABD, IR, ER 10"x10 ea 10" x 10       NV NV    Supine shoulder flexion 2x10 w/o cane 2x 10       NV W/ cane 10x 5"  W/o cane 2x 10    Table slides flexion and Scaption 5" 30x 5" 30x       NV 5" 2x 10  5" 30x                                           Ther Activity                                       Gait Training                                       Modalities

## 2023-09-18 NOTE — PROGRESS NOTES
Daily Note     Today's date: 2023  Patient name: Pal Fields  : 1945  MRN: [id number]  Referring provider: Garrick Bejarano PA-C  Dx:   Encounter Diagnosis     ICD-10-CM    1. S/P reverse total shoulder arthroplasty, left  Z96.612       2. Chronic left shoulder pain  M25.512     G89.29                      Subjective: patient states his shoulder has been feeling much better. Trying to reach overhead more to reach for light objects. Doing HEP regularly      Objective: See treatment diary below      Assessment: Tolerated treatment well. Patient would benefit from continued PT. Shoulder strength continues to improve. PROM WFL. Added new TE for scapular strengthening. Patient progressing well. Plan: Continue per plan of care. Precautions: Mitral Valve disorder, HTN, Angina at rest, CA. Surgery . Progress to Phase 2 starting   Access Code: [email or web address]  URL: [email or web address]  Date: 2023  Prepared by: Yue Orourke  POC expires Auth Status Unit limit Start date  Expiration date PT/OT + Visit Limit? Copay/ Co- Insurance   23 approved N/A 23 24 $35/visit              Visit/Unit Tracking  AUTH Status:  Date 9/14 9/19 8/10 8/15 8/17 8/22 8/29 8/31 9/5 9/7 9/12   Approved Used 12 13 3 4 5 6 7 8 9 10 11    Remaining  0 11 9 8 7 6 5 4 3 2 1         Manuals 9/12 9/14 9/19 8/15 8/17 8/22 8/29 8/31 9/5 9/7   PROM to tolerance JF  JF     NV SC SC                             Neuro Re-Ed             Scapular retractions 5" 3x 10  5" 3x 10  DC 3x10, 5" 3x10, 5" 3x10, 5" 5" x30 5" x30 5 3x 10     Bkwd shoulder rolls 30x 30x  DC x30 x30 x30 x30 x30 30x     Bent over rows   2x10          Bent over horiz.  ABD and /   2x10 ea                                                 Ther Ex             Pulleys flexion and scaption 5 min  5 min  5 min      NV 5 min  5 min    Shoulder isometrics ABD, IR, ER 10"x10 ea 10" x 10  10" x 10     NV NV    Supine shoulder flexion 2x10 w/o cane 2x 10  2x 10      NV W/ cane 10x 5"  W/o cane 2x 10    Table slides flexion and Scaption 5" 30x 5" 30x  5" 30x      NV 5" 2x 10  5" 30x    SL shoulder ER   2x10                                    Ther Activity                                       Gait Training                                       Modalities

## 2023-09-19 ENCOUNTER — OFFICE VISIT (OUTPATIENT)
Dept: PHYSICAL THERAPY | Facility: CLINIC | Age: 78
End: 2023-09-19
Payer: COMMERCIAL

## 2023-09-19 DIAGNOSIS — G89.29 CHRONIC LEFT SHOULDER PAIN: ICD-10-CM

## 2023-09-19 DIAGNOSIS — Z96.612 S/P REVERSE TOTAL SHOULDER ARTHROPLASTY, LEFT: Primary | ICD-10-CM

## 2023-09-19 DIAGNOSIS — M25.512 CHRONIC LEFT SHOULDER PAIN: ICD-10-CM

## 2023-09-19 PROCEDURE — 97112 NEUROMUSCULAR REEDUCATION: CPT | Performed by: PHYSICAL THERAPIST

## 2023-09-19 PROCEDURE — 97110 THERAPEUTIC EXERCISES: CPT | Performed by: PHYSICAL THERAPIST

## 2023-09-20 NOTE — PROGRESS NOTES
Daily Note     Today's date: 2023  Patient name: Fiorella Fu  : 1945  MRN: [id number]  Referring provider: Annetta Hutchins PA-C  Dx:   Encounter Diagnosis     ICD-10-CM    1. S/P reverse total shoulder arthroplasty, left  Z96.612       2. Chronic left shoulder pain  M25.512     G89.29                      Subjective: Patient states his shoulder is feeling much better. Not having as much pain. Uses arm for overhead activity, but it feels weak. Objective: See treatment diary below      Assessment: Tolerated treatment well. Patient would benefit from continued PT. Patient demonstrates excellent PROM of the shoulder. Initiated AROM flexion and ABD in sitting to strengthen shoulder musculature. Fatigued after session, but no complaints of pain. Plan: Continue per plan of care. Precautions: Mitral Valve disorder, HTN, Angina at rest, CA. Surgery . Progress to Phase 2 starting   Access Code: [email or web address]  URL: [email or web address]  Date: 2023  Prepared by: Socrates GRAY expires Auth Status Unit limit Start date  Expiration date PT/OT + Visit Limit? Copay/ Co- Insurance   23 approved N/A 23 24 $35/visit              Visit/Unit Tracking  AUTH Status:  Date    Approved Used 12 13 14 15 5 6 7 8 9 10 11    Remaining  0 11 10 9 7 6 5 4 3 2 1         Manuals    PROM to tolerance JF  JF JF DC   NV SC SC                             Neuro Re-Ed             Scapular retractions 5" 3x 10  5" 3x 10  DC 3x10, 5" 3x10, 5" 3x10, 5" 5" x30 5" x30 5 3x 10     Bkwd shoulder rolls 30x 30x  DC x30 x30 x30 x30 x30 30x     Bent over rows   2x10 2x10         Bent over horiz.  ABD and /   2x10 ea 2x10 ea                                                Ther Ex             Pulleys flexion and scaption 5 min  5 min  5 min  5 min    NV 5 min  5 min    Shoulder isometrics ABD, IR, ER 10"x10 ea 10" x 10  10" x 10 10" x 10    NV NV    Supine shoulder flexion 2x10 w/o cane 2x 10  2x 10  DC    NV W/ cane 10x 5"  W/o cane 2x 10    Table slides flexion and Scaption 5" 30x 5" 30x  5" 30x  5" 30x    NV 5" 2x 10  5" 30x    SL shoulder ER   2x10 2x10         Seated shoulder flexion and ABD    2x10 ea                      Ther Activity                                       Gait Training                                       Modalities

## 2023-09-21 ENCOUNTER — OFFICE VISIT (OUTPATIENT)
Dept: PHYSICAL THERAPY | Facility: CLINIC | Age: 78
End: 2023-09-21
Payer: COMMERCIAL

## 2023-09-21 DIAGNOSIS — M25.512 CHRONIC LEFT SHOULDER PAIN: ICD-10-CM

## 2023-09-21 DIAGNOSIS — G89.29 CHRONIC LEFT SHOULDER PAIN: ICD-10-CM

## 2023-09-21 DIAGNOSIS — Z96.612 S/P REVERSE TOTAL SHOULDER ARTHROPLASTY, LEFT: Primary | ICD-10-CM

## 2023-09-21 PROCEDURE — 97112 NEUROMUSCULAR REEDUCATION: CPT | Performed by: PHYSICAL THERAPIST

## 2023-09-21 PROCEDURE — 97110 THERAPEUTIC EXERCISES: CPT | Performed by: PHYSICAL THERAPIST

## 2023-09-25 NOTE — PROGRESS NOTES
Daily Note     Today's date: 2023  Patient name: Francisco Schaeffer  : 1945  MRN: [id number]  Referring provider: Suszanne Dakins, PA-C  Dx:   Encounter Diagnosis     ICD-10-CM    1. S/P reverse total shoulder arthroplasty, left  Z96.612       2. Chronic left shoulder pain  M25.512     G89.29                      Subjective: Patient states his shoulder is feeling better. Using UE to reach overhead. Is able to sweep and vacuum without pain and can now put on and tie his shoes independently. Objective: See treatment diary below      Assessment: Tolerated treatment well. Patient would benefit from continued PT. Shoulder very fatigued with the addition of ball rolls on the wall. No complaints of pain. Plan: Continue per plan of care. Precautions: Mitral Valve disorder, HTN, Angina at rest, CA. Surgery . Progress to Phase 2 starting   Access Code: [email or web address]  URL: [email or web address]  Date: 2023  Prepared by: Jacy Almeida  POC expires Auth Status Unit limit Start date  Expiration date PT/OT + Visit Limit? Copay/ Co- Insurance   23 approved N/A 23 24 $35/visit              Visit/Unit Tracking  AUTH Status:  Date    Approved Used 12 13 14 15 16 6 7 8 9 10 11    Remaining  0 11 10 9 8 6 5 4 3 2 1         Manuals    PROM to tolerance JF  JF JF DC   NV SC SC                             Neuro Re-Ed             Bent over rows   2x10 2x10 3x10        Bent over horiz.  ABD and /   2x10 ea 2x10 ea 3x10 ea        Ball rolls on wall S/I, L/R, CW, CCW     x20  S/I, x8  Ea others                                  Ther Ex             Pulleys flexion and scaption 5 min  5 min  5 min  5 min DC   NV 5 min  5 min    Shoulder isometrics ABD, IR, ER 10"x10 ea 10" x 10  10" x 10 10" x 10 10" x 10   NV NV    Table slides flexion and Scaption 5" 30x 5" 30x  5" 30x  5" 30x 5" 30x   NV 5" 2x 10  5" 30x    SL shoulder ER   2x10 2x10 2x10        Seated shoulder flexion and ABD    2x10 ea 2x10 ea                     Ther Activity                                       Gait Training                                       Modalities

## 2023-09-26 ENCOUNTER — OFFICE VISIT (OUTPATIENT)
Dept: PHYSICAL THERAPY | Facility: CLINIC | Age: 78
End: 2023-09-26
Payer: COMMERCIAL

## 2023-09-26 DIAGNOSIS — G89.29 CHRONIC LEFT SHOULDER PAIN: ICD-10-CM

## 2023-09-26 DIAGNOSIS — Z96.612 S/P REVERSE TOTAL SHOULDER ARTHROPLASTY, LEFT: Primary | ICD-10-CM

## 2023-09-26 DIAGNOSIS — M25.512 CHRONIC LEFT SHOULDER PAIN: ICD-10-CM

## 2023-09-26 PROCEDURE — 97110 THERAPEUTIC EXERCISES: CPT | Performed by: PHYSICAL THERAPIST

## 2023-09-26 PROCEDURE — 97112 NEUROMUSCULAR REEDUCATION: CPT | Performed by: PHYSICAL THERAPIST

## 2023-09-27 NOTE — PROGRESS NOTES
Daily Note     Today's date: 2023  Patient name: Marleny Ceballos  : 1945  MRN: [id number]  Referring provider: Jourdan Lan PA-C  Dx:   Encounter Diagnosis     ICD-10-CM    1. S/P reverse total shoulder arthroplasty, left  Z96.612       2. Chronic left shoulder pain  M25.512     G89.29                      Subjective: Patient states he is feeling much better. Now able to lay on his shoulder to sleep and feels much better. Objective: See treatment diary below      Assessment: Tolerated treatment well. Patient would benefit from continued PT. Very fatigued after session. Ball rolls on wall tired patient the most.  Added eccentric lowering for flexion and scaption at the wall. Plan: Continue per plan of care. Precautions: Mitral Valve disorder, HTN, Angina at rest, CA. Surgery . Progress to Phase 2 starting   Access Code: [email or web address]  URL: [email or web address]"/  Date: 2023  Prepared by: Coy GRAY expires Auth Status Unit limit Start date  Expiration date PT/OT + Visit Limit? Copay/ Co- Insurance   23 approved N/A 23 24 $35/visit              Visit/Unit Tracking  AUTH Status:  Date    Approved Used 12 13 14 15 16 17 7 8 9 10 11    Remaining  0 11 10 9 8 7 5 4 3 2 1         Manuals    PROM to tolerance JF  JF JF DC   NV SC SC                             Neuro Re-Ed             Bent over rows   2x10 2x10 3x10 2# 3x10       Bent over horiz.  ABD and /   2x10 ea 2x10 ea 3x10 ea 3x10 ea       Ball rolls on wall S/I, L/R, CW, CCW     x20  S/I, x8  Ea others GTB x10 ea                                 Ther Ex             Pulleys flexion and scaption 5 min  5 min  5 min  5 min DC   NV 5 min  5 min    Shoulder isometrics ABD, IR, ER 10"x10 ea 10" x 10  10" x 10 10" x 10 10" x 10 10" x 10  NV NV    Table slides flexion and Scaption 5" 30x 5" 30x  5" 30x  5" 30x 5" 30x 5" 30x  NV 5" 2x 10  5" 30x    SL shoulder ER   2x10 2x10 2x10 3x10       Seated shoulder flexion and ABD    2x10 ea 2x10 ea 3x10 ea       Eccentric lowering flexion and scaption at wall      x15 flexion and 8 scaption       Ther Activity                                       Gait Training                                       Modalities

## 2023-09-28 ENCOUNTER — OFFICE VISIT (OUTPATIENT)
Dept: PHYSICAL THERAPY | Facility: CLINIC | Age: 78
End: 2023-09-28
Payer: COMMERCIAL

## 2023-09-28 DIAGNOSIS — G89.29 CHRONIC LEFT SHOULDER PAIN: ICD-10-CM

## 2023-09-28 DIAGNOSIS — M25.512 CHRONIC LEFT SHOULDER PAIN: ICD-10-CM

## 2023-09-28 DIAGNOSIS — Z96.612 S/P REVERSE TOTAL SHOULDER ARTHROPLASTY, LEFT: Primary | ICD-10-CM

## 2023-09-28 PROCEDURE — 97110 THERAPEUTIC EXERCISES: CPT | Performed by: PHYSICAL THERAPIST

## 2023-09-28 PROCEDURE — 97112 NEUROMUSCULAR REEDUCATION: CPT | Performed by: PHYSICAL THERAPIST

## 2023-10-03 ENCOUNTER — OFFICE VISIT (OUTPATIENT)
Dept: PHYSICAL THERAPY | Facility: CLINIC | Age: 78
End: 2023-10-03
Payer: COMMERCIAL

## 2023-10-03 DIAGNOSIS — Z96.612 S/P REVERSE TOTAL SHOULDER ARTHROPLASTY, LEFT: Primary | ICD-10-CM

## 2023-10-03 DIAGNOSIS — G89.29 CHRONIC LEFT SHOULDER PAIN: ICD-10-CM

## 2023-10-03 DIAGNOSIS — M25.512 CHRONIC LEFT SHOULDER PAIN: ICD-10-CM

## 2023-10-03 PROCEDURE — 97110 THERAPEUTIC EXERCISES: CPT | Performed by: PHYSICAL THERAPIST

## 2023-10-03 PROCEDURE — 97112 NEUROMUSCULAR REEDUCATION: CPT | Performed by: PHYSICAL THERAPIST

## 2023-10-03 NOTE — PROGRESS NOTES
Daily Note     Today's date: 10/3/2023  Patient name: Keira Lynn  : 1945  MRN: [id number]  Referring provider: Clifton Anderson PA-C  Dx:   Encounter Diagnosis     ICD-10-CM    1. S/P reverse total shoulder arthroplasty, left  Z96.612       2. Chronic left shoulder pain  M25.512     G89.29                      Subjective: Patient states his shoulder is feeling better. Not really having pain. Objective: See treatment diary below      Assessment: Tolerated treatment well. Patient demonstrated fatigue post treatment. Difficulty with eccentric lowering shoulder flexion and scaption. Added UBE for strengthening and endurance today. Plan: Continue per plan of care. Precautions: Mitral Valve disorder, HTN, Angina at rest, CA. Surgery . Progress to Phase 2 starting   Access Code: [email or web address]  URL: [email or web address]  Date: 2023  Prepared by: Adolph Walters  POC expires Auth Status Unit limit Start date  Expiration date PT/OT + Visit Limit? Copay/ Co- Insurance   23 approved N/A 23 24 $35/visit              Visit/Unit Tracking  AUTH Status:  Date    Approved Used 12 13 14 15 16 17 7 8 9 10 11    Remaining  0 11 10 9 8 7 5 4 3 2 1         Manuals 9/12 9/14 9/19 9/21 9/26 9/28 10/3 8/31 9/5 9/7   PROM to tolerance JF  JF JF DC   NV SC SC                             Neuro Re-Ed             Bent over rows   2x10 2x10 3x10 2# 3x10 2# 3x10      Bent over horiz.  ABD and /   2x10 ea 2x10 ea 3x10 ea 3x10 ea 3x10 ea      Ball rolls on wall S/I, L/R, CW, CCW     x20  S/I, x8  Ea others GTB x10 ea GTB x10 ea                                Ther Ex             UBE 1/2 F/R       L1 4'/4'      Pulleys flexion and scaption 5 min  5 min  5 min  5 min DC   NV 5 min  5 min    Shoulder isometrics ABD, IR, ER 10"x10 ea 10" x 10  10" x 10 10" x 10 10" x 10 10" x 10 10" x 10 NV NV    Table slides flexion and Scaption 5" 30x 5" 30x  5" 30x  5" 30x 5" 30x 5" 30x DC NV 5" 2x 10  5" 30x    SL shoulder ER   2x10 2x10 2x10 3x10 3x10      Seated shoulder flexion and ABD    2x10 ea 2x10 ea 3x10 ea 3x10 ea      Eccentric lowering flexion and scaption at wall      x15 flexion and 8 scaption x15 flexion and 8 scaption      Ther Activity                                       Gait Training                                       Modalities

## 2023-10-05 ENCOUNTER — OFFICE VISIT (OUTPATIENT)
Dept: PHYSICAL THERAPY | Facility: CLINIC | Age: 78
End: 2023-10-05
Payer: COMMERCIAL

## 2023-10-05 DIAGNOSIS — Z96.612 S/P REVERSE TOTAL SHOULDER ARTHROPLASTY, LEFT: Primary | ICD-10-CM

## 2023-10-05 DIAGNOSIS — M25.512 CHRONIC LEFT SHOULDER PAIN: ICD-10-CM

## 2023-10-05 DIAGNOSIS — G89.29 CHRONIC LEFT SHOULDER PAIN: ICD-10-CM

## 2023-10-05 PROCEDURE — 97112 NEUROMUSCULAR REEDUCATION: CPT

## 2023-10-05 PROCEDURE — 97110 THERAPEUTIC EXERCISES: CPT

## 2023-10-05 NOTE — PROGRESS NOTES
Daily Note     Today's date: 10/5/2023  Patient name: Pal Fields  : 1945  MRN: [id number]  Referring provider: Garrick Bejarano PA-C  Dx:   Encounter Diagnosis     ICD-10-CM    1. S/P reverse total shoulder arthroplasty, left  Z96.612       2. Chronic left shoulder pain  M25.512     G89.29           Start Time: 1205  Stop Time: 1247  Total time in clinic (min): 42 minutes    Subjective: Pt noted that his L shoulder has been feeling a lot better. Ortho visit is scheduled for 10/12/23. Objective: See treatment diary below      Assessment: Continued with treatment session pt noted having some L shoulder muscle soreness but overall feeling well. Required additional verbal cues for eccentric control with wall slides as well as AROM. Tolerated treatment fair. Patient exhibited good technique with therapeutic exercises and would benefit from continued PT     Education reviewed with patient as well as verba agreement and understanding.   - HEP - advised to complete on a daily basis. - DOMS - 24 to 48 hours of muscle soreness may be noted   - Modalities such as CP to decrease p! Swelling as well as any muscle soreness discomfort. 10 - 20 min on and 60 min off prior to additional application.     Plan: Continue per plan of care. Precautions: Mitral Valve disorder, HTN, Angina at rest, CA. Surgery . Progress to Phase 2 starting   Access Code: [email or web address]  URL: [email or web address]  Date: 2023  Prepared by: Yue Orourke  POC expires Auth Status Unit limit Start date  Expiration date PT/OT + Visit Limit?  [employer]/ Co- Insurance   23 approved N/A 23 24 $35/visit              Visit/Unit Tracking  AUTH Status:  Date    Approved Used 12 13 14 15 16 17 7 8 9 10 11    Remaining  0 11 10 9 8 7 5 4 3 2 1         Manuals 9/12 9/14 9/19 9/21 9/26 9/28 10/3 10/5     PROM to tolerance [facility]             Bent over rows   2x10 2x10 3x10 2# 3x10 2# 3x10 2# 3x 10      Bent over horiz. ABD and /   2x10 ea 2x10 ea 3x10 ea 3x10 ea 3x10 ea 3x 10 ea. Ball rolls on wall S/I, L/R, CW, CCW     x20  S/I, x8  Ea others GTB x10 ea GTB x10 ea GMB 2x 10                                Ther Ex             UBE 1/2 F/R       L1 4'/4' L1 3'/3'     Pulleys flexion and scaption 5 min  5 min  5 min  5 min DC        Shoulder isometrics ABD, IR, ER 10"x10 ea 10" x 10  10" x 10 10" x 10 10" x 10 10" x 10 10" x 10 10" x 10 VC's     Table slides flexion and Scaption 5" 30x 5" 30x  5" 30x  5" 30x 5" 30x 5" 30x DC      SL shoulder ER   2x10 2x10 2x10 3x10 3x10 3x 10      Seated shoulder flexion and ABD    2x10 ea 2x10 ea 3x10 ea 3x10 ea 3x 10 ea.       Eccentric lowering flexion and scaption at wall      x15 flexion and 8 scaption x15 flexion and 8 scaption x15 flexion and 8 scaption     Ther Activity                                       Gait Training                                       Modalities

## 2023-10-12 ENCOUNTER — OFFICE VISIT (OUTPATIENT)
Dept: OBGYN CLINIC | Facility: CLINIC | Age: 78
End: 2023-10-12

## 2023-10-12 ENCOUNTER — APPOINTMENT (OUTPATIENT)
Dept: RADIOLOGY | Facility: CLINIC | Age: 78
End: 2023-10-12
Payer: COMMERCIAL

## 2023-10-12 VITALS
BODY MASS INDEX: 27.94 KG/M2 | HEART RATE: 65 BPM | HEIGHT: 62 IN | SYSTOLIC BLOOD PRESSURE: 135 MMHG | WEIGHT: 151.8 LBS | DIASTOLIC BLOOD PRESSURE: 74 MMHG

## 2023-10-12 DIAGNOSIS — Z96.612 S/P REVERSE TOTAL SHOULDER ARTHROPLASTY, LEFT: Primary | ICD-10-CM

## 2023-10-12 DIAGNOSIS — Z96.612 S/P REVERSE TOTAL SHOULDER ARTHROPLASTY, LEFT: ICD-10-CM

## 2023-10-12 PROCEDURE — 73030 X-RAY EXAM OF SHOULDER: CPT

## 2023-10-12 PROCEDURE — 99024 POSTOP FOLLOW-UP VISIT: CPT | Performed by: ORTHOPAEDIC SURGERY

## 2023-10-12 NOTE — PROGRESS NOTES
Patient Name:  Consuelo Atkinson  MRN:  [id number]    [id number] [place] [address]. S/P reverse total shoulder arthroplasty, left  -     XR shoulder 2+ vw left; Future; Expected date: 10/12/2023        Approximately 12 week s/p Left reverse shoulder arthroplasty performed on 07/19/2023  X-rays reviewed in office with patient   Patient may transition to a home exercise program. Patient will call if he would like another script for physical therapy  OTC analgesics as needed/directed  Continue to avoid heavy lifting with the left arm  Follow up 3 months for reevaluation and new left shoulder x-rays    History of the Present Illness   Consuelo Atkinson is a 78 y.o. male approximately 12 week s/p Left reverse shoulder arthroplasty performed on 07/19/2023. He has been attending physical therapy although he has not scheduled more. He is still working on range of motion exercises. He wanted to wait until after today's visit. He has annoying sharp pain with some movements. He is not normally having a lot of pain. Pain is managed with Tylenol. He is pleased with his outcome so far. Patient has been cooking more recently without difficulty. Review of Systems     Review of Systems   Constitutional:  Negative for chills and fever. HENT:  Negative for ear pain and sore throat. Eyes:  Negative for pain and visual disturbance. Respiratory:  Negative for cough and shortness of breath. Cardiovascular:  Negative for chest pain and palpitations. Gastrointestinal:  Negative for abdominal pain and vomiting. Genitourinary:  Negative for dysuria and hematuria. Musculoskeletal:  Negative for arthralgias and back pain. Skin:  Negative for color change and rash. Neurological:  Negative for seizures and syncope. All other systems reviewed and are negative.       Physical Exam     /74   Pulse 65   Ht 5' 2" (1.575 m)   Wt 68.9 kg (151 lb 12.8 oz)   BMI 27.76 kg/m²     Left  Shoulder:   Surgical incisions appear well healed without signs of infection or drainage. Full elbow range of motion  Active range of motion   110 degrees forward flexion  90 degrees abduction  20 degrees external rotation   SI joint internal rotation    There is 5/5 strength with supraspinatus testing. There is 4/5 strength with infraspinatus testing. There is no tenderness present over the shoulder. The patient is neurovascularly intact distally in the extremity. Data Review     I have personally reviewed pertinent films in PACS, and my interpretation follows. X-rays taken today 10/12/2023 of Left shoulder independently reviewed and demonstrate maintained reverse shoulder prosthesis in appropriate alignment. No acute fracture, dislocation, loosening or lucency.      Social History     Tobacco Use   • Smoking status: Former     Packs/day: 1.00     Years: 18.00     Total pack years: 18.00     Types: Cigarettes     Start date:      Quit date:      Years since quittin.8   • Smokeless tobacco: Never   Vaping Use   • Vaping Use: Never used   Substance Use Topics   • Alcohol use: Never   • Drug use: Never           Procedures  None     Juan Pablo Pleitez   Scribe Attestation    I,:  Juan Pablo Pleitez am acting as a scribe while in the presence of the attending physician.:       I,:  Vangie Langley, DO personally performed the services described in this documentation    as scribed in my presence.:

## 2023-11-21 ENCOUNTER — OFFICE VISIT (OUTPATIENT)
Age: 78
End: 2023-11-21
Payer: COMMERCIAL

## 2023-11-21 VITALS
WEIGHT: 147 LBS | SYSTOLIC BLOOD PRESSURE: 140 MMHG | HEIGHT: 62 IN | DIASTOLIC BLOOD PRESSURE: 76 MMHG | TEMPERATURE: 98.2 F | OXYGEN SATURATION: 98 % | HEART RATE: 62 BPM | BODY MASS INDEX: 27.05 KG/M2

## 2023-11-21 DIAGNOSIS — G47.33 MODERATE OBSTRUCTIVE SLEEP APNEA: Primary | ICD-10-CM

## 2023-11-21 DIAGNOSIS — E66.3 OVERWEIGHT (BMI 25.0-29.9): ICD-10-CM

## 2023-11-21 DIAGNOSIS — G47.00 INSOMNIA, UNSPECIFIED TYPE: ICD-10-CM

## 2023-11-21 PROCEDURE — 99214 OFFICE O/P EST MOD 30 MIN: CPT | Performed by: INTERNAL MEDICINE

## 2023-11-21 NOTE — PROGRESS NOTES
Assessment/Plan:   Diagnoses and all orders for this visit:    Moderate obstructive sleep apnea    Insomnia, unspecified type    Overweight (BMI 25.0-29. 9)        Moderate obstructive sleep apnea currently on inspire doing well. He did have a repeat home sleep study recently in March 2023 with the device still with residual AHI of 7.7 he did follow-up at [facility], was documented that could not be titrated up and could not tolerate higher titration levels with the inspire  He does not have any daytime sleepiness currently he continues with the inspire nightly  He also has insomnia takes doxepin 10 mg nightly to continue  Side effects of the medication discussed especially given the age as well understands and verbalizes  Follow-up in 1 year or earlier as needed  Return in about 1 year (around 11/21/2024). All questions are answered to the patient's satisfaction and understanding. He verbalizes understanding. He is encouraged to call with any further questions or concerns. Portions of the record may have been created with voice recognition software. Occasional wrong word or "sound a like" substitutions may have occurred due to the inherent limitations of voice recognition software. Read the chart carefully and recognize, using context, where substitutions have occurred.     Electronically Signed by Christine Archer MD    ______________________________________________________________________    Chief Complaint:   Chief Complaint   Patient presents with    Sleep Apnea    Follow-up       Patient ID: Jaylin Lawler is a 78 y.o. y.o. male has a past medical history of Abnormal stress test, Angina at rest, Apnea, Arthritis, Cancer ([address]), Carpal tunnel syndrome on left, Chest pain, CPAP (continuous positive airway pressure) dependence, Diverticulitis, GERD (gastroesophageal reflux disease), History of transfusion, Hypercholesterolemia, Hypercholesterolemia, Hyperlipidemia, Hypertension, Joint pain, Kidney stone, Mitral valve disorder, Neck pain, Sleep apnea, Sleep apnea, obstructive, and Thoracic neuritis. 11/21/2023  Patient presents today for follow-up visit. Patient is a 79 yo male former smoker with PMH of GREGG, HTN, HLD, BPH. He is currently status post inspire placed in May 2021 and he states since it has been activated in June 2021 he has been doing well also titrated and has been consistently using it. Following up in [facility] sleep center. He states he wants to currently follow-up here because of proximity to his house. He has been activating the inspire nightly and has been feeling better he states. He did have a home sleep study recently in the month of March 2023. Next        Review of Systems   Constitutional: Negative. HENT: Negative. Eyes: Negative. Respiratory: Negative. Cardiovascular: Negative. Gastrointestinal: Negative. Endocrine: Negative. Genitourinary: Negative. Musculoskeletal: Negative. Allergic/Immunologic: Negative. Neurological: Negative. Hematological: Negative. Psychiatric/Behavioral: Negative. Smoking history: He reports that he quit smoking about 45 years ago. His smoking use included cigarettes. He started smoking about 63 years ago. He has a 18.00 pack-year smoking history.  He has never used smokeless tobacco.    The following portions of the patient's history were reviewed and updated as appropriate: allergies, current medications, past family history, past medical history, past social history, past surgical history, and problem list.    Immunization History   Administered Date(s) Administered    COVID-19 MODERNA VACC 0.5 ML IM 02/08/2021, 03/08/2021, 10/22/2021, 08/16/2022    INFLUENZA 11/02/2005, 09/23/2009, 10/06/2014, 10/01/2015, 09/10/2018, 09/15/2021, 08/16/2022    Influenza Split High Dose Preservative Free IM 10/01/2015, 08/30/2017    Influenza, seasonal, injectable 10/01/2016    Pneumococcal Conjugate 13-Valent 07/08/2020    Pneumococcal Polysaccharide PPV23 09/23/2009, 08/16/2022    Tdap 1945, 05/03/2018, 10/14/2022    Zoster 1945    Zoster Vaccine Recombinant 08/16/2022, 10/27/2022    influenza, trivalent, adjuvanted 10/10/2019     Current Outpatient Medications   Medication Sig Dispense Refill    acetaminophen (TYLENOL) 500 mg tablet Take 1,000 mg by mouth every 6 (six) hours as needed for mild pain      amLODIPine (NORVASC) 10 mg tablet Take 1 tablet (10 mg total) by mouth every morning 90 tablet 1    cetirizine (ZyrTEC) 10 MG chewable tablet Chew 10 mg if needed for allergies      doxepin (SINEquan) 10 mg capsule Take 1 capsule (10 mg total) by mouth daily at bedtime as needed for sleep 90 capsule 0    fenofibrate (TRICOR) 145 mg tablet Take 1 tablet (145 mg total) by mouth daily 90 tablet 1    loperamide (IMODIUM A-D) 2 MG tablet Take 1 tablet by mouth daily as needed      lovastatin (MEVACOR) 40 MG tablet TAKE ONE TABLET BY MOUTH EVERY DAY 90 tablet 1    Multiple Vitamins-Minerals (CENTRUM ULTRA MENS PO) Take 1 tablet by mouth daily      Multiple Vitamins-Minerals (ICAPS AREDS 2 PO) Take by mouth      omeprazole (PriLOSEC) 20 mg delayed release capsule TAKE ONE CAPSULE BY MOUTH EVERY DAY 90 capsule 1    celecoxib (CeleBREX) 100 mg capsule Take 1 capsule (100 mg total) by mouth 2 (two) times a day (Patient not taking: Reported on 10/12/2023) 20 capsule 0     No current facility-administered medications for this visit. Allergies: Patient has no known allergies. Objective:  Vitals:    11/21/23 1046   BP: 140/76   Pulse: 62   Temp: 98.2 °F (36.8 °C)   SpO2: 98%   Weight: 66.7 kg (147 lb)   Height: 5' 2" (1.575 m)   Oxygen Therapy  SpO2: 98 %  . Wt Readings from Last 3 Encounters:   11/21/23 66.7 kg (147 lb)   10/12/23 68.9 kg (151 lb 12.8 oz)   08/31/23 72.1 kg (159 lb)     Body mass index is 26.89 kg/m². Physical Exam  Vitals and nursing note reviewed.    Constitutional: Appearance: He is well-developed. HENT:      Head: Normocephalic and atraumatic. Eyes:      Conjunctiva/sclera: Conjunctivae normal.      Pupils: Pupils are equal, round, and reactive to light. Neck:      Thyroid: No thyromegaly. Vascular: No JVD. Cardiovascular:      Rate and Rhythm: Normal rate and regular rhythm. Heart sounds: Normal heart sounds. No murmur heard. No friction rub. No gallop. Pulmonary:      Effort: Pulmonary effort is normal. No respiratory distress. Breath sounds: Normal breath sounds. No wheezing or rales. Chest:      Chest wall: No tenderness. Musculoskeletal:         General: No tenderness or deformity. Normal range of motion. Cervical back: Normal range of motion and neck supple. Lymphadenopathy:      Cervical: No cervical adenopathy. Skin:     General: Skin is warm and dry. Neurological:      Mental Status: He is alert and oriented to person, place, and time.          Lab Review:   Appointment on 08/29/2023   Component Date Value    Color, UA 08/29/2023 Light Potter     Clarity, UA 08/29/2023 Extra Turbid     Specific Gravity, UA 08/29/2023 1.018     pH, UA 08/29/2023 6.0     Leukocytes, UA 08/29/2023 Large (A)     Nitrite, UA 08/29/2023 Negative     Protein, UA 08/29/2023 50 (1+) (A)     Glucose, UA 08/29/2023 Negative     Ketones, UA 08/29/2023 Negative     Urobilinogen, UA 08/29/2023 <2.0     Bilirubin, UA 08/29/2023 Negative     Occult Blood, UA 08/29/2023 Large (A)     RBC, UA 08/29/2023 Innumerable (A)     WBC, UA 08/29/2023 Innumerable (A)     Epithelial Cells 08/29/2023 None Seen     Bacteria, UA 08/29/2023 None Seen     MUCUS THREADS 08/29/2023 Occasional (A)     Transitional Epithelial * 08/29/2023 Present     Urine Culture 08/29/2023 >100,000 cfu/ml Escherichia coli (A)    Admission on 07/19/2023, Discharged on 07/20/2023   Component Date Value    WBC 07/20/2023 10.82 (H)     RBC 07/20/2023 3.76 (L)     Hemoglobin 07/20/2023 13.1 Hematocrit 07/20/2023 36.6     MCV 07/20/2023 97     MCH 07/20/2023 34.8 (H)     MCHC 07/20/2023 35.8     RDW 07/20/2023 12.3     Platelets 22/86/1914 153     MPV 07/20/2023 9.8     Sodium 07/20/2023 135     Potassium 07/20/2023 4.0     Chloride 07/20/2023 105     CO2 07/20/2023 20 (L)     ANION GAP 07/20/2023 10     BUN 07/20/2023 14     Creatinine 07/20/2023 0.86     Glucose 07/20/2023 153 (H)     Glucose, Fasting 07/20/2023 153 (H)     Calcium 07/20/2023 9.1     AST 07/20/2023 18     ALT 07/20/2023 11     Alkaline Phosphatase 07/20/2023 45     Total Protein 07/20/2023 6.0 (L)     Albumin 07/20/2023 3.9     Total Bilirubin 07/20/2023 0.51     eGFR 07/20/2023 82    Appointment on 06/29/2023   Component Date Value    Sodium 06/29/2023 142     Potassium 06/29/2023 4.6     Chloride 06/29/2023 112 (H)     CO2 06/29/2023 27     ANION GAP 06/29/2023 3     BUN 06/29/2023 17     Creatinine 06/29/2023 0.96     Glucose, Fasting 06/29/2023 96     Calcium 06/29/2023 9.8     AST 06/29/2023 19     ALT 06/29/2023 20     Alkaline Phosphatase 06/29/2023 73     Total Protein 06/29/2023 7.2     Albumin 06/29/2023 4.1     Total Bilirubin 06/29/2023 0.77     eGFR 06/29/2023 74     WBC 06/29/2023 4.84     RBC 06/29/2023 4.46     Hemoglobin 06/29/2023 15.1     Hematocrit 06/29/2023 45.8     MCV 06/29/2023 103 (H)     MCH 06/29/2023 33.9     MCHC 06/29/2023 33.0     RDW 06/29/2023 12.8     MPV 06/29/2023 11.1     Platelets 73/36/0599 150     nRBC 06/29/2023 0     Neutrophils Relative 06/29/2023 53     Immat GRANS % 06/29/2023 0     Lymphocytes Relative 06/29/2023 35     Monocytes Relative 06/29/2023 11     Eosinophils Relative 06/29/2023 1     Basophils Relative 06/29/2023 0     Neutrophils Absolute 06/29/2023 2.55     Immature Grans Absolute 06/29/2023 0.00     Lymphocytes Absolute 06/29/2023 1.71     Monocytes Absolute 06/29/2023 0.54     Eosinophils Absolute 06/29/2023 0.03     Basophils Absolute 06/29/2023 0.01     Protime 06/29/2023 13. 6     INR 06/29/2023 1.02     PTT 06/29/2023 33     ABO Grouping 06/29/2023 O     Rh Factor 06/29/2023 Negative     Antibody Screen 06/29/2023 Negative     Specimen Expiration Date 06/29/2023 [id number]     CRP 06/29/2023 <3.0     Hemoglobin A1C 06/29/2023 5.2     EAG 06/29/2023 103        Diagnostics:  I have personally reviewed pertinent reports.     ESS: Total score: 7

## 2023-11-28 DIAGNOSIS — E78.2 MIXED HYPERLIPIDEMIA: ICD-10-CM

## 2023-11-28 RX ORDER — FENOFIBRATE 145 MG/1
145 TABLET, COATED ORAL DAILY
Qty: 90 TABLET | Refills: 1 | Status: SHIPPED | OUTPATIENT
Start: 2023-11-28 | End: 2023-12-02 | Stop reason: SDUPTHER

## 2023-11-28 NOTE — TELEPHONE ENCOUNTER
Voice mail message left This is [facility] pharmacy calling about a mutual patient to get a prescription refill. Patient's name is Heidi Leos. Patient needs fenofibrate 145 milligrams. Quantity 90. Takes one a day. [place], [id number]. Quantity 90, fenofibrate 145 milligrams. You can escribe that or fax, [phone]. [facility] Thank you.

## 2023-12-15 DIAGNOSIS — I10 ESSENTIAL HYPERTENSION: ICD-10-CM

## 2023-12-15 NOTE — TELEPHONE ENCOUNTER
Medication Refill Request     Name Amlodipine   Dose/Frequency 10 mg/ take 1 tab by mouth every morning  Quantity 90tabs  Verified pharmacy   [x]  Verified ordering Provider   [x]  Does patient have enough for the next 3 days? Yes [x] No []

## 2023-12-18 DIAGNOSIS — K21.9 CHRONIC GERD: ICD-10-CM

## 2023-12-18 DIAGNOSIS — E78.2 MIXED HYPERLIPIDEMIA: ICD-10-CM

## 2023-12-18 RX ORDER — OMEPRAZOLE 20 MG/1
20 CAPSULE, DELAYED RELEASE ORAL DAILY
Qty: 90 CAPSULE | Refills: 1 | Status: SHIPPED | OUTPATIENT
Start: 2023-12-18

## 2023-12-18 RX ORDER — LOVASTATIN 40 MG/1
40 TABLET ORAL DAILY
Qty: 90 TABLET | Refills: 1 | Status: SHIPPED | OUTPATIENT
Start: 2023-12-18

## 2023-12-19 RX ORDER — AMLODIPINE BESYLATE 10 MG/1
10 TABLET ORAL EVERY MORNING
Qty: 90 TABLET | Refills: 3 | Status: SHIPPED | OUTPATIENT
Start: 2023-12-19

## 2024-01-18 ENCOUNTER — APPOINTMENT (OUTPATIENT)
Dept: RADIOLOGY | Facility: CLINIC | Age: 79
End: 2024-01-18
Payer: COMMERCIAL

## 2024-01-18 VITALS
WEIGHT: 145.6 LBS | HEART RATE: 57 BPM | BODY MASS INDEX: 26.79 KG/M2 | HEIGHT: 62 IN | DIASTOLIC BLOOD PRESSURE: 74 MMHG | SYSTOLIC BLOOD PRESSURE: 149 MMHG

## 2024-01-18 DIAGNOSIS — Z96.612 S/P REVERSE TOTAL SHOULDER ARTHROPLASTY, LEFT: ICD-10-CM

## 2024-01-18 DIAGNOSIS — Z96.612 S/P REVERSE TOTAL SHOULDER ARTHROPLASTY, LEFT: Primary | ICD-10-CM

## 2024-01-18 PROCEDURE — 99213 OFFICE O/P EST LOW 20 MIN: CPT | Performed by: ORTHOPAEDIC SURGERY

## 2024-01-18 PROCEDURE — 73030 X-RAY EXAM OF SHOULDER: CPT

## 2024-01-18 NOTE — PROGRESS NOTES
"Patient Name:  Scottie Grewal  MRN:  [id number]    Assessment & Plan     1. S/P reverse total shoulder arthroplasty, left  -     XR shoulder 2+ vw left; Future; Expected date: 01/18/2024      Approximately 6 month s/p Left reverse shoulder arthroplasty performed on 07/19/23  X-rays reviewed with patient  Overall, patient is doing well s/p surgical intervention with improvement of pain as compared to before surgery  Advised patient to continue with home exercises for range of motion and postural strengthening  Avoid heavy overhead lifting, but may continue with light lifting and functioning as tolerated  Follow up in 6 months for reevaluation and new x-rays upon arrival    History of the Present Illness   Scottie Grewal is a 78 y.o. male approximately 6 month s/p Left reverse shoulder arthroplasty performed on 07/19/2023. Today, patient reports he feels as if he is not using the Left arm as much as his Right arm. He has some clicking with abduction exercises which causes some soreness. He admits he performs many ADLs with his Right upper extremity. He is feeling better as compared to before surgery.        Review of Systems     Review of Systems   Constitutional:  Negative for chills and fever.   HENT:  Negative for ear pain and sore throat.    Eyes:  Negative for pain and visual disturbance.   Respiratory:  Negative for cough and shortness of breath.    Cardiovascular:  Negative for chest pain and palpitations.   Gastrointestinal:  Negative for abdominal pain and vomiting.   Genitourinary:  Negative for dysuria and hematuria.   Musculoskeletal:  Negative for arthralgias and back pain.   Skin:  Negative for color change and rash.   Neurological:  Negative for seizures and syncope.   All other systems reviewed and are negative.      Physical Exam     /74   Pulse 57   Ht 5' 2\" (1.575 m)   Wt 66 kg (145 lb 9.6 oz)   BMI 26.63 kg/m²     Left Shoulder:   Surgical incision well healed  Active range of motion "   150 degrees forward flexion  100-105 degrees abduction   30 degrees external rotation   L4 internal rotation    There is no tenderness present over the shoulder.   The patient is neurovascularly intact distally in the extremity.      Data Review     I have personally reviewed pertinent films in PACS, and my interpretation follows.    X-rays taken 2024 of Left shoulder independently reviewed and demonstrate reverse shoulder prosthesis in appropriate alignment without fracture, loosening or lucency.    Social History     Tobacco Use    Smoking status: Former     Current packs/day: 0.00     Average packs/day: 1 pack/day for 18.0 years (18.0 ttl pk-yrs)     Types: Cigarettes     Start date:      Quit date:      Years since quittin.0    Smokeless tobacco: Never   Vaping Use    Vaping status: Never Used   Substance Use Topics    Alcohol use: Never    Drug use: Never           Procedures  None     Leslee Cardoso PA-C

## 2024-02-05 DIAGNOSIS — E78.2 MIXED HYPERLIPIDEMIA: ICD-10-CM

## 2024-02-05 DIAGNOSIS — I10 ESSENTIAL HYPERTENSION: ICD-10-CM

## 2024-02-05 DIAGNOSIS — K21.9 CHRONIC GERD: ICD-10-CM

## 2024-02-05 RX ORDER — FENOFIBRATE 145 MG/1
145 TABLET, COATED ORAL DAILY
Qty: 90 TABLET | Refills: 1 | Status: SHIPPED | OUTPATIENT
Start: 2024-02-05 | End: 2024-02-05 | Stop reason: SDUPTHER

## 2024-02-05 RX ORDER — AMLODIPINE BESYLATE 10 MG/1
10 TABLET ORAL EVERY MORNING
Qty: 90 TABLET | Refills: 3 | Status: SHIPPED | OUTPATIENT
Start: 2024-02-05 | End: 2024-02-12 | Stop reason: SDUPTHER

## 2024-02-05 RX ORDER — LOVASTATIN 40 MG/1
40 TABLET ORAL DAILY
Qty: 90 TABLET | Refills: 1 | Status: SHIPPED | OUTPATIENT
Start: 2024-02-05 | End: 2024-02-12 | Stop reason: SDUPTHER

## 2024-02-05 RX ORDER — OMEPRAZOLE 20 MG/1
20 CAPSULE, DELAYED RELEASE ORAL DAILY
Qty: 90 CAPSULE | Refills: 1 | Status: SHIPPED | OUTPATIENT
Start: 2024-02-05 | End: 2024-02-12 | Stop reason: SDUPTHER

## 2024-02-05 RX ORDER — FENOFIBRATE 145 MG/1
145 TABLET, COATED ORAL DAILY
Qty: 90 TABLET | Refills: 1 | Status: SHIPPED | OUTPATIENT
Start: 2024-02-05 | End: 2024-02-12 | Stop reason: SDUPTHER

## 2024-02-06 ENCOUNTER — TELEPHONE (OUTPATIENT)
Age: 79
End: 2024-02-06

## 2024-02-06 DIAGNOSIS — K21.9 CHRONIC GERD: ICD-10-CM

## 2024-02-06 DIAGNOSIS — E78.2 MIXED HYPERLIPIDEMIA: ICD-10-CM

## 2024-02-06 DIAGNOSIS — I10 ESSENTIAL HYPERTENSION: ICD-10-CM

## 2024-02-06 NOTE — TELEPHONE ENCOUNTER
From voice mail:    Hi, this is Scottie Grewal. I wanted to let Doctor Robbie know that I got a call from [facility]. They wanted to know, had I gotten in touch with you, to have you send my scripts to them so they can fill them? Because I now have a different insurance company, I have to go through [employer], received the medication. So I guess I'll be waiting to hear from you. Thank you very much. Michaela.

## 2024-02-08 DIAGNOSIS — G47.00 INSOMNIA, UNSPECIFIED TYPE: ICD-10-CM

## 2024-02-08 RX ORDER — DOXEPIN HYDROCHLORIDE 10 MG/1
10 CAPSULE ORAL
Qty: 90 CAPSULE | Refills: 0 | Status: SHIPPED | OUTPATIENT
Start: 2024-02-08 | End: 2024-05-08

## 2024-02-12 RX ORDER — CETIRIZINE HYDROCHLORIDE 10 MG/1
10 TABLET, CHEWABLE ORAL AS NEEDED
Qty: 90 TABLET | Refills: 1 | Status: SHIPPED | OUTPATIENT
Start: 2024-02-12

## 2024-02-12 RX ORDER — OMEPRAZOLE 20 MG/1
20 CAPSULE, DELAYED RELEASE ORAL DAILY
Qty: 90 CAPSULE | Refills: 1 | Status: SHIPPED | OUTPATIENT
Start: 2024-02-12

## 2024-02-12 RX ORDER — FENOFIBRATE 145 MG/1
145 TABLET, COATED ORAL DAILY
Qty: 90 TABLET | Refills: 1 | Status: SHIPPED | OUTPATIENT
Start: 2024-02-12

## 2024-02-12 RX ORDER — LOVASTATIN 40 MG/1
40 TABLET ORAL DAILY
Qty: 90 TABLET | Refills: 1 | Status: SHIPPED | OUTPATIENT
Start: 2024-02-12

## 2024-02-12 RX ORDER — AMLODIPINE BESYLATE 10 MG/1
10 TABLET ORAL EVERY MORNING
Qty: 90 TABLET | Refills: 2 | Status: SHIPPED | OUTPATIENT
Start: 2024-02-12

## 2024-05-28 NOTE — TELEPHONE ENCOUNTER
Patient.  Called back and was notified Detail Level: Detailed Quality 137: Melanoma: Continuity Of Care - Recall System: Patient information entered into a recall system that includes: target date for the next exam specified AND a process to follow up with patients regarding missed or unscheduled appointments When Should The Patient Follow-Up For Their Next Full-Body Skin Exam?: 3 Months

## 2024-06-07 DIAGNOSIS — G47.00 INSOMNIA, UNSPECIFIED TYPE: ICD-10-CM

## 2024-06-07 NOTE — TELEPHONE ENCOUNTER
Reason for call:   [x] Refill   [] Prior Auth  [] Other:     Office:   [] PCP/Provider -   [x] Specialty/Provider -     Medication: doxepin (SINEquan) 10 mg     Dose/Frequency: Take 1 capsule (10 mg total) by mouth daily at bedtime     Quantity: 90    Pharmacy: [facility] - [place], [place] - [address] [place] [address]     Does the patient have enough for 3 days?   [x] Yes   [] No - Send as HP to POD

## 2024-06-12 RX ORDER — DOXEPIN HYDROCHLORIDE 10 MG/1
10 CAPSULE ORAL
Qty: 90 CAPSULE | Refills: 1 | Status: SHIPPED | OUTPATIENT
Start: 2024-06-12 | End: 2024-12-09

## 2024-06-14 ENCOUNTER — TELEPHONE (OUTPATIENT)
Age: 79
End: 2024-06-14

## 2024-06-17 NOTE — TELEPHONE ENCOUNTER
Scottie called in to schedule appt with ketan in Nov , Patient insurance is not in network with [facility] .

## 2024-06-20 NOTE — TELEPHONE ENCOUNTER
Patient called again to schedule this follow up appointment, I reminded him that we had discussed of his new insurance [employer] H[employer] is not accepted by [facility]. He remembered and said he is following up with [facility] instead. All questions and concerns were addressed. Thank you

## 2024-11-06 ENCOUNTER — TELEPHONE (OUTPATIENT)
Age: 79
End: 2024-11-06

## 2024-11-11 DIAGNOSIS — E78.2 MIXED HYPERLIPIDEMIA: ICD-10-CM

## 2024-11-12 RX ORDER — FENOFIBRATE 145 MG/1
145 TABLET, COATED ORAL DAILY
Qty: 7 TABLET | Refills: 0 | Status: SHIPPED | OUTPATIENT
Start: 2024-11-12

## 2024-11-20 DIAGNOSIS — E78.2 MIXED HYPERLIPIDEMIA: ICD-10-CM

## 2025-06-18 NOTE — PROGRESS NOTES
Spoke to dr. Damaris Blakely and stated he is to stay off lisinopril as blood pressure was 122/60 [Right] : right shoulder [There are no fractures, subluxations or dislocations. No significant abnormalities are seen] : There are no fractures, subluxations or dislocations. No significant abnormalities are seen

## (undated) DEVICE — HANDPIECE SET WITH RETRACTABLE COAXIAL FAN SPRAY TIP AND SUCTION TUBE: Brand: INTERPULSE

## (undated) DEVICE — DRAPE EQUIPMENT RF WAND

## (undated) DEVICE — SUT VICRYL 2-0 CT-1 27 IN J259H

## (undated) DEVICE — PROBE 8225401 5PK SD-SD BIPOL STIM ROHS

## (undated) DEVICE — SKIN MARKER DUAL TIP WITH RULER CAP, FLEXIBLE RULER AND LABELS: Brand: DEVON

## (undated) DEVICE — IV CATH INTROCAN 18G X 1 1/4 SAFETY

## (undated) DEVICE — DRILL BIT 3.2MM

## (undated) DEVICE — PACK MAJOR ORTHO W/SPLITS PBDS

## (undated) DEVICE — SUT SILK 3-0 SH CR/8 18 IN C013D

## (undated) DEVICE — GLOVE INDICATOR PI UNDERGLOVE SZ 7.5 BLUE

## (undated) DEVICE — BIPOLAR CORD DISP

## (undated) DEVICE — NEEDLE 25G X 1 1/2

## (undated) DEVICE — TONGUE DEPRESSOR STERILE

## (undated) DEVICE — MASTISOL LIQ ADHESIVE 2/3ML

## (undated) DEVICE — CHLORHEXIDINE 4PCT 4 OZ

## (undated) DEVICE — UROCATCH BAG

## (undated) DEVICE — GLOVE PI ULTRA TOUCH SZ.6.5

## (undated) DEVICE — INTENT TO BE USED WITH SUTURE MATERIAL FOR TISSUE CLOSURE: Brand: RICHARD-ALLAN®  NEEDLE 1/2 CIRCLE REVERSE CUTTING

## (undated) DEVICE — GUIDE PIN 3 X 100MM PERFORM

## (undated) DEVICE — VESSEL LOOP MAXI - RED

## (undated) DEVICE — T-MAX DISPOSABLE FACE MASK 8 PER BOX

## (undated) DEVICE — 3M™ TEGADERM™ TRANSPARENT FILM DRESSING FRAME STYLE, 1624W, 2-3/8 IN X 2-3/4 IN (6 CM X 7 CM), 100/CT 4CT/CASE: Brand: 3M™ TEGADERM™

## (undated) DEVICE — CATH FOLEY COUNCIL 18FR 5ML 2 WAY LUBRICATH

## (undated) DEVICE — SHEATH URETERAL ACCESS 12/14FR 35CM PROXIS

## (undated) DEVICE — IMPERVIOUS STOCKINETTE: Brand: DEROYAL

## (undated) DEVICE — GLOVE SRG BIOGEL 7

## (undated) DEVICE — CAPIT SHLDR PERFORM STEM RSA-PERF-REV LAT-AUG

## (undated) DEVICE — HEAVY DUTY TABLE COVER: Brand: CONVERTORS

## (undated) DEVICE — INTENDED FOR TISSUE SEPARATION, AND OTHER PROCEDURES THAT REQUIRE A SHARP SURGICAL BLADE TO PUNCTURE OR CUT.: Brand: BARD-PARKER SAFETY BLADES SIZE 10, STERILE

## (undated) DEVICE — LUBRICANT SURGILUBE TUBE 4 OZ  FLIP TOP

## (undated) DEVICE — INVIEW CLEAR LEGGINGS: Brand: CONVERTORS

## (undated) DEVICE — ARTHROSCOPY FLOOR MAT

## (undated) DEVICE — 3M™ STERI-STRIP™ REINFORCED ADHESIVE SKIN CLOSURES, R1547, 1/2 IN X 4 IN (12 MM X 100 MM), 6 STRIPS/ENVELOPE: Brand: 3M™ STERI-STRIP™

## (undated) DEVICE — BAG DECANTER

## (undated) DEVICE — 3M™ STERI-DRAPE™ U-DRAPE 1015: Brand: STERI-DRAPE™

## (undated) DEVICE — BAG URINE DRAINAGE 2000ML ANTI RFLX LF

## (undated) DEVICE — SUT SILK PERMA-HAND 3-0 18IN A182H

## (undated) DEVICE — PACK TUR

## (undated) DEVICE — ELECTRODE BLADE MOD E-Z CLEAN  2.75IN 7CM -0012AM

## (undated) DEVICE — 3M™ IOBAN™ 2 ANTIMICROBIAL INCISE DRAPE 6650EZ: Brand: IOBAN™ 2

## (undated) DEVICE — CHLORAPREP HI-LITE 26ML ORANGE

## (undated) DEVICE — GAUZE SPONGES,USP TYPE VII GAUZE, 12 PLY: Brand: CURITY

## (undated) DEVICE — SPONGE CHERRY 1/2IN

## (undated) DEVICE — 4-PORT MANIFOLD: Brand: NEPTUNE 2

## (undated) DEVICE — PREMIUM DRY TRAY LF: Brand: MEDLINE INDUSTRIES, INC.

## (undated) DEVICE — SUT VICRYL 0 CT-1 27 IN J260H

## (undated) DEVICE — CATH URETERAL 5FR X 70 CM FLEX TIP POLYUR BARD

## (undated) DEVICE — GLOVE INDICATOR PI UNDERGLOVE SZ 7 BLUE

## (undated) DEVICE — GLOVE SRG BIOGEL ORTHOPEDIC 7.5

## (undated) DEVICE — STERILE SURGICAL LUBRICANT,  TUBE: Brand: SURGILUBE

## (undated) DEVICE — WET SKIN PREP TRAY: Brand: MEDLINE INDUSTRIES, INC.

## (undated) DEVICE — ELECTRODE 8227304 5PK PRASS PR 18MM ROHS

## (undated) DEVICE — CATH SECURE FOLEY

## (undated) DEVICE — SUT MONOCRYL 4-0 PS-2 27 IN Y426H

## (undated) DEVICE — GLOVE PI ULTRA TOUCH SZ.7.5

## (undated) DEVICE — GAUZE SPONGES,16 PLY: Brand: CURITY

## (undated) DEVICE — VESSEL LOOPS X-RAY DETECTABLE: Brand: DEROYAL

## (undated) DEVICE — SUT FIBERWIRE #2 1/2 CIRCLE T-5 38IN AR-7200

## (undated) DEVICE — BULB SYRINGE,IRRIGATION WITH PROTECTIVE CAP: Brand: DOVER

## (undated) DEVICE — DRESSING MEPILEX AG BORDER POST-OP 4 X 8 IN

## (undated) DEVICE — NEPTUNE E-SEP SMOKE EVACUATION PENCIL, COATED, 70MM BLADE, PUSH BUTTON SWITCH: Brand: NEPTUNE E-SEP

## (undated) DEVICE — DRAPE SHEET THREE QUARTER

## (undated) DEVICE — GUIDEWIRE STRGHT TIP 0.035 IN  SOLO PLUS

## (undated) DEVICE — DRESSING MEPILEX AG BORDER 4 X 8 IN

## (undated) DEVICE — BLADE SAGITTAL 25.6 X 9.5MM

## (undated) DEVICE — TIBURON SPLIT SHEET: Brand: CONVERTORS

## (undated) DEVICE — INTENDED FOR TISSUE SEPARATION, AND OTHER PROCEDURES THAT REQUIRE A SHARP SURGICAL BLADE TO PUNCTURE OR CUT.: Brand: BARD-PARKER SAFETY BLADES SIZE 15, STERILE

## (undated) DEVICE — X-RAY DETECTABLE SPONGES,16 PLY: Brand: VISTEC

## (undated) DEVICE — SCD SEQUENTIAL COMPRESSION COMFORT SLEEVE MEDIUM KNEE LENGTH: Brand: KENDALL SCD

## (undated) DEVICE — GLOVE SRG BIOGEL 7.5

## (undated) DEVICE — 3M™ TEGADERM™ TRANSPARENT FILM DRESSING FRAME STYLE, 1626W, 4 IN X 4-3/4 IN (10 CM X 12 CM), 50/CT 4CT/CASE: Brand: 3M™ TEGADERM™

## (undated) DEVICE — Device: Brand: OLYMPUS

## (undated) DEVICE — SYRINGE 10ML LL

## (undated) DEVICE — 10FR FRAZIER SUCTION HANDLE: Brand: CARDINAL HEALTH

## (undated) DEVICE — CATHETER 8591-38 PASSER,38CM

## (undated) DEVICE — PACK UNIVERSAL NECK

## (undated) DEVICE — PROVE COVER: Brand: UNBRANDED

## (undated) DEVICE — SUT SILK 2-0 SH 30 IN K833H

## (undated) DEVICE — STANDARD SURGICAL GOWN, L: Brand: CONVERTORS

## (undated) DEVICE — GUIDE PIN 3 X 75MM STRL

## (undated) DEVICE — NEEDLE 18 G X 1 1/2

## (undated) DEVICE — ALCOHOL PREP, 2 PLY, LARGE, MADE IN USA, SATURATED WITH 70% ISOPROPYL ALCOHOL, FOR EXTERNAL USE ONLY: Brand: WEBCOL

## (undated) DEVICE — SPECIMEN CONTAINER STERILE PEEL PACK

## (undated) DEVICE — SUT VICRYL 3-0 SH 27 IN J416H

## (undated) DEVICE — GUIDE PIN 2.5 X 220MM AEQUALIS PERFORM PLUS

## (undated) DEVICE — DISPOSABLE EQUIPMENT COVER: Brand: SMALL TOWEL DRAPE

## (undated) DEVICE — HOOD: Brand: FLYTE, SURGICOOL

## (undated) DEVICE — REMOTE SLEEP INSPIRE